# Patient Record
Sex: MALE | Race: WHITE | NOT HISPANIC OR LATINO | Employment: FULL TIME | URBAN - METROPOLITAN AREA
[De-identification: names, ages, dates, MRNs, and addresses within clinical notes are randomized per-mention and may not be internally consistent; named-entity substitution may affect disease eponyms.]

---

## 2017-02-15 ENCOUNTER — CONSULT (OUTPATIENT)
Dept: CARDIOLOGY | Facility: CLINIC | Age: 42
End: 2017-02-15

## 2017-02-15 VITALS
WEIGHT: 243 LBS | HEART RATE: 76 BPM | BODY MASS INDEX: 32.91 KG/M2 | HEIGHT: 72 IN | DIASTOLIC BLOOD PRESSURE: 108 MMHG | SYSTOLIC BLOOD PRESSURE: 150 MMHG

## 2017-02-15 DIAGNOSIS — F10.10 ETOH ABUSE: ICD-10-CM

## 2017-02-15 DIAGNOSIS — I48.3 TYPICAL ATRIAL FLUTTER (HCC): Primary | ICD-10-CM

## 2017-02-15 DIAGNOSIS — G47.33 OSA (OBSTRUCTIVE SLEEP APNEA): ICD-10-CM

## 2017-02-15 PROCEDURE — 99244 OFF/OP CNSLTJ NEW/EST MOD 40: CPT | Performed by: INTERNAL MEDICINE

## 2017-02-15 PROCEDURE — 93000 ELECTROCARDIOGRAM COMPLETE: CPT | Performed by: INTERNAL MEDICINE

## 2017-02-15 RX ORDER — METOPROLOL SUCCINATE 25 MG/1
25 TABLET, EXTENDED RELEASE ORAL 2 TIMES DAILY
COMMUNITY
End: 2017-03-27 | Stop reason: ALTCHOICE

## 2017-02-15 RX ORDER — BUSPIRONE HYDROCHLORIDE 15 MG/1
15 TABLET ORAL 2 TIMES DAILY
COMMUNITY
End: 2017-07-11

## 2017-02-15 RX ORDER — DILTIAZEM HYDROCHLORIDE 120 MG/1
120 CAPSULE, COATED, EXTENDED RELEASE ORAL 2 TIMES DAILY
Qty: 60 CAPSULE | Refills: 5 | Status: SHIPPED | OUTPATIENT
Start: 2017-02-15 | End: 2017-07-31 | Stop reason: SDUPTHER

## 2017-02-15 RX ORDER — CLONAZEPAM 1 MG/1
1 TABLET ORAL AS NEEDED
COMMUNITY
End: 2017-10-26

## 2017-02-15 RX ORDER — AMIODARONE HYDROCHLORIDE 200 MG/1
200 TABLET ORAL DAILY
COMMUNITY
End: 2017-02-15 | Stop reason: ALTCHOICE

## 2017-02-15 RX ORDER — DULOXETIN HYDROCHLORIDE 30 MG/1
60 CAPSULE, DELAYED RELEASE ORAL DAILY
COMMUNITY
End: 2017-03-27

## 2017-02-15 NOTE — PROGRESS NOTES
Cardiology Consults     Stevenson J Ranjana  1975  [phone]    02/15/17      [facility] CARDIOLOGY    VIKASH Nguyen  [address] / [place] [address]    Chief Complaint   Patient presents with   • Palpitations     1. Typical Atrial Flutter      A) Hosptialization 7/2016 for Aflutter in setting of phentermine - cardioverted with Corvert discharged on betablocker      B) Cardiolite stress test 8/2016 Negative for ischemia      C)Echocardiogram 7/2016: mild LVH, biatrial enlargement, EF 55%-60%     D) EPS with RFA atrial flutter 11/21/16 with Dr. Alaniz- [facility], [place], [place], recurrence with ECV and initiation of Amiodarone     E) Recurrence with repeat EPS 12/16/17: with bidirectional block documented, Dr. Alaniz,     F) CHADSvasc = 0 on Eliquis  2. Anxiety/ Depression   3. Ulcerative Colitis - diagnosed at age 23, but not being treated currently  4. Obesity   5. ETOH Abuse   6. DONNIE- non compliant  7. Surgical History   A. Right shoulder   B. Tonsillectomy      History of Present Illness:   41 year old WM referred today for atrial flutter by [facility]. He was diagnosed with atrial flutter in July of 2016 in the setting of phentermine. He underwent EPS and ablation in [place] in 11/2016 and redo in 12/2016 due to recurrence. After the second ablation, it was felt that bidirectional block was obtained.  Apparently, the night after the ablation, he had some occurrence of atrial flutter and possible atrial fibrillation. This is by report, we don't have documentation of this. Since, the second ablation, he still has some palpitations with HRs up to 140s. This happens a few times per week, each episode lasting a few hours. His symptoms include tachy palpitations, chest discomfort, and SOB. He drinks 1/5 of bourbon every three-five days, and sometimes drinks beer as well. He has not cut back on his ETOH consumption. He also has history of  binge drinking on weekends. He also has history of phentermine abuse. He has stopped taking phentermine and has not taken this since 2016. He does not drink high amounts of caffeine. He occasionally drinks an energy drink. He occasionally smokes. His BP is good. He has sleep apnea but could not tolerate the CPAP.  He has not followed up with Dr. Alaniz or worn a monitor since the procedures. He is still taking Amiodarone since the second procedure.       Allergies   Allergen Reactions   • Contrast Dye         Cannot display prior to admission medications because the patient has not been admitted in this contact.            Current Outpatient Prescriptions:   •  amiodarone (PACERONE) 200 MG tablet, Take 200 mg by mouth Daily., Disp: , Rfl:   •  apixaban (ELIQUIS) 5 MG tablet tablet, Take 5 mg by mouth 2 (Two) Times a Day., Disp: , Rfl:   •  busPIRone (BUSPAR) 15 MG tablet, Take 15 mg by mouth 2 (Two) Times a Day., Disp: , Rfl:   •  clonazePAM (KlonoPIN) 1 MG tablet, Take 1 mg by mouth 2 (Two) Times a Day As Needed for seizures., Disp: , Rfl:   •  DULoxetine (CYMBALTA) 30 MG capsule, Take 30 mg by mouth Daily., Disp: , Rfl:   •  magnesium oxide (MAGOX) 400 (241.3 MG) MG tablet tablet, Take 400 mg by mouth Daily., Disp: , Rfl:   •  metoprolol succinate XL (TOPROL-XL) 25 MG 24 hr tablet, Take 25 mg by mouth 2 (Two) Times a Day., Disp: , Rfl:     Social History     Social History   • Marital status:      Spouse name: N/A   • Number of children: N/A   • Years of education: N/A     Social History Main Topics   • Smoking status: Never Smoker   • Smokeless tobacco: None   • Alcohol use No   • Drug use: No   • Sexual activity: Defer     Other Topics Concern   • None     Social History Narrative   • None       Family History: Father  CHF 62. Grandfather on father's   Mother: still living 63 no known cardiac conditions  Cousin with atrial fibrillation, with ablation.     REVIEW OF SYSTEMS:   CONST:  No weight  "loss, fever, chills, weakness or fatigue.   HEENT:  No visual loss, blurred vision, double vision, yellow sclerae.                   No hearing loss, congestion, sore throat.   SKIN:      No rashes, urticaria, ulcers, sores.     RESP:     No shortness of breath, hemoptysis, cough, sputum.   GI:           No anorexia, nausea, vomiting, diarrhea. No abdominal pain, melena.   :         No burning on urination, hematuria or increased frequency.  ENDO:    No diaphoresis, cold or heat intolerance. No polyuria or polydipsia.   NEURO:  No headache, dizziness, syncope, paralysis, ataxia, or parasthesias.                  No change in bowel or bladder control. No history of CVA/TIA  MUSC:    No muscle, back pain, joint pain or stiffness.   HEME:    No anemia, bleeding, bruising. No history of DVT/PE.  PSYCH:  No history of depression, anxiety    Vitals:    02/15/17 1258   BP: (!) 150/108   BP Location: Right arm   Patient Position: Sitting   Pulse: 76   Weight: 243 lb (110 kg)   Height: 72\" (182.9 cm)       Physical Exam:  GEN: Well nourished, Well- developed  No acute distress  HEENT: Normocephalic, Atraumatic, PERRLA, moist mucous membranes  NECK: supple, NO JVD, no thyromegaly, no lymphadenopathy  CARD: S1S2  Tachycardic, regular no murmur, gallop, rub  LUNGS: Clear to auscultataion, normal respiratory effort  ABDOMEN: Soft, nontender, normal bowel sounds  EXTREMITIES:No gross deformities,  No clubbing, cyanosis, or edema  SKIN: Warm, dry  NEURO: No focal deficits  PSYCHIATRIC: Normal affect and mood      Data:                                        ECG 12 Lead  Date/Time: 2/15/2017 1:36 PM  Performed by: KAT REZA  Authorized by: KAT REZA   Rhythm: sinus rhythm  BPM: 76            2nd EKG: shows atrial flutter 136 bpm with 2:1 conduction (this EKG was done after Dr. Reza examined the patient)    Assessment and Plan:   1. Typical atrial flutter    2. DONNIE (obstructive sleep apnea)    3. ETOH abuse  "     1. Typical atrial flutter s/p RFA in 12/2016. By report, this was felt to be successful at an outside hospital. He is still having episodes of atrial flutter documented by EKG in our office today. CHADsvasc = 0 continue Eliquis for now. He will stop Amiodarone today. Stop Metoprolol. Start Cardizem  mg BID. We will try to obtain the tele strips from [facility] the night of his ablation when he apparently went into atrial fibrillation by report. He did go into AFL with RVR while in clinic today. We will schedule him for EPS and redo ablation of atrial flutter. The risks, benefits, and alternatives of the procedure have been reviewed and the patient wishes to proceed.     2. DONNIE- needs to wear his CPAP  3. ETOH abuse- counseled cessation.     Scribed for Ramirez Reza MD by Makenan Flaherty PA-C. 2/15/2017  2:04 PM    I, Ramirez Reza MD, personally performed the services described in this documentation as scribed by the above named individual in my presence, and it is both accurate and complete.  2/15/2017  2:27 PM

## 2017-03-27 ENCOUNTER — APPOINTMENT (OUTPATIENT)
Dept: GENERAL RADIOLOGY | Facility: HOSPITAL | Age: 42
End: 2017-03-27

## 2017-03-27 ENCOUNTER — HOSPITAL ENCOUNTER (INPATIENT)
Facility: HOSPITAL | Age: 42
LOS: 2 days | Discharge: HOME | End: 2017-03-29
Attending: HOSPITALIST | Admitting: HOSPITALIST

## 2017-03-27 DIAGNOSIS — R07.2 PRECORDIAL PAIN: ICD-10-CM

## 2017-03-27 DIAGNOSIS — I48.92 ATRIAL FLUTTER WITH RAPID VENTRICULAR RESPONSE (HCC): Primary | ICD-10-CM

## 2017-03-27 LAB
ALBUMIN SERPL-MCNC: 3.8 G/DL (ref 3.4–4.8)
ALBUMIN/GLOB SERPL: 1.6 G/DL (ref 1.1–1.8)
ALP SERPL-CCNC: 46 U/L (ref 38–126)
ALT SERPL W P-5'-P-CCNC: 75 U/L (ref 21–72)
AMPHET+METHAMPHET UR QL: NEGATIVE
ANION GAP SERPL CALCULATED.3IONS-SCNC: 9 MMOL/L (ref 5–15)
AST SERPL-CCNC: 52 U/L (ref 17–59)
BARBITURATES UR QL SCN: NEGATIVE
BASOPHILS # BLD AUTO: 0.01 10*3/MM3 (ref 0–0.2)
BASOPHILS NFR BLD AUTO: 0.1 % (ref 0–2)
BENZODIAZ UR QL SCN: NEGATIVE
BILIRUB SERPL-MCNC: 1 MG/DL (ref 0.2–1.3)
BUN BLD-MCNC: 14 MG/DL (ref 7–21)
BUN/CREAT SERPL: 12.1 (ref 7–25)
CALCIUM SPEC-SCNC: 8.3 MG/DL (ref 8.4–10.2)
CANNABINOIDS SERPL QL: NEGATIVE
CHLORIDE SERPL-SCNC: 101 MMOL/L (ref 95–110)
CO2 SERPL-SCNC: 27 MMOL/L (ref 22–31)
COCAINE UR QL: NEGATIVE
CREAT BLD-MCNC: 1.16 MG/DL (ref 0.7–1.3)
DEPRECATED RDW RBC AUTO: 50.7 FL (ref 35.1–43.9)
EOSINOPHIL # BLD AUTO: 0.19 10*3/MM3 (ref 0–0.7)
EOSINOPHIL NFR BLD AUTO: 2.1 % (ref 0–7)
ERYTHROCYTE [DISTWIDTH] IN BLOOD BY AUTOMATED COUNT: 14.6 % (ref 11.5–14.5)
GFR SERPL CREATININE-BSD FRML MDRD: 70 ML/MIN/1.73 (ref 70–162)
GLOBULIN UR ELPH-MCNC: 2.4 GM/DL (ref 2.3–3.5)
GLUCOSE BLD-MCNC: 96 MG/DL (ref 60–100)
HCT VFR BLD AUTO: 43.2 % (ref 39–49)
HGB BLD-MCNC: 14.8 G/DL (ref 13.7–17.3)
HOLD SPECIMEN: NORMAL
HOLD SPECIMEN: NORMAL
IMM GRANULOCYTES # BLD: 0.03 10*3/MM3 (ref 0–0.02)
IMM GRANULOCYTES NFR BLD: 0.3 % (ref 0–0.5)
LYMPHOCYTES # BLD AUTO: 1.46 10*3/MM3 (ref 0.6–4.2)
LYMPHOCYTES NFR BLD AUTO: 16.4 % (ref 10–50)
MCH RBC QN AUTO: 32.5 PG (ref 26.5–34)
MCHC RBC AUTO-ENTMCNC: 34.3 G/DL (ref 31.5–36.3)
MCV RBC AUTO: 94.9 FL (ref 80–98)
METHADONE UR QL SCN: NEGATIVE
MONOCYTES # BLD AUTO: 0.62 10*3/MM3 (ref 0–0.9)
MONOCYTES NFR BLD AUTO: 7 % (ref 0–12)
NEUTROPHILS # BLD AUTO: 6.61 10*3/MM3 (ref 2–8.6)
NEUTROPHILS NFR BLD AUTO: 74.1 % (ref 37–80)
OPIATES UR QL: NEGATIVE
OXYCODONE UR QL SCN: NEGATIVE
PLATELET # BLD AUTO: 259 10*3/MM3 (ref 150–450)
PMV BLD AUTO: 10.5 FL (ref 8–12)
POTASSIUM BLD-SCNC: 4 MMOL/L (ref 3.5–5.1)
PROT SERPL-MCNC: 6.2 G/DL (ref 6.3–8.6)
RBC # BLD AUTO: 4.55 10*6/MM3 (ref 4.37–5.74)
SODIUM BLD-SCNC: 137 MMOL/L (ref 137–145)
TROPONIN I SERPL-MCNC: 0.01 NG/ML
TROPONIN I SERPL-MCNC: 0.02 NG/ML
TSH SERPL DL<=0.05 MIU/L-ACNC: 2.02 MIU/ML (ref 0.46–4.68)
WBC NRBC COR # BLD: 8.92 10*3/MM3 (ref 3.2–9.8)
WHOLE BLOOD HOLD SPECIMEN: NORMAL
WHOLE BLOOD HOLD SPECIMEN: NORMAL

## 2017-03-27 PROCEDURE — 80053 COMPREHEN METABOLIC PANEL: CPT | Performed by: EMERGENCY MEDICINE

## 2017-03-27 PROCEDURE — 93005 ELECTROCARDIOGRAM TRACING: CPT | Performed by: EMERGENCY MEDICINE

## 2017-03-27 PROCEDURE — 84443 ASSAY THYROID STIM HORMONE: CPT | Performed by: EMERGENCY MEDICINE

## 2017-03-27 PROCEDURE — 80307 DRUG TEST PRSMV CHEM ANLYZR: CPT | Performed by: EMERGENCY MEDICINE

## 2017-03-27 PROCEDURE — 84484 ASSAY OF TROPONIN QUANT: CPT | Performed by: EMERGENCY MEDICINE

## 2017-03-27 PROCEDURE — 84484 ASSAY OF TROPONIN QUANT: CPT | Performed by: HOSPITALIST

## 2017-03-27 PROCEDURE — 93005 ELECTROCARDIOGRAM TRACING: CPT

## 2017-03-27 PROCEDURE — 93010 ELECTROCARDIOGRAM REPORT: CPT | Performed by: INTERNAL MEDICINE

## 2017-03-27 PROCEDURE — 99285 EMERGENCY DEPT VISIT HI MDM: CPT

## 2017-03-27 PROCEDURE — 85025 COMPLETE CBC W/AUTO DIFF WBC: CPT | Performed by: EMERGENCY MEDICINE

## 2017-03-27 PROCEDURE — 71010 HC CHEST PA OR AP: CPT

## 2017-03-27 RX ORDER — SODIUM CHLORIDE 0.9 % (FLUSH) 0.9 %
10 SYRINGE (ML) INJECTION AS NEEDED
Status: DISCONTINUED | OUTPATIENT
Start: 2017-03-27 | End: 2017-03-29 | Stop reason: HOSPADM

## 2017-03-27 RX ORDER — LORAZEPAM 2 MG/ML
1 INJECTION INTRAMUSCULAR
Status: DISCONTINUED | OUTPATIENT
Start: 2017-03-27 | End: 2017-03-29 | Stop reason: HOSPADM

## 2017-03-27 RX ORDER — DULOXETIN HYDROCHLORIDE 60 MG/1
60 CAPSULE, DELAYED RELEASE ORAL NIGHTLY
Status: DISCONTINUED | OUTPATIENT
Start: 2017-03-27 | End: 2017-03-29 | Stop reason: HOSPADM

## 2017-03-27 RX ORDER — TRAZODONE HYDROCHLORIDE 50 MG/1
50 TABLET ORAL NIGHTLY PRN
Status: DISCONTINUED | OUTPATIENT
Start: 2017-03-27 | End: 2017-03-29 | Stop reason: HOSPADM

## 2017-03-27 RX ORDER — SODIUM CHLORIDE 0.9 % (FLUSH) 0.9 %
1-10 SYRINGE (ML) INJECTION AS NEEDED
Status: DISCONTINUED | OUTPATIENT
Start: 2017-03-27 | End: 2017-03-29 | Stop reason: HOSPADM

## 2017-03-27 RX ORDER — DILTIAZEM HYDROCHLORIDE 300 MG/1
300 CAPSULE, COATED, EXTENDED RELEASE ORAL
Status: DISCONTINUED | OUTPATIENT
Start: 2017-03-28 | End: 2017-03-27

## 2017-03-27 RX ORDER — BUSPIRONE HYDROCHLORIDE 15 MG/1
15 TABLET ORAL 2 TIMES DAILY
Status: DISCONTINUED | OUTPATIENT
Start: 2017-03-28 | End: 2017-03-28 | Stop reason: SDUPTHER

## 2017-03-27 RX ORDER — ASPIRIN 325 MG
325 TABLET ORAL ONCE
Status: DISCONTINUED | OUTPATIENT
Start: 2017-03-27 | End: 2017-03-29 | Stop reason: HOSPADM

## 2017-03-27 RX ORDER — DULOXETIN HYDROCHLORIDE 60 MG/1
60 CAPSULE, DELAYED RELEASE ORAL DAILY
COMMUNITY
End: 2018-06-06 | Stop reason: DRUGHIGH

## 2017-03-27 RX ORDER — BUSPIRONE HYDROCHLORIDE 15 MG/1
15 TABLET ORAL 2 TIMES DAILY
Status: DISCONTINUED | OUTPATIENT
Start: 2017-03-28 | End: 2017-03-29 | Stop reason: HOSPADM

## 2017-03-27 RX ORDER — LORAZEPAM 0.5 MG/1
0.5 TABLET ORAL
Status: DISCONTINUED | OUTPATIENT
Start: 2017-03-27 | End: 2017-03-29 | Stop reason: HOSPADM

## 2017-03-27 RX ORDER — CLONAZEPAM 0.5 MG/1
1 TABLET ORAL 2 TIMES DAILY PRN
Status: DISCONTINUED | OUTPATIENT
Start: 2017-03-27 | End: 2017-03-29 | Stop reason: HOSPADM

## 2017-03-27 RX ORDER — DILTIAZEM HYDROCHLORIDE 5 MG/ML
20 INJECTION INTRAVENOUS ONCE
Status: COMPLETED | OUTPATIENT
Start: 2017-03-27 | End: 2017-03-27

## 2017-03-27 RX ORDER — LORAZEPAM 1 MG/1
1 TABLET ORAL
Status: DISCONTINUED | OUTPATIENT
Start: 2017-03-27 | End: 2017-03-29 | Stop reason: HOSPADM

## 2017-03-27 RX ORDER — LORAZEPAM 2 MG/ML
0.5 INJECTION INTRAMUSCULAR
Status: DISCONTINUED | OUTPATIENT
Start: 2017-03-27 | End: 2017-03-29 | Stop reason: HOSPADM

## 2017-03-27 RX ADMIN — DILTIAZEM HYDROCHLORIDE 20 MG: 5 INJECTION INTRAVENOUS at 17:38

## 2017-03-27 RX ADMIN — DULOXETINE 60 MG: 60 CAPSULE, DELAYED RELEASE ORAL at 23:25

## 2017-03-27 RX ADMIN — DILTIAZEM HYDROCHLORIDE 5 MG/HR: 5 INJECTION INTRAVENOUS at 23:34

## 2017-03-28 LAB
TROPONIN I SERPL-MCNC: 0.01 NG/ML
TROPONIN I SERPL-MCNC: 0.02 NG/ML
TROPONIN I SERPL-MCNC: 0.02 NG/ML

## 2017-03-28 PROCEDURE — 93005 ELECTROCARDIOGRAM TRACING: CPT | Performed by: INTERNAL MEDICINE

## 2017-03-28 PROCEDURE — 93010 ELECTROCARDIOGRAM REPORT: CPT | Performed by: INTERNAL MEDICINE

## 2017-03-28 PROCEDURE — 84484 ASSAY OF TROPONIN QUANT: CPT | Performed by: HOSPITALIST

## 2017-03-28 PROCEDURE — 25010000002 IBUTILIDE FUMARATE PER 1 MG: Performed by: INTERNAL MEDICINE

## 2017-03-28 RX ORDER — DILTIAZEM HYDROCHLORIDE 60 MG/1
120 CAPSULE, EXTENDED RELEASE ORAL EVERY 12 HOURS SCHEDULED
Status: DISCONTINUED | OUTPATIENT
Start: 2017-03-28 | End: 2017-03-29 | Stop reason: HOSPADM

## 2017-03-28 RX ORDER — PANTOPRAZOLE SODIUM 40 MG/1
40 TABLET, DELAYED RELEASE ORAL
Status: DISCONTINUED | OUTPATIENT
Start: 2017-03-29 | End: 2017-03-29 | Stop reason: HOSPADM

## 2017-03-28 RX ADMIN — DILTIAZEM HYDROCHLORIDE 5 MG/HR: 5 INJECTION INTRAVENOUS at 20:12

## 2017-03-28 RX ADMIN — APIXABAN 5 MG: 5 TABLET, FILM COATED ORAL at 17:52

## 2017-03-28 RX ADMIN — MAGNESIUM OXIDE TAB 400 MG (241.3 MG ELEMENTAL MG) 400 MG: 400 (241.3 MG) TAB at 08:08

## 2017-03-28 RX ADMIN — BUSPIRONE HYDROCHLORIDE 15 MG: 15 TABLET ORAL at 08:08

## 2017-03-28 RX ADMIN — DULOXETINE 60 MG: 60 CAPSULE, DELAYED RELEASE ORAL at 20:12

## 2017-03-28 RX ADMIN — APIXABAN 5 MG: 5 TABLET, FILM COATED ORAL at 08:08

## 2017-03-28 RX ADMIN — BUSPIRONE HYDROCHLORIDE 15 MG: 15 TABLET ORAL at 17:52

## 2017-03-28 RX ADMIN — BUSPIRONE HYDROCHLORIDE 15 MG: 15 TABLET ORAL at 00:52

## 2017-03-28 RX ADMIN — IBUTILIDE FUMARATE 1 MG: 0.1 INJECTION, SOLUTION INTRAVENOUS at 03:18

## 2017-03-28 RX ADMIN — DILTIAZEM HYDROCHLORIDE 120 MG: 60 CAPSULE, EXTENDED RELEASE ORAL at 21:42

## 2017-03-28 RX ADMIN — TRAZODONE HYDROCHLORIDE 50 MG: 50 TABLET ORAL at 01:05

## 2017-03-28 RX ADMIN — CLONAZEPAM 1 MG: 0.5 TABLET ORAL at 00:52

## 2017-03-29 VITALS
SYSTOLIC BLOOD PRESSURE: 132 MMHG | TEMPERATURE: 96.6 F | DIASTOLIC BLOOD PRESSURE: 73 MMHG | RESPIRATION RATE: 18 BRPM | BODY MASS INDEX: 32.53 KG/M2 | HEART RATE: 75 BPM | WEIGHT: 240.2 LBS | OXYGEN SATURATION: 94 % | HEIGHT: 72 IN

## 2017-03-29 LAB
ALBUMIN SERPL-MCNC: 3.5 G/DL (ref 3.4–4.8)
ALBUMIN/GLOB SERPL: 1.5 G/DL (ref 1.1–1.8)
ALP SERPL-CCNC: 42 U/L (ref 38–126)
ALT SERPL W P-5'-P-CCNC: 71 U/L (ref 21–72)
ANION GAP SERPL CALCULATED.3IONS-SCNC: 10 MMOL/L (ref 5–15)
AST SERPL-CCNC: 56 U/L (ref 17–59)
BASOPHILS # BLD AUTO: 0.02 10*3/MM3 (ref 0–0.2)
BASOPHILS NFR BLD AUTO: 0.3 % (ref 0–2)
BILIRUB SERPL-MCNC: 0.9 MG/DL (ref 0.2–1.3)
BUN BLD-MCNC: 13 MG/DL (ref 7–21)
BUN/CREAT SERPL: 10.8 (ref 7–25)
CALCIUM SPEC-SCNC: 8.3 MG/DL (ref 8.4–10.2)
CHLORIDE SERPL-SCNC: 104 MMOL/L (ref 95–110)
CO2 SERPL-SCNC: 26 MMOL/L (ref 22–31)
CREAT BLD-MCNC: 1.2 MG/DL (ref 0.7–1.3)
DEPRECATED RDW RBC AUTO: 50.6 FL (ref 35.1–43.9)
EOSINOPHIL # BLD AUTO: 0.17 10*3/MM3 (ref 0–0.7)
EOSINOPHIL NFR BLD AUTO: 2.5 % (ref 0–7)
ERYTHROCYTE [DISTWIDTH] IN BLOOD BY AUTOMATED COUNT: 14.8 % (ref 11.5–14.5)
GFR SERPL CREATININE-BSD FRML MDRD: 67 ML/MIN/1.73 (ref 70–162)
GLOBULIN UR ELPH-MCNC: 2.4 GM/DL (ref 2.3–3.5)
GLUCOSE BLD-MCNC: 87 MG/DL (ref 60–100)
HCT VFR BLD AUTO: 42.1 % (ref 39–49)
HGB BLD-MCNC: 14.1 G/DL (ref 13.7–17.3)
IMM GRANULOCYTES # BLD: 0.01 10*3/MM3 (ref 0–0.02)
IMM GRANULOCYTES NFR BLD: 0.1 % (ref 0–0.5)
LYMPHOCYTES # BLD AUTO: 1.49 10*3/MM3 (ref 0.6–4.2)
LYMPHOCYTES NFR BLD AUTO: 22 % (ref 10–50)
MAGNESIUM SERPL-MCNC: 2.1 MG/DL (ref 1.6–2.3)
MCH RBC QN AUTO: 31.5 PG (ref 26.5–34)
MCHC RBC AUTO-ENTMCNC: 33.5 G/DL (ref 31.5–36.3)
MCV RBC AUTO: 94 FL (ref 80–98)
MONOCYTES # BLD AUTO: 0.73 10*3/MM3 (ref 0–0.9)
MONOCYTES NFR BLD AUTO: 10.8 % (ref 0–12)
NEUTROPHILS # BLD AUTO: 4.34 10*3/MM3 (ref 2–8.6)
NEUTROPHILS NFR BLD AUTO: 64.3 % (ref 37–80)
PLATELET # BLD AUTO: 246 10*3/MM3 (ref 150–450)
PMV BLD AUTO: 10.6 FL (ref 8–12)
POTASSIUM BLD-SCNC: 4.2 MMOL/L (ref 3.5–5.1)
PROT SERPL-MCNC: 5.9 G/DL (ref 6.3–8.6)
RBC # BLD AUTO: 4.48 10*6/MM3 (ref 4.37–5.74)
SODIUM BLD-SCNC: 140 MMOL/L (ref 137–145)
WBC NRBC COR # BLD: 6.76 10*3/MM3 (ref 3.2–9.8)

## 2017-03-29 PROCEDURE — 80053 COMPREHEN METABOLIC PANEL: CPT | Performed by: INTERNAL MEDICINE

## 2017-03-29 PROCEDURE — 85025 COMPLETE CBC W/AUTO DIFF WBC: CPT | Performed by: INTERNAL MEDICINE

## 2017-03-29 PROCEDURE — 83735 ASSAY OF MAGNESIUM: CPT | Performed by: INTERNAL MEDICINE

## 2017-03-29 PROCEDURE — 25010000002 MAGNESIUM SULFATE PER 500 MG OF MAGNESIUM: Performed by: HOSPITALIST

## 2017-03-29 PROCEDURE — 25010000002 THIAMINE PER 100 MG: Performed by: HOSPITALIST

## 2017-03-29 RX ADMIN — CLONAZEPAM 1 MG: 0.5 TABLET ORAL at 00:04

## 2017-03-29 RX ADMIN — MAGNESIUM OXIDE TAB 400 MG (241.3 MG ELEMENTAL MG) 400 MG: 400 (241.3 MG) TAB at 08:10

## 2017-03-29 RX ADMIN — TRAZODONE HYDROCHLORIDE 50 MG: 50 TABLET ORAL at 00:03

## 2017-03-29 RX ADMIN — FOLIC ACID 100 ML/HR: 5 INJECTION, SOLUTION INTRAMUSCULAR; INTRAVENOUS; SUBCUTANEOUS at 08:11

## 2017-03-29 RX ADMIN — APIXABAN 5 MG: 5 TABLET, FILM COATED ORAL at 08:10

## 2017-03-29 RX ADMIN — DILTIAZEM HYDROCHLORIDE 120 MG: 60 CAPSULE, EXTENDED RELEASE ORAL at 08:09

## 2017-03-29 RX ADMIN — BUSPIRONE HYDROCHLORIDE 15 MG: 15 TABLET ORAL at 08:10

## 2017-04-07 ENCOUNTER — PREP FOR SURGERY (OUTPATIENT)
Dept: CARDIOLOGY | Facility: CLINIC | Age: 42
End: 2017-04-07

## 2017-04-07 DIAGNOSIS — I48.3 TYPICAL ATRIAL FLUTTER (HCC): Primary | ICD-10-CM

## 2017-04-07 RX ORDER — ACETAMINOPHEN 325 MG/1
650 TABLET ORAL EVERY 4 HOURS PRN
Status: CANCELLED | OUTPATIENT
Start: 2017-04-07

## 2017-04-07 RX ORDER — NITROGLYCERIN 0.4 MG/1
0.4 TABLET SUBLINGUAL
Status: CANCELLED | OUTPATIENT
Start: 2017-04-07

## 2017-04-07 RX ORDER — PROMETHAZINE HYDROCHLORIDE 25 MG/ML
12.5 INJECTION, SOLUTION INTRAMUSCULAR; INTRAVENOUS EVERY 4 HOURS PRN
Status: CANCELLED | OUTPATIENT
Start: 2017-04-07

## 2017-04-17 ENCOUNTER — TELEPHONE (OUTPATIENT)
Dept: CARDIOLOGY | Facility: CLINIC | Age: 42
End: 2017-04-17

## 2017-05-01 ENCOUNTER — APPOINTMENT (OUTPATIENT)
Dept: PREADMISSION TESTING | Facility: HOSPITAL | Age: 42
End: 2017-05-01

## 2017-05-01 DIAGNOSIS — I48.3 TYPICAL ATRIAL FLUTTER (HCC): ICD-10-CM

## 2017-05-01 LAB
ANION GAP SERPL CALCULATED.3IONS-SCNC: 3 MMOL/L (ref 3–11)
BUN BLD-MCNC: 16 MG/DL (ref 9–23)
BUN/CREAT SERPL: 12.3 (ref 7–25)
CALCIUM SPEC-SCNC: 9.2 MG/DL (ref 8.7–10.4)
CHLORIDE SERPL-SCNC: 105 MMOL/L (ref 99–109)
CO2 SERPL-SCNC: 32 MMOL/L (ref 20–31)
CREAT BLD-MCNC: 1.3 MG/DL (ref 0.6–1.3)
DEPRECATED RDW RBC AUTO: 48.9 FL (ref 37–54)
ERYTHROCYTE [DISTWIDTH] IN BLOOD BY AUTOMATED COUNT: 13.8 % (ref 11.3–14.5)
GFR SERPL CREATININE-BSD FRML MDRD: 61 ML/MIN/1.73
GLUCOSE BLD-MCNC: 91 MG/DL (ref 70–100)
HBA1C MFR BLD: 5.1 % (ref 4.8–5.6)
HCT VFR BLD AUTO: 48.8 % (ref 38.9–50.9)
HGB BLD-MCNC: 15.9 G/DL (ref 13.1–17.5)
INR PPP: 1.1
MCH RBC QN AUTO: 31.5 PG (ref 27–31)
MCHC RBC AUTO-ENTMCNC: 32.6 G/DL (ref 32–36)
MCV RBC AUTO: 96.8 FL (ref 80–99)
PLATELET # BLD AUTO: 328 10*3/MM3 (ref 150–450)
PMV BLD AUTO: 10.5 FL (ref 6–12)
POTASSIUM BLD-SCNC: 4.5 MMOL/L (ref 3.5–5.5)
PROTHROMBIN TIME: 12 SECONDS (ref 9.6–11.5)
RBC # BLD AUTO: 5.04 10*6/MM3 (ref 4.2–5.76)
SODIUM BLD-SCNC: 140 MMOL/L (ref 132–146)
WBC NRBC COR # BLD: 6.81 10*3/MM3 (ref 3.5–10.8)

## 2017-05-01 RX ORDER — DULOXETIN HYDROCHLORIDE 30 MG/1
30 CAPSULE, DELAYED RELEASE ORAL
COMMUNITY
Start: 2016-09-23 | End: 2017-05-01 | Stop reason: DRUGHIGH

## 2017-05-02 ENCOUNTER — HOSPITAL ENCOUNTER (OUTPATIENT)
Facility: HOSPITAL | Age: 42
Setting detail: OBSERVATION
Discharge: HOME | End: 2017-05-03
Attending: INTERNAL MEDICINE | Admitting: INTERNAL MEDICINE

## 2017-05-02 DIAGNOSIS — I48.3 TYPICAL ATRIAL FLUTTER (HCC): ICD-10-CM

## 2017-05-02 PROCEDURE — 25010000002 FENTANYL CITRATE (PF) 100 MCG/2ML SOLUTION: Performed by: INTERNAL MEDICINE

## 2017-05-02 PROCEDURE — C1732 CATH, EP, DIAG/ABL, 3D/VECT: HCPCS | Performed by: INTERNAL MEDICINE

## 2017-05-02 PROCEDURE — C1894 INTRO/SHEATH, NON-LASER: HCPCS | Performed by: INTERNAL MEDICINE

## 2017-05-02 PROCEDURE — 93653 COMPRE EP EVAL TX SVT: CPT | Performed by: INTERNAL MEDICINE

## 2017-05-02 PROCEDURE — 25010000002 HEPARIN (PORCINE) PER 1000 UNITS: Performed by: INTERNAL MEDICINE

## 2017-05-02 PROCEDURE — 25010000002 DIPHENHYDRAMINE PER 50 MG: Performed by: INTERNAL MEDICINE

## 2017-05-02 PROCEDURE — 94660 CPAP INITIATION&MGMT: CPT

## 2017-05-02 PROCEDURE — G0378 HOSPITAL OBSERVATION PER HR: HCPCS

## 2017-05-02 PROCEDURE — C1730 CATH, EP, 19 OR FEW ELECT: HCPCS | Performed by: INTERNAL MEDICINE

## 2017-05-02 PROCEDURE — 93623 PRGRMD STIMJ&PACG IV RX NFS: CPT | Performed by: INTERNAL MEDICINE

## 2017-05-02 PROCEDURE — 99152 MOD SED SAME PHYS/QHP 5/>YRS: CPT | Performed by: INTERNAL MEDICINE

## 2017-05-02 PROCEDURE — 93613 INTRACARDIAC EPHYS 3D MAPG: CPT | Performed by: INTERNAL MEDICINE

## 2017-05-02 PROCEDURE — 25010000002 MIDAZOLAM PER 1 MG: Performed by: INTERNAL MEDICINE

## 2017-05-02 PROCEDURE — 25010000002 ONDANSETRON PER 1 MG: Performed by: INTERNAL MEDICINE

## 2017-05-02 RX ORDER — ACETAMINOPHEN 325 MG/1
650 TABLET ORAL EVERY 4 HOURS PRN
Status: DISCONTINUED | OUTPATIENT
Start: 2017-05-02 | End: 2017-05-02 | Stop reason: HOSPADM

## 2017-05-02 RX ORDER — ONDANSETRON 2 MG/ML
INJECTION INTRAMUSCULAR; INTRAVENOUS AS NEEDED
Status: DISCONTINUED | OUTPATIENT
Start: 2017-05-02 | End: 2017-05-02 | Stop reason: HOSPADM

## 2017-05-02 RX ORDER — DULOXETIN HYDROCHLORIDE 60 MG/1
60 CAPSULE, DELAYED RELEASE ORAL NIGHTLY
Status: DISCONTINUED | OUTPATIENT
Start: 2017-05-02 | End: 2017-05-03 | Stop reason: HOSPADM

## 2017-05-02 RX ORDER — DIPHENHYDRAMINE HYDROCHLORIDE 50 MG/ML
INJECTION INTRAMUSCULAR; INTRAVENOUS AS NEEDED
Status: DISCONTINUED | OUTPATIENT
Start: 2017-05-02 | End: 2017-05-02 | Stop reason: HOSPADM

## 2017-05-02 RX ORDER — HYDROCODONE BITARTRATE AND ACETAMINOPHEN 5; 325 MG/1; MG/1
1 TABLET ORAL EVERY 4 HOURS PRN
Status: DISCONTINUED | OUTPATIENT
Start: 2017-05-02 | End: 2017-05-03 | Stop reason: HOSPADM

## 2017-05-02 RX ORDER — NITROGLYCERIN 0.4 MG/1
0.4 TABLET SUBLINGUAL
Status: DISCONTINUED | OUTPATIENT
Start: 2017-05-02 | End: 2017-05-02 | Stop reason: HOSPADM

## 2017-05-02 RX ORDER — CLONAZEPAM 1 MG/1
1 TABLET ORAL AS NEEDED
Status: DISCONTINUED | OUTPATIENT
Start: 2017-05-02 | End: 2017-05-03 | Stop reason: HOSPADM

## 2017-05-02 RX ORDER — SODIUM CHLORIDE 9 MG/ML
INJECTION, SOLUTION INTRAVENOUS CONTINUOUS PRN
Status: DISCONTINUED | OUTPATIENT
Start: 2017-05-02 | End: 2017-05-02 | Stop reason: HOSPADM

## 2017-05-02 RX ORDER — METOPROLOL SUCCINATE 25 MG/1
25 TABLET, EXTENDED RELEASE ORAL 2 TIMES DAILY
COMMUNITY
End: 2017-05-03 | Stop reason: HOSPADM

## 2017-05-02 RX ORDER — BUSPIRONE HYDROCHLORIDE 15 MG/1
15 TABLET ORAL 2 TIMES DAILY
Status: DISCONTINUED | OUTPATIENT
Start: 2017-05-02 | End: 2017-05-03 | Stop reason: HOSPADM

## 2017-05-02 RX ORDER — LIDOCAINE HYDROCHLORIDE 10 MG/ML
INJECTION, SOLUTION INFILTRATION; PERINEURAL AS NEEDED
Status: DISCONTINUED | OUTPATIENT
Start: 2017-05-02 | End: 2017-05-02 | Stop reason: HOSPADM

## 2017-05-02 RX ORDER — ONDANSETRON 2 MG/ML
4 INJECTION INTRAMUSCULAR; INTRAVENOUS EVERY 6 HOURS PRN
Status: DISCONTINUED | OUTPATIENT
Start: 2017-05-02 | End: 2017-05-03 | Stop reason: HOSPADM

## 2017-05-02 RX ORDER — PROMETHAZINE HYDROCHLORIDE 25 MG/ML
12.5 INJECTION, SOLUTION INTRAMUSCULAR; INTRAVENOUS EVERY 4 HOURS PRN
Status: DISCONTINUED | OUTPATIENT
Start: 2017-05-02 | End: 2017-05-02 | Stop reason: HOSPADM

## 2017-05-02 RX ORDER — MIDAZOLAM HYDROCHLORIDE 1 MG/ML
INJECTION INTRAMUSCULAR; INTRAVENOUS AS NEEDED
Status: DISCONTINUED | OUTPATIENT
Start: 2017-05-02 | End: 2017-05-02 | Stop reason: HOSPADM

## 2017-05-02 RX ORDER — FENTANYL CITRATE 50 UG/ML
INJECTION, SOLUTION INTRAMUSCULAR; INTRAVENOUS AS NEEDED
Status: DISCONTINUED | OUTPATIENT
Start: 2017-05-02 | End: 2017-05-02 | Stop reason: HOSPADM

## 2017-05-02 RX ADMIN — HYDROCODONE BITARTRATE AND ACETAMINOPHEN 1 TABLET: 5; 325 TABLET ORAL at 21:14

## 2017-05-02 RX ADMIN — DULOXETINE 60 MG: 60 CAPSULE, DELAYED RELEASE ORAL at 21:10

## 2017-05-02 RX ADMIN — APIXABAN 5 MG: 5 TABLET, FILM COATED ORAL at 21:11

## 2017-05-02 RX ADMIN — CLONAZEPAM 1 MG: 1 TABLET ORAL at 21:12

## 2017-05-02 RX ADMIN — BUSPIRONE HYDROCHLORIDE 15 MG: 15 TABLET ORAL at 21:10

## 2017-05-03 VITALS
TEMPERATURE: 98.7 F | DIASTOLIC BLOOD PRESSURE: 93 MMHG | HEIGHT: 72 IN | RESPIRATION RATE: 8 BRPM | BODY MASS INDEX: 33.44 KG/M2 | SYSTOLIC BLOOD PRESSURE: 133 MMHG | HEART RATE: 92 BPM | WEIGHT: 246.91 LBS | OXYGEN SATURATION: 96 %

## 2017-05-03 PROCEDURE — 99225 PR SBSQ OBSERVATION CARE/DAY 25 MINUTES: CPT | Performed by: INTERNAL MEDICINE

## 2017-05-03 PROCEDURE — 93010 ELECTROCARDIOGRAM REPORT: CPT | Performed by: INTERNAL MEDICINE

## 2017-05-03 PROCEDURE — 93005 ELECTROCARDIOGRAM TRACING: CPT | Performed by: INTERNAL MEDICINE

## 2017-05-03 PROCEDURE — G0378 HOSPITAL OBSERVATION PER HR: HCPCS

## 2017-05-03 RX ADMIN — BUSPIRONE HYDROCHLORIDE 15 MG: 15 TABLET ORAL at 08:20

## 2017-05-03 RX ADMIN — APIXABAN 5 MG: 5 TABLET, FILM COATED ORAL at 08:20

## 2017-05-05 ENCOUNTER — HOSPITAL ENCOUNTER (EMERGENCY)
Facility: HOSPITAL | Age: 42
Discharge: HOME | End: 2017-05-05
Attending: EMERGENCY MEDICINE | Admitting: EMERGENCY MEDICINE

## 2017-05-05 VITALS
DIASTOLIC BLOOD PRESSURE: 90 MMHG | RESPIRATION RATE: 18 BRPM | HEART RATE: 108 BPM | BODY MASS INDEX: 33.47 KG/M2 | OXYGEN SATURATION: 95 % | TEMPERATURE: 97.8 F | HEIGHT: 72 IN | SYSTOLIC BLOOD PRESSURE: 148 MMHG | WEIGHT: 247.1 LBS

## 2017-05-05 DIAGNOSIS — R07.2 PRECORDIAL PAIN: Primary | ICD-10-CM

## 2017-05-05 DIAGNOSIS — I48.20 CHRONIC ATRIAL FIBRILLATION (HCC): ICD-10-CM

## 2017-05-05 LAB
ALBUMIN SERPL-MCNC: 4.2 G/DL (ref 3.4–4.8)
ALBUMIN/GLOB SERPL: 1.4 G/DL (ref 1.1–1.8)
ALP SERPL-CCNC: 54 U/L (ref 38–126)
ALT SERPL W P-5'-P-CCNC: 146 U/L (ref 21–72)
AMPHET+METHAMPHET UR QL: NEGATIVE
ANION GAP SERPL CALCULATED.3IONS-SCNC: 13 MMOL/L (ref 5–15)
AST SERPL-CCNC: 51 U/L (ref 17–59)
BARBITURATES UR QL SCN: NEGATIVE
BASOPHILS # BLD AUTO: 0.01 10*3/MM3 (ref 0–0.2)
BASOPHILS NFR BLD AUTO: 0.1 % (ref 0–2)
BENZODIAZ UR QL SCN: NEGATIVE
BILIRUB SERPL-MCNC: 1 MG/DL (ref 0.2–1.3)
BUN BLD-MCNC: 15 MG/DL (ref 7–21)
BUN/CREAT SERPL: 12.5 (ref 7–25)
CALCIUM SPEC-SCNC: 9.1 MG/DL (ref 8.4–10.2)
CANNABINOIDS SERPL QL: NEGATIVE
CHLORIDE SERPL-SCNC: 102 MMOL/L (ref 95–110)
CK MB SERPL-CCNC: 5.29 NG/ML (ref 0–5)
CK SERPL-CCNC: 214 U/L (ref 55–170)
CO2 SERPL-SCNC: 24 MMOL/L (ref 22–31)
COCAINE UR QL: NEGATIVE
CREAT BLD-MCNC: 1.2 MG/DL (ref 0.7–1.3)
DEPRECATED RDW RBC AUTO: 45.8 FL (ref 35.1–43.9)
EOSINOPHIL # BLD AUTO: 0.15 10*3/MM3 (ref 0–0.7)
EOSINOPHIL NFR BLD AUTO: 1.9 % (ref 0–7)
ERYTHROCYTE [DISTWIDTH] IN BLOOD BY AUTOMATED COUNT: 13.8 % (ref 11.5–14.5)
GFR SERPL CREATININE-BSD FRML MDRD: 67 ML/MIN/1.73 (ref 70–162)
GLOBULIN UR ELPH-MCNC: 2.9 GM/DL (ref 2.3–3.5)
GLUCOSE BLD-MCNC: 109 MG/DL (ref 60–100)
HCT VFR BLD AUTO: 47.1 % (ref 39–49)
HGB BLD-MCNC: 16.3 G/DL (ref 13.7–17.3)
HOLD SPECIMEN: NORMAL
HOLD SPECIMEN: NORMAL
IMM GRANULOCYTES # BLD: 0.01 10*3/MM3 (ref 0–0.02)
IMM GRANULOCYTES NFR BLD: 0.1 % (ref 0–0.5)
LYMPHOCYTES # BLD AUTO: 1.1 10*3/MM3 (ref 0.6–4.2)
LYMPHOCYTES NFR BLD AUTO: 13.6 % (ref 10–50)
MCH RBC QN AUTO: 31.8 PG (ref 26.5–34)
MCHC RBC AUTO-ENTMCNC: 34.6 G/DL (ref 31.5–36.3)
MCV RBC AUTO: 92 FL (ref 80–98)
METHADONE UR QL SCN: NEGATIVE
MONOCYTES # BLD AUTO: 0.33 10*3/MM3 (ref 0–0.9)
MONOCYTES NFR BLD AUTO: 4.1 % (ref 0–12)
NEUTROPHILS # BLD AUTO: 6.47 10*3/MM3 (ref 2–8.6)
NEUTROPHILS NFR BLD AUTO: 80.2 % (ref 37–80)
NT-PROBNP SERPL-MCNC: 251 PG/ML (ref 0–450)
OPIATES UR QL: NEGATIVE
OXYCODONE UR QL SCN: NEGATIVE
PLATELET # BLD AUTO: 269 10*3/MM3 (ref 150–450)
PMV BLD AUTO: 10.7 FL (ref 8–12)
POTASSIUM BLD-SCNC: 4 MMOL/L (ref 3.5–5.1)
PROT SERPL-MCNC: 7.1 G/DL (ref 6.3–8.6)
RBC # BLD AUTO: 5.12 10*6/MM3 (ref 4.37–5.74)
SODIUM BLD-SCNC: 139 MMOL/L (ref 137–145)
TROPONIN I SERPL-MCNC: 0.03 NG/ML
WBC NRBC COR # BLD: 8.07 10*3/MM3 (ref 3.2–9.8)
WHOLE BLOOD HOLD SPECIMEN: NORMAL
WHOLE BLOOD HOLD SPECIMEN: NORMAL

## 2017-05-05 PROCEDURE — 80307 DRUG TEST PRSMV CHEM ANLYZR: CPT | Performed by: EMERGENCY MEDICINE

## 2017-05-05 PROCEDURE — 84484 ASSAY OF TROPONIN QUANT: CPT | Performed by: EMERGENCY MEDICINE

## 2017-05-05 PROCEDURE — 82550 ASSAY OF CK (CPK): CPT | Performed by: EMERGENCY MEDICINE

## 2017-05-05 PROCEDURE — 85025 COMPLETE CBC W/AUTO DIFF WBC: CPT | Performed by: EMERGENCY MEDICINE

## 2017-05-05 PROCEDURE — 82553 CREATINE MB FRACTION: CPT | Performed by: EMERGENCY MEDICINE

## 2017-05-05 PROCEDURE — 93005 ELECTROCARDIOGRAM TRACING: CPT | Performed by: EMERGENCY MEDICINE

## 2017-05-05 PROCEDURE — 93010 ELECTROCARDIOGRAM REPORT: CPT | Performed by: INTERNAL MEDICINE

## 2017-05-05 PROCEDURE — 83880 ASSAY OF NATRIURETIC PEPTIDE: CPT | Performed by: EMERGENCY MEDICINE

## 2017-05-05 PROCEDURE — 80053 COMPREHEN METABOLIC PANEL: CPT | Performed by: EMERGENCY MEDICINE

## 2017-05-05 PROCEDURE — 99284 EMERGENCY DEPT VISIT MOD MDM: CPT

## 2017-05-05 RX ORDER — ASPIRIN 81 MG/1
324 TABLET, CHEWABLE ORAL ONCE
Status: COMPLETED | OUTPATIENT
Start: 2017-05-05 | End: 2017-05-05

## 2017-05-05 RX ORDER — SODIUM CHLORIDE 0.9 % (FLUSH) 0.9 %
10 SYRINGE (ML) INJECTION AS NEEDED
Status: DISCONTINUED | OUTPATIENT
Start: 2017-05-05 | End: 2017-05-05 | Stop reason: HOSPADM

## 2017-05-05 RX ADMIN — ASPIRIN 324 MG: 81 TABLET, CHEWABLE ORAL at 19:00

## 2017-07-11 ENCOUNTER — OFFICE VISIT (OUTPATIENT)
Dept: CARDIOLOGY | Facility: CLINIC | Age: 42
End: 2017-07-11

## 2017-07-11 VITALS
DIASTOLIC BLOOD PRESSURE: 90 MMHG | HEART RATE: 141 BPM | HEIGHT: 72 IN | WEIGHT: 245 LBS | BODY MASS INDEX: 33.18 KG/M2 | SYSTOLIC BLOOD PRESSURE: 126 MMHG

## 2017-07-11 DIAGNOSIS — I48.19 PERSISTENT ATRIAL FIBRILLATION (HCC): Primary | ICD-10-CM

## 2017-07-11 DIAGNOSIS — I48.3 TYPICAL ATRIAL FLUTTER (HCC): ICD-10-CM

## 2017-07-11 DIAGNOSIS — G47.33 OSA (OBSTRUCTIVE SLEEP APNEA): ICD-10-CM

## 2017-07-11 PROCEDURE — 93000 ELECTROCARDIOGRAM COMPLETE: CPT | Performed by: INTERNAL MEDICINE

## 2017-07-11 PROCEDURE — 99213 OFFICE O/P EST LOW 20 MIN: CPT | Performed by: INTERNAL MEDICINE

## 2017-07-11 RX ORDER — ASPIRIN 81 MG/1
81 TABLET ORAL DAILY
COMMUNITY
End: 2017-08-30 | Stop reason: ALTCHOICE

## 2017-07-11 NOTE — PROGRESS NOTES
Stevenson ELIZABETH Ranjana  1975  [phone]  [phone]    07/11/2017    [facility] CARDIOLOGY     Irma Schuster, APRN  [address] [id number]    Chief Complaint   Patient presents with   • Atrial Fibrillation       Problem List:     1. Typical Atrial Flutter      A) Hosptialization 7/2016 for Aflutter in setting of phentermine - cardioverted with Corvert discharged on betablocker      B) Cardiolite stress test 8/2016 Negative for ischemia      C)Echocardiogram 7/2016: mild LVH, biatrial enlargement, EF 55%-60%  D) EPS with RFA atrial flutter 11/21/16 with Dr. Alaniz- [facility], [place], [place], recurrence with ECV and initiation of Amiodarone  E) Recurrence with repeat EPS 12/16/17: with bidirectional block documented, Dr. Alaniz,  F) RFA of RA flutter 5/2/17  F) CHADSvasc = 0 on Eliquis  2. Anxiety/ Depression   3. Ulcerative Colitis - diagnosed at age 23, but not being treated currently  4. Obesity   5. ETOH Abuse   6. DONNIE- non compliant  7. Surgical History  A. Right shoulder  B. Tonsillectomy    Allergies  Allergies   Allergen Reactions   • Contrast Dye Anaphylaxis       Current Medications    Current Outpatient Prescriptions:   •  apixaban (ELIQUIS) 5 MG tablet tablet, Take 5 mg by mouth 2 (Two) Times a Day., Disp: , Rfl:   •  aspirin 81 MG EC tablet, Take 81 mg by mouth Daily., Disp: , Rfl:   •  clonazePAM (KlonoPIN) 1 MG tablet, Take 1 mg by mouth As Needed for Anxiety. Pt will sometimes just take 1/2 tab (0.5 mg)., Disp: , Rfl:   •  diltiaZEM CD (CARDIZEM CD) 120 MG 24 hr capsule, Take 1 capsule by mouth 2 (Two) Times a Day., Disp: 60 capsule, Rfl: 5  •  DULoxetine (CYMBALTA) 60 MG capsule, Take 60 mg by mouth Every Night., Disp: , Rfl:   •  magnesium oxide (MAGOX) 400 (241.3 MG) MG tablet tablet, Take 400 mg by mouth Daily., Disp: , Rfl:   •  traZODone (DESYREL) 50 MG tablet, Take 50 mg by mouth At Night As Needed. Pt states he takes 1/2 tab (25 mg)  "to 1 tab (50 mg) nightly as needed., Disp: , Rfl:     History of Present Illness   HPI    Pt presents for follow up of AFL/DONNIE . Since we last saw the pt, he underwent RFA of AFL. Pt states that his HR has been irratic since then . Not like AFL but irrregular. No CP, SOB, but with fatigue.  Denies any hospitalizations, ER visits, bleeding, or TIA/CVA symptoms. Overall feels ok. He lost his job recently and does not have insurance    ROS:  General:  + fatigue, No weight gain or loss  Cardiovascular:  Denies CP, PND, syncope, near syncope, edema or palpitations.  Pulmonary:  Denies HOLLINS, cough, or wheezing      Vitals:    07/11/17 1044   BP: 126/90   BP Location: Left arm   Patient Position: Sitting   Pulse: (!) 141   Weight: 245 lb (111 kg)   Height: 72\" (182.9 cm)     PE:  General: NAD  Neck: no JVD, no carotid bruits, no TM  Heart irreg irreg tachycardic, NL S1, S2,  no rubs, murmurs  Lungs: CTA, no wheezes, rhonchi, or rales  Abd: soft, non-tender, NL BS  Ext: No musculoskeletal deformities, no edema, cyanosis, or clubbing  Psych: normal mood and affect    Diagnostic Data:    ECG 12 Lead  Date/Time: 7/11/2017 11:11 AM  Performed by: AKT BEGUM  Authorized by: KAT BEGUM   Comparison: not compared with previous ECG   Rhythm: atrial fibrillation  BPM: 140              1. Persistent atrial fibrillation    2. Typical atrial flutter    3. DONNIE (obstructive sleep apnea)          Plan:  1) AF: new onset of unknown duration prob persistent with RVR: Pt does not know the duration and is mildly symptomatic: increase dilt to 120 mg po bid RTC in 4 weeks after pt gets insurance for further evaluation   2) RA flutter s/p RFA: No recurrence  3) Anticoagulation  Continue eliquis  4) DONNIE: CPAP      F/up in 1       "

## 2017-07-25 ENCOUNTER — TELEPHONE (OUTPATIENT)
Dept: CARDIOLOGY | Facility: CLINIC | Age: 42
End: 2017-07-25

## 2017-07-27 ENCOUNTER — TELEPHONE (OUTPATIENT)
Dept: CARDIOLOGY | Facility: CLINIC | Age: 42
End: 2017-07-27

## 2017-07-27 NOTE — TELEPHONE ENCOUNTER
Spoke with wife. Stevenson is still having issue with a-fib, SOB, and chest tightness even after increasing Dilt. She reports that you discussed with them about having a a-fib ablation in his July 11th appointment. He now has insurance and is able to do the procedure. He is scheduled to see you in [facility] in Sept. Patient is wanting to do procedure sooner than later. Do you want to see him in clinic or can we go ahead and schedule him for the ablation?

## 2017-07-31 DIAGNOSIS — I48.0 PAROXYSMAL ATRIAL FIBRILLATION (HCC): Primary | ICD-10-CM

## 2017-07-31 RX ORDER — DILTIAZEM HYDROCHLORIDE 120 MG/1
120 CAPSULE, COATED, EXTENDED RELEASE ORAL 2 TIMES DAILY
Qty: 60 CAPSULE | Refills: 5 | OUTPATIENT
Start: 2017-07-31 | End: 2017-09-06 | Stop reason: HOSPADM

## 2017-07-31 NOTE — TELEPHONE ENCOUNTER
Patient has a Contrast allergy. Do you want me to order CTA without contrast or just premed for allergy?

## 2017-08-03 PROBLEM — I48.0 PAROXYSMAL ATRIAL FIBRILLATION (HCC): Status: ACTIVE | Noted: 2017-08-03

## 2017-08-04 RX ORDER — CIMETIDINE 400 MG/1
TABLET, FILM COATED ORAL
Qty: 2 TABLET | Refills: 0 | Status: SHIPPED | OUTPATIENT
Start: 2017-08-04 | End: 2017-11-30

## 2017-08-04 RX ORDER — PREDNISONE 20 MG/1
TABLET ORAL
Qty: 4 TABLET | Refills: 0 | Status: SHIPPED | OUTPATIENT
Start: 2017-08-04 | End: 2017-10-12

## 2017-08-17 ENCOUNTER — PREP FOR SURGERY (OUTPATIENT)
Dept: OTHER | Facility: HOSPITAL | Age: 42
End: 2017-08-17

## 2017-08-17 DIAGNOSIS — I48.0 PAROXYSMAL ATRIAL FIBRILLATION (HCC): Primary | ICD-10-CM

## 2017-08-17 RX ORDER — PROMETHAZINE HYDROCHLORIDE 25 MG/ML
12.5 INJECTION, SOLUTION INTRAMUSCULAR; INTRAVENOUS EVERY 4 HOURS PRN
Status: CANCELLED | OUTPATIENT
Start: 2017-08-17

## 2017-08-17 RX ORDER — NITROGLYCERIN 0.4 MG/1
0.4 TABLET SUBLINGUAL
Status: CANCELLED | OUTPATIENT
Start: 2017-08-17

## 2017-08-17 RX ORDER — ACETAMINOPHEN 325 MG/1
650 TABLET ORAL EVERY 4 HOURS PRN
Status: CANCELLED | OUTPATIENT
Start: 2017-08-17

## 2017-08-25 PROBLEM — I48.19 PERSISTENT ATRIAL FIBRILLATION (HCC): Status: ACTIVE | Noted: 2017-08-03

## 2017-08-30 ENCOUNTER — TELEPHONE (OUTPATIENT)
Dept: CARDIOLOGY | Facility: CLINIC | Age: 42
End: 2017-08-30

## 2017-08-31 ENCOUNTER — HOSPITAL ENCOUNTER (INPATIENT)
Facility: HOSPITAL | Age: 42
LOS: 1 days | Discharge: HOME | End: 2017-09-01
Attending: EMERGENCY MEDICINE | Admitting: INTERNAL MEDICINE

## 2017-08-31 ENCOUNTER — APPOINTMENT (OUTPATIENT)
Dept: GENERAL RADIOLOGY | Facility: HOSPITAL | Age: 42
End: 2017-08-31

## 2017-08-31 DIAGNOSIS — I48.91 ATRIAL FIBRILLATION WITH RAPID VENTRICULAR RESPONSE (HCC): Primary | ICD-10-CM

## 2017-08-31 PROBLEM — R79.89 ELEVATED LFTS: Status: ACTIVE | Noted: 2017-08-31

## 2017-08-31 PROBLEM — N17.9 ACUTE KIDNEY INJURY: Status: ACTIVE | Noted: 2017-08-31

## 2017-08-31 LAB
ALBUMIN SERPL-MCNC: 4.4 G/DL (ref 3.4–4.8)
ALBUMIN/GLOB SERPL: 1.7 G/DL (ref 1.1–1.8)
ALP SERPL-CCNC: 49 U/L (ref 38–126)
ALT SERPL W P-5'-P-CCNC: 114 U/L (ref 21–72)
ANION GAP SERPL CALCULATED.3IONS-SCNC: 14 MMOL/L (ref 5–15)
AST SERPL-CCNC: 98 U/L (ref 17–59)
BASOPHILS # BLD AUTO: 0.02 10*3/MM3 (ref 0–0.2)
BASOPHILS NFR BLD AUTO: 0.3 % (ref 0–2)
BILIRUB SERPL-MCNC: 1.5 MG/DL (ref 0.2–1.3)
BUN BLD-MCNC: 17 MG/DL (ref 7–21)
BUN/CREAT SERPL: 12 (ref 7–25)
CALCIUM SPEC-SCNC: 8.4 MG/DL (ref 8.4–10.2)
CHLORIDE SERPL-SCNC: 103 MMOL/L (ref 95–110)
CO2 SERPL-SCNC: 25 MMOL/L (ref 22–31)
CREAT BLD-MCNC: 1.42 MG/DL (ref 0.7–1.3)
DEPRECATED RDW RBC AUTO: 52.8 FL (ref 35.1–43.9)
EOSINOPHIL # BLD AUTO: 0.07 10*3/MM3 (ref 0–0.7)
EOSINOPHIL NFR BLD AUTO: 1.1 % (ref 0–7)
ERYTHROCYTE [DISTWIDTH] IN BLOOD BY AUTOMATED COUNT: 15.7 % (ref 11.5–14.5)
GFR SERPL CREATININE-BSD FRML MDRD: 55 ML/MIN/1.73 (ref 63–147)
GLOBULIN UR ELPH-MCNC: 2.6 GM/DL (ref 2.3–3.5)
GLUCOSE BLD-MCNC: 100 MG/DL (ref 60–100)
HAV IGM SERPL QL IA: NEGATIVE
HBV CORE IGM SERPL QL IA: NEGATIVE
HBV SURFACE AG SERPL QL IA: NEGATIVE
HCT VFR BLD AUTO: 51.2 % (ref 39–49)
HCV AB SER DONR QL: NEGATIVE
HGB BLD-MCNC: 17.3 G/DL (ref 13.7–17.3)
HOLD SPECIMEN: NORMAL
HOLD SPECIMEN: NORMAL
IMM GRANULOCYTES # BLD: 0.02 10*3/MM3 (ref 0–0.02)
IMM GRANULOCYTES NFR BLD: 0.3 % (ref 0–0.5)
INR PPP: 1.1 (ref 0.8–1.2)
LYMPHOCYTES # BLD AUTO: 1.81 10*3/MM3 (ref 0.6–4.2)
LYMPHOCYTES NFR BLD AUTO: 27.3 % (ref 10–50)
MCH RBC QN AUTO: 31.2 PG (ref 26.5–34)
MCHC RBC AUTO-ENTMCNC: 33.8 G/DL (ref 31.5–36.3)
MCV RBC AUTO: 92.4 FL (ref 80–98)
MONOCYTES # BLD AUTO: 0.36 10*3/MM3 (ref 0–0.9)
MONOCYTES NFR BLD AUTO: 5.4 % (ref 0–12)
NEUTROPHILS # BLD AUTO: 4.36 10*3/MM3 (ref 2–8.6)
NEUTROPHILS NFR BLD AUTO: 65.6 % (ref 37–80)
NT-PROBNP SERPL-MCNC: 480 PG/ML (ref 0–450)
PLATELET # BLD AUTO: 244 10*3/MM3 (ref 150–450)
PMV BLD AUTO: 10.6 FL (ref 8–12)
POTASSIUM BLD-SCNC: 3.9 MMOL/L (ref 3.5–5.1)
PROT SERPL-MCNC: 7 G/DL (ref 6.3–8.6)
PROTHROMBIN TIME: 14.1 SECONDS (ref 11.1–15.3)
RBC # BLD AUTO: 5.54 10*6/MM3 (ref 4.37–5.74)
SODIUM BLD-SCNC: 142 MMOL/L (ref 137–145)
TROPONIN I SERPL-MCNC: 0.03 NG/ML
TROPONIN I SERPL-MCNC: 0.03 NG/ML
TROPONIN I SERPL-MCNC: 0.04 NG/ML
TSH SERPL DL<=0.05 MIU/L-ACNC: 1.37 MIU/ML (ref 0.46–4.68)
WBC NRBC COR # BLD: 6.64 10*3/MM3 (ref 3.2–9.8)
WHOLE BLOOD HOLD SPECIMEN: NORMAL
WHOLE BLOOD HOLD SPECIMEN: NORMAL

## 2017-08-31 PROCEDURE — 84484 ASSAY OF TROPONIN QUANT: CPT | Performed by: EMERGENCY MEDICINE

## 2017-08-31 PROCEDURE — 85610 PROTHROMBIN TIME: CPT | Performed by: EMERGENCY MEDICINE

## 2017-08-31 PROCEDURE — 84443 ASSAY THYROID STIM HORMONE: CPT | Performed by: FAMILY MEDICINE

## 2017-08-31 PROCEDURE — 93005 ELECTROCARDIOGRAM TRACING: CPT

## 2017-08-31 PROCEDURE — 94799 UNLISTED PULMONARY SVC/PX: CPT

## 2017-08-31 PROCEDURE — 80074 ACUTE HEPATITIS PANEL: CPT | Performed by: FAMILY MEDICINE

## 2017-08-31 PROCEDURE — 93005 ELECTROCARDIOGRAM TRACING: CPT | Performed by: FAMILY MEDICINE

## 2017-08-31 PROCEDURE — 85025 COMPLETE CBC W/AUTO DIFF WBC: CPT | Performed by: EMERGENCY MEDICINE

## 2017-08-31 PROCEDURE — 83880 ASSAY OF NATRIURETIC PEPTIDE: CPT | Performed by: EMERGENCY MEDICINE

## 2017-08-31 PROCEDURE — 80053 COMPREHEN METABOLIC PANEL: CPT | Performed by: EMERGENCY MEDICINE

## 2017-08-31 PROCEDURE — 93010 ELECTROCARDIOGRAM REPORT: CPT | Performed by: INTERNAL MEDICINE

## 2017-08-31 PROCEDURE — 71010 HC CHEST PA OR AP: CPT

## 2017-08-31 PROCEDURE — 84484 ASSAY OF TROPONIN QUANT: CPT | Performed by: FAMILY MEDICINE

## 2017-08-31 PROCEDURE — 99285 EMERGENCY DEPT VISIT HI MDM: CPT

## 2017-08-31 PROCEDURE — 94760 N-INVAS EAR/PLS OXIMETRY 1: CPT

## 2017-08-31 RX ORDER — THIAMINE MONONITRATE (VIT B1) 100 MG
100 TABLET ORAL DAILY
Status: DISCONTINUED | OUTPATIENT
Start: 2017-09-01 | End: 2017-09-01 | Stop reason: HOSPADM

## 2017-08-31 RX ORDER — SODIUM CHLORIDE 0.9 % (FLUSH) 0.9 %
10 SYRINGE (ML) INJECTION AS NEEDED
Status: DISCONTINUED | OUTPATIENT
Start: 2017-08-31 | End: 2017-09-01 | Stop reason: HOSPADM

## 2017-08-31 RX ORDER — DIPHENHYDRAMINE HCL 25 MG
25 TABLET ORAL NIGHTLY PRN
Status: DISCONTINUED | OUTPATIENT
Start: 2017-08-31 | End: 2017-09-01 | Stop reason: HOSPADM

## 2017-08-31 RX ORDER — CLONAZEPAM 0.5 MG/1
1 TABLET ORAL AS NEEDED
Status: DISCONTINUED | OUTPATIENT
Start: 2017-08-31 | End: 2017-09-01 | Stop reason: HOSPADM

## 2017-08-31 RX ORDER — LORAZEPAM 2 MG/1
2 TABLET ORAL
Status: DISCONTINUED | OUTPATIENT
Start: 2017-08-31 | End: 2017-09-01 | Stop reason: HOSPADM

## 2017-08-31 RX ORDER — LORAZEPAM 2 MG/ML
2 INJECTION INTRAMUSCULAR
Status: DISCONTINUED | OUTPATIENT
Start: 2017-08-31 | End: 2017-09-01 | Stop reason: HOSPADM

## 2017-08-31 RX ORDER — DILTIAZEM HYDROCHLORIDE 120 MG/1
120 CAPSULE, COATED, EXTENDED RELEASE ORAL 2 TIMES DAILY
Status: DISCONTINUED | OUTPATIENT
Start: 2017-08-31 | End: 2017-09-01 | Stop reason: HOSPADM

## 2017-08-31 RX ORDER — MORPHINE SULFATE 2 MG/ML
1 INJECTION, SOLUTION INTRAMUSCULAR; INTRAVENOUS EVERY 4 HOURS PRN
Status: DISCONTINUED | OUTPATIENT
Start: 2017-08-31 | End: 2017-09-01 | Stop reason: HOSPADM

## 2017-08-31 RX ORDER — SODIUM CHLORIDE 0.9 % (FLUSH) 0.9 %
1-10 SYRINGE (ML) INJECTION AS NEEDED
Status: DISCONTINUED | OUTPATIENT
Start: 2017-08-31 | End: 2017-09-01 | Stop reason: HOSPADM

## 2017-08-31 RX ORDER — LORAZEPAM 0.5 MG/1
1 TABLET ORAL
Status: DISCONTINUED | OUTPATIENT
Start: 2017-08-31 | End: 2017-09-01 | Stop reason: HOSPADM

## 2017-08-31 RX ORDER — SODIUM CHLORIDE 9 MG/ML
100 INJECTION, SOLUTION INTRAVENOUS CONTINUOUS
Status: DISCONTINUED | OUTPATIENT
Start: 2017-08-31 | End: 2017-09-01 | Stop reason: HOSPADM

## 2017-08-31 RX ORDER — DULOXETIN HYDROCHLORIDE 60 MG/1
60 CAPSULE, DELAYED RELEASE ORAL EVERY MORNING
Status: DISCONTINUED | OUTPATIENT
Start: 2017-09-01 | End: 2017-09-01 | Stop reason: HOSPADM

## 2017-08-31 RX ORDER — FOLIC ACID 1 MG/1
1 TABLET ORAL DAILY
Status: DISCONTINUED | OUTPATIENT
Start: 2017-09-01 | End: 2017-09-01 | Stop reason: HOSPADM

## 2017-08-31 RX ORDER — DILTIAZEM HYDROCHLORIDE 5 MG/ML
10 INJECTION INTRAVENOUS ONCE
Status: COMPLETED | OUTPATIENT
Start: 2017-08-31 | End: 2017-08-31

## 2017-08-31 RX ORDER — LORAZEPAM 2 MG/ML
1 INJECTION INTRAMUSCULAR
Status: DISCONTINUED | OUTPATIENT
Start: 2017-08-31 | End: 2017-09-01 | Stop reason: HOSPADM

## 2017-08-31 RX ORDER — NALOXONE HCL 0.4 MG/ML
0.4 VIAL (ML) INJECTION
Status: DISCONTINUED | OUTPATIENT
Start: 2017-08-31 | End: 2017-09-01 | Stop reason: HOSPADM

## 2017-08-31 RX ADMIN — CLONAZEPAM 1 MG: 0.5 TABLET ORAL at 20:56

## 2017-08-31 RX ADMIN — DILTIAZEM HYDROCHLORIDE 10 MG/HR: 5 INJECTION INTRAVENOUS at 13:22

## 2017-08-31 RX ADMIN — APIXABAN 5 MG: 5 TABLET, FILM COATED ORAL at 17:12

## 2017-08-31 RX ADMIN — SODIUM CHLORIDE 100 ML/HR: 9 INJECTION, SOLUTION INTRAVENOUS at 17:11

## 2017-08-31 RX ADMIN — METOPROLOL TARTRATE 25 MG: 25 TABLET ORAL at 17:12

## 2017-08-31 RX ADMIN — DILTIAZEM HYDROCHLORIDE 10 MG: 5 INJECTION INTRAVENOUS at 12:56

## 2017-08-31 RX ADMIN — Medication 10 ML: at 14:28

## 2017-08-31 RX ADMIN — DILTIAZEM HYDROCHLORIDE 12.5 MG/HR: 5 INJECTION INTRAVENOUS at 16:45

## 2017-09-01 VITALS
OXYGEN SATURATION: 93 % | RESPIRATION RATE: 20 BRPM | HEIGHT: 72 IN | BODY MASS INDEX: 33.06 KG/M2 | HEART RATE: 103 BPM | DIASTOLIC BLOOD PRESSURE: 88 MMHG | WEIGHT: 244.06 LBS | SYSTOLIC BLOOD PRESSURE: 144 MMHG | TEMPERATURE: 97.9 F

## 2017-09-01 LAB
ALBUMIN SERPL-MCNC: 3.8 G/DL (ref 3.4–4.8)
ALBUMIN/GLOB SERPL: 1.7 G/DL (ref 1.1–1.8)
ALP SERPL-CCNC: 44 U/L (ref 38–126)
ALT SERPL W P-5'-P-CCNC: 104 U/L (ref 21–72)
ANION GAP SERPL CALCULATED.3IONS-SCNC: 11 MMOL/L (ref 5–15)
AST SERPL-CCNC: 73 U/L (ref 17–59)
BASOPHILS # BLD AUTO: 0.02 10*3/MM3 (ref 0–0.2)
BASOPHILS NFR BLD AUTO: 0.3 % (ref 0–2)
BILIRUB SERPL-MCNC: 1.7 MG/DL (ref 0.2–1.3)
BUN BLD-MCNC: 18 MG/DL (ref 7–21)
BUN/CREAT SERPL: 14.9 (ref 7–25)
CALCIUM SPEC-SCNC: 8 MG/DL (ref 8.4–10.2)
CHLORIDE SERPL-SCNC: 101 MMOL/L (ref 95–110)
CO2 SERPL-SCNC: 26 MMOL/L (ref 22–31)
CREAT BLD-MCNC: 1.21 MG/DL (ref 0.7–1.3)
DEPRECATED RDW RBC AUTO: 53.2 FL (ref 35.1–43.9)
EOSINOPHIL # BLD AUTO: 0.22 10*3/MM3 (ref 0–0.7)
EOSINOPHIL NFR BLD AUTO: 3 % (ref 0–7)
ERYTHROCYTE [DISTWIDTH] IN BLOOD BY AUTOMATED COUNT: 15.5 % (ref 11.5–14.5)
GFR SERPL CREATININE-BSD FRML MDRD: 66 ML/MIN/1.73 (ref 63–147)
GLOBULIN UR ELPH-MCNC: 2.3 GM/DL (ref 2.3–3.5)
GLUCOSE BLD-MCNC: 83 MG/DL (ref 60–100)
HCT VFR BLD AUTO: 48.6 % (ref 39–49)
HGB BLD-MCNC: 16.4 G/DL (ref 13.7–17.3)
IMM GRANULOCYTES # BLD: 0.01 10*3/MM3 (ref 0–0.02)
IMM GRANULOCYTES NFR BLD: 0.1 % (ref 0–0.5)
LYMPHOCYTES # BLD AUTO: 1.93 10*3/MM3 (ref 0.6–4.2)
LYMPHOCYTES NFR BLD AUTO: 26.7 % (ref 10–50)
MCH RBC QN AUTO: 31.4 PG (ref 26.5–34)
MCHC RBC AUTO-ENTMCNC: 33.7 G/DL (ref 31.5–36.3)
MCV RBC AUTO: 92.9 FL (ref 80–98)
MONOCYTES # BLD AUTO: 0.6 10*3/MM3 (ref 0–0.9)
MONOCYTES NFR BLD AUTO: 8.3 % (ref 0–12)
NEUTROPHILS # BLD AUTO: 4.44 10*3/MM3 (ref 2–8.6)
NEUTROPHILS NFR BLD AUTO: 61.6 % (ref 37–80)
PLATELET # BLD AUTO: 191 10*3/MM3 (ref 150–450)
PMV BLD AUTO: 10.3 FL (ref 8–12)
POTASSIUM BLD-SCNC: 4 MMOL/L (ref 3.5–5.1)
PROT SERPL-MCNC: 6.1 G/DL (ref 6.3–8.6)
RBC # BLD AUTO: 5.23 10*6/MM3 (ref 4.37–5.74)
SODIUM BLD-SCNC: 138 MMOL/L (ref 137–145)
TROPONIN I SERPL-MCNC: 0.04 NG/ML
WBC NRBC COR # BLD: 7.22 10*3/MM3 (ref 3.2–9.8)

## 2017-09-01 PROCEDURE — 84484 ASSAY OF TROPONIN QUANT: CPT | Performed by: FAMILY MEDICINE

## 2017-09-01 PROCEDURE — 80053 COMPREHEN METABOLIC PANEL: CPT | Performed by: FAMILY MEDICINE

## 2017-09-01 PROCEDURE — 93010 ELECTROCARDIOGRAM REPORT: CPT | Performed by: INTERNAL MEDICINE

## 2017-09-01 PROCEDURE — 85025 COMPLETE CBC W/AUTO DIFF WBC: CPT | Performed by: FAMILY MEDICINE

## 2017-09-01 PROCEDURE — 93005 ELECTROCARDIOGRAM TRACING: CPT | Performed by: INTERNAL MEDICINE

## 2017-09-01 RX ORDER — METOPROLOL SUCCINATE 25 MG/1
25 TABLET, EXTENDED RELEASE ORAL EVERY 12 HOURS SCHEDULED
Status: DISCONTINUED | OUTPATIENT
Start: 2017-09-01 | End: 2017-09-01 | Stop reason: HOSPADM

## 2017-09-01 RX ORDER — METOPROLOL SUCCINATE 25 MG/1
25 TABLET, EXTENDED RELEASE ORAL EVERY 12 HOURS SCHEDULED
Qty: 60 TABLET | Refills: 1 | Status: SHIPPED | OUTPATIENT
Start: 2017-09-01 | End: 2017-09-06 | Stop reason: HOSPADM

## 2017-09-01 RX ORDER — METOPROLOL SUCCINATE 25 MG/1
25 TABLET, EXTENDED RELEASE ORAL EVERY 12 HOURS SCHEDULED
Status: DISCONTINUED | OUTPATIENT
Start: 2017-09-01 | End: 2017-09-01

## 2017-09-01 RX ADMIN — METOPROLOL SUCCINATE 25 MG: 25 TABLET, EXTENDED RELEASE ORAL at 09:22

## 2017-09-01 RX ADMIN — SODIUM CHLORIDE 100 ML/HR: 9 INJECTION, SOLUTION INTRAVENOUS at 03:51

## 2017-09-01 RX ADMIN — THERA TABS 1 TABLET: TAB at 09:22

## 2017-09-01 RX ADMIN — DILTIAZEM HYDROCHLORIDE 120 MG: 120 CAPSULE, COATED, EXTENDED RELEASE ORAL at 09:21

## 2017-09-01 RX ADMIN — FOLIC ACID 1 MG: 1 TABLET ORAL at 09:23

## 2017-09-01 RX ADMIN — Medication 100 MG: at 09:22

## 2017-09-01 RX ADMIN — METOPROLOL SUCCINATE 25 MG: 25 TABLET, EXTENDED RELEASE ORAL at 03:54

## 2017-09-01 RX ADMIN — DULOXETINE 60 MG: 60 CAPSULE, DELAYED RELEASE ORAL at 09:21

## 2017-09-01 RX ADMIN — MAGNESIUM OXIDE TAB 400 MG (241.3 MG ELEMENTAL MG) 400 MG: 400 (241.3 MG) TAB at 09:22

## 2017-09-01 RX ADMIN — APIXABAN 5 MG: 5 TABLET, FILM COATED ORAL at 09:21

## 2017-09-01 NOTE — DISCHARGE SUMMARY
[facility] Medicine Services  DISCHARGE SUMMARY       Date of Admission: 8/31/2017  Date of Discharge:  9/1/2017  Primary Care Physician: VIKASH Linda    Presenting Problem/History of Present Illness:  Atrial fibrillation with rapid ventricular response [I48.91]       Final Discharge Diagnoses:  A fib with RVR  pierre   Alcohol abuse disorder   Elevated liver enzymes     Consults:   Consults     Date and Time Order Name Status Description    8/31/2017 1606 Inpatient Consult to Cardiology Completed     8/31/2017 1441 Hospitalist (on-call MD unless specified)            Procedures Performed: none                 Pertinent Test Results:   Chest xray   CONCLUSION:  No Acute Disease  Chief Complaint on Day of Discharge: none     Hospital Course:   Patient is 42 y.o. male presents with Complaint of having palpitation and dizziness this morning.  Patient was taking shower when he felt palpitation and stated that he felt dizzy and fatigue and felt like he was about to pass out.  Patient has history of atrial fibrillation which was started May 2017.  Patient has had total of 3 ablations done and he is planning to get another ablation done on Tuesday of next week in [place].  Patient is currently on Eliquis, and Cardizem.  Patient admitted to use marijuana on every other day basis, also he admitted to drink alcohol about a pint of liquor a day.  Patient denies any use of cocaine or amphetamine.     Patient has been started on Cardizem drip at 10, his heart rate is controlled at 110 to 115 in the ED.  I have discussed with Dr. Robbins he is recommended to start patient on metoprolol 25 mg.  His Cardizem drip would be stopped if his heart rate is better controlled on metoprolol.     Patient was also found to have creatinine 1.42 with a baseline of 1.2.  She is also have elevated LFT  Patient was started on lopressor . Heart rate was controlled . He will have another  "ablation on Tuesday           Condition on Discharge:  Stable     Physical Exam on Discharge:  /88 (BP Location: Left arm, Patient Position: Lying)  Pulse 103  Temp 97.9 °F (36.6 °C) (Temporal Artery )   Resp 20  Ht 72\" (182.9 cm)  Wt 244 lb 1 oz (111 kg)  SpO2 93%  BMI 33.1 kg/m2  Physical Exam   Constitutional: He is oriented to person, place, and time. He appears well-developed and well-nourished.   HENT:   Head: Normocephalic and atraumatic.   Eyes: EOM are normal. Pupils are equal, round, and reactive to light.   Neck: Normal range of motion. Neck supple.   Cardiovascular: Exam reveals no gallop and no friction rub.    No murmur heard.  Irregular    Abdominal: Soft. Bowel sounds are normal. He exhibits no distension. There is no tenderness. There is no rebound and no guarding.   Musculoskeletal: Normal range of motion. He exhibits no edema.   Neurological: He is alert and oriented to person, place, and time. He has normal reflexes.   Skin: Skin is warm and dry. No erythema. No pallor.   Psychiatric: He has a normal mood and affect. His behavior is normal. Judgment and thought content normal.         Discharge Disposition:  Home or Self Care    Discharge Medications:   Stevenson Chilel   Home Medication Instructions TERESSA:[id number]    Printed on:09/01/17 1810   Medication Information                      apixaban (ELIQUIS) 5 MG tablet tablet  Take 5 mg by mouth 2 (Two) Times a Day.             cimetidine (TAGAMET) 400 MG tablet  Take 1 tablet AM prior to procedure and 1 tablet AM of procedure.             clonazePAM (KlonoPIN) 1 MG tablet  Take 1 mg by mouth As Needed for Anxiety. Pt will sometimes just take 1/2 tab (0.5 mg).             diltiaZEM CD (CARDIZEM CD) 120 MG 24 hr capsule  Take 1 capsule by mouth 2 (Two) Times a Day.             diphenhydrAMINE (BENADRYL) 50 MG tablet  Take 1 tablet AM prior to procedure and 1 tablet AM of procedure.             DULoxetine (CYMBALTA) 60 MG " capsule  Take 60 mg by mouth Every Morning.             magnesium oxide (MAGOX) 400 (241.3 MG) MG tablet tablet  Take 400 mg by mouth Daily.             metoprolol succinate XL (TOPROL-XL) 25 MG 24 hr tablet  Take 1 tablet by mouth Every 12 (Twelve) Hours.             predniSONE (DELTASONE) 20 MG tablet  Take 2 tablet AM prior to procedure and 2 tablet AM of procedure.                 Discharge Diet:   Diet Instructions     Diet: Cardiac; Thin       Discharge Diet:  Cardiac   Fluid Consistency:  Thin                 Activity at Discharge:   Activity Instructions     Activity as Tolerated                     Discharge Care Plan/Instructions: [facility] in [facility] [place]     Follow-up Appointments:   Future Appointments  Date Time Provider Department Center   9/4/2017 4:00 PM PAT 1 [facility] PAT [facility]   9/4/2017 4:30 PM [facility] CT 1 BH [facility] CT [facility]   9/12/2017 2:15 PM Ramirez Reza MD [facility] None       Test Results Pending at Discharge:     Lila Disla MD  09/01/17  6:10 PM    Time: 30 min

## 2017-09-01 NOTE — PAYOR COMM NOTE
"Clinicals for pending reference # [id number]  Hui Burns RN Case Management  Phone: [phone] Fax: [phone].      Viki Chilel (42 y.o. Male)     Date of Birth Social Security Number Address Home Phone MRN    1975  [address] [place] [address] [place] [address] [phone] [id number]    Hindu Marital Status          Orthodox        Admission Date Admission Type Admitting Provider Attending Provider Department, Room/Bed    8/31/17 Emergency Alexandre Borja MD Popescu, Tudor, MD [facility] STEPDOWN UNIT, 318/1    Discharge Date Discharge Disposition Discharge Destination                      Attending Provider: Alexandre Borja MD     Allergies:  Contrast Dye    Isolation:  None   Infection:  None   Code Status:  FULL    Ht:  72\" (182.9 cm)   Wt:  244 lb 1 oz (111 kg)    Admission Cmt:  None   Principal Problem:  None                Active Insurance as of 8/31/2017     Primary Coverage     Payor Plan Insurance Group Employer/Plan Group    ANTHEM MEDICAID ANTHEM MEDICAID [id number]     Payor Plan Address Payor Plan Phone Number Effective From Effective To    [address] [address] [phone] 7/1/2017     [place] [address]       Subscriber Name Subscriber Birth Date Member ID       VIKI CHILEL 1975 [id number]                 Emergency Contacts      (Rel.) Home Phone Work Phone Mobile Phone    Gunjan Chilel (Spouse) [phone] -- [phone]            Insurance Information                ANTHEM MEDICAID/ANTHEM MEDICAID Phone: [phone]    Subscriber: Viki Chilel Subscriber#: [id number]    Group#: [id number] Precert#:              History & Physical      Colby Odell MD at 8/31/2017  3:20 PM           [address], [place]. [address]  T - [phone]     H&P         SUBJECTIVE:   Patient Care Team:  VIKASH Linda as PCP - General (Family Medicine)    Chief " Complaint:     Chief Complaint   Patient presents with   • Chest Pain       Patient is 42 y.o. male presents with Complaint of having palpitation and dizziness this morning.  Patient was taking shower when he felt palpitation and stated that he felt dizzy and fatigue and felt like he was about to pass out.  Patient has history of atrial fibrillation which was started May 2017.  Patient has had total of 3 ablations done and he is planning to get another ablation done on Tuesday of next week in [place].  Patient is currently on Eliquis, and Cardizem.  Patient admitted to use marijuana on every other day basis, also he admitted to drink alcohol about a pint of liquor a day.  Patient denies any use of cocaine or amphetamine.    Patient has been started on Cardizem drip at 10, his heart rate is controlled at 110 to 115 in the ED.  I have discussed with Dr. Robbins he is recommended to start patient on metoprolol 25 mg.  His Cardizem drip would be stopped if his heart rate is better controlled on metoprolol.    Patient was also found to have creatinine 1.42 with a baseline of 1.2.  She is also have elevated LFT.    HPI     ROS/HISTORY/ CURRENT MEDICATIONS/OBJECTIVE/VS/PE:   Review of Systems:   Review of Systems   Constitutional: Negative for chills and fever.   HENT: Negative for rhinorrhea.    Respiratory: Negative for choking.    Cardiovascular: Negative for chest pain, palpitations and leg swelling.   Gastrointestinal: Negative for abdominal pain, constipation, diarrhea, nausea and vomiting.   Endocrine: Negative for cold intolerance and heat intolerance.   Genitourinary: Negative for dysuria and urgency.   Neurological: Positive for dizziness. Negative for numbness.   Hematological: Negative for adenopathy.   Psychiatric/Behavioral: Negative for agitation and behavioral problems.       History:     Past Medical History:   Diagnosis Date   • Anxiety    • Arrhythmia    • Arthritis     rt shoulder    • Atrial flutter  07/2016    EP  ablation x2    • Depression    • ETOH abuse    • Sleep apnea     have used a machine in the past   • Tendonitis     rt shoulder   • Ulcerative colitis      Past Surgical History:   Procedure Laterality Date   • CARDIAC ELECTROPHYSIOLOGY PROCEDURE N/A 5/2/2017    Procedure: Ablation atrial flutter;  Surgeon: Ramirez Reza MD;  Location: [facility] INVASIVE LOCATION;  Service:    • CARDIAC SURGERY  Nov 2016 Dec 2016    ablation   • COLONOSCOPY     • SHOULDER SURGERY Right 2014   • TONSILLECTOMY  12/2015     Family History   Problem Relation Age of Onset   • Heart disease Father    • Cancer Maternal Grandmother    • Cancer Paternal Grandmother    • Heart disease Paternal Grandfather      Social History   Substance Use Topics   • Smoking status: Never Smoker   • Smokeless tobacco: Never Used   • Alcohol use 1.8 - 3.0 oz/week     3 - 5 Shots of liquor per week      Comment: bourbon & moonshine       (Not in a hospital admission)  Allergies:  Contrast dye    Current Medications:     Current Facility-Administered Medications   Medication Dose Route Frequency Provider Last Rate Last Dose   • diltiaZEM (CARDIZEM) 125 mg in 100 mL sodium chloride 0.9% (total volume 125 mL)  5-15 mg/hr Intravenous Titrated Titi Andrade MD 10 mL/hr at 08/31/17 1322 10 mg/hr at 08/31/17 1322   • sodium chloride 0.9 % flush 10 mL  10 mL Intravenous PRN Titi Andrade MD   10 mL at 08/31/17 1428     Current Outpatient Prescriptions   Medication Sig Dispense Refill   • apixaban (ELIQUIS) 5 MG tablet tablet Take 5 mg by mouth 2 (Two) Times a Day.     • clonazePAM (KlonoPIN) 1 MG tablet Take 1 mg by mouth As Needed for Anxiety. Pt will sometimes just take 1/2 tab (0.5 mg).     • diltiaZEM CD (CARDIZEM CD) 120 MG 24 hr capsule Take 1 capsule by mouth 2 (Two) Times a Day. 60 capsule 5   • diphenhydrAMINE (BENADRYL) 50 MG tablet Take 1 tablet AM prior to procedure and 1 tablet AM of procedure. 2 tablet 0   • DULoxetine (CYMBALTA)  60 MG capsule Take 60 mg by mouth Every Morning.     • magnesium oxide (MAGOX) 400 (241.3 MG) MG tablet tablet Take 400 mg by mouth Daily.     • cimetidine (TAGAMET) 400 MG tablet Take 1 tablet AM prior to procedure and 1 tablet AM of procedure. 2 tablet 0   • predniSONE (DELTASONE) 20 MG tablet Take 2 tablet AM prior to procedure and 2 tablet AM of procedure. 4 tablet 0       Physical Exam:     Vital Sign Min/Max for last 24 hours  Temp  Min: 97.7 °F (36.5 °C)  Max: 97.7 °F (36.5 °C)   BP  Min: 98/66  Max: 146/80   Pulse  Min: 83  Max: 130   Resp  Min: 18  Max: 20   SpO2  Min: 95 %  Max: 96 %   No Data Recorded   Weight  Min: 240 lb (109 kg)  Max: 240 lb (109 kg)       Physical Exam:    Physical Exam   Constitutional: He is oriented to person, place, and time. He appears well-developed.   HENT:   Head: Normocephalic.   Eyes: EOM are normal. Pupils are equal, round, and reactive to light.   Neck: Normal range of motion.   Cardiovascular: Normal rate, regular rhythm and normal heart sounds.    Pulmonary/Chest: Effort normal and breath sounds normal.   Abdominal: Soft. Bowel sounds are normal.   Musculoskeletal: Normal range of motion.   Neurological: He is alert and oriented to person, place, and time. No cranial nerve deficit. Coordination normal.   Skin: Skin is warm.   Psychiatric: He has a normal mood and affect. His behavior is normal.   Vitals reviewed.       Results Review:   Lab Results (last 24 hours)     Procedure Component Value Units Date/Time    CBC & Differential [[id number]] Collected:  08/31/17 1248    Specimen:  Blood Updated:  08/31/17 1301    Narrative:       The following orders were created for panel order CBC & Differential.  Procedure                               Abnormality         Status                     ---------                               -----------         ------                     CBC Auto Differential[[id number]]        Abnormal            Final result                 Please view  results for these tests on the individual orders.    CBC Auto Differential [[id number]]  (Abnormal) Collected:  08/31/17 1248    Specimen:  Blood Updated:  08/31/17 1301     WBC 6.64 10*3/mm3      RBC 5.54 10*6/mm3      Hemoglobin 17.3 g/dL      Hematocrit 51.2 (H) %      MCV 92.4 fL      MCH 31.2 pg      MCHC 33.8 g/dL      RDW 15.7 (H) %      RDW-SD 52.8 (H) fl      MPV 10.6 fL      Platelets 244 10*3/mm3      Neutrophil % 65.6 %      Lymphocyte % 27.3 %      Monocyte % 5.4 %      Eosinophil % 1.1 %      Basophil % 0.3 %      Immature Grans % 0.3 %      Neutrophils, Absolute 4.36 10*3/mm3      Lymphocytes, Absolute 1.81 10*3/mm3      Monocytes, Absolute 0.36 10*3/mm3      Eosinophils, Absolute 0.07 10*3/mm3      Basophils, Absolute 0.02 10*3/mm3      Immature Grans, Absolute 0.02 10*3/mm3     Comprehensive Metabolic Panel [[id number]]  (Abnormal) Collected:  08/31/17 1248    Specimen:  Blood Updated:  08/31/17 1316     Glucose 100 mg/dL      BUN 17 mg/dL      Creatinine 1.42 (H) mg/dL      Sodium 142 mmol/L      Potassium 3.9 mmol/L      Chloride 103 mmol/L      CO2 25.0 mmol/L      Calcium 8.4 mg/dL      Total Protein 7.0 g/dL      Albumin 4.40 g/dL      ALT (SGPT) 114 (H) U/L      AST (SGOT) 98 (H) U/L      Alkaline Phosphatase 49 U/L      Total Bilirubin 1.5 (H) mg/dL      eGFR Non African Amer 55 (L) mL/min/1.73      Globulin 2.6 gm/dL      A/G Ratio 1.7 g/dL      BUN/Creatinine Ratio 12.0     Anion Gap 14.0 mmol/L     Protime-INR [[id number]]  (Normal) Collected:  08/31/17 1248    Specimen:  Blood Updated:  08/31/17 1319     Protime 14.1 Seconds      INR 1.10    Narrative:       Therapeutic range for most indications is 2.0-3.0 INR,  or 2.5-3.5 for mechanical heart valves.    Troponin [[id number]]  (Normal) Collected:  08/31/17 1248    Specimen:  Blood Updated:  08/31/17 1327     Troponin I 0.031 ng/mL     BNP [[id number]]  (Abnormal) Collected:  08/31/17 1248    Specimen:  Blood Updated:  08/31/17 1327     proBNP  480.0 (H) pg/mL     [place] Draw [[id number]] Collected:  08/31/17 1248    Specimen:  Blood Updated:  08/31/17 1402    Narrative:       The following orders were created for panel order [place] Draw.  Procedure                               Abnormality         Status                     ---------                               -----------         ------                     Light Blue Top[[id number]]                                   Final result               Green Top (Gel)[[id number]]                                  Final result               Lavender Top[[id number]]                                     Final result               Gold Top - SST[[id number]]                                   Final result                 Please view results for these tests on the individual orders.    Light Blue Top [[id number]] Collected:  08/31/17 1248    Specimen:  Blood Updated:  08/31/17 1402     Extra Tube hold for add-on      Auto resulted       Green Top (Gel) [[id number]] Collected:  08/31/17 1248    Specimen:  Blood Updated:  08/31/17 1402     Extra Tube Hold for add-ons.      Auto resulted.       Lavender Top [[id number]] Collected:  08/31/17 1248    Specimen:  Blood Updated:  08/31/17 1402     Extra Tube hold for add-on      Auto resulted       Gold Top - SST [[id number]] Collected:  08/31/17 1248    Specimen:  Blood Updated:  08/31/17 1402     Extra Tube Hold for add-ons.      Auto resulted.                  Imaging Results (last 24 hours)     Procedure Component Value Units Date/Time    XR Chest 1 View [[id number]] Collected:  08/31/17 1252     Updated:  08/31/17 1325    Narrative:       Patient Name:  VIKI SNOWDEN  Patient ID:  [id number]   Ordering:  ELISABETH NELSON  Attending:  ELISABETH NELSON  Referring:  ELISABETH NELSON  ------------------------------------------------    PORTABLE CHEST    HISTORY: Chest pain. Left-sided facial numbness.    Portable AP upright film of the chest was obtained at 12:33 PM.  COMPARISON: March  27, 2017    EKG leads in place.  The lungs are clear of an acute process.  The heart is not enlarged.  The pulmonary vasculature is not increased.  No pleural effusion.  No pneumothorax.  No acute osseous abnormality.      Impression:       CONCLUSION:  No Acute Disease    [id number]    Electronically signed by:  Pedrito Lyons MD  8/31/2017 1:24 PM CDT  Workstation: [employer]           I reviewed the patient's new clinical results.  I reviewed the patient's new imaging results and agree with the interpretation.     ASSESSMENT/PLAN:   Assessment/Plan   Active Hospital Problems (** Indicates Principal Problem)    Diagnosis Date Noted   • Atrial fibrillation with rapid ventricular response [I48.91] 08/31/2017     Patient is currently on Cardizem drip at 10.  Heart rate control and 110s and 150s.  Discussed with Dr. Robbins recommended to start patient on metoprolol 25 mg daily.  If it makes a heart rate down we'll stop the Cardizem drip.  He will follow up patient in the hospital.     • Acute kidney injury [N17.9] 08/31/2017     Worsening of Cr. Pt has baseline of 1.4.   Will give hydration and will monitor.      • Elevated LFTs [R79.89] 08/31/2017     Will obtain hepatitis panel        Resolved Hospital Problems    Diagnosis Date Noted Date Resolved   No resolved problems to display.       Full code     I discussed the patients findings and my recommendations with patient, family and consulting provider.               This document has been electronically signed by Colby Odell MD on August 31, 2017 3:33 PM                            Electronically signed by Colby Odell MD at 8/31/2017  3:34 PM        Emergency Department Notes     No notes of this type exist for this encounter.        Hospital Medications (active)       Dose Frequency Start End    apixaban (ELIQUIS) tablet 5 mg 5 mg 2 Times Daily 8/31/2017     Sig - Route: Take 1 tablet by mouth 2 (Two) Times a Day. - Oral    clonazePAM (KlonoPIN) tablet 1 mg 1 mg  "As Needed 8/31/2017     Sig - Route: Take 2 tablets by mouth As Needed for Anxiety. - Oral    diltiaZEM (CARDIZEM) 125 mg in 100 mL sodium chloride 0.9% (total volume 125 mL) 5-15 mg/hr Titrated 8/31/2017     Sig - Route: Infuse 5-15 mg/hr into a venous catheter Dose Adjusted By Provider As Needed. - Intravenous    diltiaZEM CD (CARDIZEM CD) 24 hr capsule 120 mg 120 mg 2 Times Daily 8/31/2017     Sig - Route: Take 1 capsule by mouth 2 (Two) Times a Day. - Oral    diphenhydrAMINE (BENADRYL) tablet 25 mg 25 mg Nightly PRN 8/31/2017     Sig - Route: Take 1 tablet by mouth At Night As Needed for Itching. - Oral    DULoxetine (CYMBALTA) DR capsule 60 mg 60 mg Every Morning 9/1/2017     Sig - Route: Take 1 capsule by mouth Every Morning. - Oral    folic acid (FOLVITE) tablet 1 mg 1 mg Daily 9/1/2017 9/4/2017    Sig - Route: Take 1 tablet by mouth Daily. - Oral    Linked Group 1:  \"And\" Linked Group Details        LORazepam (ATIVAN) injection 1 mg 1 mg Every 2 Hours PRN 8/31/2017 9/10/2017    Sig - Route: Infuse 0.5 mL into a venous catheter Every 2 (Two) Hours As Needed for Withdrawal (For CIWA score 8-10). - Intravenous    Linked Group 2:  \"Or\" Linked Group Details        LORazepam (ATIVAN) injection 2 mg 2 mg Every 1 Hour PRN 8/31/2017 9/10/2017    Sig - Route: Infuse 1 mL into a venous catheter Every 1 (One) Hour As Needed for Withdrawal (For CIWA score 11-15). - Intravenous    Linked Group 2:  \"Or\" Linked Group Details        LORazepam (ATIVAN) injection 2 mg 2 mg Every 15 Minutes PRN 8/31/2017 9/10/2017    Sig - Route: Infuse 1 mL into a venous catheter Every 15 (Fifteen) Minutes As Needed for Anxiety (Use IV route first.  If unable to give IV, use IM.  For CIWA-Ar greater than 15.  Repeat dose in 15 minutes if CIWA-Ar is not decreasing.). - Intravenous    Linked Group 2:  \"Or\" Linked Group Details        LORazepam (ATIVAN) injection 2 mg 2 mg Every 15 Minutes PRN 8/31/2017 9/10/2017    Sig - Route: Inject 1 mL into " "the shoulder, thigh, or buttocks Every 15 (Fifteen) Minutes As Needed for Withdrawal (Use IV route first.  If unable to give IV, use IM.  For CIWA-Ar greater than 15.  Repeat dose in 15 minutes if CIWA-Ar is not decreasing.). - Intramuscular    Linked Group 2:  \"Or\" Linked Group Details        LORazepam (ATIVAN) tablet 1 mg 1 mg Every 2 Hours PRN 8/31/2017 9/10/2017    Sig - Route: Take 2 tablets by mouth Every 2 (Two) Hours As Needed for Withdrawal (For CIWA score 8-10). - Oral    Linked Group 2:  \"Or\" Linked Group Details        LORazepam (ATIVAN) tablet 2 mg 2 mg Every 1 Hour PRN 8/31/2017 9/10/2017    Sig - Route: Take 1 tablet by mouth Every 1 (One) Hour As Needed for Withdrawal (For CIWA score 11-15). - Oral    Linked Group 2:  \"Or\" Linked Group Details        magnesium oxide (MAGOX) tablet 400 mg 400 mg Daily 8/31/2017     Sig - Route: Take 1 tablet by mouth Daily. - Oral    metoprolol succinate XL (TOPROL-XL) 24 hr tablet 25 mg 25 mg Every 12 Hours Scheduled 9/1/2017     Sig - Route: Take 1 tablet by mouth Every 12 (Twelve) Hours. - Oral    Morphine sulfate (PF) injection 1 mg 1 mg Every 4 Hours PRN 8/31/2017 9/10/2017    Sig - Route: Infuse 0.5 mL into a venous catheter Every 4 (Four) Hours As Needed for Moderate Pain . - Intravenous    Linked Group 3:  \"And\" Linked Group Details        multivitamin with beta carotene tablet 1 tablet 1 tablet Daily 9/1/2017 9/4/2017    Sig - Route: Take 1 tablet by mouth Daily. - Oral    Linked Group 1:  \"And\" Linked Group Details        naloxone (NARCAN) injection 0.4 mg 0.4 mg Every 5 Minutes PRN 8/31/2017     Sig - Route: Infuse 1 mL into a venous catheter Every 5 (Five) Minutes As Needed for Respiratory Depression. - Intravenous    Linked Group 3:  \"And\" Linked Group Details        sodium chloride 0.9 % bolus 500 mL 500 mL Once 8/31/2017     Sig - Route: Infuse 500 mL into a venous catheter 1 (One) Time. - Intravenous    sodium chloride 0.9 % flush 1-10 mL 1-10 mL As " "Needed 2017     Sig - Route: Infuse 1-10 mL into a venous catheter As Needed for Line Care. - Intravenous    sodium chloride 0.9 % flush 10 mL 10 mL As Needed 2017     Sig - Route: Infuse 10 mL into a venous catheter As Needed for Line Care. - Intravenous    sodium chloride 0.9 % infusion 100 mL/hr Continuous 2017     Sig - Route: Infuse 100 mL/hr into a venous catheter Continuous. - Intravenous    thiamine (VITAMIN B-1) tablet 100 mg 100 mg Daily 2017    Sig - Route: Take 1 tablet by mouth Daily. - Oral    Linked Group 1:  \"And\" Linked Group Details        metoprolol succinate XL (TOPROL-XL) 24 hr tablet 25 mg (Discontinued) 25 mg Every 12 Hours Scheduled 2017    Sig - Route: Take 1 tablet by mouth Every 12 (Twelve) Hours. - Oral    Reason for Discontinue: Error    metoprolol tartrate (LOPRESSOR) tablet 25 mg (Discontinued) 25 mg Every 12 Hours Scheduled 2017    Sig - Route: Take 1 tablet by mouth Every 12 (Twelve) Hours. - Oral    Reason for Discontinue: Duplicate order          Physician Progress Notes (most recent note)     No notes of this type exist for this encounter.           Consult Notes (most recent note)      Joe Robbins MD at 2017  5:22 PM  Version 1 of 1     Consult Orders:    1. Inpatient Consult to Cardiology [[id number]] ordered by Colby Odell MD at 17 1517                  Cardiology Consultation Note.        Patient Name: Stevenson Chilel  Age/Sex: 42 y.o. male  : 1975  MRN: [id number]    Date of consultation: 2017  Consulting Physician: Joe Robbins MD  Primary care Physician: VIKASH Linda  Requesting Physician:   Colby Odell MD  Reason for consultation: Atrial fibrillation with a rapid ventricular response      Subjective:       Chief Complaint: Symptomatic palpitation    History of Present Illness:  Stevenson Chilel is a 42 y.o. male     Body mass index is 33.1 kg/(m^2). with a weight " of 252 pounds and height of 6 feet, with previous history of atrial flutter, status post chemical cardioversion, history of alcohol abuse with previous history of phentermine abuse, biatrial enlargement, concentric left ventricular hypertrophy, with an ejection fraction of 55%, with a negative exercise stress test evaluation with patient exercising for a total duration of 12 minutes without any recurrence of atrial flutter, history of obstructive sleep apnea, noncompliant with the CPAP, anxiety and depression, who presents for further evaluation for symptoms of palpitation. Patient was last evaluated in July. Patient subsequently underwent an exercise Cardiolite stress test. Patient's exercise Cardiolite stress test did not show of any evidence of any stress-induced ischemia and no evidence of any arrhythmia. Patient had been on anticoagulation with Eliquis.     Patient was hospitalized in March 2017 with symptoms off shortness of breath and atrial flutter fibrillation.  Patient after the last evaluation underwent atrial flutter ablation with  Dr. Crandall at [place] .  Patient was scheduled for atrial fibrillation ablation on Tuesday.  Patient denies excessive intake of coffee or caffeinated beverage.  Patient presented to the emergency room with symptoms off palpitation.  Patient was found to be in atrial fibrillation with a rapid ventricular response.  Due to the patient atrial fibrillation with a rapid ventricular response patient was evaluated in the emergency room and was subsequently hospitalized.  Patient denies excessive intake of coffee or caffeinated beverage.  Patient was found to be in atrial fibrillation with a rapid ventricular response.  Patient had mild renal function abnormality.  Patient had not complained of having any bleeding diastasis of hemoptysis hematuria bright blood per rectum.    Patient 10 point review of system except for stated in the history of present illness is  "negative      Past Medical History:  1. Atrial fibrillation with the radiofrequency ablation ×2 done at [facility].   2.  Radiofrequency ablation for atrial flutter ×2 done at [facility] and [facility] in [place] with previous chemical cardioversion with [facility].   3. Negative exercise Cardiolite stress test for any evidence of any stress induced ischemia with patient exercising for a total duration of 12 minutes completing 3 minutes of José Luis stage 4 done in 08/2016.   4. Arterial hypertension.   5. Hypertensive heart disease.   6. Preserved left ventricular systolic function with an ejection fraction of 55%.   7. Mild mitral regurgitation   8. Obesity with a body mass index of 31.   9. Obstructive sleep apnea noncompliant with the C-PAP machine.   10. Anxiety.   11. Chronic anticoagulation with Eliquis.   12. History of alcohol abuse.         Past Surgical History:  1. Colonoscopy.   2. Tonsillectomy.   3. Shoulder surgery.   Family History:  Family History   Problem Relation Age of Onset   • Heart disease Father    • Cancer Maternal Grandmother    • Cancer Paternal Grandmother    • Heart disease Paternal Grandfather        Social History:  Social History     Social History   • Marital status:      Spouse name: N/A   • Number of children: N/A   • Years of education: N/A     Occupational History   • Not on file.     Social History Main Topics   • Smoking status: Never Smoker   • Smokeless tobacco: Never Used   • Alcohol use 1.8 - 3.0 oz/week     3 - 5 Shots of liquor per week      Comment: matthew dahl   • Drug use: No      Comment: states he uses \"a little\" every day   • Sexual activity: Defer     Other Topics Concern   • Not on file     Social History Narrative        Cardiac Risk factor:   1. Male.   2. Arterial hypertension.   3. Obesity.     Allergies:  Allergies   Allergen Reactions   • Contrast Dye Anaphylaxis       Medication::  Prescriptions Prior to Admission "   Medication Sig Dispense Refill Last Dose   • apixaban (ELIQUIS) 5 MG tablet tablet Take 5 mg by mouth 2 (Two) Times a Day.   8/31/2017 at 0900   • clonazePAM (KlonoPIN) 1 MG tablet Take 1 mg by mouth As Needed for Anxiety. Pt will sometimes just take 1/2 tab (0.5 mg).   Past Week at Unknown time   • diltiaZEM CD (CARDIZEM CD) 120 MG 24 hr capsule Take 1 capsule by mouth 2 (Two) Times a Day. 60 capsule 5 8/31/2017 at 0900   • diphenhydrAMINE (BENADRYL) 50 MG tablet Take 1 tablet AM prior to procedure and 1 tablet AM of procedure. 2 tablet 0    • DULoxetine (CYMBALTA) 60 MG capsule Take 60 mg by mouth Every Morning.   8/31/2017 at 0900   • magnesium oxide (MAGOX) 400 (241.3 MG) MG tablet tablet Take 400 mg by mouth Daily.   8/31/2017 at 0900   • cimetidine (TAGAMET) 400 MG tablet Take 1 tablet AM prior to procedure and 1 tablet AM of procedure. 2 tablet 0    • predniSONE (DELTASONE) 20 MG tablet Take 2 tablet AM prior to procedure and 2 tablet AM of procedure. 4 tablet 0            Review of Systems:       Constitutional:  Denies recent weight loss, weight gain, fever or chills, no change in exercise tolerance     HENT:  Denies any hearing loss, epistaxis, hoarseness, or difficulty speaking.     Eyes: Wears eyeglasses or contact lenses     Respiratory:  Denies dyspnea with exertion,no cough, wheezing, or hemoptysis.     Cardiovascular: Positive for palpitation Negative for palpitations,  orthopnea, PND, peripheral edema, syncope, or claudication.     Gastrointestinal:  Denies change in bowel habits, dyspepsia, ulcer disease, hematochezia, or melena.  No nausea, no vomiting, no hematemesis, no diarrhea or constipation, no melena      Endocrine: Negative for cold intolerance, heat intolerance, polydipsia, polyphagia and polyuria. Denies any history of weight change, heat/cold intolerance, polydipsia, polyuria     Genitourinary: Negative for hematuria.      Musculoskeletal: Denies any history of arthritic symptoms or  back problems .  No joint pain, joint stiffness, joint swelling, muscle pain, muscle weakness and neck pain    Skin:  Denies any change in hair or nails, rashes, or skin lesions.     Allergic/Immunologic: Negative.  Negative for environmental allergies, food allergies and immunocompromised state.     Neurological:  Denies any history of recurrent headaches, strokes, TIA, or seizure disorder.     Hematological: Denies excessive bleeding, easy bruising, fatigue, lymphadenopathy and petechiae or any bleeding disorders, or lymphadenopathy.     Psychiatric/Behavioral: Denies any history of depression, substance abuse, or change in cognitive function. Denies any psychomotor reaction or tangential thought.  No depression, homicidal ideations and suicidal ideations    Endocrine: No frequent urination and nocturia, temperature intolerance, weight gain, unintended and weight loss, unintended            Objective:     Objective:  Vitals:    08/31/17 1559   BP: 142/90   Pulse:    Resp:    Temp:    SpO2:      .    Body mass index is 33.1 kg/(m^2).           Physical Exam:   General Appearance:    Alert, oriented, cooperative, in no acute distress   Head:    Normocephalic, atraumatic, without obvious abnormality   Eyes:           ELLIE  Lids and lashes normal, conjunctivae and sclerae normal, no icterus, no pallor   Ears:    Ears appear intact with no abnormalities noted   Throat:   Mucous membranes pink and moist   Neck:   Supple, trachea midline, no carotid bruit, no organomegaly or JVD   Lungs:     Clear to auscultation and percussion, respirations regular, even and Unlabored. No wheezes, rales, rhonchi    Heart:    Regular rhythm and normal rate, normal S1 and S2, no            murmur, no gallop, no rub, no click   Abdomen:     Soft, non-tender, non-distended, no guarding, no rebound tenderness, Normal bowel sounds in all four quadrants, no masses, liver and spleen nonpalpable,    Genitalia:    Deferred   Extremities:   Moves  all extremities well, no edema, no cyanosis, no              Redness, no clubbing   Pulses:   Pulses palpable and equal bilaterally   Skin:   Moist and warm. No bleeding, bruising or rash   Neurologic/Psychiatric:   Alert and oriented to person, place, and time.  Motor, power and tone in upper and lower extremities are grossly intact.  No focal neurological deficits. Normal cognitive function. No psychomotor reaction or tangential thought. No depression, homicidal ideations and suicidal ideations           Lab Review:       Results from last 7 days  Lab Units 08/31/17  1248   SODIUM mmol/L 142   POTASSIUM mmol/L 3.9   CHLORIDE mmol/L 103   CO2 mmol/L 25.0   BUN mg/dL 17   CREATININE mg/dL 1.42*   CALCIUM mg/dL 8.4   BILIRUBIN mg/dL 1.5*   ALK PHOS U/L 49   ALT (SGPT) U/L 114*   AST (SGOT) U/L 98*   GLUCOSE mg/dL 100       Results from last 7 days  Lab Units 08/31/17  1600 08/31/17  1248   TROPONIN I ng/mL 0.030 0.031           Results from last 7 days  Lab Units 08/31/17  1248   WBC 10*3/mm3 6.64   HEMOGLOBIN g/dL 17.3   HEMATOCRIT % 51.2*   PLATELETS 10*3/mm3 244       Results from last 7 days  Lab Units 08/31/17  1248   INR  1.10                   Invalid input(s):  T4,  FREET4    EKG:   ECG/EMG Results (last 24 hours)     Procedure Component Value Units Date/Time    ECG 12 Lead [[id number]] Collected:  08/31/17 1625     Updated:  08/31/17 1635    Narrative:       Test Reason : chest pain  Blood Pressure : **/** mmHG  Vent. Rate : 110 BPM     Atrial Rate : 375 BPM     P-R Int : 000 ms          QRS Dur : 100 ms      QT Int : 344 ms       P-R-T Axes : 000 077 067 degrees     QTc Int : 465 ms    Atrial fibrillation with rapid ventricular response with premature  ventricular or aberrantly conducted complexes  Abnormal ECG      Referred By:             Confirmed By:     ECG 12 Lead [[id number]] Collected:  08/31/17 1229     Updated:  08/31/17 1639          Imaging:  Imaging Results (last 24 hours)     Procedure Component  Value Units Date/Time    XR Chest 1 View [[id number]] Collected:  08/31/17 1252     Updated:  08/31/17 1325    Narrative:       Patient Name:  VIKI SNOWDEN  Patient ID:  [id number]   Ordering:  ELISABETH NELSON  Attending:  ELISABETH NELSON  Referring:  ELISABETH NELSON  ------------------------------------------------    PORTABLE CHEST    HISTORY: Chest pain. Left-sided facial numbness.    Portable AP upright film of the chest was obtained at 12:33 PM.  COMPARISON: March 27, 2017    EKG leads in place.  The lungs are clear of an acute process.  The heart is not enlarged.  The pulmonary vasculature is not increased.  No pleural effusion.  No pneumothorax.  No acute osseous abnormality.      Impression:       CONCLUSION:  No Acute Disease    [id number]    Electronically signed by:  Pedrito Lyons MD  8/31/2017 1:24 PM CDT  Workstation: [email or web address] personally viewed and interpreted the patient's EKG/Telemetry data.    Assessment:   1.  Atrial fibrillation with a rapid ventricular response.  2.  Arterial hypertension.  3.  Hypertensive heart disease.  4.  Obstructive sleep apnea.  5.  Chronic anticoagulation with Eliquis          Plan:   1. Symptomatic palpitation and shortness of breath with resting electrocardiogram which revealed atrial fibrillation with a rapid ventricular response at 120 beats per minute. Patient has had previous history of atrial flutter with previous hospitalization and ablation done for atrial flutter by Dr. Crandall in [place].  Patient is scheduled for atrial fibrillation  Ablation.  Patient is currently on IV Cardizem.  Would start the patient on beta blocker metoprolol 25 mg twice a day for blood pressure and heart rate control.  Patient would be weaned off the IV nitroglycerin and washed restart the patient on oral Cardizem.  Patient would be continued on anticoagulation with Eliquis.  2.  Atypical symptoms of chest pain.  Patient had undergone previous stress test which was  negative.  Patient has negative troponin.  Patient at the present time has been recommended medical management.  Patient would not be subjected to any invasive intervention.  3.  Arterial hypertension.  Some blood pressure is mildly elevated to 155/77.  Patient denies any symptoms of headache.  Patient denies excessive intake of salt.  4. Obstructive sleep apnea with obesity with a body mass index of 31. Patient has been counseled on weight reduction and to be compliant with the CPAP machine. Patient has been explained the risks associated with not using the CPAP machine and causing right atrial and right ventricular enlargement and further increasing the pressure in the pulmonary artery were discussed with the patient.   5. Anxiety and depression. Patient is on Klonopin, BuSpar, and Cymbalta and has been followed by VIKASH Mcmillan.   6.  Chronic anticoagulation with Eliquis.  Patient had not complained of having any episodes or bleeding diastasis of hemoptysis hematuria bright red blood per rectum.      Thank you for the consultation.        Time: time spent in face-to-face evaluation of greater than 55 minutes and interacting and formulating examining and discussing the plan with the patient with 50% of greater time spent in face-to-face interaction.    Joe Robbins MD  08/31/17  5:22 PM    Dictated utilizing Dragon dictation.          Electronically signed by Joe Robbins MD at 9/1/2017  1:35 AM

## 2017-09-04 ENCOUNTER — APPOINTMENT (OUTPATIENT)
Dept: PREADMISSION TESTING | Facility: HOSPITAL | Age: 42
End: 2017-09-04

## 2017-09-04 ENCOUNTER — HOSPITAL ENCOUNTER (OUTPATIENT)
Dept: CT IMAGING | Facility: HOSPITAL | Age: 42
Discharge: HOME | End: 2017-09-04
Attending: INTERNAL MEDICINE | Admitting: INTERNAL MEDICINE

## 2017-09-04 DIAGNOSIS — I48.0 PAROXYSMAL ATRIAL FIBRILLATION (HCC): ICD-10-CM

## 2017-09-04 LAB
ANION GAP SERPL CALCULATED.3IONS-SCNC: 7 MMOL/L (ref 3–11)
BUN BLD-MCNC: 19 MG/DL (ref 9–23)
BUN/CREAT SERPL: 15.8 (ref 7–25)
CALCIUM SPEC-SCNC: 9.4 MG/DL (ref 8.7–10.4)
CHLORIDE SERPL-SCNC: 104 MMOL/L (ref 99–109)
CO2 SERPL-SCNC: 28 MMOL/L (ref 20–31)
CREAT BLD-MCNC: 1.2 MG/DL (ref 0.6–1.3)
DEPRECATED RDW RBC AUTO: 54.2 FL (ref 37–54)
ERYTHROCYTE [DISTWIDTH] IN BLOOD BY AUTOMATED COUNT: 15.6 % (ref 11.3–14.5)
GFR SERPL CREATININE-BSD FRML MDRD: 67 ML/MIN/1.73
GLUCOSE BLD-MCNC: 100 MG/DL (ref 70–100)
HBA1C MFR BLD: 5.3 % (ref 4.8–5.6)
HCT VFR BLD AUTO: 52.8 % (ref 38.9–50.9)
HGB BLD-MCNC: 17.6 G/DL (ref 13.1–17.5)
INR PPP: 1.13
MCH RBC QN AUTO: 31.8 PG (ref 27–31)
MCHC RBC AUTO-ENTMCNC: 33.3 G/DL (ref 32–36)
MCV RBC AUTO: 95.5 FL (ref 80–99)
PLATELET # BLD AUTO: 223 10*3/MM3 (ref 150–450)
PMV BLD AUTO: 10.6 FL (ref 6–12)
POTASSIUM BLD-SCNC: 4.6 MMOL/L (ref 3.5–5.5)
PROTHROMBIN TIME: 12.4 SECONDS (ref 9.6–11.5)
RBC # BLD AUTO: 5.53 10*6/MM3 (ref 4.2–5.76)
SODIUM BLD-SCNC: 139 MMOL/L (ref 132–146)
WBC NRBC COR # BLD: 7.97 10*3/MM3 (ref 3.5–10.8)

## 2017-09-04 PROCEDURE — 83036 HEMOGLOBIN GLYCOSYLATED A1C: CPT | Performed by: PHYSICIAN ASSISTANT

## 2017-09-04 PROCEDURE — 0 IOPAMIDOL PER 1 ML: Performed by: INTERNAL MEDICINE

## 2017-09-04 PROCEDURE — 71275 CT ANGIOGRAPHY CHEST: CPT

## 2017-09-04 PROCEDURE — 85610 PROTHROMBIN TIME: CPT | Performed by: PHYSICIAN ASSISTANT

## 2017-09-04 PROCEDURE — 80048 BASIC METABOLIC PNL TOTAL CA: CPT | Performed by: PHYSICIAN ASSISTANT

## 2017-09-04 PROCEDURE — 36415 COLL VENOUS BLD VENIPUNCTURE: CPT

## 2017-09-04 PROCEDURE — 85027 COMPLETE CBC AUTOMATED: CPT | Performed by: PHYSICIAN ASSISTANT

## 2017-09-04 RX ADMIN — IOPAMIDOL 85 ML: 755 INJECTION, SOLUTION INTRAVENOUS at 17:17

## 2017-09-04 NOTE — DISCHARGE INSTRUCTIONS
The following instructions given during Pre Admission Testing visit:    Do not eat or drink anything after MN except for sips of water with your a.m. Prescription meds unless otherwise instructed by your physician.    Glasses and jewelry may be worn, but dentures must be removed prior to cath/procedure.    Leave any items you consider valuable at home.    Family members may wait in [facility] waiting area during procedure.    Bring all medications in their original containers the day of procedure.    Bring photo ID and insurance cards on the day of procedure.    Need to make arrangements for transportation prior to discharge.    The following handouts were given:     Heart Cath pathway (if applicable)   Cardiac Cath booklet published by [facility]    OR appropriate [facility] procedure booklet    If applicable, pt instructed to bring CPAP mask and tubing the day of procedure.

## 2017-09-05 ENCOUNTER — ANESTHESIA EVENT (OUTPATIENT)
Dept: CARDIOLOGY | Facility: HOSPITAL | Age: 42
End: 2017-09-05

## 2017-09-05 ENCOUNTER — HOSPITAL ENCOUNTER (OUTPATIENT)
Facility: HOSPITAL | Age: 42
Discharge: HOME | End: 2017-09-06
Attending: INTERNAL MEDICINE | Admitting: INTERNAL MEDICINE

## 2017-09-05 ENCOUNTER — ANESTHESIA (OUTPATIENT)
Dept: CARDIOLOGY | Facility: HOSPITAL | Age: 42
End: 2017-09-05

## 2017-09-05 DIAGNOSIS — I48.19 PERSISTENT ATRIAL FIBRILLATION (HCC): Primary | ICD-10-CM

## 2017-09-05 DIAGNOSIS — I48.0 PAROXYSMAL ATRIAL FIBRILLATION (HCC): ICD-10-CM

## 2017-09-05 LAB
ACT BLD: 103 SECONDS (ref 82–152)
ACT BLD: 147 SECONDS (ref 82–152)
ACT BLD: 324 SECONDS (ref 82–152)
ACT BLD: 335 SECONDS (ref 82–152)
ACT BLD: 340 SECONDS (ref 82–152)
ACT BLD: 351 SECONDS (ref 82–152)
BASE EXCESS BLDA CALC-SCNC: -2 MMOL/L (ref -5–5)
CA-I BLDA-SCNC: 1.13 MMOL/L (ref 1.2–1.32)
CO2 BLDA-SCNC: 25 MMOL/L (ref 24–29)
HCO3 BLDA-SCNC: 23.6 MMOL/L (ref 22–26)
HCT VFR BLDA CALC: 52 % (ref 38–51)
HGB BLDA-MCNC: 17.7 G/DL (ref 12–17)
PCO2 BLDA: 44.9 MM HG (ref 35–45)
PH BLDA: 7.33 PH UNITS (ref 7.35–7.6)
PO2 BLDA: 64 MMHG (ref 80–105)
POTASSIUM BLDA-SCNC: 5.5 MMOL/L (ref 3.5–4.9)
SAO2 % BLDA: 90 % (ref 95–98)
SODIUM BLDA-SCNC: 139 MMOL/L (ref 138–146)

## 2017-09-05 PROCEDURE — 93656 COMPRE EP EVAL ABLTJ ATR FIB: CPT | Performed by: INTERNAL MEDICINE

## 2017-09-05 PROCEDURE — C1893 INTRO/SHEATH, FIXED,NON-PEEL: HCPCS | Performed by: INTERNAL MEDICINE

## 2017-09-05 PROCEDURE — C1894 INTRO/SHEATH, NON-LASER: HCPCS | Performed by: INTERNAL MEDICINE

## 2017-09-05 PROCEDURE — C1732 CATH, EP, DIAG/ABL, 3D/VECT: HCPCS | Performed by: INTERNAL MEDICINE

## 2017-09-05 PROCEDURE — 84295 ASSAY OF SERUM SODIUM: CPT

## 2017-09-05 PROCEDURE — 93613 INTRACARDIAC EPHYS 3D MAPG: CPT | Performed by: INTERNAL MEDICINE

## 2017-09-05 PROCEDURE — 25010000002 KETOROLAC TROMETHAMINE PER 15 MG: Performed by: INTERNAL MEDICINE

## 2017-09-05 PROCEDURE — C1730 CATH, EP, 19 OR FEW ELECT: HCPCS | Performed by: INTERNAL MEDICINE

## 2017-09-05 PROCEDURE — C1733 CATH, EP, OTHR THAN COOL-TIP: HCPCS | Performed by: INTERNAL MEDICINE

## 2017-09-05 PROCEDURE — 25010000002 DIPHENHYDRAMINE PER 50 MG: Performed by: NURSE PRACTITIONER

## 2017-09-05 PROCEDURE — C1769 GUIDE WIRE: HCPCS | Performed by: INTERNAL MEDICINE

## 2017-09-05 PROCEDURE — 85347 COAGULATION TIME ACTIVATED: CPT

## 2017-09-05 PROCEDURE — C1731 CATH, EP, 20 OR MORE ELEC: HCPCS | Performed by: INTERNAL MEDICINE

## 2017-09-05 PROCEDURE — 25010000003 DOPAMINE PER 40 MG: Performed by: NURSE ANESTHETIST, CERTIFIED REGISTERED

## 2017-09-05 PROCEDURE — 25010000002 ONDANSETRON PER 1 MG: Performed by: NURSE ANESTHETIST, CERTIFIED REGISTERED

## 2017-09-05 PROCEDURE — 82330 ASSAY OF CALCIUM: CPT

## 2017-09-05 PROCEDURE — 82803 BLOOD GASES ANY COMBINATION: CPT

## 2017-09-05 PROCEDURE — 25010000002 METHYLPREDNISOLONE PER 125 MG: Performed by: NURSE ANESTHETIST, CERTIFIED REGISTERED

## 2017-09-05 PROCEDURE — 25010000002 HYDROCORTISONE SODIUM SUCCINATE 100 MG RECONSTITUTED SOLUTION: Performed by: NURSE PRACTITIONER

## 2017-09-05 PROCEDURE — 25010000002 PHENYLEPHRINE PER 1 ML: Performed by: NURSE ANESTHETIST, CERTIFIED REGISTERED

## 2017-09-05 PROCEDURE — 84132 ASSAY OF SERUM POTASSIUM: CPT

## 2017-09-05 PROCEDURE — 25010000002 PROPOFOL 10 MG/ML EMULSION: Performed by: NURSE ANESTHETIST, CERTIFIED REGISTERED

## 2017-09-05 PROCEDURE — 25010000002 NEOSTIGMINE PER 0.5 MG: Performed by: NURSE ANESTHETIST, CERTIFIED REGISTERED

## 2017-09-05 PROCEDURE — 25010000002 HEPARIN (PORCINE) PER 1000 UNITS: Performed by: INTERNAL MEDICINE

## 2017-09-05 PROCEDURE — 82947 ASSAY GLUCOSE BLOOD QUANT: CPT

## 2017-09-05 PROCEDURE — 93662 INTRACARDIAC ECG (ICE): CPT | Performed by: INTERNAL MEDICINE

## 2017-09-05 PROCEDURE — C1759 CATH, INTRA ECHOCARDIOGRAPHY: HCPCS | Performed by: INTERNAL MEDICINE

## 2017-09-05 PROCEDURE — 25010000002 PROTAMINE SULFATE PER 10 MG: Performed by: INTERNAL MEDICINE

## 2017-09-05 PROCEDURE — G0378 HOSPITAL OBSERVATION PER HR: HCPCS

## 2017-09-05 PROCEDURE — 25010000002 FENTANYL CITRATE (PF) 100 MCG/2ML SOLUTION: Performed by: NURSE ANESTHETIST, CERTIFIED REGISTERED

## 2017-09-05 PROCEDURE — C1766 INTRO/SHEATH,STRBLE,NON-PEEL: HCPCS | Performed by: INTERNAL MEDICINE

## 2017-09-05 PROCEDURE — 85014 HEMATOCRIT: CPT

## 2017-09-05 RX ORDER — PROTAMINE SULFATE 10 MG/ML
INJECTION, SOLUTION INTRAVENOUS AS NEEDED
Status: DISCONTINUED | OUTPATIENT
Start: 2017-09-05 | End: 2017-09-05 | Stop reason: HOSPADM

## 2017-09-05 RX ORDER — PROMETHAZINE HYDROCHLORIDE 25 MG/ML
12.5 INJECTION, SOLUTION INTRAMUSCULAR; INTRAVENOUS EVERY 4 HOURS PRN
Status: DISCONTINUED | OUTPATIENT
Start: 2017-09-05 | End: 2017-09-05 | Stop reason: HOSPADM

## 2017-09-05 RX ORDER — SODIUM CHLORIDE 9 MG/ML
INJECTION, SOLUTION INTRAVENOUS CONTINUOUS PRN
Status: DISCONTINUED | OUTPATIENT
Start: 2017-09-05 | End: 2017-09-05 | Stop reason: HOSPADM

## 2017-09-05 RX ORDER — HYDROMORPHONE HYDROCHLORIDE 1 MG/ML
0.5 INJECTION, SOLUTION INTRAMUSCULAR; INTRAVENOUS; SUBCUTANEOUS
Status: DISCONTINUED | OUTPATIENT
Start: 2017-09-05 | End: 2017-09-05 | Stop reason: HOSPADM

## 2017-09-05 RX ORDER — HEPARIN SODIUM 1000 [USP'U]/ML
INJECTION, SOLUTION INTRAVENOUS; SUBCUTANEOUS AS NEEDED
Status: DISCONTINUED | OUTPATIENT
Start: 2017-09-05 | End: 2017-09-05 | Stop reason: HOSPADM

## 2017-09-05 RX ORDER — DOPAMINE HYDROCHLORIDE 80 MG/100ML
INJECTION, SOLUTION INTRAVENOUS CONTINUOUS PRN
Status: DISCONTINUED | OUTPATIENT
Start: 2017-09-05 | End: 2017-09-05 | Stop reason: SURG

## 2017-09-05 RX ORDER — ATRACURIUM BESYLATE 10 MG/ML
INJECTION, SOLUTION INTRAVENOUS AS NEEDED
Status: DISCONTINUED | OUTPATIENT
Start: 2017-09-05 | End: 2017-09-05 | Stop reason: SURG

## 2017-09-05 RX ORDER — PANTOPRAZOLE SODIUM 40 MG/1
40 TABLET, DELAYED RELEASE ORAL
Status: DISCONTINUED | OUTPATIENT
Start: 2017-09-05 | End: 2017-09-06 | Stop reason: HOSPADM

## 2017-09-05 RX ORDER — SUCRALFATE 1 G/1
1 TABLET ORAL
Status: DISCONTINUED | OUTPATIENT
Start: 2017-09-05 | End: 2017-09-05

## 2017-09-05 RX ORDER — CARVEDILOL 3.12 MG/1
3.12 TABLET ORAL EVERY 12 HOURS SCHEDULED
Status: DISCONTINUED | OUTPATIENT
Start: 2017-09-06 | End: 2017-09-06

## 2017-09-05 RX ORDER — LIDOCAINE HYDROCHLORIDE 10 MG/ML
INJECTION, SOLUTION INFILTRATION; PERINEURAL AS NEEDED
Status: DISCONTINUED | OUTPATIENT
Start: 2017-09-05 | End: 2017-09-05 | Stop reason: SURG

## 2017-09-05 RX ORDER — PROMETHAZINE HYDROCHLORIDE 25 MG/ML
6.25 INJECTION, SOLUTION INTRAMUSCULAR; INTRAVENOUS ONCE AS NEEDED
Status: DISCONTINUED | OUTPATIENT
Start: 2017-09-05 | End: 2017-09-05 | Stop reason: HOSPADM

## 2017-09-05 RX ORDER — LISINOPRIL 10 MG/1
10 TABLET ORAL
Status: DISCONTINUED | OUTPATIENT
Start: 2017-09-05 | End: 2017-09-06 | Stop reason: HOSPADM

## 2017-09-05 RX ORDER — MEPERIDINE HYDROCHLORIDE 25 MG/ML
12.5 INJECTION INTRAMUSCULAR; INTRAVENOUS; SUBCUTANEOUS
Status: DISCONTINUED | OUTPATIENT
Start: 2017-09-05 | End: 2017-09-05 | Stop reason: HOSPADM

## 2017-09-05 RX ORDER — DULOXETIN HYDROCHLORIDE 60 MG/1
60 CAPSULE, DELAYED RELEASE ORAL EVERY MORNING
Status: DISCONTINUED | OUTPATIENT
Start: 2017-09-06 | End: 2017-09-06 | Stop reason: HOSPADM

## 2017-09-05 RX ORDER — SUCRALFATE 1 G/1
1 TABLET ORAL
Status: DISCONTINUED | OUTPATIENT
Start: 2017-09-06 | End: 2017-09-06 | Stop reason: HOSPADM

## 2017-09-05 RX ORDER — KETOROLAC TROMETHAMINE 15 MG/ML
15 INJECTION, SOLUTION INTRAMUSCULAR; INTRAVENOUS EVERY 8 HOURS
Status: DISCONTINUED | OUTPATIENT
Start: 2017-09-05 | End: 2017-09-06 | Stop reason: HOSPADM

## 2017-09-05 RX ORDER — PROPOFOL 10 MG/ML
VIAL (ML) INTRAVENOUS AS NEEDED
Status: DISCONTINUED | OUTPATIENT
Start: 2017-09-05 | End: 2017-09-05 | Stop reason: SURG

## 2017-09-05 RX ORDER — HYDROCODONE BITARTRATE AND ACETAMINOPHEN 5; 325 MG/1; MG/1
1 TABLET ORAL EVERY 4 HOURS PRN
Status: DISCONTINUED | OUTPATIENT
Start: 2017-09-05 | End: 2017-09-06 | Stop reason: HOSPADM

## 2017-09-05 RX ORDER — FAMOTIDINE 10 MG/ML
20 INJECTION, SOLUTION INTRAVENOUS ONCE
Status: DISCONTINUED | OUTPATIENT
Start: 2017-09-05 | End: 2017-09-05 | Stop reason: HOSPADM

## 2017-09-05 RX ORDER — ONDANSETRON 2 MG/ML
INJECTION INTRAMUSCULAR; INTRAVENOUS AS NEEDED
Status: DISCONTINUED | OUTPATIENT
Start: 2017-09-05 | End: 2017-09-05 | Stop reason: SURG

## 2017-09-05 RX ORDER — PROMETHAZINE HYDROCHLORIDE 25 MG/1
25 TABLET ORAL ONCE AS NEEDED
Status: DISCONTINUED | OUTPATIENT
Start: 2017-09-05 | End: 2017-09-05 | Stop reason: HOSPADM

## 2017-09-05 RX ORDER — PROMETHAZINE HYDROCHLORIDE 25 MG/1
25 SUPPOSITORY RECTAL ONCE AS NEEDED
Status: DISCONTINUED | OUTPATIENT
Start: 2017-09-05 | End: 2017-09-05 | Stop reason: HOSPADM

## 2017-09-05 RX ORDER — CLONAZEPAM 1 MG/1
1 TABLET ORAL DAILY PRN
Status: DISCONTINUED | OUTPATIENT
Start: 2017-09-05 | End: 2017-09-06 | Stop reason: HOSPADM

## 2017-09-05 RX ORDER — NITROGLYCERIN 0.4 MG/1
0.4 TABLET SUBLINGUAL
Status: DISCONTINUED | OUTPATIENT
Start: 2017-09-05 | End: 2017-09-05 | Stop reason: HOSPADM

## 2017-09-05 RX ORDER — FENTANYL CITRATE 50 UG/ML
INJECTION, SOLUTION INTRAMUSCULAR; INTRAVENOUS AS NEEDED
Status: DISCONTINUED | OUTPATIENT
Start: 2017-09-05 | End: 2017-09-05 | Stop reason: SURG

## 2017-09-05 RX ORDER — ACETAMINOPHEN 325 MG/1
650 TABLET ORAL EVERY 4 HOURS PRN
Status: DISCONTINUED | OUTPATIENT
Start: 2017-09-05 | End: 2017-09-05 | Stop reason: HOSPADM

## 2017-09-05 RX ORDER — FAMOTIDINE 20 MG/1
20 TABLET, FILM COATED ORAL ONCE
Status: COMPLETED | OUTPATIENT
Start: 2017-09-05 | End: 2017-09-05

## 2017-09-05 RX ORDER — FENTANYL CITRATE 50 UG/ML
50 INJECTION, SOLUTION INTRAMUSCULAR; INTRAVENOUS
Status: DISCONTINUED | OUTPATIENT
Start: 2017-09-05 | End: 2017-09-05 | Stop reason: HOSPADM

## 2017-09-05 RX ORDER — SODIUM CHLORIDE 0.9 % (FLUSH) 0.9 %
1-10 SYRINGE (ML) INJECTION AS NEEDED
Status: DISCONTINUED | OUTPATIENT
Start: 2017-09-05 | End: 2017-09-05 | Stop reason: HOSPADM

## 2017-09-05 RX ORDER — METHYLPREDNISOLONE SODIUM SUCCINATE 125 MG/2ML
INJECTION, POWDER, LYOPHILIZED, FOR SOLUTION INTRAMUSCULAR; INTRAVENOUS AS NEEDED
Status: DISCONTINUED | OUTPATIENT
Start: 2017-09-05 | End: 2017-09-05 | Stop reason: SURG

## 2017-09-05 RX ORDER — DIPHENHYDRAMINE HYDROCHLORIDE 50 MG/ML
50 INJECTION INTRAMUSCULAR; INTRAVENOUS ONCE
Status: COMPLETED | OUTPATIENT
Start: 2017-09-05 | End: 2017-09-05

## 2017-09-05 RX ORDER — LIDOCAINE HYDROCHLORIDE 10 MG/ML
0.5 INJECTION, SOLUTION EPIDURAL; INFILTRATION; INTRACAUDAL; PERINEURAL ONCE AS NEEDED
Status: DISCONTINUED | OUTPATIENT
Start: 2017-09-05 | End: 2017-09-05 | Stop reason: HOSPADM

## 2017-09-05 RX ORDER — GLYCOPYRROLATE 0.2 MG/ML
INJECTION INTRAMUSCULAR; INTRAVENOUS AS NEEDED
Status: DISCONTINUED | OUTPATIENT
Start: 2017-09-05 | End: 2017-09-05 | Stop reason: SURG

## 2017-09-05 RX ORDER — SODIUM CHLORIDE, SODIUM LACTATE, POTASSIUM CHLORIDE, CALCIUM CHLORIDE 600; 310; 30; 20 MG/100ML; MG/100ML; MG/100ML; MG/100ML
9 INJECTION, SOLUTION INTRAVENOUS CONTINUOUS
Status: DISCONTINUED | OUTPATIENT
Start: 2017-09-05 | End: 2017-09-06 | Stop reason: HOSPADM

## 2017-09-05 RX ADMIN — LIDOCAINE HYDROCHLORIDE 50 MG: 10 INJECTION, SOLUTION INFILTRATION; PERINEURAL at 10:54

## 2017-09-05 RX ADMIN — GLYCOPYRROLATE 0.4 MG: 0.2 INJECTION, SOLUTION INTRAMUSCULAR; INTRAVENOUS at 12:58

## 2017-09-05 RX ADMIN — PHENYLEPHRINE HYDROCHLORIDE 200 MCG: 10 INJECTION INTRAVENOUS at 12:31

## 2017-09-05 RX ADMIN — DIPHENHYDRAMINE HYDROCHLORIDE 50 MG: 50 INJECTION INTRAMUSCULAR; INTRAVENOUS at 08:08

## 2017-09-05 RX ADMIN — LISINOPRIL 10 MG: 10 TABLET ORAL at 21:08

## 2017-09-05 RX ADMIN — HYDROCODONE BITARTRATE AND ACETAMINOPHEN 1 TABLET: 5; 325 TABLET ORAL at 15:51

## 2017-09-05 RX ADMIN — Medication: at 16:55

## 2017-09-05 RX ADMIN — HYDROCORTISONE SODIUM SUCCINATE 50 MG: 100 INJECTION, POWDER, FOR SOLUTION INTRAMUSCULAR; INTRAVENOUS at 08:08

## 2017-09-05 RX ADMIN — FENTANYL CITRATE 50 MCG: 50 INJECTION, SOLUTION INTRAMUSCULAR; INTRAVENOUS at 10:54

## 2017-09-05 RX ADMIN — DOPAMINE HYDROCHLORIDE 5 MCG/KG/MIN: 80 INJECTION, SOLUTION INTRAVENOUS at 12:32

## 2017-09-05 RX ADMIN — SUCRALFATE 1 G: 1 TABLET ORAL at 18:01

## 2017-09-05 RX ADMIN — PROPOFOL 200 MG: 10 INJECTION, EMULSION INTRAVENOUS at 10:54

## 2017-09-05 RX ADMIN — PHENYLEPHRINE HYDROCHLORIDE 0.5 MCG/KG/MIN: 10 INJECTION INTRAVENOUS at 11:20

## 2017-09-05 RX ADMIN — METHYLPREDNISOLONE SODIUM SUCCINATE 125 MG: 125 INJECTION, POWDER, FOR SOLUTION INTRAMUSCULAR; INTRAVENOUS at 12:47

## 2017-09-05 RX ADMIN — KETOROLAC TROMETHAMINE 15 MG: 15 INJECTION, SOLUTION INTRAMUSCULAR; INTRAVENOUS at 15:51

## 2017-09-05 RX ADMIN — PHENYLEPHRINE HYDROCHLORIDE 200 MCG: 10 INJECTION INTRAVENOUS at 12:19

## 2017-09-05 RX ADMIN — ONDANSETRON 4 MG: 2 INJECTION INTRAMUSCULAR; INTRAVENOUS at 12:51

## 2017-09-05 RX ADMIN — PHENYLEPHRINE HYDROCHLORIDE 100 MCG: 10 INJECTION INTRAVENOUS at 11:51

## 2017-09-05 RX ADMIN — PHENYLEPHRINE HYDROCHLORIDE 100 MCG: 10 INJECTION INTRAVENOUS at 12:15

## 2017-09-05 RX ADMIN — CLONAZEPAM 1 MG: 1 TABLET ORAL at 21:08

## 2017-09-05 RX ADMIN — FAMOTIDINE 20 MG: 20 TABLET ORAL at 07:27

## 2017-09-05 RX ADMIN — PHENYLEPHRINE HYDROCHLORIDE 100 MCG: 10 INJECTION INTRAVENOUS at 12:29

## 2017-09-05 RX ADMIN — PHENYLEPHRINE HYDROCHLORIDE 100 MCG: 10 INJECTION INTRAVENOUS at 12:17

## 2017-09-05 RX ADMIN — ATRACURIUM BESYLATE 50 MG: 10 INJECTION, SOLUTION INTRAVENOUS at 10:54

## 2017-09-05 RX ADMIN — FENTANYL CITRATE 25 MCG: 50 INJECTION, SOLUTION INTRAMUSCULAR; INTRAVENOUS at 11:45

## 2017-09-05 RX ADMIN — KETOROLAC TROMETHAMINE 15 MG: 15 INJECTION, SOLUTION INTRAMUSCULAR; INTRAVENOUS at 23:49

## 2017-09-05 RX ADMIN — FENTANYL CITRATE 25 MCG: 50 INJECTION, SOLUTION INTRAMUSCULAR; INTRAVENOUS at 12:11

## 2017-09-05 RX ADMIN — HYDROCODONE BITARTRATE AND ACETAMINOPHEN 1 TABLET: 5; 325 TABLET ORAL at 21:08

## 2017-09-05 RX ADMIN — Medication 3 MG: at 12:58

## 2017-09-05 NOTE — PLAN OF CARE
Problem: Patient Care Overview (Adult)  Goal: Plan of Care Review  Outcome: Ongoing (interventions implemented as appropriate)    09/05/17 3658   Outcome Evaluation   Outcome Summary/Follow up Plan Pt arrived to floor from PACU. Sheaths removed with no complications. No s/s of distress. Bedrest up at 2030. Will continue to monitor.        Goal: Adult Individualization and Mutuality  Outcome: Ongoing (interventions implemented as appropriate)  Goal: Discharge Needs Assessment  Outcome: Ongoing (interventions implemented as appropriate)    Problem: Arrhythmia/Dysrhythmia (Symptomatic) (Adult)  Goal: Signs and Symptoms of Listed Potential Problems Will be Absent or Manageable (Arrhythmia/Dysrhythmia)  Outcome: Ongoing (interventions implemented as appropriate)

## 2017-09-05 NOTE — ANESTHESIA POSTPROCEDURE EVALUATION
Patient: Stevenson Chilel    Procedure Summary     Date Anesthesia Start Anesthesia Stop Room / Location    09/05/17 1050  [facility] / [facility]       Procedure Diagnosis Provider Provider    Ablation atrial fibrillation PVA  (N/A ) Paroxysmal atrial fibrillation  (a-fib) MD Matthew Domínguez MD          Anesthesia Type: general  Last vitals  BP   120/80 (09/05/17 1325)    Temp   97 °F (36.1 °C) (09/05/17 1325)    Pulse   74 (09/05/17 1325)   Resp   16 (09/05/17 1325)    SpO2   96 % (09/05/17 1325)      Post Anesthesia Care and Evaluation    Patient location during evaluation: PACU  Patient participation: complete - patient participated  Level of consciousness: awake and alert  Pain score: 0  Pain management: adequate  Airway patency: patent  Anesthetic complications: No anesthetic complications  PONV Status: none  Cardiovascular status: hemodynamically stable and acceptable  Respiratory status: nonlabored ventilation, acceptable and nasal cannula  Hydration status: acceptable

## 2017-09-05 NOTE — ANESTHESIA PREPROCEDURE EVALUATION
Anesthesia Evaluation     Patient summary reviewed and Nursing notes reviewed   NPO Solid Status: > 8 hours  NPO Liquid Status: > 8 hours     Airway   Mallampati: III  TM distance: >3 FB  Neck ROM: full  possible difficult intubation  Dental - normal exam     Pulmonary    (+) sleep apnea,   Cardiovascular     ECG reviewed    (+) dysrhythmias Atrial Fib,       Neuro/Psych  GI/Hepatic/Renal/Endo    (+)  renal disease CRI,     Musculoskeletal     Abdominal    Substance History   (+) alcohol use,      OB/GYN          Other                                        Anesthesia Plan    ASA 3     general   (Glidescope available)  intravenous induction     Plan discussed with CRNA.

## 2017-09-05 NOTE — ANESTHESIA PROCEDURE NOTES
Airway  Urgency: elective    Date/Time: 9/5/2017 10:56 AM  Airway not difficult    General Information and Staff    Patient location during procedure: OR  Anesthesiologist: LISSETTE CALVIN  CRNA: ROEL QUEVEDO    Indications and Patient Condition  Indications for airway management: airway protection    Preoxygenated: yes  MILS not maintained throughout  Mask difficulty assessment: 1 - vent by mask    Final Airway Details  Final airway type: endotracheal airway      Successful airway: ETT  Cuffed: yes   Successful intubation technique: direct laryngoscopy  Endotracheal tube insertion site: oral  Blade: Bridgett  Blade size: #3  ETT size: 7.5 mm  Cormack-Lehane Classification: grade IIb - view of arytenoids or posterior of glottis only  Placement verified by: chest auscultation and capnometry   Measured from: lips  ETT to lips (cm): 20  Number of attempts at approach: 1    Additional Comments  Negative epigastric sounds, Breath sound equal bilaterally with symmetric chest rise and fall

## 2017-09-05 NOTE — PLAN OF CARE
Problem: Patient Care Overview (Adult)  Goal: Plan of Care Review  Outcome: Ongoing (interventions implemented as appropriate)    09/05/17 0714   Coping/Psychosocial Response Interventions   Plan Of Care Reviewed With patient   Patient Care Overview   Progress no change   Outcome Evaluation   Outcome Summary/Follow up Plan awaiting pva

## 2017-09-05 NOTE — H&P
Stevenson Chilel  [id number]  1975   LOS: 0 days   Patient Care Team:  VIKASH Linda as PCP - General (Family Medicine)     Mr. Chilel is a  white male from [place], [place], mows lawns and power lifts.     Chief Complaint:  Atrial fibrillation    Problem List:  1. Typical Atrial Flutter    A) Hosptialization 7/2016 for Aflutter in setting of phentermine - cardioverted with Corvert discharged on betablocker    B) Cardiolite stress test 8/2016 Negative for ischemia    C)Echocardiogram 7/2016: mild LVH, biatrial enlargement, EF 55%-60%   D) EPS with RFA atrial flutter 11/21/16 with Dr. Alaniz- [facility], [place], [place], recurrence with ECV and initiation of Amiodarone   E) Recurrence with repeat EPS 12/16/17: with bidirectional block documented, Dr. Alaniz,   F) RFA of RA flutter 5/2/17   G) CHADSvasc = 0 on Eliquis  2. Anxiety/ Depression   3. Ulcerative Colitis - diagnosed at age 23, but not being treated currently  4. Moderate Obesity; BMI 33  5. ETOH Abuse; half pint daily   6. DONNIE- non compliant  7. Marijuana use  8. Surgical History   A. Right shoulder   B. Tonsillectomy    Allergies   Allergen Reactions   • Contrast Dye Anaphylaxis     ANAPHYLAXIS     Prescriptions Prior to Admission   Medication Sig Dispense Refill Last Dose   • apixaban (ELIQUIS) 5 MG tablet tablet Take 5 mg by mouth 2 (Two) Times a Day.   9/5/2017 at Unknown time   • cimetidine (TAGAMET) 400 MG tablet Take 1 tablet AM prior to procedure and 1 tablet AM of procedure. (Patient taking differently: Take 400 mg by mouth. Take 1 tablet AM prior to procedure and 1 tablet AM of procedure.) 2 tablet 0 9/4/2017 at Unknown time   • clonazePAM (KlonoPIN) 1 MG tablet Take 1 mg by mouth As Needed for Anxiety. Pt will sometimes just take 1/2 tab (0.5 mg).   Past Week at Unknown time   • diltiaZEM CD (CARDIZEM CD) 120 MG 24 hr capsule Take 1 capsule by mouth 2 (Two) Times a Day. 60 capsule 5 9/5/2017 at Unknown time    • diphenhydrAMINE (BENADRYL) 50 MG tablet Take 1 tablet AM prior to procedure and 1 tablet AM of procedure. (Patient taking differently: Take 50 mg by mouth. Take 1 tablet AM prior to procedure and 1 tablet AM of procedure.) 2 tablet 0 9/4/2017 at Unknown time   • DULoxetine (CYMBALTA) 60 MG capsule Take 60 mg by mouth Every Morning.   9/5/2017 at Unknown time   • magnesium oxide (MAGOX) 400 (241.3 MG) MG tablet tablet Take 400 mg by mouth Daily.   9/5/2017 at Unknown time   • metoprolol succinate XL (TOPROL-XL) 25 MG 24 hr tablet Take 1 tablet by mouth Every 12 (Twelve) Hours. 60 tablet 1 9/5/2017 at Unknown time   • predniSONE (DELTASONE) 20 MG tablet Take 2 tablet AM prior to procedure and 2 tablet AM of procedure. (Patient taking differently: Take 20 mg by mouth. Take 2 tablet AM prior to procedure and 2 tablet AM of procedure.) 4 tablet 0 9/4/2017 at Unknown time     Scheduled Meds:  famotidine 20 mg Intravenous Once     Continuous Infusions:  lactated ringers 9 mL/hr          History of Present Illness:    42 year old white male here for a PVA with Cryo and anesthesia. He is anticoagulated with Eliquis, last dose this morning. He has a contrast allergy, DONNIE, and drinks a pint of bourbon/moonshine daily as well as uses marijuana. He denies chest pain but says when he feels the atrial fibrillation that he experiences SOB, palpitations, fatigue, dizziness, and cold sweats. Currently he is asymptomatic. He is requesting to heavy weight lift as soon as we can give him clearance to do so.     Cardiac risk factors: male gender and obesity (BMI >= 30 kg/m2).    Social History     Social History   • Marital status:      Spouse name: N/A   • Number of children: N/A   • Years of education: N/A     Occupational History   • Not on file.     Social History Main Topics   • Smoking status: Never Smoker   • Smokeless tobacco: Never Used   • Alcohol use 1.8 - 3.0 oz/week     3 - 5 Shots of liquor per week      Comment:  "bourbon & moonshine DRINKS HALF PINT DAILY    • Drug use: 7.00 per week     Special: Marijuana      Comment: states he uses \"a little\" every day   • Sexual activity: Defer     Other Topics Concern   • Not on file     Social History Narrative     Family History   Problem Relation Age of Onset   • Heart disease Father    • Cancer Maternal Grandmother    • Cancer Paternal Grandmother    • Heart disease Paternal Grandfather        Review of Systems  Pertinent items are noted in HPI and problem list.     Objective:       Physical Exam  BP (!) 138/120 (BP Location: Right arm, Patient Position: Lying) Comment: 138/113 left arm  Pulse 84  Temp 97.1 °F (36.2 °C) (Temporal Artery )   Ht 72\" (182.9 cm)  Wt 247 lb 9.2 oz (112 kg)  SpO2 95%  BMI 33.58 kg/m2  Last 2 weights    17  0657   Weight: 247 lb 9.2 oz (112 kg)     Body mass index is 33.58 kg/(m^2).  No intake or output data in the 24 hours ending 17 0742    General Appearance:  Alert, cooperative, no distress, appears stated age   Head:  Normocephalic, without obvious abnormality, atraumatic   Eyes:  PERRL, conjunctiva/corneas clear, EOM's intact, fundi benign, both eyes   Throat: Lips, mucosa, and tongue normal; teeth and gums normal   Neck: Supple, symmetrical, trachea midline, no adenopathy, thyroid: not enlarged, symmetric, no tenderness/mass/nodules, no carotid bruit or JVD   Lungs:   Clear to auscultation bilaterally, respirations unlabored   Heart:  Irregular rate and rhythm, S1, S2 normal, no murmur, rub or gallop   Abdomen:   Soft, non-tender, no masses, no organomegaly   Extremities: No edema   Pulses: 2+ and symmetric   Skin: Skin color, texture, turgor normal, no rashes or lesions   Neurologic: Normal       Cardiographics  · EK17; Atrial fibrillation with a competing junctional pacemaker  Nonspecific ST-T changes  Abnormal ECG    Imaging  · Chest x-ray:17; No acute disease  · CTA chest 17 pending    Lab Review     Results from " last 7 days  Lab Units 09/04/17  1607 09/01/17  0345 08/31/17  1248   SODIUM mmol/L 139 138 142   POTASSIUM mmol/L 4.6 4.0 3.9   CHLORIDE mmol/L 104 101 103   CO2 mmol/L 28.0 26.0 25.0   BUN mg/dL 19 18 17   CREATININE mg/dL 1.20 1.21 1.42*   GLUCOSE mg/dL 100 83 100   CALCIUM mg/dL 9.4 8.0* 8.4       Results from last 7 days  Lab Units 09/04/17  1607 09/01/17  0345 08/31/17  1248   WBC 10*3/mm3 7.97 7.22 6.64   HEMOGLOBIN g/dL 17.6* 16.4 17.3   HEMATOCRIT % 52.8* 48.6 51.2*   PLATELETS 10*3/mm3 223 191 244           Results from last 7 days  Lab Units 09/04/17  1608   HEMOGLOBIN A1C % 5.30       Results from last 7 days  Lab Units 09/01/17  0345 08/31/17  2128 08/31/17  1600   TROPONIN I ng/mL 0.038* 0.035* 0.030         Assessment:    Patient here for a PVA for atrial fibrillation, anticoagulated with Eliquis, last dose this morning.  Procedure, risks, complications discussed with patient and he is agreeable to proceed.  He needs reduction in his consumption of alcohol long-term. Labs acceptable.           Plan:   1. PVA with Cryo and anesthesia  2. Premedicate for contrast allergy      Scribed for Ramirez Reza MD by VIKASH Lewis. 9/5/2017  7:52 AM            I, Ramirez Reza MD, personally performed the services face to face as described and documented by the above named individual. I have made any necessary edits and it is both accurate and complete 9/5/2017  10:53 AM

## 2017-09-06 ENCOUNTER — APPOINTMENT (OUTPATIENT)
Dept: CARDIOLOGY | Facility: HOSPITAL | Age: 42
End: 2017-09-06
Attending: INTERNAL MEDICINE

## 2017-09-06 VITALS
WEIGHT: 247.58 LBS | SYSTOLIC BLOOD PRESSURE: 128 MMHG | OXYGEN SATURATION: 95 % | DIASTOLIC BLOOD PRESSURE: 68 MMHG | RESPIRATION RATE: 16 BRPM | HEART RATE: 85 BPM | HEIGHT: 72 IN | BODY MASS INDEX: 33.53 KG/M2 | TEMPERATURE: 97.5 F

## 2017-09-06 LAB
ALBUMIN SERPL-MCNC: 4 G/DL (ref 3.2–4.8)
ALBUMIN/GLOB SERPL: 1.7 G/DL (ref 1.5–2.5)
ALP SERPL-CCNC: 46 U/L (ref 25–100)
ALT SERPL W P-5'-P-CCNC: 66 U/L (ref 7–40)
ANION GAP SERPL CALCULATED.3IONS-SCNC: 2 MMOL/L (ref 3–11)
AST SERPL-CCNC: 58 U/L (ref 0–33)
BH CV ECHO MEAS - AO ROOT AREA (BSA CORRECTED): 1.4
BH CV ECHO MEAS - AO ROOT AREA: 8.6 CM^2
BH CV ECHO MEAS - AO ROOT DIAM: 3.3 CM
BH CV ECHO MEAS - BSA(HAYCOCK): 2.4 M^2
BH CV ECHO MEAS - BSA: 2.3 M^2
BH CV ECHO MEAS - BZI_BMI: 33.5 KILOGRAMS/M^2
BH CV ECHO MEAS - BZI_METRIC_HEIGHT: 182.9 CM
BH CV ECHO MEAS - BZI_METRIC_WEIGHT: 112 KG
BH CV ECHO MEAS - CONTRAST EF (2CH): 56.1 ML/M^2
BH CV ECHO MEAS - CONTRAST EF 4CH: 41.9 ML/M^2
BH CV ECHO MEAS - EDV(CUBED): 168.2 ML
BH CV ECHO MEAS - EDV(MOD-SP2): 228 ML
BH CV ECHO MEAS - EDV(MOD-SP4): 217 ML
BH CV ECHO MEAS - EDV(TEICH): 148.7 ML
BH CV ECHO MEAS - EF(CUBED): 58.7 %
BH CV ECHO MEAS - EF(TEICH): 49.8 %
BH CV ECHO MEAS - ESV(CUBED): 69.4 ML
BH CV ECHO MEAS - ESV(MOD-SP2): 100 ML
BH CV ECHO MEAS - ESV(MOD-SP4): 126 ML
BH CV ECHO MEAS - ESV(TEICH): 74.7 ML
BH CV ECHO MEAS - FS: 25.5 %
BH CV ECHO MEAS - IVS/LVPW: 0.99
BH CV ECHO MEAS - IVSD: 1.3 CM
BH CV ECHO MEAS - LA DIMENSION: 4.4 CM
BH CV ECHO MEAS - LA/AO: 1.3
BH CV ECHO MEAS - LV DIASTOLIC VOL/BSA (35-75): 93.1 ML/M^2
BH CV ECHO MEAS - LV MASS(C)D: 317.7 GRAMS
BH CV ECHO MEAS - LV MASS(C)DI: 136.3 GRAMS/M^2
BH CV ECHO MEAS - LV SYSTOLIC VOL/BSA (12-30): 54.1 ML/M^2
BH CV ECHO MEAS - LVIDD: 5.5 CM
BH CV ECHO MEAS - LVIDS: 4.1 CM
BH CV ECHO MEAS - LVLD AP2: 9.9 CM
BH CV ECHO MEAS - LVLD AP4: 9.6 CM
BH CV ECHO MEAS - LVLS AP2: 8.3 CM
BH CV ECHO MEAS - LVLS AP4: 8.6 CM
BH CV ECHO MEAS - LVPWD: 1.3 CM
BH CV ECHO MEAS - MV A MAX VEL: 42.4 CM/SEC
BH CV ECHO MEAS - MV DEC SLOPE: 783 CM/SEC^2
BH CV ECHO MEAS - MV DEC TIME: 0.16 SEC
BH CV ECHO MEAS - MV E MAX VEL: 83.4 CM/SEC
BH CV ECHO MEAS - MV E/A: 2
BH CV ECHO MEAS - MV P1/2T MAX VEL: 103 CM/SEC
BH CV ECHO MEAS - MV P1/2T: 38.5 MSEC
BH CV ECHO MEAS - MVA P1/2T LCG: 2.1 CM^2
BH CV ECHO MEAS - MVA(P1/2T): 5.7 CM^2
BH CV ECHO MEAS - PA ACC SLOPE: 643 CM/SEC^2
BH CV ECHO MEAS - PA ACC TIME: 0.11 SEC
BH CV ECHO MEAS - PA PR(ACCEL): 27.7 MMHG
BH CV ECHO MEAS - RV MAX PG: 1.5 MMHG
BH CV ECHO MEAS - RV V1 MAX: 60.7 CM/SEC
BH CV ECHO MEAS - SI(CUBED): 42.4 ML/M^2
BH CV ECHO MEAS - SI(MOD-SP2): 54.9 ML/M^2
BH CV ECHO MEAS - SI(MOD-SP4): 39.1 ML/M^2
BH CV ECHO MEAS - SI(TEICH): 31.8 ML/M^2
BH CV ECHO MEAS - SV(CUBED): 98.8 ML
BH CV ECHO MEAS - SV(MOD-SP2): 128 ML
BH CV ECHO MEAS - SV(MOD-SP4): 91 ML
BH CV ECHO MEAS - SV(TEICH): 74 ML
BH CV ECHO MEAS - TAPSE (>1.6): 2.3 CM2
BH CV XLRA - RV BASE: 4.1 CM
BH CV XLRA - RV LENGTH: 8.5 CM
BH CV XLRA - RV MID: 3.4 CM
BH CV XLRA - TDI S': 10.1 CM/SEC
BILIRUB SERPL-MCNC: 1.2 MG/DL (ref 0.3–1.2)
BUN BLD-MCNC: 21 MG/DL (ref 9–23)
BUN/CREAT SERPL: 16.2 (ref 7–25)
CALCIUM SPEC-SCNC: 8.6 MG/DL (ref 8.7–10.4)
CHLORIDE SERPL-SCNC: 100 MMOL/L (ref 99–109)
CO2 SERPL-SCNC: 32 MMOL/L (ref 20–31)
CREAT BLD-MCNC: 1.3 MG/DL (ref 0.6–1.3)
GFR SERPL CREATININE-BSD FRML MDRD: 61 ML/MIN/1.73
GLOBULIN UR ELPH-MCNC: 2.3 GM/DL
GLUCOSE BLD-MCNC: 123 MG/DL (ref 70–100)
LEFT ATRIUM VOLUME INDEX: 24 ML/M2
LEFT ATRIUM VOLUME: 56 CM3
POTASSIUM BLD-SCNC: 4.4 MMOL/L (ref 3.5–5.5)
PROT SERPL-MCNC: 6.3 G/DL (ref 5.7–8.2)
SODIUM BLD-SCNC: 134 MMOL/L (ref 132–146)

## 2017-09-06 PROCEDURE — 80053 COMPREHEN METABOLIC PANEL: CPT | Performed by: INTERNAL MEDICINE

## 2017-09-06 PROCEDURE — 25010000002 KETOROLAC TROMETHAMINE PER 15 MG: Performed by: INTERNAL MEDICINE

## 2017-09-06 PROCEDURE — 93306 TTE W/DOPPLER COMPLETE: CPT

## 2017-09-06 PROCEDURE — 93005 ELECTROCARDIOGRAM TRACING: CPT | Performed by: INTERNAL MEDICINE

## 2017-09-06 PROCEDURE — G0378 HOSPITAL OBSERVATION PER HR: HCPCS

## 2017-09-06 PROCEDURE — 93306 TTE W/DOPPLER COMPLETE: CPT | Performed by: INTERNAL MEDICINE

## 2017-09-06 PROCEDURE — 99217 PR OBSERVATION CARE DISCHARGE MANAGEMENT: CPT | Performed by: INTERNAL MEDICINE

## 2017-09-06 PROCEDURE — 93010 ELECTROCARDIOGRAM REPORT: CPT | Performed by: INTERNAL MEDICINE

## 2017-09-06 RX ORDER — CARVEDILOL 6.25 MG/1
6.25 TABLET ORAL EVERY 12 HOURS SCHEDULED
Qty: 60 TABLET | Refills: 3 | Status: SHIPPED | OUTPATIENT
Start: 2017-09-06 | End: 2017-09-06

## 2017-09-06 RX ORDER — SUCRALFATE 1 G/1
1 TABLET ORAL
Qty: 21 TABLET | Refills: 0 | Status: SHIPPED | OUTPATIENT
Start: 2017-09-06 | End: 2017-09-06

## 2017-09-06 RX ORDER — SUCRALFATE 1 G/1
1 TABLET ORAL
Qty: 21 TABLET | Refills: 0 | Status: SHIPPED | OUTPATIENT
Start: 2017-09-06 | End: 2017-12-12

## 2017-09-06 RX ORDER — CARVEDILOL 3.12 MG/1
3.12 TABLET ORAL EVERY 12 HOURS SCHEDULED
Qty: 60 TABLET | Refills: 6 | Status: CANCELLED | OUTPATIENT
Start: 2017-09-06

## 2017-09-06 RX ORDER — CARVEDILOL 6.25 MG/1
6.25 TABLET ORAL EVERY 12 HOURS SCHEDULED
Qty: 60 TABLET | Refills: 3 | Status: SHIPPED | OUTPATIENT
Start: 2017-09-06 | End: 2017-10-03 | Stop reason: SDUPTHER

## 2017-09-06 RX ORDER — LISINOPRIL 10 MG/1
10 TABLET ORAL
Qty: 30 TABLET | Refills: 6 | Status: SHIPPED | OUTPATIENT
Start: 2017-09-06 | End: 2017-12-12 | Stop reason: DRUGHIGH

## 2017-09-06 RX ORDER — LISINOPRIL 10 MG/1
10 TABLET ORAL
Qty: 30 TABLET | Refills: 6 | Status: SHIPPED | OUTPATIENT
Start: 2017-09-06 | End: 2017-09-06

## 2017-09-06 RX ORDER — CARVEDILOL 6.25 MG/1
6.25 TABLET ORAL EVERY 12 HOURS SCHEDULED
Status: DISCONTINUED | OUTPATIENT
Start: 2017-09-06 | End: 2017-09-06 | Stop reason: HOSPADM

## 2017-09-06 RX ADMIN — APIXABAN 5 MG: 5 TABLET, FILM COATED ORAL at 08:35

## 2017-09-06 RX ADMIN — PANTOPRAZOLE SODIUM 40 MG: 40 TABLET, DELAYED RELEASE ORAL at 08:35

## 2017-09-06 RX ADMIN — CARVEDILOL 3.12 MG: 3.12 TABLET, FILM COATED ORAL at 08:36

## 2017-09-06 RX ADMIN — KETOROLAC TROMETHAMINE 15 MG: 15 INJECTION, SOLUTION INTRAMUSCULAR; INTRAVENOUS at 08:35

## 2017-09-06 RX ADMIN — SUCRALFATE 1 G: 1 TABLET ORAL at 12:18

## 2017-09-06 RX ADMIN — DULOXETINE HYDROCHLORIDE 60 MG: 60 CAPSULE, DELAYED RELEASE ORAL at 08:35

## 2017-09-06 RX ADMIN — SUCRALFATE 1 G: 1 TABLET ORAL at 08:36

## 2017-09-06 RX ADMIN — Medication 400 MG: at 08:36

## 2017-09-06 NOTE — PLAN OF CARE
Problem: Patient Care Overview (Adult)  Goal: Plan of Care Review  Outcome: Ongoing (interventions implemented as appropriate)    09/06/17 0416   Coping/Psychosocial Response Interventions   Plan Of Care Reviewed With patient   Patient Care Overview   Progress progress toward functional goals as expected   Outcome Evaluation   Outcome Summary/Follow up Plan Pt VSS. Groin sites, clean dry & intact. NSR on tele.          Problem: Arrhythmia/Dysrhythmia (Symptomatic) (Adult)  Goal: Signs and Symptoms of Listed Potential Problems Will be Absent or Manageable (Arrhythmia/Dysrhythmia)  Outcome: Ongoing (interventions implemented as appropriate)    09/06/17 0416   Arrhythmia/Dysrhythmia (Symptomatic)   Problems Assessed (Arrhythmia/Dysrhythmia) all   Problems Present (Arrhythmia/Dysrhythmia) none

## 2017-09-06 NOTE — DISCHARGE SUMMARY
"[facility]  IP Progress Note      Chief Complaint: Atrial Fibrillation       Subjective:Denies Chest Pain or SOB. Ambulating last night and today. Eating breakfast ok. No complaints .        Objective:  Blood pressure 138/86, pulse 92, temperature 97.4 °F (36.3 °C), temperature source Oral, resp. rate 16, height 72\" (182.9 cm), weight 247 lb 9.2 oz (112 kg), SpO2 95 %.     Intake/Output Summary (Last 24 hours) at 09/06/17 0845  Last data filed at 09/06/17 0843   Gross per 24 hour   Intake             1720 ml   Output                0 ml   Net             1720 ml       Physical Exam:  General: No acute distress.   Neck: no JVD.  Chest:No respiratory distress, breath sounds are normal. No wheezes,  rhonchi or rales.  Cardiovascular: Normal S1 and S2, no murmer, gallop or rub.    Extremities: No edema.Cath sites stable. Mild ecchymosis on the right    Results Review:     I reviewed the patient's new clinical results.      Results from last 7 days  Lab Units 09/05/17  1251 09/04/17  1607   WBC 10*3/mm3  --  7.97   HEMOGLOBIN g/dL  --  17.6*   HEMOGLOBIN, POC g/dL 17.7*  --    HEMATOCRIT %  --  52.8*   HEMATOCRIT POC % 52*  --    PLATELETS 10*3/mm3  --  223       Results from last 7 days  Lab Units 09/06/17  0555   SODIUM mmol/L 134   POTASSIUM mmol/L 4.4   CHLORIDE mmol/L 100   CO2 mmol/L 32.0*   BUN mg/dL 21   CREATININE mg/dL 1.30   CALCIUM mg/dL 8.6*   BILIRUBIN mg/dL 1.2   ALK PHOS U/L 46   ALT (SGPT) U/L 66*   AST (SGOT) U/L 58*   GLUCOSE mg/dL 123*       Results from last 7 days  Lab Units 09/06/17  0555   SODIUM mmol/L 134   POTASSIUM mmol/L 4.4   CHLORIDE mmol/L 100   CO2 mmol/L 32.0*   BUN mg/dL 21   CREATININE mg/dL 1.30   GLUCOSE mg/dL 123*   CALCIUM mg/dL 8.6*       Results from last 7 days  Lab Units 09/04/17  1607 08/31/17  1248   INR  1.13 1.10     Lab Results   Component Value Date    CKTOTAL 214 (H) 05/05/2017    CKMB 5.29 (H) 05/05/2017    TROPONINI 0.038 (H) " 09/01/2017       Results from last 7 days  Lab Units 08/31/17  1600   TSH mIU/mL 1.370               Tele: Sinus Rhythm       Assessment/Plan:  1. Afib/Flutter: S/p PVA Cryo  2. ETOH/Illicit drug abuse  3. UC  4. DONNIE  5. Cardiomyopathy by ICE with EF 25%: BB,ACE, Repeat Echo if EF <35% Life vest.    PLAN:  · Repeat Echo today if EF <35%, Home with Life vest. Continue BB and ACE  · Follow up in Afib clinic one month and with Dr. Reza in 3 months; remove suture in right leg before DC  · Disposition stable    Will Juan GARCIA I, Andrea Arzate, have reviewed the note in full and agree with all aspects of the above including physical exam, assessment, labs and plan with changes made accordingly. Face to Face Time was spent with the patient.    Andrea Arzate DO  09/06/17  8:59 AM    ADDENDUM:  EF 50-55% and suture removed. DC home on BB and ACE with follow up as noted    Andrea Arzate DO  2:48 PM

## 2017-09-06 NOTE — NURSING NOTE
Ablation Nurse Navigator    Pt s/p PVA w/cryo yesterday.  Hypotensive with low EF while under general anesthesia, resolved.  EF on TTE done earlier today 50-55% per Dr. Hicks.  Doing well at this time. No C/O.  NSR on Tele.  HR 80s, /80.  I removed suture from right groin.  Small ecchymosis present, but no hematoma.  Has eaten without nausea, has voided without difficulty (did not have lama) and has ambulated without dizziness or groin issue.  Going home soon.  Reviewed D/C information with patient and his wife, particularly normal post procedural expectations and what to call for.  Ablation discharge instruction handout left for future reference.  Verbalized understanding.  Will follow up in the AFib Clinic in one month and with Dr. Reza in 3 months in [facility] (may cancel AFib Clinic appt due to 3hr drive).  I gave them my contact information and encouraged to call me with questions or concerns in the interim.      MARCE MorrisonN, RN  09/06/17  3:08 PM

## 2017-09-06 NOTE — PROGRESS NOTES
Discharge Planning Assessment  [facility]     Patient Name: Stevenson Chilel  MRN: [id number]  Today's Date: 9/6/2017    Admit Date: 9/5/2017          Discharge Needs Assessment       09/06/17 1203    Living Environment    Lives With spouse    Living Arrangements house    Home Accessibility no concerns    Stair Railings at Home none    Type of Financial/Environmental Concern none    Transportation Available car;family or friend will provide    Living Environment    Provides Primary Care For no one    Quality Of Family Relationships supportive    Able to Return to Prior Living Arrangements yes    Discharge Needs Assessment    Concerns To Be Addressed denies needs/concerns at this time    Readmission Within The Last 30 Days planned readmission    Anticipated Changes Related to Illness none    Equipment Currently Used at Home bipap/ cpap    Equipment Needed After Discharge none    Discharge Disposition still a patient            Discharge Plan       09/06/17 1206    Case Management/Social Work Plan    Plan Home    Additional Comments Met with Mr. Chilel and his wife Gunjan at the bedside, he uses a cpap at home and denies any other DME.  States he has never had any HH services.  He reports his copays and medications are affordable.  His plan is to return home this afternoon, his wife will transport him.  CM will cont. to follow.         Discharge Placement     No information found        Expected Discharge Date and Time     Expected Discharge Date Expected Discharge Time    Sep 7, 2017               Demographic Summary       09/06/17 1202    Contact Information    Permission Granted to Share Information With     Primary Care Physician Information    Name Irma Schuster      09/06/17 1103    Referral Information    Admission Type observation    Referral Source admission list    Reason For Consult discharge planning    Record Reviewed history and physical;medical record            Functional Status        09/06/17 1202    Functional Status Current    Current Functional Level Comment See Nursing Notes    Functional Status Prior    Ambulation 0-->independent    Transferring 0-->independent    Toileting 0-->independent    Bathing 0-->independent    Dressing 0-->independent    Eating 0-->independent    Communication 0-->understands/communicates without difficulty    Swallowing 0-->swallows foods/liquids without difficulty    IADL    Medications independent    Meal Preparation independent    Housekeeping independent    Laundry independent    Shopping independent    Oral Care independent    Activity Tolerance    Current Activity Limitations none    Usual Activity Tolerance good    Current Activity Tolerance good    Employment/Financial    Financial Concerns none    Employment/Finance Comments [employer]            Psychosocial     None            Abuse/Neglect     None            Legal     None            Substance Abuse     None            Patient Forms     None          Sofia Owens RN

## 2017-09-08 ENCOUNTER — TELEPHONE (OUTPATIENT)
Dept: CARDIOLOGY | Facility: CLINIC | Age: 42
End: 2017-09-08

## 2017-09-08 NOTE — TELEPHONE ENCOUNTER
MD Analia Domínguez, RN                     Please call and stop lisinopril. His baseline crea abnormal. Stay on coreg.                I spoke with pt's wife, she understands and they will monitor BP and continue coreg.

## 2017-10-03 ENCOUNTER — APPOINTMENT (OUTPATIENT)
Dept: ONCOLOGY | Facility: HOSPITAL | Age: 42
End: 2017-10-03

## 2017-10-03 ENCOUNTER — APPOINTMENT (OUTPATIENT)
Dept: ONCOLOGY | Facility: CLINIC | Age: 42
End: 2017-10-03

## 2017-10-03 ENCOUNTER — APPOINTMENT (OUTPATIENT)
Dept: ULTRASOUND IMAGING | Facility: HOSPITAL | Age: 42
End: 2017-10-03

## 2017-10-03 RX ORDER — CARVEDILOL 6.25 MG/1
6.25 TABLET ORAL EVERY 12 HOURS SCHEDULED
Qty: 60 TABLET | Refills: 5 | Status: SHIPPED | OUTPATIENT
Start: 2017-10-03 | End: 2017-10-04 | Stop reason: SDUPTHER

## 2017-10-04 ENCOUNTER — APPOINTMENT (OUTPATIENT)
Dept: CARDIOLOGY | Facility: HOSPITAL | Age: 42
End: 2017-10-04

## 2017-10-04 RX ORDER — CARVEDILOL 6.25 MG/1
6.25 TABLET ORAL EVERY 12 HOURS SCHEDULED
Qty: 60 TABLET | Refills: 0 | Status: ON HOLD | OUTPATIENT
Start: 2017-10-04 | End: 2018-07-02 | Stop reason: SDUPTHER

## 2017-10-11 ENCOUNTER — HOSPITAL ENCOUNTER (OUTPATIENT)
Dept: ULTRASOUND IMAGING | Facility: HOSPITAL | Age: 42
Discharge: HOME | End: 2017-10-11
Admitting: INTERNAL MEDICINE

## 2017-10-11 ENCOUNTER — APPOINTMENT (OUTPATIENT)
Dept: ULTRASOUND IMAGING | Facility: HOSPITAL | Age: 42
End: 2017-10-11

## 2017-10-11 DIAGNOSIS — N18.1 CHRONIC KIDNEY DISEASE, STAGE I: ICD-10-CM

## 2017-10-11 DIAGNOSIS — E66.9 OBESITY: ICD-10-CM

## 2017-10-11 DIAGNOSIS — I48.91 ATRIAL FIBRILLATION (HCC): ICD-10-CM

## 2017-10-11 DIAGNOSIS — G47.33 OBSTRUCTIVE SLEEP APNEA: ICD-10-CM

## 2017-10-11 DIAGNOSIS — F10.20 ACUTE ALCOHOLISM (HCC): ICD-10-CM

## 2017-10-11 PROCEDURE — 76775 US EXAM ABDO BACK WALL LIM: CPT

## 2017-10-12 ENCOUNTER — CONSULT (OUTPATIENT)
Dept: ONCOLOGY | Facility: CLINIC | Age: 42
End: 2017-10-12

## 2017-10-12 ENCOUNTER — LAB (OUTPATIENT)
Dept: ONCOLOGY | Facility: HOSPITAL | Age: 42
End: 2017-10-12

## 2017-10-12 VITALS
WEIGHT: 241 LBS | DIASTOLIC BLOOD PRESSURE: 95 MMHG | TEMPERATURE: 98.2 F | RESPIRATION RATE: 16 BRPM | HEIGHT: 72 IN | BODY MASS INDEX: 32.64 KG/M2 | SYSTOLIC BLOOD PRESSURE: 131 MMHG | HEART RATE: 78 BPM

## 2017-10-12 DIAGNOSIS — D75.1 POLYCYTHEMIA: ICD-10-CM

## 2017-10-12 DIAGNOSIS — D75.1 POLYCYTHEMIA: Primary | ICD-10-CM

## 2017-10-12 LAB
ALBUMIN SERPL-MCNC: 4.6 G/DL (ref 3.4–4.8)
ALBUMIN/GLOB SERPL: 1.6 G/DL (ref 1.1–1.8)
ALP SERPL-CCNC: 59 U/L (ref 38–126)
ALT SERPL W P-5'-P-CCNC: 81 U/L (ref 21–72)
ANION GAP SERPL CALCULATED.3IONS-SCNC: 15 MMOL/L (ref 5–15)
AST SERPL-CCNC: 44 U/L (ref 17–59)
BASOPHILS # BLD AUTO: 0.01 10*3/MM3 (ref 0–0.2)
BASOPHILS NFR BLD AUTO: 0.2 % (ref 0–2)
BILIRUB SERPL-MCNC: 2 MG/DL (ref 0.2–1.3)
BUN BLD-MCNC: 26 MG/DL (ref 7–21)
BUN/CREAT SERPL: 21 (ref 7–25)
CALCIUM SPEC-SCNC: 9.5 MG/DL (ref 8.4–10.2)
CHLORIDE SERPL-SCNC: 100 MMOL/L (ref 95–110)
CO2 SERPL-SCNC: 23 MMOL/L (ref 22–31)
COHGB MFR BLD: 2.8 % (ref 0–5)
CREAT BLD-MCNC: 1.24 MG/DL (ref 0.7–1.3)
DEPRECATED RDW RBC AUTO: 46.5 FL (ref 35.1–43.9)
EOSINOPHIL # BLD AUTO: 0.17 10*3/MM3 (ref 0–0.7)
EOSINOPHIL NFR BLD AUTO: 2.9 % (ref 0–7)
ERYTHROCYTE [DISTWIDTH] IN BLOOD BY AUTOMATED COUNT: 13.9 % (ref 11.5–14.5)
ERYTHROCYTE [SEDIMENTATION RATE] IN BLOOD: 2 MM/HR (ref 0–15)
GFR SERPL CREATININE-BSD FRML MDRD: 65 ML/MIN/1.73 (ref 63–147)
GLOBULIN UR ELPH-MCNC: 2.9 GM/DL (ref 2.3–3.5)
GLUCOSE BLD-MCNC: 68 MG/DL (ref 60–100)
HCT VFR BLD AUTO: 51.5 % (ref 39–49)
HGB BLD-MCNC: 17.8 G/DL (ref 13.7–17.3)
IMM GRANULOCYTES # BLD: 0.01 10*3/MM3 (ref 0–0.02)
IMM GRANULOCYTES NFR BLD: 0.2 % (ref 0–0.5)
LDH SERPL-CCNC: 482 U/L (ref 313–618)
LYMPHOCYTES # BLD AUTO: 1.23 10*3/MM3 (ref 0.6–4.2)
LYMPHOCYTES NFR BLD AUTO: 20.7 % (ref 10–50)
MCH RBC QN AUTO: 32 PG (ref 26.5–34)
MCHC RBC AUTO-ENTMCNC: 34.6 G/DL (ref 31.5–36.3)
MCV RBC AUTO: 92.5 FL (ref 80–98)
MONOCYTES # BLD AUTO: 0.54 10*3/MM3 (ref 0–0.9)
MONOCYTES NFR BLD AUTO: 9.1 % (ref 0–12)
NEUTROPHILS # BLD AUTO: 3.99 10*3/MM3 (ref 2–8.6)
NEUTROPHILS NFR BLD AUTO: 66.9 % (ref 37–80)
NRBC BLD MANUAL-RTO: 0 /100 WBC (ref 0–0)
PLATELET # BLD AUTO: 155 10*3/MM3 (ref 150–450)
PMV BLD AUTO: 10.3 FL (ref 8–12)
POTASSIUM BLD-SCNC: 4.4 MMOL/L (ref 3.5–5.1)
PROT SERPL-MCNC: 7.5 G/DL (ref 6.3–8.6)
RBC # BLD AUTO: 5.57 10*6/MM3 (ref 4.37–5.74)
SODIUM BLD-SCNC: 138 MMOL/L (ref 137–145)
WBC NRBC COR # BLD: 5.95 10*3/MM3 (ref 3.2–9.8)

## 2017-10-12 PROCEDURE — 82375 ASSAY CARBOXYHB QUANT: CPT | Performed by: INTERNAL MEDICINE

## 2017-10-12 PROCEDURE — 83615 LACTATE (LD) (LDH) ENZYME: CPT | Performed by: INTERNAL MEDICINE

## 2017-10-12 PROCEDURE — 85651 RBC SED RATE NONAUTOMATED: CPT | Performed by: INTERNAL MEDICINE

## 2017-10-12 PROCEDURE — 81270 JAK2 GENE: CPT | Performed by: INTERNAL MEDICINE

## 2017-10-12 PROCEDURE — 36415 COLL VENOUS BLD VENIPUNCTURE: CPT | Performed by: INTERNAL MEDICINE

## 2017-10-12 PROCEDURE — 82668 ASSAY OF ERYTHROPOIETIN: CPT | Performed by: INTERNAL MEDICINE

## 2017-10-12 PROCEDURE — 85025 COMPLETE CBC W/AUTO DIFF WBC: CPT | Performed by: INTERNAL MEDICINE

## 2017-10-12 PROCEDURE — 99204 OFFICE O/P NEW MOD 45 MIN: CPT | Performed by: INTERNAL MEDICINE

## 2017-10-12 PROCEDURE — 80053 COMPREHEN METABOLIC PANEL: CPT | Performed by: INTERNAL MEDICINE

## 2017-10-12 PROCEDURE — G0463 HOSPITAL OUTPT CLINIC VISIT: HCPCS | Performed by: INTERNAL MEDICINE

## 2017-10-12 NOTE — PROGRESS NOTES
DATE OF CONSULT: 10/12/2017    REQUESTING SOURCE:   [facility]    REASON FOR CONSULTATION:  Persistent Polycythemia    HISTORY OF PRESENT ILLNESS: Mr. Chilel is a pleasant 42 year old gentleman with history of sleep apnea diagnosed 3 years ago doesn't use a CPAP yet, atrial fibrillation and flutter s/p ablation x 4 being referred to us for further evaluation of his persistent polycythemia. He doesn't smoke cigarettes but smokes marjuana and drinks 1-2 beers daily. He denies any headaches, dizziness, chest pains, worsening dyspnea, erythromelalgia like symptoms but admits to snoring at nights and early satiety symptoms with mild upper abdominal discomfort. Further review of his labs is as follows -    On 9/4/17 H&H 17.6&52.8  RBC 5.53   Plt 223K WBC 7.97K with normal differential         8/31   H&H 17.3&51.2          5/5    H&H 16.3&47.1       PAST MEDICAL HISTORY:    Past Medical History:   Diagnosis Date   • A-fib    • Anxiety    • Arrhythmia    • Arthritis     rt shoulder    • Atrial flutter 07/2016    EP  ablation x2    • Depression    • ETOH abuse    • Sleep apnea     have used a machine in the past   • Tendonitis     rt shoulder   • Ulcerative colitis        PAST SURGICAL HISTORY:  Past Surgical History:   Procedure Laterality Date   • CARDIAC ELECTROPHYSIOLOGY PROCEDURE N/A 5/2/2017    Procedure: Ablation atrial flutter;  Surgeon: Ramirez Reza MD;  Location: [facility] EP INVASIVE LOCATION;  Service:    • CARDIAC ELECTROPHYSIOLOGY PROCEDURE N/A 9/5/2017    Procedure: Ablation atrial fibrillation PVA ;  Surgeon: Ramirez Reza MD;  Location: [facility] EP INVASIVE LOCATION;  Service:    • COLONOSCOPY     • SHOULDER SURGERY Right 2014   • TONSILLECTOMY  12/2015       ALLERGIES:    Allergies   Allergen Reactions   • Contrast Dye Anaphylaxis     ANAPHYLAXIS       SOCIAL HISTORY:   Social History   Substance Use Topics   • Smoking status: Never Smoker   • Smokeless tobacco: Never Used   • Alcohol use 1.8 -  3.0 oz/week     3 - 5 Shots of liquor per week      Comment: bourbon & momelissahine DRINKS HALF PINT DAILY        CURRENT MEDICATIONS:    Current Outpatient Prescriptions   Medication Sig Dispense Refill   • apixaban (ELIQUIS) 5 MG tablet tablet Take 5 mg by mouth 2 (Two) Times a Day.     • carvedilol (COREG) 6.25 MG tablet Take 1 tablet by mouth Every 12 (Twelve) Hours. 60 tablet 0   • cimetidine (TAGAMET) 400 MG tablet Take 1 tablet AM prior to procedure and 1 tablet AM of procedure. (Patient taking differently: Take 400 mg by mouth. Take 1 tablet AM prior to procedure and 1 tablet AM of procedure.) 2 tablet 0   • clonazePAM (KlonoPIN) 1 MG tablet Take 1 mg by mouth As Needed for Anxiety. Pt will sometimes just take 1/2 tab (0.5 mg).     • DULoxetine (CYMBALTA) 60 MG capsule Take 60 mg by mouth Every Morning.     • esomeprazole (NEXIUM 24HR) 20 MG capsule Take 2 capsules by mouth Every Morning Before Breakfast. 30 capsule 0   • lisinopril (PRINIVIL,ZESTRIL) 10 MG tablet Take 1 tablet by mouth Daily. 30 tablet 6   • magnesium oxide (MAGOX) 400 (241.3 MG) MG tablet tablet Take 400 mg by mouth Daily.     • sucralfate (CARAFATE) 1 g tablet Take 1 tablet by mouth 3 (Three) Times a Day Before Meals. Take for one week and discontinue 21 tablet 0     No current facility-administered medications for this visit.         HOME MEDICATIONS:   Current Outpatient Prescriptions on File Prior to Visit   Medication Sig Dispense Refill   • apixaban (ELIQUIS) 5 MG tablet tablet Take 5 mg by mouth 2 (Two) Times a Day.     • carvedilol (COREG) 6.25 MG tablet Take 1 tablet by mouth Every 12 (Twelve) Hours. 60 tablet 0   • cimetidine (TAGAMET) 400 MG tablet Take 1 tablet AM prior to procedure and 1 tablet AM of procedure. (Patient taking differently: Take 400 mg by mouth. Take 1 tablet AM prior to procedure and 1 tablet AM of procedure.) 2 tablet 0   • clonazePAM (KlonoPIN) 1 MG tablet Take 1 mg by mouth As Needed for Anxiety. Pt will  sometimes just take 1/2 tab (0.5 mg).     • DULoxetine (CYMBALTA) 60 MG capsule Take 60 mg by mouth Every Morning.     • esomeprazole (NEXIUM 24HR) 20 MG capsule Take 2 capsules by mouth Every Morning Before Breakfast. 30 capsule 0   • lisinopril (PRINIVIL,ZESTRIL) 10 MG tablet Take 1 tablet by mouth Daily. 30 tablet 6   • magnesium oxide (MAGOX) 400 (241.3 MG) MG tablet tablet Take 400 mg by mouth Daily.     • sucralfate (CARAFATE) 1 g tablet Take 1 tablet by mouth 3 (Three) Times a Day Before Meals. Take for one week and discontinue 21 tablet 0   • [DISCONTINUED] diphenhydrAMINE (BENADRYL) 50 MG tablet Take 1 tablet AM prior to procedure and 1 tablet AM of procedure. (Patient taking differently: Take 50 mg by mouth. Take 1 tablet AM prior to procedure and 1 tablet AM of procedure.) 2 tablet 0   • [DISCONTINUED] predniSONE (DELTASONE) 20 MG tablet Take 2 tablet AM prior to procedure and 2 tablet AM of procedure. (Patient taking differently: Take 20 mg by mouth. Take 2 tablet AM prior to procedure and 2 tablet AM of procedure.) 4 tablet 0     No current facility-administered medications on file prior to visit.        FAMILY HISTORY:    Family History   Problem Relation Age of Onset   • Heart disease Father    • Cancer Maternal Grandmother    • Cancer Paternal Grandmother    • Heart disease Paternal Grandfather        REVIEW OF SYSTEMS:      Review of Systems     CONSTITUTIONAL: Complains of fatigue. No fever, chills, or night sweats. No loss of weight or apetite.      HEENT:  No epistaxis, mouth sores, or difficulty swallowing. Denies any sore throat.      RESPIRATORY:  Denies any worsening dyspnea on exertion, cough at present.      CARDIOVASCULAR:  No chest pain or palpitation or dizziness.      GASTROINTESTINAL:  No abdominal pain, nausea, vomiting, or blood in the stool. Admits to early satiety and discomfort in the left upper abdomen.      GENITOURINARY:  No dysuria or hematuria.       MUSCULOSKELETAL:  No any  "new back pain or arthralgias.       NEUROLOGICAL:  No tingling or numbness. No new headache or dizziness.       LYMPHATICS:  Denies any abnormal swollen and anywhere in the body.      SKIN:  Denies any new skin rash    VITAL SIGNS: /95  Pulse 78  Temp 98.2 °F (36.8 °C) (Temporal Artery )   Resp 16  Ht 72\" (182.9 cm)  Wt 241 lb (109 kg)  BMI 32.69 kg/m2    PHYSICAL EXAMINATION:      GENERAL:  Not in any distress.    HEENT:  Conjunctiva clear. No icterus.    NECK:  No adenopathy.    RESPIRATORY:  Clear to ausculatation bilaterally.    CARDIOVASCULAR:  S1, S2. Regular rate and rhythm.    ABDOMEN:  Soft, obese, nontender. Bowel sounds present.  No organomegaly appreciated.    EXTREMITIES:  No edema.    NEUROLOGIC:  Alert, awake and oriented ×3.  No deficit appreciated.    DIAGNOSTIC DATA:    WBC   Date Value Ref Range Status   09/04/2017 7.97 3.50 - 10.80 10*3/mm3 Final     RBC   Date Value Ref Range Status   09/04/2017 5.53 4.20 - 5.76 10*6/mm3 Final     Hemoglobin   Date Value Ref Range Status   09/05/2017 17.7 (H) 12.0 - 17.0 g/dL Final   09/04/2017 17.6 (H) 13.1 - 17.5 g/dL Final     Hematocrit   Date Value Ref Range Status   09/05/2017 52 (H) 38 - 51 % Final   09/04/2017 52.8 (H) 38.9 - 50.9 % Final     MCV   Date Value Ref Range Status   09/04/2017 95.5 80.0 - 99.0 fL Final     MCH   Date Value Ref Range Status   09/04/2017 31.8 (H) 27.0 - 31.0 pg Final     MCHC   Date Value Ref Range Status   09/04/2017 33.3 32.0 - 36.0 g/dL Final     RDW   Date Value Ref Range Status   09/04/2017 15.6 (H) 11.3 - 14.5 % Final     RDW-SD   Date Value Ref Range Status   09/04/2017 54.2 (H) 37.0 - 54.0 fl Final     MPV   Date Value Ref Range Status   09/04/2017 10.6 6.0 - 12.0 fL Final     Platelets   Date Value Ref Range Status   09/04/2017 223 150 - 450 10*3/mm3 Final     Neutrophil %   Date Value Ref Range Status   09/01/2017 61.6 37.0 - 80.0 % Final     Lymphocyte %   Date Value Ref Range Status   09/01/2017 26.7 " 10.0 - 50.0 % Final     Monocyte %   Date Value Ref Range Status   09/01/2017 8.3 0.0 - 12.0 % Final     Eosinophil %   Date Value Ref Range Status   09/01/2017 3.0 0.0 - 7.0 % Final     Basophil %   Date Value Ref Range Status   09/01/2017 0.3 0.0 - 2.0 % Final     Immature Grans %   Date Value Ref Range Status   09/01/2017 0.1 0.0 - 0.5 % Final     Neutrophils, Absolute   Date Value Ref Range Status   09/01/2017 4.44 2.00 - 8.60 10*3/mm3 Final     Lymphocytes, Absolute   Date Value Ref Range Status   09/01/2017 1.93 0.60 - 4.20 10*3/mm3 Final     Monocytes, Absolute   Date Value Ref Range Status   09/01/2017 0.60 0.00 - 0.90 10*3/mm3 Final     Eosinophils, Absolute   Date Value Ref Range Status   09/01/2017 0.22 0.00 - 0.70 10*3/mm3 Final     Basophils, Absolute   Date Value Ref Range Status   09/01/2017 0.02 0.00 - 0.20 10*3/mm3 Final     Immature Grans, Absolute   Date Value Ref Range Status   09/01/2017 0.01 0.00 - 0.02 10*3/mm3 Final     Glucose   Date Value Ref Range Status   09/06/2017 123 (H) 70 - 100 mg/dL Final     Sodium   Date Value Ref Range Status   09/06/2017 134 132 - 146 mmol/L Final     Potassium   Date Value Ref Range Status   09/06/2017 4.4 3.5 - 5.5 mmol/L Final     CO2   Date Value Ref Range Status   09/06/2017 32.0 (H) 20.0 - 31.0 mmol/L Final     Chloride   Date Value Ref Range Status   09/06/2017 100 99 - 109 mmol/L Final     Anion Gap   Date Value Ref Range Status   09/06/2017 2.0 (L) 3.0 - 11.0 mmol/L Final     Creatinine   Date Value Ref Range Status   09/06/2017 1.30 0.60 - 1.30 mg/dL Final     BUN   Date Value Ref Range Status   09/06/2017 21 9 - 23 mg/dL Final     BUN/Creatinine Ratio   Date Value Ref Range Status   09/06/2017 16.2 7.0 - 25.0 Final     Calcium   Date Value Ref Range Status   09/06/2017 8.6 (L) 8.7 - 10.4 mg/dL Final     eGFR Non  Amer   Date Value Ref Range Status   09/06/2017 61 >60 mL/min/1.73 Final     Alkaline Phosphatase   Date Value Ref Range Status    09/06/2017 46 25 - 100 U/L Final     Total Protein   Date Value Ref Range Status   09/06/2017 6.3 5.7 - 8.2 g/dL Final     ALT (SGPT)   Date Value Ref Range Status   09/06/2017 66 (H) 7 - 40 U/L Final     AST (SGOT)   Date Value Ref Range Status   09/06/2017 58 (H) 0 - 33 U/L Final     Total Bilirubin   Date Value Ref Range Status   09/06/2017 1.2 0.3 - 1.2 mg/dL Final     Albumin   Date Value Ref Range Status   09/06/2017 4.00 3.20 - 4.80 g/dL Final     Globulin   Date Value Ref Range Status   09/06/2017 2.3 gm/dL Final     A/G Ratio   Date Value Ref Range Status   09/06/2017 1.7 1.5 - 2.5 g/dL Final       No results found for: , LABCA2, AFPTM, HCGQUANT, , CHROMGRNA, [id number], CEA, REFLABREPO]    ASSESSMENT AND PLAN:      Persistent polycythemia likely secondary due to underlying sleep apnea cannot r/o PPV:    - His repeat CBC today shows hemoglobin & hematocrit of 17.5 & 51.5, RBC 5.57; WBC & platelets normal at 5.9K and 155K  - I requested for erythropoetin level, BAKARI 2 mutation, LDH, peripheral blood smear review and an abdominal ultrasound to r/o hepatosplenomegaly.  - He had ultrasound of the kidneys done on 10/11 with no evidence of renal tumors.  - We will refer him to a sleep specialist (as per patient's request). He had a sleep study done 3 years ago s/o sleep apnea but doesn't use CPAP.  - We will phlebotomize 500ml today. Advised him to drink plenty of fluids. He is already on apixiban for his heart condition.  - He will return to the clinic in 2 weeks with repeat CBC prior to the visit. He understands to call us back sooner if needed.    ACP: Advanced Care Planning was discussed. N    Thank you for this consultation.    Sonia Monet MD  10/12/2017  2:07 PM

## 2017-10-13 ENCOUNTER — LAB (OUTPATIENT)
Dept: LAB | Facility: HOSPITAL | Age: 42
End: 2017-10-13

## 2017-10-13 DIAGNOSIS — D75.1 POLYCYTHEMIA: ICD-10-CM

## 2017-10-13 LAB
BASOPHILS # BLD AUTO: 0.02 10*3/MM3 (ref 0–0.2)
BASOPHILS NFR BLD AUTO: 0.3 % (ref 0–2)
DEPRECATED RDW RBC AUTO: 46 FL (ref 35.1–43.9)
EOSINOPHIL # BLD AUTO: 0.17 10*3/MM3 (ref 0–0.7)
EOSINOPHIL NFR BLD AUTO: 2.4 % (ref 0–7)
ERYTHROCYTE [DISTWIDTH] IN BLOOD BY AUTOMATED COUNT: 13.8 % (ref 11.5–14.5)
ETHNIC BACKGROUND STATED: 6.9 MIU/ML (ref 2.6–18.5)
HCT VFR BLD AUTO: 50.5 % (ref 39–49)
HGB BLD-MCNC: 17.4 G/DL (ref 13.7–17.3)
IMM GRANULOCYTES # BLD: 0.01 10*3/MM3 (ref 0–0.02)
IMM GRANULOCYTES NFR BLD: 0.1 % (ref 0–0.5)
LYMPHOCYTES # BLD AUTO: 1.51 10*3/MM3 (ref 0.6–4.2)
LYMPHOCYTES NFR BLD AUTO: 20.9 % (ref 10–50)
MCH RBC QN AUTO: 31.8 PG (ref 26.5–34)
MCHC RBC AUTO-ENTMCNC: 34.5 G/DL (ref 31.5–36.3)
MCV RBC AUTO: 92.3 FL (ref 80–98)
MONOCYTES # BLD AUTO: 0.64 10*3/MM3 (ref 0–0.9)
MONOCYTES NFR BLD AUTO: 8.9 % (ref 0–12)
NEUTROPHILS # BLD AUTO: 4.86 10*3/MM3 (ref 2–8.6)
NEUTROPHILS NFR BLD AUTO: 67.4 % (ref 37–80)
PLATELET # BLD AUTO: 145 10*3/MM3 (ref 150–450)
PMV BLD AUTO: 10.8 FL (ref 8–12)
POST-BLOOD PRESSURE: NORMAL
PRE-BLOOD PRESSURE: NORMAL
PRE-HCT: 51.5
PRE-HGB: 17.8
PULSE: 72
RBC # BLD AUTO: 5.47 10*6/MM3 (ref 4.37–5.74)
VOLUME COLLECTED: 500
WBC NRBC COR # BLD: 7.21 10*3/MM3 (ref 3.2–9.8)

## 2017-10-13 PROCEDURE — 85025 COMPLETE CBC W/AUTO DIFF WBC: CPT

## 2017-10-13 PROCEDURE — 99195 PHLEBOTOMY: CPT | Performed by: INTERNAL MEDICINE

## 2017-10-13 PROCEDURE — 85060 BLOOD SMEAR INTERPRETATION: CPT

## 2017-10-16 LAB
CYTOLOGIST CVX/VAG CYTO: NORMAL
PATH INTERP BLD-IMP: NORMAL

## 2017-10-17 LAB
JAK2 P.V617F BLD/T QL: NORMAL
LAB DIRECTOR NAME PROVIDER: NORMAL
LABORATORY COMMENT REPORT: NORMAL

## 2017-10-20 ENCOUNTER — HOSPITAL ENCOUNTER (OUTPATIENT)
Dept: ULTRASOUND IMAGING | Facility: HOSPITAL | Age: 42
Discharge: HOME | End: 2017-10-20
Attending: INTERNAL MEDICINE | Admitting: INTERNAL MEDICINE

## 2017-10-20 DIAGNOSIS — D75.1 POLYCYTHEMIA: ICD-10-CM

## 2017-10-20 PROCEDURE — 76700 US EXAM ABDOM COMPLETE: CPT

## 2017-10-26 ENCOUNTER — OFFICE VISIT (OUTPATIENT)
Dept: ONCOLOGY | Facility: CLINIC | Age: 42
End: 2017-10-26

## 2017-10-26 VITALS
DIASTOLIC BLOOD PRESSURE: 70 MMHG | RESPIRATION RATE: 16 BRPM | BODY MASS INDEX: 33.52 KG/M2 | TEMPERATURE: 98.4 F | SYSTOLIC BLOOD PRESSURE: 155 MMHG | WEIGHT: 247.14 LBS | HEART RATE: 76 BPM

## 2017-10-26 DIAGNOSIS — D69.6 THROMBOCYTOPENIA (HCC): ICD-10-CM

## 2017-10-26 DIAGNOSIS — D75.1 SECONDARY POLYCYTHEMIA: Primary | ICD-10-CM

## 2017-10-26 PROCEDURE — G0463 HOSPITAL OUTPT CLINIC VISIT: HCPCS | Performed by: INTERNAL MEDICINE

## 2017-10-26 PROCEDURE — 99214 OFFICE O/P EST MOD 30 MIN: CPT | Performed by: INTERNAL MEDICINE

## 2017-10-26 NOTE — PROGRESS NOTES
DATE OF VISIT: 10/26/2017    REASON FOR VISIT:  Secondary polycythemia    HISTORY OF PRESENT ILLNESS:    42 year old male with history of sleep apnea diagnosed 3 years ago doesn't use a CPAP yet, history of hypo-testosterone level for which she was taking testosterone supplement until August 2017, atrial fibrillation and flutter s/p ablation x 4 .which was in September of 2017.  Patient was initially seen in consultation by Dr. Monet on October 20, 2017 for evaluation of polycythemia.  Patient had extensive blood work done as well as ultrasound of abdomen.  He is here to discuss the result of blood work and ultrasound.  Denies any headaches or dizziness or any symptoms suggestive of erythromelalgia.      PAST MEDICAL HISTORY:    Past Medical History:   Diagnosis Date   • A-fib    • Anxiety    • Arrhythmia    • Arthritis     rt shoulder    • Atrial flutter 07/2016    EP  ablation x2    • Depression    • ETOH abuse    • Sleep apnea     have used a machine in the past   • Tendonitis     rt shoulder   • Ulcerative colitis        SOCIAL HISTORY:    Social History   Substance Use Topics   • Smoking status: Never Smoker   • Smokeless tobacco: Never Used   • Alcohol use 1.8 - 3.0 oz/week     3 - 5 Shots of liquor per week      Comment: bourbon & moonshine DRINKS HALF PINT DAILY        Surgical History :  Past Surgical History:   Procedure Laterality Date   • CARDIAC ELECTROPHYSIOLOGY PROCEDURE N/A 5/2/2017    Procedure: Ablation atrial flutter;  Surgeon: Ramirez Reza MD;  Location:  [facility];  Service:    • CARDIAC ELECTROPHYSIOLOGY PROCEDURE N/A 9/5/2017    Procedure: Ablation atrial fibrillation PVA ;  Surgeon: Ramirez Reza MD;  Location:  [facility];  Service:    • COLONOSCOPY     • SHOULDER SURGERY Right 2014   • TONSILLECTOMY  12/2015       ALLERGIES:    Allergies   Allergen Reactions   • Contrast Dye Anaphylaxis     ANAPHYLAXIS         FAMILY HISTORY:  Family History    Problem Relation Age of Onset   • Heart disease Father    • Cancer Maternal Grandmother    • Cancer Paternal Grandmother    • Heart disease Paternal Grandfather            REVIEW OF SYSTEMS:      CONSTITUTIONAL:  No fever, chills, or night sweats.     HEENT:  No epistaxis, mouth sores, or difficulty swallowing.    RESPIRATORY:  No new shortness of breath or cough at present.    CARDIOVASCULAR:  No chest pain or palpitations.    GASTROINTESTINAL:  No abdominal pain, nausea, vomiting, or blood in the stool.    GENITOURINARY:  No dysuria or hematuria.    MUSCULOSKELETAL:  Lens of chronic back pain secondary to disc issue as well as pain in the knee, secondary to weightlifting exercises.    NEUROLOGICAL:  No tingling or numbness. No new headache or dizziness.     LYMPHATICS:  Denies any abnormal swollen and anywhere in the body.    SKIN:  Denies any new skin rash.        PHYSICAL EXAMINATION:      VITAL SIGNS:  /70  Pulse 76  Temp 98.4 °F (36.9 °C)  Resp 16  Wt 247 lb 2.2 oz (112 kg)  BMI 33.52 kg/m2  Last 3 weights    10/26/17  1359   Weight: 247 lb 2.2 oz (112 kg)       ECOG  performance status: 0      GENERAL:  Not in any distress.    HEENT:  Normocephalic, Atraumatic.Mild Conjunctival pallor. No icterus. Extraocular Movements Intact. No Facial Asymmetry noted.    NECK:  No adenopathy. No JVD.    RESPIRATORY:  Fair air entry bilateral. No rhonchi or wheezing.    CARDIOVASCULAR:  S1, S2. Regular rate and rhythm. No murmur or gallop appreciated.    ABDOMEN:  Soft, obese, nontender. Bowel sounds present in all four quadrants.  No organomegaly appreciated.    EXTREMITIES:  No edema.No Calf Tenderness.    NEUROLOGIC:  Alert, awake and oriented ×3.  No  Motor or sensory deficit appreciated. Cranial Nerves 2-12 grossly intact.    SKIN : Tattoos present.        DIAGNOSTIC DATA:    Glucose   Date Value Ref Range Status   10/12/2017 68 60 - 100 mg/dL Final     Sodium   Date Value Ref Range Status   10/12/2017 138  137 - 145 mmol/L Final     Potassium   Date Value Ref Range Status   10/12/2017 4.4 3.5 - 5.1 mmol/L Final     CO2   Date Value Ref Range Status   10/12/2017 23.0 22.0 - 31.0 mmol/L Final     Chloride   Date Value Ref Range Status   10/12/2017 100 95 - 110 mmol/L Final     Anion Gap   Date Value Ref Range Status   10/12/2017 15.0 5.0 - 15.0 mmol/L Final     Creatinine   Date Value Ref Range Status   10/12/2017 1.24 0.70 - 1.30 mg/dL Final     BUN   Date Value Ref Range Status   10/12/2017 26 (H) 7 - 21 mg/dL Final     BUN/Creatinine Ratio   Date Value Ref Range Status   10/12/2017 21.0 7.0 - 25.0 Final     Calcium   Date Value Ref Range Status   10/12/2017 9.5 8.4 - 10.2 mg/dL Final     eGFR Non  Amer   Date Value Ref Range Status   10/12/2017 65 63 - 147 mL/min/1.73 Final     Alkaline Phosphatase   Date Value Ref Range Status   10/12/2017 59 38 - 126 U/L Final     Total Protein   Date Value Ref Range Status   10/12/2017 7.5 6.3 - 8.6 g/dL Final     ALT (SGPT)   Date Value Ref Range Status   10/12/2017 81 (H) 21 - 72 U/L Final     AST (SGOT)   Date Value Ref Range Status   10/12/2017 44 17 - 59 U/L Final     Total Bilirubin   Date Value Ref Range Status   10/12/2017 2.0 (H) 0.2 - 1.3 mg/dL Final     Albumin   Date Value Ref Range Status   10/12/2017 4.60 3.40 - 4.80 g/dL Final     Globulin   Date Value Ref Range Status   10/12/2017 2.9 2.3 - 3.5 gm/dL Final     A/G Ratio   Date Value Ref Range Status   10/12/2017 1.6 1.1 - 1.8 g/dL Final     Lab Results   Component Value Date    WBC 7.21 10/13/2017    HGB 17.4 (H) 10/13/2017    HCT 50.5 (H) 10/13/2017    MCV 92.3 10/13/2017     (L) 10/13/2017     Lab Results   Component Value Date    NEUTROABS 4.86 10/13/2017     JAK2 mutation done on peripheral blood on October 12, 2017 showed:  JAK2 V617F Mutation Comment   Comments: Result: NEGATIVE for the JAK2 V617F mutation.        Erythropoietin level done on peripheral blood on October 12, 2017  showed:  Erythropoietin 2.6 - 18.5 mIU/mL 6.9   Comments: Siemens Immulite 2000 Immunochemiluminometric assay (ICMA)               RADIOLOGY DATA :  Ultrasound of abdomen done on October 20, 2017 showed:  Liver:      size: normal      echotexture: wnl      no focal mass or intrahepatic biliary ductal dilatation.       Biliary:    Gall bladder:  no cholelithiasis                            no wall thickening or pericholecystic  fluid      Common bile duct:  normal caliber       Pancreas (visualized portions): Cannot see well enough to  evaluate.     Spleen:  normal  size and echotexture, no focal mass.     Kidney, right:      size:  normal    echotexture:  normal    no nephrolithiasis, solid mass, or collecting system dilation     Kidney, left:      size:  normal    echotexture:  normal    no nephrolithiasis, solid mass, or collecting system dilation        IMPRESSION:  CONCLUSION:   1.  Cannot see the pancreas well enough to evaluate.  2.  Otherwise negative ultrasound abdomen.          ASSESSMENT AND PLAN:       1.  Erythrocytosis: Most likely secondary to obstructive sleep apnea plus use of testosterone injection in the past.  Workup done by Dr. Castro shows negativity for JAK2 mutation as well as normal level of erythropoietin, both of which are against primary myeloproliferative neoplasm.  Result of blood work were discussed with patient.  He had a phlebotomy done on October 13, 2017.  Patient does not complain of any symptoms of hyperviscosity-like erythromelalgia or any headaches.  We will see him back in about 5 weeks with a repeat CBC to be done on that day.  Patient remains on Apixaban as well as baby aspirin as per cardiology team.    2.  Mild thrombocytopenia: CBC done on October 13, 2017 shows mild thrombocytopenia with platelet count of 145,000.  At that point patient was consuming alcohol on a daily basis.  Patient states for last 3 days.  He has not consumed any alcohol.  We will monitored with CBC at  this point.  Ultrasound of abdomen does not show any evidence of splenomegaly or any liver lesions.    3.  History of obstructive sleep apnea: Patient is not using CPAP machine.  He has been diagnosed 3 years ago.  He has an appointment to get seen by sleep medicine clinic in January 2018.  He was encouraged to keep that appointment.    4.  History of low testosterone, patient was taking weekly testosterone supplement.  He states he has not taken any testosterone injections since August 2017.    5.  Health maintenance: Patient is not a smoker.  He is only 42 years of age and not due for colonoscopy at this point.    Jose Banks MD  10/26/2017  2:34 PM        EMR Dragon/Transcription disclaimer:   Much of this encounter note is an electronic transcription/translation of spoken language to printed text. The electronic translation of spoken language may permit erroneous, or at times, nonsensical words or phrases to be inadvertently transcribed; Although I have reviewed the note for such errors, some may still exist.

## 2017-11-30 ENCOUNTER — LAB (OUTPATIENT)
Dept: ONCOLOGY | Facility: HOSPITAL | Age: 42
End: 2017-11-30

## 2017-11-30 ENCOUNTER — OFFICE VISIT (OUTPATIENT)
Dept: ONCOLOGY | Facility: CLINIC | Age: 42
End: 2017-11-30

## 2017-11-30 VITALS
TEMPERATURE: 99.7 F | HEART RATE: 91 BPM | SYSTOLIC BLOOD PRESSURE: 153 MMHG | RESPIRATION RATE: 18 BRPM | DIASTOLIC BLOOD PRESSURE: 70 MMHG | BODY MASS INDEX: 32.62 KG/M2 | WEIGHT: 240.52 LBS

## 2017-11-30 DIAGNOSIS — D75.1 SECONDARY POLYCYTHEMIA: ICD-10-CM

## 2017-11-30 DIAGNOSIS — D75.1 ERYTHROCYTOSIS: Primary | ICD-10-CM

## 2017-11-30 LAB
ALBUMIN SERPL-MCNC: 4.7 G/DL (ref 3.4–4.8)
ALBUMIN/GLOB SERPL: 1.6 G/DL (ref 1.1–1.8)
ALP SERPL-CCNC: 62 U/L (ref 38–126)
ALT SERPL W P-5'-P-CCNC: 58 U/L (ref 21–72)
ANION GAP SERPL CALCULATED.3IONS-SCNC: 13 MMOL/L (ref 5–15)
AST SERPL-CCNC: 39 U/L (ref 17–59)
BASOPHILS # BLD AUTO: 0 10*3/MM3 (ref 0–0.2)
BASOPHILS NFR BLD AUTO: 0 % (ref 0–2)
BILIRUB SERPL-MCNC: 1.7 MG/DL (ref 0.2–1.3)
BUN BLD-MCNC: 33 MG/DL (ref 7–21)
BUN/CREAT SERPL: 26.4 (ref 7–25)
CALCIUM SPEC-SCNC: 9.4 MG/DL (ref 8.4–10.2)
CHLORIDE SERPL-SCNC: 98 MMOL/L (ref 95–110)
CO2 SERPL-SCNC: 22 MMOL/L (ref 22–31)
CREAT BLD-MCNC: 1.25 MG/DL (ref 0.7–1.3)
DEPRECATED RDW RBC AUTO: 43.5 FL (ref 35.1–43.9)
EOSINOPHIL # BLD AUTO: 0 10*3/MM3 (ref 0–0.7)
EOSINOPHIL NFR BLD AUTO: 0 % (ref 0–7)
ERYTHROCYTE [DISTWIDTH] IN BLOOD BY AUTOMATED COUNT: 12.9 % (ref 11.5–14.5)
GFR SERPL CREATININE-BSD FRML MDRD: 64 ML/MIN/1.73 (ref 60–147)
GLOBULIN UR ELPH-MCNC: 2.9 GM/DL (ref 2.3–3.5)
GLUCOSE BLD-MCNC: 105 MG/DL (ref 60–100)
HCT VFR BLD AUTO: 49 % (ref 39–49)
HGB BLD-MCNC: 16.8 G/DL (ref 13.7–17.3)
IMM GRANULOCYTES # BLD: 0.04 10*3/MM3 (ref 0–0.02)
IMM GRANULOCYTES NFR BLD: 0.2 % (ref 0–0.5)
LYMPHOCYTES # BLD AUTO: 0.82 10*3/MM3 (ref 0.6–4.2)
LYMPHOCYTES NFR BLD AUTO: 4.6 % (ref 10–50)
MCH RBC QN AUTO: 31.7 PG (ref 26.5–34)
MCHC RBC AUTO-ENTMCNC: 34.3 G/DL (ref 31.5–36.3)
MCV RBC AUTO: 92.5 FL (ref 80–98)
MONOCYTES # BLD AUTO: 0.49 10*3/MM3 (ref 0–0.9)
MONOCYTES NFR BLD AUTO: 2.8 % (ref 0–12)
NEUTROPHILS # BLD AUTO: 16.46 10*3/MM3 (ref 2–8.6)
NEUTROPHILS NFR BLD AUTO: 92.4 % (ref 37–80)
PLATELET # BLD AUTO: 215 10*3/MM3 (ref 150–450)
PMV BLD AUTO: 10.4 FL (ref 8–12)
POTASSIUM BLD-SCNC: 4.5 MMOL/L (ref 3.5–5.1)
PROT SERPL-MCNC: 7.6 G/DL (ref 6.3–8.6)
RBC # BLD AUTO: 5.3 10*6/MM3 (ref 4.37–5.74)
SODIUM BLD-SCNC: 133 MMOL/L (ref 137–145)
WBC NRBC COR # BLD: 17.81 10*3/MM3 (ref 3.2–9.8)

## 2017-11-30 PROCEDURE — 99214 OFFICE O/P EST MOD 30 MIN: CPT | Performed by: NURSE PRACTITIONER

## 2017-11-30 PROCEDURE — 85025 COMPLETE CBC W/AUTO DIFF WBC: CPT

## 2017-11-30 PROCEDURE — G0463 HOSPITAL OUTPT CLINIC VISIT: HCPCS | Performed by: NURSE PRACTITIONER

## 2017-11-30 PROCEDURE — 80053 COMPREHEN METABOLIC PANEL: CPT

## 2017-11-30 NOTE — PROGRESS NOTES
DATE OF VISIT: 11/30/2017    REASON FOR VISIT:  Follow-up for secondary polycythemia      HISTORY OF PRESENT ILLNESS:      42 yr old male with history of sleep apnea diagnosed 3 yrs ago but doesn't use his CPAP machine. He is scheduled for a follow-up sleep study in January. He has a past history of testosterone use and a history of atrial flutter that has required ablation. He was initially seen by Dr. Monet in October for elevated H&H. He had an extensive work up that included a negative BAKARI 2 mutation, Normal EPO level and Ultrasound of abdomen that was unremarkable. He did have a phlebotomy in October.    PAST MEDICAL HISTORY:    Past Medical History:   Diagnosis Date   • A-fib    • Anxiety    • Arrhythmia    • Arthritis     rt shoulder    • Atrial flutter 07/2016    EP  ablation x2    • Depression    • ETOH abuse    • Sleep apnea     have used a machine in the past   • Tendonitis     rt shoulder   • Ulcerative colitis        SOCIAL HISTORY:    Social History   Substance Use Topics   • Smoking status: Never Smoker   • Smokeless tobacco: Never Used   • Alcohol use 1.8 - 3.0 oz/week     3 - 5 Shots of liquor per week      Comment: bourbon & moonshine DRINKS HALF PINT DAILY        Surgical History :  Past Surgical History:   Procedure Laterality Date   • CARDIAC ELECTROPHYSIOLOGY PROCEDURE N/A 5/2/2017    Procedure: Ablation atrial flutter;  Surgeon: Ramirez Reza MD;  Location:  [facility];  Service:    • CARDIAC ELECTROPHYSIOLOGY PROCEDURE N/A 9/5/2017    Procedure: Ablation atrial fibrillation PVA ;  Surgeon: Ramirez Reza MD;  Location:  [facility];  Service:    • COLONOSCOPY     • SHOULDER SURGERY Right 2014   • TONSILLECTOMY  12/2015       ALLERGIES:    Allergies   Allergen Reactions   • Contrast Dye Anaphylaxis     ANAPHYLAXIS       REVIEW OF SYSTEMS:      CONSTITUTIONAL:  No fever, chills, or night sweats.     HEENT:  No epistaxis, mouth sores, or difficulty  swallowing.    RESPIRATORY:  No new shortness of breath or cough at present.    CARDIOVASCULAR:  No chest pain or palpitations.    GASTROINTESTINAL:  No abdominal pain, nausea, vomiting, or blood in the stool.    GENITOURINARY:  No dysuria or hematuria.    MUSCULOSKELETAL:  No any new back pain or arthralgias.     NEUROLOGICAL:  No tingling or numbness. No new headache or dizziness.     LYMPHATICS:  Denies any abnormal swollen and anywhere in the body.    SKIN:  Denies any new skin rash.    PHYSICAL EXAMINATION:      VITAL SIGNS:  /70  Pulse 91  Temp 99.7 °F (37.6 °C)  Resp 18  Wt 240 lb 8.4 oz (109 kg)  BMI 32.62 kg/m2    GENERAL:  Not in any distress.    HEENT:  Normocephalic, Atraumatic.Mild Conjunctival pallor. No icterus.  No Facial Asymmetry noted.    NECK:  No adenopathy. No JVD.    RESPIRATORY:  Fair air entry bilateral. No rhonchi or wheezing.    CARDIOVASCULAR:  S1, S2. Regular rate and rhythm. No murmur or gallop appreciated.    ABDOMEN:  Soft, obese, nontender. Bowel sounds present in all four quadrants.  No organomegaly appreciated.    EXTREMITIES:  No edema.No Calf Tenderness.    NEUROLOGIC:  Alert, awake and oriented ×3.  No  Motor or sensory deficit appreciated. Cranial Nerves 2-12 grossly intact.    SKIN : No new skin lesion identified  DIAGNOSTIC DATA:    Glucose   Date Value Ref Range Status   11/30/2017 105 (H) 60 - 100 mg/dL Final     Sodium   Date Value Ref Range Status   11/30/2017 133 (L) 137 - 145 mmol/L Final     Potassium   Date Value Ref Range Status   11/30/2017 4.5 3.5 - 5.1 mmol/L Final     CO2   Date Value Ref Range Status   11/30/2017 22.0 22.0 - 31.0 mmol/L Final     Chloride   Date Value Ref Range Status   11/30/2017 98 95 - 110 mmol/L Final     Anion Gap   Date Value Ref Range Status   11/30/2017 13.0 5.0 - 15.0 mmol/L Final     Creatinine   Date Value Ref Range Status   11/30/2017 1.25 0.70 - 1.30 mg/dL Final     BUN   Date Value Ref Range Status   11/30/2017 33 (H) 7  - 21 mg/dL Final     BUN/Creatinine Ratio   Date Value Ref Range Status   11/30/2017 26.4 (H) 7.0 - 25.0 Final     Calcium   Date Value Ref Range Status   11/30/2017 9.4 8.4 - 10.2 mg/dL Final     eGFR Non  Amer   Date Value Ref Range Status   11/30/2017 64 >60 mL/min/1.73 Final     Alkaline Phosphatase   Date Value Ref Range Status   11/30/2017 62 38 - 126 U/L Final     Total Protein   Date Value Ref Range Status   11/30/2017 7.6 6.3 - 8.6 g/dL Final     ALT (SGPT)   Date Value Ref Range Status   11/30/2017 58 21 - 72 U/L Final     AST (SGOT)   Date Value Ref Range Status   11/30/2017 39 17 - 59 U/L Final     Total Bilirubin   Date Value Ref Range Status   11/30/2017 1.7 (H) 0.2 - 1.3 mg/dL Final     Albumin   Date Value Ref Range Status   11/30/2017 4.70 3.40 - 4.80 g/dL Final     Globulin   Date Value Ref Range Status   11/30/2017 2.9 2.3 - 3.5 gm/dL Final     A/G Ratio   Date Value Ref Range Status   11/30/2017 1.6 1.1 - 1.8 g/dL Final     Lab Results   Component Value Date    WBC 17.81 (H) 11/30/2017    HGB 16.8 11/30/2017    HCT 49.0 11/30/2017    MCV 92.5 11/30/2017     11/30/2017     Lab Results   Component Value Date    NEUTROABS 16.46 (H) 11/30/2017     No results found for: , LABCA2, AFPTM, HCGQUANT, , CHROMGRNA, [id number], CEA, REFLABREPO]        ASSESSMENT AND PLAN:      1. Erythrocytosis: most likely secondary to obstructive sleep apnea along with his past use of testosterone, pt states he is no longer using testosterone supplement. He does not have testing that would suggest that this a primary myeloproliferative neoplasm. He is not having hyperviscosity or erythromelalgia symptoms and his Hct today is 49%. Will recheck his labs again in 6 weeks.    2. Leukocytosis, most likely reactive, he has had some upper respiratory/allergy things going on, no fever, no palpable adenopathy, will recheck again in 6 weeks.    3. History of obstructive sleep apnea, he is not using his CPAP  machine, he is seeing sleep clinic in January.    4. Health maintenance, he has cut back considerable on his ETOH intake, he does not smoke and he is 42 so not due for screening colnoscopy.       This document has been signed by VIKASH Clinton on November 30, 2017 1:00 PM

## 2017-12-12 ENCOUNTER — OFFICE VISIT (OUTPATIENT)
Dept: CARDIOLOGY | Facility: CLINIC | Age: 42
End: 2017-12-12

## 2017-12-12 VITALS
DIASTOLIC BLOOD PRESSURE: 86 MMHG | HEIGHT: 72 IN | BODY MASS INDEX: 31.56 KG/M2 | SYSTOLIC BLOOD PRESSURE: 130 MMHG | WEIGHT: 233 LBS | HEART RATE: 65 BPM

## 2017-12-12 DIAGNOSIS — D75.1 SECONDARY POLYCYTHEMIA: ICD-10-CM

## 2017-12-12 DIAGNOSIS — F10.10 ETOH ABUSE: ICD-10-CM

## 2017-12-12 DIAGNOSIS — I48.19 PERSISTENT ATRIAL FIBRILLATION (HCC): Primary | ICD-10-CM

## 2017-12-12 DIAGNOSIS — I48.92 ATRIAL FLUTTER WITH RAPID VENTRICULAR RESPONSE (HCC): ICD-10-CM

## 2017-12-12 PROCEDURE — 93000 ELECTROCARDIOGRAM COMPLETE: CPT | Performed by: INTERNAL MEDICINE

## 2017-12-12 PROCEDURE — 99213 OFFICE O/P EST LOW 20 MIN: CPT | Performed by: INTERNAL MEDICINE

## 2017-12-12 RX ORDER — LISINOPRIL 5 MG/1
5 TABLET ORAL DAILY
COMMUNITY
End: 2019-01-31 | Stop reason: ALTCHOICE

## 2017-12-12 NOTE — PROGRESS NOTES
Stevenson CLARA Chilel  1975  [phone]      12/12/2017    [facility] CARDIOLOGY     Irma Schuster, APRN  [address] [address]    Chief Complaint   Patient presents with   • Atrial Fibrillation     PROBLEM LIST:  1. Typical atrial flutter  a. Hospitilization 7/2016 for Aflutter in setting of phentermine - cardioverted with Corvert discharged on betablocker  b. Cardiolite stress test 8/2016 Negative for ischemia  c. Echo 7/2016: mild LVH, biatrial enlargement, EF 55%-60%  d. EPS with RFA atrial flutter 11/21/16 with Dr. Alaniz- [facility], [place], [place], recurrence with ECV and initiation of Amiodarone  e. Recurrence with repeat EPS 12/16/17: with bidirectional block documented, Dr. Alaniz  f. RFA of RA flutter 5/2/2017  2. AF   A.  CHADS-Vasc 0 on Eliquis.  B.  Cryoablation of pulmonary veins, 8/3/2017, successful cryoablation, transseptal cath tto LA, intracardiac US, esophageal temperature monitoring. Severe LV dysfunction with LV EF of < 25% by ICE under general anesthesia noted. Transient periods of hypotension associated with drop in o2 sat from 100-92% requiring IV Mundo-Synephrine as well as dopamine with resolution following discontinuation of general anesthesia  C. Echo 9/6/2017: EF 50-55%. Trace MR and TR.  3. CKD  g. BL Renal US 10/11/2017, Increased echogenicity of kidneys, compatible with medical renal disease. No hydronephrosis.  h. Negative abdominal US, 10/20/2017  4 Anxiety/Depression  5 UC - diagnosed at age 23, but not being treated currently  6  Obesity   7 ETOH abuse  8 DONNIE- non compliant  9 Polycytemia: plhebotomy as needed  10  Surgical Hx:  i. Right shoulder  j. Tonsillectomy      Allergies  Allergies   Allergen Reactions   • Contrast Dye Anaphylaxis     ANAPHYLAXIS       Current Medications    Current Outpatient Prescriptions:   •  apixaban (ELIQUIS) 5 MG tablet tablet, Take 5 mg by mouth 2 (Two) Times a Day., Disp: , Rfl:   •   "carvedilol (COREG) 6.25 MG tablet, Take 1 tablet by mouth Every 12 (Twelve) Hours., Disp: 60 tablet, Rfl: 0  •  DULoxetine (CYMBALTA) 60 MG capsule, Take 60 mg by mouth Every Morning., Disp: , Rfl:   •  lisinopril (PRINIVIL,ZESTRIL) 5 MG tablet, Take 5 mg by mouth Daily., Disp: , Rfl:   •  magnesium oxide (MAGOX) 400 (241.3 MG) MG tablet tablet, Take 400 mg by mouth Daily., Disp: , Rfl:     History of Present Illness   HPI    Pt presents for follow up of AFL/DONNIE . Since we last saw the pt, pt denies any AF episodes, SOB, CP, LH, and dizziness. Denies any hospitalizations, ER visits, bleeding, or TIA/CVA symptoms. Overall feels well.  Kidney fxn has normalized. He has stopped drinking ETOH. He was phebotomized for increased HCT.    ROS:  General:  Denies fatigue, weight gain or loss  Cardiovascular:  Denies CP, PND, syncope, near syncope, edema or palpitations.  Pulmonary:  Denies HOLLINS, cough, or wheezing      Vitals:    12/12/17 1136   BP: 130/86   BP Location: Left arm   Patient Position: Sitting   Pulse: 65   Weight: 106 kg (233 lb)   Height: 182.9 cm (72\")         Physical Exam   Constitutional: oriented to person, place, and time.  well-developed and well-nourished. No distress.   HENT: Normocephalic.   Eyes: Conjunctivae are normal. No scleral icterus.   Neck: Normal carotid pulses, no hepatojugular reflux and no JVD present. Carotid bruit is not present. No tracheal deviation, no edema and no erythema present. No thyromegaly present.   Cardiovascular: Regular rate and rhythm normal S1 and S2 there is an S4 no murmurs are appreciated.  Pulses: equal and symmetrical.   Pulmonary/Chest: Clear to Auscultation bilaterally with no rales or wheezes.  Abdominal: Soft. Bowel sounds are normal. no distension and no mass. There is no hepatosplenomegaly. There is no tenderness. There is no rebound and no guarding.   Musculoskeletal:  exhibits no edema, tenderness or deformity.   Neurological: is alert and oriented to " person, place, and time.  Rest is nonfocal.    Skin: Skin is warm and dry. No rash noted. No diaphoretic. No cyanosis or erythema. No pallor. Nails show no clubbing.   Psychiatric: Normal mood and affect.Speech is normal and behavior is normal.      Diagnostic Data:        ECG 12 Lead  Date/Time: 12/12/2017 11:48 AM  Performed by: KAT BEGUM  Authorized by: KAT BEGUM   Comparison: compared with previous ECG from 9/5/2017  Similar to previous ECG  Rhythm: sinus rhythm  BPM: 65              1. Persistent atrial fibrillation    2. Atrial flutter with rapid ventricular response    3. Secondary polycythemia    4. ETOH abuse          Plan:  1) AF/AFL s/p RFA; No recurrence doing very well   Continue present medications.   2) Anticoagulation  Continue ASA; stop NOAC as pt with CHADVASC 1; use prn as needed; told pt to notify Hematologist of stopping eliquis  3) HTN  Wt loss, exercise, salt reduction  4) ETOH abuse: cessation      F/up in 6 months

## 2018-01-03 ENCOUNTER — OFFICE VISIT (OUTPATIENT)
Dept: ONCOLOGY | Facility: CLINIC | Age: 43
End: 2018-01-03

## 2018-01-03 ENCOUNTER — CONSULT (OUTPATIENT)
Dept: SLEEP MEDICINE | Facility: HOSPITAL | Age: 43
End: 2018-01-03

## 2018-01-03 ENCOUNTER — LAB (OUTPATIENT)
Dept: ONCOLOGY | Facility: HOSPITAL | Age: 43
End: 2018-01-03

## 2018-01-03 ENCOUNTER — LAB (OUTPATIENT)
Dept: LAB | Facility: HOSPITAL | Age: 43
End: 2018-01-03

## 2018-01-03 VITALS
BODY MASS INDEX: 32.04 KG/M2 | RESPIRATION RATE: 18 BRPM | HEIGHT: 72 IN | DIASTOLIC BLOOD PRESSURE: 106 MMHG | HEART RATE: 91 BPM | WEIGHT: 236.55 LBS | TEMPERATURE: 98.4 F | SYSTOLIC BLOOD PRESSURE: 163 MMHG

## 2018-01-03 VITALS
HEART RATE: 68 BPM | SYSTOLIC BLOOD PRESSURE: 120 MMHG | OXYGEN SATURATION: 98 % | BODY MASS INDEX: 32.51 KG/M2 | WEIGHT: 240 LBS | DIASTOLIC BLOOD PRESSURE: 70 MMHG | HEIGHT: 72 IN

## 2018-01-03 DIAGNOSIS — G47.33 OBSTRUCTIVE SLEEP APNEA, ADULT: Primary | ICD-10-CM

## 2018-01-03 DIAGNOSIS — D75.1 ERYTHROCYTOSIS: ICD-10-CM

## 2018-01-03 DIAGNOSIS — D75.1 SECONDARY ERYTHROCYTOSIS: Primary | ICD-10-CM

## 2018-01-03 DIAGNOSIS — D75.1 SECONDARY ERYTHROCYTOSIS: ICD-10-CM

## 2018-01-03 DIAGNOSIS — I48.91 ATRIAL FIBRILLATION, UNSPECIFIED TYPE (HCC): ICD-10-CM

## 2018-01-03 LAB
ALBUMIN SERPL-MCNC: 4.8 G/DL (ref 3.4–4.8)
ALBUMIN/GLOB SERPL: 1.5 G/DL (ref 1.1–1.8)
ALP SERPL-CCNC: 58 U/L (ref 38–126)
ALT SERPL W P-5'-P-CCNC: 71 U/L (ref 21–72)
ANION GAP SERPL CALCULATED.3IONS-SCNC: 13 MMOL/L (ref 5–15)
AST SERPL-CCNC: 81 U/L (ref 17–59)
BASOPHILS # BLD AUTO: 0.02 10*3/MM3 (ref 0–0.2)
BASOPHILS NFR BLD AUTO: 0.2 % (ref 0–2)
BILIRUB SERPL-MCNC: 0.9 MG/DL (ref 0.2–1.3)
BUN BLD-MCNC: 26 MG/DL (ref 7–21)
BUN/CREAT SERPL: 25.2 (ref 7–25)
CALCIUM SPEC-SCNC: 9.7 MG/DL (ref 8.4–10.2)
CHLORIDE SERPL-SCNC: 98 MMOL/L (ref 95–110)
CO2 SERPL-SCNC: 30 MMOL/L (ref 22–31)
CREAT BLD-MCNC: 1.03 MG/DL (ref 0.7–1.3)
DEPRECATED RDW RBC AUTO: 47.5 FL (ref 35.1–43.9)
EOSINOPHIL # BLD AUTO: 0.22 10*3/MM3 (ref 0–0.7)
EOSINOPHIL NFR BLD AUTO: 2.6 % (ref 0–7)
ERYTHROCYTE [DISTWIDTH] IN BLOOD BY AUTOMATED COUNT: 13.8 % (ref 11.5–14.5)
GFR SERPL CREATININE-BSD FRML MDRD: 80 ML/MIN/1.73 (ref 60–147)
GLOBULIN UR ELPH-MCNC: 3.1 GM/DL (ref 2.3–3.5)
GLUCOSE BLD-MCNC: 97 MG/DL (ref 60–100)
HCT VFR BLD AUTO: 52.2 % (ref 39–49)
HGB BLD-MCNC: 17.7 G/DL (ref 13.7–17.3)
IMM GRANULOCYTES # BLD: 0.02 10*3/MM3 (ref 0–0.02)
IMM GRANULOCYTES NFR BLD: 0.2 % (ref 0–0.5)
LYMPHOCYTES # BLD AUTO: 1.56 10*3/MM3 (ref 0.6–4.2)
LYMPHOCYTES NFR BLD AUTO: 18.5 % (ref 10–50)
MCH RBC QN AUTO: 31.9 PG (ref 26.5–34)
MCHC RBC AUTO-ENTMCNC: 33.9 G/DL (ref 31.5–36.3)
MCV RBC AUTO: 94.2 FL (ref 80–98)
MONOCYTES # BLD AUTO: 0.8 10*3/MM3 (ref 0–0.9)
MONOCYTES NFR BLD AUTO: 9.5 % (ref 0–12)
NEUTROPHILS # BLD AUTO: 5.81 10*3/MM3 (ref 2–8.6)
NEUTROPHILS NFR BLD AUTO: 69 % (ref 37–80)
PLATELET # BLD AUTO: 184 10*3/MM3 (ref 150–450)
PMV BLD AUTO: 10 FL (ref 8–12)
POST-BLOOD PRESSURE: NORMAL
POTASSIUM BLD-SCNC: 4.5 MMOL/L (ref 3.5–5.1)
PRE-BLOOD PRESSURE: NORMAL
PRE-HCT: 52.2
PRE-HGB: 17.7
PROT SERPL-MCNC: 7.9 G/DL (ref 6.3–8.6)
PULSE: 72
RBC # BLD AUTO: 5.54 10*6/MM3 (ref 4.37–5.74)
SODIUM BLD-SCNC: 141 MMOL/L (ref 137–145)
VOLUME COLLECTED: NORMAL
WBC NRBC COR # BLD: 8.43 10*3/MM3 (ref 3.2–9.8)

## 2018-01-03 PROCEDURE — 99195 PHLEBOTOMY: CPT

## 2018-01-03 PROCEDURE — G0463 HOSPITAL OUTPT CLINIC VISIT: HCPCS | Performed by: NURSE PRACTITIONER

## 2018-01-03 PROCEDURE — 85025 COMPLETE CBC W/AUTO DIFF WBC: CPT

## 2018-01-03 PROCEDURE — 99214 OFFICE O/P EST MOD 30 MIN: CPT | Performed by: NURSE PRACTITIONER

## 2018-01-03 PROCEDURE — 36415 COLL VENOUS BLD VENIPUNCTURE: CPT | Performed by: NURSE PRACTITIONER

## 2018-01-03 PROCEDURE — 80053 COMPREHEN METABOLIC PANEL: CPT

## 2018-01-03 PROCEDURE — 99213 OFFICE O/P EST LOW 20 MIN: CPT | Performed by: INTERNAL MEDICINE

## 2018-01-03 NOTE — PROGRESS NOTES
Sleep Clinic Follow Up    Date: 1/3/2018  Primary Care Physician: VIKASH Linda      Interim History (1/3):  Since the last visit on 12/16/2014, patient has:      1)  DONNIE - Has not been compliant with CPAP. He has mask issues. He did not notice any benefit with prior use. He has less alcohol use than prior and symptoms are worse with alcohol. He had tonsillectomy in ~ 12/2015. His current symptoms include snoring, unrestful sleep, gasping during sleep, decreased libido, excessive daytime sleepiness, irritability, memory loss, nocturia, night sweats, sleep talking.  He denies abnormal dreams, sleep paralysis, hypnagogic hallucinations, or cataplexy symptoms. Data card was not available.     Mask type: FFM  DME: [facility]    Bed time: [id number]  Sleep latency: 5-10 minutes  Number of times awakens during the night: 1-2  Wake time: 0700  Estimated total sleep time at night: 7-8 hours  Caffeine intake: 1 coffee, 1 tea  Alcohol intake: 3-5 per week  Nap time: rare  Sleepiness with Driving: none    [employer] - 8    2) Patient denies RLS symptoms.     PMHx, FH, SH reviewed and pertinent changes are: as above    REVIEW OF SYSTEMS:   Negative for chest pain, fever, chills, SOA, abdominal pain. Smoking: none      Exam (6-11/12):    Vitals:    01/03/18 0816   BP: 120/70   Pulse: 68   SpO2: 98%     HR - 68  RR - 15    Body mass index is 32.55 kg/(m^2).  Gen:  No distress, conversant, pleasant, appears stated age, alert, oriented  Eyes:   Anicteric sclera, moist conjunctiva, no lid lag     PERRLA, EOMI   Heent:   NC/AT    Oropharynx clear, Mallampati 4, s/p UPPP, large tongue, crowded dentition    normal hearing  Lungs:  Normal effort, non-labored breathing    Clear to auscultation    CV:  Normal S1/S2, no murmur    no lower extremity edema  ABD:  Soft, normal bowel sounds    Psych:  Appropriate affect  Neuro:  CN 2-12 intact    Past Medical History:   Diagnosis Date   • A-fib    • Anxiety    • Arrhythmia    •  Arthritis     rt shoulder    • Atrial flutter 07/2016    EP  ablation x2    • Depression    • ETOH abuse    • Sleep apnea     have used a machine in the past   • Tendonitis     rt shoulder   • Ulcerative colitis        Current Outpatient Prescriptions:   •  apixaban (ELIQUIS) 5 MG tablet tablet, Take 5 mg by mouth 2 (Two) Times a Day., Disp: , Rfl:   •  carvedilol (COREG) 6.25 MG tablet, Take 1 tablet by mouth Every 12 (Twelve) Hours., Disp: 60 tablet, Rfl: 0  •  DULoxetine (CYMBALTA) 60 MG capsule, Take 60 mg by mouth Every Morning., Disp: , Rfl:   •  lisinopril (PRINIVIL,ZESTRIL) 5 MG tablet, Take 5 mg by mouth Daily., Disp: , Rfl:   •  magnesium oxide (MAGOX) 400 (241.3 MG) MG tablet tablet, Take 400 mg by mouth Daily., Disp: , Rfl:   - off eliquis    ASSESSMENT / PLAN:     1. Obstructive sleep apnea  1. PSG on 02/27/2013, AHI of 15 , REM AHI of 24.6  2. Currently on no therapy.  3. Repeat PSG given his tonsillectomy and ablations (4)  4. Return to clinic after study   5. May be candidate for oral appliance evaluation.  2. Afib - on ASA  3. Depression    Total time 15 min, more than half spent in face to face counseling and coordination of care.     This document has been electronically signed by Doug Lezama MD on January 3, 2018         CC: Irma Schuster, Sonia Hickman MD

## 2018-01-03 NOTE — PROGRESS NOTES
DATE OF VISIT: 1/3/2018    REASON FOR VISIT:  Follow-up for secondary erythrocytosis    HISTORY OF PRESENT ILLNESS:       42 yr old male with history of sleep apnea diagnosed 3 yrs ago but doesn't use his CPAP machine. He just saw Dr. Lezama in sleep clinic and is scheduled for sleep study the end of this month. He has a past history of testosterone use and a history of atrial flutter that has required ablation. He was initially seen by Dr. Monet in October for elevated H&H. He had an extensive work up that included a negative BAKARI 2 mutation, Normal EPO level and Ultrasound of abdomen that was unremarkable. He did have a phlebotomy in October.       PAST MEDICAL HISTORY:    Past Medical History:   Diagnosis Date   • A-fib    • Anxiety    • Arrhythmia    • Arthritis     rt shoulder    • Atrial flutter 07/2016    EP  ablation x2    • Depression    • ETOH abuse    • Sleep apnea     have used a machine in the past   • Tendonitis     rt shoulder   • Ulcerative colitis        SOCIAL HISTORY:    Social History   Substance Use Topics   • Smoking status: Never Smoker   • Smokeless tobacco: Never Used   • Alcohol use Yes      Comment: occa       Surgical History :  Past Surgical History:   Procedure Laterality Date   • CARDIAC ELECTROPHYSIOLOGY PROCEDURE N/A 5/2/2017    Procedure: Ablation atrial flutter;  Surgeon: Ramirez Reza MD;  Location: [facility] EP INVASIVE LOCATION;  Service:    • CARDIAC ELECTROPHYSIOLOGY PROCEDURE N/A 9/5/2017    Procedure: Ablation atrial fibrillation PVA ;  Surgeon: Ramirez Reza MD;  Location: [facility] EP INVASIVE LOCATION;  Service:    • COLONOSCOPY     • SHOULDER SURGERY Right 2014   • TONSILLECTOMY  12/2015       ALLERGIES:    Allergies   Allergen Reactions   • Contrast Dye Anaphylaxis     ANAPHYLAXIS       REVIEW OF SYSTEMS:      CONSTITUTIONAL:  No fever, chills, or night sweats.     HEENT:  No epistaxis, mouth sores, or difficulty swallowing.    RESPIRATORY:  No new shortness of breath  "or cough at present.    CARDIOVASCULAR:  No chest pain or palpitations.    GASTROINTESTINAL:  No abdominal pain, nausea, vomiting, or blood in the stool.    GENITOURINARY:  No dysuria or hematuria.    MUSCULOSKELETAL:  No any new back pain or arthralgias.     NEUROLOGICAL:  No tingling or numbness. No new headache or dizziness.     LYMPHATICS:  Denies any abnormal swollen and anywhere in the body.    SKIN:  Denies any new skin rash.    PHYSICAL EXAMINATION:      VITAL SIGNS:  BP (!) 163/106  Pulse 91  Temp 98.4 °F (36.9 °C) (Temporal Artery )   Resp 18  Ht 182.9 cm (72.01\")  Wt 107 kg (236 lb 8.9 oz)  BMI 32.08 kg/m2    GENERAL:  Not in any distress.    HEENT:  Normocephalic, Atraumatic.Mild Conjunctival pallor. No icterus.  No Facial Asymmetry noted.    NECK:  No adenopathy. No JVD.    RESPIRATORY:  Fair air entry bilateral. No rhonchi or wheezing.    CARDIOVASCULAR:  S1, S2. Regular rate and rhythm. No murmur or gallop appreciated.    ABDOMEN:  Soft, obese, nontender. Bowel sounds present in all four quadrants.  No organomegaly appreciated.    EXTREMITIES:  No edema.No Calf Tenderness.    NEUROLOGIC:  Alert, awake and oriented ×3.      SKIN : No new skin lesion identified  DIAGNOSTIC DATA:    Glucose   Date Value Ref Range Status   01/03/2018 97 60 - 100 mg/dL Final     Sodium   Date Value Ref Range Status   01/03/2018 141 137 - 145 mmol/L Final     Potassium   Date Value Ref Range Status   01/03/2018 4.5 3.5 - 5.1 mmol/L Final     CO2   Date Value Ref Range Status   01/03/2018 30.0 22.0 - 31.0 mmol/L Final     Chloride   Date Value Ref Range Status   01/03/2018 98 95 - 110 mmol/L Final     Anion Gap   Date Value Ref Range Status   01/03/2018 13.0 5.0 - 15.0 mmol/L Final     Creatinine   Date Value Ref Range Status   01/03/2018 1.03 0.70 - 1.30 mg/dL Final     BUN   Date Value Ref Range Status   01/03/2018 26 (H) 7 - 21 mg/dL Final     BUN/Creatinine Ratio   Date Value Ref Range Status   01/03/2018 25.2 (H) " 7.0 - 25.0 Final     Calcium   Date Value Ref Range Status   01/03/2018 9.7 8.4 - 10.2 mg/dL Final     eGFR Non  Amer   Date Value Ref Range Status   01/03/2018 80 >60 mL/min/1.73 Final     Alkaline Phosphatase   Date Value Ref Range Status   01/03/2018 58 38 - 126 U/L Final     Total Protein   Date Value Ref Range Status   01/03/2018 7.9 6.3 - 8.6 g/dL Final     ALT (SGPT)   Date Value Ref Range Status   01/03/2018 71 21 - 72 U/L Final     AST (SGOT)   Date Value Ref Range Status   01/03/2018 81 (H) 17 - 59 U/L Final     Total Bilirubin   Date Value Ref Range Status   01/03/2018 0.9 0.2 - 1.3 mg/dL Final     Albumin   Date Value Ref Range Status   01/03/2018 4.80 3.40 - 4.80 g/dL Final     Globulin   Date Value Ref Range Status   01/03/2018 3.1 2.3 - 3.5 gm/dL Final     A/G Ratio   Date Value Ref Range Status   01/03/2018 1.5 1.1 - 1.8 g/dL Final     Lab Results   Component Value Date    WBC 8.43 01/03/2018    HGB 17.7 (H) 01/03/2018    HCT 52.2 (H) 01/03/2018    MCV 94.2 01/03/2018     01/03/2018     Lab Results   Component Value Date    NEUTROABS 5.81 01/03/2018     No results found for: , LABCA2, AFPTM, HCGQUANT, , CHROMGRNA, [id number], CEA, REFLABREPO]        ASSESSMENT AND PLAN:      1. Secondary erythrocytosis; pt has resumed taking his testosterone; he Hct has risen since he was here last. He understands the increased risk of taking the supplement. His Hct today is 52.2%. Will send him for a therapeutic phlebotomy 500 ml today and recheck his lab again in about 4 wks.    2. Atrial fibrillation, pt was just taken off the Eliquis by his cardiologist and is currently taking baby ASA daily.    3. Health maintenance, he does not smoke and does not need screening colonoscopy at this time.      This document has been signed by VIKASH Clinton on January 3, 2018 2:04 PM

## 2018-01-13 ENCOUNTER — APPOINTMENT (OUTPATIENT)
Dept: GENERAL RADIOLOGY | Facility: HOSPITAL | Age: 43
End: 2018-01-13

## 2018-01-13 ENCOUNTER — HOSPITAL ENCOUNTER (EMERGENCY)
Facility: HOSPITAL | Age: 43
Discharge: HOME | End: 2018-01-13
Attending: FAMILY MEDICINE | Admitting: FAMILY MEDICINE

## 2018-01-13 VITALS
DIASTOLIC BLOOD PRESSURE: 87 MMHG | SYSTOLIC BLOOD PRESSURE: 156 MMHG | HEART RATE: 85 BPM | TEMPERATURE: 98.8 F | RESPIRATION RATE: 18 BRPM | WEIGHT: 235 LBS | OXYGEN SATURATION: 93 % | HEIGHT: 72 IN | BODY MASS INDEX: 31.83 KG/M2

## 2018-01-13 DIAGNOSIS — M25.562 ACUTE PAIN OF LEFT KNEE: Primary | ICD-10-CM

## 2018-01-13 PROCEDURE — 73564 X-RAY EXAM KNEE 4 OR MORE: CPT

## 2018-01-13 PROCEDURE — 99282 EMERGENCY DEPT VISIT SF MDM: CPT

## 2018-01-13 RX ORDER — NAPROXEN 500 MG/1
500 TABLET ORAL 2 TIMES DAILY WITH MEALS
Qty: 14 TABLET | Refills: 0 | Status: SHIPPED | OUTPATIENT
Start: 2018-01-13 | End: 2018-01-13 | Stop reason: HOSPADM

## 2018-01-13 NOTE — ED PROVIDER NOTES
Subjective   HPI Comments: Patient presents to emergency department for fall and left knee pain x 12 hours.  States he fell while walking due to slipping on ice.  He landed on his left knee.  Denies weakness, numbness, tingling.  He is able to bear weight and ambulate with pain.  Notes inability to fully extend or flex knee.             Patient is a 42 y.o. male presenting with knee pain.   History provided by:  Patient   used: No    Knee Pain   Location:  Knee  Time since incident:  12 hours  Injury: yes    Mechanism of injury: fall    Knee location:  L knee  Pain details:     Quality:  Aching    Radiates to:  Does not radiate  Associated symptoms: no back pain, no fever and no neck pain        Review of Systems   Constitutional: Negative for chills and fever.   Respiratory: Negative for chest tightness and shortness of breath.    Cardiovascular: Negative for chest pain.   Gastrointestinal: Negative for abdominal pain, constipation, diarrhea, nausea and vomiting.   Endocrine: Negative for polyuria.   Genitourinary: Negative for decreased urine volume, difficulty urinating, dysuria, flank pain, frequency and hematuria.   Musculoskeletal: Positive for arthralgias (left knee). Negative for back pain and neck pain.   Skin: Negative for color change.   Allergic/Immunologic: Negative for immunocompromised state.   Neurological: Negative for dizziness, syncope, weakness and headaches.   Hematological: Does not bruise/bleed easily.   Psychiatric/Behavioral: Negative for confusion, self-injury and suicidal ideas. The patient is not nervous/anxious.        Past Medical History:   Diagnosis Date   • A-fib    • Anxiety    • Arrhythmia    • Arthritis     rt shoulder    • Atrial flutter 07/2016    EP  ablation x2    • Depression    • ETOH abuse    • Sleep apnea     have used a machine in the past   • Tendonitis     rt shoulder   • Ulcerative colitis        Allergies   Allergen Reactions   • Contrast Dye  "Anaphylaxis     ANAPHYLAXIS       Past Surgical History:   Procedure Laterality Date   • CARDIAC ELECTROPHYSIOLOGY PROCEDURE N/A 5/2/2017    Procedure: Ablation atrial flutter;  Surgeon: Ramirez Reza MD;  Location:  [facility] EP INVASIVE LOCATION;  Service:    • CARDIAC ELECTROPHYSIOLOGY PROCEDURE N/A 9/5/2017    Procedure: Ablation atrial fibrillation PVA ;  Surgeon: Ramirez Reza MD;  Location:  [facility] EP INVASIVE LOCATION;  Service:    • COLONOSCOPY     • SHOULDER SURGERY Right 2014   • TONSILLECTOMY  12/2015       Family History   Problem Relation Age of Onset   • Heart disease Father    • Cancer Maternal Grandmother    • Cancer Paternal Grandmother    • Heart disease Paternal Grandfather        Social History     Social History   • Marital status:      Spouse name: N/A   • Number of children: N/A   • Years of education: N/A     Social History Main Topics   • Smoking status: Never Smoker   • Smokeless tobacco: Never Used   • Alcohol use Yes      Comment: occa   • Drug use: 7.00 per week     Special: Marijuana      Comment: states he uses \"a little\" every day   • Sexual activity: Defer     Other Topics Concern   • None     Social History Narrative           Objective      /87 (BP Location: Right arm, Patient Position: Sitting)  Pulse 85  Temp 98.8 °F (37.1 °C) (Oral)   Resp 18  Ht 182.9 cm (72\")  Wt 107 kg (235 lb)  SpO2 93%  BMI 31.87 kg/m2    Physical Exam   Constitutional: He is oriented to person, place, and time. He appears well-developed and well-nourished. No distress.   Cardiovascular: Normal rate, regular rhythm and normal heart sounds.    Pulmonary/Chest: Effort normal and breath sounds normal.   Abdominal: Soft. Bowel sounds are normal.   Musculoskeletal:        Left knee: He exhibits decreased range of motion. He exhibits no swelling, no effusion, no ecchymosis, no deformity and no erythema. Tenderness found.   Neurological: He is alert and oriented to person, place, and time. "   Skin: Skin is warm and dry.   Psychiatric: He has a normal mood and affect. His behavior is normal. Thought content normal.   Nursing note and vitals reviewed.      Procedures         ED Course  ED Course      Xr Knee 4+ View Left    Result Date: 1/13/2018  Narrative: EXAM:  Radiograph(s), Osseous       REGION:   Knee  SIDE:     Left   VIEWS:   4         INDICATION:    fall, left knee pain   COMPARISON:    none          FINDINGS:       No evidence of a fracture or bony malalignment.   No evidence of osteolytic/osteoblastic disease.                           .        Impression: CONCLUSION:       1. No gross evidence of acute osseous pathology.          Note:  if pain or symptoms persist beyond reasonable expectations and follow-up imaging is anticipated,  cross sectional imaging (CT and/or MRI) is suggested, as is deemed clinically appropriate.            Electronically signed by:  KOBY Cotton MD  1/13/2018 1:30 PM [facility] Workstation: [id number]                [facility]    Final diagnoses:   Acute pain of left knee            Benedicto Beauchamp PA-C  01/13/18 [id number]

## 2018-01-13 NOTE — ED NOTES
Patient presented to ED with c/O left knee pain due to fall last night.     Grace Clarke LPN  01/13/18 [id number]

## 2018-01-31 ENCOUNTER — HOSPITAL ENCOUNTER (OUTPATIENT)
Dept: SLEEP MEDICINE | Facility: HOSPITAL | Age: 43
Discharge: HOME | End: 2018-01-31
Admitting: INTERNAL MEDICINE

## 2018-01-31 VITALS — BODY MASS INDEX: 31.83 KG/M2 | WEIGHT: 235 LBS | HEIGHT: 72 IN

## 2018-01-31 DIAGNOSIS — G47.33 OBSTRUCTIVE SLEEP APNEA, ADULT: ICD-10-CM

## 2018-01-31 PROCEDURE — 95810 POLYSOM 6/> YRS 4/> PARAM: CPT

## 2018-01-31 PROCEDURE — 95810 POLYSOM 6/> YRS 4/> PARAM: CPT | Performed by: INTERNAL MEDICINE

## 2018-02-06 DIAGNOSIS — G47.33 OSA (OBSTRUCTIVE SLEEP APNEA): Primary | ICD-10-CM

## 2018-02-09 DIAGNOSIS — D75.1 SECONDARY POLYCYTHEMIA: Primary | ICD-10-CM

## 2018-02-14 ENCOUNTER — OFFICE VISIT (OUTPATIENT)
Dept: ONCOLOGY | Facility: CLINIC | Age: 43
End: 2018-02-14

## 2018-02-14 ENCOUNTER — LAB (OUTPATIENT)
Dept: ONCOLOGY | Facility: HOSPITAL | Age: 43
End: 2018-02-14

## 2018-02-14 VITALS
HEART RATE: 80 BPM | TEMPERATURE: 99.8 F | WEIGHT: 245.81 LBS | DIASTOLIC BLOOD PRESSURE: 97 MMHG | BODY MASS INDEX: 33.29 KG/M2 | SYSTOLIC BLOOD PRESSURE: 160 MMHG | RESPIRATION RATE: 16 BRPM | HEIGHT: 72 IN

## 2018-02-14 DIAGNOSIS — D75.1 POLYCYTHEMIA, SECONDARY: Primary | ICD-10-CM

## 2018-02-14 DIAGNOSIS — D75.1 SECONDARY POLYCYTHEMIA: ICD-10-CM

## 2018-02-14 LAB
ALBUMIN SERPL-MCNC: 4.3 G/DL (ref 3.4–4.8)
ALBUMIN/GLOB SERPL: 1.7 G/DL (ref 1.1–1.8)
ALP SERPL-CCNC: 41 U/L (ref 38–126)
ALT SERPL W P-5'-P-CCNC: 49 U/L (ref 21–72)
ANION GAP SERPL CALCULATED.3IONS-SCNC: 14 MMOL/L (ref 5–15)
AST SERPL-CCNC: 45 U/L (ref 17–59)
BASOPHILS # BLD AUTO: 0.02 10*3/MM3 (ref 0–0.2)
BASOPHILS NFR BLD AUTO: 0.2 % (ref 0–2)
BILIRUB SERPL-MCNC: 0.6 MG/DL (ref 0.2–1.3)
BUN BLD-MCNC: 25 MG/DL (ref 7–21)
BUN/CREAT SERPL: 16.9 (ref 7–25)
CALCIUM SPEC-SCNC: 9.2 MG/DL (ref 8.4–10.2)
CHLORIDE SERPL-SCNC: 100 MMOL/L (ref 95–110)
CO2 SERPL-SCNC: 25 MMOL/L (ref 22–31)
CREAT BLD-MCNC: 1.48 MG/DL (ref 0.7–1.3)
DEPRECATED RDW RBC AUTO: 49.5 FL (ref 35.1–43.9)
EOSINOPHIL # BLD AUTO: 0.11 10*3/MM3 (ref 0–0.7)
EOSINOPHIL NFR BLD AUTO: 1 % (ref 0–7)
ERYTHROCYTE [DISTWIDTH] IN BLOOD BY AUTOMATED COUNT: 14.7 % (ref 11.5–14.5)
GFR SERPL CREATININE-BSD FRML MDRD: 53 ML/MIN/1.73 (ref 63–147)
GLOBULIN UR ELPH-MCNC: 2.6 GM/DL (ref 2.3–3.5)
GLUCOSE BLD-MCNC: 60 MG/DL (ref 60–100)
HCT VFR BLD AUTO: 47.9 % (ref 39–49)
HGB BLD-MCNC: 16.1 G/DL (ref 13.7–17.3)
IMM GRANULOCYTES # BLD: 0.04 10*3/MM3 (ref 0–0.02)
IMM GRANULOCYTES NFR BLD: 0.3 % (ref 0–0.5)
LYMPHOCYTES # BLD AUTO: 1.24 10*3/MM3 (ref 0.6–4.2)
LYMPHOCYTES NFR BLD AUTO: 10.8 % (ref 10–50)
MCH RBC QN AUTO: 31.2 PG (ref 26.5–34)
MCHC RBC AUTO-ENTMCNC: 33.6 G/DL (ref 31.5–36.3)
MCV RBC AUTO: 92.8 FL (ref 80–98)
MONOCYTES # BLD AUTO: 0.95 10*3/MM3 (ref 0–0.9)
MONOCYTES NFR BLD AUTO: 8.3 % (ref 0–12)
NEUTROPHILS # BLD AUTO: 9.11 10*3/MM3 (ref 2–8.6)
NEUTROPHILS NFR BLD AUTO: 79.4 % (ref 37–80)
PLATELET # BLD AUTO: 208 10*3/MM3 (ref 150–450)
PMV BLD AUTO: 10.2 FL (ref 8–12)
POTASSIUM BLD-SCNC: 4.1 MMOL/L (ref 3.5–5.1)
PROT SERPL-MCNC: 6.9 G/DL (ref 6.3–8.6)
RBC # BLD AUTO: 5.16 10*6/MM3 (ref 4.37–5.74)
SODIUM BLD-SCNC: 139 MMOL/L (ref 137–145)
WBC NRBC COR # BLD: 11.47 10*3/MM3 (ref 3.2–9.8)

## 2018-02-14 PROCEDURE — 80053 COMPREHEN METABOLIC PANEL: CPT

## 2018-02-14 PROCEDURE — G0463 HOSPITAL OUTPT CLINIC VISIT: HCPCS | Performed by: NURSE PRACTITIONER

## 2018-02-14 PROCEDURE — 85025 COMPLETE CBC W/AUTO DIFF WBC: CPT

## 2018-02-14 PROCEDURE — 99214 OFFICE O/P EST MOD 30 MIN: CPT | Performed by: NURSE PRACTITIONER

## 2018-02-14 NOTE — PROGRESS NOTES
DATE OF VISIT: 2/14/2018    REASON FOR VISIT:  Secondary erythrocytosis    HISTORY OF PRESENT ILLNESS:  42 yr old male with history of sleep apnea diagnosed 3 yrs ago but doesn't use his CPAP machine. He just saw Dr. Lezama in sleep clinic and is scheduled for sleep study the end of this month. He has a past history of testosterone use and a history of atrial flutter that has required ablation. He was initially seen by Dr. Moent in October for elevated H&H. He had an extensive work up that included a negative BAKARI 2 mutation, Normal EPO level and Ultrasound of abdomen that was unremarkable. He has had couple therapeutic phlebotomy; last one was in January. He was seen by sleep medicine and found to have mild DONNIE; he is in process of getting new machine with new face piece.       PAST MEDICAL HISTORY:    Past Medical History:   Diagnosis Date   • A-fib    • Anxiety    • Arrhythmia    • Arthritis     rt shoulder    • Atrial flutter 07/2016    EP  ablation x2    • Depression    • ETOH abuse    • Sleep apnea     have used a machine in the past   • Tendonitis     rt shoulder   • Ulcerative colitis        SOCIAL HISTORY:    Social History   Substance Use Topics   • Smoking status: Never Smoker   • Smokeless tobacco: Never Used   • Alcohol use Yes      Comment: occa       Surgical History :  Past Surgical History:   Procedure Laterality Date   • CARDIAC ELECTROPHYSIOLOGY PROCEDURE N/A 5/2/2017    Procedure: Ablation atrial flutter;  Surgeon: Ramirez Reza MD;  Location:  [facility] EP INVASIVE LOCATION;  Service:    • CARDIAC ELECTROPHYSIOLOGY PROCEDURE N/A 9/5/2017    Procedure: Ablation atrial fibrillation PVA ;  Surgeon: Ramirez Reza MD;  Location:  [facility] EP INVASIVE LOCATION;  Service:    • COLONOSCOPY     • SHOULDER SURGERY Right 2014   • TONSILLECTOMY  12/2015       ALLERGIES:    Allergies   Allergen Reactions   • Contrast Dye Anaphylaxis     ANAPHYLAXIS       REVIEW OF SYSTEMS:      CONSTITUTIONAL:  No fever,  "chills, or night sweats.     HEENT:  No epistaxis, mouth sores, or difficulty swallowing.    RESPIRATORY:  No new shortness of breath or cough at present.    CARDIOVASCULAR:  No chest pain or palpitations.    GASTROINTESTINAL:  No abdominal pain, nausea, vomiting, or blood in the stool.    GENITOURINARY:  No dysuria or hematuria.    MUSCULOSKELETAL:  No any new back pain or arthralgias.     NEUROLOGICAL:  No tingling or numbness. No new headache or dizziness.     LYMPHATICS:  Denies any abnormal swollen and anywhere in the body.    SKIN:  Denies any new skin rash.    PHYSICAL EXAMINATION:      VITAL SIGNS:  /97  Pulse 80  Temp 99.8 °F (37.7 °C) (Temporal Artery )   Resp 16  Ht 182.9 cm (72.01\")  Wt 112 kg (245 lb 13 oz)  BMI 33.33 kg/m2    GENERAL:  Not in any distress.    HEENT:  Normocephalic, Atraumatic.Mild Conjunctival pallor. No icterus. Extraocular Movements Intact. No Facial Asymmetry noted.    NECK:  No adenopathy. No JVD.    RESPIRATORY:  Fair air entry bilateral. No rhonchi or wheezing.    CARDIOVASCULAR:  S1, S2. Regular rate and rhythm. No murmur or gallop appreciated.    ABDOMEN:  Soft, obese, nontender. Bowel sounds present in all four quadrants.  No organomegaly appreciated.    EXTREMITIES:  No edema.No Calf Tenderness.    NEUROLOGIC:  Alert, awake and oriented ×3.  No  Motor or sensory deficit appreciated. Cranial Nerves 2-12 grossly intact.    SKIN : No new skin lesion identified  DIAGNOSTIC DATA:    Glucose   Date Value Ref Range Status   01/03/2018 97 60 - 100 mg/dL Final     Sodium   Date Value Ref Range Status   01/03/2018 141 137 - 145 mmol/L Final     Potassium   Date Value Ref Range Status   01/03/2018 4.5 3.5 - 5.1 mmol/L Final     CO2   Date Value Ref Range Status   01/03/2018 30.0 22.0 - 31.0 mmol/L Final     Chloride   Date Value Ref Range Status   01/03/2018 98 95 - 110 mmol/L Final     Anion Gap   Date Value Ref Range Status   01/03/2018 13.0 5.0 - 15.0 mmol/L Final "     Creatinine   Date Value Ref Range Status   01/03/2018 1.03 0.70 - 1.30 mg/dL Final     BUN   Date Value Ref Range Status   01/03/2018 26 (H) 7 - 21 mg/dL Final     BUN/Creatinine Ratio   Date Value Ref Range Status   01/03/2018 25.2 (H) 7.0 - 25.0 Final     Calcium   Date Value Ref Range Status   01/03/2018 9.7 8.4 - 10.2 mg/dL Final     eGFR Non  Amer   Date Value Ref Range Status   01/03/2018 80 >60 mL/min/1.73 Final     Alkaline Phosphatase   Date Value Ref Range Status   01/03/2018 58 38 - 126 U/L Final     Total Protein   Date Value Ref Range Status   01/03/2018 7.9 6.3 - 8.6 g/dL Final     ALT (SGPT)   Date Value Ref Range Status   01/03/2018 71 21 - 72 U/L Final     AST (SGOT)   Date Value Ref Range Status   01/03/2018 81 (H) 17 - 59 U/L Final     Total Bilirubin   Date Value Ref Range Status   01/03/2018 0.9 0.2 - 1.3 mg/dL Final     Albumin   Date Value Ref Range Status   01/03/2018 4.80 3.40 - 4.80 g/dL Final     Globulin   Date Value Ref Range Status   01/03/2018 3.1 2.3 - 3.5 gm/dL Final     A/G Ratio   Date Value Ref Range Status   01/03/2018 1.5 1.1 - 1.8 g/dL Final     Lab Results   Component Value Date    WBC 11.47 (H) 02/14/2018    HGB 16.1 02/14/2018    HCT 47.9 02/14/2018    MCV 92.8 02/14/2018     02/14/2018     Lab Results   Component Value Date    NEUTROABS 9.11 (H) 02/14/2018     No results found for: , LABCA2, AFPTM, HCGQUANT, , CHROMGRNA, [id number], CEA, REFLABREPO]      ASSESSMENT AND PLAN:          1.Erythrocytosis,secondary to past testosterone use and an element of DONNIE;  Hct today is 47.9%, last therapeutic phlebotomy was in January; no need for any intervention; will see him back again in 3 mos with repeat CBC.     2. Atrial fibrillation, pt was just taken off the Eliquis by his cardiologist and is currently taking baby ASA daily. He has an appointment to see him again in June.     3. Health maintenance, he does not smoke and does not need screening  colonoscopy at this time.       This document has been signed by VIKASH Clinton on February 14, 2018 2:37 PM

## 2018-03-19 ENCOUNTER — APPOINTMENT (OUTPATIENT)
Dept: GENERAL RADIOLOGY | Facility: HOSPITAL | Age: 43
End: 2018-03-19

## 2018-03-19 ENCOUNTER — HOSPITAL ENCOUNTER (EMERGENCY)
Facility: HOSPITAL | Age: 43
Discharge: HOME | End: 2018-03-19
Attending: FAMILY MEDICINE | Admitting: FAMILY MEDICINE

## 2018-03-19 VITALS
HEIGHT: 72 IN | SYSTOLIC BLOOD PRESSURE: 167 MMHG | HEART RATE: 98 BPM | TEMPERATURE: 98.2 F | WEIGHT: 235 LBS | RESPIRATION RATE: 18 BRPM | OXYGEN SATURATION: 93 % | BODY MASS INDEX: 31.83 KG/M2 | DIASTOLIC BLOOD PRESSURE: 92 MMHG

## 2018-03-19 DIAGNOSIS — R10.13 EPIGASTRIC PAIN: Primary | ICD-10-CM

## 2018-03-19 DIAGNOSIS — K85.20 ALCOHOL-INDUCED ACUTE PANCREATITIS, UNSPECIFIED COMPLICATION STATUS: ICD-10-CM

## 2018-03-19 LAB
ALBUMIN SERPL-MCNC: 3.8 G/DL (ref 3.4–4.8)
ALBUMIN/GLOB SERPL: 1.5 G/DL (ref 1.1–1.8)
ALP SERPL-CCNC: 33 U/L (ref 38–126)
ALT SERPL W P-5'-P-CCNC: 45 U/L (ref 21–72)
AMYLASE SERPL-CCNC: 221 U/L (ref 50–130)
ANION GAP SERPL CALCULATED.3IONS-SCNC: 12 MMOL/L (ref 5–15)
AST SERPL-CCNC: 36 U/L (ref 17–59)
BACTERIA UR QL AUTO: ABNORMAL /HPF
BASOPHILS # BLD AUTO: 0.02 10*3/MM3 (ref 0–0.2)
BASOPHILS NFR BLD AUTO: 0.2 % (ref 0–2)
BILIRUB SERPL-MCNC: 1.4 MG/DL (ref 0.2–1.3)
BILIRUB UR QL STRIP: ABNORMAL
BUN BLD-MCNC: 12 MG/DL (ref 7–21)
BUN/CREAT SERPL: 12.9 (ref 7–25)
CALCIUM SPEC-SCNC: 9.3 MG/DL (ref 8.4–10.2)
CHLORIDE SERPL-SCNC: 99 MMOL/L (ref 95–110)
CK MB SERPL-CCNC: 3.21 NG/ML (ref 0–5)
CK SERPL-CCNC: 193 U/L (ref 55–170)
CLARITY UR: CLEAR
CO2 SERPL-SCNC: 24 MMOL/L (ref 22–31)
COLOR UR: ABNORMAL
CREAT BLD-MCNC: 0.93 MG/DL (ref 0.7–1.3)
DEPRECATED RDW RBC AUTO: 48.8 FL (ref 35.1–43.9)
EOSINOPHIL # BLD AUTO: 0.07 10*3/MM3 (ref 0–0.7)
EOSINOPHIL NFR BLD AUTO: 0.8 % (ref 0–7)
ERYTHROCYTE [DISTWIDTH] IN BLOOD BY AUTOMATED COUNT: 14.7 % (ref 11.5–14.5)
GFR SERPL CREATININE-BSD FRML MDRD: 90 ML/MIN/1.73 (ref 63–147)
GLOBULIN UR ELPH-MCNC: 2.6 GM/DL (ref 2.3–3.5)
GLUCOSE BLD-MCNC: 84 MG/DL (ref 60–100)
GLUCOSE UR STRIP-MCNC: NEGATIVE MG/DL
HCT VFR BLD AUTO: 49.8 % (ref 39–49)
HGB BLD-MCNC: 17.1 G/DL (ref 13.7–17.3)
HGB UR QL STRIP.AUTO: NEGATIVE
HOLD SPECIMEN: NORMAL
HOLD SPECIMEN: NORMAL
HYALINE CASTS UR QL AUTO: ABNORMAL /LPF
IMM GRANULOCYTES # BLD: 0.03 10*3/MM3 (ref 0–0.02)
IMM GRANULOCYTES NFR BLD: 0.3 % (ref 0–0.5)
INR PPP: 1.06 (ref 0.8–1.2)
KETONES UR QL STRIP: ABNORMAL
LEUKOCYTE ESTERASE UR QL STRIP.AUTO: NEGATIVE
LIPASE SERPL-CCNC: 662 U/L (ref 23–300)
LYMPHOCYTES # BLD AUTO: 1.06 10*3/MM3 (ref 0.6–4.2)
LYMPHOCYTES NFR BLD AUTO: 11.4 % (ref 10–50)
MCH RBC QN AUTO: 31.1 PG (ref 26.5–34)
MCHC RBC AUTO-ENTMCNC: 34.3 G/DL (ref 31.5–36.3)
MCV RBC AUTO: 90.7 FL (ref 80–98)
MONOCYTES # BLD AUTO: 0.7 10*3/MM3 (ref 0–0.9)
MONOCYTES NFR BLD AUTO: 7.5 % (ref 0–12)
NEUTROPHILS # BLD AUTO: 7.43 10*3/MM3 (ref 2–8.6)
NEUTROPHILS NFR BLD AUTO: 79.8 % (ref 37–80)
NITRITE UR QL STRIP: NEGATIVE
NT-PROBNP SERPL-MCNC: 160 PG/ML (ref 0–450)
PH UR STRIP.AUTO: 7.5 [PH] (ref 5–9)
PLATELET # BLD AUTO: 296 10*3/MM3 (ref 150–450)
PMV BLD AUTO: 10.7 FL (ref 8–12)
POTASSIUM BLD-SCNC: 4.7 MMOL/L (ref 3.5–5.1)
PROT SERPL-MCNC: 6.4 G/DL (ref 6.3–8.6)
PROT UR QL STRIP: ABNORMAL
PROTHROMBIN TIME: 13.6 SECONDS (ref 11.1–15.3)
RBC # BLD AUTO: 5.49 10*6/MM3 (ref 4.37–5.74)
RBC # UR: ABNORMAL /HPF
REF LAB TEST METHOD: ABNORMAL
SODIUM BLD-SCNC: 135 MMOL/L (ref 137–145)
SP GR UR STRIP: 1.02 (ref 1–1.03)
SQUAMOUS #/AREA URNS HPF: ABNORMAL /HPF
TROPONIN I SERPL-MCNC: <0.012 NG/ML
UROBILINOGEN UR QL STRIP: ABNORMAL
WBC NRBC COR # BLD: 9.31 10*3/MM3 (ref 3.2–9.8)
WBC UR QL AUTO: ABNORMAL /HPF
WHOLE BLOOD HOLD SPECIMEN: NORMAL
WHOLE BLOOD HOLD SPECIMEN: NORMAL

## 2018-03-19 PROCEDURE — 93005 ELECTROCARDIOGRAM TRACING: CPT

## 2018-03-19 PROCEDURE — 83880 ASSAY OF NATRIURETIC PEPTIDE: CPT | Performed by: FAMILY MEDICINE

## 2018-03-19 PROCEDURE — 80053 COMPREHEN METABOLIC PANEL: CPT | Performed by: FAMILY MEDICINE

## 2018-03-19 PROCEDURE — 85025 COMPLETE CBC W/AUTO DIFF WBC: CPT | Performed by: FAMILY MEDICINE

## 2018-03-19 PROCEDURE — 85610 PROTHROMBIN TIME: CPT | Performed by: FAMILY MEDICINE

## 2018-03-19 PROCEDURE — 81001 URINALYSIS AUTO W/SCOPE: CPT | Performed by: FAMILY MEDICINE

## 2018-03-19 PROCEDURE — 93010 ELECTROCARDIOGRAM REPORT: CPT | Performed by: INTERNAL MEDICINE

## 2018-03-19 PROCEDURE — 82550 ASSAY OF CK (CPK): CPT | Performed by: FAMILY MEDICINE

## 2018-03-19 PROCEDURE — 25010000002 ONDANSETRON PER 1 MG

## 2018-03-19 PROCEDURE — 99285 EMERGENCY DEPT VISIT HI MDM: CPT

## 2018-03-19 PROCEDURE — 82553 CREATINE MB FRACTION: CPT | Performed by: FAMILY MEDICINE

## 2018-03-19 PROCEDURE — 83690 ASSAY OF LIPASE: CPT | Performed by: FAMILY MEDICINE

## 2018-03-19 PROCEDURE — 93005 ELECTROCARDIOGRAM TRACING: CPT | Performed by: FAMILY MEDICINE

## 2018-03-19 PROCEDURE — 82150 ASSAY OF AMYLASE: CPT | Performed by: FAMILY MEDICINE

## 2018-03-19 PROCEDURE — 84484 ASSAY OF TROPONIN QUANT: CPT | Performed by: FAMILY MEDICINE

## 2018-03-19 PROCEDURE — 96361 HYDRATE IV INFUSION ADD-ON: CPT

## 2018-03-19 PROCEDURE — 96374 THER/PROPH/DIAG INJ IV PUSH: CPT

## 2018-03-19 PROCEDURE — 71046 X-RAY EXAM CHEST 2 VIEWS: CPT

## 2018-03-19 RX ORDER — ONDANSETRON 4 MG/1
4 TABLET, ORALLY DISINTEGRATING ORAL EVERY 6 HOURS PRN
Qty: 10 TABLET | Refills: 0 | Status: ON HOLD | OUTPATIENT
Start: 2018-03-19 | End: 2018-04-01

## 2018-03-19 RX ORDER — SUCRALFATE 1 G/1
1 TABLET ORAL
Qty: 30 TABLET | Refills: 0 | Status: SHIPPED | OUTPATIENT
Start: 2018-03-19 | End: 2018-06-06

## 2018-03-19 RX ORDER — PROMETHAZINE HYDROCHLORIDE 25 MG/1
25 SUPPOSITORY RECTAL EVERY 6 HOURS PRN
Qty: 15 SUPPOSITORY | Refills: 0 | Status: SHIPPED | OUTPATIENT
Start: 2018-03-19 | End: 2018-08-20

## 2018-03-19 RX ORDER — ONDANSETRON 2 MG/ML
4 INJECTION INTRAMUSCULAR; INTRAVENOUS ONCE
Status: COMPLETED | OUTPATIENT
Start: 2018-03-19 | End: 2018-03-19

## 2018-03-19 RX ORDER — ASPIRIN 325 MG
325 TABLET ORAL ONCE
Status: COMPLETED | OUTPATIENT
Start: 2018-03-19 | End: 2018-03-19

## 2018-03-19 RX ORDER — ESOMEPRAZOLE MAGNESIUM 20 MG/1
20 TABLET, DELAYED RELEASE ORAL DAILY
Qty: 30 TABLET | Refills: 0 | Status: ON HOLD | OUTPATIENT
Start: 2018-03-19 | End: 2018-03-28

## 2018-03-19 RX ORDER — FAMOTIDINE 10 MG/ML
20 INJECTION, SOLUTION INTRAVENOUS EVERY 12 HOURS SCHEDULED
Status: DISCONTINUED | OUTPATIENT
Start: 2018-03-19 | End: 2018-03-19 | Stop reason: HOSPADM

## 2018-03-19 RX ORDER — ONDANSETRON 2 MG/ML
INJECTION INTRAMUSCULAR; INTRAVENOUS
Status: COMPLETED
Start: 2018-03-19 | End: 2018-03-19

## 2018-03-19 RX ORDER — SODIUM CHLORIDE 0.9 % (FLUSH) 0.9 %
10 SYRINGE (ML) INJECTION AS NEEDED
Status: DISCONTINUED | OUTPATIENT
Start: 2018-03-19 | End: 2018-03-19 | Stop reason: HOSPADM

## 2018-03-19 RX ORDER — ALUMINA, MAGNESIA, AND SIMETHICONE 2400; 2400; 240 MG/30ML; MG/30ML; MG/30ML
15 SUSPENSION ORAL ONCE
Status: COMPLETED | OUTPATIENT
Start: 2018-03-19 | End: 2018-03-19

## 2018-03-19 RX ADMIN — ASPIRIN 325 MG: 325 TABLET, COATED ORAL at 16:36

## 2018-03-19 RX ADMIN — SODIUM CHLORIDE 1000 ML: 900 INJECTION, SOLUTION INTRAVENOUS at 16:37

## 2018-03-19 RX ADMIN — ALUMINUM HYDROXIDE, MAGNESIUM HYDROXIDE, AND DIMETHICONE 15 ML: 400; 400; 40 SUSPENSION ORAL at 16:22

## 2018-03-19 RX ADMIN — LIDOCAINE HYDROCHLORIDE 15 ML: 20 SOLUTION ORAL; TOPICAL at 16:23

## 2018-03-19 RX ADMIN — ONDANSETRON 4 MG: 2 INJECTION INTRAMUSCULAR; INTRAVENOUS at 17:00

## 2018-03-19 NOTE — ED PROVIDER NOTES
Subjective   PT with h/o a-fib with cardiac ablasion x 4.         Chest Pain   Pain location:  Substernal area and epigastric  Pain quality: burning    Pain radiates to:  Does not radiate  Pain severity:  Moderate  Onset quality:  Sudden  Duration:  3 weeks  Timing:  Intermittent  Progression:  Waxing and waning  Chronicity:  Recurrent  Context: eating    Relieved by:  Nothing  Exacerbated by: eating.  Ineffective treatments:  None tried  Associated symptoms: diaphoresis, nausea, palpitations, shortness of breath and vomiting    Associated symptoms: no abdominal pain, no cough, no dizziness, no dysphagia, no fatigue, no fever, no headache and no weakness    Risk factors: male sex    Risk factors: no coronary artery disease, no diabetes mellitus, no high cholesterol, not obese and no smoking        Review of Systems   Constitutional: Positive for chills and diaphoresis. Negative for appetite change, fatigue and fever.   HENT: Negative for congestion, ear discharge, ear pain, nosebleeds, rhinorrhea, sinus pressure, sore throat and trouble swallowing.    Eyes: Negative for discharge and redness.   Respiratory: Positive for shortness of breath. Negative for apnea, cough, chest tightness and wheezing.    Cardiovascular: Positive for chest pain and palpitations.   Gastrointestinal: Positive for nausea and vomiting. Negative for abdominal pain and diarrhea.   Endocrine: Negative for polyuria.   Genitourinary: Negative for dysuria, frequency and urgency.   Musculoskeletal: Negative for myalgias and neck pain.   Skin: Negative for color change and rash.   Allergic/Immunologic: Negative for immunocompromised state.   Neurological: Negative for dizziness, seizures, syncope, weakness, light-headedness and headaches.   Hematological: Negative for adenopathy. Does not bruise/bleed easily.   Psychiatric/Behavioral: Negative for behavioral problems and confusion.   All other systems reviewed and are negative.      Past Medical  "History:   Diagnosis Date   • A-fib    • Anxiety    • Arrhythmia    • Arthritis     rt shoulder    • Atrial flutter 07/2016    EP  ablation x2    • Depression    • ETOH abuse    • Sleep apnea     have used a machine in the past   • Tendonitis     rt shoulder   • Ulcerative colitis        Allergies   Allergen Reactions   • Contrast Dye Anaphylaxis     ANAPHYLAXIS       Past Surgical History:   Procedure Laterality Date   • CARDIAC ELECTROPHYSIOLOGY PROCEDURE N/A 5/2/2017    Procedure: Ablation atrial flutter;  Surgeon: Ramirez Reza MD;  Location:  [facility];  Service:    • CARDIAC ELECTROPHYSIOLOGY PROCEDURE N/A 9/5/2017    Procedure: Ablation atrial fibrillation PVA ;  Surgeon: Ramirez Reza MD;  Location:  [facility];  Service:    • COLONOSCOPY     • SHOULDER SURGERY Right 2014   • TONSILLECTOMY  12/2015       Family History   Problem Relation Age of Onset   • Heart disease Father    • Cancer Maternal Grandmother    • Cancer Paternal Grandmother    • Heart disease Paternal Grandfather        Social History     Social History   • Marital status:      Social History Main Topics   • Smoking status: Never Smoker   • Smokeless tobacco: Never Used   • Alcohol use Yes      Comment: occa   • Drug use:      Frequency: 7.0 times per week     Types: Marijuana      Comment: states he uses \"a little\" every day   • Sexual activity: Defer     Other Topics Concern   • Not on file           Objective   Physical Exam   Constitutional: He is oriented to person, place, and time. He appears well-developed and well-nourished.   HENT:   Head: Normocephalic and atraumatic.   Nose: Nose normal.   Mouth/Throat: Oropharynx is clear and moist.   Eyes: Conjunctivae and EOM are normal. Pupils are equal, round, and reactive to light. Right eye exhibits no discharge. Left eye exhibits no discharge. No scleral icterus.   Neck: Normal range of motion. Neck supple. No tracheal deviation present. "   Cardiovascular: Normal rate, regular rhythm and normal heart sounds.    No murmur heard.  Pulmonary/Chest: Effort normal and breath sounds normal. No stridor. No respiratory distress. He has no wheezes. He has no rales.   Abdominal: Soft. Bowel sounds are normal. He exhibits no distension and no mass. There is tenderness in the epigastric area. There is no rebound and no guarding.   Musculoskeletal: He exhibits no edema.   Neurological: He is alert and oriented to person, place, and time. Coordination normal.   Skin: Skin is warm and dry. No rash noted. No erythema.   Psychiatric: He has a normal mood and affect. His behavior is normal. Thought content normal.   Nursing note and vitals reviewed.      ECG 12 Lead    Date/Time: 3/19/2018 5:47 PM  Performed by: ELICIA DICKINSON  Authorized by: ELICIA DICKINSON   Interpreted by physician  Rhythm: sinus rhythm  Rate: normal  BPM: 81  ST Segments: ST segments normal                 ED Course  ED Course            Labs Reviewed   COMPREHENSIVE METABOLIC PANEL - Abnormal; Notable for the following:        Result Value    Sodium 135 (*)     Alkaline Phosphatase 33 (*)     Total Bilirubin 1.4 (*)     All other components within normal limits   CK - Abnormal; Notable for the following:     Creatine Kinase 193 (*)     All other components within normal limits   LIPASE - Abnormal; Notable for the following:     Lipase 662 (*)     All other components within normal limits   AMYLASE - Abnormal; Notable for the following:     Amylase 221 (*)     All other components within normal limits   CBC WITH AUTO DIFFERENTIAL - Abnormal; Notable for the following:     Hematocrit 49.8 (*)     RDW 14.7 (*)     RDW-SD 48.8 (*)     Immature Grans, Absolute 0.03 (*)     All other components within normal limits   URINALYSIS W/ CULTURE IF INDICATED - Abnormal; Notable for the following:     Color, UA Orange (*)     Ketones, UA 15 mg/dL (1+) (*)     Bilirubin, UA Small (1+) (*)      Protein, UA 30 mg/dL (1+) (*)     All other components within normal limits   URINALYSIS, MICROSCOPIC ONLY - Abnormal; Notable for the following:     RBC, UA 0-2 (*)     All other components within normal limits   TROPONIN (IN-HOUSE) - Normal   BNP (IN-HOUSE) - Normal   PROTIME-INR - Normal    Narrative:     Therapeutic range for most indications is 2.0-3.0 INR,  or 2.5-3.5 for mechanical heart valves.   CK MB - Normal   RAINBOW DRAW    Narrative:     The following orders were created for panel order [place] Draw.  Procedure                               Abnormality         Status                     ---------                               -----------         ------                     Light Blue Top[[id number]]                                   Final result               Green Top (Gel)[[id number]]                                  Final result               Lavender Top[[id number]]                                     Final result               Gold Top - SST[[id number]]                                   Final result                 Please view results for these tests on the individual orders.   CBC AND DIFFERENTIAL    Narrative:     The following orders were created for panel order CBC & Differential.  Procedure                               Abnormality         Status                     ---------                               -----------         ------                     CBC Auto Differential[[id number]]        Abnormal            Final result                 Please view results for these tests on the individual orders.   LIGHT BLUE TOP   GREEN TOP   LAVENDER TOP   GOLD TOP - SST       XR Chest 2 View   Final Result   CONCLUSION: No evidence of active disease.          Electronically signed by:  Tahir Tuttle MD  3/19/2018 3:28 PM CDT   Workstation: [id number]                  [facility]    Final diagnoses:   Epigastric pain   Alcohol-induced acute pancreatitis, unspecified complication status            Rios Mart,  MD  03/19/18 3199

## 2018-03-27 ENCOUNTER — APPOINTMENT (OUTPATIENT)
Dept: CT IMAGING | Facility: HOSPITAL | Age: 43
End: 2018-03-27

## 2018-03-27 ENCOUNTER — HOSPITAL ENCOUNTER (INPATIENT)
Facility: HOSPITAL | Age: 43
LOS: 5 days | Discharge: HOME | End: 2018-04-01
Attending: FAMILY MEDICINE | Admitting: FAMILY MEDICINE

## 2018-03-27 DIAGNOSIS — K57.92 ACUTE DIVERTICULITIS: Primary | ICD-10-CM

## 2018-03-27 DIAGNOSIS — R10.13 EPIGASTRIC PAIN: ICD-10-CM

## 2018-03-27 LAB
ALBUMIN SERPL-MCNC: 4 G/DL (ref 3.4–4.8)
ALBUMIN/GLOB SERPL: 1.4 G/DL (ref 1.1–1.8)
ALP SERPL-CCNC: 42 U/L (ref 38–126)
ALT SERPL W P-5'-P-CCNC: 74 U/L (ref 21–72)
ANION GAP SERPL CALCULATED.3IONS-SCNC: 14 MMOL/L (ref 5–15)
AST SERPL-CCNC: 42 U/L (ref 17–59)
BASOPHILS # BLD AUTO: 0.01 10*3/MM3 (ref 0–0.2)
BASOPHILS NFR BLD AUTO: 0.1 % (ref 0–2)
BILIRUB SERPL-MCNC: 1.2 MG/DL (ref 0.2–1.3)
BUN BLD-MCNC: 12 MG/DL (ref 7–21)
BUN/CREAT SERPL: 11.7 (ref 7–25)
CALCIUM SPEC-SCNC: 8.5 MG/DL (ref 8.4–10.2)
CHLORIDE SERPL-SCNC: 94 MMOL/L (ref 95–110)
CO2 SERPL-SCNC: 27 MMOL/L (ref 22–31)
CREAT BLD-MCNC: 1.03 MG/DL (ref 0.7–1.3)
CRP SERPL-MCNC: 4.6 MG/DL (ref 0–1)
D-LACTATE SERPL-SCNC: 0.9 MMOL/L (ref 0.5–2)
DEPRECATED RDW RBC AUTO: 46.7 FL (ref 35.1–43.9)
EOSINOPHIL # BLD AUTO: 0.02 10*3/MM3 (ref 0–0.7)
EOSINOPHIL NFR BLD AUTO: 0.1 % (ref 0–7)
ERYTHROCYTE [DISTWIDTH] IN BLOOD BY AUTOMATED COUNT: 14.2 % (ref 11.5–14.5)
ETHANOL BLD-MCNC: <10 MG/DL (ref 0–10)
ETHANOL UR QL: <0.01 %
GFR SERPL CREATININE-BSD FRML MDRD: 80 ML/MIN/1.73 (ref 63–147)
GLOBULIN UR ELPH-MCNC: 2.9 GM/DL (ref 2.3–3.5)
GLUCOSE BLD-MCNC: 110 MG/DL (ref 60–100)
HCT VFR BLD AUTO: 48.4 % (ref 39–49)
HGB BLD-MCNC: 16.7 G/DL (ref 13.7–17.3)
HOLD SPECIMEN: NORMAL
HOLD SPECIMEN: NORMAL
IMM GRANULOCYTES # BLD: 0.11 10*3/MM3 (ref 0–0.02)
IMM GRANULOCYTES NFR BLD: 0.6 % (ref 0–0.5)
LIPASE SERPL-CCNC: 138 U/L (ref 23–300)
LYMPHOCYTES # BLD AUTO: 0.61 10*3/MM3 (ref 0.6–4.2)
LYMPHOCYTES NFR BLD AUTO: 3.1 % (ref 10–50)
MCH RBC QN AUTO: 31.2 PG (ref 26.5–34)
MCHC RBC AUTO-ENTMCNC: 34.5 G/DL (ref 31.5–36.3)
MCV RBC AUTO: 90.5 FL (ref 80–98)
MONOCYTES # BLD AUTO: 0.98 10*3/MM3 (ref 0–0.9)
MONOCYTES NFR BLD AUTO: 4.9 % (ref 0–12)
NEUTROPHILS # BLD AUTO: 18.13 10*3/MM3 (ref 2–8.6)
NEUTROPHILS NFR BLD AUTO: 91.2 % (ref 37–80)
PLATELET # BLD AUTO: 272 10*3/MM3 (ref 150–450)
PMV BLD AUTO: 11 FL (ref 8–12)
POTASSIUM BLD-SCNC: 3.5 MMOL/L (ref 3.5–5.1)
PROT SERPL-MCNC: 6.9 G/DL (ref 6.3–8.6)
RBC # BLD AUTO: 5.35 10*6/MM3 (ref 4.37–5.74)
SODIUM BLD-SCNC: 135 MMOL/L (ref 137–145)
WBC NRBC COR # BLD: 19.86 10*3/MM3 (ref 3.2–9.8)
WHOLE BLOOD HOLD SPECIMEN: NORMAL
WHOLE BLOOD HOLD SPECIMEN: NORMAL

## 2018-03-27 PROCEDURE — 80307 DRUG TEST PRSMV CHEM ANLYZR: CPT | Performed by: FAMILY MEDICINE

## 2018-03-27 PROCEDURE — 25010000002 HYDROMORPHONE PER 4 MG: Performed by: FAMILY MEDICINE

## 2018-03-27 PROCEDURE — 36415 COLL VENOUS BLD VENIPUNCTURE: CPT

## 2018-03-27 PROCEDURE — 25010000002 LEVOFLOXACIN PER 250 MG: Performed by: FAMILY MEDICINE

## 2018-03-27 PROCEDURE — 74176 CT ABD & PELVIS W/O CONTRAST: CPT

## 2018-03-27 PROCEDURE — 86140 C-REACTIVE PROTEIN: CPT | Performed by: FAMILY MEDICINE

## 2018-03-27 PROCEDURE — 80053 COMPREHEN METABOLIC PANEL: CPT | Performed by: FAMILY MEDICINE

## 2018-03-27 PROCEDURE — 83690 ASSAY OF LIPASE: CPT | Performed by: FAMILY MEDICINE

## 2018-03-27 PROCEDURE — 87040 BLOOD CULTURE FOR BACTERIA: CPT | Performed by: FAMILY MEDICINE

## 2018-03-27 PROCEDURE — 25010000002 ONDANSETRON PER 1 MG: Performed by: FAMILY MEDICINE

## 2018-03-27 PROCEDURE — 85025 COMPLETE CBC W/AUTO DIFF WBC: CPT | Performed by: FAMILY MEDICINE

## 2018-03-27 PROCEDURE — 99285 EMERGENCY DEPT VISIT HI MDM: CPT

## 2018-03-27 PROCEDURE — 83605 ASSAY OF LACTIC ACID: CPT | Performed by: FAMILY MEDICINE

## 2018-03-27 PROCEDURE — 81001 URINALYSIS AUTO W/SCOPE: CPT | Performed by: FAMILY MEDICINE

## 2018-03-27 RX ORDER — PROMETHAZINE HYDROCHLORIDE 25 MG/1
25 TABLET ORAL EVERY 6 HOURS PRN
COMMUNITY
End: 2018-04-01 | Stop reason: HOSPADM

## 2018-03-27 RX ORDER — LEVOFLOXACIN 5 MG/ML
500 INJECTION, SOLUTION INTRAVENOUS ONCE
Status: COMPLETED | OUTPATIENT
Start: 2018-03-27 | End: 2018-03-28

## 2018-03-27 RX ORDER — ONDANSETRON 2 MG/ML
4 INJECTION INTRAMUSCULAR; INTRAVENOUS ONCE
Status: COMPLETED | OUTPATIENT
Start: 2018-03-27 | End: 2018-03-27

## 2018-03-27 RX ORDER — SODIUM CHLORIDE 0.9 % (FLUSH) 0.9 %
10 SYRINGE (ML) INJECTION AS NEEDED
Status: DISCONTINUED | OUTPATIENT
Start: 2018-03-27 | End: 2018-04-01 | Stop reason: HOSPADM

## 2018-03-27 RX ORDER — CLONAZEPAM 1 MG/1
1 TABLET ORAL 2 TIMES DAILY PRN
COMMUNITY
End: 2020-10-27

## 2018-03-27 RX ORDER — PANTOPRAZOLE SODIUM 40 MG/1
40 TABLET, DELAYED RELEASE ORAL DAILY
Status: ON HOLD | COMMUNITY
End: 2018-04-01 | Stop reason: SDUPTHER

## 2018-03-27 RX ADMIN — ONDANSETRON HYDROCHLORIDE 4 MG: 2 INJECTION, SOLUTION INTRAMUSCULAR; INTRAVENOUS at 20:59

## 2018-03-27 RX ADMIN — HYDROMORPHONE HYDROCHLORIDE 0.5 MG: 1 INJECTION, SOLUTION INTRAMUSCULAR; INTRAVENOUS; SUBCUTANEOUS at 23:29

## 2018-03-27 RX ADMIN — SODIUM CHLORIDE 1000 ML: 9 INJECTION, SOLUTION INTRAVENOUS at 20:59

## 2018-03-27 RX ADMIN — LEVOFLOXACIN 500 MG: 5 INJECTION, SOLUTION INTRAVENOUS at 23:29

## 2018-03-27 RX ADMIN — METRONIDAZOLE 500 MG: 500 INJECTION, SOLUTION INTRAVENOUS at 22:39

## 2018-03-27 RX ADMIN — HYDROMORPHONE HYDROCHLORIDE 0.5 MG: 1 INJECTION, SOLUTION INTRAMUSCULAR; INTRAVENOUS; SUBCUTANEOUS at 21:00

## 2018-03-27 RX ADMIN — SODIUM CHLORIDE 1000 ML: 900 INJECTION, SOLUTION INTRAVENOUS at 23:29

## 2018-03-28 PROBLEM — Z86.79 S/P ABLATION OF ATRIAL FLUTTER: Status: ACTIVE | Noted: 2018-03-28

## 2018-03-28 PROBLEM — F41.9 ANXIETY: Status: ACTIVE | Noted: 2018-03-28

## 2018-03-28 PROBLEM — I10 ESSENTIAL HYPERTENSION: Status: ACTIVE | Noted: 2018-03-28

## 2018-03-28 PROBLEM — A41.9 SEPSIS: Status: ACTIVE | Noted: 2018-03-28

## 2018-03-28 PROBLEM — N10 ACUTE PYELONEPHRITIS: Status: ACTIVE | Noted: 2018-03-28

## 2018-03-28 PROBLEM — Z98.890 S/P ABLATION OF ATRIAL FLUTTER: Status: ACTIVE | Noted: 2018-03-28

## 2018-03-28 PROBLEM — F60.3 BORDERLINE PERSONALITY DISORDER (HCC): Status: ACTIVE | Noted: 2018-03-28

## 2018-03-28 LAB
ALBUMIN SERPL-MCNC: 3.8 G/DL (ref 3.4–4.8)
ALBUMIN/GLOB SERPL: 1.4 G/DL (ref 1.1–1.8)
ALP SERPL-CCNC: 37 U/L (ref 38–126)
ALT SERPL W P-5'-P-CCNC: 61 U/L (ref 21–72)
AMPHET+METHAMPHET UR QL: NEGATIVE
ANION GAP SERPL CALCULATED.3IONS-SCNC: 9 MMOL/L (ref 5–15)
AST SERPL-CCNC: 28 U/L (ref 17–59)
BACTERIA UR QL AUTO: ABNORMAL /HPF
BARBITURATES UR QL SCN: NEGATIVE
BASOPHILS # BLD AUTO: 0.01 10*3/MM3 (ref 0–0.2)
BASOPHILS NFR BLD AUTO: 0 % (ref 0–2)
BENZODIAZ UR QL SCN: NEGATIVE
BILIRUB SERPL-MCNC: 1.5 MG/DL (ref 0.2–1.3)
BILIRUB UR QL STRIP: ABNORMAL
BUN BLD-MCNC: 9 MG/DL (ref 7–21)
BUN/CREAT SERPL: 9 (ref 7–25)
CALCIUM SPEC-SCNC: 8.2 MG/DL (ref 8.4–10.2)
CANNABINOIDS SERPL QL: POSITIVE
CHLORIDE SERPL-SCNC: 99 MMOL/L (ref 95–110)
CLARITY UR: CLEAR
CO2 SERPL-SCNC: 28 MMOL/L (ref 22–31)
COCAINE UR QL: NEGATIVE
COLOR UR: ABNORMAL
CREAT BLD-MCNC: 1 MG/DL (ref 0.7–1.3)
DEPRECATED RDW RBC AUTO: 47.3 FL (ref 35.1–43.9)
EOSINOPHIL # BLD AUTO: 0.01 10*3/MM3 (ref 0–0.7)
EOSINOPHIL NFR BLD AUTO: 0 % (ref 0–7)
ERYTHROCYTE [DISTWIDTH] IN BLOOD BY AUTOMATED COUNT: 14.2 % (ref 11.5–14.5)
GFR SERPL CREATININE-BSD FRML MDRD: 83 ML/MIN/1.73 (ref 60–147)
GLOBULIN UR ELPH-MCNC: 2.8 GM/DL (ref 2.3–3.5)
GLUCOSE BLD-MCNC: 110 MG/DL (ref 60–100)
GLUCOSE UR STRIP-MCNC: NEGATIVE MG/DL
HCT VFR BLD AUTO: 49 % (ref 39–49)
HGB BLD-MCNC: 16.8 G/DL (ref 13.7–17.3)
HGB UR QL STRIP.AUTO: NEGATIVE
HYALINE CASTS UR QL AUTO: ABNORMAL /LPF
IMM GRANULOCYTES # BLD: 0.1 10*3/MM3 (ref 0–0.02)
IMM GRANULOCYTES NFR BLD: 0.5 % (ref 0–0.5)
KETONES UR QL STRIP: ABNORMAL
LEUKOCYTE ESTERASE UR QL STRIP.AUTO: ABNORMAL
LYMPHOCYTES # BLD AUTO: 0.94 10*3/MM3 (ref 0.6–4.2)
LYMPHOCYTES NFR BLD AUTO: 4.3 % (ref 10–50)
MCH RBC QN AUTO: 31.2 PG (ref 26.5–34)
MCHC RBC AUTO-ENTMCNC: 34.3 G/DL (ref 31.5–36.3)
MCV RBC AUTO: 90.9 FL (ref 80–98)
METHADONE UR QL SCN: NEGATIVE
MONOCYTES # BLD AUTO: 1.02 10*3/MM3 (ref 0–0.9)
MONOCYTES NFR BLD AUTO: 4.7 % (ref 0–12)
MUCOUS THREADS URNS QL MICRO: ABNORMAL /HPF
NEUTROPHILS # BLD AUTO: 19.79 10*3/MM3 (ref 2–8.6)
NEUTROPHILS NFR BLD AUTO: 90.5 % (ref 37–80)
NITRITE UR QL STRIP: NEGATIVE
OPIATES UR QL: POSITIVE
OXYCODONE UR QL SCN: NEGATIVE
PH UR STRIP.AUTO: 7 [PH] (ref 5–9)
PLATELET # BLD AUTO: 265 10*3/MM3 (ref 150–450)
PMV BLD AUTO: 10.2 FL (ref 8–12)
POTASSIUM BLD-SCNC: 4 MMOL/L (ref 3.5–5.1)
PROT SERPL-MCNC: 6.6 G/DL (ref 6.3–8.6)
PROT UR QL STRIP: ABNORMAL
RBC # BLD AUTO: 5.39 10*6/MM3 (ref 4.37–5.74)
RBC # UR: ABNORMAL /HPF
REF LAB TEST METHOD: ABNORMAL
SODIUM BLD-SCNC: 136 MMOL/L (ref 137–145)
SP GR UR STRIP: 1.03 (ref 1–1.03)
SQUAMOUS #/AREA URNS HPF: ABNORMAL /HPF
UROBILINOGEN UR QL STRIP: ABNORMAL
WBC NRBC COR # BLD: 21.87 10*3/MM3 (ref 3.2–9.8)
WBC UR QL AUTO: ABNORMAL /HPF

## 2018-03-28 PROCEDURE — 25010000002 LORAZEPAM PER 2 MG: Performed by: FAMILY MEDICINE

## 2018-03-28 PROCEDURE — 85025 COMPLETE CBC W/AUTO DIFF WBC: CPT | Performed by: FAMILY MEDICINE

## 2018-03-28 PROCEDURE — 25010000002 THIAMINE PER 100 MG: Performed by: FAMILY MEDICINE

## 2018-03-28 PROCEDURE — 25010000002 MORPHINE PER 10 MG: Performed by: FAMILY MEDICINE

## 2018-03-28 PROCEDURE — 25010000002 ONDANSETRON PER 1 MG: Performed by: FAMILY MEDICINE

## 2018-03-28 PROCEDURE — 94762 N-INVAS EAR/PLS OXIMTRY CONT: CPT

## 2018-03-28 PROCEDURE — 25010000002 MAGNESIUM SULFATE PER 500 MG OF MAGNESIUM: Performed by: FAMILY MEDICINE

## 2018-03-28 PROCEDURE — 99223 1ST HOSP IP/OBS HIGH 75: CPT | Performed by: FAMILY MEDICINE

## 2018-03-28 PROCEDURE — 25010000002 PROMETHAZINE PER 50 MG: Performed by: FAMILY MEDICINE

## 2018-03-28 PROCEDURE — 80053 COMPREHEN METABOLIC PANEL: CPT | Performed by: FAMILY MEDICINE

## 2018-03-28 RX ORDER — CLONAZEPAM 0.5 MG/1
1 TABLET ORAL 2 TIMES DAILY PRN
Status: DISCONTINUED | OUTPATIENT
Start: 2018-03-28 | End: 2018-03-28

## 2018-03-28 RX ORDER — PROMETHAZINE HYDROCHLORIDE 25 MG/1
25 SUPPOSITORY RECTAL EVERY 6 HOURS PRN
Status: DISCONTINUED | OUTPATIENT
Start: 2018-03-28 | End: 2018-03-28

## 2018-03-28 RX ORDER — NALOXONE HCL 0.4 MG/ML
0.4 VIAL (ML) INJECTION
Status: DISCONTINUED | OUTPATIENT
Start: 2018-03-28 | End: 2018-04-01 | Stop reason: HOSPADM

## 2018-03-28 RX ORDER — LORAZEPAM 2 MG/ML
1 INJECTION INTRAMUSCULAR
Status: DISCONTINUED | OUTPATIENT
Start: 2018-03-28 | End: 2018-04-01 | Stop reason: HOSPADM

## 2018-03-28 RX ORDER — LABETALOL HYDROCHLORIDE 5 MG/ML
10 INJECTION, SOLUTION INTRAVENOUS EVERY 6 HOURS PRN
Status: DISCONTINUED | OUTPATIENT
Start: 2018-03-28 | End: 2018-04-01 | Stop reason: HOSPADM

## 2018-03-28 RX ORDER — CLONAZEPAM 0.5 MG/1
1 TABLET ORAL 2 TIMES DAILY PRN
Status: DISCONTINUED | OUTPATIENT
Start: 2018-03-28 | End: 2018-04-01 | Stop reason: HOSPADM

## 2018-03-28 RX ORDER — DULOXETIN HYDROCHLORIDE 60 MG/1
60 CAPSULE, DELAYED RELEASE ORAL EVERY MORNING
Status: DISCONTINUED | OUTPATIENT
Start: 2018-03-28 | End: 2018-03-31

## 2018-03-28 RX ORDER — CALCIUM CARBONATE 200(500)MG
2 TABLET,CHEWABLE ORAL 2 TIMES DAILY PRN
Status: DISCONTINUED | OUTPATIENT
Start: 2018-03-28 | End: 2018-04-01 | Stop reason: HOSPADM

## 2018-03-28 RX ORDER — CLONAZEPAM 1 MG/1
1 TABLET ORAL
Status: ON HOLD | COMMUNITY
End: 2018-03-28

## 2018-03-28 RX ORDER — ONDANSETRON 2 MG/ML
4 INJECTION INTRAMUSCULAR; INTRAVENOUS EVERY 6 HOURS PRN
Status: DISCONTINUED | OUTPATIENT
Start: 2018-03-28 | End: 2018-04-01 | Stop reason: HOSPADM

## 2018-03-28 RX ORDER — LORAZEPAM 2 MG/ML
2 INJECTION INTRAMUSCULAR
Status: DISCONTINUED | OUTPATIENT
Start: 2018-03-28 | End: 2018-04-01 | Stop reason: HOSPADM

## 2018-03-28 RX ORDER — MORPHINE SULFATE 2 MG/ML
2 INJECTION, SOLUTION INTRAMUSCULAR; INTRAVENOUS ONCE
Status: COMPLETED | OUTPATIENT
Start: 2018-03-28 | End: 2018-03-28

## 2018-03-28 RX ORDER — PROMETHAZINE HYDROCHLORIDE 25 MG/1
25 TABLET ORAL EVERY 6 HOURS PRN
Status: DISCONTINUED | OUTPATIENT
Start: 2018-03-28 | End: 2018-03-31

## 2018-03-28 RX ORDER — MORPHINE SULFATE 2 MG/ML
1 INJECTION, SOLUTION INTRAMUSCULAR; INTRAVENOUS EVERY 4 HOURS PRN
Status: DISCONTINUED | OUTPATIENT
Start: 2018-03-28 | End: 2018-04-01 | Stop reason: HOSPADM

## 2018-03-28 RX ORDER — ONDANSETRON 4 MG/1
4 TABLET, ORALLY DISINTEGRATING ORAL EVERY 6 HOURS PRN
Status: DISCONTINUED | OUTPATIENT
Start: 2018-03-28 | End: 2018-04-01 | Stop reason: HOSPADM

## 2018-03-28 RX ORDER — ONDANSETRON 4 MG/1
4 TABLET, FILM COATED ORAL EVERY 8 HOURS PRN
COMMUNITY
Start: 2018-03-23 | End: 2018-04-01 | Stop reason: HOSPADM

## 2018-03-28 RX ORDER — DULOXETIN HYDROCHLORIDE 60 MG/1
60 CAPSULE, DELAYED RELEASE ORAL
Status: ON HOLD | COMMUNITY
End: 2018-03-28

## 2018-03-28 RX ORDER — LISINOPRIL 5 MG/1
5 TABLET ORAL
Status: DISCONTINUED | OUTPATIENT
Start: 2018-03-28 | End: 2018-04-01 | Stop reason: HOSPADM

## 2018-03-28 RX ORDER — LEVOFLOXACIN 5 MG/ML
750 INJECTION, SOLUTION INTRAVENOUS EVERY 24 HOURS
Status: DISCONTINUED | OUTPATIENT
Start: 2018-03-28 | End: 2018-04-01 | Stop reason: HOSPADM

## 2018-03-28 RX ORDER — SUCRALFATE 1 G/1
1 TABLET ORAL
COMMUNITY
End: 2018-04-01 | Stop reason: HOSPADM

## 2018-03-28 RX ORDER — LISINOPRIL 5 MG/1
5 TABLET ORAL
Status: ON HOLD | COMMUNITY
End: 2018-03-28

## 2018-03-28 RX ORDER — CARVEDILOL 6.25 MG/1
6.25 TABLET ORAL
COMMUNITY
End: 2018-04-01 | Stop reason: HOSPADM

## 2018-03-28 RX ORDER — CHLORDIAZEPOXIDE HYDROCHLORIDE 25 MG/1
25 CAPSULE, GELATIN COATED ORAL
COMMUNITY
Start: 2018-03-23 | End: 2018-04-01 | Stop reason: HOSPADM

## 2018-03-28 RX ORDER — LORAZEPAM 2 MG/ML
1 INJECTION INTRAMUSCULAR EVERY 8 HOURS
Status: DISCONTINUED | OUTPATIENT
Start: 2018-03-29 | End: 2018-03-28

## 2018-03-28 RX ORDER — CARVEDILOL 6.25 MG/1
6.25 TABLET ORAL EVERY 12 HOURS SCHEDULED
Status: DISCONTINUED | OUTPATIENT
Start: 2018-03-28 | End: 2018-04-01 | Stop reason: HOSPADM

## 2018-03-28 RX ORDER — OMEPRAZOLE 40 MG/1
40 CAPSULE, DELAYED RELEASE ORAL
COMMUNITY
Start: 2018-03-23 | End: 2018-04-01 | Stop reason: HOSPADM

## 2018-03-28 RX ORDER — SODIUM CHLORIDE 0.9 % (FLUSH) 0.9 %
1-10 SYRINGE (ML) INJECTION AS NEEDED
Status: DISCONTINUED | OUTPATIENT
Start: 2018-03-28 | End: 2018-04-01 | Stop reason: HOSPADM

## 2018-03-28 RX ORDER — SODIUM CHLORIDE 9 MG/ML
100 INJECTION, SOLUTION INTRAVENOUS CONTINUOUS
Status: DISCONTINUED | OUTPATIENT
Start: 2018-03-28 | End: 2018-04-01 | Stop reason: HOSPADM

## 2018-03-28 RX ORDER — PROMETHAZINE HYDROCHLORIDE 25 MG/1
25 SUPPOSITORY RECTAL EVERY 6 HOURS PRN
COMMUNITY
End: 2018-04-01 | Stop reason: HOSPADM

## 2018-03-28 RX ORDER — ALUMINA, MAGNESIA, AND SIMETHICONE 2400; 2400; 240 MG/30ML; MG/30ML; MG/30ML
15 SUSPENSION ORAL ONCE
Status: COMPLETED | OUTPATIENT
Start: 2018-03-28 | End: 2018-03-28

## 2018-03-28 RX ORDER — PANTOPRAZOLE SODIUM 40 MG/1
40 TABLET, DELAYED RELEASE ORAL DAILY
Status: DISCONTINUED | OUTPATIENT
Start: 2018-03-28 | End: 2018-04-01 | Stop reason: HOSPADM

## 2018-03-28 RX ORDER — LORAZEPAM 2 MG/ML
1 INJECTION INTRAMUSCULAR EVERY 6 HOURS
Status: DISCONTINUED | OUTPATIENT
Start: 2018-03-28 | End: 2018-03-28

## 2018-03-28 RX ORDER — CALCIUM CARBONATE/VITAMIN D3 500-10/5ML
400 LIQUID (ML) ORAL
Status: ON HOLD | COMMUNITY
End: 2018-03-28

## 2018-03-28 RX ORDER — LORAZEPAM 2 MG/1
2 TABLET ORAL
Status: DISCONTINUED | OUTPATIENT
Start: 2018-03-28 | End: 2018-04-01 | Stop reason: HOSPADM

## 2018-03-28 RX ORDER — PROMETHAZINE HYDROCHLORIDE 25 MG/ML
25 INJECTION, SOLUTION INTRAMUSCULAR; INTRAVENOUS EVERY 6 HOURS PRN
Status: DISCONTINUED | OUTPATIENT
Start: 2018-03-28 | End: 2018-03-31

## 2018-03-28 RX ORDER — LORAZEPAM 1 MG/1
1 TABLET ORAL
Status: DISCONTINUED | OUTPATIENT
Start: 2018-03-28 | End: 2018-04-01 | Stop reason: HOSPADM

## 2018-03-28 RX ADMIN — LISINOPRIL 5 MG: 5 TABLET ORAL at 16:59

## 2018-03-28 RX ADMIN — PROMETHAZINE HYDROCHLORIDE 25 MG: 25 INJECTION INTRAMUSCULAR; INTRAVENOUS at 05:41

## 2018-03-28 RX ADMIN — PROMETHAZINE HYDROCHLORIDE 25 MG: 25 INJECTION INTRAMUSCULAR; INTRAVENOUS at 18:30

## 2018-03-28 RX ADMIN — MORPHINE SULFATE 1 MG: 2 INJECTION, SOLUTION INTRAMUSCULAR; INTRAVENOUS at 10:32

## 2018-03-28 RX ADMIN — MAGNESIUM SULFATE HEPTAHYDRATE 100 ML/HR: 500 INJECTION, SOLUTION INTRAMUSCULAR; INTRAVENOUS at 08:11

## 2018-03-28 RX ADMIN — MORPHINE SULFATE 1 MG: 2 INJECTION, SOLUTION INTRAMUSCULAR; INTRAVENOUS at 22:52

## 2018-03-28 RX ADMIN — MORPHINE SULFATE 2 MG: 2 INJECTION, SOLUTION INTRAMUSCULAR; INTRAVENOUS at 01:40

## 2018-03-28 RX ADMIN — ONDANSETRON HYDROCHLORIDE 4 MG: 2 INJECTION, SOLUTION INTRAMUSCULAR; INTRAVENOUS at 20:33

## 2018-03-28 RX ADMIN — SODIUM CHLORIDE 100 ML/HR: 900 INJECTION, SOLUTION INTRAVENOUS at 07:00

## 2018-03-28 RX ADMIN — PROMETHAZINE HYDROCHLORIDE 25 MG: 25 INJECTION INTRAMUSCULAR; INTRAVENOUS at 12:25

## 2018-03-28 RX ADMIN — LORAZEPAM 1 MG: 2 INJECTION, SOLUTION INTRAMUSCULAR; INTRAVENOUS at 12:08

## 2018-03-28 RX ADMIN — LIDOCAINE HYDROCHLORIDE 15 ML: 20 SOLUTION ORAL; TOPICAL at 21:20

## 2018-03-28 RX ADMIN — Medication 10 ML: at 18:30

## 2018-03-28 RX ADMIN — ALUMINUM HYDROXIDE, MAGNESIUM HYDROXIDE, AND DIMETHICONE 15 ML: 400; 400; 40 SUSPENSION ORAL at 21:20

## 2018-03-28 RX ADMIN — LORAZEPAM 1 MG: 2 INJECTION, SOLUTION INTRAMUSCULAR; INTRAVENOUS at 02:38

## 2018-03-28 RX ADMIN — METRONIDAZOLE 500 MG: 500 INJECTION, SOLUTION INTRAVENOUS at 06:56

## 2018-03-28 RX ADMIN — LORAZEPAM 2 MG: 2 INJECTION, SOLUTION INTRAMUSCULAR; INTRAVENOUS at 21:34

## 2018-03-28 RX ADMIN — MAGNESIUM OXIDE TAB 400 MG (241.3 MG ELEMENTAL MG) 400 MG: 400 (241.3 MG) TAB at 21:21

## 2018-03-28 RX ADMIN — MORPHINE SULFATE 1 MG: 2 INJECTION, SOLUTION INTRAMUSCULAR; INTRAVENOUS at 05:41

## 2018-03-28 RX ADMIN — ONDANSETRON HYDROCHLORIDE 4 MG: 2 INJECTION, SOLUTION INTRAMUSCULAR; INTRAVENOUS at 01:53

## 2018-03-28 RX ADMIN — LABETALOL HYDROCHLORIDE 10 MG: 5 INJECTION, SOLUTION INTRAVENOUS at 22:53

## 2018-03-28 RX ADMIN — CARVEDILOL 6.25 MG: 6.25 TABLET, FILM COATED ORAL at 21:21

## 2018-03-28 RX ADMIN — MORPHINE SULFATE 1 MG: 2 INJECTION, SOLUTION INTRAMUSCULAR; INTRAVENOUS at 18:30

## 2018-03-28 RX ADMIN — MORPHINE SULFATE 1 MG: 2 INJECTION, SOLUTION INTRAMUSCULAR; INTRAVENOUS at 14:07

## 2018-03-28 RX ADMIN — Medication 10 ML: at 02:32

## 2018-03-28 RX ADMIN — METRONIDAZOLE 500 MG: 500 INJECTION, SOLUTION INTRAVENOUS at 16:59

## 2018-03-28 RX ADMIN — ONDANSETRON HYDROCHLORIDE 4 MG: 2 INJECTION, SOLUTION INTRAMUSCULAR; INTRAVENOUS at 08:35

## 2018-03-28 RX ADMIN — METRONIDAZOLE 500 MG: 500 INJECTION, SOLUTION INTRAVENOUS at 22:52

## 2018-03-29 LAB
ALBUMIN SERPL-MCNC: 3.3 G/DL (ref 3.4–4.8)
ALBUMIN/GLOB SERPL: 1.1 G/DL (ref 1.1–1.8)
ALP SERPL-CCNC: 38 U/L (ref 38–126)
ALT SERPL W P-5'-P-CCNC: 48 U/L (ref 21–72)
ANION GAP SERPL CALCULATED.3IONS-SCNC: 13 MMOL/L (ref 5–15)
AST SERPL-CCNC: 20 U/L (ref 17–59)
BASOPHILS # BLD AUTO: 0 10*3/MM3 (ref 0–0.2)
BASOPHILS NFR BLD AUTO: 0 % (ref 0–2)
BILIRUB SERPL-MCNC: 1.3 MG/DL (ref 0.2–1.3)
BUN BLD-MCNC: 11 MG/DL (ref 7–21)
BUN/CREAT SERPL: 10.3 (ref 7–25)
CALCIUM SPEC-SCNC: 7.8 MG/DL (ref 8.4–10.2)
CHLORIDE SERPL-SCNC: 102 MMOL/L (ref 95–110)
CO2 SERPL-SCNC: 22 MMOL/L (ref 22–31)
CREAT BLD-MCNC: 1.07 MG/DL (ref 0.7–1.3)
CRP SERPL-MCNC: 26.3 MG/DL (ref 0–1)
DEPRECATED RDW RBC AUTO: 47.6 FL (ref 35.1–43.9)
EOSINOPHIL # BLD AUTO: 0.01 10*3/MM3 (ref 0–0.7)
EOSINOPHIL NFR BLD AUTO: 0.1 % (ref 0–7)
ERYTHROCYTE [DISTWIDTH] IN BLOOD BY AUTOMATED COUNT: 14.3 % (ref 11.5–14.5)
GFR SERPL CREATININE-BSD FRML MDRD: 77 ML/MIN/1.73 (ref 63–147)
GLOBULIN UR ELPH-MCNC: 2.9 GM/DL (ref 2.3–3.5)
GLUCOSE BLD-MCNC: 108 MG/DL (ref 60–100)
HCT VFR BLD AUTO: 45.2 % (ref 39–49)
HGB BLD-MCNC: 15.3 G/DL (ref 13.7–17.3)
IMM GRANULOCYTES # BLD: 0.09 10*3/MM3 (ref 0–0.02)
IMM GRANULOCYTES NFR BLD: 0.5 % (ref 0–0.5)
LYMPHOCYTES # BLD AUTO: 0.62 10*3/MM3 (ref 0.6–4.2)
LYMPHOCYTES NFR BLD AUTO: 3.4 % (ref 10–50)
MCH RBC QN AUTO: 30.7 PG (ref 26.5–34)
MCHC RBC AUTO-ENTMCNC: 33.8 G/DL (ref 31.5–36.3)
MCV RBC AUTO: 90.8 FL (ref 80–98)
MONOCYTES # BLD AUTO: 1.35 10*3/MM3 (ref 0–0.9)
MONOCYTES NFR BLD AUTO: 7.3 % (ref 0–12)
NEUTROPHILS # BLD AUTO: 16.36 10*3/MM3 (ref 2–8.6)
NEUTROPHILS NFR BLD AUTO: 88.7 % (ref 37–80)
PLATELET # BLD AUTO: 241 10*3/MM3 (ref 150–450)
PMV BLD AUTO: 10.4 FL (ref 8–12)
POTASSIUM BLD-SCNC: 3.9 MMOL/L (ref 3.5–5.1)
PROT SERPL-MCNC: 6.2 G/DL (ref 6.3–8.6)
RBC # BLD AUTO: 4.98 10*6/MM3 (ref 4.37–5.74)
SODIUM BLD-SCNC: 137 MMOL/L (ref 137–145)
WBC NRBC COR # BLD: 18.43 10*3/MM3 (ref 3.2–9.8)

## 2018-03-29 PROCEDURE — 99253 IP/OBS CNSLTJ NEW/EST LOW 45: CPT | Performed by: INTERNAL MEDICINE

## 2018-03-29 PROCEDURE — 99232 SBSQ HOSP IP/OBS MODERATE 35: CPT | Performed by: STUDENT IN AN ORGANIZED HEALTH CARE EDUCATION/TRAINING PROGRAM

## 2018-03-29 PROCEDURE — 25010000002 PROMETHAZINE PER 50 MG: Performed by: FAMILY MEDICINE

## 2018-03-29 PROCEDURE — 25010000002 THIAMINE PER 100 MG: Performed by: FAMILY MEDICINE

## 2018-03-29 PROCEDURE — 85025 COMPLETE CBC W/AUTO DIFF WBC: CPT | Performed by: FAMILY MEDICINE

## 2018-03-29 PROCEDURE — 25010000002 LEVOFLOXACIN PER 250 MG: Performed by: FAMILY MEDICINE

## 2018-03-29 PROCEDURE — 80053 COMPREHEN METABOLIC PANEL: CPT | Performed by: FAMILY MEDICINE

## 2018-03-29 PROCEDURE — 25010000002 LORAZEPAM PER 2 MG: Performed by: FAMILY MEDICINE

## 2018-03-29 PROCEDURE — 25010000002 HYDRALAZINE PER 20 MG: Performed by: FAMILY MEDICINE

## 2018-03-29 PROCEDURE — 86140 C-REACTIVE PROTEIN: CPT | Performed by: FAMILY MEDICINE

## 2018-03-29 PROCEDURE — 25010000002 MAGNESIUM SULFATE PER 500 MG OF MAGNESIUM: Performed by: FAMILY MEDICINE

## 2018-03-29 PROCEDURE — 25010000002 MORPHINE PER 10 MG: Performed by: FAMILY MEDICINE

## 2018-03-29 PROCEDURE — 25010000002 ONDANSETRON PER 1 MG: Performed by: FAMILY MEDICINE

## 2018-03-29 RX ORDER — HYDRALAZINE HYDROCHLORIDE 20 MG/ML
20 INJECTION INTRAMUSCULAR; INTRAVENOUS EVERY 6 HOURS PRN
Status: DISCONTINUED | OUTPATIENT
Start: 2018-03-29 | End: 2018-04-01 | Stop reason: HOSPADM

## 2018-03-29 RX ORDER — DEXTROSE AND SODIUM CHLORIDE 5; .45 G/100ML; G/100ML
30 INJECTION, SOLUTION INTRAVENOUS CONTINUOUS PRN
Status: CANCELLED | OUTPATIENT
Start: 2018-03-29

## 2018-03-29 RX ORDER — SODIUM CHLORIDE 0.9 % (FLUSH) 0.9 %
1-10 SYRINGE (ML) INJECTION AS NEEDED
Status: CANCELLED | OUTPATIENT
Start: 2018-03-29

## 2018-03-29 RX ORDER — ALUMINA, MAGNESIA, AND SIMETHICONE 2400; 2400; 240 MG/30ML; MG/30ML; MG/30ML
15 SUSPENSION ORAL ONCE
Status: COMPLETED | OUTPATIENT
Start: 2018-03-29 | End: 2018-03-29

## 2018-03-29 RX ORDER — CHLORPROMAZINE HYDROCHLORIDE 25 MG/ML
25 INJECTION INTRAMUSCULAR ONCE
Status: COMPLETED | OUTPATIENT
Start: 2018-03-30 | End: 2018-03-30

## 2018-03-29 RX ORDER — ACETAMINOPHEN 325 MG/1
650 TABLET ORAL EVERY 6 HOURS PRN
Status: DISCONTINUED | OUTPATIENT
Start: 2018-03-29 | End: 2018-04-01 | Stop reason: HOSPADM

## 2018-03-29 RX ADMIN — MORPHINE SULFATE 1 MG: 2 INJECTION, SOLUTION INTRAMUSCULAR; INTRAVENOUS at 02:57

## 2018-03-29 RX ADMIN — MORPHINE SULFATE 1 MG: 2 INJECTION, SOLUTION INTRAMUSCULAR; INTRAVENOUS at 08:02

## 2018-03-29 RX ADMIN — LEVOFLOXACIN 750 MG: 5 INJECTION, SOLUTION INTRAVENOUS at 00:00

## 2018-03-29 RX ADMIN — LISINOPRIL 5 MG: 5 TABLET ORAL at 17:37

## 2018-03-29 RX ADMIN — CLONAZEPAM 1 MG: 0.5 TABLET ORAL at 21:52

## 2018-03-29 RX ADMIN — MORPHINE SULFATE 1 MG: 2 INJECTION, SOLUTION INTRAMUSCULAR; INTRAVENOUS at 20:52

## 2018-03-29 RX ADMIN — METRONIDAZOLE 500 MG: 500 INJECTION, SOLUTION INTRAVENOUS at 22:39

## 2018-03-29 RX ADMIN — Medication 20 MG: at 08:03

## 2018-03-29 RX ADMIN — METRONIDAZOLE 500 MG: 500 INJECTION, SOLUTION INTRAVENOUS at 06:23

## 2018-03-29 RX ADMIN — LORAZEPAM 2 MG: 2 INJECTION, SOLUTION INTRAMUSCULAR; INTRAVENOUS at 20:52

## 2018-03-29 RX ADMIN — Medication 20 MG: at 20:51

## 2018-03-29 RX ADMIN — ACETAMINOPHEN 650 MG: 325 TABLET ORAL at 04:54

## 2018-03-29 RX ADMIN — Medication 10 ML: at 17:30

## 2018-03-29 RX ADMIN — LIDOCAINE HYDROCHLORIDE 15 ML: 20 SOLUTION ORAL; TOPICAL at 10:44

## 2018-03-29 RX ADMIN — CLONAZEPAM 1 MG: 0.5 TABLET ORAL at 08:00

## 2018-03-29 RX ADMIN — ONDANSETRON HYDROCHLORIDE 4 MG: 2 INJECTION, SOLUTION INTRAMUSCULAR; INTRAVENOUS at 20:52

## 2018-03-29 RX ADMIN — ALUMINUM HYDROXIDE, MAGNESIUM HYDROXIDE, AND DIMETHICONE 15 ML: 400; 400; 40 SUSPENSION ORAL at 10:44

## 2018-03-29 RX ADMIN — METRONIDAZOLE 500 MG: 500 INJECTION, SOLUTION INTRAVENOUS at 16:13

## 2018-03-29 RX ADMIN — MAGNESIUM OXIDE TAB 400 MG (241.3 MG ELEMENTAL MG) 400 MG: 400 (241.3 MG) TAB at 08:11

## 2018-03-29 RX ADMIN — MORPHINE SULFATE 1 MG: 2 INJECTION, SOLUTION INTRAMUSCULAR; INTRAVENOUS at 16:10

## 2018-03-29 RX ADMIN — SODIUM CHLORIDE 100 ML/HR: 900 INJECTION, SOLUTION INTRAVENOUS at 05:53

## 2018-03-29 RX ADMIN — PROMETHAZINE HYDROCHLORIDE 25 MG: 25 INJECTION INTRAMUSCULAR; INTRAVENOUS at 17:31

## 2018-03-29 RX ADMIN — LISINOPRIL 5 MG: 5 TABLET ORAL at 06:54

## 2018-03-29 RX ADMIN — MORPHINE SULFATE 1 MG: 2 INJECTION, SOLUTION INTRAMUSCULAR; INTRAVENOUS at 08:04

## 2018-03-29 RX ADMIN — ONDANSETRON HYDROCHLORIDE 4 MG: 2 INJECTION, SOLUTION INTRAMUSCULAR; INTRAVENOUS at 10:19

## 2018-03-29 RX ADMIN — PROMETHAZINE HYDROCHLORIDE 25 MG: 25 INJECTION INTRAMUSCULAR; INTRAVENOUS at 02:57

## 2018-03-29 RX ADMIN — CARVEDILOL 6.25 MG: 6.25 TABLET, FILM COATED ORAL at 21:53

## 2018-03-29 RX ADMIN — Medication 20 MG: at 00:35

## 2018-03-29 RX ADMIN — PROMETHAZINE HYDROCHLORIDE 25 MG: 25 INJECTION INTRAMUSCULAR; INTRAVENOUS at 08:13

## 2018-03-29 RX ADMIN — MAGNESIUM SULFATE HEPTAHYDRATE 100 ML/HR: 500 INJECTION, SOLUTION INTRAMUSCULAR; INTRAVENOUS at 08:06

## 2018-03-29 RX ADMIN — DULOXETINE 60 MG: 60 CAPSULE, DELAYED RELEASE ORAL at 06:23

## 2018-03-29 RX ADMIN — MAGNESIUM OXIDE TAB 400 MG (241.3 MG ELEMENTAL MG) 400 MG: 400 (241.3 MG) TAB at 21:52

## 2018-03-29 RX ADMIN — PANTOPRAZOLE SODIUM 40 MG: 40 TABLET, DELAYED RELEASE ORAL at 08:09

## 2018-03-29 RX ADMIN — Medication 10 ML: at 08:10

## 2018-03-29 RX ADMIN — CARVEDILOL 6.25 MG: 6.25 TABLET, FILM COATED ORAL at 08:11

## 2018-03-30 ENCOUNTER — ANESTHESIA (OUTPATIENT)
Dept: GASTROENTEROLOGY | Facility: HOSPITAL | Age: 43
End: 2018-03-30

## 2018-03-30 ENCOUNTER — ANESTHESIA EVENT (OUTPATIENT)
Dept: GASTROENTEROLOGY | Facility: HOSPITAL | Age: 43
End: 2018-03-30

## 2018-03-30 PROBLEM — R10.13 EPIGASTRIC PAIN: Status: ACTIVE | Noted: 2018-03-27

## 2018-03-30 LAB
ALBUMIN SERPL-MCNC: 2.8 G/DL (ref 3.4–4.8)
ALBUMIN/GLOB SERPL: 1.1 G/DL (ref 1.1–1.8)
ALP SERPL-CCNC: 25 U/L (ref 38–126)
ALT SERPL W P-5'-P-CCNC: 35 U/L (ref 21–72)
ANION GAP SERPL CALCULATED.3IONS-SCNC: 10 MMOL/L (ref 5–15)
AST SERPL-CCNC: 19 U/L (ref 17–59)
BASOPHILS # BLD AUTO: 0.01 10*3/MM3 (ref 0–0.2)
BASOPHILS NFR BLD AUTO: 0.1 % (ref 0–2)
BILIRUB SERPL-MCNC: 0.7 MG/DL (ref 0.2–1.3)
BUN BLD-MCNC: 14 MG/DL (ref 7–21)
BUN/CREAT SERPL: 13.9 (ref 7–25)
CALCIUM SPEC-SCNC: 7.6 MG/DL (ref 8.4–10.2)
CHLORIDE SERPL-SCNC: 104 MMOL/L (ref 95–110)
CO2 SERPL-SCNC: 23 MMOL/L (ref 22–31)
CREAT BLD-MCNC: 1.01 MG/DL (ref 0.7–1.3)
DEPRECATED RDW RBC AUTO: 46.2 FL (ref 35.1–43.9)
EOSINOPHIL # BLD AUTO: 0.04 10*3/MM3 (ref 0–0.7)
EOSINOPHIL NFR BLD AUTO: 0.4 % (ref 0–7)
ERYTHROCYTE [DISTWIDTH] IN BLOOD BY AUTOMATED COUNT: 14.1 % (ref 11.5–14.5)
GFR SERPL CREATININE-BSD FRML MDRD: 82 ML/MIN/1.73 (ref 60–147)
GLOBULIN UR ELPH-MCNC: 2.5 GM/DL (ref 2.3–3.5)
GLUCOSE BLD-MCNC: 80 MG/DL (ref 60–100)
HCT VFR BLD AUTO: 39.4 % (ref 39–49)
HGB BLD-MCNC: 13.7 G/DL (ref 13.7–17.3)
IMM GRANULOCYTES # BLD: 0.04 10*3/MM3 (ref 0–0.02)
IMM GRANULOCYTES NFR BLD: 0.4 % (ref 0–0.5)
LYMPHOCYTES # BLD AUTO: 0.73 10*3/MM3 (ref 0.6–4.2)
LYMPHOCYTES NFR BLD AUTO: 6.9 % (ref 10–50)
MCH RBC QN AUTO: 31.1 PG (ref 26.5–34)
MCHC RBC AUTO-ENTMCNC: 34.8 G/DL (ref 31.5–36.3)
MCV RBC AUTO: 89.3 FL (ref 80–98)
MONOCYTES # BLD AUTO: 0.79 10*3/MM3 (ref 0–0.9)
MONOCYTES NFR BLD AUTO: 7.5 % (ref 0–12)
NEUTROPHILS # BLD AUTO: 8.99 10*3/MM3 (ref 2–8.6)
NEUTROPHILS NFR BLD AUTO: 84.7 % (ref 37–80)
PLATELET # BLD AUTO: 225 10*3/MM3 (ref 150–450)
PMV BLD AUTO: 9.9 FL (ref 8–12)
POTASSIUM BLD-SCNC: 4 MMOL/L (ref 3.5–5.1)
PROT SERPL-MCNC: 5.3 G/DL (ref 6.3–8.6)
RBC # BLD AUTO: 4.41 10*6/MM3 (ref 4.37–5.74)
SODIUM BLD-SCNC: 137 MMOL/L (ref 137–145)
WBC NRBC COR # BLD: 10.6 10*3/MM3 (ref 3.2–9.8)

## 2018-03-30 PROCEDURE — 25010000002 LEVOFLOXACIN PER 250 MG: Performed by: FAMILY MEDICINE

## 2018-03-30 PROCEDURE — 25010000002 CHLORPROMAZINE HCL 50 MG/2ML SOLUTION: Performed by: FAMILY MEDICINE

## 2018-03-30 PROCEDURE — 88305 TISSUE EXAM BY PATHOLOGIST: CPT | Performed by: INTERNAL MEDICINE

## 2018-03-30 PROCEDURE — 85025 COMPLETE CBC W/AUTO DIFF WBC: CPT | Performed by: FAMILY MEDICINE

## 2018-03-30 PROCEDURE — 43239 EGD BIOPSY SINGLE/MULTIPLE: CPT | Performed by: INTERNAL MEDICINE

## 2018-03-30 PROCEDURE — 25010000002 MAGNESIUM SULFATE PER 500 MG OF MAGNESIUM: Performed by: FAMILY MEDICINE

## 2018-03-30 PROCEDURE — 25010000002 THIAMINE PER 100 MG: Performed by: FAMILY MEDICINE

## 2018-03-30 PROCEDURE — 88312 SPECIAL STAINS GROUP 1: CPT | Performed by: PATHOLOGY

## 2018-03-30 PROCEDURE — 88312 SPECIAL STAINS GROUP 1: CPT | Performed by: INTERNAL MEDICINE

## 2018-03-30 PROCEDURE — 99232 SBSQ HOSP IP/OBS MODERATE 35: CPT | Performed by: INTERNAL MEDICINE

## 2018-03-30 PROCEDURE — 0DB38ZX EXCISION OF LOWER ESOPHAGUS, VIA NATURAL OR ARTIFICIAL OPENING ENDOSCOPIC, DIAGNOSTIC: ICD-10-PCS | Performed by: INTERNAL MEDICINE

## 2018-03-30 PROCEDURE — 88305 TISSUE EXAM BY PATHOLOGIST: CPT | Performed by: PATHOLOGY

## 2018-03-30 PROCEDURE — 99232 SBSQ HOSP IP/OBS MODERATE 35: CPT | Performed by: STUDENT IN AN ORGANIZED HEALTH CARE EDUCATION/TRAINING PROGRAM

## 2018-03-30 PROCEDURE — 25010000002 PROPOFOL 10 MG/ML EMULSION: Performed by: NURSE ANESTHETIST, CERTIFIED REGISTERED

## 2018-03-30 PROCEDURE — 25010000002 MORPHINE PER 10 MG: Performed by: FAMILY MEDICINE

## 2018-03-30 PROCEDURE — 0DB68ZX EXCISION OF STOMACH, VIA NATURAL OR ARTIFICIAL OPENING ENDOSCOPIC, DIAGNOSTIC: ICD-10-PCS | Performed by: INTERNAL MEDICINE

## 2018-03-30 PROCEDURE — 80053 COMPREHEN METABOLIC PANEL: CPT | Performed by: FAMILY MEDICINE

## 2018-03-30 PROCEDURE — 25010000002 PROMETHAZINE PER 50 MG: Performed by: FAMILY MEDICINE

## 2018-03-30 PROCEDURE — 25010000002 ONDANSETRON PER 1 MG: Performed by: FAMILY MEDICINE

## 2018-03-30 RX ORDER — LIDOCAINE HYDROCHLORIDE 20 MG/ML
INJECTION, SOLUTION INFILTRATION; PERINEURAL AS NEEDED
Status: DISCONTINUED | OUTPATIENT
Start: 2018-03-30 | End: 2018-03-30 | Stop reason: SURG

## 2018-03-30 RX ORDER — SODIUM CHLORIDE 9 MG/ML
INJECTION, SOLUTION INTRAVENOUS CONTINUOUS PRN
Status: DISCONTINUED | OUTPATIENT
Start: 2018-03-30 | End: 2018-03-30 | Stop reason: SURG

## 2018-03-30 RX ORDER — SUCRALFATE ORAL 1 G/10ML
1 SUSPENSION ORAL EVERY 6 HOURS SCHEDULED
Status: DISCONTINUED | OUTPATIENT
Start: 2018-03-30 | End: 2018-03-30 | Stop reason: HOSPADM

## 2018-03-30 RX ORDER — PROPOFOL 10 MG/ML
VIAL (ML) INTRAVENOUS AS NEEDED
Status: DISCONTINUED | OUTPATIENT
Start: 2018-03-30 | End: 2018-03-30 | Stop reason: SURG

## 2018-03-30 RX ADMIN — MAGNESIUM OXIDE TAB 400 MG (241.3 MG ELEMENTAL MG) 400 MG: 400 (241.3 MG) TAB at 20:35

## 2018-03-30 RX ADMIN — PROMETHAZINE HYDROCHLORIDE 25 MG: 25 INJECTION INTRAMUSCULAR; INTRAVENOUS at 14:58

## 2018-03-30 RX ADMIN — ONDANSETRON HYDROCHLORIDE 4 MG: 2 INJECTION, SOLUTION INTRAMUSCULAR; INTRAVENOUS at 08:56

## 2018-03-30 RX ADMIN — METRONIDAZOLE 500 MG: 500 INJECTION, SOLUTION INTRAVENOUS at 16:37

## 2018-03-30 RX ADMIN — SODIUM CHLORIDE: 9 INJECTION, SOLUTION INTRAVENOUS at 12:59

## 2018-03-30 RX ADMIN — PROPOFOL 100 MG: 10 INJECTION, EMULSION INTRAVENOUS at 13:03

## 2018-03-30 RX ADMIN — PROPOFOL 20 MG: 10 INJECTION, EMULSION INTRAVENOUS at 13:05

## 2018-03-30 RX ADMIN — MORPHINE SULFATE 1 MG: 2 INJECTION, SOLUTION INTRAMUSCULAR; INTRAVENOUS at 05:50

## 2018-03-30 RX ADMIN — LISINOPRIL 5 MG: 5 TABLET ORAL at 16:37

## 2018-03-30 RX ADMIN — LIDOCAINE HYDROCHLORIDE 60 MG: 20 INJECTION, SOLUTION INFILTRATION; PERINEURAL at 13:03

## 2018-03-30 RX ADMIN — PROPOFOL 20 MG: 10 INJECTION, EMULSION INTRAVENOUS at 13:07

## 2018-03-30 RX ADMIN — PROMETHAZINE HYDROCHLORIDE 25 MG: 25 INJECTION INTRAMUSCULAR; INTRAVENOUS at 05:50

## 2018-03-30 RX ADMIN — CARVEDILOL 6.25 MG: 6.25 TABLET, FILM COATED ORAL at 10:18

## 2018-03-30 RX ADMIN — CARVEDILOL 6.25 MG: 6.25 TABLET, FILM COATED ORAL at 20:35

## 2018-03-30 RX ADMIN — DULOXETINE 60 MG: 60 CAPSULE, DELAYED RELEASE ORAL at 06:00

## 2018-03-30 RX ADMIN — MORPHINE SULFATE 1 MG: 2 INJECTION, SOLUTION INTRAMUSCULAR; INTRAVENOUS at 15:13

## 2018-03-30 RX ADMIN — PANTOPRAZOLE SODIUM 40 MG: 40 TABLET, DELAYED RELEASE ORAL at 08:55

## 2018-03-30 RX ADMIN — MAGNESIUM OXIDE TAB 400 MG (241.3 MG ELEMENTAL MG) 400 MG: 400 (241.3 MG) TAB at 08:55

## 2018-03-30 RX ADMIN — MAGNESIUM SULFATE HEPTAHYDRATE 100 ML/HR: 500 INJECTION, SOLUTION INTRAMUSCULAR; INTRAVENOUS at 08:56

## 2018-03-30 RX ADMIN — LISINOPRIL 5 MG: 5 TABLET ORAL at 08:55

## 2018-03-30 RX ADMIN — LEVOFLOXACIN 750 MG: 5 INJECTION, SOLUTION INTRAVENOUS at 00:22

## 2018-03-30 RX ADMIN — MORPHINE SULFATE 1 MG: 2 INJECTION, SOLUTION INTRAMUSCULAR; INTRAVENOUS at 10:33

## 2018-03-30 RX ADMIN — METRONIDAZOLE 500 MG: 500 INJECTION, SOLUTION INTRAVENOUS at 06:00

## 2018-03-30 RX ADMIN — CHLORPROMAZINE HYDROCHLORIDE 25 MG: 25 INJECTION INTRAMUSCULAR at 00:22

## 2018-03-30 RX ADMIN — METRONIDAZOLE 500 MG: 500 INJECTION, SOLUTION INTRAVENOUS at 22:04

## 2018-03-30 NOTE — ANESTHESIA PREPROCEDURE EVALUATION
Anesthesia Evaluation     Patient summary reviewed   NPO Solid Status: > 8 hours  NPO Liquid Status: > 8 hours           Airway   Mallampati: II  TM distance: >3 FB  Dental      Pulmonary - normal exam   (+) sleep apnea,   Cardiovascular - normal exam    Patient on routine beta blocker    (+) hypertension, dysrhythmias (post ablation),       Neuro/Psych  GI/Hepatic/Renal/Endo    (+) obesity,  GERD,      ROS Comment: pancreatitis    Musculoskeletal     Abdominal  - normal exam  (+) obese,    Substance History   (+) alcohol use,      OB/GYN          Other   (+) arthritis                   Anesthesia Plan    ASA 3     MAC     intravenous induction   Anesthetic plan and risks discussed with patient.

## 2018-03-30 NOTE — ANESTHESIA POSTPROCEDURE EVALUATION
Patient: Stevenson Chilel    Procedure Summary     Date:  03/30/18 Room / Location:  [facility] ENDOSCOPY 1 / [facility] ENDOSCOPY    Anesthesia Start:  1259 Anesthesia Stop:  1310    Procedure:  ESOPHAGOGASTRODUODENOSCOPY (N/A Esophagus) Diagnosis:       Epigastric pain      (Epigastric pain [R10.13])    Surgeon:  Edwin Medina MD Provider:  Kuldeep Mao CRNA    Anesthesia Type:  MAC ASA Status:  3          Anesthesia Type: MAC  Last vitals  BP   (!) 186/100 (03/30/18 1249)   Temp   98 °F (36.7 °C) (03/30/18 1251)   Pulse   101 (03/30/18 1249)   Resp   16 (03/30/18 1249)     SpO2   95 % (03/30/18 1249)     Post Anesthesia Care and Evaluation    Patient location during evaluation: PACU  Level of consciousness: responsive to light touch  Pain score: 0  Pain management: adequate  Airway patency: patent  Anesthetic complications: No anesthetic complications  PONV Status: none  Cardiovascular status: acceptable and hypertensive  Respiratory status: acceptable and spontaneous ventilation  Hydration status: acceptable

## 2018-03-31 PROBLEM — A41.9 SEPSIS (HCC): Status: RESOLVED | Noted: 2018-03-28 | Resolved: 2018-03-31

## 2018-03-31 PROBLEM — F12.10 MARIJUANA ABUSE, CONTINUOUS: Status: ACTIVE | Noted: 2018-03-31

## 2018-03-31 PROBLEM — K29.20 CHRONIC ALCOHOLIC GASTRITIS WITHOUT HEMORRHAGE: Status: ACTIVE | Noted: 2018-03-27

## 2018-03-31 LAB
ALBUMIN SERPL-MCNC: 3.1 G/DL (ref 3.4–4.8)
ALBUMIN/GLOB SERPL: 1.1 G/DL (ref 1.1–1.8)
ALP SERPL-CCNC: 32 U/L (ref 38–126)
ALT SERPL W P-5'-P-CCNC: 44 U/L (ref 21–72)
ANION GAP SERPL CALCULATED.3IONS-SCNC: 13 MMOL/L (ref 5–15)
AST SERPL-CCNC: 27 U/L (ref 17–59)
BASOPHILS # BLD AUTO: 0.02 10*3/MM3 (ref 0–0.2)
BASOPHILS NFR BLD AUTO: 0.2 % (ref 0–2)
BILIRUB SERPL-MCNC: 0.7 MG/DL (ref 0.2–1.3)
BUN BLD-MCNC: 11 MG/DL (ref 7–21)
BUN/CREAT SERPL: 10.6 (ref 7–25)
CALCIUM SPEC-SCNC: 7.8 MG/DL (ref 8.4–10.2)
CHLORIDE SERPL-SCNC: 102 MMOL/L (ref 95–110)
CO2 SERPL-SCNC: 25 MMOL/L (ref 22–31)
CREAT BLD-MCNC: 1.04 MG/DL (ref 0.7–1.3)
CRP SERPL-MCNC: 5 MG/DL (ref 0–1)
DEPRECATED RDW RBC AUTO: 46.6 FL (ref 35.1–43.9)
EOSINOPHIL # BLD AUTO: 0.08 10*3/MM3 (ref 0–0.7)
EOSINOPHIL NFR BLD AUTO: 1 % (ref 0–7)
ERYTHROCYTE [DISTWIDTH] IN BLOOD BY AUTOMATED COUNT: 14 % (ref 11.5–14.5)
GFR SERPL CREATININE-BSD FRML MDRD: 79 ML/MIN/1.73 (ref 63–147)
GLOBULIN UR ELPH-MCNC: 2.7 GM/DL (ref 2.3–3.5)
GLUCOSE BLD-MCNC: 94 MG/DL (ref 60–100)
HCT VFR BLD AUTO: 42.9 % (ref 39–49)
HGB BLD-MCNC: 14.5 G/DL (ref 13.7–17.3)
IMM GRANULOCYTES # BLD: 0.04 10*3/MM3 (ref 0–0.02)
IMM GRANULOCYTES NFR BLD: 0.5 % (ref 0–0.5)
LYMPHOCYTES # BLD AUTO: 0.68 10*3/MM3 (ref 0.6–4.2)
LYMPHOCYTES NFR BLD AUTO: 8.1 % (ref 10–50)
MCH RBC QN AUTO: 30.6 PG (ref 26.5–34)
MCHC RBC AUTO-ENTMCNC: 33.8 G/DL (ref 31.5–36.3)
MCV RBC AUTO: 90.5 FL (ref 80–98)
MONOCYTES # BLD AUTO: 0.54 10*3/MM3 (ref 0–0.9)
MONOCYTES NFR BLD AUTO: 6.4 % (ref 0–12)
NEUTROPHILS # BLD AUTO: 7.06 10*3/MM3 (ref 2–8.6)
NEUTROPHILS NFR BLD AUTO: 83.8 % (ref 37–80)
PLATELET # BLD AUTO: 263 10*3/MM3 (ref 150–450)
PMV BLD AUTO: 10.5 FL (ref 8–12)
POTASSIUM BLD-SCNC: 4.2 MMOL/L (ref 3.5–5.1)
PROT SERPL-MCNC: 5.8 G/DL (ref 6.3–8.6)
RBC # BLD AUTO: 4.74 10*6/MM3 (ref 4.37–5.74)
SODIUM BLD-SCNC: 140 MMOL/L (ref 137–145)
WBC NRBC COR # BLD: 8.42 10*3/MM3 (ref 3.2–9.8)

## 2018-03-31 PROCEDURE — 85025 COMPLETE CBC W/AUTO DIFF WBC: CPT | Performed by: FAMILY MEDICINE

## 2018-03-31 PROCEDURE — 63710000001 PROMETHAZINE PER 25 MG: Performed by: FAMILY MEDICINE

## 2018-03-31 PROCEDURE — 25010000002 MORPHINE PER 10 MG: Performed by: FAMILY MEDICINE

## 2018-03-31 PROCEDURE — 86140 C-REACTIVE PROTEIN: CPT | Performed by: FAMILY MEDICINE

## 2018-03-31 PROCEDURE — 25010000002 ONDANSETRON PER 1 MG: Performed by: FAMILY MEDICINE

## 2018-03-31 PROCEDURE — 25010000002 LEVOFLOXACIN PER 250 MG: Performed by: FAMILY MEDICINE

## 2018-03-31 PROCEDURE — 80053 COMPREHEN METABOLIC PANEL: CPT | Performed by: FAMILY MEDICINE

## 2018-03-31 PROCEDURE — 99232 SBSQ HOSP IP/OBS MODERATE 35: CPT | Performed by: FAMILY MEDICINE

## 2018-03-31 RX ORDER — TRAZODONE HYDROCHLORIDE 50 MG/1
25 TABLET ORAL ONCE
Status: COMPLETED | OUTPATIENT
Start: 2018-03-31 | End: 2018-03-31

## 2018-03-31 RX ORDER — DULOXETIN HYDROCHLORIDE 60 MG/1
60 CAPSULE, DELAYED RELEASE ORAL NIGHTLY
Status: DISCONTINUED | OUTPATIENT
Start: 2018-03-31 | End: 2018-04-01 | Stop reason: HOSPADM

## 2018-03-31 RX ORDER — SUCRALFATE ORAL 1 G/10ML
1 SUSPENSION ORAL
Status: DISCONTINUED | OUTPATIENT
Start: 2018-03-31 | End: 2018-04-01 | Stop reason: HOSPADM

## 2018-03-31 RX ORDER — METOPROLOL TARTRATE 5 MG/5ML
5 INJECTION INTRAVENOUS EVERY 6 HOURS PRN
Status: DISCONTINUED | OUTPATIENT
Start: 2018-03-31 | End: 2018-04-01 | Stop reason: HOSPADM

## 2018-03-31 RX ADMIN — METRONIDAZOLE 500 MG: 500 INJECTION, SOLUTION INTRAVENOUS at 14:49

## 2018-03-31 RX ADMIN — DULOXETINE 60 MG: 60 CAPSULE, DELAYED RELEASE ORAL at 22:50

## 2018-03-31 RX ADMIN — PANTOPRAZOLE SODIUM 40 MG: 40 TABLET, DELAYED RELEASE ORAL at 08:46

## 2018-03-31 RX ADMIN — SUCRALFATE 1 G: 1 SUSPENSION ORAL at 20:25

## 2018-03-31 RX ADMIN — SODIUM CHLORIDE 100 ML/HR: 900 INJECTION, SOLUTION INTRAVENOUS at 22:52

## 2018-03-31 RX ADMIN — ONDANSETRON HYDROCHLORIDE 4 MG: 2 INJECTION, SOLUTION INTRAMUSCULAR; INTRAVENOUS at 00:23

## 2018-03-31 RX ADMIN — MORPHINE SULFATE 1 MG: 2 INJECTION, SOLUTION INTRAMUSCULAR; INTRAVENOUS at 22:51

## 2018-03-31 RX ADMIN — Medication 25 MG: at 10:37

## 2018-03-31 RX ADMIN — CLONAZEPAM 1 MG: 0.5 TABLET ORAL at 01:56

## 2018-03-31 RX ADMIN — PROMETHAZINE HYDROCHLORIDE 25 MG: 25 TABLET ORAL at 04:12

## 2018-03-31 RX ADMIN — SUCRALFATE 1 G: 1 SUSPENSION ORAL at 14:49

## 2018-03-31 RX ADMIN — MORPHINE SULFATE 1 MG: 2 INJECTION, SOLUTION INTRAMUSCULAR; INTRAVENOUS at 04:12

## 2018-03-31 RX ADMIN — METRONIDAZOLE 500 MG: 500 INJECTION, SOLUTION INTRAVENOUS at 06:02

## 2018-03-31 RX ADMIN — LISINOPRIL 5 MG: 5 TABLET ORAL at 18:03

## 2018-03-31 RX ADMIN — CLONAZEPAM 1 MG: 0.5 TABLET ORAL at 22:50

## 2018-03-31 RX ADMIN — SUCRALFATE 1 G: 1 SUSPENSION ORAL at 18:03

## 2018-03-31 RX ADMIN — LEVOFLOXACIN 750 MG: 5 INJECTION, SOLUTION INTRAVENOUS at 00:20

## 2018-03-31 RX ADMIN — MAGNESIUM OXIDE TAB 400 MG (241.3 MG ELEMENTAL MG) 400 MG: 400 (241.3 MG) TAB at 20:25

## 2018-03-31 RX ADMIN — MAGNESIUM OXIDE TAB 400 MG (241.3 MG ELEMENTAL MG) 400 MG: 400 (241.3 MG) TAB at 08:47

## 2018-03-31 RX ADMIN — SODIUM CHLORIDE 300 ML/HR: 900 INJECTION, SOLUTION INTRAVENOUS at 00:24

## 2018-03-31 RX ADMIN — DULOXETINE 60 MG: 60 CAPSULE, DELAYED RELEASE ORAL at 06:02

## 2018-03-31 RX ADMIN — CALCIUM CARBONATE (ANTACID) CHEW TAB 500 MG 2 TABLET: 500 CHEW TAB at 22:52

## 2018-03-31 RX ADMIN — MORPHINE SULFATE 1 MG: 2 INJECTION, SOLUTION INTRAMUSCULAR; INTRAVENOUS at 00:23

## 2018-03-31 RX ADMIN — CARVEDILOL 6.25 MG: 6.25 TABLET, FILM COATED ORAL at 08:48

## 2018-03-31 RX ADMIN — SUCRALFATE 1 G: 1 SUSPENSION ORAL at 10:37

## 2018-03-31 RX ADMIN — ONDANSETRON 4 MG: 4 TABLET, ORALLY DISINTEGRATING ORAL at 20:25

## 2018-03-31 RX ADMIN — SODIUM CHLORIDE 300 ML/HR: 900 INJECTION, SOLUTION INTRAVENOUS at 05:13

## 2018-03-31 RX ADMIN — LISINOPRIL 5 MG: 5 TABLET ORAL at 08:46

## 2018-03-31 RX ADMIN — METRONIDAZOLE 500 MG: 500 INJECTION, SOLUTION INTRAVENOUS at 22:51

## 2018-03-31 RX ADMIN — CARVEDILOL 6.25 MG: 6.25 TABLET, FILM COATED ORAL at 20:25

## 2018-04-01 VITALS
OXYGEN SATURATION: 96 % | HEART RATE: 72 BPM | BODY MASS INDEX: 33.21 KG/M2 | DIASTOLIC BLOOD PRESSURE: 87 MMHG | RESPIRATION RATE: 16 BRPM | TEMPERATURE: 97.1 F | WEIGHT: 245.19 LBS | HEIGHT: 72 IN | SYSTOLIC BLOOD PRESSURE: 126 MMHG

## 2018-04-01 LAB
ALBUMIN SERPL-MCNC: 2.8 G/DL (ref 3.4–4.8)
ALBUMIN/GLOB SERPL: 1.1 G/DL (ref 1.1–1.8)
ALP SERPL-CCNC: 23 U/L (ref 38–126)
ALT SERPL W P-5'-P-CCNC: 40 U/L (ref 21–72)
ANION GAP SERPL CALCULATED.3IONS-SCNC: 9 MMOL/L (ref 5–15)
AST SERPL-CCNC: 26 U/L (ref 17–59)
BACTERIA SPEC AEROBE CULT: NORMAL
BACTERIA SPEC AEROBE CULT: NORMAL
BASOPHILS # BLD AUTO: 0.02 10*3/MM3 (ref 0–0.2)
BASOPHILS NFR BLD AUTO: 0.3 % (ref 0–2)
BILIRUB SERPL-MCNC: 0.7 MG/DL (ref 0.2–1.3)
BUN BLD-MCNC: 8 MG/DL (ref 7–21)
BUN/CREAT SERPL: 8.2 (ref 7–25)
CALCIUM SPEC-SCNC: 8.3 MG/DL (ref 8.4–10.2)
CHLORIDE SERPL-SCNC: 102 MMOL/L (ref 95–110)
CO2 SERPL-SCNC: 29 MMOL/L (ref 22–31)
CREAT BLD-MCNC: 0.97 MG/DL (ref 0.7–1.3)
DEPRECATED RDW RBC AUTO: 46 FL (ref 35.1–43.9)
EOSINOPHIL # BLD AUTO: 0.1 10*3/MM3 (ref 0–0.7)
EOSINOPHIL NFR BLD AUTO: 1.6 % (ref 0–7)
ERYTHROCYTE [DISTWIDTH] IN BLOOD BY AUTOMATED COUNT: 14 % (ref 11.5–14.5)
GFR SERPL CREATININE-BSD FRML MDRD: 86 ML/MIN/1.73 (ref 60–147)
GLOBULIN UR ELPH-MCNC: 2.5 GM/DL (ref 2.3–3.5)
GLUCOSE BLD-MCNC: 82 MG/DL (ref 60–100)
HCT VFR BLD AUTO: 42.1 % (ref 39–49)
HGB BLD-MCNC: 14.2 G/DL (ref 13.7–17.3)
IMM GRANULOCYTES # BLD: 0.02 10*3/MM3 (ref 0–0.02)
IMM GRANULOCYTES NFR BLD: 0.3 % (ref 0–0.5)
LYMPHOCYTES # BLD AUTO: 1.06 10*3/MM3 (ref 0.6–4.2)
LYMPHOCYTES NFR BLD AUTO: 17.1 % (ref 10–50)
MCH RBC QN AUTO: 30.4 PG (ref 26.5–34)
MCHC RBC AUTO-ENTMCNC: 33.7 G/DL (ref 31.5–36.3)
MCV RBC AUTO: 90.1 FL (ref 80–98)
MONOCYTES # BLD AUTO: 0.69 10*3/MM3 (ref 0–0.9)
MONOCYTES NFR BLD AUTO: 11.1 % (ref 0–12)
NEUTROPHILS # BLD AUTO: 4.3 10*3/MM3 (ref 2–8.6)
NEUTROPHILS NFR BLD AUTO: 69.6 % (ref 37–80)
PLATELET # BLD AUTO: 220 10*3/MM3 (ref 150–450)
PMV BLD AUTO: 9.9 FL (ref 8–12)
POTASSIUM BLD-SCNC: 4.2 MMOL/L (ref 3.5–5.1)
PROT SERPL-MCNC: 5.3 G/DL (ref 6.3–8.6)
RBC # BLD AUTO: 4.67 10*6/MM3 (ref 4.37–5.74)
SODIUM BLD-SCNC: 140 MMOL/L (ref 137–145)
WBC NRBC COR # BLD: 6.19 10*3/MM3 (ref 3.2–9.8)

## 2018-04-01 PROCEDURE — 80053 COMPREHEN METABOLIC PANEL: CPT | Performed by: FAMILY MEDICINE

## 2018-04-01 PROCEDURE — 25010000002 MORPHINE PER 10 MG: Performed by: FAMILY MEDICINE

## 2018-04-01 PROCEDURE — 25010000002 LEVOFLOXACIN PER 250 MG: Performed by: FAMILY MEDICINE

## 2018-04-01 PROCEDURE — 85025 COMPLETE CBC W/AUTO DIFF WBC: CPT | Performed by: FAMILY MEDICINE

## 2018-04-01 PROCEDURE — 99232 SBSQ HOSP IP/OBS MODERATE 35: CPT | Performed by: FAMILY MEDICINE

## 2018-04-01 RX ORDER — ESOMEPRAZOLE MAGNESIUM 20 MG/1
40 TABLET, DELAYED RELEASE ORAL
Qty: 60 TABLET | Refills: 0 | Status: SHIPPED | OUTPATIENT
Start: 2018-04-01 | End: 2018-11-29

## 2018-04-01 RX ORDER — METRONIDAZOLE 500 MG/1
500 TABLET ORAL 3 TIMES DAILY
Qty: 27 TABLET | Refills: 0 | Status: SHIPPED | OUTPATIENT
Start: 2018-04-01 | End: 2018-04-10

## 2018-04-01 RX ORDER — LEVOFLOXACIN 750 MG/1
750 TABLET ORAL DAILY
Qty: 9 TABLET | Refills: 0 | Status: SHIPPED | OUTPATIENT
Start: 2018-04-01 | End: 2018-04-10

## 2018-04-01 RX ORDER — ONDANSETRON 4 MG/1
4 TABLET, ORALLY DISINTEGRATING ORAL EVERY 6 HOURS PRN
Qty: 30 TABLET | Refills: 0 | Status: SHIPPED | OUTPATIENT
Start: 2018-04-01 | End: 2018-08-20

## 2018-04-01 RX ADMIN — MAGNESIUM OXIDE TAB 400 MG (241.3 MG ELEMENTAL MG) 400 MG: 400 (241.3 MG) TAB at 09:25

## 2018-04-01 RX ADMIN — CLONAZEPAM 1 MG: 0.5 TABLET ORAL at 09:25

## 2018-04-01 RX ADMIN — MORPHINE SULFATE 1 MG: 2 INJECTION, SOLUTION INTRAMUSCULAR; INTRAVENOUS at 06:20

## 2018-04-01 RX ADMIN — LISINOPRIL 5 MG: 5 TABLET ORAL at 09:25

## 2018-04-01 RX ADMIN — PANTOPRAZOLE SODIUM 40 MG: 40 TABLET, DELAYED RELEASE ORAL at 09:24

## 2018-04-01 RX ADMIN — METRONIDAZOLE 500 MG: 500 INJECTION, SOLUTION INTRAVENOUS at 06:13

## 2018-04-01 RX ADMIN — LEVOFLOXACIN 750 MG: 5 INJECTION, SOLUTION INTRAVENOUS at 00:00

## 2018-04-01 RX ADMIN — CARVEDILOL 6.25 MG: 6.25 TABLET, FILM COATED ORAL at 09:25

## 2018-04-01 RX ADMIN — SUCRALFATE 1 G: 1 SUSPENSION ORAL at 09:25

## 2018-04-01 NOTE — PROGRESS NOTES
FAMILY MEDICINE DAILY PROGRESS NOTE  NAME: Stevenson Chilel  : 1975  MRN: [id number]     LOS: 5 days      PROVIDER OF SERVICE: Edna Miranda MD    Chief Complaint: Acute diverticulitis    Subjective:     Interval History:  History taken from: patient chart RN    Stevenson Chilel was admitted for management of abdominal pain secondary to alcoholic gastritis, acute diverticulitis, and acute pyelonephritis. Patient was started on PPI therapy for his gastritis and dual antibiotic therapy for the presenting infectious processes.     Patient GI symptoms have improved and patient was able to tolerate a clear, liquid diet yesterday. He is willing to try a soft, bland diet today. If tolerating PO, patient is agreeable to discharge home.     Review of Systems:   Review of Systems   Constitutional: Negative for activity change, appetite change, fatigue and fever.   HENT: Negative for congestion, rhinorrhea and sore throat.    Eyes: Negative for pain, discharge and visual disturbance.   Respiratory: Negative for cough and shortness of breath.    Cardiovascular: Negative for chest pain and palpitations.   Gastrointestinal: Positive for abdominal pain (LLQ). Negative for nausea and vomiting.   Endocrine: Negative for cold intolerance and heat intolerance.   Genitourinary: Negative for difficulty urinating and dysuria.   Musculoskeletal: Negative for arthralgias and gait problem.   Skin: Negative for color change and rash.   Neurological: Negative for dizziness, weakness and headaches.   Hematological: Negative for adenopathy. Does not bruise/bleed easily.   Psychiatric/Behavioral: Negative for agitation, confusion and sleep disturbance.       Objective:     Vital Signs  Temp:  [97.6 °F (36.4 °C)-98.5 °F (36.9 °C)] 98.5 °F (36.9 °C)  Heart Rate:  [71-95] 71  Resp:  [16] 16  BP: (132-198)/() 132/82      Intake/Output Summary (Last 24 hours) at 18 0730  Last data filed at 18 4526   Gross per 24  hour   Intake              350 ml   Output                0 ml   Net              350 ml       Physical Exam  Physical Exam   Constitutional: He is oriented to person, place, and time. He appears well-developed and well-nourished. No distress.   HENT:   Head: Normocephalic and atraumatic.   Mouth/Throat: Oropharynx is clear and moist.   Eyes: Conjunctivae and EOM are normal. Pupils are equal, round, and reactive to light. No scleral icterus.   Neck: Normal range of motion. Neck supple. No tracheal deviation present. No thyromegaly present.   Cardiovascular: Normal rate, regular rhythm, normal heart sounds and intact distal pulses.    Pulmonary/Chest: Effort normal and breath sounds normal. He has no wheezes. He has no rales.   Abdominal: Soft. Bowel sounds are normal. There is tenderness (LLQ).   Musculoskeletal: Normal range of motion. He exhibits no edema or tenderness.   Lymphadenopathy:     He has no cervical adenopathy.   Neurological: He is alert and oriented to person, place, and time. No cranial nerve deficit.   Skin: Skin is warm and dry. No rash noted. He is not diaphoretic.   Psychiatric: He has a normal mood and affect. His behavior is normal. Judgment and thought content normal.   Nursing note and vitals reviewed.      Medication Review    Current Facility-Administered Medications:   •  acetaminophen (TYLENOL) tablet 650 mg, 650 mg, Oral, Q6H PRN, Chloé Velazquez MD, 650 mg at 03/29/18 0454  •  calcium carbonate (TUMS) chewable tablet 500 mg (200 mg elemental), 2 tablet, Oral, BID PRN, Chloé Velazquez MD, 2 tablet at 03/31/18 2252  •  carvedilol (COREG) tablet 6.25 mg, 6.25 mg, Oral, Q12H, Chloé Velazquez MD, 6.25 mg at 03/31/18 2025  •  clonazePAM (KlonoPIN) tablet 1 mg, 1 mg, Oral, BID PRN, Chloé Velazquez MD, 1 mg at 03/31/18 2250  •  DULoxetine (CYMBALTA) DR capsule 60 mg, 60 mg, Oral, Nightly, Edna Miranda MD, 60 mg at 03/31/18 2250  •  hydrALAZINE  (APRESOLINE) injection 20 mg, 20 mg, Intravenous, Q6H PRN, Chloé Velazquez MD, 20 mg at 03/29/18 2051  •  labetalol (NORMODYNE,TRANDATE) injection 10 mg, 10 mg, Intravenous, Q6H PRN, Chloé Velazquez MD, 10 mg at 03/28/18 2253  •  levoFLOXacin (LEVAQUIN) 750 mg/150 mL D5W (premix) (LEVAQUIN) 750 mg, 750 mg, Intravenous, Q24H, Chloé Velazquez MD, 750 mg at 04/01/18 0000  •  lisinopril (PRINIVIL,ZESTRIL) tablet 5 mg, 5 mg, Oral, BID AC, Chloé Velazquez MD, 5 mg at 03/31/18 1803  •  LORazepam (ATIVAN) tablet 1 mg, 1 mg, Oral, Q2H PRN **OR** LORazepam (ATIVAN) injection 1 mg, 1 mg, Intravenous, Q2H PRN, 1 mg at 03/28/18 1208 **OR** LORazepam (ATIVAN) tablet 2 mg, 2 mg, Oral, Q1H PRN **OR** LORazepam (ATIVAN) injection 2 mg, 2 mg, Intravenous, Q1H PRN, 2 mg at 03/29/18 2052 **OR** LORazepam (ATIVAN) injection 2 mg, 2 mg, Intravenous, Q15 Min PRN **OR** LORazepam (ATIVAN) injection 2 mg, 2 mg, Intramuscular, Q15 Min PRN, Chloé Velazquez MD  •  magnesium oxide (MAGOX) tablet 400 mg, 400 mg, Oral, BID, Chloé Velazquez MD, 400 mg at 03/31/18 2025  •  metoprolol tartrate (LOPRESSOR) injection 5 mg, 5 mg, Intravenous, Q6H PRN, Edna Miranda MD  •  metroNIDAZOLE (FLAGYL) IVPB 500 mg, 500 mg, Intravenous, Q8H, Chloé Velazquez MD, 500 mg at 04/01/18 0613  •  morphine injection 1 mg, 1 mg, Intravenous, Q4H PRN, 1 mg at 04/01/18 0620 **AND** naloxone (NARCAN) injection 0.4 mg, 0.4 mg, Intravenous, Q5 Min PRN, Chloé Velazquez MD  •  morphine injection 1 mg, 1 mg, Intravenous, Q4H PRN, 1 mg at 03/31/18 0023 **AND** naloxone (NARCAN) injection 0.4 mg, 0.4 mg, Intravenous, Q5 Min PRN, Chloé Velazquez MD  •  ondansetron (ZOFRAN) injection 4 mg, 4 mg, Intravenous, Q6H PRN, Chloé Velazquez MD, 4 mg at 03/31/18 0023  •  ondansetron ODT (ZOFRAN-ODT) disintegrating tablet 4 mg, 4 mg, Oral, Q6H PRN, Chloé Velazquez MD, 4 mg at 03/31/18 2025  •   pantoprazole (PROTONIX) EC tablet 40 mg, 40 mg, Oral, Daily, Chloé Velazquez MD, 40 mg at 03/31/18 0846  •  prochlorperazine (COMPAZINE) injection 5 mg, 5 mg, Intravenous, Q6H PRN, Edna Miranda MD  •  sodium chloride 0.9 % flush 1-10 mL, 1-10 mL, Intravenous, PRN, Chloé Velazquez MD, 10 mL at 03/28/18 1830  •  Insert peripheral IV, , , Once **AND** sodium chloride 0.9 % flush 10 mL, 10 mL, Intravenous, PRN, Rios Mart MD, 10 mL at 03/29/18 1730  •  sodium chloride 0.9 % infusion, 100 mL/hr, Intravenous, Continuous, Edna Miranda MD, Last Rate: 100 mL/hr at 03/31/18 2252, 100 mL/hr at 03/31/18 2252  •  sucralfate (CARAFATE) 1 GM/10ML suspension 1 g, 1 g, Oral, 4x Daily With Meals & Nightly, Edna Miranda MD, 1 g at 03/31/18 2025     Diagnostic Data    Lab Results (last 24 hours)     Procedure Component Value Units Date/Time    Comprehensive Metabolic Panel [[id number]]  (Abnormal) Collected:  04/01/18 0541    Specimen:  Blood Updated:  04/01/18 0616     Glucose 82 mg/dL      BUN 8 mg/dL      Creatinine 0.97 mg/dL      Sodium 140 mmol/L      Potassium 4.2 mmol/L      Chloride 102 mmol/L      CO2 29.0 mmol/L      Calcium 8.3 (L) mg/dL      Total Protein 5.3 (L) g/dL      Albumin 2.80 (L) g/dL      ALT (SGPT) 40 U/L      AST (SGOT) 26 U/L      Alkaline Phosphatase 23 (L) U/L      Total Bilirubin 0.7 mg/dL      eGFR Non African Amer 86 mL/min/1.73      Globulin 2.5 gm/dL      A/G Ratio 1.1 g/dL      BUN/Creatinine Ratio 8.2     Anion Gap 9.0 mmol/L     CBC & Differential [[id number]] Collected:  04/01/18 0541    Specimen:  Blood Updated:  04/01/18 0609    Narrative:       The following orders were created for panel order CBC & Differential.  Procedure                               Abnormality         Status                     ---------                               -----------         ------                     CBC Auto Differential[[id number]]        Abnormal             Final result                 Please view results for these tests on the individual orders.    CBC Auto Differential [[id number]]  (Abnormal) Collected:  04/01/18 0541    Specimen:  Blood Updated:  04/01/18 0609     WBC 6.19 10*3/mm3      RBC 4.67 10*6/mm3      Hemoglobin 14.2 g/dL      Hematocrit 42.1 %      MCV 90.1 fL      MCH 30.4 pg      MCHC 33.7 g/dL      RDW 14.0 %      RDW-SD 46.0 (H) fl      MPV 9.9 fL      Platelets 220 10*3/mm3      Neutrophil % 69.6 %      Lymphocyte % 17.1 %      Monocyte % 11.1 %      Eosinophil % 1.6 %      Basophil % 0.3 %      Immature Grans % 0.3 %      Neutrophils, Absolute 4.30 10*3/mm3      Lymphocytes, Absolute 1.06 10*3/mm3      Monocytes, Absolute 0.69 10*3/mm3      Eosinophils, Absolute 0.10 10*3/mm3      Basophils, Absolute 0.02 10*3/mm3      Immature Grans, Absolute 0.02 10*3/mm3     Blood Culture - Blood, [[id number]]  (Normal) Collected:  03/27/18 2201    Specimen:  Blood from Arm, Left Updated:  03/31/18 2216     Blood Culture No growth at 4 days    Blood Culture - Blood, [[id number]]  (Normal) Collected:  03/27/18 2038    Specimen:  Blood from Wrist, Left Updated:  03/31/18 2046     Blood Culture No growth at 4 days        I reviewed the patient's new clinical results.    Assessment/Plan:     Active Hospital Problems (** Indicates Principal Problem)    Diagnosis Date Noted   • **Acute diverticulitis [K57.92] 03/27/2018     Priority: High     -Descending colon, as seen on CT  -Levaquin 750 mg IV (day 5 of 14)  -Metronidazole 500 mg IV (day 5 of 14)  -Tolerating PO; advancing diet  -Trending CRP every other day and WBC daily      • Chronic alcoholic gastritis without hemorrhage [K29.20] 03/27/2018     Priority: High     -Status post EGD with Dr. Medina  -Found to have severe esophagitis and gastritis  -Discussed a GERD diet with patient; will provide additional written material  -Continue PPI therapy and Zofran PRN for nausea and vomiting     • ETOH abuse [F10.10]  02/15/2017     Priority: High     -Patient states he use to drink 1/5 of 100 proof bourbon every 2 days but has cut back since September and has not drank in 1 week.   -[facility] protocol in place; last score 1  -Encouraging sobriety      • Marijuana abuse, continuous [F12.10] 03/31/2018     Priority: Medium     -Seen on UDS  -Likely contributing to nausea and vomiting     • Essential hypertension [I10] 03/28/2018     Priority: Medium     -Continue home lisinopril 5mg daily  -Monitoring BP per hospital protocol     • Borderline personality disorder [F60.3] 03/28/2018     Priority: Medium     -Continue home Cymbalta     • Acute pyelonephritis [N10] 03/28/2018     Priority: Medium     -Perinephric stranding on CT  -Urinalysis indicated no infectious process  -Empiric antibiotic therapy with Levaquin; also used to treat diverticulitis      • Anxiety [F41.9] 03/28/2018     Priority: Low     -Monitoring patient per [facility] protocol  -Continuing home Klonopin therapy     • S/P ablation of atrial flutter [Z98.890, Z86.79] 03/28/2018     -Continue aspirin 81 mg home medication         Resolved Hospital Problems    Diagnosis Date Noted Date Resolved   • Sepsis [A41.9] 03/28/2018 03/31/2018     Patient screens sepsis positive based on heart rate greater than 90 (108), WBC greater than 12 (19.86) and 2 possible sources of infection of the abdomen and kidneys.  Clinically patient appears stable.   -f/u urine culture, GI panel, and blood cultures         We will continue to monitor the patient's course and adjust our care accordingly.     DVT prophylaxis: SCD/KASHIF  Code status is Full Code    Plan for disposition:Where: home and When:  today    Signature  Edna Miranda MD  [facility] Medicine Resident, PGYII        This document has been electronically signed by Edna Miranda MD on April 1, 2018 7:48 AM

## 2018-04-01 NOTE — PROGRESS NOTES
FAMILY MEDICINE DAILY PROGRESS NOTE    NAME: Stevenson Chilel  : 1975  MRN: [id number]      LOS: 5 days     18  7:33 AM      PROVIDER OF SERVICE: Mitchell Rob DO    Chief Complaint: Acute diverticulitis    Subjective:     Interval History:  History taken from: patient. EGD yesterday showed gastritis  Patient Complaints: vomiting, left sided abdominal pain.  Patient Denies:  Chest pains, palpitations, shortness of breath    Review of Systems:   Review of Systems   Constitutional: Negative for activity change and fatigue.   HENT: Negative for congestion and sore throat.    Eyes: Negative for pain and visual disturbance.   Respiratory: Negative for chest tightness, shortness of breath and wheezing.    Cardiovascular: Negative for chest pain and palpitations.   Gastrointestinal: Negative for nausea and vomiting.   Genitourinary: Negative for difficulty urinating and hematuria.   Musculoskeletal: Negative for arthralgias and myalgias.   Skin: Negative for color change and pallor.   Neurological: Negative for seizures and syncope.   Psychiatric/Behavioral: Negative for agitation and confusion.       Objective:     Vital Signs  Temp:  [96.2 °F (35.7 °C)-98.5 °F (36.9 °C)] 96.2 °F (35.7 °C)  Heart Rate:  [71-85] 73  Resp:  [16-18] 18  BP: (132-158)/(79-92) 132/79  Body mass index is 33.25 kg/m².    Physical Exam  Physical Exam   Constitutional: He is oriented to person, place, and time. Vital signs are normal. He appears well-developed and well-nourished. He is cooperative. He does not appear ill. No distress.   HENT:   Head: Normocephalic and atraumatic.   Right Ear: Tympanic membrane and external ear normal.   Left Ear: Tympanic membrane and external ear normal.   Nose: Nose normal. Right sinus exhibits no maxillary sinus tenderness and no frontal sinus tenderness. Left sinus exhibits no maxillary sinus tenderness and no frontal sinus tenderness.   Mouth/Throat: Oropharynx is clear and moist and  mucous membranes are normal. No oral lesions. Normal dentition. No oropharyngeal exudate.   Eyes: Conjunctivae and EOM are normal. Pupils are equal, round, and reactive to light.   Neck: Trachea normal and normal range of motion. Neck supple. No spinous process tenderness present. No tracheal deviation, no edema and no erythema present. No thyroid mass and no thyromegaly present.   Cardiovascular: Normal rate, regular rhythm, S1 normal, S2 normal, normal heart sounds, intact distal pulses and normal pulses.  PMI is not displaced.  Exam reveals no gallop and no friction rub.    No murmur heard.  Pulses:       Dorsalis pedis pulses are 2+ on the right side.        Posterior tibial pulses are 2+ on the right side.   Pulmonary/Chest: Effort normal and breath sounds normal. No accessory muscle usage. No tachypnea. No respiratory distress. He has no wheezes. He has no rhonchi. He has no rales.   Abdominal: Soft. Normal appearance and bowel sounds are normal. He exhibits no distension. There is no hepatosplenomegaly. There is no tenderness. There is no guarding and no CVA tenderness.   Musculoskeletal: Normal range of motion. He exhibits no edema, tenderness or deformity.   Lymphadenopathy:        Head (right side): No submental, no submandibular, no preauricular and no posterior auricular adenopathy present.        Head (left side): No submental, no submandibular, no preauricular and no posterior auricular adenopathy present.     He has no cervical adenopathy.   Neurological: He is alert and oriented to person, place, and time. He has normal reflexes. He is not disoriented. No cranial nerve deficit or sensory deficit. Coordination and gait normal. GCS eye subscore is 4. GCS verbal subscore is 5. GCS motor subscore is 6.   Skin: Skin is warm, dry and intact. No abrasion, no lesion and no rash noted. He is not diaphoretic. No cyanosis or erythema. No pallor. Nails show no clubbing.   Psychiatric: He has a normal mood and  affect. His speech is normal and behavior is normal. His mood appears not anxious. His affect is not inappropriate. He is not agitated and not hyperactive. He expresses no homicidal and no suicidal ideation.   Vitals reviewed.      Medication Review    Current Facility-Administered Medications:   •  acetaminophen (TYLENOL) tablet 650 mg, 650 mg, Oral, Q6H PRN, Chloé Velazquez MD, 650 mg at 03/29/18 0454  •  calcium carbonate (TUMS) chewable tablet 500 mg (200 mg elemental), 2 tablet, Oral, BID PRN, Chloé Velazquez MD, 2 tablet at 03/31/18 2252  •  carvedilol (COREG) tablet 6.25 mg, 6.25 mg, Oral, Q12H, Chloé Velazquez MD, 6.25 mg at 04/01/18 0925  •  clonazePAM (KlonoPIN) tablet 1 mg, 1 mg, Oral, BID PRN, Chloé Velazquez MD, 1 mg at 04/01/18 0925  •  DULoxetine (CYMBALTA) DR capsule 60 mg, 60 mg, Oral, Nightly, Edna Miranad MD, 60 mg at 03/31/18 2250  •  hydrALAZINE (APRESOLINE) injection 20 mg, 20 mg, Intravenous, Q6H PRN, Chloé Velazquez MD, 20 mg at 03/29/18 2051  •  labetalol (NORMODYNE,TRANDATE) injection 10 mg, 10 mg, Intravenous, Q6H PRN, Chloé Velazquez MD, 10 mg at 03/28/18 2253  •  levoFLOXacin (LEVAQUIN) 750 mg/150 mL D5W (premix) (LEVAQUIN) 750 mg, 750 mg, Intravenous, Q24H, Chloé Velazquez MD, 750 mg at 04/01/18 0000  •  lisinopril (PRINIVIL,ZESTRIL) tablet 5 mg, 5 mg, Oral, BID AC, Chloé Velazquez MD, 5 mg at 04/01/18 0925  •  LORazepam (ATIVAN) tablet 1 mg, 1 mg, Oral, Q2H PRN **OR** LORazepam (ATIVAN) injection 1 mg, 1 mg, Intravenous, Q2H PRN, 1 mg at 03/28/18 1208 **OR** LORazepam (ATIVAN) tablet 2 mg, 2 mg, Oral, Q1H PRN **OR** LORazepam (ATIVAN) injection 2 mg, 2 mg, Intravenous, Q1H PRN, 2 mg at 03/29/18 2052 **OR** LORazepam (ATIVAN) injection 2 mg, 2 mg, Intravenous, Q15 Min PRN **OR** LORazepam (ATIVAN) injection 2 mg, 2 mg, Intramuscular, Q15 Min PRN, Chloé Velazquez MD  •  magnesium oxide (MAGOX) tablet 400 mg,  400 mg, Oral, BID, Chloé Velazquez MD, 400 mg at 04/01/18 0925  •  metoprolol tartrate (LOPRESSOR) injection 5 mg, 5 mg, Intravenous, Q6H PRN, Edna Miranda MD  •  metroNIDAZOLE (FLAGYL) IVPB 500 mg, 500 mg, Intravenous, Q8H, Chloé Velazquez MD, 500 mg at 04/01/18 0613  •  morphine injection 1 mg, 1 mg, Intravenous, Q4H PRN, 1 mg at 04/01/18 0620 **AND** naloxone (NARCAN) injection 0.4 mg, 0.4 mg, Intravenous, Q5 Min PRN, Chloé Velazquez MD  •  morphine injection 1 mg, 1 mg, Intravenous, Q4H PRN, 1 mg at 03/31/18 0023 **AND** naloxone (NARCAN) injection 0.4 mg, 0.4 mg, Intravenous, Q5 Min PRN, Chloé Velazquez MD  •  ondansetron (ZOFRAN) injection 4 mg, 4 mg, Intravenous, Q6H PRN, Chloé Velazquez MD, 4 mg at 03/31/18 0023  •  ondansetron ODT (ZOFRAN-ODT) disintegrating tablet 4 mg, 4 mg, Oral, Q6H PRN, Chloé Velazquez MD, 4 mg at 03/31/18 2025  •  pantoprazole (PROTONIX) EC tablet 40 mg, 40 mg, Oral, Daily, Chloé Velazquez MD, 40 mg at 04/01/18 0924  •  prochlorperazine (COMPAZINE) injection 5 mg, 5 mg, Intravenous, Q6H PRN, Edna Miranda MD  •  sodium chloride 0.9 % flush 1-10 mL, 1-10 mL, Intravenous, PRN, Chloé Velazquez MD, 10 mL at 03/28/18 1830  •  Insert peripheral IV, , , Once **AND** sodium chloride 0.9 % flush 10 mL, 10 mL, Intravenous, PRN, Rios Mart MD, 10 mL at 03/29/18 1730  •  sodium chloride 0.9 % infusion, 100 mL/hr, Intravenous, Continuous, Edna Miranda MD, Last Rate: 100 mL/hr at 03/31/18 2252, 100 mL/hr at 03/31/18 2252  •  sucralfate (CARAFATE) 1 GM/10ML suspension 1 g, 1 g, Oral, 4x Daily With Meals & Nightly, Edna Miranda MD, 1 g at 04/01/18 0905     Diagnostic Data    Lab Results (last 24 hours)     Procedure Component Value Units Date/Time    Comprehensive Metabolic Panel [[id number]]  (Abnormal) Collected:  04/01/18 0541    Specimen:  Blood Updated:  04/01/18 0616     Glucose 82 mg/dL       BUN 8 mg/dL      Creatinine 0.97 mg/dL      Sodium 140 mmol/L      Potassium 4.2 mmol/L      Chloride 102 mmol/L      CO2 29.0 mmol/L      Calcium 8.3 (L) mg/dL      Total Protein 5.3 (L) g/dL      Albumin 2.80 (L) g/dL      ALT (SGPT) 40 U/L      AST (SGOT) 26 U/L      Alkaline Phosphatase 23 (L) U/L      Total Bilirubin 0.7 mg/dL      eGFR Non African Amer 86 mL/min/1.73      Globulin 2.5 gm/dL      A/G Ratio 1.1 g/dL      BUN/Creatinine Ratio 8.2     Anion Gap 9.0 mmol/L     CBC & Differential [[id number]] Collected:  04/01/18 0541    Specimen:  Blood Updated:  04/01/18 0609    Narrative:       The following orders were created for panel order CBC & Differential.  Procedure                               Abnormality         Status                     ---------                               -----------         ------                     CBC Auto Differential[[id number]]        Abnormal            Final result                 Please view results for these tests on the individual orders.    CBC Auto Differential [[id number]]  (Abnormal) Collected:  04/01/18 0541    Specimen:  Blood Updated:  04/01/18 0609     WBC 6.19 10*3/mm3      RBC 4.67 10*6/mm3      Hemoglobin 14.2 g/dL      Hematocrit 42.1 %      MCV 90.1 fL      MCH 30.4 pg      MCHC 33.7 g/dL      RDW 14.0 %      RDW-SD 46.0 (H) fl      MPV 9.9 fL      Platelets 220 10*3/mm3      Neutrophil % 69.6 %      Lymphocyte % 17.1 %      Monocyte % 11.1 %      Eosinophil % 1.6 %      Basophil % 0.3 %      Immature Grans % 0.3 %      Neutrophils, Absolute 4.30 10*3/mm3      Lymphocytes, Absolute 1.06 10*3/mm3      Monocytes, Absolute 0.69 10*3/mm3      Eosinophils, Absolute 0.10 10*3/mm3      Basophils, Absolute 0.02 10*3/mm3      Immature Grans, Absolute 0.02 10*3/mm3     Blood Culture - Blood, [[id number]]  (Normal) Collected:  03/27/18 2201    Specimen:  Blood from Arm, Left Updated:  03/31/18 2216     Blood Culture No growth at 4 days    Blood Culture - Blood,  [[id number]]  (Normal) Collected:  03/27/18 2038    Specimen:  Blood from Wrist, Left Updated:  03/31/18 2046     Blood Culture No growth at 4 days            I reviewed the patient's new clinical results.    Assessment/Plan:     Active Hospital Problems (** Indicates Principal Problem)    Diagnosis Date Noted   • **Acute diverticulitis [K57.92] 03/27/2018     -Descending colon, as seen on CT  -Levaquin 750 mg IV (day 5 of 14)  -Metronidazole 500 mg IV (day 5 of 14)  -Tolerating PO; advancing diet  -Trending CRP every other day and WBC daily      • Marijuana abuse, continuous [F12.10] 03/31/2018     -Seen on UDS  -Likely contributing to nausea and vomiting     • Essential hypertension [I10] 03/28/2018     -Continue home lisinopril 5mg daily  -Monitoring BP per hospital protocol     • S/P ablation of atrial flutter [Z98.890, Z86.79] 03/28/2018     -Continue aspirin 81 mg home medication      • Borderline personality disorder [F60.3] 03/28/2018     -Continue home Cymbalta     • Anxiety [F41.9] 03/28/2018     -Monitoring patient per [facility] protocol  -Continuing home Klonopin therapy     • Acute pyelonephritis [N10] 03/28/2018     -Perinephric stranding on CT  -Urinalysis indicated no infectious process  -Empiric antibiotic therapy with Levaquin; also used to treat diverticulitis      • Chronic alcoholic gastritis without hemorrhage [K29.20] 03/27/2018     -Status post EGD with Dr. Medina  -Found to have severe esophagitis and gastritis  -Discussed a GERD diet with patient; will provide additional written material  -Continue PPI therapy and Zofran PRN for nausea and vomiting     • ETOH abuse [F10.10] 02/15/2017     -Patient states he use to drink 1/5 of 100 proof bourbon every 2 days but has cut back since September and has not drank in 1 week.   -[facility] protocol in place; last score 1  -Encouraging sobriety         Resolved Hospital Problems    Diagnosis Date Noted Date Resolved   • Sepsis [A41.9] 03/28/2018 03/31/2018      Patient screens sepsis positive based on heart rate greater than 90 (108), WBC greater than 12 (19.86) and 2 possible sources of infection of the abdomen and kidneys.  Clinically patient appears stable.   -f/u urine culture, GI panel, and blood cultures         DVT prophylaxis: SCDs/TEDs  Code status is Full Code     Home today    I have examined the patient and reviewed the pertinent diagnostic data. I have discussed the case with the Family Medicine Resident and agree with the assessment and plan of care.    Mitchell Rob DO           This document has been electronically signed by Mitchell Rob DO on April 1, 2018 10:29 AM

## 2018-04-01 NOTE — PLAN OF CARE
Problem: Patient Care Overview  Goal: Plan of Care Review  Outcome: Ongoing (interventions implemented as appropriate)   04/01/18 0128   Coping/Psychosocial   Plan of Care Reviewed With patient   Plan of Care Review   Progress no change       Problem: Infection, Risk/Actual (Adult)  Goal: Identify Related Risk Factors and Signs and Symptoms  Outcome: Ongoing (interventions implemented as appropriate)    Goal: Infection Prevention/Resolution  Outcome: Ongoing (interventions implemented as appropriate)      Problem: Pain, Acute (Adult)  Goal: Identify Related Risk Factors and Signs and Symptoms  Outcome: Ongoing (interventions implemented as appropriate)    Goal: Acceptable Pain Control/Comfort Level  Outcome: Ongoing (interventions implemented as appropriate)

## 2018-04-02 LAB
LAB AP CASE REPORT: NORMAL
LAB AP SPECIAL STAINS: NORMAL
Lab: NORMAL
PATH REPORT.FINAL DX SPEC: NORMAL
PATH REPORT.GROSS SPEC: NORMAL

## 2018-04-02 NOTE — PAYOR COMM NOTE
"Viki Chilel (42 y.o. Male)     Date of Birth Social Security Number Address Home Phone MRN    1975  [address] [place] [address] [address] [place] [address] [phone] [id number]    Islam Marital Status          Amish        Admission Date Admission Type Admitting Provider Attending Provider Department, Room/Bed    3/27/18 Emergency Chloé Velazquez MD  [address], 432/1    Discharge Date Discharge Disposition Discharge Destination        4/1/2018 Home or Self Care              Attending Provider:  (none)   Allergies:  Contrast Dye    Isolation:  None   Infection:  None   Code Status:  Prior    Ht:  182.9 cm (72\")   Wt:  111 kg (245 lb 3 oz)    Admission Cmt:  None   Principal Problem:  Acute diverticulitis [K57.92] More...                 Active Insurance as of 3/27/2018     Primary Coverage     Payor Plan Insurance Group Employer/Plan Group    ANTHEM MEDICAID ANTHEM MEDICAID [id number]     Payor Plan Address Payor Plan Phone Number Effective From Effective To    [address] [address] [phone] 7/1/2017     [place] [address]       Subscriber Name Subscriber Birth Date Member ID       VIKI CHILEL 1975 [id number]                 Emergency Contacts      (Rel.) Home Phone Work Phone Mobile Phone    Gunjan Chilel (Spouse) [phone] -- [phone]               Discharge Summary      Edna Miranda MD at 3/31/2018  6:08 AM     Attestation signed by Mitchell Rob DO at 4/2/2018  8:40 AM    I have examined the patient and reviewed the pertinent diagnostic data. I have discussed the case with the Family Medicine Resident and agree with the assessment and plan of care.    Mitchell Rob DO           This document has been electronically signed by Mitchell Rob DO on April 2, 2018 8:40 AM                     [address], [place]. [address]  T - [phone] "     DISCHARGE SUMMARY         PATIENT  DEMOGRAPHICS   PATIENT NAME: Stevenson Chilel                      PHYSICIAN: Edna Miranda MD  : 1975  MRN: [id number]    ADMISSION/DISCHARGE INFO   ADMISSION DATE: 3/27/2018   DISCHARGE DATE:  18    ADMISSION DIAGNOSES: Acute diverticulitis [K57.92]  DISCHARGE DIAGNOSES:     Principal Problem:    Acute diverticulitis  Active Problems:    ETOH abuse    Chronic alcoholic gastritis without hemorrhage    Essential hypertension    Borderline personality disorder    Acute pyelonephritis    Marijuana abuse, continuous    Anxiety    S/P ablation of atrial flutter      SERVICE: Family Medicine  ATTENDING PROVIDER: Dr. Rob  RESIDENT: Edna Miranda MD     CONSULTS   Consult Orders (all)     Start     Ordered    18  Inpatient Gastroenterology Consult  Once     Specialty:  Gastroenterology  Provider:  Edwin Medina MD    18 2357  Family Practice - Resident (on-call MD unless specified)  Once     Specialty:  Family Medicine  Provider:  Chloé Velazquez MD    18 2358          PROCEDURES     Imaging Results (last 24 hours)     ** No results found for the last 24 hours. **          Ct Abdomen Pelvis Without Contrast    Result Date: 3/27/2018  Narrative: CT abdomen and pelvis without contrast on 3/27/2018 CLINICAL INDICATION: Left-sided abdominal pain, reported history of ulcerative colitis TECHNIQUE: Multiple axial images are obtained throughout the abdomen and pelvis without the administration of contrast. This exam was performed according to our departmental dose-optimization program, which includes automated exposure control, adjustment of the mA and/or kV according to patient size and/or use of iterative reconstruction technique. Total DLP is 551.5 mGy*cm. COMPARISON: None FINDINGS: Abdomen: Mild bilateral gynecomastia is noted. There is mild bibasilar atelectasis. There are no renal or ureteral stones and no  hydronephrosis. There is bilateral perinephric stranding which is nonspecific. Recommend correlation with urinalysis to exclude infection as a cause. The unenhanced solid abdominal organs are otherwise unremarkable. There is no abdominal adenopathy. There is no free air within the abdomen. There is fat stranding and fluid centered around a diverticulum in the mid descending colon consistent with acute diverticulitis. There is no evidence of abscess or perforation. The abdominal portion of the GI tract is otherwise unremarkable. Pelvis: There is other diverticulosis. No bowel wall thickening to suggest patient's reported history of ulcerative colitis is noted. There is no pelvic adenopathy. The pelvic portion of the GI tract including the appendix is otherwise unremarkable. There is no pelvic adenopathy. Trace free fluid is noted in the pelvis. Degenerative changes are noted in the spine.     Impression: 1. Acute descending colon diverticulitis without evidence of abscess or perforation. 2. Nonspecific bilateral perinephric stranding, recommend correlation with urinalysis to exclude infection. Electronically signed by:  Carlos Gilman  3/27/2018 10:23 PM CDT Workstation: [id number]    Xr Chest 2 View    Result Date: 3/19/2018  Narrative: Chest PA and lateral CLINICAL INDICATION: Chest pain, epigastric pain. COMPARISON: August 31, 2017. FINDINGS: Cardiac silhouette is normal in size. Pulmonary vascularity is unremarkable. No focal infiltrate or consolidation.  No pleural effusion.  No pneumothorax.     Impression: CONCLUSION: No evidence of active disease.  Electronically signed by:  Tahir Tuttle MD  3/19/2018 3:28 PM CDT Workstation: [id number]      HISTORY OF PRESENT ILLNESS   Stevenson Chilel is a 42 y.o. male with concurrent medical history of Hypertension, borderline personality disorder, GERD, and recent discharge from [facility] on Friday where he was treated for cyclical vomiting disorder and acute  "pancreatitis who presents with severe abdominal pain.  Patient states he has been having nausea, vomiting, and abdominal pain for the last 6 weeks.  He was recently discharged from [facility] on Friday.  He for the past week he has been having fevers off and on and chills for the past week.  He states he woke up this morning and had a 3/10 cramping abdominal pain which felt like he needed to have a bowel movement but he was unable to pass anything including flatus.  The pain then started to increased to a left sided 10/10 pain which would radiate under the umbilicus.  He states nothing makes the pain better and nothing makes it worse.  He has eaten today but he has also been nauseated and vomited 10x bile substance.  He denies any history of abdominal surgeries, hemoptysis, hematemesis, melena, hematochezia or sick contacts.  Patient states now he is starting to have heart burn symptoms and he has pain in his abdomen when he tries to take deep breaths.  In the ED patient was found to have WBC of 19.86, normal lipase at 138, CRP of 4.6, normal lactate of 0.9, U/A positive for 2+ ketones, 2+ protein and 1+ bilirubin with trace leuk esterase although patient denies any dysuria.  Urine drug test positive for opiates and THC.  CT of the abdomen/pelvis showed acute descending colon diverticulitis with out evidence of abscess or perforation and nonspecific bilateral perinephric stranding.\"  In the ED patient was given maalox, aspirin 325, diluadid, levaquin, flagyl, zofran and 2L bolus of normal saline.       HOSPITAL COURSE   During his hospital stay the patient was admitted and brought to the floor. Patient was started on IV fluid hydration and IV antibiotics, Levaquin and Flagyl for his acute diverticulitis. He was kept NPO to avoid further exacerbation of his abdominal pain. Additionally, he was placed on [facility] protocol for concern of drinking history obtained on admission although he did not show signs of withdrawal " during his admission. Patient began to improve in the subsequent days in terms of his abdominal pain and nausea/vomiting. However, patient's family requested that a GI doctor see the patient for additional evaluation. Dr. Ureña was consulted and decided to do a EGD due to patient's symptoms. EGD did show gastritis and severe esophagitis; biopsies were obtained and pending. Patient was started on PPI therapy and advised to follow a GERD diet. As patient's GI symptoms improved and he was able to tolerate PO intake, patient was prepared for discharge with prescriptions to complete his antibiotic therapy and a prescription for a PPI. Patient was advised to avoid alcohol with Flagyl. Patient is determined to maintain his sobriety.      DISCHARGE CONDITION   Stable    DISPOSITION   To Home      DISCHARGE MEDICATIONS        Your medication list      START taking these medications      Instructions Last Dose Given Next Dose Due   Esomeprazole Magnesium 20 MG tablet delayed-release  Commonly known as:  NEXIUM 24HR  Replaces:  pantoprazole 40 MG EC tablet      Take 40 mg by mouth Every Morning Before Breakfast.       levoFLOXacin 750 MG tablet  Commonly known as:  LEVAQUIN      Take 1 tablet by mouth Daily for 9 days.       metroNIDAZOLE 500 MG tablet  Commonly known as:  FLAGYL      Take 1 tablet by mouth 3 (Three) Times a Day for 9 days.          CHANGE how you take these medications      Instructions Last Dose Given Next Dose Due   carvedilol 6.25 MG tablet  Commonly known as:  COREG  What changed:  Another medication with the same name was removed. Continue taking this medication, and follow the directions you see here.      Take 1 tablet by mouth Every 12 (Twelve) Hours.       promethazine 25 MG suppository  Commonly known as:  PHENERGAN  What changed:  Another medication with the same name was removed. Continue taking this medication, and follow the directions you see here.      Insert 1 suppository into the rectum  Every 6 (Six) Hours As Needed for Nausea or Vomiting.       sucralfate 1 g tablet  Commonly known as:  CARAFATE  What changed:  Another medication with the same name was removed. Continue taking this medication, and follow the directions you see here.      Take 1 tablet by mouth 5 (Five) Times a Day As Needed (abdominal pain).          CONTINUE taking these medications      Instructions Last Dose Given Next Dose Due   clonazePAM 1 MG tablet  Commonly known as:  KlonoPIN      Take 1 mg by mouth 2 (Two) Times a Day As Needed for Seizures.       DULoxetine 60 MG capsule  Commonly known as:  CYMBALTA      Take 60 mg by mouth Every Morning.       lisinopril 5 MG tablet  Commonly known as:  PRINIVIL,ZESTRIL      Take 5 mg by mouth 2 (Two) Times a Day Before Meals.       magnesium oxide 400 (241.3 Mg) MG tablet tablet  Commonly known as:  MAGOX      Take 400 mg by mouth 2 (Two) Times a Day.       ondansetron ODT 4 MG disintegrating tablet  Commonly known as:  ZOFRAN-ODT      Take 1 tablet by mouth Every 6 (Six) Hours As Needed for Nausea or Vomiting.          STOP taking these medications    chlordiazePOXIDE 25 MG capsule  Commonly known as:  LIBRIUM        omeprazole 40 MG capsule  Commonly known as:  priLOSEC        ondansetron 4 MG tablet  Commonly known as:  ZOFRAN        pantoprazole 40 MG EC tablet  Commonly known as:  PROTONIX  Replaced by:  Esomeprazole Magnesium 20 MG tablet delayed-release              Where to Get Your Medications      These medications were sent to [facility] - [place] - [address] - [phone]  - [phone]   [address], [facility] [address]    Phone:  [phone]    Esomeprazole Magnesium 20 MG tablet delayed-release     You can get these medications from any pharmacy    Bring a paper prescription for each of these medications   levoFLOXacin 750 MG tablet   metroNIDAZOLE 500 MG tablet   ondansetron ODT 4 MG disintegrating tablet         INSTRUCTIONS    Activity:   Activity Instructions     Activity as Tolerated             Diet:   Diet Instructions     Advance Diet As Tolerated             Special Instructions: Patient instructed to call M.D. or return to ED with worsening shortness of breath, chest pain, fever greater than 100.4°F or any other medical concerns.    FOLLOW UP   Additional Instructions for the Follow-ups that You Need to Schedule     Discharge Follow-up with PCP    As directed      Follow Up Details:  Follow up for acute diverticulitis and chronic alcoholic gastritis and esophagitis           Follow-up Information     [facility] Follow up on 4/10/2018.    Specialty:  Family Medicine  Why:  Hospital follow up appointment Tuesday  4/10/18 at 2:00pm.  Contact information:  [address] [id number]  [phone]             [facility] Linda .    Specialty:  Family Medicine  Why:  Follow up for acute diverticulitis and chronic alcoholic gastritis and esophagitis  Contact information:  [address] [facility] [address]  [phone]                  PENDING TEST RESULTS AT DISCHARGE    Order Current Status    Tissue Pathology Exam In process    Blood Culture - Blood, Preliminary result    Blood Culture - Blood, Preliminary result         TIME   Time: 30 minutes were spent planning this discharge.          Dr. Rob  is the attending at time of discharge, He is aware of the patient's status and agrees with the above discharge summary.     Signature  Edna Miranda MD  [facility] Family Medicine Resident, PGY II        This document has been electronically signed by Edna Miranda MD on April 1, 2018 10:24 AM      Electronically signed by Mitchell Rob DO at 4/2/2018  8:40 AM

## 2018-04-05 ENCOUNTER — APPOINTMENT (OUTPATIENT)
Dept: GENERAL RADIOLOGY | Facility: HOSPITAL | Age: 43
End: 2018-04-05

## 2018-04-05 ENCOUNTER — HOSPITAL ENCOUNTER (EMERGENCY)
Facility: HOSPITAL | Age: 43
Discharge: HOME | End: 2018-04-05
Attending: EMERGENCY MEDICINE | Admitting: EMERGENCY MEDICINE

## 2018-04-05 ENCOUNTER — APPOINTMENT (OUTPATIENT)
Dept: NUCLEAR MEDICINE | Facility: HOSPITAL | Age: 43
End: 2018-04-05

## 2018-04-05 ENCOUNTER — APPOINTMENT (OUTPATIENT)
Dept: ULTRASOUND IMAGING | Facility: HOSPITAL | Age: 43
End: 2018-04-05

## 2018-04-05 ENCOUNTER — APPOINTMENT (OUTPATIENT)
Dept: CT IMAGING | Facility: HOSPITAL | Age: 43
End: 2018-04-05

## 2018-04-05 VITALS
DIASTOLIC BLOOD PRESSURE: 68 MMHG | WEIGHT: 219 LBS | TEMPERATURE: 98.4 F | OXYGEN SATURATION: 91 % | RESPIRATION RATE: 20 BRPM | HEIGHT: 72 IN | SYSTOLIC BLOOD PRESSURE: 125 MMHG | BODY MASS INDEX: 29.66 KG/M2 | HEART RATE: 81 BPM

## 2018-04-05 DIAGNOSIS — J40 BRONCHITIS: Primary | ICD-10-CM

## 2018-04-05 DIAGNOSIS — R10.84 GENERALIZED ABDOMINAL PAIN: ICD-10-CM

## 2018-04-05 LAB
ALBUMIN SERPL-MCNC: 3.6 G/DL (ref 3.4–4.8)
ALBUMIN/GLOB SERPL: 1.3 G/DL (ref 1.1–1.8)
ALP SERPL-CCNC: 31 U/L (ref 38–126)
ALT SERPL W P-5'-P-CCNC: 87 U/L (ref 21–72)
ANION GAP SERPL CALCULATED.3IONS-SCNC: 14 MMOL/L (ref 5–15)
AST SERPL-CCNC: 79 U/L (ref 17–59)
BASOPHILS # BLD AUTO: 0.03 10*3/MM3 (ref 0–0.2)
BASOPHILS NFR BLD AUTO: 0.4 % (ref 0–2)
BILIRUB SERPL-MCNC: 0.4 MG/DL (ref 0.2–1.3)
BILIRUB UR QL STRIP: NEGATIVE
BUN BLD-MCNC: 11 MG/DL (ref 7–21)
BUN/CREAT SERPL: 10.6 (ref 7–25)
CALCIUM SPEC-SCNC: 8.5 MG/DL (ref 8.4–10.2)
CHLORIDE SERPL-SCNC: 101 MMOL/L (ref 95–110)
CLARITY UR: CLEAR
CO2 SERPL-SCNC: 23 MMOL/L (ref 22–31)
COLOR UR: ABNORMAL
CREAT BLD-MCNC: 1.04 MG/DL (ref 0.7–1.3)
CRP SERPL-MCNC: 0.9 MG/DL (ref 0–1)
D-DIMER, QUANTITATIVE (MAD,POW, STR): 2376 NG/ML (FEU) (ref 0–470)
DEPRECATED RDW RBC AUTO: 45.9 FL (ref 35.1–43.9)
EOSINOPHIL # BLD AUTO: 0.13 10*3/MM3 (ref 0–0.7)
EOSINOPHIL NFR BLD AUTO: 1.8 % (ref 0–7)
ERYTHROCYTE [DISTWIDTH] IN BLOOD BY AUTOMATED COUNT: 14.2 % (ref 11.5–14.5)
FLUAV AG NPH QL: NEGATIVE
FLUBV AG NPH QL IA: NEGATIVE
GFR SERPL CREATININE-BSD FRML MDRD: 79 ML/MIN/1.73 (ref 63–147)
GLOBULIN UR ELPH-MCNC: 2.8 GM/DL (ref 2.3–3.5)
GLUCOSE BLD-MCNC: 113 MG/DL (ref 60–100)
GLUCOSE UR STRIP-MCNC: NEGATIVE MG/DL
HCT VFR BLD AUTO: 46.8 % (ref 39–49)
HGB BLD-MCNC: 16.1 G/DL (ref 13.7–17.3)
HGB UR QL STRIP.AUTO: NEGATIVE
HOLD SPECIMEN: NORMAL
HOLD SPECIMEN: NORMAL
IMM GRANULOCYTES # BLD: 0.04 10*3/MM3 (ref 0–0.02)
IMM GRANULOCYTES NFR BLD: 0.5 % (ref 0–0.5)
KETONES UR QL STRIP: NEGATIVE
LEUKOCYTE ESTERASE UR QL STRIP.AUTO: NEGATIVE
LIPASE SERPL-CCNC: 144 U/L (ref 23–300)
LYMPHOCYTES # BLD AUTO: 0.74 10*3/MM3 (ref 0.6–4.2)
LYMPHOCYTES NFR BLD AUTO: 10.1 % (ref 10–50)
MCH RBC QN AUTO: 30.4 PG (ref 26.5–34)
MCHC RBC AUTO-ENTMCNC: 34.4 G/DL (ref 31.5–36.3)
MCV RBC AUTO: 88.3 FL (ref 80–98)
MONOCYTES # BLD AUTO: 0.91 10*3/MM3 (ref 0–0.9)
MONOCYTES NFR BLD AUTO: 12.4 % (ref 0–12)
NEUTROPHILS # BLD AUTO: 5.48 10*3/MM3 (ref 2–8.6)
NEUTROPHILS NFR BLD AUTO: 74.8 % (ref 37–80)
NITRITE UR QL STRIP: NEGATIVE
NT-PROBNP SERPL-MCNC: 123 PG/ML (ref 0–450)
PH UR STRIP.AUTO: 7 [PH] (ref 5–9)
PLATELET # BLD AUTO: 247 10*3/MM3 (ref 150–450)
PMV BLD AUTO: 10.1 FL (ref 8–12)
POTASSIUM BLD-SCNC: 3.8 MMOL/L (ref 3.5–5.1)
PROT SERPL-MCNC: 6.4 G/DL (ref 6.3–8.6)
PROT UR QL STRIP: ABNORMAL
RBC # BLD AUTO: 5.3 10*6/MM3 (ref 4.37–5.74)
SODIUM BLD-SCNC: 138 MMOL/L (ref 137–145)
SP GR UR STRIP: 1.02 (ref 1–1.03)
TROPONIN I SERPL-MCNC: <0.012 NG/ML
UROBILINOGEN UR QL STRIP: ABNORMAL
WBC NRBC COR # BLD: 7.33 10*3/MM3 (ref 3.2–9.8)
WHOLE BLOOD HOLD SPECIMEN: NORMAL
WHOLE BLOOD HOLD SPECIMEN: NORMAL

## 2018-04-05 PROCEDURE — 96374 THER/PROPH/DIAG INJ IV PUSH: CPT

## 2018-04-05 PROCEDURE — 71046 X-RAY EXAM CHEST 2 VIEWS: CPT

## 2018-04-05 PROCEDURE — 84484 ASSAY OF TROPONIN QUANT: CPT | Performed by: EMERGENCY MEDICINE

## 2018-04-05 PROCEDURE — 25010000002 MORPHINE PER 10 MG: Performed by: EMERGENCY MEDICINE

## 2018-04-05 PROCEDURE — 93010 ELECTROCARDIOGRAM REPORT: CPT | Performed by: INTERNAL MEDICINE

## 2018-04-05 PROCEDURE — 85379 FIBRIN DEGRADATION QUANT: CPT | Performed by: EMERGENCY MEDICINE

## 2018-04-05 PROCEDURE — 83690 ASSAY OF LIPASE: CPT | Performed by: EMERGENCY MEDICINE

## 2018-04-05 PROCEDURE — 99284 EMERGENCY DEPT VISIT MOD MDM: CPT

## 2018-04-05 PROCEDURE — A9540 TC99M MAA: HCPCS | Performed by: EMERGENCY MEDICINE

## 2018-04-05 PROCEDURE — 93005 ELECTROCARDIOGRAM TRACING: CPT | Performed by: EMERGENCY MEDICINE

## 2018-04-05 PROCEDURE — 80053 COMPREHEN METABOLIC PANEL: CPT | Performed by: EMERGENCY MEDICINE

## 2018-04-05 PROCEDURE — 86140 C-REACTIVE PROTEIN: CPT | Performed by: EMERGENCY MEDICINE

## 2018-04-05 PROCEDURE — 25010000002 ONDANSETRON PER 1 MG: Performed by: EMERGENCY MEDICINE

## 2018-04-05 PROCEDURE — 0 TECHNETIUM ALBUMIN AGGREGATED: Performed by: EMERGENCY MEDICINE

## 2018-04-05 PROCEDURE — 83880 ASSAY OF NATRIURETIC PEPTIDE: CPT | Performed by: EMERGENCY MEDICINE

## 2018-04-05 PROCEDURE — 81003 URINALYSIS AUTO W/O SCOPE: CPT | Performed by: EMERGENCY MEDICINE

## 2018-04-05 PROCEDURE — 93970 EXTREMITY STUDY: CPT

## 2018-04-05 PROCEDURE — A9539 TC99M PENTETATE: HCPCS | Performed by: EMERGENCY MEDICINE

## 2018-04-05 PROCEDURE — 74176 CT ABD & PELVIS W/O CONTRAST: CPT

## 2018-04-05 PROCEDURE — 78582 LUNG VENTILAT&PERFUS IMAGING: CPT

## 2018-04-05 PROCEDURE — 94760 N-INVAS EAR/PLS OXIMETRY 1: CPT

## 2018-04-05 PROCEDURE — 85025 COMPLETE CBC W/AUTO DIFF WBC: CPT | Performed by: EMERGENCY MEDICINE

## 2018-04-05 PROCEDURE — 87804 INFLUENZA ASSAY W/OPTIC: CPT | Performed by: EMERGENCY MEDICINE

## 2018-04-05 PROCEDURE — 94640 AIRWAY INHALATION TREATMENT: CPT

## 2018-04-05 PROCEDURE — 0 TECHNETIUM TC 99M PENTETATE KIT: Performed by: EMERGENCY MEDICINE

## 2018-04-05 PROCEDURE — 96375 TX/PRO/DX INJ NEW DRUG ADDON: CPT

## 2018-04-05 RX ORDER — MORPHINE SULFATE 2 MG/ML
2 INJECTION, SOLUTION INTRAMUSCULAR; INTRAVENOUS ONCE
Status: COMPLETED | OUTPATIENT
Start: 2018-04-05 | End: 2018-04-05

## 2018-04-05 RX ORDER — SODIUM CHLORIDE 0.9 % (FLUSH) 0.9 %
10 SYRINGE (ML) INJECTION AS NEEDED
Status: DISCONTINUED | OUTPATIENT
Start: 2018-04-05 | End: 2018-04-05 | Stop reason: HOSPADM

## 2018-04-05 RX ORDER — IPRATROPIUM BROMIDE AND ALBUTEROL SULFATE 2.5; .5 MG/3ML; MG/3ML
3 SOLUTION RESPIRATORY (INHALATION)
Status: DISCONTINUED | OUTPATIENT
Start: 2018-04-05 | End: 2018-04-05 | Stop reason: HOSPADM

## 2018-04-05 RX ORDER — ALBUTEROL SULFATE 90 UG/1
2 AEROSOL, METERED RESPIRATORY (INHALATION) EVERY 4 HOURS PRN
Qty: 18 G | Refills: 2 | Status: SHIPPED | OUTPATIENT
Start: 2018-04-05 | End: 2018-08-20 | Stop reason: ALTCHOICE

## 2018-04-05 RX ORDER — ONDANSETRON 2 MG/ML
4 INJECTION INTRAMUSCULAR; INTRAVENOUS ONCE
Status: COMPLETED | OUTPATIENT
Start: 2018-04-05 | End: 2018-04-05

## 2018-04-05 RX ADMIN — KIT FOR THE PREPARATION OF TECHNETIUM TC 99M PENTETATE 1 DOSE: 20 INJECTION, POWDER, LYOPHILIZED, FOR SOLUTION INTRAVENOUS; RESPIRATORY (INHALATION) at 11:20

## 2018-04-05 RX ADMIN — Medication 10 ML: at 10:38

## 2018-04-05 RX ADMIN — ONDANSETRON HYDROCHLORIDE 4 MG: 2 INJECTION INTRAMUSCULAR; INTRAVENOUS at 07:51

## 2018-04-05 RX ADMIN — MORPHINE SULFATE 2 MG: 2 INJECTION, SOLUTION INTRAMUSCULAR; INTRAVENOUS at 10:38

## 2018-04-05 RX ADMIN — Medication 10 ML: at 07:51

## 2018-04-05 RX ADMIN — Medication 1 DOSE: at 11:43

## 2018-04-05 RX ADMIN — SODIUM CHLORIDE 1000 ML: 9 INJECTION, SOLUTION INTRAVENOUS at 07:51

## 2018-04-05 RX ADMIN — IPRATROPIUM BROMIDE AND ALBUTEROL SULFATE 3 ML: .5; 3 SOLUTION RESPIRATORY (INHALATION) at 12:29

## 2018-04-05 NOTE — ED PROVIDER NOTES
Subjective   Patient is a 42-year-old male who presents today with cough and flank pain.  Recent admission to the hospital and discharge about 3 days ago.  He was admitted for esophagitis, gastritis, pancreatitis and diverticulitis.  He was admitted by the [facility] practice service.  He was also followed by Dr. Medina of the GI service.  While here he had an EGD that showed gastritis and esophagitis.  Patient was discharged on Flagyl and Levaquin.  Since that time he has had nausea and over the past 24 hours multiple stools that have been described as loose with some water.  He is felt like he's had a fever but has not actually been able to record one.  The cough has been nonproductive.  He denies any alcohol use since discharge.            Review of Systems   Constitutional: Positive for appetite change, chills and fatigue. Negative for activity change, diaphoresis, fever and unexpected weight change.   Respiratory: Positive for cough. Negative for apnea, choking, chest tightness, shortness of breath, wheezing and stridor.    Gastrointestinal: Positive for abdominal pain, diarrhea, nausea and vomiting. Negative for abdominal distention, anal bleeding, blood in stool, constipation and rectal pain.   All other systems reviewed and are negative.      Past Medical History:   Diagnosis Date   • A-fib    • Anxiety    • Arrhythmia    • Arthritis     rt shoulder    • Atrial flutter 07/2016    EP  ablation x2    • Depression    • Diverticulitis    • ETOH abuse    • GERD (gastroesophageal reflux disease)    • Kidney stone    • Pancreatitis    • Sleep apnea     have used a machine in the past   • Tendonitis     rt shoulder       Allergies   Allergen Reactions   • Contrast Dye Anaphylaxis     ANAPHYLAXIS       Past Surgical History:   Procedure Laterality Date   • CARDIAC ELECTROPHYSIOLOGY PROCEDURE N/A 5/2/2017    Procedure: Ablation atrial flutter;  Surgeon: Ramirez Reza MD;  Location: [facility] EP INVASIVE LOCATION;   "Service:    • CARDIAC ELECTROPHYSIOLOGY PROCEDURE N/A 9/5/2017    Procedure: Ablation atrial fibrillation PVA ;  Surgeon: Ramirez Reza MD;  Location: [facility] INVASIVE LOCATION;  Service:    • COLONOSCOPY     • ENDOSCOPY N/A 3/30/2018    Procedure: ESOPHAGOGASTRODUODENOSCOPY;  Surgeon: Edwin Medina MD;  Location: [facility] ENDOSCOPY;  Service: Gastroenterology   • SHOULDER SURGERY Right 2014   • TONSILLECTOMY  12/2015       Family History   Problem Relation Age of Onset   • Heart disease Father    • Depression Father    • Cancer Maternal Grandmother      lung   • Cancer Paternal Grandmother      pancreatic   • Heart disease Paternal Grandfather        Social History     Social History   • Marital status:      Social History Main Topics   • Smoking status: Never Smoker   • Smokeless tobacco: Never Used   • Alcohol use Yes      Comment: 1/5 bourbon in 2 days, has cut back since september   • Drug use:      Frequency: 7.0 times per week     Types: Marijuana      Comment: states he uses \"a little\" every day   • Sexual activity: Defer     Other Topics Concern   • Not on file     Social History Narrative    Pt states has not had drink of any alcohol in 3 weeks as of 4/5/2018           Objective   Physical Exam   Constitutional: He is oriented to person, place, and time. He appears well-developed and well-nourished. No distress.   HENT:   Head: Normocephalic and atraumatic.   Mouth/Throat: Oropharynx is clear and moist.   Eyes: Conjunctivae and EOM are normal. Pupils are equal, round, and reactive to light.   Neck: Normal range of motion. Neck supple. No JVD present. No tracheal deviation present. No thyromegaly present.   Cardiovascular: Normal heart sounds.    Atrial fibrillation/atrial flutter with a heart rate around 100 bpm.   Abdominal: Soft. He exhibits no distension and no mass. There is tenderness. There is no rebound and no guarding.   Decreased bowel sounds throughout   Musculoskeletal: Normal range " of motion. He exhibits no edema, tenderness or deformity.   Neurological: He is alert and oriented to person, place, and time. No cranial nerve deficit. Coordination normal.   Skin: Skin is warm and dry. No rash noted. He is not diaphoretic. No erythema. No pallor.   Psychiatric: He has a normal mood and affect. His behavior is normal. Judgment and thought content normal.   Nursing note and vitals reviewed.      ECG 12 Lead    Date/Time: 4/5/2018 7:37 AM  Performed by: KWABENA BRIGGS  Authorized by: KWABENA BRIGGS   Comparison: compared with previous ECG from 3/19/2018  Rhythm: sinus rhythm  Rate: normal  BPM: 95  QRS axis: right  Clinical impression: abnormal ECG  Comments: Pulmonary disease pattern.  Downsloping ST segments in inferior leads have resolved from previous EKG.               ED Course  ED Course      Patient's diverticulitis by CT is much improved.  I think patient is picked up a bronchitis.  He is on Levaquin and Flagyl and think of if there is any infectious/bacterial etiology and in his lungs that should take care of it.  Chest x-ray was unremarkable.  D-dimer was known to be elevated.  Doppler bilateral lower extremities for DVT was negative.  VQ scan was low probability.  V/Q was obtained because patient is intolerant contrast dye.  Patient had a DuoNeb in the emergency department and didn't have the good improvement.  I've written him for a Ventolin inhaler.  With his current infection I'm going to avoid steroids at this time.  He has an appointment with Taty Schuster his PCP on Monday.  They're to call today and let Taty Schuster know he was in the emergency department and see if she wants to see him sooner.            MDM  Number of Diagnoses or Management Options     Amount and/or Complexity of Data Reviewed  Clinical lab tests: ordered and reviewed  Tests in the radiology section of CPT®: ordered and reviewed  Tests in the medicine section of CPT®: reviewed and ordered  Decide to obtain  previous medical records or to obtain history from someone other than the patient: yes  Review and summarize past medical records: yes  Independent visualization of images, tracings, or specimens: yes    Risk of Complications, Morbidity, and/or Mortality  Presenting problems: high  Diagnostic procedures: high  Management options: high        Final diagnoses:   Bronchitis   Generalized abdominal pain            Manuel Ventura MD  04/05/18 1244       Manuel Ventura MD  04/05/18 1249

## 2018-04-09 ENCOUNTER — OFFICE VISIT (OUTPATIENT)
Dept: GASTROENTEROLOGY | Facility: CLINIC | Age: 43
End: 2018-04-09

## 2018-04-09 VITALS
BODY MASS INDEX: 29.96 KG/M2 | DIASTOLIC BLOOD PRESSURE: 68 MMHG | HEIGHT: 72 IN | WEIGHT: 221.2 LBS | HEART RATE: 72 BPM | SYSTOLIC BLOOD PRESSURE: 124 MMHG

## 2018-04-09 DIAGNOSIS — K21.00 GASTROESOPHAGEAL REFLUX DISEASE WITH ESOPHAGITIS: Primary | ICD-10-CM

## 2018-04-09 DIAGNOSIS — R74.8 ELEVATED LIVER ENZYMES: ICD-10-CM

## 2018-04-09 DIAGNOSIS — R63.4 WEIGHT LOSS, UNINTENTIONAL: ICD-10-CM

## 2018-04-09 DIAGNOSIS — R10.84 GENERALIZED ABDOMINAL PAIN: ICD-10-CM

## 2018-04-09 DIAGNOSIS — K85.21 ALCOHOL-INDUCED ACUTE PANCREATITIS WITH UNINFECTED NECROSIS: ICD-10-CM

## 2018-04-09 PROCEDURE — 99214 OFFICE O/P EST MOD 30 MIN: CPT | Performed by: PHYSICIAN ASSISTANT

## 2018-04-09 RX ORDER — DEXTROSE AND SODIUM CHLORIDE 5; .45 G/100ML; G/100ML
30 INJECTION, SOLUTION INTRAVENOUS CONTINUOUS PRN
Status: CANCELLED | OUTPATIENT
Start: 2018-05-30

## 2018-04-09 NOTE — PROGRESS NOTES
Chief Complaint   Patient presents with   • Hospital Follow Up       ENDO PROCEDURE ORDERED: EGD/COLON, severe esophagitis/hx colitis, pancreatitis, abd pain, diverticulitis    Subjective    Stevenson Chilel is a 42 y.o. male. he is here today for follow-up.    History of Present Illness    This 42-year-old male was seen as a hospital followup for nausea, vomiting, diverticulitis. He was hospitalized at [facility] for nausea, vomiting, pancreatitis. I did not have all of those records available. He did have some laboratories drawn there on 03/19/2018 that showed an amylase 221, lipase 662. He was subsequently admitted here from 03/27/2018 to 04/01/2018 with acute diverticulitis, alcohol abuse. A urine drug screen was positive for opiates and THC. CT scan showed fat stranding in the mid descending colon as well as bilateral perinephric stranding. Chest x-ray was negative. He had an EGD by Dr. Ureña on 03/30/2018 that showed severe esophagitis and gastritis. Biopsy showed reactive change, they were negative for acute viral changes, fungi, etc.    Subsequently he had studies on 04/05/2018. CT scan of the abdomen and pelvis without contrast showed normal appearing liver, gallbladder, pancreas, improvement in the left perinephric stranding, improvement in the inflammatory changes around the descending colon. He had a negative bilateral DVT ultrasound. VQ scan was negative. Influenza screen was negative. Urinalysis showed some trace of abnormalities with protein. Normal BNP. D-dimer was increased to 2376. Cardiac enzymes were normal. Lipase was normal. CBC was normal. He is followed by the [facility] for polycythemia. A CMP showed glucose 113, AST 79, ALT 87, otherwise normal.    The patient states that he has had 4 cardiac ablations for arrhythmias. He states it had caused liver and kidney abnormalities in his laboratories around that time, it has been several years ago. He has apparently saw a nephrologist in the  past. He denies a previous history of pancreatitis, diverticulitis, or previous renal issues. He states he had a history of severe colitis at age 23 when he started working at [employer], he states everyone gets sick when they start there and he has been well since that time. He thinks he had a colonoscopy around that time as well or perhaps prior.    The patient presents with 2 out of 10 diffuse abdominal pain. He is on Nexium and Carafate for chronic heartburn. He denied nausea, vomiting or dysphagia. He states his bowels were normal before he was hospitalized. He has now had very loose stools, but he thinks it may be related to his antibiotics. His weight is down 25 pounds since his symptoms began. He is currently on Flagyl. He was treated with Levaquin in the hospital.    ASSESSMENT/PLAN: Patient with moderate elevation in transaminases with history of heavy alcohol use, pancreatitis presumed secondary to alcohol use, suspect infectious etiology for his perinephric and colonic stranding. Certainly inflammatory bowel disease is of concern, would consider ischemia. Again consider infectious etiology. I recommended repeating an EGD as well as scheduling him for a colonoscopy in 6-8 weeks once he finishes his antibiotics and presuming he continues to improve. He states he is scheduled for a HIDA scan on Thursday, but I do not think he has a gallbladder issue. I think his GI problems need to be more fully evaluated. Given his elevated liver enzymes, recommended hepatitis diagnostic panel, HENSON FibroSure, RADHA, AMA, SMA, alpha-1 antitrypsin, AFP, iron studies, ceruloplasmin. Will see him in followup after the above, further pending clinical course and the results of the above.       The following portions of the patient's history were reviewed and updated as appropriate:   Past Medical History:   Diagnosis Date   • A-fib    • Anxiety    • Arrhythmia    • Arthritis     rt shoulder    • Atrial flutter 07/2016    EP  ablation  "x2    • Depression    • Diverticulitis    • ETOH abuse    • GERD (gastroesophageal reflux disease)    • Kidney stone    • Pancreatitis    • Sleep apnea     have used a machine in the past   • Tendonitis     rt shoulder     Past Surgical History:   Procedure Laterality Date   • CARDIAC ELECTROPHYSIOLOGY PROCEDURE N/A 5/2/2017    Procedure: Ablation atrial flutter;  Surgeon: Ramirez Reza MD;  Location:  [facility];  Service:    • CARDIAC ELECTROPHYSIOLOGY PROCEDURE N/A 9/5/2017    Procedure: Ablation atrial fibrillation PVA ;  Surgeon: Ramirez Reza MD;  Location:  [facility];  Service:    • COLONOSCOPY     • ENDOSCOPY N/A 3/30/2018    Procedure: ESOPHAGOGASTRODUODENOSCOPY;  Surgeon: Edwin Medina MD;  Location: [facility] ENDOSCOPY;  Service: Gastroenterology   • SHOULDER SURGERY Right 2014   • TONSILLECTOMY  12/2015   • UPPER GASTROINTESTINAL ENDOSCOPY  03/30/2018   • UPPER GASTROINTESTINAL ENDOSCOPY       Family History   Problem Relation Age of Onset   • Heart disease Father    • Depression Father    • Cancer Maternal Grandmother      lung   • Cancer Paternal Grandmother      pancreatic   • Heart disease Paternal Grandfather        Allergies   Allergen Reactions   • Contrast Dye Anaphylaxis     ANAPHYLAXIS   • Iodinated Diagnostic Agents Swelling     Social History     Social History   • Marital status:      Social History Main Topics   • Smoking status: Never Smoker   • Smokeless tobacco: Never Used   • Alcohol use No      Comment: quit 3 weeks ago   • Drug use:      Frequency: 7.0 times per week     Types: Marijuana      Comment: states he uses \"a little\" every day   • Sexual activity: Defer     Other Topics Concern   • Not on file     Social History Narrative    Pt states has not had drink of any alcohol in 3 weeks as of 4/5/2018       Current Outpatient Prescriptions:   •  albuterol (PROVENTIL HFA;VENTOLIN HFA) 108 (90 Base) MCG/ACT inhaler, Inhale 2 puffs Every 4 " "(Four) Hours As Needed for Wheezing or Shortness of Air. 2 puffs q 2 hours for 2 days then 2 puffs q 4 hours  PRN., Disp: 18 g, Rfl: 2  •  carvedilol (COREG) 6.25 MG tablet, Take 1 tablet by mouth Every 12 (Twelve) Hours., Disp: 60 tablet, Rfl: 0  •  clonazePAM (KlonoPIN) 1 MG tablet, Take 1 mg by mouth 2 (Two) Times a Day As Needed for Anxiety., Disp: , Rfl:   •  DULoxetine (CYMBALTA) 60 MG capsule, Take 60 mg by mouth Daily., Disp: , Rfl:   •  Esomeprazole Magnesium (NEXIUM 24HR) 20 MG tablet delayed-release, Take 40 mg by mouth Every Morning Before Breakfast., Disp: 60 tablet, Rfl: 0  •  levoFLOXacin (LEVAQUIN) 750 MG tablet, Take 1 tablet by mouth Daily for 9 days., Disp: 9 tablet, Rfl: 0  •  lisinopril (PRINIVIL,ZESTRIL) 5 MG tablet, Take 5 mg by mouth Daily., Disp: , Rfl:   •  magnesium oxide (MAGOX) 400 (241.3 MG) MG tablet tablet, Take 400 mg by mouth 2 (Two) Times a Day., Disp: , Rfl:   •  metroNIDAZOLE (FLAGYL) 500 MG tablet, Take 1 tablet by mouth 3 (Three) Times a Day for 9 days., Disp: 27 tablet, Rfl: 0  •  ondansetron ODT (ZOFRAN-ODT) 4 MG disintegrating tablet, Take 1 tablet by mouth Every 6 (Six) Hours As Needed for Nausea or Vomiting., Disp: 30 tablet, Rfl: 0  •  promethazine (PHENERGAN) 25 MG suppository, Insert 1 suppository into the rectum Every 6 (Six) Hours As Needed for Nausea or Vomiting., Disp: 15 suppository, Rfl: 0  •  sucralfate (CARAFATE) 1 g tablet, Take 1 tablet by mouth 5 (Five) Times a Day As Needed (abdominal pain)., Disp: 30 tablet, Rfl: 0  Review of Systems  Review of Systems       Objective    /68 (BP Location: Left arm)   Pulse 72   Ht 182.9 cm (72.01\")   Wt 100 kg (221 lb 3.2 oz)   BMI 29.99 kg/m²   Physical Exam   Constitutional: He is oriented to person, place, and time. He appears well-developed and well-nourished. No distress.   HENT:   Head: Normocephalic and atraumatic.   Eyes: EOM are normal. Pupils are equal, round, and reactive to light.   Neck: Normal range " of motion.   Cardiovascular: Normal rate, regular rhythm and normal heart sounds.    Pulmonary/Chest: Effort normal and breath sounds normal.   Abdominal: Soft. Bowel sounds are normal. He exhibits no shifting dullness, no distension, no abdominal bruit, no ascites and no mass. There is no hepatosplenomegaly. There is tenderness. There is no rigidity, no rebound, no guarding and no CVA tenderness. No hernia. Hernia confirmed negative in the ventral area.   Mild diffuse   Musculoskeletal: Normal range of motion.   Neurological: He is alert and oriented to person, place, and time.   Skin: Skin is warm and dry.   Psychiatric: He has a normal mood and affect. His behavior is normal. Judgment and thought content normal.   Nursing note and vitals reviewed.    Assessment/Plan      1. Gastroesophageal reflux disease with esophagitis    2. Alcohol-induced acute pancreatitis with uninfected necrosis    3. Elevated liver enzymes    4. Weight loss, unintentional    5. Generalized abdominal pain    .   Stevenson was seen today for hospital follow up.    Diagnoses and all orders for this visit:    Gastroesophageal reflux disease with esophagitis  -     Case Request; Standing  -     dextrose 5 % and sodium chloride 0.45 % infusion; Infuse 30 mL/hr into a venous catheter Continuous As Needed (Start Prior to Procedure).  -     Case Request    Alcohol-induced acute pancreatitis with uninfected necrosis  -     Iron and TIBC  -     Ferritin  -     AFP Tumor Marker  -     Alpha - 1 - Antitrypsin  -     Anti-Smooth Muscle Antibody Titer  -     Ceruloplasmin  -     Protime-INR  -     Nuclear Antigen Antibody, IFA  -     HENSON Fibrosure  -     Mitochondrial Antibodies, M2  -     Hepatitis Panel, Acute  -     Case Request; Standing  -     dextrose 5 % and sodium chloride 0.45 % infusion; Infuse 30 mL/hr into a venous catheter Continuous As Needed (Start Prior to Procedure).  -     Case Request    Elevated liver enzymes  -     Iron and TIBC  -      Ferritin  -     AFP Tumor Marker  -     Alpha - 1 - Antitrypsin  -     Anti-Smooth Muscle Antibody Titer  -     Ceruloplasmin  -     Protime-INR  -     Nuclear Antigen Antibody, IFA  -     HENSON Fibrosure  -     Mitochondrial Antibodies, M2  -     Hepatitis Panel, Acute  -     Case Request; Standing  -     dextrose 5 % and sodium chloride 0.45 % infusion; Infuse 30 mL/hr into a venous catheter Continuous As Needed (Start Prior to Procedure).  -     Case Request    Weight loss, unintentional  -     Case Request; Standing  -     dextrose 5 % and sodium chloride 0.45 % infusion; Infuse 30 mL/hr into a venous catheter Continuous As Needed (Start Prior to Procedure).  -     Case Request    Generalized abdominal pain  -     Case Request; Standing  -     dextrose 5 % and sodium chloride 0.45 % infusion; Infuse 30 mL/hr into a venous catheter Continuous As Needed (Start Prior to Procedure).  -     Case Request    Other orders  -     Obtain Informed Consent; Standing  -     POC Glucose Once; Standing        Orders placed during this encounter include:  Orders Placed This Encounter   Procedures   • Iron and TIBC   • Ferritin   • AFP Tumor Marker   • Alpha - 1 - Antitrypsin   • Anti-Smooth Muscle Antibody Titer   • Ceruloplasmin   • Protime-INR   • Nuclear Antigen Antibody, IFA   • HENSON Fibrosure   • Mitochondrial Antibodies, M2   • Hepatitis Panel, Acute       Medications prescribed:  No orders of the defined types were placed in this encounter.      Requested Prescriptions      No prescriptions requested or ordered in this encounter       Review and/or summary of lab tests, radiology, procedures, medications. Review and summary of old records and obtaining of history. The risks and benefits of my recommendations, as well as other treatment options were discussed with the patient today. Questions were answered.    Follow-up: Return if symptoms worsen or fail to improve, for After the above.      ESOPHAGOGASTRODUODENOSCOPY--schedule in 6-8 weeks (N/A), COLONOSCOPY (N/A)      This document has been electronically signed by Rene Saenz PA-C on April 10, 2018 7:22 PM      Results for orders placed or performed in visit on 04/09/18   Iron and TIBC   Result Value Ref Range    Iron 126 49 - 181 mcg/dL    TIBC 345 261 - 462 mcg/dL    Iron Saturation 37 20 - 55 %   Protime-INR   Result Value Ref Range    Protime 14.0 11.1 - 15.3 Seconds    INR 1.10 0.80 - 1.20   Ferritin   Result Value Ref Range    Ferritin 179.00 17.90 - 464.00 ng/mL   Results for orders placed or performed during the hospital encounter of 04/05/18   Gold Top - SST   Result Value Ref Range    Extra Tube Hold for add-ons.    Green Top (Gel)   Result Value Ref Range    Extra Tube Hold for add-ons.    Urinalysis With / Culture If Indicated - Urine, Clean Catch   Result Value Ref Range    Color, UA Dark Yellow Yellow, Straw, Dark Yellow, Amelia    Appearance, UA Clear Clear    pH, UA 7.0 5.0 - 9.0    Specific Gravity, UA 1.024 1.003 - 1.030    Glucose, UA Negative Negative    Ketones, UA Negative Negative    Bilirubin, UA Negative Negative    Blood, UA Negative Negative    Protein, UA Trace (A) Negative    Leuk Esterase, UA Negative Negative    Nitrite, UA Negative Negative    Urobilinogen, UA 1.0 E.U./dL 0.2 - 1.0 E.U./dL   CBC Auto Differential   Result Value Ref Range    WBC 7.33 3.20 - 9.80 10*3/mm3    RBC 5.30 4.37 - 5.74 10*6/mm3    Hemoglobin 16.1 13.7 - 17.3 g/dL    Hematocrit 46.8 39.0 - 49.0 %    MCV 88.3 80.0 - 98.0 fL    MCH 30.4 26.5 - 34.0 pg    MCHC 34.4 31.5 - 36.3 g/dL    RDW 14.2 11.5 - 14.5 %    RDW-SD 45.9 (H) 35.1 - 43.9 fl    MPV 10.1 8.0 - 12.0 fL    Platelets 247 150 - 450 10*3/mm3    Neutrophil % 74.8 37.0 - 80.0 %    Lymphocyte % 10.1 10.0 - 50.0 %    Monocyte % 12.4 (H) 0.0 - 12.0 %    Eosinophil % 1.8 0.0 - 7.0 %    Basophil % 0.4 0.0 - 2.0 %    Immature Grans % 0.5 0.0 - 0.5 %    Neutrophils, Absolute 5.48 2.00 -  8.60 10*3/mm3    Lymphocytes, Absolute 0.74 0.60 - 4.20 10*3/mm3    Monocytes, Absolute 0.91 (H) 0.00 - 0.90 10*3/mm3    Eosinophils, Absolute 0.13 0.00 - 0.70 10*3/mm3    Basophils, Absolute 0.03 0.00 - 0.20 10*3/mm3    Immature Grans, Absolute 0.04 (H) 0.00 - 0.02 10*3/mm3   Lavender Top   Result Value Ref Range    Extra Tube hold for add-on    Light Blue Top   Result Value Ref Range    Extra Tube hold for add-on    Troponin   Result Value Ref Range    Troponin I <0.012 <=0.034 ng/mL   D-dimer, Quantitative   Result Value Ref Range    D-Dimer, Quantitative 2,376 (H) 0 - 470 ng/mL (FEU)   Influenza Antigen, Rapid - Swab, Nasopharynx   Result Value Ref Range    Influenza A Ag, EIA Negative Negative    Influenza B Ag, EIA Negative Negative   C-reactive Protein   Result Value Ref Range    C-Reactive Protein 0.90 0.00 - 1.00 mg/dL   BNP   Result Value Ref Range    proBNP 123.0 0.0 - 450.0 pg/mL   Lipase   Result Value Ref Range    Lipase 144 23 - 300 U/L   Comprehensive Metabolic Panel   Result Value Ref Range    Glucose 113 (H) 60 - 100 mg/dL    BUN 11 7 - 21 mg/dL    Creatinine 1.04 0.70 - 1.30 mg/dL    Sodium 138 137 - 145 mmol/L    Potassium 3.8 3.5 - 5.1 mmol/L    Chloride 101 95 - 110 mmol/L    CO2 23.0 22.0 - 31.0 mmol/L    Calcium 8.5 8.4 - 10.2 mg/dL    Total Protein 6.4 6.3 - 8.6 g/dL    Albumin 3.60 3.40 - 4.80 g/dL    ALT (SGPT) 87 (H) 21 - 72 U/L    AST (SGOT) 79 (H) 17 - 59 U/L    Alkaline Phosphatase 31 (L) 38 - 126 U/L    Total Bilirubin 0.4 0.2 - 1.3 mg/dL    eGFR Non  Amer 79 63 - 147 mL/min/1.73    Globulin 2.8 2.3 - 3.5 gm/dL    A/G Ratio 1.3 1.1 - 1.8 g/dL    BUN/Creatinine Ratio 10.6 7.0 - 25.0    Anion Gap 14.0 5.0 - 15.0 mmol/L   Results for orders placed or performed during the hospital encounter of 03/27/18   Gold Top - SST   Result Value Ref Range    Extra Tube Hold for add-ons.    Green Top (Gel)   Result Value Ref Range    Extra Tube Hold for add-ons.    Urinalysis, Microscopic Only  - Urine, Clean Catch   Result Value Ref Range    RBC, UA None Seen None Seen /HPF    WBC, UA 3-5 None Seen, 0-2, 3-5 /HPF    Bacteria, UA None Seen None Seen /HPF    Squamous Epithelial Cells, UA 0-2 None Seen, 0-2 /HPF    Hyaline Casts, UA None Seen None Seen /LPF    Mucus, UA Moderate/2+ (A) None Seen, Trace /HPF    Methodology Manual Light Microscopy    Urinalysis With / Culture If Indicated - Urine, Clean Catch   Result Value Ref Range    Color, UA Dark Yellow Yellow, Straw, Dark Yellow, Amelia    Appearance, UA Clear Clear    pH, UA 7.0 5.0 - 9.0    Specific Gravity, UA 1.029 1.003 - 1.030    Glucose, UA Negative Negative    Ketones, UA 40 mg/dL (2+) (A) Negative    Bilirubin, UA Small (1+) (A) Negative    Blood, UA Negative Negative    Protein,  mg/dL (2+) (A) Negative    Leuk Esterase, UA Trace (A) Negative    Nitrite, UA Negative Negative    Urobilinogen, UA 1.0 E.U./dL 0.2 - 1.0 E.U./dL   CBC Auto Differential   Result Value Ref Range    WBC 6.19 3.20 - 9.80 10*3/mm3    RBC 4.67 4.37 - 5.74 10*6/mm3    Hemoglobin 14.2 13.7 - 17.3 g/dL    Hematocrit 42.1 39.0 - 49.0 %    MCV 90.1 80.0 - 98.0 fL    MCH 30.4 26.5 - 34.0 pg    MCHC 33.7 31.5 - 36.3 g/dL    RDW 14.0 11.5 - 14.5 %    RDW-SD 46.0 (H) 35.1 - 43.9 fl    MPV 9.9 8.0 - 12.0 fL    Platelets 220 150 - 450 10*3/mm3    Neutrophil % 69.6 37.0 - 80.0 %    Lymphocyte % 17.1 10.0 - 50.0 %    Monocyte % 11.1 0.0 - 12.0 %    Eosinophil % 1.6 0.0 - 7.0 %    Basophil % 0.3 0.0 - 2.0 %    Immature Grans % 0.3 0.0 - 0.5 %    Neutrophils, Absolute 4.30 2.00 - 8.60 10*3/mm3    Lymphocytes, Absolute 1.06 0.60 - 4.20 10*3/mm3    Monocytes, Absolute 0.69 0.00 - 0.90 10*3/mm3    Eosinophils, Absolute 0.10 0.00 - 0.70 10*3/mm3    Basophils, Absolute 0.02 0.00 - 0.20 10*3/mm3    Immature Grans, Absolute 0.02 0.00 - 0.02 10*3/mm3     *Note: Due to a large number of results and/or encounters for the requested time period, some results have not been displayed. A complete  set of results can be found in Results Review.       Some portions of this note have been dictated using voice recognition software and may contain errors and/or omissions.

## 2018-04-10 ENCOUNTER — APPOINTMENT (OUTPATIENT)
Dept: LAB | Facility: HOSPITAL | Age: 43
End: 2018-04-10

## 2018-04-10 ENCOUNTER — OFFICE VISIT (OUTPATIENT)
Dept: SLEEP MEDICINE | Facility: HOSPITAL | Age: 43
End: 2018-04-10

## 2018-04-10 VITALS
SYSTOLIC BLOOD PRESSURE: 122 MMHG | HEIGHT: 72 IN | DIASTOLIC BLOOD PRESSURE: 69 MMHG | BODY MASS INDEX: 30.2 KG/M2 | WEIGHT: 223 LBS

## 2018-04-10 DIAGNOSIS — G47.33 OBSTRUCTIVE SLEEP APNEA, ADULT: Primary | ICD-10-CM

## 2018-04-10 LAB
FERRITIN SERPL-MCNC: 179 NG/ML (ref 17.9–464)
INR PPP: 1.1 (ref 0.8–1.2)
IRON 24H UR-MRATE: 126 MCG/DL (ref 49–181)
IRON SATN MFR SERPL: 37 % (ref 20–55)
PROTHROMBIN TIME: 14 SECONDS (ref 11.1–15.3)
TIBC SERPL-MCNC: 345 MCG/DL (ref 261–462)

## 2018-04-10 PROCEDURE — 82947 ASSAY GLUCOSE BLOOD QUANT: CPT | Performed by: PHYSICIAN ASSISTANT

## 2018-04-10 PROCEDURE — 82105 ALPHA-FETOPROTEIN SERUM: CPT | Performed by: PHYSICIAN ASSISTANT

## 2018-04-10 PROCEDURE — 86038 ANTINUCLEAR ANTIBODIES: CPT | Performed by: PHYSICIAN ASSISTANT

## 2018-04-10 PROCEDURE — 82390 ASSAY OF CERULOPLASMIN: CPT | Performed by: PHYSICIAN ASSISTANT

## 2018-04-10 PROCEDURE — 82728 ASSAY OF FERRITIN: CPT | Performed by: PHYSICIAN ASSISTANT

## 2018-04-10 PROCEDURE — 82172 ASSAY OF APOLIPOPROTEIN: CPT | Performed by: PHYSICIAN ASSISTANT

## 2018-04-10 PROCEDURE — 83550 IRON BINDING TEST: CPT | Performed by: PHYSICIAN ASSISTANT

## 2018-04-10 PROCEDURE — 99213 OFFICE O/P EST LOW 20 MIN: CPT | Performed by: INTERNAL MEDICINE

## 2018-04-10 PROCEDURE — 83540 ASSAY OF IRON: CPT | Performed by: PHYSICIAN ASSISTANT

## 2018-04-10 PROCEDURE — 85610 PROTHROMBIN TIME: CPT | Performed by: PHYSICIAN ASSISTANT

## 2018-04-10 PROCEDURE — 84460 ALANINE AMINO (ALT) (SGPT): CPT | Performed by: PHYSICIAN ASSISTANT

## 2018-04-10 PROCEDURE — 82465 ASSAY BLD/SERUM CHOLESTEROL: CPT | Performed by: PHYSICIAN ASSISTANT

## 2018-04-10 PROCEDURE — 82977 ASSAY OF GGT: CPT | Performed by: PHYSICIAN ASSISTANT

## 2018-04-10 PROCEDURE — 83883 ASSAY NEPHELOMETRY NOT SPEC: CPT | Performed by: PHYSICIAN ASSISTANT

## 2018-04-10 PROCEDURE — 84450 TRANSFERASE (AST) (SGOT): CPT | Performed by: PHYSICIAN ASSISTANT

## 2018-04-10 PROCEDURE — 80074 ACUTE HEPATITIS PANEL: CPT | Performed by: PHYSICIAN ASSISTANT

## 2018-04-10 PROCEDURE — 83010 ASSAY OF HAPTOGLOBIN QUANT: CPT | Performed by: PHYSICIAN ASSISTANT

## 2018-04-10 PROCEDURE — 36415 COLL VENOUS BLD VENIPUNCTURE: CPT | Performed by: PHYSICIAN ASSISTANT

## 2018-04-10 PROCEDURE — 82103 ALPHA-1-ANTITRYPSIN TOTAL: CPT | Performed by: PHYSICIAN ASSISTANT

## 2018-04-10 PROCEDURE — 83516 IMMUNOASSAY NONANTIBODY: CPT | Performed by: PHYSICIAN ASSISTANT

## 2018-04-10 PROCEDURE — 82247 BILIRUBIN TOTAL: CPT | Performed by: PHYSICIAN ASSISTANT

## 2018-04-10 PROCEDURE — 84478 ASSAY OF TRIGLYCERIDES: CPT | Performed by: PHYSICIAN ASSISTANT

## 2018-04-10 NOTE — PROGRESS NOTES
Sleep Clinic Follow Up    Date: 4/10/2018  Primary Care Physician: VIKASH Linda      Interim History (1/3):  Since the last visit on 01/03/2018, patient has:      1)  DONNIE - he had overnight diagnostic polysomnography on January 31, 2018.  It showed an apnea hypopnea index of 9.7, elevated arousal index, elevated periodic limb movement index, and oxygen gayathri of 81%. He was started on autocpap, but has reduced compliance secondary to hospitalization. His initial compliance was great, but of course dropped off. He denies mask and machine issues, dry mouth, headaches, pressures intolerance. He uses a FFM and had mild claustrophobia.     PAP Data:  Time frame: 02/21/2018 - 04/08/2018   Compliance 46.8%  PAP range : 5-20 cm H2O  Average 90% pressure: 12.2 cmH2O  Leak: 16.2 min   Average AHI 1.9 events/hr  Mask type: FFM  DME: [facility]    Bed time: 8953-9899  Sleep latency: 5 minutes  Number of times awakens during the night: 1  Wake time: 0630  Estimated total sleep time at night: 8 hours  Caffeine intake: 1 coffee, ctea  Alcohol intake: none  Nap time: none  Sleepiness with Driving: none    [place] - 6    2) Patient RLS symptoms are better with CPAP    PMHx, FH, SH reviewed and pertinent changes are: He was hospitalized for 7 days with pancreatitis and diverticulitis. He lost 25 lbs. He returned to ED and was diagnosed with bronchitis.       REVIEW OF SYSTEMS:   Negative for chest pain, fever, chills, SOA, abdominal pain. Smoking: none      Exam (6-11/12):    Vitals:    04/10/18 1310   BP: 122/69     Body mass index is 30.24 kg/m². Discussed the patient's BMI with him. BMI is above normal parameters. Follow-up plan includes:  referral to primary care.      Gen:  No distress, conversant, pleasant, appears stated age, alert, oriented  Eyes:   Anicteric sclera, moist conjunctiva, no lid lag     PERRLA, EOMI   Heent:   NC/AT    Oropharynx clear, Mallampati 4, s/p UPPP    normal hearing  Lungs:  Normal effort,  non-labored breathing    Clear to auscultation    CV:  Normal S1/S2, no murmur    no lower extremity edema  ABD:  Soft, normal bowel sounds    Psych:  Appropriate affect  Neuro:  CN 2-12 intact    Past Medical History:   Diagnosis Date   • A-fib    • Anxiety    • Arrhythmia    • Arthritis     rt shoulder    • Atrial flutter 07/2016    EP  ablation x2    • Depression    • Diverticulitis    • ETOH abuse    • GERD (gastroesophageal reflux disease)    • Kidney stone    • Pancreatitis    • Sleep apnea     have used a machine in the past   • Tendonitis     rt shoulder       Current Outpatient Prescriptions:   •  albuterol (PROVENTIL HFA;VENTOLIN HFA) 108 (90 Base) MCG/ACT inhaler, Inhale 2 puffs Every 4 (Four) Hours As Needed for Wheezing or Shortness of Air. 2 puffs q 2 hours for 2 days then 2 puffs q 4 hours  PRN., Disp: 18 g, Rfl: 2  •  carvedilol (COREG) 6.25 MG tablet, Take 1 tablet by mouth Every 12 (Twelve) Hours., Disp: 60 tablet, Rfl: 0  •  clonazePAM (KlonoPIN) 1 MG tablet, Take 1 mg by mouth 2 (Two) Times a Day As Needed for Anxiety., Disp: , Rfl:   •  DULoxetine (CYMBALTA) 60 MG capsule, Take 60 mg by mouth Daily., Disp: , Rfl:   •  Esomeprazole Magnesium (NEXIUM 24HR) 20 MG tablet delayed-release, Take 40 mg by mouth Every Morning Before Breakfast., Disp: 60 tablet, Rfl: 0  •  levoFLOXacin (LEVAQUIN) 750 MG tablet, Take 1 tablet by mouth Daily for 9 days., Disp: 9 tablet, Rfl: 0  •  lisinopril (PRINIVIL,ZESTRIL) 5 MG tablet, Take 5 mg by mouth Daily., Disp: , Rfl:   •  magnesium oxide (MAGOX) 400 (241.3 MG) MG tablet tablet, Take 400 mg by mouth 2 (Two) Times a Day., Disp: , Rfl:   •  metroNIDAZOLE (FLAGYL) 500 MG tablet, Take 1 tablet by mouth 3 (Three) Times a Day for 9 days., Disp: 27 tablet, Rfl: 0  •  ondansetron ODT (ZOFRAN-ODT) 4 MG disintegrating tablet, Take 1 tablet by mouth Every 6 (Six) Hours As Needed for Nausea or Vomiting., Disp: 30 tablet, Rfl: 0  •  promethazine (PHENERGAN) 25 MG suppository,  Insert 1 suppository into the rectum Every 6 (Six) Hours As Needed for Nausea or Vomiting., Disp: 15 suppository, Rfl: 0  •  sucralfate (CARAFATE) 1 g tablet, Take 1 tablet by mouth 5 (Five) Times a Day As Needed (abdominal pain)., Disp: 30 tablet, Rfl: 0      ASSESSMENT / PLAN:     1. Obstructive sleep apnea  1. PSG on 2/27/2013, AHI of 15  2. Currently on 5-20 cm H2O  3. Continue PAP as prescribed.   4. Increase compliance   5. Return to clinic in 3 months with compliance check unless sx change in the interim period.  2. Afib  3. Depression -   1. Better now back on Cymbalta        Total time 15 min, more than half spent in face to face counseling and coordination of care.     This document has been electronically signed by Doug Lezama MD on April 10, 2018         CC: VIKASH Linda          No ref. provider found

## 2018-04-11 LAB
A1AT SERPL-MCNC: 182 MG/DL (ref 90–200)
ACTIN IGG SERPL-ACNC: 10 UNITS (ref 0–19)
AFP-TM SERPL-MCNC: 2.2 NG/ML (ref 0–8.3)
ANA SER QL IA: NEGATIVE
CERULOPLASMIN SERPL-MCNC: 24 MG/DL (ref 16–31)
DEPRECATED MITOCHONDRIA M2 IGG SER-ACNC: <20 UNITS (ref 0–20)
HAV IGM SERPL QL IA: NEGATIVE
HBV CORE IGM SERPL QL IA: NEGATIVE
HBV SURFACE AG SERPL QL IA: NEGATIVE
HCV AB SER DONR QL: NEGATIVE

## 2018-04-12 ENCOUNTER — HOSPITAL ENCOUNTER (OUTPATIENT)
Dept: NUCLEAR MEDICINE | Facility: HOSPITAL | Age: 43
Discharge: HOME | End: 2018-04-12

## 2018-04-12 DIAGNOSIS — R10.13 EPIGASTRIC PAIN: ICD-10-CM

## 2018-04-12 LAB
A2 MACROGLOB SERPL-MCNC: 106 MG/DL (ref 110–276)
ALT SERPL W P-5'-P-CCNC: 72 IU/L (ref 0–55)
APO A-I SERPL-MCNC: 86 MG/DL (ref 101–178)
AST SERPL W P-5'-P-CCNC: 48 IU/L (ref 0–40)
BILIRUB SERPL-MCNC: 0.6 MG/DL (ref 0–1.2)
CHOLEST SERPL-MCNC: 141 MG/DL (ref 100–199)
FIBROSIS SCORING:: ABNORMAL
FIBROSIS STAGE SERPL QL: ABNORMAL
GGT SERPL-CCNC: 24 IU/L (ref 0–65)
GLUCOSE SERPL-MCNC: 75 MG/DL (ref 65–99)
HAPTOGLOB SERPL-MCNC: 211 MG/DL (ref 34–200)
INTERPRETATIONS: (REFERENCE): ABNORMAL
LABORATORY COMMENT REPORT: ABNORMAL
LIMITATIONS: (REFERENCE): ABNORMAL
LIVER FIBR SCORE SERPL CALC.FIBROSURE: 0.09 (ref 0–0.21)
NASH GRADE (REFERENCE): ABNORMAL
NASH SCORE (REFERENCE): 0.5
NASH SCORING (REFERENCE): ABNORMAL
STEATOSIS GRADE (REFERENCE): ABNORMAL
STEATOSIS GRADING (REFERENCE): ABNORMAL
STEATOSIS SCORE (REFERENCE): 0.7 (ref 0–0.3)
TRIGL SERPL-MCNC: 91 MG/DL (ref 0–149)
WEIGHT: (REFERENCE): 223 LBS

## 2018-04-12 PROCEDURE — 25010000002 SINCALIDE PER 5 MCG: Performed by: NURSE PRACTITIONER

## 2018-04-12 PROCEDURE — A9537 TC99M MEBROFENIN: HCPCS | Performed by: NURSE PRACTITIONER

## 2018-04-12 PROCEDURE — 78227 HEPATOBIL SYST IMAGE W/DRUG: CPT

## 2018-04-12 PROCEDURE — 0 TECHNETIUM TC 99M MEBROFENIN KIT: Performed by: NURSE PRACTITIONER

## 2018-04-12 RX ORDER — SODIUM CHLORIDE 9 MG/ML
50 INJECTION, SOLUTION INTRAVENOUS ONCE
Status: COMPLETED | OUTPATIENT
Start: 2018-04-12 | End: 2018-04-12

## 2018-04-12 RX ORDER — KIT FOR THE PREPARATION OF TECHNETIUM TC 99M MEBROFENIN 45 MG/10ML
1 INJECTION, POWDER, LYOPHILIZED, FOR SOLUTION INTRAVENOUS
Status: COMPLETED | OUTPATIENT
Start: 2018-04-12 | End: 2018-04-12

## 2018-04-12 RX ADMIN — SODIUM CHLORIDE 50 ML/HR: 900 INJECTION, SOLUTION INTRAVENOUS at 14:34

## 2018-04-12 RX ADMIN — SINCALIDE 2 MCG: 5 INJECTION, POWDER, LYOPHILIZED, FOR SOLUTION INTRAVENOUS at 14:34

## 2018-04-12 RX ADMIN — MEBROFENIN 1 DOSE: 45 INJECTION, POWDER, LYOPHILIZED, FOR SOLUTION INTRAVENOUS at 13:30

## 2018-05-16 ENCOUNTER — LAB (OUTPATIENT)
Dept: ONCOLOGY | Facility: HOSPITAL | Age: 43
End: 2018-05-16

## 2018-05-16 ENCOUNTER — OFFICE VISIT (OUTPATIENT)
Dept: ONCOLOGY | Facility: CLINIC | Age: 43
End: 2018-05-16

## 2018-05-16 VITALS
DIASTOLIC BLOOD PRESSURE: 70 MMHG | HEIGHT: 72 IN | WEIGHT: 222.6 LBS | SYSTOLIC BLOOD PRESSURE: 111 MMHG | HEART RATE: 69 BPM | BODY MASS INDEX: 30.15 KG/M2 | RESPIRATION RATE: 16 BRPM | TEMPERATURE: 98.8 F

## 2018-05-16 DIAGNOSIS — D75.1 POLYCYTHEMIA, SECONDARY: ICD-10-CM

## 2018-05-16 DIAGNOSIS — D75.1 SECONDARY ERYTHROCYTOSIS: Primary | ICD-10-CM

## 2018-05-16 LAB
BASOPHILS # BLD AUTO: 0.03 10*3/MM3 (ref 0–0.2)
BASOPHILS NFR BLD AUTO: 0.4 % (ref 0–2)
DEPRECATED RDW RBC AUTO: 53 FL (ref 35.1–43.9)
EOSINOPHIL # BLD AUTO: 0.16 10*3/MM3 (ref 0–0.7)
EOSINOPHIL NFR BLD AUTO: 2.2 % (ref 0–7)
ERYTHROCYTE [DISTWIDTH] IN BLOOD BY AUTOMATED COUNT: 16.3 % (ref 11.5–14.5)
HCT VFR BLD AUTO: 46.5 % (ref 39–49)
HGB BLD-MCNC: 15.7 G/DL (ref 13.7–17.3)
IMM GRANULOCYTES # BLD: 0.03 10*3/MM3 (ref 0–0.02)
IMM GRANULOCYTES NFR BLD: 0.4 % (ref 0–0.5)
LYMPHOCYTES # BLD AUTO: 1.33 10*3/MM3 (ref 0.6–4.2)
LYMPHOCYTES NFR BLD AUTO: 18.7 % (ref 10–50)
MCH RBC QN AUTO: 29.7 PG (ref 26.5–34)
MCHC RBC AUTO-ENTMCNC: 33.8 G/DL (ref 31.5–36.3)
MCV RBC AUTO: 88.1 FL (ref 80–98)
MONOCYTES # BLD AUTO: 0.87 10*3/MM3 (ref 0–0.9)
MONOCYTES NFR BLD AUTO: 12.2 % (ref 0–12)
NEUTROPHILS # BLD AUTO: 4.71 10*3/MM3 (ref 2–8.6)
NEUTROPHILS NFR BLD AUTO: 66.1 % (ref 37–80)
PLATELET # BLD AUTO: 274 10*3/MM3 (ref 150–450)
PMV BLD AUTO: 9.9 FL (ref 8–12)
RBC # BLD AUTO: 5.28 10*6/MM3 (ref 4.37–5.74)
WBC NRBC COR # BLD: 7.13 10*3/MM3 (ref 3.2–9.8)

## 2018-05-16 PROCEDURE — 99214 OFFICE O/P EST MOD 30 MIN: CPT | Performed by: NURSE PRACTITIONER

## 2018-05-16 PROCEDURE — G0463 HOSPITAL OUTPT CLINIC VISIT: HCPCS | Performed by: NURSE PRACTITIONER

## 2018-05-16 PROCEDURE — 85025 COMPLETE CBC W/AUTO DIFF WBC: CPT | Performed by: NURSE PRACTITIONER

## 2018-05-16 NOTE — PROGRESS NOTES
DATE OF VISIT: 5/16/2018    REASON FOR VISIT:  Follow-up for erythrocytosis, secondary    HISTORY OF PRESENT ILLNESS:  42 yr old male with history of sleep apnea diagnosed 3 yrs ago but doesn't use his CPAP machine. He just saw Dr. Lezama in sleep clinic and is scheduled for sleep study the end of this month. He has a past history of testosterone use and a history of atrial flutter that has required ablation. He was initially seen by Dr. Monet in October for elevated H&H. He had an extensive work up that included a negative BAKARI 2 mutation, Normal EPO level and Ultrasound of abdomen that was unremarkable. He has had couple therapeutic phlebotomy; last one was in January. He was seen by sleep medicine and found to have mild DONNIE. His labs have been adequate last several visits and no intervention needed.    Since he was here last he was hospitalized for pancreatitis and diverticulitis. He is scheduled to have EGD and colonoscopy later this month with Dr. Ureña. He states he is back to wearing his sleep apnea machine. He denies any symptoms of erythromyalgia.    PAST MEDICAL HISTORY:    Past Medical History:   Diagnosis Date   • A-fib    • Anxiety    • Arrhythmia    • Arthritis     rt shoulder    • Atrial flutter 07/2016    EP  ablation x2    • Depression    • Diverticulitis    • ETOH abuse    • GERD (gastroesophageal reflux disease)    • Kidney stone    • Pancreatitis    • Sleep apnea     have used a machine in the past   • Tendonitis     rt shoulder       SOCIAL HISTORY:    Social History   Substance Use Topics   • Smoking status: Never Smoker   • Smokeless tobacco: Never Used   • Alcohol use No      Comment: quit 3 weeks ago       Surgical History :  Past Surgical History:   Procedure Laterality Date   • CARDIAC ELECTROPHYSIOLOGY PROCEDURE N/A 5/2/2017    Procedure: Ablation atrial flutter;  Surgeon: Ramirez Reza MD;  Location: [facility] INVASIVE LOCATION;  Service:    • CARDIAC ELECTROPHYSIOLOGY PROCEDURE N/A  "9/5/2017    Procedure: Ablation atrial fibrillation PVA ;  Surgeon: Ramirez Reza MD;  Location: [facility] INVASIVE LOCATION;  Service:    • COLONOSCOPY     • ENDOSCOPY N/A 3/30/2018    Procedure: ESOPHAGOGASTRODUODENOSCOPY;  Surgeon: Edwin Medina MD;  Location: [facility] ENDOSCOPY;  Service: Gastroenterology   • SHOULDER SURGERY Right 2014   • TONSILLECTOMY  12/2015   • UPPER GASTROINTESTINAL ENDOSCOPY  03/30/2018   • UPPER GASTROINTESTINAL ENDOSCOPY         ALLERGIES:    Allergies   Allergen Reactions   • Contrast Dye Anaphylaxis     ANAPHYLAXIS   • Iodinated Diagnostic Agents Anaphylaxis       REVIEW OF SYSTEMS:      CONSTITUTIONAL:  No fever, chills, or night sweats.     HEENT:  No epistaxis, mouth sores, or difficulty swallowing.    RESPIRATORY:  No new shortness of breath or cough at present.    CARDIOVASCULAR:  No chest pain or palpitations.    GASTROINTESTINAL:  No abdominal pain, nausea, vomiting, or blood in the stool.    GENITOURINARY:  No dysuria or hematuria.    MUSCULOSKELETAL:  No any new back pain or arthralgias.     NEUROLOGICAL:  No tingling or numbness. No new headache or dizziness.     LYMPHATICS:  Denies any abnormal swollen and anywhere in the body.    SKIN:  Denies any new skin rash.    PHYSICAL EXAMINATION:      VITAL SIGNS:  /70   Pulse 69   Temp 98.8 °F (37.1 °C) (Temporal Artery )   Resp 16   Ht 182.9 cm (72.01\")   Wt 101 kg (222 lb 9.6 oz)   BMI 30.18 kg/m²     GENERAL:  Not in any distress.    HEENT:  Normocephalic, Atraumatic.Mild Conjunctival pallor. No icterus. No Facial Asymmetry noted.    NECK:  No adenopathy. No JVD.    RESPIRATORY:  Fair air entry bilateral. No rhonchi or wheezing.    CARDIOVASCULAR:  S1, S2. Regular rate and rhythm. No murmur or gallop appreciated.    ABDOMEN:  Soft, obese, nontender. Bowel sounds present in all four quadrants.  No organomegaly appreciated.    EXTREMITIES:  No edema.No Calf Tenderness.    NEUROLOGIC:  Alert, awake and oriented ×3. "      SKIN : No new skin lesion identified  DIAGNOSTIC DATA:    Glucose   Date Value Ref Range Status   04/05/2018 113 (H) 60 - 100 mg/dL Final     Sodium   Date Value Ref Range Status   04/05/2018 138 137 - 145 mmol/L Final     Potassium   Date Value Ref Range Status   04/05/2018 3.8 3.5 - 5.1 mmol/L Final     CO2   Date Value Ref Range Status   04/05/2018 23.0 22.0 - 31.0 mmol/L Final     Chloride   Date Value Ref Range Status   04/05/2018 101 95 - 110 mmol/L Final     Anion Gap   Date Value Ref Range Status   04/05/2018 14.0 5.0 - 15.0 mmol/L Final     Creatinine   Date Value Ref Range Status   04/05/2018 1.04 0.70 - 1.30 mg/dL Final     BUN   Date Value Ref Range Status   04/05/2018 11 7 - 21 mg/dL Final     BUN/Creatinine Ratio   Date Value Ref Range Status   04/05/2018 10.6 7.0 - 25.0 Final     Calcium   Date Value Ref Range Status   04/05/2018 8.5 8.4 - 10.2 mg/dL Final     eGFR Non  Amer   Date Value Ref Range Status   04/05/2018 79 63 - 147 mL/min/1.73 Final     Alkaline Phosphatase   Date Value Ref Range Status   04/05/2018 31 (L) 38 - 126 U/L Final     Total Protein   Date Value Ref Range Status   04/05/2018 6.4 6.3 - 8.6 g/dL Final     ALT (SGPT)   Date Value Ref Range Status   04/05/2018 87 (H) 21 - 72 U/L Final     AST (SGOT)   Date Value Ref Range Status   04/05/2018 79 (H) 17 - 59 U/L Final     Total Bilirubin   Date Value Ref Range Status   04/05/2018 0.4 0.2 - 1.3 mg/dL Final     Albumin   Date Value Ref Range Status   04/05/2018 3.60 3.40 - 4.80 g/dL Final     Globulin   Date Value Ref Range Status   04/05/2018 2.8 2.3 - 3.5 gm/dL Final     A/G Ratio   Date Value Ref Range Status   04/05/2018 1.3 1.1 - 1.8 g/dL Final     Lab Results   Component Value Date    WBC 7.13 05/16/2018    HGB 15.7 05/16/2018    HCT 46.5 05/16/2018    MCV 88.1 05/16/2018     05/16/2018     Lab Results   Component Value Date    NEUTROABS 4.71 05/16/2018    IRON 126 04/10/2018    TIBC 345 04/10/2018    LABIRON  37 04/10/2018    FERRITIN 179.00 04/10/2018     Lab Results   Component Value Date    AFPTM 2.2 04/10/2018    AFPTM 182 04/10/2018   ]        ASSESSMENT AND PLAN:      1. Erythrocytosis,secondary to past testosterone use and an element of DONNIE;  Hct today is 46.5%,  No intervention needed at this time. Will see him back in 4 mos. He is already taking baby ASA for his atrial fibrillation.      2. Atrial fibrillation, pt was just taken off the Eliquis by his cardiologist and is currently taking baby ASA daily. He has an appointment to see him again in June.     3. Health maintenance, he does not smoke; he is having EGD and colonoscopy later this month for follow-up from recent hospitalization for pancreatitis, esophagitis and diverticulitis.             Patient's Body mass index is 30.18 kg/m². BMI is within normal parameters. No follow-up required. Patient did lose about 25 lbs due to his recent hospitalization.          This document has been signed by VIKASH Clinton on May 16, 2018 8:35 AM

## 2018-05-30 ENCOUNTER — ANESTHESIA (OUTPATIENT)
Dept: GASTROENTEROLOGY | Facility: HOSPITAL | Age: 43
End: 2018-05-30

## 2018-05-30 ENCOUNTER — ANESTHESIA EVENT (OUTPATIENT)
Dept: GASTROENTEROLOGY | Facility: HOSPITAL | Age: 43
End: 2018-05-30

## 2018-05-30 ENCOUNTER — HOSPITAL ENCOUNTER (OUTPATIENT)
Facility: HOSPITAL | Age: 43
Setting detail: HOSPITAL OUTPATIENT SURGERY
Discharge: HOME | End: 2018-05-30
Attending: INTERNAL MEDICINE | Admitting: INTERNAL MEDICINE

## 2018-05-30 VITALS
OXYGEN SATURATION: 95 % | HEART RATE: 83 BPM | SYSTOLIC BLOOD PRESSURE: 118 MMHG | HEIGHT: 72 IN | DIASTOLIC BLOOD PRESSURE: 74 MMHG | WEIGHT: 217 LBS | RESPIRATION RATE: 20 BRPM | BODY MASS INDEX: 29.39 KG/M2 | TEMPERATURE: 98.7 F

## 2018-05-30 DIAGNOSIS — K85.21 ALCOHOL-INDUCED ACUTE PANCREATITIS WITH UNINFECTED NECROSIS: ICD-10-CM

## 2018-05-30 DIAGNOSIS — R74.8 ELEVATED LIVER ENZYMES: ICD-10-CM

## 2018-05-30 DIAGNOSIS — R10.84 GENERALIZED ABDOMINAL PAIN: ICD-10-CM

## 2018-05-30 DIAGNOSIS — R63.4 WEIGHT LOSS, UNINTENTIONAL: ICD-10-CM

## 2018-05-30 DIAGNOSIS — K21.00 GASTROESOPHAGEAL REFLUX DISEASE WITH ESOPHAGITIS: ICD-10-CM

## 2018-05-30 PROCEDURE — 94640 AIRWAY INHALATION TREATMENT: CPT

## 2018-05-30 PROCEDURE — 88305 TISSUE EXAM BY PATHOLOGIST: CPT | Performed by: INTERNAL MEDICINE

## 2018-05-30 PROCEDURE — 88305 TISSUE EXAM BY PATHOLOGIST: CPT | Performed by: PATHOLOGY

## 2018-05-30 PROCEDURE — 25010000002 PROPOFOL 10 MG/ML EMULSION: Performed by: NURSE ANESTHETIST, CERTIFIED REGISTERED

## 2018-05-30 PROCEDURE — 43239 EGD BIOPSY SINGLE/MULTIPLE: CPT | Performed by: INTERNAL MEDICINE

## 2018-05-30 PROCEDURE — 45380 COLONOSCOPY AND BIOPSY: CPT | Performed by: INTERNAL MEDICINE

## 2018-05-30 RX ORDER — MEPERIDINE HYDROCHLORIDE 50 MG/ML
12.5 INJECTION INTRAMUSCULAR; INTRAVENOUS; SUBCUTANEOUS
Status: DISCONTINUED | OUTPATIENT
Start: 2018-05-30 | End: 2018-05-30 | Stop reason: HOSPADM

## 2018-05-30 RX ORDER — DEXTROSE AND SODIUM CHLORIDE 5; .45 G/100ML; G/100ML
30 INJECTION, SOLUTION INTRAVENOUS CONTINUOUS PRN
Status: DISCONTINUED | OUTPATIENT
Start: 2018-05-30 | End: 2018-05-30 | Stop reason: HOSPADM

## 2018-05-30 RX ORDER — PROMETHAZINE HYDROCHLORIDE 25 MG/1
25 SUPPOSITORY RECTAL ONCE AS NEEDED
Status: DISCONTINUED | OUTPATIENT
Start: 2018-05-30 | End: 2018-05-30 | Stop reason: HOSPADM

## 2018-05-30 RX ORDER — LIDOCAINE HYDROCHLORIDE 10 MG/ML
INJECTION, SOLUTION INFILTRATION; PERINEURAL AS NEEDED
Status: DISCONTINUED | OUTPATIENT
Start: 2018-05-30 | End: 2018-05-30 | Stop reason: SURG

## 2018-05-30 RX ORDER — PROMETHAZINE HYDROCHLORIDE 25 MG/ML
12.5 INJECTION, SOLUTION INTRAMUSCULAR; INTRAVENOUS ONCE AS NEEDED
Status: DISCONTINUED | OUTPATIENT
Start: 2018-05-30 | End: 2018-05-30 | Stop reason: HOSPADM

## 2018-05-30 RX ORDER — ONDANSETRON 2 MG/ML
4 INJECTION INTRAMUSCULAR; INTRAVENOUS ONCE AS NEEDED
Status: DISCONTINUED | OUTPATIENT
Start: 2018-05-30 | End: 2018-05-30 | Stop reason: HOSPADM

## 2018-05-30 RX ORDER — PROPOFOL 10 MG/ML
VIAL (ML) INTRAVENOUS AS NEEDED
Status: DISCONTINUED | OUTPATIENT
Start: 2018-05-30 | End: 2018-05-30 | Stop reason: SURG

## 2018-05-30 RX ORDER — DEXAMETHASONE SODIUM PHOSPHATE 4 MG/ML
8 INJECTION, SOLUTION INTRA-ARTICULAR; INTRALESIONAL; INTRAMUSCULAR; INTRAVENOUS; SOFT TISSUE ONCE AS NEEDED
Status: DISCONTINUED | OUTPATIENT
Start: 2018-05-30 | End: 2018-05-30 | Stop reason: HOSPADM

## 2018-05-30 RX ORDER — PROMETHAZINE HYDROCHLORIDE 25 MG/1
25 TABLET ORAL ONCE AS NEEDED
Status: DISCONTINUED | OUTPATIENT
Start: 2018-05-30 | End: 2018-05-30 | Stop reason: HOSPADM

## 2018-05-30 RX ADMIN — PROPOFOL 100 MG: 10 INJECTION, EMULSION INTRAVENOUS at 15:45

## 2018-05-30 RX ADMIN — ALBUTEROL SULFATE 2.5 MG: 2.5 SOLUTION RESPIRATORY (INHALATION) at 16:53

## 2018-05-30 RX ADMIN — PROPOFOL 100 MG: 10 INJECTION, EMULSION INTRAVENOUS at 15:40

## 2018-05-30 RX ADMIN — PROPOFOL 100 MG: 10 INJECTION, EMULSION INTRAVENOUS at 15:47

## 2018-05-30 RX ADMIN — DEXTROSE AND SODIUM CHLORIDE 30 ML/HR: 5; 450 INJECTION, SOLUTION INTRAVENOUS at 14:17

## 2018-05-30 RX ADMIN — LIDOCAINE HYDROCHLORIDE 100 MG: 10 INJECTION, SOLUTION INFILTRATION; PERINEURAL at 15:35

## 2018-05-30 RX ADMIN — PROPOFOL 200 MG: 10 INJECTION, EMULSION INTRAVENOUS at 15:35

## 2018-05-30 RX ADMIN — PROPOFOL 100 MG: 10 INJECTION, EMULSION INTRAVENOUS at 15:42

## 2018-05-30 NOTE — ANESTHESIA PREPROCEDURE EVALUATION
Anesthesia Evaluation     Patient summary reviewed and Nursing notes reviewed   NPO Solid Status: > 8 hours  NPO Liquid Status: > 8 hours           Airway   No difficulty expected  Dental      Pulmonary    (+) sleep apnea,   Cardiovascular     (+) hypertension, dysrhythmias Atrial Flutter, Atrial Fib,       Neuro/Psych  GI/Hepatic/Renal/Endo    (+)  GERD,      Musculoskeletal     Abdominal    Substance History   (+) alcohol use,      OB/GYN          Other   (+) arthritis                     Anesthesia Plan    ASA 3     MAC     intravenous induction   Anesthetic plan and risks discussed with patient.    Plan discussed with CRNA.

## 2018-05-30 NOTE — ANESTHESIA POSTPROCEDURE EVALUATION
Patient: Stevenson Chilel    Procedure Summary     Date:  05/30/18 Room / Location:  [facility] ENDOSCOPY 1 / [facility] ENDOSCOPY    Anesthesia Start:  1533 Anesthesia Stop:  1602    Procedures:       ESOPHAGOGASTRODUODENOSCOPY (N/A Esophagus)      COLONOSCOPY (N/A ) Diagnosis:       Generalized abdominal pain      Elevated liver enzymes      Weight loss, unintentional      Gastroesophageal reflux disease with esophagitis      Alcohol-induced acute pancreatitis with uninfected necrosis      (Generalized abdominal pain [R10.84])      (Elevated liver enzymes [R74.8])      (Weight loss, unintentional [R63.4])      (Gastroesophageal reflux disease with esophagitis [K21.0])      (Alcohol-induced acute pancreatitis with uninfected necrosis [K85.21])    Surgeon:  Edwin Medina MD Provider:      Anesthesia Type:  MAC ASA Status:  3          Anesthesia Type: MAC  Last vitals  BP   149/89 (05/30/18 1402)   Temp   98.7 °F (37.1 °C) (05/30/18 1402)   Pulse   76 (05/30/18 1402)   Resp   16 (05/30/18 1402)     SpO2   97 % (05/30/18 1402)     Post Anesthesia Care and Evaluation    Patient location during evaluation: bedside  Patient participation: complete - patient participated  Level of consciousness: awake and alert  Pain score: 1  Pain management: adequate  Airway patency: patent  Anesthetic complications: No anesthetic complications  PONV Status: none  Cardiovascular status: acceptable  Respiratory status: acceptable  Hydration status: acceptable

## 2018-06-01 LAB
LAB AP CASE REPORT: NORMAL
Lab: NORMAL
PATH REPORT.FINAL DX SPEC: NORMAL
PATH REPORT.GROSS SPEC: NORMAL

## 2018-06-06 ENCOUNTER — OFFICE VISIT (OUTPATIENT)
Dept: GASTROENTEROLOGY | Facility: CLINIC | Age: 43
End: 2018-06-06

## 2018-06-06 VITALS
WEIGHT: 224.2 LBS | SYSTOLIC BLOOD PRESSURE: 138 MMHG | BODY MASS INDEX: 30.37 KG/M2 | DIASTOLIC BLOOD PRESSURE: 94 MMHG | HEART RATE: 89 BPM | HEIGHT: 72 IN

## 2018-06-06 DIAGNOSIS — R74.8 ELEVATED LIVER ENZYMES: ICD-10-CM

## 2018-06-06 DIAGNOSIS — K85.21 ALCOHOL-INDUCED ACUTE PANCREATITIS WITH UNINFECTED NECROSIS: ICD-10-CM

## 2018-06-06 DIAGNOSIS — R10.84 GENERALIZED ABDOMINAL PAIN: ICD-10-CM

## 2018-06-06 DIAGNOSIS — K21.00 GASTROESOPHAGEAL REFLUX DISEASE WITH ESOPHAGITIS: Primary | ICD-10-CM

## 2018-06-06 DIAGNOSIS — K57.30 DIVERTICULOSIS OF LARGE INTESTINE WITHOUT HEMORRHAGE: ICD-10-CM

## 2018-06-06 PROCEDURE — 99214 OFFICE O/P EST MOD 30 MIN: CPT | Performed by: PHYSICIAN ASSISTANT

## 2018-06-06 RX ORDER — DULOXETIN HYDROCHLORIDE 30 MG/1
60 CAPSULE, DELAYED RELEASE ORAL NIGHTLY
COMMUNITY
End: 2018-08-20 | Stop reason: ALTCHOICE

## 2018-06-06 NOTE — PATIENT INSTRUCTIONS
MyPlate from [employer]  The general, healthful diet is based on the 2010 Dietary Guidelines for Americans. The amount of food you need to eat from each food group depends on your age, sex, and level of physical activity and can be individualized by a dietitian. Go to ChooseMyPlate.gov for more information.  What do I need to know about the MyPlate plan?  · Enjoy your food, but eat less.  · Avoid oversized portions.  ¨ ½ of your plate should include fruits and vegetables.  ¨ ¼ of your plate should be grains.  ¨ ¼ of your plate should be protein.  Grains   · Make at least half of your grains whole grains.  · For a 2,000 calorie daily food plan, eat 6 oz every day.  · 1 oz is about 1 slice bread, 1 cup cereal, or ½ cup cooked rice, cereal, or pasta.  Vegetables   · Make half your plate fruits and vegetables.  · For a 2,000 calorie daily food plan, eat 2½ cups every day.  · 1 cup is about 1 cup raw or cooked vegetables or vegetable juice or 2 cups raw leafy greens.  Fruits   · Make half your plate fruits and vegetables.  · For a 2,000 calorie daily food plan, eat 2 cups every day.  · 1 cup is about 1 cup fruit or 100% fruit juice or ½ cup dried fruit.  Protein   · For a 2,000 calorie daily food plan, eat 5½ oz every day.  · 1 oz is about 1 oz meat, poultry, or fish, ¼ cup cooked beans, 1 egg, 1 Tbsp peanut butter, or ½ oz nuts or seeds.  Dairy   · Switch to fat-free or low-fat (1%) milk.  · For a 2,000 calorie daily food plan, eat 3 cups every day.  · 1 cup is about 1 cup milk or yogurt or soy milk (soy beverage), 1½ oz natural cheese, or 2 oz processed cheese.  Fats, Oils, and Empty Calories   · Only small amounts of oils are recommended.  · Empty calories are calories from solid fats or added sugars.  · Compare sodium in foods like soup, bread, and frozen meals. Choose the foods with lower numbers.  · Drink water instead of sugary drinks.  What foods can I eat?  Grains   Whole grains such as whole wheat, quinoa, millet,  and bulgur. Bread, rolls, and pasta made from whole grains. Brown or wild rice. Hot or cold cereals made from whole grains and without added sugar.  Vegetables   All fresh vegetables, especially fresh red, dark green, or orange vegetables. Peas and beans. Low-sodium frozen or canned vegetables prepared without added salt. Low-sodium vegetable juices.  Fruits   All fresh, frozen, and dried fruits. Canned fruit packed in water or fruit juice without added sugar. Fruit juices without added sugar.  Meats and Other Protein Sources   Boiled, baked, or grilled lean meat trimmed of fat. Skinless poultry. Fresh seafood and shellfish. Canned seafood packed in water. Unsalted nuts and unsalted nut butters. Tofu. Dried beans and pea. Eggs.  Dairy   Low-fat or fat-free milk, yogurt, and cheeses.  Sweets and Desserts   Frozen desserts made from low-fat milk.  Fats and Oils   Olive, peanut, and canola oils and margarine. Salad dressing and mayonnaise made from these oils.  Other   Soups and casseroles made from allowed ingredients and without added fat or salt.  The items listed above may not be a complete list of recommended foods or beverages. Contact your dietitian for more options.   What foods are not recommended?  Grains   Sweetened, low-fiber cereals. Packaged baked goods. Snack crackers and chips. Cheese crackers, butter crackers, and biscuits. Frozen waffles, sweet breads, doughnuts, pastries, packaged baking mixes, pancakes, cakes, and cookies.  Vegetables   Regular canned or frozen vegetables or vegetables prepared with salt. Canned tomatoes. Canned tomato sauce. Fried vegetables. Vegetables in cream sauce or cheese sauce.  Fruits   Fruits packed in syrup or made with added sugar.  Meats and Other Protein Sources   Marbled or fatty meats such as ribs. Poultry with skin. Fried meats, poultry, eggs, or fish. Sausages, hot dogs, and deli meats such as pastrami, bologna, or salami.  Dairy   Whole milk, cream, cheeses made  from whole milk, sour cream. Ice cream or yogurt made from whole milk or with added sugar.  Beverages   For adults, no more than one alcoholic drink per day. Regular soft drinks or other sugary beverages. Juice drinks.  Sweets and Desserts   Sugary or fatty desserts, candy, and other sweets.  Fats and Oils   Solid shortening or partially hydrogenated oils. Solid margarine. Margarine that contains trans fats. Butter.  The items listed above may not be a complete list of foods and beverages to avoid. Contact your dietitian for more information.   This information is not intended to replace advice given to you by your health care provider. Make sure you discuss any questions you have with your health care provider.  Document Released: 01/06/2009 Document Revised: 05/25/2017 Document Reviewed: 11/26/2014  [employer] Interactive Patient Education © 2017 [employer]  BMI for Adults  Body mass index (BMI) is a number that is calculated from a person's weight and height. In most adults, the number is used to find how much of an adult's weight is made up of fat. BMI is not as accurate as a direct measure of body fat.  How is BMI calculated?  BMI is calculated by dividing weight in kilograms by height in meters squared. It can also be calculated by dividing weight in pounds by height in inches squared, then multiplying the resulting number by 703. Charts are available to help you find your BMI quickly and easily without doing this calculation.  How is BMI interpreted?  Health care professionals use BMI charts to identify whether an adult is underweight, at a normal weight, or overweight based on the following guidelines:  · Underweight: BMI less than 18.5.  · Normal weight: BMI between 18.5 and 24.9.  · Overweight: BMI between 25 and 29.9.  · Obese: BMI of 30 and above.  BMI is usually interpreted the same for males and females.  Weight includes both fat and muscle, so someone with a muscular build, such as an athlete, may  have a BMI that is higher than 24.9. In cases like these, BMI may not accurately depict body fat. To determine if excess body fat is the cause of a BMI of 25 or higher, further assessments may need to be done by a health care provider.  Why is BMI a useful tool?  BMI is used to identify a possible weight problem that may be related to a medical problem or may increase the risk for medical problems. BMI can also be used to promote changes to reach a healthy weight.  This information is not intended to replace advice given to you by your health care provider. Make sure you discuss any questions you have with your health care provider.  Document Released: 08/29/2005 Document Revised: 04/27/2017 Document Reviewed: 05/15/2015  [employer] Interactive Patient Education © 2017 [employer]

## 2018-06-06 NOTE — PROGRESS NOTES
Chief Complaint   Patient presents with   • Heartburn   • Elevated Hepatic Enzymes   • Weight Loss   • Abdominal Pain       ENDO PROCEDURE ORDERED:    Karolina Chilel is a 43 y.o. male. he is here today for follow-up.    History of Present Illness    HISTORY: Patient is seen on a recheck of his GERD, abdominal pain, recent diverticulitis, elevated pancreatic enzymes, elevated liver enzymes. Last seen 04/09/2018. The patient currently denies abdominal pain. GERD is well-controlled on the Nexium. He denied nausea, vomiting, or dysphagia. Bowel movements are regular without blood or mucus. Weight is up 3 pounds since last visit. The patient had a previous EGD on 03/30/2018 showing a severe esophagitis and gastritis.     Laboratory on 04/10/2018: Hepatitis diagnostic panel, AMA, RADHA, SMA, ceruloplasmin, alpha-1 antitrypsin, AFP, and iron studies were normal or negative. HENSON FibroSure 0.09/F0, steatosis 0.70/S3, 0.50/N1. Within the panel AST was 48, ALT 72, otherwise normal. INR 1.1. He had a HIDA scan with an EF of 92% on 04/12/2018. CBC was okay on 05/16/2018.     The patient underwent EGD/colonoscopy on 05/30/2018 that showed esophagitis, diffuse gastritis. Antral biopsy showed mild chronic gastritis, negative H pylori. Small bowel biopsy showed mild chronic inflammation. Colonoscopy showed multiple diverticula in the sigmoid, descending, transverse colon, hemorrhoids, adequate prep. Random colon biopsy was negative. Recommend repeat colonoscopy at age 47.     ASSESSMENT AND PLAN: Patient with significant esophagitis and gastritis, improved on the Nexium. He was encouraged to continue dietary modification and weight loss. Extensive diverticulosis. I discussed long-term implications, potential risk for recurrent diverticulitis, the importance of a high-fiber/diverticular diet. He is encouraged to continue to abstain from alcohol. We will plan followup in 3 months with CMP prior, further pending  clinical course and the results of the above. I did tell him if he has recurrent symptoms of diverticulitis that he should be seen immediately. We also discussed potential risk for surgical resection if he has recurrent bouts.        The following portions of the patient's history were reviewed and updated as appropriate:   Past Medical History:   Diagnosis Date   • A-fib    • Anxiety    • Arrhythmia    • Arthritis     rt shoulder    • Atrial flutter 07/2016    EP  ablation x2    • Depression    • Diverticulitis    • ETOH abuse    • GERD (gastroesophageal reflux disease)    • Kidney stone    • Pancreatitis    • Sleep apnea     have used a machine in the past   • Tendonitis     rt shoulder     Past Surgical History:   Procedure Laterality Date   • CARDIAC ELECTROPHYSIOLOGY PROCEDURE N/A 5/2/2017    Procedure: Ablation atrial flutter;  Surgeon: Ramirez Reza MD;  Location:  [facility];  Service:    • CARDIAC ELECTROPHYSIOLOGY PROCEDURE N/A 9/5/2017    Procedure: Ablation atrial fibrillation PVA ;  Surgeon: Ramirez Reza MD;  Location:  [facility];  Service:    • COLONOSCOPY     • COLONOSCOPY N/A 5/30/2018    Procedure: COLONOSCOPY;  Surgeon: Edwin Medina MD;  Location: [facility] ENDOSCOPY;  Service: Gastroenterology   • ENDOSCOPY N/A 3/30/2018    Procedure: ESOPHAGOGASTRODUODENOSCOPY;  Surgeon: Edwin Medina MD;  Location: [facility] ENDOSCOPY;  Service: Gastroenterology   • ENDOSCOPY N/A 5/30/2018    Procedure: ESOPHAGOGASTRODUODENOSCOPY;  Surgeon: Edwin Medina MD;  Location: [facility] ENDOSCOPY;  Service: Gastroenterology   • SHOULDER SURGERY Right 2014   • TONSILLECTOMY  12/2015   • UPPER GASTROINTESTINAL ENDOSCOPY  03/30/2018   • UPPER GASTROINTESTINAL ENDOSCOPY     • UPPER GASTROINTESTINAL ENDOSCOPY  05/30/2018     Family History   Problem Relation Age of Onset   • Heart disease Father    • Depression Father    • Cancer Maternal Grandmother         lung   • Cancer Paternal  "Grandmother         pancreatic   • Heart disease Paternal Grandfather        Allergies   Allergen Reactions   • Contrast Dye Anaphylaxis     ANAPHYLAXIS   • Iodinated Diagnostic Agents Anaphylaxis     Social History     Social History   • Marital status:      Social History Main Topics   • Smoking status: Never Smoker   • Smokeless tobacco: Never Used   • Alcohol use No      Comment: quit 3/17/18   • Drug use: Yes     Frequency: 7.0 times per week     Types: Marijuana      Comment: states he uses \"a little\" every day   • Sexual activity: Defer     Other Topics Concern   • Not on file     Social History Narrative    Pt states has not had drink of any alcohol in 3 weeks as of 4/5/2018       Current Outpatient Prescriptions:   •  albuterol (PROVENTIL HFA;VENTOLIN HFA) 108 (90 Base) MCG/ACT inhaler, Inhale 2 puffs Every 4 (Four) Hours As Needed for Wheezing or Shortness of Air. 2 puffs q 2 hours for 2 days then 2 puffs q 4 hours  PRN., Disp: 18 g, Rfl: 2  •  carvedilol (COREG) 6.25 MG tablet, Take 1 tablet by mouth Every 12 (Twelve) Hours., Disp: 60 tablet, Rfl: 0  •  clonazePAM (KlonoPIN) 1 MG tablet, Take 1 mg by mouth 2 (Two) Times a Day As Needed for Anxiety., Disp: , Rfl:   •  DULoxetine (CYMBALTA) 30 MG capsule, Take 30 mg by mouth Daily., Disp: , Rfl:   •  Esomeprazole Magnesium (NEXIUM 24HR) 20 MG tablet delayed-release, Take 40 mg by mouth Every Morning Before Breakfast., Disp: 60 tablet, Rfl: 0  •  lisinopril (PRINIVIL,ZESTRIL) 5 MG tablet, Take 5 mg by mouth Daily., Disp: , Rfl:   •  magnesium oxide (MAGOX) 400 (241.3 MG) MG tablet tablet, Take 400 mg by mouth 2 (Two) Times a Day., Disp: , Rfl:   •  ondansetron ODT (ZOFRAN-ODT) 4 MG disintegrating tablet, Take 1 tablet by mouth Every 6 (Six) Hours As Needed for Nausea or Vomiting., Disp: 30 tablet, Rfl: 0  •  promethazine (PHENERGAN) 25 MG suppository, Insert 1 suppository into the rectum Every 6 (Six) Hours As Needed for Nausea or Vomiting., Disp: 15 " "suppository, Rfl: 0  Review of Systems  Review of Systems       Objective    /94 (BP Location: Left arm)   Pulse 89   Ht 182.9 cm (72.01\")   Wt 102 kg (224 lb 3.2 oz)   BMI 30.40 kg/m²   Physical Exam   Constitutional: He is oriented to person, place, and time. He appears well-developed and well-nourished. No distress.   HENT:   Head: Normocephalic and atraumatic.   Eyes: EOM are normal. Pupils are equal, round, and reactive to light.   Neck: Normal range of motion.   Cardiovascular: Normal rate, regular rhythm and normal heart sounds.    Pulmonary/Chest: Effort normal and breath sounds normal.   Abdominal: Soft. Bowel sounds are normal. He exhibits no shifting dullness, no distension, no abdominal bruit, no ascites and no mass. There is no hepatosplenomegaly. There is tenderness. There is no rigidity, no rebound, no guarding and no CVA tenderness. No hernia. Hernia confirmed negative in the ventral area.   Mild diffuse   Musculoskeletal: Normal range of motion.   Neurological: He is alert and oriented to person, place, and time.   Skin: Skin is warm and dry.   Psychiatric: He has a normal mood and affect. His behavior is normal. Judgment and thought content normal.   Nursing note and vitals reviewed.    Assessment/Plan      1. Gastroesophageal reflux disease with esophagitis    2. Elevated liver enzymes    3. Generalized abdominal pain    4. Alcohol-induced acute pancreatitis with uninfected necrosis    5. Diverticulosis of large intestine without hemorrhage    .   Stevenson was seen today for heartburn, elevated hepatic enzymes, weight loss and abdominal pain.    Diagnoses and all orders for this visit:    Gastroesophageal reflux disease with esophagitis  -     Comprehensive Metabolic Panel; Future    Elevated liver enzymes  -     Comprehensive Metabolic Panel; Future    Generalized abdominal pain  -     Comprehensive Metabolic Panel; Future    Alcohol-induced acute pancreatitis with uninfected necrosis  -    "  Comprehensive Metabolic Panel; Future    Diverticulosis of large intestine without hemorrhage        Orders placed during this encounter include:  Orders Placed This Encounter   Procedures   • Comprehensive Metabolic Panel     Standing Status:   Future     Standing Expiration Date:   12/3/2018       Medications prescribed:  No orders of the defined types were placed in this encounter.    Discontinued Medications       Reason for Discontinue    DULoxetine (CYMBALTA) 60 MG capsule Dose adjustment    sucralfate (CARAFATE) 1 g tablet *Therapy completed        Requested Prescriptions      No prescriptions requested or ordered in this encounter       Review and/or summary of lab tests, radiology, procedures, medications. Review and summary of old records and obtaining of history. The risks and benefits of my recommendations, as well as other treatment options were discussed with the patient today. Questions were answered.    Follow-up: Return in about 3 months (around 9/6/2018), or if symptoms worsen or fail to improve, for lab prior.     * Surgery not found *      This document has been electronically signed by Rene Saenz PA-C on June 7, 2018 6:38 PM      Results for orders placed or performed during the hospital encounter of 05/30/18   Tissue Pathology Exam   Result Value Ref Range    Case Report       Surgical Pathology Report                         Case: [id number]                                  Authorizing Provider:  Edwin Medina MD         Collected:           05/30/2018 03:48 PM          [facility]ing [facility]             Received:            05/31/2018 08:00 AM                                 [place] [facility]                                                     Pathologist:           Mahad Morillo MD                                                         Specimens:   1) - Small Intestine, small bowel                                                                   2) -  Gastric, Antrum                                                                                3) - Large Intestine, colonic mucosa                                                       Final Diagnosis       1.  SMALL INTESTINE, MUCOSAL BIOPSY:   MILD CHRONIC INFLAMMATION.     2.  GASTRIC ANTRUM, MUCOSAL BIOPSY:   MILD CHRONIC GASTRITIS.   NO EVIDENCE OF HELICOBACTER PYLORI.     3.  COLONIC MUCOSAL BIOPSY:   NO SIGNIFICANT PATHOLOGIC DIAGNOSIS.       Gross Description       In 3 containers, each of these show mucosal biopsies measuring up to 0.3 cm in greatest dimension.  Embedded accordingly:  1A small bowel, 2A antrum, 3A colonic mucosa.         Embedded Images     Results for orders placed or performed in visit on 05/16/18   CBC Auto Differential   Result Value Ref Range    WBC 7.13 3.20 - 9.80 10*3/mm3    RBC 5.28 4.37 - 5.74 10*6/mm3    Hemoglobin 15.7 13.7 - 17.3 g/dL    Hematocrit 46.5 39.0 - 49.0 %    MCV 88.1 80.0 - 98.0 fL    MCH 29.7 26.5 - 34.0 pg    MCHC 33.8 31.5 - 36.3 g/dL    RDW 16.3 (H) 11.5 - 14.5 %    RDW-SD 53.0 (H) 35.1 - 43.9 fl    MPV 9.9 8.0 - 12.0 fL    Platelets 274 150 - 450 10*3/mm3    Neutrophil % 66.1 37.0 - 80.0 %    Lymphocyte % 18.7 10.0 - 50.0 %    Monocyte % 12.2 (H) 0.0 - 12.0 %    Eosinophil % 2.2 0.0 - 7.0 %    Basophil % 0.4 0.0 - 2.0 %    Immature Grans % 0.4 0.0 - 0.5 %    Neutrophils, Absolute 4.71 2.00 - 8.60 10*3/mm3    Lymphocytes, Absolute 1.33 0.60 - 4.20 10*3/mm3    Monocytes, Absolute 0.87 0.00 - 0.90 10*3/mm3    Eosinophils, Absolute 0.16 0.00 - 0.70 10*3/mm3    Basophils, Absolute 0.03 0.00 - 0.20 10*3/mm3    Immature Grans, Absolute 0.03 (H) 0.00 - 0.02 10*3/mm3   Results for orders placed or performed in visit on 04/09/18   HENSON Fibrosure   Result Value Ref Range    Fibrosis Score (References) 0.09 0.00 - 0.21    Fibrosis Stage (Reference) Comment     Steatosis Score (Reference) 0.70 (H) 0.00 - 0.30    Steatosis Grade (Reference) Comment     HENSON Score  (Reference) 0.50 0.25    Lewis Grade (Reference) Comment     Height: (Reference) 72 in    Weight: (Reference) 223 LBS    Alpha 2-Macroglobulins, Qn 106 (L) 110 - 276 mg/dL    Haptoglobin 211 (H) 34 - 200 mg/dL    Apolipoprotein A-1 86 (L) 101 - 178 mg/dL    Total Bilirubin 0.6 0.0 - 1.2 mg/dL    GGT 24 0 - 65 IU/L    ALT (SGPT) 72 (H) 0 - 55 IU/L    AST (SGOT) P5P (Reference) 48 (H) 0 - 40 IU/L    Cholesterol, Total (Reference) 141 100 - 199 mg/dL    Glucose, Serum (Reference) 75 65 - 99 mg/dL    Triglycerides 91 0 - 149 mg/dL    Interpretations: (Reference) Comment     Fibrosis Scoring: Comment     Steatosis Grading (Reference) Comment     Lewis Scoring (Reference) Comment     Limitations: (Reference) Comment     Comment (Reference) Comment    Nuclear Antigen Antibody, IFA   Result Value Ref Range    RADHA Negative    Iron and TIBC   Result Value Ref Range    Iron 126 49 - 181 mcg/dL    TIBC 345 261 - 462 mcg/dL    Iron Saturation 37 20 - 55 %   Alpha - 1 - Antitrypsin   Result Value Ref Range    A-1 Antitrypsin 182 90 - 200 mg/dL   Mitochondrial Antibodies, M2   Result Value Ref Range    Mitochondrial Ab <20.0 0.0 - 20.0 Units   Ceruloplasmin   Result Value Ref Range    Ceruloplasmin 24.0 16.0 - 31.0 mg/dL   AFP Tumor Marker   Result Value Ref Range    AFP Tumor Marker 2.2 0.0 - 8.3 ng/mL   Hepatitis Panel, Acute   Result Value Ref Range    Hepatitis C Ab Negative Negative    Hep A IgM Negative Negative    Hep B C IgM Negative Negative    Hepatitis B Surface Ag Negative Negative   Anti-Smooth Muscle Antibody Titer   Result Value Ref Range    Smooth Muscle Ab 10 0 - 19 Units   Protime-INR   Result Value Ref Range    Protime 14.0 11.1 - 15.3 Seconds    INR 1.10 0.80 - 1.20   Ferritin   Result Value Ref Range    Ferritin 179.00 17.90 - 464.00 ng/mL   Results for orders placed or performed during the hospital encounter of 04/05/18   Gold Top - SST   Result Value Ref Range    Extra Tube Hold for add-ons.    Green Top (Gel)    Result Value Ref Range    Extra Tube Hold for add-ons.    Urinalysis With / Culture If Indicated - Urine, Clean Catch   Result Value Ref Range    Color, UA Dark Yellow Yellow, Straw, Dark Yellow, Amelia    Appearance, UA Clear Clear    pH, UA 7.0 5.0 - 9.0    Specific Gravity, UA 1.024 1.003 - 1.030    Glucose, UA Negative Negative    Ketones, UA Negative Negative    Bilirubin, UA Negative Negative    Blood, UA Negative Negative    Protein, UA Trace (A) Negative    Leuk Esterase, UA Negative Negative    Nitrite, UA Negative Negative    Urobilinogen, UA 1.0 E.U./dL 0.2 - 1.0 E.U./dL   CBC Auto Differential   Result Value Ref Range    WBC 7.33 3.20 - 9.80 10*3/mm3    RBC 5.30 4.37 - 5.74 10*6/mm3    Hemoglobin 16.1 13.7 - 17.3 g/dL    Hematocrit 46.8 39.0 - 49.0 %    MCV 88.3 80.0 - 98.0 fL    MCH 30.4 26.5 - 34.0 pg    MCHC 34.4 31.5 - 36.3 g/dL    RDW 14.2 11.5 - 14.5 %    RDW-SD 45.9 (H) 35.1 - 43.9 fl    MPV 10.1 8.0 - 12.0 fL    Platelets 247 150 - 450 10*3/mm3    Neutrophil % 74.8 37.0 - 80.0 %    Lymphocyte % 10.1 10.0 - 50.0 %    Monocyte % 12.4 (H) 0.0 - 12.0 %    Eosinophil % 1.8 0.0 - 7.0 %    Basophil % 0.4 0.0 - 2.0 %    Immature Grans % 0.5 0.0 - 0.5 %    Neutrophils, Absolute 5.48 2.00 - 8.60 10*3/mm3    Lymphocytes, Absolute 0.74 0.60 - 4.20 10*3/mm3    Monocytes, Absolute 0.91 (H) 0.00 - 0.90 10*3/mm3    Eosinophils, Absolute 0.13 0.00 - 0.70 10*3/mm3    Basophils, Absolute 0.03 0.00 - 0.20 10*3/mm3    Immature Grans, Absolute 0.04 (H) 0.00 - 0.02 10*3/mm3     *Note: Due to a large number of results and/or encounters for the requested time period, some results have not been displayed. A complete set of results can be found in Results Review.       Some portions of this note have been dictated using voice recognition software and may contain errors and/or omissions.

## 2018-06-27 ENCOUNTER — APPOINTMENT (OUTPATIENT)
Dept: CT IMAGING | Facility: HOSPITAL | Age: 43
End: 2018-06-27

## 2018-06-27 ENCOUNTER — HOSPITAL ENCOUNTER (EMERGENCY)
Facility: HOSPITAL | Age: 43
Discharge: HOME | End: 2018-06-27
Attending: FAMILY MEDICINE | Admitting: EMERGENCY MEDICINE

## 2018-06-27 ENCOUNTER — APPOINTMENT (OUTPATIENT)
Dept: GENERAL RADIOLOGY | Facility: HOSPITAL | Age: 43
End: 2018-06-27

## 2018-06-27 VITALS
RESPIRATION RATE: 20 BRPM | SYSTOLIC BLOOD PRESSURE: 178 MMHG | DIASTOLIC BLOOD PRESSURE: 96 MMHG | HEIGHT: 72 IN | WEIGHT: 218 LBS | TEMPERATURE: 97.8 F | OXYGEN SATURATION: 98 % | HEART RATE: 98 BPM | BODY MASS INDEX: 29.53 KG/M2

## 2018-06-27 DIAGNOSIS — R07.89 NON-CARDIAC CHEST PAIN: Primary | ICD-10-CM

## 2018-06-27 LAB
ALBUMIN SERPL-MCNC: 4.6 G/DL (ref 3.4–4.8)
ALBUMIN/GLOB SERPL: 1.5 G/DL (ref 1.1–1.8)
ALP SERPL-CCNC: 36 U/L (ref 38–126)
ALT SERPL W P-5'-P-CCNC: 55 U/L (ref 21–72)
AMPHET+METHAMPHET UR QL: NEGATIVE
AMYLASE SERPL-CCNC: 112 U/L (ref 50–130)
ANION GAP SERPL CALCULATED.3IONS-SCNC: 11 MMOL/L (ref 5–15)
AST SERPL-CCNC: 43 U/L (ref 17–59)
BACTERIA UR QL AUTO: ABNORMAL /HPF
BARBITURATES UR QL SCN: NEGATIVE
BASOPHILS # BLD AUTO: 0.03 10*3/MM3 (ref 0–0.2)
BASOPHILS NFR BLD AUTO: 0.3 % (ref 0–2)
BENZODIAZ UR QL SCN: NEGATIVE
BILIRUB SERPL-MCNC: 1.2 MG/DL (ref 0.2–1.3)
BILIRUB UR QL STRIP: NEGATIVE
BUN BLD-MCNC: 18 MG/DL (ref 7–21)
BUN/CREAT SERPL: 14.9 (ref 7–25)
CALCIUM SPEC-SCNC: 9.5 MG/DL (ref 8.4–10.2)
CANNABINOIDS SERPL QL: POSITIVE
CHLORIDE SERPL-SCNC: 102 MMOL/L (ref 95–110)
CK SERPL-CCNC: 295 U/L (ref 55–170)
CLARITY UR: CLEAR
CO2 SERPL-SCNC: 27 MMOL/L (ref 22–31)
COCAINE UR QL: NEGATIVE
COLOR UR: ABNORMAL
CREAT BLD-MCNC: 1.21 MG/DL (ref 0.7–1.3)
CRP SERPL-MCNC: 0.6 MG/DL (ref 0–1)
D-DIMER, QUANTITATIVE (MAD,POW, STR): 466 NG/ML (FEU) (ref 0–470)
DEPRECATED RDW RBC AUTO: 50.4 FL (ref 35.1–43.9)
EOSINOPHIL # BLD AUTO: 0.17 10*3/MM3 (ref 0–0.7)
EOSINOPHIL NFR BLD AUTO: 1.5 % (ref 0–7)
ERYTHROCYTE [DISTWIDTH] IN BLOOD BY AUTOMATED COUNT: 15.5 % (ref 11.5–14.5)
ETHANOL BLD-MCNC: <10 MG/DL (ref 0–10)
ETHANOL UR QL: <0.01 %
GFR SERPL CREATININE-BSD FRML MDRD: 66 ML/MIN/1.73 (ref 63–147)
GLOBULIN UR ELPH-MCNC: 3.1 GM/DL (ref 2.3–3.5)
GLUCOSE BLD-MCNC: 92 MG/DL (ref 60–100)
GLUCOSE UR STRIP-MCNC: NEGATIVE MG/DL
HCT VFR BLD AUTO: 52.1 % (ref 39–49)
HGB BLD-MCNC: 17.8 G/DL (ref 13.7–17.3)
HGB UR QL STRIP.AUTO: NEGATIVE
HOLD SPECIMEN: NORMAL
HOLD SPECIMEN: NORMAL
HYALINE CASTS UR QL AUTO: ABNORMAL /LPF
IMM GRANULOCYTES # BLD: 0.03 10*3/MM3 (ref 0–0.02)
IMM GRANULOCYTES NFR BLD: 0.3 % (ref 0–0.5)
KETONES UR QL STRIP: ABNORMAL
LEUKOCYTE ESTERASE UR QL STRIP.AUTO: NEGATIVE
LIPASE SERPL-CCNC: 98 U/L (ref 23–300)
LYMPHOCYTES # BLD AUTO: 1.18 10*3/MM3 (ref 0.6–4.2)
LYMPHOCYTES NFR BLD AUTO: 10.3 % (ref 10–50)
MCH RBC QN AUTO: 30.1 PG (ref 26.5–34)
MCHC RBC AUTO-ENTMCNC: 34.2 G/DL (ref 31.5–36.3)
MCV RBC AUTO: 88.2 FL (ref 80–98)
METHADONE UR QL SCN: NEGATIVE
MONOCYTES # BLD AUTO: 0.61 10*3/MM3 (ref 0–0.9)
MONOCYTES NFR BLD AUTO: 5.3 % (ref 0–12)
NEUTROPHILS # BLD AUTO: 9.46 10*3/MM3 (ref 2–8.6)
NEUTROPHILS NFR BLD AUTO: 82.3 % (ref 37–80)
NITRITE UR QL STRIP: NEGATIVE
OPIATES UR QL: POSITIVE
OXYCODONE UR QL SCN: NEGATIVE
PH UR STRIP.AUTO: 7 [PH] (ref 5–9)
PLATELET # BLD AUTO: 292 10*3/MM3 (ref 150–450)
PMV BLD AUTO: 9.2 FL (ref 8–12)
POTASSIUM BLD-SCNC: 4.2 MMOL/L (ref 3.5–5.1)
PROT SERPL-MCNC: 7.7 G/DL (ref 6.3–8.6)
PROT UR QL STRIP: ABNORMAL
RBC # BLD AUTO: 5.91 10*6/MM3 (ref 4.37–5.74)
RBC # UR: ABNORMAL /HPF
REF LAB TEST METHOD: ABNORMAL
SODIUM BLD-SCNC: 140 MMOL/L (ref 137–145)
SP GR UR STRIP: 1.03 (ref 1–1.03)
SQUAMOUS #/AREA URNS HPF: ABNORMAL /HPF
TROPONIN I SERPL-MCNC: <0.012 NG/ML
UROBILINOGEN UR QL STRIP: ABNORMAL
WBC NRBC COR # BLD: 11.48 10*3/MM3 (ref 3.2–9.8)
WBC UR QL AUTO: ABNORMAL /HPF
WHOLE BLOOD HOLD SPECIMEN: NORMAL
WHOLE BLOOD HOLD SPECIMEN: NORMAL

## 2018-06-27 PROCEDURE — 80307 DRUG TEST PRSMV CHEM ANLYZR: CPT | Performed by: FAMILY MEDICINE

## 2018-06-27 PROCEDURE — 71045 X-RAY EXAM CHEST 1 VIEW: CPT

## 2018-06-27 PROCEDURE — 82550 ASSAY OF CK (CPK): CPT | Performed by: FAMILY MEDICINE

## 2018-06-27 PROCEDURE — 25010000002 HYDROMORPHONE PER 4 MG: Performed by: EMERGENCY MEDICINE

## 2018-06-27 PROCEDURE — 96361 HYDRATE IV INFUSION ADD-ON: CPT

## 2018-06-27 PROCEDURE — 93005 ELECTROCARDIOGRAM TRACING: CPT

## 2018-06-27 PROCEDURE — 96374 THER/PROPH/DIAG INJ IV PUSH: CPT

## 2018-06-27 PROCEDURE — 25010000002 ONDANSETRON PER 1 MG: Performed by: FAMILY MEDICINE

## 2018-06-27 PROCEDURE — 25010000002 ONDANSETRON PER 1 MG: Performed by: EMERGENCY MEDICINE

## 2018-06-27 PROCEDURE — 80053 COMPREHEN METABOLIC PANEL: CPT | Performed by: FAMILY MEDICINE

## 2018-06-27 PROCEDURE — 81001 URINALYSIS AUTO W/SCOPE: CPT | Performed by: FAMILY MEDICINE

## 2018-06-27 PROCEDURE — 99284 EMERGENCY DEPT VISIT MOD MDM: CPT

## 2018-06-27 PROCEDURE — 82150 ASSAY OF AMYLASE: CPT | Performed by: FAMILY MEDICINE

## 2018-06-27 PROCEDURE — 96376 TX/PRO/DX INJ SAME DRUG ADON: CPT

## 2018-06-27 PROCEDURE — 85379 FIBRIN DEGRADATION QUANT: CPT | Performed by: PHYSICIAN ASSISTANT

## 2018-06-27 PROCEDURE — 93010 ELECTROCARDIOGRAM REPORT: CPT | Performed by: INTERNAL MEDICINE

## 2018-06-27 PROCEDURE — 25010000002 HYDROMORPHONE PER 4 MG: Performed by: PHYSICIAN ASSISTANT

## 2018-06-27 PROCEDURE — 86140 C-REACTIVE PROTEIN: CPT | Performed by: FAMILY MEDICINE

## 2018-06-27 PROCEDURE — 85025 COMPLETE CBC W/AUTO DIFF WBC: CPT | Performed by: FAMILY MEDICINE

## 2018-06-27 PROCEDURE — 96375 TX/PRO/DX INJ NEW DRUG ADDON: CPT

## 2018-06-27 PROCEDURE — 74176 CT ABD & PELVIS W/O CONTRAST: CPT

## 2018-06-27 PROCEDURE — 93005 ELECTROCARDIOGRAM TRACING: CPT | Performed by: FAMILY MEDICINE

## 2018-06-27 PROCEDURE — 83690 ASSAY OF LIPASE: CPT | Performed by: FAMILY MEDICINE

## 2018-06-27 PROCEDURE — 84484 ASSAY OF TROPONIN QUANT: CPT | Performed by: FAMILY MEDICINE

## 2018-06-27 RX ORDER — ONDANSETRON 2 MG/ML
4 INJECTION INTRAMUSCULAR; INTRAVENOUS ONCE
Status: COMPLETED | OUTPATIENT
Start: 2018-06-27 | End: 2018-06-27

## 2018-06-27 RX ORDER — SODIUM CHLORIDE 0.9 % (FLUSH) 0.9 %
10 SYRINGE (ML) INJECTION AS NEEDED
Status: DISCONTINUED | OUTPATIENT
Start: 2018-06-27 | End: 2018-06-28 | Stop reason: HOSPADM

## 2018-06-27 RX ORDER — ASPIRIN 325 MG
325 TABLET ORAL ONCE
Status: COMPLETED | OUTPATIENT
Start: 2018-06-27 | End: 2018-06-27

## 2018-06-27 RX ADMIN — HYDROMORPHONE HYDROCHLORIDE 0.5 MG: 1 INJECTION, SOLUTION INTRAMUSCULAR; INTRAVENOUS; SUBCUTANEOUS at 22:08

## 2018-06-27 RX ADMIN — SODIUM CHLORIDE 1000 ML: 9 INJECTION, SOLUTION INTRAVENOUS at 18:30

## 2018-06-27 RX ADMIN — ONDANSETRON 4 MG: 2 INJECTION INTRAMUSCULAR; INTRAVENOUS at 18:33

## 2018-06-27 RX ADMIN — ASPIRIN 325 MG: 325 TABLET, COATED ORAL at 20:41

## 2018-06-27 RX ADMIN — HYDROMORPHONE HYDROCHLORIDE 1 MG: 1 INJECTION, SOLUTION INTRAMUSCULAR; INTRAVENOUS; SUBCUTANEOUS at 18:39

## 2018-06-27 RX ADMIN — ONDANSETRON 4 MG: 2 INJECTION INTRAMUSCULAR; INTRAVENOUS at 22:08

## 2018-06-28 ENCOUNTER — HOSPITAL ENCOUNTER (OUTPATIENT)
Facility: HOSPITAL | Age: 43
Setting detail: OBSERVATION
Discharge: HOME | End: 2018-07-02
Attending: FAMILY MEDICINE | Admitting: FAMILY MEDICINE

## 2018-06-28 ENCOUNTER — OFFICE VISIT (OUTPATIENT)
Dept: GASTROENTEROLOGY | Facility: CLINIC | Age: 43
End: 2018-06-28

## 2018-06-28 VITALS
SYSTOLIC BLOOD PRESSURE: 168 MMHG | BODY MASS INDEX: 27.72 KG/M2 | DIASTOLIC BLOOD PRESSURE: 92 MMHG | HEIGHT: 74 IN | WEIGHT: 216 LBS

## 2018-06-28 DIAGNOSIS — R10.84 GENERALIZED ABDOMINAL PAIN: ICD-10-CM

## 2018-06-28 DIAGNOSIS — R63.4 WEIGHT LOSS, ABNORMAL: ICD-10-CM

## 2018-06-28 DIAGNOSIS — K57.92 ACUTE DIVERTICULITIS OF INTESTINE: Primary | ICD-10-CM

## 2018-06-28 DIAGNOSIS — R11.2 INTRACTABLE VOMITING WITH NAUSEA, UNSPECIFIED VOMITING TYPE: ICD-10-CM

## 2018-06-28 DIAGNOSIS — K57.92 DIVERTICULITIS OF INTESTINE WITHOUT PERFORATION OR ABSCESS WITHOUT BLEEDING, UNSPECIFIED PART OF INTESTINAL TRACT: Primary | ICD-10-CM

## 2018-06-28 DIAGNOSIS — R11.2 NON-INTRACTABLE VOMITING WITH NAUSEA, UNSPECIFIED VOMITING TYPE: ICD-10-CM

## 2018-06-28 PROCEDURE — G0378 HOSPITAL OBSERVATION PER HR: HCPCS

## 2018-06-28 PROCEDURE — 96365 THER/PROPH/DIAG IV INF INIT: CPT

## 2018-06-28 PROCEDURE — 25010000002 LEVOFLOXACIN PER 250 MG: Performed by: PHYSICIAN ASSISTANT

## 2018-06-28 PROCEDURE — 25010000002 MORPHINE PER 10 MG: Performed by: FAMILY MEDICINE

## 2018-06-28 PROCEDURE — 25010000002 ONDANSETRON PER 1 MG: Performed by: PHYSICIAN ASSISTANT

## 2018-06-28 PROCEDURE — 99214 OFFICE O/P EST MOD 30 MIN: CPT | Performed by: PHYSICIAN ASSISTANT

## 2018-06-28 PROCEDURE — 96375 TX/PRO/DX INJ NEW DRUG ADDON: CPT

## 2018-06-28 PROCEDURE — 99283 EMERGENCY DEPT VISIT LOW MDM: CPT

## 2018-06-28 PROCEDURE — 25010000002 PROMETHAZINE PER 50 MG: Performed by: PHYSICIAN ASSISTANT

## 2018-06-28 PROCEDURE — 96361 HYDRATE IV INFUSION ADD-ON: CPT

## 2018-06-28 RX ORDER — SODIUM CHLORIDE 0.9 % (FLUSH) 0.9 %
1-10 SYRINGE (ML) INJECTION AS NEEDED
Status: DISCONTINUED | OUTPATIENT
Start: 2018-06-28 | End: 2018-07-02 | Stop reason: HOSPADM

## 2018-06-28 RX ORDER — LEVOFLOXACIN 5 MG/ML
750 INJECTION, SOLUTION INTRAVENOUS ONCE
Status: COMPLETED | OUTPATIENT
Start: 2018-06-28 | End: 2018-06-29

## 2018-06-28 RX ORDER — MORPHINE SULFATE 2 MG/ML
1 INJECTION, SOLUTION INTRAMUSCULAR; INTRAVENOUS EVERY 6 HOURS PRN
Status: DISCONTINUED | OUTPATIENT
Start: 2018-06-28 | End: 2018-06-30

## 2018-06-28 RX ORDER — PROMETHAZINE HYDROCHLORIDE 25 MG/1
25 TABLET ORAL EVERY 6 HOURS PRN
COMMUNITY
End: 2018-08-20

## 2018-06-28 RX ORDER — NALOXONE HCL 0.4 MG/ML
0.4 VIAL (ML) INJECTION
Status: DISCONTINUED | OUTPATIENT
Start: 2018-06-28 | End: 2018-06-30

## 2018-06-28 RX ORDER — METRONIDAZOLE 500 MG/1
500 TABLET ORAL 2 TIMES DAILY
Qty: 20 TABLET | Refills: 0 | Status: SHIPPED | OUTPATIENT
Start: 2018-06-28 | End: 2018-07-02 | Stop reason: HOSPADM

## 2018-06-28 RX ORDER — SODIUM CHLORIDE 9 MG/ML
125 INJECTION, SOLUTION INTRAVENOUS CONTINUOUS
Status: DISCONTINUED | OUTPATIENT
Start: 2018-06-28 | End: 2018-07-02 | Stop reason: HOSPADM

## 2018-06-28 RX ORDER — ONDANSETRON 2 MG/ML
4 INJECTION INTRAMUSCULAR; INTRAVENOUS EVERY 6 HOURS PRN
Status: DISCONTINUED | OUTPATIENT
Start: 2018-06-28 | End: 2018-06-29

## 2018-06-28 RX ORDER — ONDANSETRON 2 MG/ML
4 INJECTION INTRAMUSCULAR; INTRAVENOUS ONCE
Status: COMPLETED | OUTPATIENT
Start: 2018-06-28 | End: 2018-06-28

## 2018-06-28 RX ORDER — PROMETHAZINE HYDROCHLORIDE 25 MG/ML
12.5 INJECTION, SOLUTION INTRAMUSCULAR; INTRAVENOUS ONCE
Status: COMPLETED | OUTPATIENT
Start: 2018-06-28 | End: 2018-06-28

## 2018-06-28 RX ORDER — CYCLOBENZAPRINE HCL 10 MG
10 TABLET ORAL AS NEEDED
Refills: 2 | COMMUNITY
Start: 2018-06-20 | End: 2018-10-11

## 2018-06-28 RX ORDER — CIPROFLOXACIN 500 MG/1
500 TABLET, FILM COATED ORAL 2 TIMES DAILY
Qty: 20 TABLET | Refills: 0 | Status: SHIPPED | OUTPATIENT
Start: 2018-06-28 | End: 2018-07-02 | Stop reason: HOSPADM

## 2018-06-28 RX ORDER — SODIUM CHLORIDE 0.9 % (FLUSH) 0.9 %
10 SYRINGE (ML) INJECTION AS NEEDED
Status: DISCONTINUED | OUTPATIENT
Start: 2018-06-28 | End: 2018-07-02 | Stop reason: HOSPADM

## 2018-06-28 RX ADMIN — SODIUM CHLORIDE 1000 ML: 900 INJECTION, SOLUTION INTRAVENOUS at 18:00

## 2018-06-28 RX ADMIN — LEVOFLOXACIN 750 MG: 5 INJECTION, SOLUTION INTRAVENOUS at 22:04

## 2018-06-28 RX ADMIN — ONDANSETRON HYDROCHLORIDE 4 MG: 2 INJECTION, SOLUTION INTRAMUSCULAR; INTRAVENOUS at 18:00

## 2018-06-28 RX ADMIN — PROMETHAZINE HYDROCHLORIDE 12.5 MG: 25 INJECTION INTRAMUSCULAR; INTRAVENOUS at 18:23

## 2018-06-28 RX ADMIN — METRONIDAZOLE 500 MG: 500 INJECTION, SOLUTION INTRAVENOUS at 18:00

## 2018-06-28 RX ADMIN — SODIUM CHLORIDE 100 ML/HR: 900 INJECTION, SOLUTION INTRAVENOUS at 21:30

## 2018-06-28 RX ADMIN — MORPHINE SULFATE 1 MG: 2 INJECTION, SOLUTION INTRAMUSCULAR; INTRAVENOUS at 22:08

## 2018-06-29 LAB
ANION GAP SERPL CALCULATED.3IONS-SCNC: 13 MMOL/L (ref 5–15)
BASOPHILS # BLD AUTO: 0.01 10*3/MM3 (ref 0–0.2)
BASOPHILS # BLD AUTO: 0.02 10*3/MM3 (ref 0–0.2)
BASOPHILS NFR BLD AUTO: 0.1 % (ref 0–2)
BASOPHILS NFR BLD AUTO: 0.1 % (ref 0–2)
BUN BLD-MCNC: 14 MG/DL (ref 7–21)
BUN/CREAT SERPL: 12.3 (ref 7–25)
CALCIUM SPEC-SCNC: 8.8 MG/DL (ref 8.4–10.2)
CHLORIDE SERPL-SCNC: 100 MMOL/L (ref 95–110)
CO2 SERPL-SCNC: 29 MMOL/L (ref 22–31)
CREAT BLD-MCNC: 1.14 MG/DL (ref 0.7–1.3)
DEPRECATED RDW RBC AUTO: 49.3 FL (ref 35.1–43.9)
DEPRECATED RDW RBC AUTO: 50.7 FL (ref 35.1–43.9)
EOSINOPHIL # BLD AUTO: 0 10*3/MM3 (ref 0–0.7)
EOSINOPHIL # BLD AUTO: 0.01 10*3/MM3 (ref 0–0.7)
EOSINOPHIL NFR BLD AUTO: 0 % (ref 0–7)
EOSINOPHIL NFR BLD AUTO: 0.1 % (ref 0–7)
ERYTHROCYTE [DISTWIDTH] IN BLOOD BY AUTOMATED COUNT: 15.4 % (ref 11.5–14.5)
ERYTHROCYTE [DISTWIDTH] IN BLOOD BY AUTOMATED COUNT: 15.5 % (ref 11.5–14.5)
GFR SERPL CREATININE-BSD FRML MDRD: 71 ML/MIN/1.73 (ref 63–147)
GLUCOSE BLD-MCNC: 109 MG/DL (ref 60–100)
HCT VFR BLD AUTO: 51.5 % (ref 39–49)
HCT VFR BLD AUTO: 51.9 % (ref 39–49)
HGB BLD-MCNC: 17.4 G/DL (ref 13.7–17.3)
HGB BLD-MCNC: 17.5 G/DL (ref 13.7–17.3)
IMM GRANULOCYTES # BLD: 0.05 10*3/MM3 (ref 0–0.02)
IMM GRANULOCYTES # BLD: 0.06 10*3/MM3 (ref 0–0.02)
IMM GRANULOCYTES NFR BLD: 0.3 % (ref 0–0.5)
IMM GRANULOCYTES NFR BLD: 0.4 % (ref 0–0.5)
LYMPHOCYTES # BLD AUTO: 1.18 10*3/MM3 (ref 0.6–4.2)
LYMPHOCYTES # BLD AUTO: 1.21 10*3/MM3 (ref 0.6–4.2)
LYMPHOCYTES NFR BLD AUTO: 7 % (ref 10–50)
LYMPHOCYTES NFR BLD AUTO: 8.8 % (ref 10–50)
MCH RBC QN AUTO: 29.5 PG (ref 26.5–34)
MCH RBC QN AUTO: 29.8 PG (ref 26.5–34)
MCHC RBC AUTO-ENTMCNC: 33.7 G/DL (ref 31.5–36.3)
MCHC RBC AUTO-ENTMCNC: 33.8 G/DL (ref 31.5–36.3)
MCV RBC AUTO: 87.3 FL (ref 80–98)
MCV RBC AUTO: 88.4 FL (ref 80–98)
MONOCYTES # BLD AUTO: 0.74 10*3/MM3 (ref 0–0.9)
MONOCYTES # BLD AUTO: 1.06 10*3/MM3 (ref 0–0.9)
MONOCYTES NFR BLD AUTO: 5.5 % (ref 0–12)
MONOCYTES NFR BLD AUTO: 6.1 % (ref 0–12)
NEUTROPHILS # BLD AUTO: 11.37 10*3/MM3 (ref 2–8.6)
NEUTROPHILS # BLD AUTO: 15.02 10*3/MM3 (ref 2–8.6)
NEUTROPHILS NFR BLD AUTO: 85.2 % (ref 37–80)
NEUTROPHILS NFR BLD AUTO: 86.4 % (ref 37–80)
PLATELET # BLD AUTO: 266 10*3/MM3 (ref 150–450)
PLATELET # BLD AUTO: 283 10*3/MM3 (ref 150–450)
PMV BLD AUTO: 9.1 FL (ref 8–12)
PMV BLD AUTO: 9.8 FL (ref 8–12)
POTASSIUM BLD-SCNC: 4 MMOL/L (ref 3.5–5.1)
RBC # BLD AUTO: 5.87 10*6/MM3 (ref 4.37–5.74)
RBC # BLD AUTO: 5.9 10*6/MM3 (ref 4.37–5.74)
SODIUM BLD-SCNC: 142 MMOL/L (ref 137–145)
WBC NRBC COR # BLD: 13.36 10*3/MM3 (ref 3.2–9.8)
WBC NRBC COR # BLD: 17.37 10*3/MM3 (ref 3.2–9.8)

## 2018-06-29 PROCEDURE — 25010000002 LEVOFLOXACIN PER 250 MG: Performed by: FAMILY MEDICINE

## 2018-06-29 PROCEDURE — 80048 BASIC METABOLIC PNL TOTAL CA: CPT | Performed by: FAMILY MEDICINE

## 2018-06-29 PROCEDURE — 85025 COMPLETE CBC W/AUTO DIFF WBC: CPT | Performed by: FAMILY MEDICINE

## 2018-06-29 PROCEDURE — 25010000002 PROMETHAZINE PER 50 MG: Performed by: FAMILY MEDICINE

## 2018-06-29 PROCEDURE — 25010000002 MORPHINE PER 10 MG: Performed by: FAMILY MEDICINE

## 2018-06-29 PROCEDURE — 96361 HYDRATE IV INFUSION ADD-ON: CPT

## 2018-06-29 PROCEDURE — 96376 TX/PRO/DX INJ SAME DRUG ADON: CPT

## 2018-06-29 PROCEDURE — 96375 TX/PRO/DX INJ NEW DRUG ADDON: CPT

## 2018-06-29 PROCEDURE — G0378 HOSPITAL OBSERVATION PER HR: HCPCS

## 2018-06-29 PROCEDURE — 25010000002 HYDRALAZINE PER 20 MG: Performed by: INTERNAL MEDICINE

## 2018-06-29 RX ORDER — LEVOFLOXACIN 5 MG/ML
750 INJECTION, SOLUTION INTRAVENOUS EVERY 24 HOURS
Status: DISCONTINUED | OUTPATIENT
Start: 2018-06-29 | End: 2018-07-02 | Stop reason: HOSPADM

## 2018-06-29 RX ORDER — PROMETHAZINE HYDROCHLORIDE 25 MG/ML
12.5 INJECTION, SOLUTION INTRAMUSCULAR; INTRAVENOUS EVERY 8 HOURS PRN
Status: DISCONTINUED | OUTPATIENT
Start: 2018-06-29 | End: 2018-06-30

## 2018-06-29 RX ORDER — PROMETHAZINE HYDROCHLORIDE 12.5 MG/1
12.5 TABLET ORAL EVERY 6 HOURS PRN
Status: DISCONTINUED | OUTPATIENT
Start: 2018-06-29 | End: 2018-06-30

## 2018-06-29 RX ORDER — FAMOTIDINE 10 MG/ML
20 INJECTION, SOLUTION INTRAVENOUS DAILY
Status: DISCONTINUED | OUTPATIENT
Start: 2018-06-29 | End: 2018-07-02 | Stop reason: HOSPADM

## 2018-06-29 RX ORDER — HYDRALAZINE HYDROCHLORIDE 20 MG/ML
10 INJECTION INTRAMUSCULAR; INTRAVENOUS EVERY 6 HOURS PRN
Status: DISCONTINUED | OUTPATIENT
Start: 2018-06-29 | End: 2018-07-02 | Stop reason: HOSPADM

## 2018-06-29 RX ADMIN — MORPHINE SULFATE 1 MG: 2 INJECTION, SOLUTION INTRAMUSCULAR; INTRAVENOUS at 16:45

## 2018-06-29 RX ADMIN — PROMETHAZINE HYDROCHLORIDE 12.5 MG: 25 INJECTION INTRAMUSCULAR; INTRAVENOUS at 12:52

## 2018-06-29 RX ADMIN — METRONIDAZOLE 500 MG: 500 INJECTION, SOLUTION INTRAVENOUS at 09:14

## 2018-06-29 RX ADMIN — Medication 10 MG: at 22:30

## 2018-06-29 RX ADMIN — MORPHINE SULFATE 1 MG: 2 INJECTION, SOLUTION INTRAMUSCULAR; INTRAVENOUS at 09:59

## 2018-06-29 RX ADMIN — PROMETHAZINE HYDROCHLORIDE 12.5 MG: 25 INJECTION INTRAMUSCULAR; INTRAVENOUS at 20:33

## 2018-06-29 RX ADMIN — METRONIDAZOLE 500 MG: 500 INJECTION, SOLUTION INTRAVENOUS at 16:57

## 2018-06-29 RX ADMIN — MORPHINE SULFATE 1 MG: 2 INJECTION, SOLUTION INTRAMUSCULAR; INTRAVENOUS at 23:03

## 2018-06-29 RX ADMIN — LEVOFLOXACIN 750 MG: 5 INJECTION, SOLUTION INTRAVENOUS at 10:34

## 2018-06-29 RX ADMIN — FAMOTIDINE 20 MG: 10 INJECTION INTRAVENOUS at 22:38

## 2018-06-29 RX ADMIN — MORPHINE SULFATE 1 MG: 2 INJECTION, SOLUTION INTRAMUSCULAR; INTRAVENOUS at 04:08

## 2018-06-29 RX ADMIN — SODIUM CHLORIDE 100 ML/HR: 900 INJECTION, SOLUTION INTRAVENOUS at 12:22

## 2018-06-29 RX ADMIN — Medication 10 ML: at 22:38

## 2018-06-30 LAB
ANION GAP SERPL CALCULATED.3IONS-SCNC: 9 MMOL/L (ref 5–15)
BASOPHILS # BLD AUTO: 0.02 10*3/MM3 (ref 0–0.2)
BASOPHILS NFR BLD AUTO: 0.2 % (ref 0–2)
BUN BLD-MCNC: 12 MG/DL (ref 7–21)
BUN/CREAT SERPL: 11.8 (ref 7–25)
CALCIUM SPEC-SCNC: 8.4 MG/DL (ref 8.4–10.2)
CHLORIDE SERPL-SCNC: 103 MMOL/L (ref 95–110)
CO2 SERPL-SCNC: 25 MMOL/L (ref 22–31)
CREAT BLD-MCNC: 1.02 MG/DL (ref 0.7–1.3)
DEPRECATED RDW RBC AUTO: 48.9 FL (ref 35.1–43.9)
EOSINOPHIL # BLD AUTO: 0.01 10*3/MM3 (ref 0–0.7)
EOSINOPHIL NFR BLD AUTO: 0.1 % (ref 0–7)
ERYTHROCYTE [DISTWIDTH] IN BLOOD BY AUTOMATED COUNT: 15.4 % (ref 11.5–14.5)
GFR SERPL CREATININE-BSD FRML MDRD: 80 ML/MIN/1.73 (ref 63–147)
GLUCOSE BLD-MCNC: 89 MG/DL (ref 60–100)
HCT VFR BLD AUTO: 49.9 % (ref 39–49)
HGB BLD-MCNC: 16.9 G/DL (ref 13.7–17.3)
IMM GRANULOCYTES # BLD: 0.04 10*3/MM3 (ref 0–0.02)
IMM GRANULOCYTES NFR BLD: 0.3 % (ref 0–0.5)
LYMPHOCYTES # BLD AUTO: 1.3 10*3/MM3 (ref 0.6–4.2)
LYMPHOCYTES NFR BLD AUTO: 10.5 % (ref 10–50)
MCH RBC QN AUTO: 29.2 PG (ref 26.5–34)
MCHC RBC AUTO-ENTMCNC: 33.9 G/DL (ref 31.5–36.3)
MCV RBC AUTO: 86.2 FL (ref 80–98)
MONOCYTES # BLD AUTO: 0.99 10*3/MM3 (ref 0–0.9)
MONOCYTES NFR BLD AUTO: 8 % (ref 0–12)
NEUTROPHILS # BLD AUTO: 10.02 10*3/MM3 (ref 2–8.6)
NEUTROPHILS NFR BLD AUTO: 80.9 % (ref 37–80)
PLATELET # BLD AUTO: 252 10*3/MM3 (ref 150–450)
PMV BLD AUTO: 10.1 FL (ref 8–12)
POTASSIUM BLD-SCNC: 3.5 MMOL/L (ref 3.5–5.1)
RBC # BLD AUTO: 5.79 10*6/MM3 (ref 4.37–5.74)
SODIUM BLD-SCNC: 137 MMOL/L (ref 137–145)
WBC NRBC COR # BLD: 12.38 10*3/MM3 (ref 3.2–9.8)

## 2018-06-30 PROCEDURE — 93010 ELECTROCARDIOGRAM REPORT: CPT | Performed by: INTERNAL MEDICINE

## 2018-06-30 PROCEDURE — 96376 TX/PRO/DX INJ SAME DRUG ADON: CPT

## 2018-06-30 PROCEDURE — 96361 HYDRATE IV INFUSION ADD-ON: CPT

## 2018-06-30 PROCEDURE — 25010000002 PROMETHAZINE PER 50 MG: Performed by: INTERNAL MEDICINE

## 2018-06-30 PROCEDURE — 93005 ELECTROCARDIOGRAM TRACING: CPT | Performed by: FAMILY MEDICINE

## 2018-06-30 PROCEDURE — 25010000002 MORPHINE PER 10 MG: Performed by: FAMILY MEDICINE

## 2018-06-30 PROCEDURE — 96366 THER/PROPH/DIAG IV INF ADDON: CPT

## 2018-06-30 PROCEDURE — 85025 COMPLETE CBC W/AUTO DIFF WBC: CPT | Performed by: FAMILY MEDICINE

## 2018-06-30 PROCEDURE — 25010000002 HYDRALAZINE PER 20 MG: Performed by: INTERNAL MEDICINE

## 2018-06-30 PROCEDURE — G0378 HOSPITAL OBSERVATION PER HR: HCPCS

## 2018-06-30 PROCEDURE — 80048 BASIC METABOLIC PNL TOTAL CA: CPT | Performed by: FAMILY MEDICINE

## 2018-06-30 PROCEDURE — 96375 TX/PRO/DX INJ NEW DRUG ADDON: CPT

## 2018-06-30 PROCEDURE — 25010000002 LORAZEPAM PER 2 MG: Performed by: FAMILY MEDICINE

## 2018-06-30 PROCEDURE — 25010000002 LEVOFLOXACIN PER 250 MG: Performed by: FAMILY MEDICINE

## 2018-06-30 RX ORDER — MORPHINE SULFATE 2 MG/ML
2 INJECTION, SOLUTION INTRAMUSCULAR; INTRAVENOUS EVERY 4 HOURS PRN
Status: DISCONTINUED | OUTPATIENT
Start: 2018-06-30 | End: 2018-07-02 | Stop reason: HOSPADM

## 2018-06-30 RX ORDER — PROMETHAZINE HYDROCHLORIDE 25 MG/ML
12.5 INJECTION, SOLUTION INTRAMUSCULAR; INTRAVENOUS EVERY 6 HOURS PRN
Status: DISCONTINUED | OUTPATIENT
Start: 2018-06-30 | End: 2018-07-02 | Stop reason: HOSPADM

## 2018-06-30 RX ORDER — DULOXETIN HYDROCHLORIDE 30 MG/1
30 CAPSULE, DELAYED RELEASE ORAL NIGHTLY
Status: DISCONTINUED | OUTPATIENT
Start: 2018-06-30 | End: 2018-07-02 | Stop reason: HOSPADM

## 2018-06-30 RX ORDER — PANTOPRAZOLE SODIUM 40 MG/10ML
40 INJECTION, POWDER, LYOPHILIZED, FOR SOLUTION INTRAVENOUS EVERY 12 HOURS SCHEDULED
Status: DISCONTINUED | OUTPATIENT
Start: 2018-06-30 | End: 2018-07-02 | Stop reason: HOSPADM

## 2018-06-30 RX ORDER — PROMETHAZINE HYDROCHLORIDE 12.5 MG/1
12.5 TABLET ORAL EVERY 6 HOURS PRN
Status: DISCONTINUED | OUTPATIENT
Start: 2018-06-30 | End: 2018-07-02 | Stop reason: HOSPADM

## 2018-06-30 RX ORDER — NALOXONE HCL 0.4 MG/ML
0.4 VIAL (ML) INJECTION
Status: DISCONTINUED | OUTPATIENT
Start: 2018-06-30 | End: 2018-07-02 | Stop reason: HOSPADM

## 2018-06-30 RX ORDER — WATER 1000 ML/1000ML
INJECTION, SOLUTION INTRAVENOUS
Status: COMPLETED
Start: 2018-06-30 | End: 2018-06-30

## 2018-06-30 RX ORDER — LORAZEPAM 2 MG/ML
1 INJECTION INTRAMUSCULAR EVERY 8 HOURS PRN
Status: DISCONTINUED | OUTPATIENT
Start: 2018-06-30 | End: 2018-07-02 | Stop reason: HOSPADM

## 2018-06-30 RX ADMIN — WATER 1 ML: 1 INJECTION INTRAMUSCULAR; INTRAVENOUS; SUBCUTANEOUS at 21:16

## 2018-06-30 RX ADMIN — Medication 10 MG: at 16:20

## 2018-06-30 RX ADMIN — SODIUM CHLORIDE 100 ML/HR: 900 INJECTION, SOLUTION INTRAVENOUS at 05:54

## 2018-06-30 RX ADMIN — PANTOPRAZOLE SODIUM 40 MG: 40 INJECTION, POWDER, FOR SOLUTION INTRAVENOUS at 13:11

## 2018-06-30 RX ADMIN — METRONIDAZOLE 500 MG: 500 INJECTION, SOLUTION INTRAVENOUS at 01:24

## 2018-06-30 RX ADMIN — Medication 10 MG: at 04:17

## 2018-06-30 RX ADMIN — MORPHINE SULFATE 2 MG: 2 INJECTION, SOLUTION INTRAMUSCULAR; INTRAVENOUS at 21:16

## 2018-06-30 RX ADMIN — SODIUM CHLORIDE 125 ML/HR: 900 INJECTION, SOLUTION INTRAVENOUS at 23:51

## 2018-06-30 RX ADMIN — PROMETHAZINE HYDROCHLORIDE 12.5 MG: 25 INJECTION INTRAMUSCULAR; INTRAVENOUS at 09:02

## 2018-06-30 RX ADMIN — METRONIDAZOLE 500 MG: 500 INJECTION, SOLUTION INTRAVENOUS at 09:11

## 2018-06-30 RX ADMIN — Medication 10 ML: at 21:17

## 2018-06-30 RX ADMIN — LEVOFLOXACIN 750 MG: 5 INJECTION, SOLUTION INTRAVENOUS at 10:32

## 2018-06-30 RX ADMIN — PROMETHAZINE HYDROCHLORIDE 12.5 MG: 25 INJECTION INTRAMUSCULAR; INTRAVENOUS at 17:40

## 2018-06-30 RX ADMIN — DULOXETINE HYDROCHLORIDE 30 MG: 30 CAPSULE, DELAYED RELEASE ORAL at 20:15

## 2018-06-30 RX ADMIN — LORAZEPAM 1 MG: 2 INJECTION, SOLUTION INTRAMUSCULAR; INTRAVENOUS at 12:45

## 2018-06-30 RX ADMIN — PROMETHAZINE HYDROCHLORIDE 12.5 MG: 25 INJECTION INTRAMUSCULAR; INTRAVENOUS at 02:49

## 2018-06-30 RX ADMIN — PANTOPRAZOLE SODIUM 40 MG: 40 INJECTION, POWDER, FOR SOLUTION INTRAVENOUS at 20:15

## 2018-06-30 RX ADMIN — Medication 10 ML: at 20:15

## 2018-06-30 RX ADMIN — METRONIDAZOLE 500 MG: 500 INJECTION, SOLUTION INTRAVENOUS at 18:07

## 2018-06-30 RX ADMIN — LORAZEPAM 1 MG: 2 INJECTION, SOLUTION INTRAMUSCULAR; INTRAVENOUS at 21:16

## 2018-06-30 RX ADMIN — MORPHINE SULFATE 2 MG: 2 INJECTION, SOLUTION INTRAMUSCULAR; INTRAVENOUS at 16:20

## 2018-07-01 LAB
ANION GAP SERPL CALCULATED.3IONS-SCNC: 10 MMOL/L (ref 5–15)
BASOPHILS # BLD AUTO: 0.02 10*3/MM3 (ref 0–0.2)
BASOPHILS NFR BLD AUTO: 0.2 % (ref 0–2)
BUN BLD-MCNC: 11 MG/DL (ref 7–21)
BUN/CREAT SERPL: 10.1 (ref 7–25)
CALCIUM SPEC-SCNC: 8.2 MG/DL (ref 8.4–10.2)
CHLORIDE SERPL-SCNC: 103 MMOL/L (ref 95–110)
CO2 SERPL-SCNC: 24 MMOL/L (ref 22–31)
CREAT BLD-MCNC: 1.09 MG/DL (ref 0.7–1.3)
DEPRECATED RDW RBC AUTO: 48.3 FL (ref 35.1–43.9)
EOSINOPHIL # BLD AUTO: 0.05 10*3/MM3 (ref 0–0.7)
EOSINOPHIL NFR BLD AUTO: 0.4 % (ref 0–7)
ERYTHROCYTE [DISTWIDTH] IN BLOOD BY AUTOMATED COUNT: 15.3 % (ref 11.5–14.5)
GFR SERPL CREATININE-BSD FRML MDRD: 75 ML/MIN/1.73 (ref 63–147)
GLUCOSE BLD-MCNC: 91 MG/DL (ref 60–100)
HCT VFR BLD AUTO: 49.3 % (ref 39–49)
HGB BLD-MCNC: 16.8 G/DL (ref 13.7–17.3)
IMM GRANULOCYTES # BLD: 0.03 10*3/MM3 (ref 0–0.02)
IMM GRANULOCYTES NFR BLD: 0.3 % (ref 0–0.5)
LYMPHOCYTES # BLD AUTO: 1.45 10*3/MM3 (ref 0.6–4.2)
LYMPHOCYTES NFR BLD AUTO: 12.2 % (ref 10–50)
MCH RBC QN AUTO: 29.3 PG (ref 26.5–34)
MCHC RBC AUTO-ENTMCNC: 34.1 G/DL (ref 31.5–36.3)
MCV RBC AUTO: 86 FL (ref 80–98)
MONOCYTES # BLD AUTO: 1.1 10*3/MM3 (ref 0–0.9)
MONOCYTES NFR BLD AUTO: 9.3 % (ref 0–12)
NEUTROPHILS # BLD AUTO: 9.21 10*3/MM3 (ref 2–8.6)
NEUTROPHILS NFR BLD AUTO: 77.6 % (ref 37–80)
PLATELET # BLD AUTO: 242 10*3/MM3 (ref 150–450)
PMV BLD AUTO: 9.4 FL (ref 8–12)
POTASSIUM BLD-SCNC: 3.5 MMOL/L (ref 3.5–5.1)
RBC # BLD AUTO: 5.73 10*6/MM3 (ref 4.37–5.74)
SODIUM BLD-SCNC: 137 MMOL/L (ref 137–145)
WBC NRBC COR # BLD: 11.86 10*3/MM3 (ref 3.2–9.8)

## 2018-07-01 PROCEDURE — 25010000002 LEVOFLOXACIN PER 250 MG: Performed by: FAMILY MEDICINE

## 2018-07-01 PROCEDURE — 25010000002 MORPHINE PER 10 MG: Performed by: FAMILY MEDICINE

## 2018-07-01 PROCEDURE — 96376 TX/PRO/DX INJ SAME DRUG ADON: CPT

## 2018-07-01 PROCEDURE — 96361 HYDRATE IV INFUSION ADD-ON: CPT

## 2018-07-01 PROCEDURE — 80048 BASIC METABOLIC PNL TOTAL CA: CPT | Performed by: FAMILY MEDICINE

## 2018-07-01 PROCEDURE — 85025 COMPLETE CBC W/AUTO DIFF WBC: CPT | Performed by: FAMILY MEDICINE

## 2018-07-01 PROCEDURE — 25010000002 LORAZEPAM PER 2 MG: Performed by: FAMILY MEDICINE

## 2018-07-01 PROCEDURE — 96367 TX/PROPH/DG ADDL SEQ IV INF: CPT

## 2018-07-01 PROCEDURE — 96366 THER/PROPH/DIAG IV INF ADDON: CPT

## 2018-07-01 PROCEDURE — G0378 HOSPITAL OBSERVATION PER HR: HCPCS

## 2018-07-01 RX ADMIN — METRONIDAZOLE 500 MG: 500 INJECTION, SOLUTION INTRAVENOUS at 17:51

## 2018-07-01 RX ADMIN — DULOXETINE HYDROCHLORIDE 30 MG: 30 CAPSULE, DELAYED RELEASE ORAL at 21:26

## 2018-07-01 RX ADMIN — PANTOPRAZOLE SODIUM 40 MG: 40 INJECTION, POWDER, FOR SOLUTION INTRAVENOUS at 09:11

## 2018-07-01 RX ADMIN — METRONIDAZOLE 500 MG: 500 INJECTION, SOLUTION INTRAVENOUS at 00:59

## 2018-07-01 RX ADMIN — MORPHINE SULFATE 2 MG: 2 INJECTION, SOLUTION INTRAMUSCULAR; INTRAVENOUS at 10:51

## 2018-07-01 RX ADMIN — MORPHINE SULFATE 2 MG: 2 INJECTION, SOLUTION INTRAMUSCULAR; INTRAVENOUS at 21:36

## 2018-07-01 RX ADMIN — LORAZEPAM 1 MG: 2 INJECTION, SOLUTION INTRAMUSCULAR; INTRAVENOUS at 09:24

## 2018-07-01 RX ADMIN — FAMOTIDINE 20 MG: 10 INJECTION INTRAVENOUS at 09:15

## 2018-07-01 RX ADMIN — LEVOFLOXACIN 750 MG: 5 INJECTION, SOLUTION INTRAVENOUS at 10:45

## 2018-07-01 RX ADMIN — METRONIDAZOLE 500 MG: 500 INJECTION, SOLUTION INTRAVENOUS at 09:12

## 2018-07-01 RX ADMIN — PANTOPRAZOLE SODIUM 40 MG: 40 INJECTION, POWDER, FOR SOLUTION INTRAVENOUS at 21:25

## 2018-07-01 RX ADMIN — MORPHINE SULFATE 2 MG: 2 INJECTION, SOLUTION INTRAMUSCULAR; INTRAVENOUS at 15:35

## 2018-07-01 RX ADMIN — LORAZEPAM 1 MG: 2 INJECTION, SOLUTION INTRAMUSCULAR; INTRAVENOUS at 17:51

## 2018-07-02 VITALS
HEART RATE: 108 BPM | WEIGHT: 219 LBS | RESPIRATION RATE: 18 BRPM | DIASTOLIC BLOOD PRESSURE: 90 MMHG | OXYGEN SATURATION: 97 % | SYSTOLIC BLOOD PRESSURE: 140 MMHG | BODY MASS INDEX: 28.11 KG/M2 | TEMPERATURE: 98.2 F | HEIGHT: 74 IN

## 2018-07-02 LAB
ANION GAP SERPL CALCULATED.3IONS-SCNC: 6 MMOL/L (ref 5–15)
BASOPHILS # BLD AUTO: 0.03 10*3/MM3 (ref 0–0.2)
BASOPHILS NFR BLD AUTO: 0.4 % (ref 0–2)
BUN BLD-MCNC: 9 MG/DL (ref 7–21)
BUN/CREAT SERPL: 8.4 (ref 7–25)
CALCIUM SPEC-SCNC: 7.9 MG/DL (ref 8.4–10.2)
CHLORIDE SERPL-SCNC: 102 MMOL/L (ref 95–110)
CO2 SERPL-SCNC: 29 MMOL/L (ref 22–31)
CREAT BLD-MCNC: 1.07 MG/DL (ref 0.7–1.3)
DEPRECATED RDW RBC AUTO: 48.1 FL (ref 35.1–43.9)
EOSINOPHIL # BLD AUTO: 0.2 10*3/MM3 (ref 0–0.7)
EOSINOPHIL NFR BLD AUTO: 2.8 % (ref 0–7)
ERYTHROCYTE [DISTWIDTH] IN BLOOD BY AUTOMATED COUNT: 15.2 % (ref 11.5–14.5)
GFR SERPL CREATININE-BSD FRML MDRD: 76 ML/MIN/1.73 (ref 63–147)
GLUCOSE BLD-MCNC: 74 MG/DL (ref 60–100)
HCT VFR BLD AUTO: 45.5 % (ref 39–49)
HGB BLD-MCNC: 15.2 G/DL (ref 13.7–17.3)
IMM GRANULOCYTES # BLD: 0.04 10*3/MM3 (ref 0–0.02)
IMM GRANULOCYTES NFR BLD: 0.6 % (ref 0–0.5)
LYMPHOCYTES # BLD AUTO: 1.79 10*3/MM3 (ref 0.6–4.2)
LYMPHOCYTES NFR BLD AUTO: 24.9 % (ref 10–50)
MCH RBC QN AUTO: 29 PG (ref 26.5–34)
MCHC RBC AUTO-ENTMCNC: 33.4 G/DL (ref 31.5–36.3)
MCV RBC AUTO: 86.7 FL (ref 80–98)
MONOCYTES # BLD AUTO: 0.7 10*3/MM3 (ref 0–0.9)
MONOCYTES NFR BLD AUTO: 9.7 % (ref 0–12)
NEUTROPHILS # BLD AUTO: 4.43 10*3/MM3 (ref 2–8.6)
NEUTROPHILS NFR BLD AUTO: 61.6 % (ref 37–80)
PLATELET # BLD AUTO: 200 10*3/MM3 (ref 150–450)
PMV BLD AUTO: 10 FL (ref 8–12)
POTASSIUM BLD-SCNC: 3.5 MMOL/L (ref 3.5–5.1)
RBC # BLD AUTO: 5.25 10*6/MM3 (ref 4.37–5.74)
SODIUM BLD-SCNC: 137 MMOL/L (ref 137–145)
WBC NRBC COR # BLD: 7.19 10*3/MM3 (ref 3.2–9.8)

## 2018-07-02 PROCEDURE — 25010000002 LORAZEPAM PER 2 MG: Performed by: FAMILY MEDICINE

## 2018-07-02 PROCEDURE — 25010000002 MORPHINE PER 10 MG: Performed by: FAMILY MEDICINE

## 2018-07-02 PROCEDURE — G0378 HOSPITAL OBSERVATION PER HR: HCPCS

## 2018-07-02 PROCEDURE — 85025 COMPLETE CBC W/AUTO DIFF WBC: CPT | Performed by: FAMILY MEDICINE

## 2018-07-02 PROCEDURE — 96376 TX/PRO/DX INJ SAME DRUG ADON: CPT

## 2018-07-02 PROCEDURE — 25010000002 LEVOFLOXACIN PER 250 MG: Performed by: FAMILY MEDICINE

## 2018-07-02 PROCEDURE — 63710000001 PROMETHAZINE PER 12.5 MG: Performed by: INTERNAL MEDICINE

## 2018-07-02 PROCEDURE — 96361 HYDRATE IV INFUSION ADD-ON: CPT

## 2018-07-02 PROCEDURE — 80048 BASIC METABOLIC PNL TOTAL CA: CPT | Performed by: FAMILY MEDICINE

## 2018-07-02 PROCEDURE — 96366 THER/PROPH/DIAG IV INF ADDON: CPT

## 2018-07-02 RX ORDER — METRONIDAZOLE 500 MG/1
500 TABLET ORAL 3 TIMES DAILY
Qty: 30 TABLET | Refills: 0 | Status: SHIPPED | OUTPATIENT
Start: 2018-07-02 | End: 2018-08-20

## 2018-07-02 RX ORDER — CIPROFLOXACIN 500 MG/1
500 TABLET, FILM COATED ORAL EVERY 12 HOURS SCHEDULED
Qty: 20 TABLET | Refills: 0 | Status: SHIPPED | OUTPATIENT
Start: 2018-07-02 | End: 2018-08-20

## 2018-07-02 RX ORDER — WATER 1000 ML/1000ML
INJECTION, SOLUTION INTRAVENOUS
Status: COMPLETED
Start: 2018-07-02 | End: 2018-07-02

## 2018-07-02 RX ORDER — CARVEDILOL 6.25 MG/1
TABLET ORAL
Qty: 60 TABLET | Refills: 6 | Status: SHIPPED | OUTPATIENT
Start: 2018-07-02 | End: 2018-12-18

## 2018-07-02 RX ADMIN — MORPHINE SULFATE 2 MG: 2 INJECTION, SOLUTION INTRAMUSCULAR; INTRAVENOUS at 01:40

## 2018-07-02 RX ADMIN — SODIUM CHLORIDE 125 ML/HR: 900 INJECTION, SOLUTION INTRAVENOUS at 08:53

## 2018-07-02 RX ADMIN — PANTOPRAZOLE SODIUM 40 MG: 40 INJECTION, POWDER, FOR SOLUTION INTRAVENOUS at 08:53

## 2018-07-02 RX ADMIN — Medication 10 ML: at 07:46

## 2018-07-02 RX ADMIN — METRONIDAZOLE 500 MG: 500 INJECTION, SOLUTION INTRAVENOUS at 09:03

## 2018-07-02 RX ADMIN — MORPHINE SULFATE 2 MG: 2 INJECTION, SOLUTION INTRAMUSCULAR; INTRAVENOUS at 12:05

## 2018-07-02 RX ADMIN — PROMETHAZINE HYDROCHLORIDE 12.5 MG: 12.5 TABLET ORAL at 07:47

## 2018-07-02 RX ADMIN — LEVOFLOXACIN 750 MG: 5 INJECTION, SOLUTION INTRAVENOUS at 10:51

## 2018-07-02 RX ADMIN — WATER 1 ML: 1 INJECTION INTRAMUSCULAR; INTRAVENOUS; SUBCUTANEOUS at 01:41

## 2018-07-02 RX ADMIN — FAMOTIDINE 20 MG: 10 INJECTION INTRAVENOUS at 09:02

## 2018-07-02 RX ADMIN — METRONIDAZOLE 500 MG: 500 INJECTION, SOLUTION INTRAVENOUS at 01:40

## 2018-07-02 RX ADMIN — MORPHINE SULFATE 2 MG: 2 INJECTION, SOLUTION INTRAMUSCULAR; INTRAVENOUS at 07:46

## 2018-07-02 RX ADMIN — LORAZEPAM 1 MG: 2 INJECTION, SOLUTION INTRAMUSCULAR; INTRAVENOUS at 01:40

## 2018-07-02 RX ADMIN — SODIUM CHLORIDE 125 ML/HR: 900 INJECTION, SOLUTION INTRAVENOUS at 00:24

## 2018-07-03 ENCOUNTER — LAB (OUTPATIENT)
Dept: LAB | Facility: HOSPITAL | Age: 43
End: 2018-07-03

## 2018-07-03 DIAGNOSIS — R74.8 ELEVATED LIVER ENZYMES: ICD-10-CM

## 2018-07-03 DIAGNOSIS — K85.21 ALCOHOL-INDUCED ACUTE PANCREATITIS WITH UNINFECTED NECROSIS: ICD-10-CM

## 2018-07-03 DIAGNOSIS — K21.00 GASTROESOPHAGEAL REFLUX DISEASE WITH ESOPHAGITIS: ICD-10-CM

## 2018-07-03 DIAGNOSIS — R10.84 GENERALIZED ABDOMINAL PAIN: ICD-10-CM

## 2018-07-03 LAB
ANION GAP SERPL CALCULATED.3IONS-SCNC: 6 MMOL/L (ref 5–15)
BUN BLD-MCNC: 12 MG/DL (ref 7–21)
BUN/CREAT SERPL: 10.7 (ref 7–25)
CALCIUM SPEC-SCNC: 8.6 MG/DL (ref 8.4–10.2)
CHLORIDE SERPL-SCNC: 101 MMOL/L (ref 95–110)
CO2 SERPL-SCNC: 32 MMOL/L (ref 22–31)
CREAT BLD-MCNC: 1.12 MG/DL (ref 0.7–1.3)
GFR SERPL CREATININE-BSD FRML MDRD: 72 ML/MIN/1.73 (ref 63–147)
GLUCOSE BLD-MCNC: 82 MG/DL (ref 60–100)
POTASSIUM BLD-SCNC: 4.1 MMOL/L (ref 3.5–5.1)
SODIUM BLD-SCNC: 139 MMOL/L (ref 137–145)

## 2018-07-03 PROCEDURE — 83516 IMMUNOASSAY NONANTIBODY: CPT | Performed by: PHYSICIAN ASSISTANT

## 2018-07-03 PROCEDURE — 36415 COLL VENOUS BLD VENIPUNCTURE: CPT | Performed by: PHYSICIAN ASSISTANT

## 2018-07-03 PROCEDURE — 86671 FUNGUS NES ANTIBODY: CPT | Performed by: PHYSICIAN ASSISTANT

## 2018-07-03 PROCEDURE — 86255 FLUORESCENT ANTIBODY SCREEN: CPT | Performed by: PHYSICIAN ASSISTANT

## 2018-07-03 PROCEDURE — 80048 BASIC METABOLIC PNL TOTAL CA: CPT | Performed by: PHYSICIAN ASSISTANT

## 2018-07-04 ENCOUNTER — LAB (OUTPATIENT)
Dept: LAB | Facility: HOSPITAL | Age: 43
End: 2018-07-04

## 2018-07-04 ENCOUNTER — TRANSCRIBE ORDERS (OUTPATIENT)
Dept: GASTROENTEROLOGY | Facility: CLINIC | Age: 43
End: 2018-07-04

## 2018-07-04 DIAGNOSIS — K57.92 DIVERTICULITIS OF INTESTINE WITHOUT PERFORATION OR ABSCESS WITHOUT BLEEDING, UNSPECIFIED PART OF INTESTINAL TRACT: ICD-10-CM

## 2018-07-04 DIAGNOSIS — R11.2 NON-INTRACTABLE VOMITING WITH NAUSEA, UNSPECIFIED VOMITING TYPE: ICD-10-CM

## 2018-07-04 DIAGNOSIS — R63.4 ABNORMAL WEIGHT LOSS: ICD-10-CM

## 2018-07-04 DIAGNOSIS — R10.84 ABDOMINAL PAIN, GENERALIZED: ICD-10-CM

## 2018-07-04 DIAGNOSIS — R63.4 ABNORMAL WEIGHT LOSS: Primary | ICD-10-CM

## 2018-07-06 ENCOUNTER — OFFICE VISIT (OUTPATIENT)
Dept: GASTROENTEROLOGY | Facility: CLINIC | Age: 43
End: 2018-07-06

## 2018-07-06 ENCOUNTER — OFFICE VISIT (OUTPATIENT)
Dept: SURGERY | Facility: CLINIC | Age: 43
End: 2018-07-06

## 2018-07-06 VITALS
BODY MASS INDEX: 27.41 KG/M2 | WEIGHT: 213.6 LBS | DIASTOLIC BLOOD PRESSURE: 80 MMHG | SYSTOLIC BLOOD PRESSURE: 130 MMHG | HEIGHT: 74 IN

## 2018-07-06 VITALS
WEIGHT: 213.6 LBS | HEIGHT: 74 IN | SYSTOLIC BLOOD PRESSURE: 138 MMHG | DIASTOLIC BLOOD PRESSURE: 92 MMHG | HEART RATE: 109 BPM | BODY MASS INDEX: 27.41 KG/M2

## 2018-07-06 DIAGNOSIS — R63.4 WEIGHT LOSS, ABNORMAL: ICD-10-CM

## 2018-07-06 DIAGNOSIS — R10.84 GENERALIZED ABDOMINAL PAIN: ICD-10-CM

## 2018-07-06 DIAGNOSIS — K57.92 ACUTE DIVERTICULITIS OF INTESTINE: Primary | ICD-10-CM

## 2018-07-06 DIAGNOSIS — K21.00 GASTROESOPHAGEAL REFLUX DISEASE WITH ESOPHAGITIS: ICD-10-CM

## 2018-07-06 DIAGNOSIS — K57.30 DIVERTICULOSIS OF LARGE INTESTINE WITHOUT HEMORRHAGE: ICD-10-CM

## 2018-07-06 DIAGNOSIS — K57.32 DIVERTICULITIS OF LARGE INTESTINE WITHOUT PERFORATION OR ABSCESS WITHOUT BLEEDING: Primary | ICD-10-CM

## 2018-07-06 PROCEDURE — 99214 OFFICE O/P EST MOD 30 MIN: CPT | Performed by: PHYSICIAN ASSISTANT

## 2018-07-06 PROCEDURE — 99203 OFFICE O/P NEW LOW 30 MIN: CPT | Performed by: SURGERY

## 2018-07-06 RX ORDER — MESALAMINE 800 MG/1
800 TABLET, DELAYED RELEASE ORAL 2 TIMES DAILY
Qty: 60 TABLET | Refills: 5 | Status: SHIPPED | OUTPATIENT
Start: 2018-07-06 | End: 2018-07-23

## 2018-07-06 NOTE — PATIENT INSTRUCTIONS

## 2018-07-06 NOTE — PATIENT INSTRUCTIONS
MyPlate from [employer]  The general, healthful diet is based on the 2010 Dietary Guidelines for Americans. The amount of food you need to eat from each food group depends on your age, sex, and level of physical activity and can be individualized by a dietitian. Go to ChooseMyPlate.gov for more information.  What do I need to know about the MyPlate plan?  · Enjoy your food, but eat less.  · Avoid oversized portions.  ? ½ of your plate should include fruits and vegetables.  ? ¼ of your plate should be grains.  ? ¼ of your plate should be protein.  Grains  · Make at least half of your grains whole grains.  · For a 2,000 calorie daily food plan, eat 6 oz every day.  · 1 oz is about 1 slice bread, 1 cup cereal, or ½ cup cooked rice, cereal, or pasta.  Vegetables  · Make half your plate fruits and vegetables.  · For a 2,000 calorie daily food plan, eat 2½ cups every day.  · 1 cup is about 1 cup raw or cooked vegetables or vegetable juice or 2 cups raw leafy greens.  Fruits  · Make half your plate fruits and vegetables.  · For a 2,000 calorie daily food plan, eat 2 cups every day.  · 1 cup is about 1 cup fruit or 100% fruit juice or ½ cup dried fruit.  Protein  · For a 2,000 calorie daily food plan, eat 5½ oz every day.  · 1 oz is about 1 oz meat, poultry, or fish, ¼ cup cooked beans, 1 egg, 1 Tbsp peanut butter, or ½ oz nuts or seeds.  Dairy  · Switch to fat-free or low-fat (1%) milk.  · For a 2,000 calorie daily food plan, eat 3 cups every day.  · 1 cup is about 1 cup milk or yogurt or soy milk (soy beverage), 1½ oz natural cheese, or 2 oz processed cheese.  Fats, Oils, and Empty Calories  · Only small amounts of oils are recommended.  · Empty calories are calories from solid fats or added sugars.  · Compare sodium in foods like soup, bread, and frozen meals. Choose the foods with lower numbers.  · Drink water instead of sugary drinks.  What foods can I eat?  Grains  Whole grains such as whole wheat, quinoa, millet, and  bulgur. Bread, rolls, and pasta made from whole grains. Brown or wild rice. Hot or cold cereals made from whole grains and without added sugar.  Vegetables  All fresh vegetables, especially fresh red, dark green, or orange vegetables. Peas and beans. Low-sodium frozen or canned vegetables prepared without added salt. Low-sodium vegetable juices.  Fruits  All fresh, frozen, and dried fruits. Canned fruit packed in water or fruit juice without added sugar. Fruit juices without added sugar.  Meats and Other Protein Sources  Boiled, baked, or grilled lean meat trimmed of fat. Skinless poultry. Fresh seafood and shellfish. Canned seafood packed in water. Unsalted nuts and unsalted nut butters. Tofu. Dried beans and pea. Eggs.  Dairy  Low-fat or fat-free milk, yogurt, and cheeses.  Sweets and Desserts  Frozen desserts made from low-fat milk.  Fats and Oils  Olive, peanut, and canola oils and margarine. Salad dressing and mayonnaise made from these oils.  Other  Soups and casseroles made from allowed ingredients and without added fat or salt.  The items listed above may not be a complete list of recommended foods or beverages. Contact your dietitian for more options.  What foods are not recommended?  Grains  Sweetened, low-fiber cereals. Packaged baked goods. Snack crackers and chips. Cheese crackers, butter crackers, and biscuits. Frozen waffles, sweet breads, doughnuts, pastries, packaged baking mixes, pancakes, cakes, and cookies.  Vegetables  Regular canned or frozen vegetables or vegetables prepared with salt. Canned tomatoes. Canned tomato sauce. Fried vegetables. Vegetables in cream sauce or cheese sauce.  Fruits  Fruits packed in syrup or made with added sugar.  Meats and Other Protein Sources  Marbled or fatty meats such as ribs. Poultry with skin. Fried meats, poultry, eggs, or fish. Sausages, hot dogs, and deli meats such as pastrami, bologna, or salami.  Dairy  Whole milk, cream, cheeses made from whole milk,  sour cream. Ice cream or yogurt made from whole milk or with added sugar.  Beverages  For adults, no more than one alcoholic drink per day. Regular soft drinks or other sugary beverages. Juice drinks.  Sweets and Desserts  Sugary or fatty desserts, candy, and other sweets.  Fats and Oils  Solid shortening or partially hydrogenated oils. Solid margarine. Margarine that contains trans fats. Butter.  The items listed above may not be a complete list of foods and beverages to avoid. Contact your dietitian for more information.  This information is not intended to replace advice given to you by your health care provider. Make sure you discuss any questions you have with your health care provider.  Document Released: 01/06/2009 Document Revised: 05/25/2017 Document Reviewed: 11/26/2014  [employer] Interactive Patient Education © 2018 [employer]  BMI for Adults  Body mass index (BMI) is a number that is calculated from a person's weight and height. In most adults, the number is used to find how much of an adult's weight is made up of fat. BMI is not as accurate as a direct measure of body fat.  How is BMI calculated?  BMI is calculated by dividing weight in kilograms by height in meters squared. It can also be calculated by dividing weight in pounds by height in inches squared, then multiplying the resulting number by 703. Charts are available to help you find your BMI quickly and easily without doing this calculation.  How is BMI interpreted?  Health care professionals use BMI charts to identify whether an adult is underweight, at a normal weight, or overweight based on the following guidelines:  · Underweight: BMI less than 18.5.  · Normal weight: BMI between 18.5 and 24.9.  · Overweight: BMI between 25 and 29.9.  · Obese: BMI of 30 and above.    BMI is usually interpreted the same for males and females.  Weight includes both fat and muscle, so someone with a muscular build, such as an athlete, may have a BMI that is  higher than 24.9. In cases like these, BMI may not accurately depict body fat. To determine if excess body fat is the cause of a BMI of 25 or higher, further assessments may need to be done by a health care provider.  Why is BMI a useful tool?  BMI is used to identify a possible weight problem that may be related to a medical problem or may increase the risk for medical problems. BMI can also be used to promote changes to reach a healthy weight.  This information is not intended to replace advice given to you by your health care provider. Make sure you discuss any questions you have with your health care provider.  Document Released: 08/29/2005 Document Revised: 04/27/2017 Document Reviewed: 05/15/2015  [employer] Interactive Patient Education © 2018 [employer]

## 2018-07-06 NOTE — PROGRESS NOTES
Chief Complaint   Patient presents with   • Diverticulitis   • Weight Loss   • Abdominal Pain       ENDO PROCEDURE ORDERED:    Subjective    Stevenson Chilel is a 43 y.o. male. he is here today for follow-up.    History of Present Illness    HISTORY: Patient is seen on a recheck of his acute diverticulitis, abdominal pain, weight loss, fatty liver, F0/S3/N1. Last seen 06/28/2018. I had given him Cipro and Flagyl with recommendation for further studies to include stool studies as well as an IBD panel. He subsequently went to the emergency room and was admitted from 06/28/2018 to 07/02/2018 for recurrent diverticulitis. He states he is doing much better. Laboratories after discharge: BMP showed a calcium 7.9, CBC was fairly normal. He had a recent EGD/colonoscopy on 05/30/2018 showing esophagitis, gastritis, severe diverticulosis; this was after his last bout with diverticulitis.    The patient thinks his back injury may have precipitated his colitis. He has been following at the chiropractor. He has some numbness on the right side of his body. He has nearly completed the Cipro and Flagyl. He feels much better. No nausea or vomiting. No fevers. Bowels are moving without blood or mucus. Weight is down 2.4 pounds since last visit. GERD is well-controlled on Prilosec 40 mg daily.    ASSESSMENT AND PLAN: Patient with recurrent diverticulitis. At this point he has recovered quickly. I am concerned he will continue to have bouts of diverticulitis. In the hopes of avoiding surgery and recurrent infections I suggested we start him on Asacol 800 mg b.i.d. He will complete his antibiotics. Continue on the Prilosec for chronic GERD. He will start back on a high-fiber diet in the next few weeks. As long as he is doing well we will plan followup in 6 weeks with CBC, CMP prior, sooner if needed, further pending clinical course and the results of the above.       The following portions of the patient's history were reviewed and  updated as appropriate:   Past Medical History:   Diagnosis Date   • A-fib (CMS/HCC)    • Anxiety    • Arrhythmia    • Arthritis     rt shoulder    • Atrial flutter (CMS/HCC) 07/2016    EP  ablation x2    • Depression    • Diverticulitis    • ETOH abuse    • GERD (gastroesophageal reflux disease)    • Kidney stone    • Pancreatitis    • Sleep apnea     have used a machine in the past   • Tendonitis     rt shoulder     Past Surgical History:   Procedure Laterality Date   • CARDIAC ELECTROPHYSIOLOGY PROCEDURE N/A 5/2/2017    Procedure: Ablation atrial flutter;  Surgeon: Ramirez Reza MD;  [facility];  Service:    • CARDIAC ELECTROPHYSIOLOGY PROCEDURE N/A 9/5/2017    Procedure: Ablation atrial fibrillation PVA ;  Surgeon: Ramirez Reza MD;  [facility];  Service:    • COLONOSCOPY     • COLONOSCOPY N/A 5/30/2018    Procedure: COLONOSCOPY;  Surgeon: Edwin Medina MD;  Location: [facility] ENDOSCOPY;  Service: Gastroenterology   • ENDOSCOPY N/A 3/30/2018    Procedure: ESOPHAGOGASTRODUODENOSCOPY;  Surgeon: Edwin Medina MD;  Location: [facility] ENDOSCOPY;  Service: Gastroenterology   • ENDOSCOPY N/A 5/30/2018    Procedure: ESOPHAGOGASTRODUODENOSCOPY;  Surgeon: Edwin Medina MD;  Location: [facility] ENDOSCOPY;  Service: Gastroenterology   • SHOULDER SURGERY Right 2014   • TONSILLECTOMY  12/2015   • UPPER GASTROINTESTINAL ENDOSCOPY  03/30/2018   • UPPER GASTROINTESTINAL ENDOSCOPY     • UPPER GASTROINTESTINAL ENDOSCOPY  05/30/2018     Family History   Problem Relation Age of Onset   • Heart disease Father    • Depression Father    • Cancer Maternal Grandmother         lung   • Cancer Paternal Grandmother         pancreatic   • Heart disease Paternal Grandfather        Allergies   Allergen Reactions   • Contrast Dye Anaphylaxis     ANAPHYLAXIS   • Iodinated Diagnostic Agents Anaphylaxis     Social History     Social History   • Marital status:      Social History  "Main Topics   • Smoking status: Never Smoker   • Smokeless tobacco: Never Used   • Alcohol use No      Comment: quit 3/17/18   • Drug use: Yes     Frequency: 7.0 times per week     Types: Marijuana      Comment: states he uses \"a little\" every day   • Sexual activity: Defer     Other Topics Concern   • Not on file     Social History Narrative    Pt states has not had drink of any alcohol in 3 weeks as of 4/5/2018       Current Outpatient Prescriptions:   •  albuterol (PROVENTIL HFA;VENTOLIN HFA) 108 (90 Base) MCG/ACT inhaler, Inhale 2 puffs Every 4 (Four) Hours As Needed for Wheezing or Shortness of Air. 2 puffs q 2 hours for 2 days then 2 puffs q 4 hours  PRN., Disp: 18 g, Rfl: 2  •  carvedilol (COREG) 6.25 MG tablet, TAKE ONE TABLET BY MOUTH EVERY 12 HOURS, Disp: 60 tablet, Rfl: 6  •  ciprofloxacin (CIPRO) 500 MG tablet, Take 1 tablet by mouth Every 12 (Twelve) Hours., Disp: 20 tablet, Rfl: 0  •  clonazePAM (KlonoPIN) 1 MG tablet, Take 1 mg by mouth 2 (Two) Times a Day As Needed for Anxiety., Disp: , Rfl:   •  cyclobenzaprine (FLEXERIL) 10 MG tablet, Take 10 mg by mouth As Needed., Disp: , Rfl: 2  •  DULoxetine (CYMBALTA) 30 MG capsule, Take 60 mg by mouth Every Night., Disp: , Rfl:   •  Esomeprazole Magnesium (NEXIUM 24HR) 20 MG tablet delayed-release, Take 40 mg by mouth Every Morning Before Breakfast. (Patient taking differently: Take 40 mg by mouth Daily As Needed.), Disp: 60 tablet, Rfl: 0  •  lisinopril (PRINIVIL,ZESTRIL) 5 MG tablet, Take 5 mg by mouth Daily., Disp: , Rfl:   •  magnesium oxide (MAGOX) 400 (241.3 MG) MG tablet tablet, Take 400 mg by mouth 2 (Two) Times a Day., Disp: , Rfl:   •  metroNIDAZOLE (FLAGYL) 500 MG tablet, Take 1 tablet by mouth 3 (Three) Times a Day., Disp: 30 tablet, Rfl: 0  •  ondansetron ODT (ZOFRAN-ODT) 4 MG disintegrating tablet, Take 1 tablet by mouth Every 6 (Six) Hours As Needed for Nausea or Vomiting., Disp: 30 tablet, Rfl: 0  •  Probiotic Product (PROBIOTIC-10 PO), Take  " "by mouth., Disp: , Rfl:   •  promethazine (PHENERGAN) 25 MG suppository, Insert 1 suppository into the rectum Every 6 (Six) Hours As Needed for Nausea or Vomiting., Disp: 15 suppository, Rfl: 0  •  promethazine (PHENERGAN) 25 MG tablet, Take 25 mg by mouth Every 6 (Six) Hours As Needed for Nausea or Vomiting., Disp: , Rfl:   •  mesalamine (ASACOL HD) 800 MG EC tablet, Take 1 tablet by mouth 2 (Two) Times a Day., Disp: 60 tablet, Rfl: 5  Review of Systems  Review of Systems       Objective    /92 (BP Location: Left arm)   Pulse 109   Ht 188 cm (74\")   Wt 96.9 kg (213 lb 9.6 oz)   BMI 27.42 kg/m²   Physical Exam   Constitutional: He is oriented to person, place, and time. He appears well-developed and well-nourished. No distress.   HENT:   Head: Normocephalic and atraumatic.   Eyes: EOM are normal. Pupils are equal, round, and reactive to light.   Neck: Normal range of motion.   Cardiovascular: Normal rate, regular rhythm and normal heart sounds.    Pulmonary/Chest: Effort normal and breath sounds normal.   Abdominal: Soft. Bowel sounds are normal. He exhibits no shifting dullness, no distension, no abdominal bruit, no ascites and no mass. There is no hepatosplenomegaly. There is tenderness. There is no rigidity, no rebound, no guarding and no CVA tenderness. No hernia. Hernia confirmed negative in the ventral area.   Mild diffuse   Musculoskeletal: Normal range of motion.   Neurological: He is alert and oriented to person, place, and time.   Skin: Skin is warm and dry.   Psychiatric: He has a normal mood and affect. His behavior is normal. Judgment and thought content normal.   Nursing note and vitals reviewed.    Assessment/Plan      1. Acute diverticulitis of intestine    2. Generalized abdominal pain    3. Gastroesophageal reflux disease with esophagitis    4. Weight loss, abnormal    5. Diverticulosis of large intestine without hemorrhage    .   Stevenson was seen today for diverticulitis, weight loss and " abdominal pain.    Diagnoses and all orders for this visit:    Acute diverticulitis of intestine  -     CBC & Differential; Future  -     Comprehensive Metabolic Panel; Future    Generalized abdominal pain  -     CBC & Differential; Future  -     Comprehensive Metabolic Panel; Future    Gastroesophageal reflux disease with esophagitis  -     CBC & Differential; Future  -     Comprehensive Metabolic Panel; Future    Weight loss, abnormal  -     CBC & Differential; Future  -     Comprehensive Metabolic Panel; Future    Diverticulosis of large intestine without hemorrhage    Other orders  -     mesalamine (ASACOL HD) 800 MG EC tablet; Take 1 tablet by mouth 2 (Two) Times a Day.        Orders placed during this encounter include:  Orders Placed This Encounter   Procedures   • Comprehensive Metabolic Panel     Standing Status:   Future     Standing Expiration Date:   9/24/2018   • CBC & Differential     Standing Status:   Future     Standing Expiration Date:   9/24/2018     Order Specific Question:   Manual Differential     Answer:   No       Medications prescribed:  New Medications Ordered This Visit   Medications   • mesalamine (ASACOL HD) 800 MG EC tablet     Sig: Take 1 tablet by mouth 2 (Two) Times a Day.     Dispense:  60 tablet     Refill:  5       Requested Prescriptions     Signed Prescriptions Disp Refills   • mesalamine (ASACOL HD) 800 MG EC tablet 60 tablet 5     Sig: Take 1 tablet by mouth 2 (Two) Times a Day.       Review and/or summary of lab tests, radiology, procedures, medications. Review and summary of old records and obtaining of history. The risks and benefits of my recommendations, as well as other treatment options were discussed with the patient today. Questions were answered.    Follow-up: Return in about 6 weeks (around 8/17/2018), or if symptoms worsen or fail to improve, for lab prior.     * Surgery not found *      This document has been electronically signed by Rene Saenz PA-C on July  6, 2018 2:27 PM      Results for orders placed or performed during the hospital encounter of 06/28/18   CBC Auto Differential   Result Value Ref Range    WBC 7.19 3.20 - 9.80 10*3/mm3    RBC 5.25 4.37 - 5.74 10*6/mm3    Hemoglobin 15.2 13.7 - 17.3 g/dL    Hematocrit 45.5 39.0 - 49.0 %    MCV 86.7 80.0 - 98.0 fL    MCH 29.0 26.5 - 34.0 pg    MCHC 33.4 31.5 - 36.3 g/dL    RDW 15.2 (H) 11.5 - 14.5 %    RDW-SD 48.1 (H) 35.1 - 43.9 fl    MPV 10.0 8.0 - 12.0 fL    Platelets 200 150 - 450 10*3/mm3    Neutrophil % 61.6 37.0 - 80.0 %    Lymphocyte % 24.9 10.0 - 50.0 %    Monocyte % 9.7 0.0 - 12.0 %    Eosinophil % 2.8 0.0 - 7.0 %    Basophil % 0.4 0.0 - 2.0 %    Immature Grans % 0.6 (H) 0.0 - 0.5 %    Neutrophils, Absolute 4.43 2.00 - 8.60 10*3/mm3    Lymphocytes, Absolute 1.79 0.60 - 4.20 10*3/mm3    Monocytes, Absolute 0.70 0.00 - 0.90 10*3/mm3    Eosinophils, Absolute 0.20 0.00 - 0.70 10*3/mm3    Basophils, Absolute 0.03 0.00 - 0.20 10*3/mm3    Immature Grans, Absolute 0.04 (H) 0.00 - 0.02 10*3/mm3   CBC Auto Differential   Result Value Ref Range    WBC 11.86 (H) 3.20 - 9.80 10*3/mm3    RBC 5.73 4.37 - 5.74 10*6/mm3    Hemoglobin 16.8 13.7 - 17.3 g/dL    Hematocrit 49.3 (H) 39.0 - 49.0 %    MCV 86.0 80.0 - 98.0 fL    MCH 29.3 26.5 - 34.0 pg    MCHC 34.1 31.5 - 36.3 g/dL    RDW 15.3 (H) 11.5 - 14.5 %    RDW-SD 48.3 (H) 35.1 - 43.9 fl    MPV 9.4 8.0 - 12.0 fL    Platelets 242 150 - 450 10*3/mm3    Neutrophil % 77.6 37.0 - 80.0 %    Lymphocyte % 12.2 10.0 - 50.0 %    Monocyte % 9.3 0.0 - 12.0 %    Eosinophil % 0.4 0.0 - 7.0 %    Basophil % 0.2 0.0 - 2.0 %    Immature Grans % 0.3 0.0 - 0.5 %    Neutrophils, Absolute 9.21 (H) 2.00 - 8.60 10*3/mm3    Lymphocytes, Absolute 1.45 0.60 - 4.20 10*3/mm3    Monocytes, Absolute 1.10 (H) 0.00 - 0.90 10*3/mm3    Eosinophils, Absolute 0.05 0.00 - 0.70 10*3/mm3    Basophils, Absolute 0.02 0.00 - 0.20 10*3/mm3    Immature Grans, Absolute 0.03 (H) 0.00 - 0.02 10*3/mm3   CBC Auto Differential    Result Value Ref Range    WBC 12.38 (H) 3.20 - 9.80 10*3/mm3    RBC 5.79 (H) 4.37 - 5.74 10*6/mm3    Hemoglobin 16.9 13.7 - 17.3 g/dL    Hematocrit 49.9 (H) 39.0 - 49.0 %    MCV 86.2 80.0 - 98.0 fL    MCH 29.2 26.5 - 34.0 pg    MCHC 33.9 31.5 - 36.3 g/dL    RDW 15.4 (H) 11.5 - 14.5 %    RDW-SD 48.9 (H) 35.1 - 43.9 fl    MPV 10.1 8.0 - 12.0 fL    Platelets 252 150 - 450 10*3/mm3    Neutrophil % 80.9 (H) 37.0 - 80.0 %    Lymphocyte % 10.5 10.0 - 50.0 %    Monocyte % 8.0 0.0 - 12.0 %    Eosinophil % 0.1 0.0 - 7.0 %    Basophil % 0.2 0.0 - 2.0 %    Immature Grans % 0.3 0.0 - 0.5 %    Neutrophils, Absolute 10.02 (H) 2.00 - 8.60 10*3/mm3    Lymphocytes, Absolute 1.30 0.60 - 4.20 10*3/mm3    Monocytes, Absolute 0.99 (H) 0.00 - 0.90 10*3/mm3    Eosinophils, Absolute 0.01 0.00 - 0.70 10*3/mm3    Basophils, Absolute 0.02 0.00 - 0.20 10*3/mm3    Immature Grans, Absolute 0.04 (H) 0.00 - 0.02 10*3/mm3   CBC Auto Differential   Result Value Ref Range    WBC 13.36 (H) 3.20 - 9.80 10*3/mm3    RBC 5.87 (H) 4.37 - 5.74 10*6/mm3    Hemoglobin 17.5 (H) 13.7 - 17.3 g/dL    Hematocrit 51.9 (H) 39.0 - 49.0 %    MCV 88.4 80.0 - 98.0 fL    MCH 29.8 26.5 - 34.0 pg    MCHC 33.7 31.5 - 36.3 g/dL    RDW 15.5 (H) 11.5 - 14.5 %    RDW-SD 50.7 (H) 35.1 - 43.9 fl    MPV 9.1 8.0 - 12.0 fL    Platelets 283 150 - 450 10*3/mm3    Neutrophil % 85.2 (H) 37.0 - 80.0 %    Lymphocyte % 8.8 (L) 10.0 - 50.0 %    Monocyte % 5.5 0.0 - 12.0 %    Eosinophil % 0.0 0.0 - 7.0 %    Basophil % 0.1 0.0 - 2.0 %    Immature Grans % 0.4 0.0 - 0.5 %    Neutrophils, Absolute 11.37 (H) 2.00 - 8.60 10*3/mm3    Lymphocytes, Absolute 1.18 0.60 - 4.20 10*3/mm3    Monocytes, Absolute 0.74 0.00 - 0.90 10*3/mm3    Eosinophils, Absolute 0.00 0.00 - 0.70 10*3/mm3    Basophils, Absolute 0.02 0.00 - 0.20 10*3/mm3    Immature Grans, Absolute 0.05 (H) 0.00 - 0.02 10*3/mm3   CBC Auto Differential   Result Value Ref Range    WBC 17.37 (H) 3.20 - 9.80 10*3/mm3    RBC 5.90 (H) 4.37 - 5.74  10*6/mm3    Hemoglobin 17.4 (H) 13.7 - 17.3 g/dL    Hematocrit 51.5 (H) 39.0 - 49.0 %    MCV 87.3 80.0 - 98.0 fL    MCH 29.5 26.5 - 34.0 pg    MCHC 33.8 31.5 - 36.3 g/dL    RDW 15.4 (H) 11.5 - 14.5 %    RDW-SD 49.3 (H) 35.1 - 43.9 fl    MPV 9.8 8.0 - 12.0 fL    Platelets 266 150 - 450 10*3/mm3    Neutrophil % 86.4 (H) 37.0 - 80.0 %    Lymphocyte % 7.0 (L) 10.0 - 50.0 %    Monocyte % 6.1 0.0 - 12.0 %    Eosinophil % 0.1 0.0 - 7.0 %    Basophil % 0.1 0.0 - 2.0 %    Immature Grans % 0.3 0.0 - 0.5 %    Neutrophils, Absolute 15.02 (H) 2.00 - 8.60 10*3/mm3    Lymphocytes, Absolute 1.21 0.60 - 4.20 10*3/mm3    Monocytes, Absolute 1.06 (H) 0.00 - 0.90 10*3/mm3    Eosinophils, Absolute 0.01 0.00 - 0.70 10*3/mm3    Basophils, Absolute 0.01 0.00 - 0.20 10*3/mm3    Immature Grans, Absolute 0.06 (H) 0.00 - 0.02 10*3/mm3     *Note: Due to a large number of results and/or encounters for the requested time period, some results have not been displayed. A complete set of results can be found in Results Review.       Some portions of this note have been dictated using voice recognition software and may contain errors and/or omissions.

## 2018-07-09 NOTE — PROGRESS NOTES
Chief Complaint   Patient presents with   • Abdominal Pain     diverticulitis        Abdominal Pain   This is a recurrent problem. The current episode started more than 1 month ago. The onset quality is gradual. The problem has been resolved. The pain is located in the LLQ. The pain is moderate. The quality of the pain is sharp. The abdominal pain does not radiate. Associated symptoms include dysuria. Pertinent negatives include no anorexia, arthralgias, belching, constipation, diarrhea, fever, flatus, frequency, headaches, hematochezia, hematuria, melena, myalgias, nausea, vomiting or weight loss. Nothing aggravates the pain. The pain is relieved by nothing. He has tried antibiotics for the symptoms. The treatment provided significant relief. Prior diagnostic workup includes CT scan and lower endoscopy. There is no history of abdominal surgery, colon cancer, Crohn's disease, gallstones, GERD, irritable bowel syndrome, pancreatitis, PUD or ulcerative colitis.     Second episode of diverticulitis in the last several months. Currently pain free on oral antibiotics. No fever or chills.  Past Medical History:   Diagnosis Date   • A-fib (CMS/HCC)    • Anxiety    • Arrhythmia    • Arthritis     rt shoulder    • Atrial flutter (CMS/HCC) 07/2016    EP  ablation x2    • Depression    • Diverticulitis    • ETOH abuse    • GERD (gastroesophageal reflux disease)    • Kidney stone    • Pancreatitis    • Sleep apnea     have used a machine in the past   • Tendonitis     rt shoulder       Past Surgical History:   Procedure Laterality Date   • CARDIAC ELECTROPHYSIOLOGY PROCEDURE N/A 5/2/2017    Procedure: Ablation atrial flutter;  Surgeon: Ramirez Reza MD;  Location:  [facility];  Service:    • CARDIAC ELECTROPHYSIOLOGY PROCEDURE N/A 9/5/2017    Procedure: Ablation atrial fibrillation PVA ;  Surgeon: Ramirez Reza MD;  [facility];  Service:    • COLONOSCOPY     • COLONOSCOPY N/A  5/30/2018    Procedure: COLONOSCOPY;  Surgeon: Edwin Medina MD;  Location: [facility] ENDOSCOPY;  Service: Gastroenterology   • ENDOSCOPY N/A 3/30/2018    Procedure: ESOPHAGOGASTRODUODENOSCOPY;  Surgeon: Edwin Medina MD;  Location: [facility] ENDOSCOPY;  Service: Gastroenterology   • ENDOSCOPY N/A 5/30/2018    Procedure: ESOPHAGOGASTRODUODENOSCOPY;  Surgeon: Edwin Medina MD;  Location: [facility] ENDOSCOPY;  Service: Gastroenterology   • SHOULDER SURGERY Right 2014   • TONSILLECTOMY  12/2015   • UPPER GASTROINTESTINAL ENDOSCOPY  03/30/2018   • UPPER GASTROINTESTINAL ENDOSCOPY     • UPPER GASTROINTESTINAL ENDOSCOPY  05/30/2018         Current Outpatient Prescriptions:   •  albuterol (PROVENTIL HFA;VENTOLIN HFA) 108 (90 Base) MCG/ACT inhaler, Inhale 2 puffs Every 4 (Four) Hours As Needed for Wheezing or Shortness of Air. 2 puffs q 2 hours for 2 days then 2 puffs q 4 hours  PRN., Disp: 18 g, Rfl: 2  •  carvedilol (COREG) 6.25 MG tablet, TAKE ONE TABLET BY MOUTH EVERY 12 HOURS, Disp: 60 tablet, Rfl: 6  •  ciprofloxacin (CIPRO) 500 MG tablet, Take 1 tablet by mouth Every 12 (Twelve) Hours., Disp: 20 tablet, Rfl: 0  •  clonazePAM (KlonoPIN) 1 MG tablet, Take 1 mg by mouth 2 (Two) Times a Day As Needed for Anxiety., Disp: , Rfl:   •  cyclobenzaprine (FLEXERIL) 10 MG tablet, Take 10 mg by mouth As Needed., Disp: , Rfl: 2  •  DULoxetine (CYMBALTA) 30 MG capsule, Take 60 mg by mouth Every Night., Disp: , Rfl:   •  Esomeprazole Magnesium (NEXIUM 24HR) 20 MG tablet delayed-release, Take 40 mg by mouth Every Morning Before Breakfast. (Patient taking differently: Take 40 mg by mouth Daily As Needed.), Disp: 60 tablet, Rfl: 0  •  lisinopril (PRINIVIL,ZESTRIL) 5 MG tablet, Take 5 mg by mouth Daily., Disp: , Rfl:   •  magnesium oxide (MAGOX) 400 (241.3 MG) MG tablet tablet, Take 400 mg by mouth 2 (Two) Times a Day., Disp: , Rfl:   •  mesalamine (ASACOL HD) 800 MG EC tablet, Take 1 tablet by mouth 2 (Two) Times a Day., Disp: 60 tablet,  "Rfl: 5  •  metroNIDAZOLE (FLAGYL) 500 MG tablet, Take 1 tablet by mouth 3 (Three) Times a Day., Disp: 30 tablet, Rfl: 0  •  ondansetron ODT (ZOFRAN-ODT) 4 MG disintegrating tablet, Take 1 tablet by mouth Every 6 (Six) Hours As Needed for Nausea or Vomiting., Disp: 30 tablet, Rfl: 0  •  Probiotic Product (PROBIOTIC-10 PO), Take  by mouth., Disp: , Rfl:   •  promethazine (PHENERGAN) 25 MG suppository, Insert 1 suppository into the rectum Every 6 (Six) Hours As Needed for Nausea or Vomiting., Disp: 15 suppository, Rfl: 0  •  promethazine (PHENERGAN) 25 MG tablet, Take 25 mg by mouth Every 6 (Six) Hours As Needed for Nausea or Vomiting., Disp: , Rfl:     Allergies   Allergen Reactions   • Contrast Dye Anaphylaxis     ANAPHYLAXIS   • Iodinated Diagnostic Agents Anaphylaxis       Family History   Problem Relation Age of Onset   • Heart disease Father    • Depression Father    • Cancer Maternal Grandmother         lung   • Cancer Paternal Grandmother         pancreatic   • Heart disease Paternal Grandfather        Social History     Social History   • Marital status:      Spouse name: N/A   • Number of children: N/A   • Years of education: N/A     Occupational History   • Not on file.     Social History Main Topics   • Smoking status: Never Smoker   • Smokeless tobacco: Never Used   • Alcohol use No      Comment: quit 3/17/18   • Drug use: Yes     Frequency: 7.0 times per week     Types: Marijuana      Comment: states he uses \"a little\" every day   • Sexual activity: Defer     Other Topics Concern   • Not on file     Social History Narrative    Pt states has not had drink of any alcohol in 3 weeks as of 4/5/2018       Review of Systems   Constitutional: Negative for activity change, appetite change, chills, fever and weight loss.   HENT: Negative for hearing loss, nosebleeds and trouble swallowing.    Cardiovascular: Negative for chest pain, palpitations and leg swelling.   Gastrointestinal: Positive for abdominal " pain. Negative for abdominal distention, anal bleeding, anorexia, blood in stool, constipation, diarrhea, flatus, hematochezia, melena, nausea, rectal pain and vomiting.   Endocrine: Negative for cold intolerance, heat intolerance, polydipsia and polyuria.   Genitourinary: Positive for dysuria. Negative for decreased urine volume, difficulty urinating, enuresis, frequency, hematuria and urgency.   Musculoskeletal: Negative for arthralgias, back pain, gait problem, myalgias and neck pain.   Skin: Negative for pallor, rash and wound.   Allergic/Immunologic: Negative for immunocompromised state.   Neurological: Negative for dizziness, seizures, weakness, light-headedness, numbness and headaches.   Psychiatric/Behavioral: Negative for agitation and behavioral problems. The patient is not nervous/anxious.        Physical Exam   Constitutional: He is oriented to person, place, and time. He appears well-developed and well-nourished. No distress.   HENT:   Head: Normocephalic and atraumatic.   Eyes: EOM are normal. Pupils are equal, round, and reactive to light. No scleral icterus.   Neck: Normal range of motion. Neck supple. No JVD present. No tracheal deviation present. No thyromegaly present.   Cardiovascular: Normal rate and regular rhythm.    Pulmonary/Chest: Effort normal and breath sounds normal. No stridor.   Abdominal: Soft. Bowel sounds are normal. He exhibits no distension and no mass. There is no tenderness. There is no rebound and no guarding. No hernia.   Musculoskeletal: Normal range of motion. He exhibits no tenderness.   Lymphadenopathy:     He has no cervical adenopathy.     He has no axillary adenopathy.   Neurological: He is alert and oriented to person, place, and time.   Skin: Skin is warm and dry. No rash noted. He is not diaphoretic. No erythema. No pallor.   Psychiatric: He has a normal mood and affect. His behavior is normal. Judgment and thought content normal.   Vitals  reviewed.        ASSESSMENT    Stevenson was seen today for abdominal pain.    Diagnoses and all orders for this visit:    Diverticulitis of large intestine without perforation or abscess without bleeding        PLAN    1. Discussed surgical versus medical treatment.  2. Recheck in 1 month          This document has been electronically signed by Michel Lala MD on July 8, 2018 8:45 PM

## 2018-07-10 ENCOUNTER — OFFICE VISIT (OUTPATIENT)
Dept: CARDIOLOGY | Facility: CLINIC | Age: 43
End: 2018-07-10

## 2018-07-10 VITALS
SYSTOLIC BLOOD PRESSURE: 140 MMHG | WEIGHT: 209 LBS | BODY MASS INDEX: 26.82 KG/M2 | HEART RATE: 68 BPM | DIASTOLIC BLOOD PRESSURE: 86 MMHG | HEIGHT: 74 IN

## 2018-07-10 DIAGNOSIS — F10.10 ETOH ABUSE: ICD-10-CM

## 2018-07-10 DIAGNOSIS — I48.3 TYPICAL ATRIAL FLUTTER (HCC): ICD-10-CM

## 2018-07-10 DIAGNOSIS — I10 ESSENTIAL HYPERTENSION: ICD-10-CM

## 2018-07-10 DIAGNOSIS — I48.19 PERSISTENT ATRIAL FIBRILLATION (HCC): Primary | ICD-10-CM

## 2018-07-10 LAB
BAKER'S YEAST IGG QN IA: 29 UNITS (ref 0–50)
CHITOBIOSIDE IGA SERPL IA-ACNC: 5 UNITS (ref 0–90)
LABORATORY COMMENT REPORT: NORMAL
LAMINARIBIOSIDE IGG SERPL IA-ACNC: 9 UNITS (ref 0–60)
MANNOBIOSIDE IGG SERPL IA-ACNC: 32 UNITS (ref 0–100)
P-ANCA ATYPICAL TITR SER IF: NEGATIVE {TITER}

## 2018-07-10 PROCEDURE — 99213 OFFICE O/P EST LOW 20 MIN: CPT | Performed by: INTERNAL MEDICINE

## 2018-07-10 PROCEDURE — 93000 ELECTROCARDIOGRAM COMPLETE: CPT | Performed by: INTERNAL MEDICINE

## 2018-07-10 NOTE — PROGRESS NOTES
Stevenson Chilel  1975  [phone]      07/10/2018    [facility] CARDIOLOGY     Irma Schuster, APRN  [address] [address]    Chief Complaint   Patient presents with   • Atrial Fibrillation       Problem List:   1. Typical atrial flutter  a. Hospitilization 7/2016 for Aflutter in setting of phentermine - cardioverted with Corvert discharged on betablocker  b. Cardiolite stress test 8/2016 Negative for ischemia  c. Echo 7/2016: mild LVH, biatrial enlargement, EF 55%-60%  d. EPS with RFA atrial flutter 11/21/16 with Dr. Alaniz- [facility], [place], [place], recurrence with ECV and initiation of Amiodarone  e. Recurrence with repeat EPS 12/16/17: with bidirectional block documented, Dr. Alaniz  f. RFA of RA flutter 5/2/2017    2. AF   A.  CHADS-Vasc 0 on Eliquis.  B.  Cryoablation of pulmonary veins, 8/3/2017, successful cryoablation, transseptal cath tto LA, intracardiac US, esophageal temperature monitoring. Severe LV dysfunction with LV EF of < 25% by ICE under general anesthesia noted. Transient periods of hypotension associated with drop in o2 sat from 100-92% requiring IV Mundo-Synephrine as well as dopamine with resolution following discontinuation of general anesthesia  C. Echo 9/6/2017: EF 50-55%. Trace MR and TR.  3. CKD  g. BL Renal US 10/11/2017, Increased echogenicity of kidneys, compatible with medical renal disease. No hydronephrosis.  h. Negative abdominal US, 10/20/2017  4    Anxiety/Depression  5    UC - diagnosed at age 23, but not being treated currently  6    Obesity              7    ETOH abuse  8    DONNIE- non compliant  9    Polycytemia: plhebotomy as needed  10  Surgical Hx:  i. Right shoulder  j. Tonsillectomy        Allergies  Allergies   Allergen Reactions   • Contrast Dye Anaphylaxis     ANAPHYLAXIS   • Iodinated Diagnostic Agents Anaphylaxis       Current Medications    Current Outpatient Prescriptions:   •  albuterol  (PROVENTIL HFA;VENTOLIN HFA) 108 (90 Base) MCG/ACT inhaler, Inhale 2 puffs Every 4 (Four) Hours As Needed for Wheezing or Shortness of Air. 2 puffs q 2 hours for 2 days then 2 puffs q 4 hours  PRN., Disp: 18 g, Rfl: 2  •  carvedilol (COREG) 6.25 MG tablet, TAKE ONE TABLET BY MOUTH EVERY 12 HOURS, Disp: 60 tablet, Rfl: 6  •  ciprofloxacin (CIPRO) 500 MG tablet, Take 1 tablet by mouth Every 12 (Twelve) Hours., Disp: 20 tablet, Rfl: 0  •  clonazePAM (KlonoPIN) 1 MG tablet, Take 1 mg by mouth 2 (Two) Times a Day As Needed for Anxiety., Disp: , Rfl:   •  cyclobenzaprine (FLEXERIL) 10 MG tablet, Take 10 mg by mouth As Needed., Disp: , Rfl: 2  •  DULoxetine (CYMBALTA) 30 MG capsule, Take 60 mg by mouth Every Night., Disp: , Rfl:   •  Esomeprazole Magnesium (NEXIUM 24HR) 20 MG tablet delayed-release, Take 40 mg by mouth Every Morning Before Breakfast. (Patient taking differently: Take 40 mg by mouth Daily As Needed.), Disp: 60 tablet, Rfl: 0  •  lisinopril (PRINIVIL,ZESTRIL) 5 MG tablet, Take 5 mg by mouth Daily., Disp: , Rfl:   •  magnesium oxide (MAGOX) 400 (241.3 MG) MG tablet tablet, Take 400 mg by mouth 2 (Two) Times a Day., Disp: , Rfl:   •  mesalamine (ASACOL HD) 800 MG EC tablet, Take 1 tablet by mouth 2 (Two) Times a Day., Disp: 60 tablet, Rfl: 5  •  metroNIDAZOLE (FLAGYL) 500 MG tablet, Take 1 tablet by mouth 3 (Three) Times a Day., Disp: 30 tablet, Rfl: 0  •  ondansetron ODT (ZOFRAN-ODT) 4 MG disintegrating tablet, Take 1 tablet by mouth Every 6 (Six) Hours As Needed for Nausea or Vomiting., Disp: 30 tablet, Rfl: 0  •  Probiotic Product (PROBIOTIC-10 PO), Take  by mouth., Disp: , Rfl:   •  promethazine (PHENERGAN) 25 MG suppository, Insert 1 suppository into the rectum Every 6 (Six) Hours As Needed for Nausea or Vomiting., Disp: 15 suppository, Rfl: 0  •  promethazine (PHENERGAN) 25 MG tablet, Take 25 mg by mouth Every 6 (Six) Hours As Needed for Nausea or Vomiting., Disp: , Rfl:     History of Present Illness  "  HPI    Pt presents for follow up of AF . Since we last saw the pt, pt denies any AF episodes, SOB, CP, LH, and dizziness. Denies any hospitalizations, ER visits, bleeding, or TIA/CVA symptoms. Overall feels well. Has had both pancreatitis and diverticulitis. No surgeries planned. Still works and trains daily.  He remains sober since last time we saw him.    ROS:  General:  Denies fatigue, + 30 lb weight vouluntary loss  Cardiovascular:  Denies CP, PND, syncope, near syncope, edema or palpitations.  Pulmonary:  Denies HOLLINS, cough, or wheezing      Vitals:    07/10/18 1139   BP: 140/86   BP Location: Left arm   Patient Position: Sitting   Pulse: 68   Weight: 94.8 kg (209 lb)   Height: 188 cm (74\")     Body mass index is 26.83 kg/m².  PE:  General: NAD  Neck: no JVD, no carotid bruits, no TM  Heart RRR, NL S1, S2, , no rubs, murmurs  Lungs: CTA, no wheezes, rhonchi, or rales  Abd: soft, non-tender, NL BS  Ext: No musculoskeletal deformities, no edema, cyanosis, or clubbing  Psych: normal mood and affect    Diagnostic Data:        ECG 12 Lead  Date/Time: 7/10/2018 12:24 PM  Performed by: KAT BEGUM  Authorized by: KAT BEGUM   Comparison: compared with previous ECG from 12/22/2017  Similar to previous ECG  Rhythm: sinus rhythm  BPM: 86              1. Persistent atrial fibrillation (CMS/HCC)    2. Typical atrial flutter (CMS/HCC)    3. Essential hypertension    4. ETOH abuse          Plan:  1) Persistent atrial fibrillation status post pulmonary vein ablation in September 2017.  No recurrence off antiarrhythmic medications.  Doing well.  Continue present medications.   2) Anticoagulation  Continue ASA  3) HTN; well-controlled following substantial weight loss.  Wt loss, exercise, salt reduction  4) alcohol abuse, presently sober.  Continue to monitor.    F/up in 12 months      "

## 2018-07-23 RX ORDER — MESALAMINE 0.38 G/1
375 CAPSULE, EXTENDED RELEASE ORAL 2 TIMES DAILY
Qty: 60 CAPSULE | Refills: 0 | Status: SHIPPED | OUTPATIENT
Start: 2018-07-23 | End: 2018-08-20 | Stop reason: SDUPTHER

## 2018-08-06 ENCOUNTER — OFFICE VISIT (OUTPATIENT)
Dept: SLEEP MEDICINE | Facility: HOSPITAL | Age: 43
End: 2018-08-06

## 2018-08-06 VITALS
DIASTOLIC BLOOD PRESSURE: 80 MMHG | BODY MASS INDEX: 27.98 KG/M2 | WEIGHT: 218 LBS | HEIGHT: 74 IN | SYSTOLIC BLOOD PRESSURE: 140 MMHG

## 2018-08-06 DIAGNOSIS — G47.33 OBSTRUCTIVE SLEEP APNEA, ADULT: Primary | ICD-10-CM

## 2018-08-06 PROCEDURE — 99213 OFFICE O/P EST LOW 20 MIN: CPT | Performed by: INTERNAL MEDICINE

## 2018-08-06 NOTE — PROGRESS NOTES
Sleep Clinic Follow Up    Date: 8/6/2018  Primary Care Physician: Irma Schuster APRN      Interim History (1/3):  Since the last visit on 04/10/2018, patient has:      1)  DONNIE - Has remained compliant with CPAP until his last flare of diverticulitis in the middle of June. He was hospitalized and lost ~ 15 lbs. He lost ~ 24 lbs total. His sx has dissipated since that time  He denies mask and machine issues, dry mouth, headaches, pressures intolerance, or non-compliance. He denies abnormal dreams, sleep paralysis, nasal congestion, URI sx.    PAP Data:    Time frame: 04/07/2018 - 07/05/2018   Compliance 57.8%  Average use on days used: 5hrs 11 min  Percent of days with usage greater than or equal to 4 hours: 40%  PAP range : 5-20 cm H2O  Average 90% pressure: 6.3 cmH2O  Leak: 35 minutes  Average AHI 1.9 events/hr  Mask type: FFM  DME: [facility]    Bed time: 4490-3817  Sleep latency: 5-10 minutes  Number of times awakens during the night: 1  Wake time: 0800  Estimated total sleep time at night: 8-9 hours  Caffeine intake: 8oz of coffee, 24oz of tea, and 0oz of soda  Alcohol intake: 0 drinks per week  Nap time: none   Sleepiness with Driving: none    [place] - 7    2) Patient denies RLS symptoms.     PMHx, FH, SH reviewed and pertinent changes are: flare of diverticulitis      REVIEW OF SYSTEMS:   Negative for chest pain, fever, chills, SOA, abdominal pain. Smoking: none      Exam (6-11/12):    Vitals:    08/06/18 1013   BP: 140/80     Body mass index is 27.99 kg/m². Patient's Body mass index is 27.99 kg/m². BMI is above normal parameters. Recommendations include: referral to primary care.      Gen:  No distress, conversant, pleasant, appears stated age, alert, oriented  Eyes:   Anicteric sclera, moist conjunctiva, no lid lag     PERRLA, EOMI   Heent:   NC/AT    Oropharynx clear, Mallampati 4, high arched palate    normal hearing  Lungs:  Normal effort, non-labored breathing    Clear to auscultation    CV:  Normal  S1/S2, no murmur    no lower extremity edema  ABD:  Soft, normal bowel sounds    Psych:  Appropriate affect  Neuro:  CN 2-12 intact    Past Medical History:   Diagnosis Date   • A-fib (CMS/HCC)    • Anxiety    • Arrhythmia    • Arthritis     rt shoulder    • Atrial flutter (CMS/HCC) 07/2016    EP  ablation x2    • Depression    • Diverticulitis    • ETOH abuse    • GERD (gastroesophageal reflux disease)    • Kidney stone    • Pancreatitis    • Sleep apnea     have used a machine in the past   • Tendonitis     rt shoulder       Current Outpatient Prescriptions:   •  albuterol (PROVENTIL HFA;VENTOLIN HFA) 108 (90 Base) MCG/ACT inhaler, Inhale 2 puffs Every 4 (Four) Hours As Needed for Wheezing or Shortness of Air. 2 puffs q 2 hours for 2 days then 2 puffs q 4 hours  PRN., Disp: 18 g, Rfl: 2  •  carvedilol (COREG) 6.25 MG tablet, TAKE ONE TABLET BY MOUTH EVERY 12 HOURS, Disp: 60 tablet, Rfl: 6  •  ciprofloxacin (CIPRO) 500 MG tablet, Take 1 tablet by mouth Every 12 (Twelve) Hours., Disp: 20 tablet, Rfl: 0  •  clonazePAM (KlonoPIN) 1 MG tablet, Take 1 mg by mouth 2 (Two) Times a Day As Needed for Anxiety., Disp: , Rfl:   •  cyclobenzaprine (FLEXERIL) 10 MG tablet, Take 10 mg by mouth As Needed., Disp: , Rfl: 2  •  DULoxetine (CYMBALTA) 30 MG capsule, Take 60 mg by mouth Every Night., Disp: , Rfl:   •  Esomeprazole Magnesium (NEXIUM 24HR) 20 MG tablet delayed-release, Take 40 mg by mouth Every Morning Before Breakfast. (Patient taking differently: Take 40 mg by mouth Daily As Needed.), Disp: 60 tablet, Rfl: 0  •  lisinopril (PRINIVIL,ZESTRIL) 5 MG tablet, Take 5 mg by mouth Daily., Disp: , Rfl:   •  magnesium oxide (MAGOX) 400 (241.3 MG) MG tablet tablet, Take 400 mg by mouth 2 (Two) Times a Day., Disp: , Rfl:   •  mesalamine (APRISO) 0.375 g 24 hr capsule, Take 1 capsule by mouth 2 (Two) Times a Day., Disp: 60 capsule, Rfl: 0  •  metroNIDAZOLE (FLAGYL) 500 MG tablet, Take 1 tablet by mouth 3 (Three) Times a Day., Disp:  30 tablet, Rfl: 0  •  ondansetron ODT (ZOFRAN-ODT) 4 MG disintegrating tablet, Take 1 tablet by mouth Every 6 (Six) Hours As Needed for Nausea or Vomiting., Disp: 30 tablet, Rfl: 0  •  Probiotic Product (PROBIOTIC-10 PO), Take  by mouth., Disp: , Rfl:   •  promethazine (PHENERGAN) 25 MG suppository, Insert 1 suppository into the rectum Every 6 (Six) Hours As Needed for Nausea or Vomiting., Disp: 15 suppository, Rfl: 0  •  promethazine (PHENERGAN) 25 MG tablet, Take 25 mg by mouth Every 6 (Six) Hours As Needed for Nausea or Vomiting., Disp: , Rfl:       ASSESSMENT / PLAN:     1. PSG on 2/27/2013, AHI of 15  2. Currently on 5-20 cm H2O, continue  3. May not need CPAP at all, consider repeat study if sx return  4. RTC in 12 months  2. Afib  3. Depression -   1. Now on Effexor instead of Cymbalta    Total time 15 min, more than half spent in face to face counseling and coordination of care.    RTC in 12 months     This document has been electronically signed by Doug Lezama MD on August 6, 2018         CC: Irma Schuster APRN          No ref. provider found

## 2018-08-17 ENCOUNTER — LAB (OUTPATIENT)
Dept: LAB | Facility: HOSPITAL | Age: 43
End: 2018-08-17

## 2018-08-17 DIAGNOSIS — R10.84 GENERALIZED ABDOMINAL PAIN: ICD-10-CM

## 2018-08-17 DIAGNOSIS — K57.92 ACUTE DIVERTICULITIS OF INTESTINE: ICD-10-CM

## 2018-08-17 DIAGNOSIS — K21.00 GASTROESOPHAGEAL REFLUX DISEASE WITH ESOPHAGITIS: ICD-10-CM

## 2018-08-17 DIAGNOSIS — R63.4 WEIGHT LOSS, ABNORMAL: ICD-10-CM

## 2018-08-17 LAB
ALBUMIN SERPL-MCNC: 4.2 G/DL (ref 3.4–4.8)
ALBUMIN/GLOB SERPL: 1.6 G/DL (ref 1.1–1.8)
ALP SERPL-CCNC: 27 U/L (ref 38–126)
ALT SERPL W P-5'-P-CCNC: 57 U/L (ref 21–72)
ANION GAP SERPL CALCULATED.3IONS-SCNC: 11 MMOL/L (ref 5–15)
AST SERPL-CCNC: 56 U/L (ref 17–59)
BASOPHILS # BLD AUTO: 0.03 10*3/MM3 (ref 0–0.2)
BASOPHILS NFR BLD AUTO: 0.4 % (ref 0–2)
BILIRUB SERPL-MCNC: 0.9 MG/DL (ref 0.2–1.3)
BUN BLD-MCNC: 20 MG/DL (ref 7–21)
BUN/CREAT SERPL: 17.4 (ref 7–25)
CALCIUM SPEC-SCNC: 9 MG/DL (ref 8.4–10.2)
CHLORIDE SERPL-SCNC: 103 MMOL/L (ref 95–110)
CO2 SERPL-SCNC: 26 MMOL/L (ref 22–31)
CREAT BLD-MCNC: 1.15 MG/DL (ref 0.7–1.3)
DEPRECATED RDW RBC AUTO: 50.6 FL (ref 35.1–43.9)
EOSINOPHIL # BLD AUTO: 0.32 10*3/MM3 (ref 0–0.7)
EOSINOPHIL NFR BLD AUTO: 4.7 % (ref 0–7)
ERYTHROCYTE [DISTWIDTH] IN BLOOD BY AUTOMATED COUNT: 15.7 % (ref 11.5–14.5)
GFR SERPL CREATININE-BSD FRML MDRD: 70 ML/MIN/1.73 (ref 63–147)
GLOBULIN UR ELPH-MCNC: 2.7 GM/DL (ref 2.3–3.5)
GLUCOSE BLD-MCNC: 85 MG/DL (ref 60–100)
HCT VFR BLD AUTO: 50.5 % (ref 39–49)
HGB BLD-MCNC: 16.9 G/DL (ref 13.7–17.3)
IMM GRANULOCYTES # BLD: 0.01 10*3/MM3 (ref 0–0.02)
IMM GRANULOCYTES NFR BLD: 0.1 % (ref 0–0.5)
LYMPHOCYTES # BLD AUTO: 1.11 10*3/MM3 (ref 0.6–4.2)
LYMPHOCYTES NFR BLD AUTO: 16.4 % (ref 10–50)
MCH RBC QN AUTO: 29.6 PG (ref 26.5–34)
MCHC RBC AUTO-ENTMCNC: 33.5 G/DL (ref 31.5–36.3)
MCV RBC AUTO: 88.6 FL (ref 80–98)
MONOCYTES # BLD AUTO: 0.6 10*3/MM3 (ref 0–0.9)
MONOCYTES NFR BLD AUTO: 8.9 % (ref 0–12)
NEUTROPHILS # BLD AUTO: 4.68 10*3/MM3 (ref 2–8.6)
NEUTROPHILS NFR BLD AUTO: 69.5 % (ref 37–80)
PLATELET # BLD AUTO: 268 10*3/MM3 (ref 150–450)
PMV BLD AUTO: 10 FL (ref 8–12)
POTASSIUM BLD-SCNC: 4.8 MMOL/L (ref 3.5–5.1)
PROT SERPL-MCNC: 6.9 G/DL (ref 6.3–8.6)
RBC # BLD AUTO: 5.7 10*6/MM3 (ref 4.37–5.74)
SODIUM BLD-SCNC: 140 MMOL/L (ref 137–145)
WBC NRBC COR # BLD: 6.75 10*3/MM3 (ref 3.2–9.8)

## 2018-08-17 PROCEDURE — 85025 COMPLETE CBC W/AUTO DIFF WBC: CPT

## 2018-08-17 PROCEDURE — 80053 COMPREHEN METABOLIC PANEL: CPT

## 2018-08-17 PROCEDURE — 36415 COLL VENOUS BLD VENIPUNCTURE: CPT

## 2018-08-20 ENCOUNTER — OFFICE VISIT (OUTPATIENT)
Dept: GASTROENTEROLOGY | Facility: CLINIC | Age: 43
End: 2018-08-20

## 2018-08-20 VITALS
HEART RATE: 78 BPM | SYSTOLIC BLOOD PRESSURE: 130 MMHG | HEIGHT: 72 IN | WEIGHT: 218.4 LBS | BODY MASS INDEX: 29.58 KG/M2 | DIASTOLIC BLOOD PRESSURE: 78 MMHG

## 2018-08-20 DIAGNOSIS — K57.30 DIVERTICULOSIS OF LARGE INTESTINE WITHOUT HEMORRHAGE: Primary | ICD-10-CM

## 2018-08-20 DIAGNOSIS — R74.8 ELEVATED LIVER ENZYMES: ICD-10-CM

## 2018-08-20 DIAGNOSIS — R10.84 GENERALIZED ABDOMINAL PAIN: ICD-10-CM

## 2018-08-20 DIAGNOSIS — K21.00 GASTROESOPHAGEAL REFLUX DISEASE WITH ESOPHAGITIS: ICD-10-CM

## 2018-08-20 PROCEDURE — 99213 OFFICE O/P EST LOW 20 MIN: CPT | Performed by: PHYSICIAN ASSISTANT

## 2018-08-20 RX ORDER — VENLAFAXINE 75 MG/1
75 TABLET ORAL 2 TIMES DAILY
Refills: 1 | COMMUNITY
Start: 2018-07-24 | End: 2018-09-20

## 2018-08-20 RX ORDER — MESALAMINE 0.38 G/1
1500 CAPSULE, EXTENDED RELEASE ORAL DAILY
Qty: 120 CAPSULE | Refills: 2 | Status: SHIPPED | OUTPATIENT
Start: 2018-08-20 | End: 2019-04-25

## 2018-08-20 NOTE — PATIENT INSTRUCTIONS
Diverticulitis  Diverticulitis is infection or inflammation of small pouches (diverticula) in the colon that form due to a condition called diverticulosis. Diverticula can trap stool (feces) and bacteria, causing infection and inflammation.  Diverticulitis may cause severe stomach pain and diarrhea. It may lead to tissue damage in the colon that causes bleeding. The diverticula may also burst (rupture) and cause infected stool to enter other areas of the abdomen.  Complications of diverticulitis can include:  · Bleeding.  · Severe infection.  · Severe pain.  · Rupture (perforation) of the colon.  · Blockage (obstruction) of the colon.    What are the causes?  This condition is caused by stool becoming trapped in the diverticula, which allows bacteria to grow in the diverticula. This leads to inflammation and infection.  What increases the risk?  You are more likely to develop this condition if:  · You have diverticulosis. The risk for diverticulosis increases if:  ? You are overweight or obese.  ? You use tobacco products.  ? You do not get enough exercise.  · You eat a diet that does not include enough fiber. High-fiber foods include fruits, vegetables, beans, nuts, and whole grains.    What are the signs or symptoms?  Symptoms of this condition may include:  · Pain and tenderness in the abdomen. The pain is normally located on the left side of the abdomen, but it may occur in other areas.  · Fever and chills.  · Bloating.  · Cramping.  · Nausea.  · Vomiting.  · Changes in bowel routines.  · Blood in your stool.    How is this diagnosed?  This condition is diagnosed based on:  · Your medical history.  · A physical exam.  · Tests to make sure there is nothing else causing your condition. These tests may include:  ? Blood tests.  ? Urine tests.  ? Imaging tests of the abdomen, including X-rays, ultrasounds, MRIs, or CT scans.    How is this treated?  Most cases of this condition are mild and can be treated at home.  Treatment may include:  · Taking over-the-counter pain medicines.  · Following a clear liquid diet.  · Taking antibiotic medicines by mouth.  · Rest.    More severe cases may need to be treated at a hospital. Treatment may include:  · Not eating or drinking.  · Taking prescription pain medicine.  · Receiving antibiotic medicines through an IV tube.  · Receiving fluids and nutrition through an IV tube.  · Surgery.    When your condition is under control, your health care provider may recommend that you have a colonoscopy. This is an exam to look at the entire large intestine. During the exam, a lubricated, bendable tube is inserted into the anus and then passed into the rectum, colon, and other parts of the large intestine. A colonoscopy can show how severe your diverticula are and whether something else may be causing your symptoms.  Follow these instructions at home:  Medicines  · Take over-the-counter and prescription medicines only as told by your health care provider. These include fiber supplements, probiotics, and stool softeners.  · If you were prescribed an antibiotic medicine, take it as told by your health care provider. Do not stop taking the antibiotic even if you start to feel better.  · Do not drive or use heavy machinery while taking prescription pain medicine.  General instructions  · Follow a full liquid diet or another diet as directed by your health care provider. After your symptoms improve, your health care provider may tell you to change your diet. He or she may recommend that you eat a diet that contains at least 25 g (25 grams) of fiber daily. Fiber makes it easier to pass stool. Healthy sources of fiber include:  ? Berries. One cup contains 4-8 grams of fiber.  ? Beans or lentils. One half cup contains 5-8 grams of fiber.  ? Green vegetables. One cup contains 4 grams of fiber.  · Exercise for at least 30 minutes, 3 times each week. You should exercise hard enough to raise your heart rate and  break a sweat.  · Keep all follow-up visits as told by your health care provider. This is important. You may need a colonoscopy.  Contact a health care provider if:  · Your pain does not improve.  · You have a hard time drinking or eating food.  · Your bowel movements do not return to normal.  Get help right away if:  · Your pain gets worse.  · Your symptoms do not get better with treatment.  · Your symptoms suddenly get worse.  · You have a fever.  · You vomit more than one time.  · You have stools that are bloody, black, or tarry.  Summary  · Diverticulitis is infection or inflammation of small pouches (diverticula) in the colon that form due to a condition called diverticulosis. Diverticula can trap stool (feces) and bacteria, causing infection and inflammation.  · You are at higher risk for this condition if you have diverticulosis and you eat a diet that does not include enough fiber.  · Most cases of this condition are mild and can be treated at home. More severe cases may need to be treated at a hospital.  · When your condition is under control, your health care provider may recommend that you have an exam called a colonoscopy. This exam can show how severe your diverticula are and whether something else may be causing your symptoms.  This information is not intended to replace advice given to you by your health care provider. Make sure you discuss any questions you have with your health care provider.  Document Released: 09/27/2006 Document Revised: 01/20/2018 Document Reviewed: 01/20/2018  [employer] Patient Education © 2018 [employer]

## 2018-08-20 NOTE — PROGRESS NOTES
Chief Complaint   Patient presents with   • Diverticulitis   • Abdominal Pain   • wt loss       ENDO PROCEDURE ORDERED:    Subjective    Stevenson Chilel is a 43 y.o. male. he is here today for follow-up.    History of Present Illness    Patient is seen on a recheck of his GERD, abdominal pain, diverticulosis, weight loss, F0/S3/N1.  Last seen on 07/06/2018.  I tried to give the patient Asacol, but insurance would not cover and he has been on a Apriso 2 daily.  He states he has not had any major episodes since last visit.  He still gets occasional abdominal discomfort.  He is still taking a probiotic as well.  He is on Effexor.  He is on Prilosec 40 mg daily for chronic heartburn.  Denies nausea, vomiting, or dysphagia.  No blood in his stool.  Weight is up 4.5 pounds since last visit.  Last EGD/colonoscopy on 05/30/2018 showed esophagitis, gastritis, and diverticulosis.    Laboratory on 08/17/2018 showed fairly normal CBC and CMP.          ASSESSMENT AND PLAN:  Patient with recurrent diverticulitis; he does appear to be improved on the Apriso.  He states he may be taking a job out of town and may be gone for several months.  As long as he is doing well, I suggested follow up in 4 months with CBC and CMP prior.  I increased his dosage to 4 daily, but he may continue on 2 daily as long as he is doing well, and I asked him to increase his dosage if he has increased symptoms.  Certainly, he is asked to return if he has another bout of diverticulitis or to go to the ER if he is out of town.  He was agreeable.               The following portions of the patient's history were reviewed and updated as appropriate:   Past Medical History:   Diagnosis Date   • A-fib (CMS/[facility])    • Anxiety    • Arrhythmia    • Arthritis     rt shoulder    • Atrial flutter (CMS/[facility]) 07/2016    EP  ablation x2    • Depression    • Diverticulitis    • ETOH abuse    • GERD (gastroesophageal reflux disease)    • Kidney stone    • Pancreatitis   "  • Sleep apnea     have used a machine in the past   • Tendonitis     rt shoulder     Past Surgical History:   Procedure Laterality Date   • CARDIAC ELECTROPHYSIOLOGY PROCEDURE N/A 5/2/2017    Procedure: Ablation atrial flutter;  Surgeon: Ramirez Reza MD;  Location:  [facility];  Service:    • CARDIAC ELECTROPHYSIOLOGY PROCEDURE N/A 9/5/2017    Procedure: Ablation atrial fibrillation PVA ;  Surgeon: Ramirez Reza MD;  Location:  [facility];  Service:    • COLONOSCOPY     • COLONOSCOPY N/A 5/30/2018    Procedure: COLONOSCOPY;  Surgeon: Edwin Medina MD;  Location: [facility] ENDOSCOPY;  Service: Gastroenterology   • ENDOSCOPY N/A 3/30/2018    Procedure: ESOPHAGOGASTRODUODENOSCOPY;  Surgeon: Edwin Medina MD;  Location: [facility] ENDOSCOPY;  Service: Gastroenterology   • ENDOSCOPY N/A 5/30/2018    Procedure: ESOPHAGOGASTRODUODENOSCOPY;  Surgeon: Edwin Medina MD;  Location: [facility] ENDOSCOPY;  Service: Gastroenterology   • SHOULDER SURGERY Right 2014   • TONSILLECTOMY  12/2015   • UPPER GASTROINTESTINAL ENDOSCOPY  03/30/2018   • UPPER GASTROINTESTINAL ENDOSCOPY     • UPPER GASTROINTESTINAL ENDOSCOPY  05/30/2018     Family History   Problem Relation Age of Onset   • Heart disease Father    • Depression Father    • Cancer Maternal Grandmother         lung   • Cancer Paternal Grandmother         pancreatic   • Heart disease Paternal Grandfather        Allergies   Allergen Reactions   • Contrast Dye Anaphylaxis     ANAPHYLAXIS   • Iodinated Diagnostic Agents Anaphylaxis     Social History     Social History   • Marital status:      Social History Main Topics   • Smoking status: Never Smoker   • Smokeless tobacco: Never Used   • Alcohol use No      Comment: quit 3/17/18   • Drug use: Yes     Frequency: 7.0 times per week     Types: Marijuana      Comment: states he uses \"a little\" every day   • Sexual activity: Defer     Other Topics Concern   • Not on file     Social History " "Narrative    Pt states has not had drink of any alcohol in 3 weeks as of 4/5/2018       Current Outpatient Prescriptions:   •  carvedilol (COREG) 6.25 MG tablet, TAKE ONE TABLET BY MOUTH EVERY 12 HOURS, Disp: 60 tablet, Rfl: 6  •  clonazePAM (KlonoPIN) 1 MG tablet, Take 1 mg by mouth 2 (Two) Times a Day As Needed for Anxiety., Disp: , Rfl:   •  cyclobenzaprine (FLEXERIL) 10 MG tablet, Take 10 mg by mouth As Needed., Disp: , Rfl: 2  •  Esomeprazole Magnesium (NEXIUM 24HR) 20 MG tablet delayed-release, Take 40 mg by mouth Every Morning Before Breakfast. (Patient taking differently: Take 40 mg by mouth Daily As Needed.), Disp: 60 tablet, Rfl: 0  •  lisinopril (PRINIVIL,ZESTRIL) 5 MG tablet, Take 5 mg by mouth Daily., Disp: , Rfl:   •  magnesium oxide (MAGOX) 400 (241.3 MG) MG tablet tablet, Take 400 mg by mouth 2 (Two) Times a Day., Disp: , Rfl:   •  mesalamine (APRISO) 0.375 g 24 hr capsule, Take 4 capsules by mouth Daily., Disp: 120 capsule, Rfl: 2  •  Probiotic Product (PROBIOTIC-10 PO), Take  by mouth., Disp: , Rfl:   •  venlafaxine (EFFEXOR) 75 MG tablet, Take 75 mg by mouth 2 (Two) Times a Day., Disp: , Rfl: 1  Review of Systems  Review of Systems       Objective    /78 (BP Location: Left arm, Patient Position: Sitting)   Pulse 78   Ht 182.9 cm (72\")   Wt 99.1 kg (218 lb 6.4 oz)   BMI 29.62 kg/m²   Physical Exam   Constitutional: He is oriented to person, place, and time. He appears well-developed and well-nourished. No distress.   HENT:   Head: Normocephalic and atraumatic.   Eyes: Pupils are equal, round, and reactive to light. EOM are normal.   Neck: Normal range of motion.   Cardiovascular: Normal rate, regular rhythm and normal heart sounds.    Pulmonary/Chest: Effort normal and breath sounds normal.   Abdominal: Soft. Bowel sounds are normal. He exhibits no shifting dullness, no distension, no abdominal bruit, no ascites and no mass. There is no hepatosplenomegaly. There is tenderness. There is no " rigidity, no rebound, no guarding and no CVA tenderness. No hernia. Hernia confirmed negative in the ventral area.   Mild diffuse   Musculoskeletal: Normal range of motion.   Neurological: He is alert and oriented to person, place, and time.   Skin: Skin is warm and dry.   Psychiatric: He has a normal mood and affect. His behavior is normal. Judgment and thought content normal.   Nursing note and vitals reviewed.    Assessment/Plan      1. Diverticulosis of large intestine without hemorrhage    2. Generalized abdominal pain    3. Gastroesophageal reflux disease with esophagitis    4. Elevated liver enzymes    .   Stevenson was seen today for diverticulitis, abdominal pain and wt loss.    Diagnoses and all orders for this visit:    Diverticulosis of large intestine without hemorrhage  -     CBC & Differential; Future  -     Comprehensive Metabolic Panel; Future    Generalized abdominal pain  -     CBC & Differential; Future  -     Comprehensive Metabolic Panel; Future    Gastroesophageal reflux disease with esophagitis  -     CBC & Differential; Future  -     Comprehensive Metabolic Panel; Future    Elevated liver enzymes  -     CBC & Differential; Future  -     Comprehensive Metabolic Panel; Future    Other orders  -     mesalamine (APRISO) 0.375 g 24 hr capsule; Take 4 capsules by mouth Daily.        Orders placed during this encounter include:  Orders Placed This Encounter   Procedures   • Comprehensive Metabolic Panel     Standing Status:   Future     Standing Expiration Date:   2/16/2019   • CBC & Differential     Standing Status:   Future     Standing Expiration Date:   2/16/2019     Order Specific Question:   Manual Differential     Answer:   No       Medications prescribed:  New Medications Ordered This Visit   Medications   • mesalamine (APRISO) 0.375 g 24 hr capsule     Sig: Take 4 capsules by mouth Daily.     Dispense:  120 capsule     Refill:  2     Discontinued Medications       Reason for Discontinue     albuterol (PROVENTIL HFA;VENTOLIN HFA) 108 (90 Base) MCG/ACT inhaler Discontinued by another clinician    DULoxetine (CYMBALTA) 30 MG capsule Discontinued by another clinician    promethazine (PHENERGAN) 25 MG tablet *Therapy completed    promethazine (PHENERGAN) 25 MG suppository *Therapy completed    ondansetron ODT (ZOFRAN-ODT) 4 MG disintegrating tablet *Therapy completed    ciprofloxacin (CIPRO) 500 MG tablet *Therapy completed    metroNIDAZOLE (FLAGYL) 500 MG tablet *Therapy completed        Requested Prescriptions     Signed Prescriptions Disp Refills   • mesalamine (APRISO) 0.375 g 24 hr capsule 120 capsule 2     Sig: Take 4 capsules by mouth Daily.       Review and/or summary of lab tests, radiology, procedures, medications. Review and summary of old records and obtaining of history. The risks and benefits of my recommendations, as well as other treatment options were discussed with the patient today. Questions were answered.    Follow-up: Return in about 4 months (around 12/20/2018), or if symptoms worsen or fail to improve, for lab prior.     * Surgery not found *      This document has been electronically signed by Rene Saenz PA-C on August 21, 2018 6:55 PM      Results for orders placed or performed in visit on 08/17/18   CBC Auto Differential   Result Value Ref Range    WBC 6.75 3.20 - 9.80 10*3/mm3    RBC 5.70 4.37 - 5.74 10*6/mm3    Hemoglobin 16.9 13.7 - 17.3 g/dL    Hematocrit 50.5 (H) 39.0 - 49.0 %    MCV 88.6 80.0 - 98.0 fL    MCH 29.6 26.5 - 34.0 pg    MCHC 33.5 31.5 - 36.3 g/dL    RDW 15.7 (H) 11.5 - 14.5 %    RDW-SD 50.6 (H) 35.1 - 43.9 fl    MPV 10.0 8.0 - 12.0 fL    Platelets 268 150 - 450 10*3/mm3    Neutrophil % 69.5 37.0 - 80.0 %    Lymphocyte % 16.4 10.0 - 50.0 %    Monocyte % 8.9 0.0 - 12.0 %    Eosinophil % 4.7 0.0 - 7.0 %    Basophil % 0.4 0.0 - 2.0 %    Immature Grans % 0.1 0.0 - 0.5 %    Neutrophils, Absolute 4.68 2.00 - 8.60 10*3/mm3    Lymphocytes, Absolute 1.11 0.60 - 4.20  10*3/mm3    Monocytes, Absolute 0.60 0.00 - 0.90 10*3/mm3    Eosinophils, Absolute 0.32 0.00 - 0.70 10*3/mm3    Basophils, Absolute 0.03 0.00 - 0.20 10*3/mm3    Immature Grans, Absolute 0.01 0.00 - 0.02 10*3/mm3   Results for orders placed or performed in visit on 07/03/18   Comprehensive Metabolic Panel   Result Value Ref Range    Glucose 85 60 - 100 mg/dL    BUN 20 7 - 21 mg/dL    Creatinine 1.15 0.70 - 1.30 mg/dL    Sodium 140 137 - 145 mmol/L    Potassium 4.8 3.5 - 5.1 mmol/L    Chloride 103 95 - 110 mmol/L    CO2 26.0 22.0 - 31.0 mmol/L    Calcium 9.0 8.4 - 10.2 mg/dL    Total Protein 6.9 6.3 - 8.6 g/dL    Albumin 4.20 3.40 - 4.80 g/dL    ALT (SGPT) 57 21 - 72 U/L    AST (SGOT) 56 17 - 59 U/L    Alkaline Phosphatase 27 (L) 38 - 126 U/L    Total Bilirubin 0.9 0.2 - 1.3 mg/dL    eGFR Non  Amer 70 63 - 147 mL/min/1.73    Globulin 2.7 2.3 - 3.5 gm/dL    A/G Ratio 1.6 1.1 - 1.8 g/dL    BUN/Creatinine Ratio 17.4 7.0 - 25.0    Anion Gap 11.0 5.0 - 15.0 mmol/L   Results for orders placed or performed during the hospital encounter of 06/28/18   CBC Auto Differential   Result Value Ref Range    WBC 7.19 3.20 - 9.80 10*3/mm3    RBC 5.25 4.37 - 5.74 10*6/mm3    Hemoglobin 15.2 13.7 - 17.3 g/dL    Hematocrit 45.5 39.0 - 49.0 %    MCV 86.7 80.0 - 98.0 fL    MCH 29.0 26.5 - 34.0 pg    MCHC 33.4 31.5 - 36.3 g/dL    RDW 15.2 (H) 11.5 - 14.5 %    RDW-SD 48.1 (H) 35.1 - 43.9 fl    MPV 10.0 8.0 - 12.0 fL    Platelets 200 150 - 450 10*3/mm3    Neutrophil % 61.6 37.0 - 80.0 %    Lymphocyte % 24.9 10.0 - 50.0 %    Monocyte % 9.7 0.0 - 12.0 %    Eosinophil % 2.8 0.0 - 7.0 %    Basophil % 0.4 0.0 - 2.0 %    Immature Grans % 0.6 (H) 0.0 - 0.5 %    Neutrophils, Absolute 4.43 2.00 - 8.60 10*3/mm3    Lymphocytes, Absolute 1.79 0.60 - 4.20 10*3/mm3    Monocytes, Absolute 0.70 0.00 - 0.90 10*3/mm3    Eosinophils, Absolute 0.20 0.00 - 0.70 10*3/mm3    Basophils, Absolute 0.03 0.00 - 0.20 10*3/mm3    Immature Grans, Absolute 0.04 (H)  0.00 - 0.02 10*3/mm3   CBC Auto Differential   Result Value Ref Range    WBC 11.86 (H) 3.20 - 9.80 10*3/mm3    RBC 5.73 4.37 - 5.74 10*6/mm3    Hemoglobin 16.8 13.7 - 17.3 g/dL    Hematocrit 49.3 (H) 39.0 - 49.0 %    MCV 86.0 80.0 - 98.0 fL    MCH 29.3 26.5 - 34.0 pg    MCHC 34.1 31.5 - 36.3 g/dL    RDW 15.3 (H) 11.5 - 14.5 %    RDW-SD 48.3 (H) 35.1 - 43.9 fl    MPV 9.4 8.0 - 12.0 fL    Platelets 242 150 - 450 10*3/mm3    Neutrophil % 77.6 37.0 - 80.0 %    Lymphocyte % 12.2 10.0 - 50.0 %    Monocyte % 9.3 0.0 - 12.0 %    Eosinophil % 0.4 0.0 - 7.0 %    Basophil % 0.2 0.0 - 2.0 %    Immature Grans % 0.3 0.0 - 0.5 %    Neutrophils, Absolute 9.21 (H) 2.00 - 8.60 10*3/mm3    Lymphocytes, Absolute 1.45 0.60 - 4.20 10*3/mm3    Monocytes, Absolute 1.10 (H) 0.00 - 0.90 10*3/mm3    Eosinophils, Absolute 0.05 0.00 - 0.70 10*3/mm3    Basophils, Absolute 0.02 0.00 - 0.20 10*3/mm3    Immature Grans, Absolute 0.03 (H) 0.00 - 0.02 10*3/mm3   CBC Auto Differential   Result Value Ref Range    WBC 12.38 (H) 3.20 - 9.80 10*3/mm3    RBC 5.79 (H) 4.37 - 5.74 10*6/mm3    Hemoglobin 16.9 13.7 - 17.3 g/dL    Hematocrit 49.9 (H) 39.0 - 49.0 %    MCV 86.2 80.0 - 98.0 fL    MCH 29.2 26.5 - 34.0 pg    MCHC 33.9 31.5 - 36.3 g/dL    RDW 15.4 (H) 11.5 - 14.5 %    RDW-SD 48.9 (H) 35.1 - 43.9 fl    MPV 10.1 8.0 - 12.0 fL    Platelets 252 150 - 450 10*3/mm3    Neutrophil % 80.9 (H) 37.0 - 80.0 %    Lymphocyte % 10.5 10.0 - 50.0 %    Monocyte % 8.0 0.0 - 12.0 %    Eosinophil % 0.1 0.0 - 7.0 %    Basophil % 0.2 0.0 - 2.0 %    Immature Grans % 0.3 0.0 - 0.5 %    Neutrophils, Absolute 10.02 (H) 2.00 - 8.60 10*3/mm3    Lymphocytes, Absolute 1.30 0.60 - 4.20 10*3/mm3    Monocytes, Absolute 0.99 (H) 0.00 - 0.90 10*3/mm3    Eosinophils, Absolute 0.01 0.00 - 0.70 10*3/mm3    Basophils, Absolute 0.02 0.00 - 0.20 10*3/mm3    Immature Grans, Absolute 0.04 (H) 0.00 - 0.02 10*3/mm3     *Note: Due to a large number of results and/or encounters for the requested  time period, some results have not been displayed. A complete set of results can be found in Results Review.       Some portions of this note have been dictated using voice recognition software and may contain errors and/or omissions.

## 2018-09-20 ENCOUNTER — OFFICE VISIT (OUTPATIENT)
Dept: ONCOLOGY | Facility: CLINIC | Age: 43
End: 2018-09-20

## 2018-09-20 ENCOUNTER — LAB (OUTPATIENT)
Dept: ONCOLOGY | Facility: HOSPITAL | Age: 43
End: 2018-09-20

## 2018-09-20 VITALS
SYSTOLIC BLOOD PRESSURE: 144 MMHG | BODY MASS INDEX: 30.28 KG/M2 | HEART RATE: 74 BPM | OXYGEN SATURATION: 98 % | DIASTOLIC BLOOD PRESSURE: 88 MMHG | RESPIRATION RATE: 18 BRPM | TEMPERATURE: 98.2 F | WEIGHT: 223.6 LBS | HEIGHT: 72 IN

## 2018-09-20 DIAGNOSIS — D75.1 POLYCYTHEMIA: Primary | ICD-10-CM

## 2018-09-20 DIAGNOSIS — D75.1 SECONDARY ERYTHROCYTOSIS: ICD-10-CM

## 2018-09-20 LAB
ALBUMIN SERPL-MCNC: 3.9 G/DL (ref 3.4–4.8)
ALBUMIN/GLOB SERPL: 1.3 G/DL (ref 1.1–1.8)
ALP SERPL-CCNC: 34 U/L (ref 38–126)
ALT SERPL W P-5'-P-CCNC: 61 U/L (ref 21–72)
ANION GAP SERPL CALCULATED.3IONS-SCNC: 7 MMOL/L (ref 5–15)
AST SERPL-CCNC: 50 U/L (ref 17–59)
BASOPHILS # BLD AUTO: 0.02 10*3/MM3 (ref 0–0.2)
BASOPHILS NFR BLD AUTO: 0.3 % (ref 0–2)
BILIRUB SERPL-MCNC: 0.8 MG/DL (ref 0.2–1.3)
BUN BLD-MCNC: 21 MG/DL (ref 7–21)
BUN/CREAT SERPL: 20.8 (ref 7–25)
CALCIUM SPEC-SCNC: 9.1 MG/DL (ref 8.4–10.2)
CHLORIDE SERPL-SCNC: 104 MMOL/L (ref 95–110)
CO2 SERPL-SCNC: 28 MMOL/L (ref 22–31)
CREAT BLD-MCNC: 1.01 MG/DL (ref 0.7–1.3)
DEPRECATED RDW RBC AUTO: 48.6 FL (ref 35.1–43.9)
EOSINOPHIL # BLD AUTO: 0.47 10*3/MM3 (ref 0–0.7)
EOSINOPHIL NFR BLD AUTO: 6.7 % (ref 0–7)
ERYTHROCYTE [DISTWIDTH] IN BLOOD BY AUTOMATED COUNT: 15.3 % (ref 11.5–14.5)
GFR SERPL CREATININE-BSD FRML MDRD: 81 ML/MIN/1.73 (ref 63–147)
GLOBULIN UR ELPH-MCNC: 2.9 GM/DL (ref 2.3–3.5)
GLUCOSE BLD-MCNC: 78 MG/DL (ref 60–100)
HCT VFR BLD AUTO: 50.2 % (ref 39–49)
HGB BLD-MCNC: 17.4 G/DL (ref 13.7–17.3)
IMM GRANULOCYTES # BLD: 0.01 10*3/MM3 (ref 0–0.02)
IMM GRANULOCYTES NFR BLD: 0.1 % (ref 0–0.5)
LYMPHOCYTES # BLD AUTO: 1.21 10*3/MM3 (ref 0.6–4.2)
LYMPHOCYTES NFR BLD AUTO: 17.3 % (ref 10–50)
MCH RBC QN AUTO: 30.6 PG (ref 26.5–34)
MCHC RBC AUTO-ENTMCNC: 34.7 G/DL (ref 31.5–36.3)
MCV RBC AUTO: 88.2 FL (ref 80–98)
MONOCYTES # BLD AUTO: 0.57 10*3/MM3 (ref 0–0.9)
MONOCYTES NFR BLD AUTO: 8.2 % (ref 0–12)
NEUTROPHILS # BLD AUTO: 4.71 10*3/MM3 (ref 2–8.6)
NEUTROPHILS NFR BLD AUTO: 67.4 % (ref 37–80)
PLATELET # BLD AUTO: 215 10*3/MM3 (ref 150–450)
PMV BLD AUTO: 9.8 FL (ref 8–12)
POTASSIUM BLD-SCNC: 4.3 MMOL/L (ref 3.5–5.1)
PROT SERPL-MCNC: 6.8 G/DL (ref 6.3–8.6)
RBC # BLD AUTO: 5.69 10*6/MM3 (ref 4.37–5.74)
SODIUM BLD-SCNC: 139 MMOL/L (ref 137–145)
WBC NRBC COR # BLD: 6.99 10*3/MM3 (ref 3.2–9.8)

## 2018-09-20 PROCEDURE — 80053 COMPREHEN METABOLIC PANEL: CPT | Performed by: NURSE PRACTITIONER

## 2018-09-20 PROCEDURE — 85025 COMPLETE CBC W/AUTO DIFF WBC: CPT | Performed by: NURSE PRACTITIONER

## 2018-09-20 PROCEDURE — 99214 OFFICE O/P EST MOD 30 MIN: CPT | Performed by: NURSE PRACTITIONER

## 2018-09-20 PROCEDURE — G0463 HOSPITAL OUTPT CLINIC VISIT: HCPCS | Performed by: NURSE PRACTITIONER

## 2018-09-20 RX ORDER — DULOXETIN HYDROCHLORIDE 60 MG/1
90 CAPSULE, DELAYED RELEASE ORAL NIGHTLY
Refills: 2 | COMMUNITY
Start: 2018-08-21

## 2018-09-20 NOTE — PATIENT INSTRUCTIONS
Therapeutic Phlebotomy  Therapeutic phlebotomy is the controlled removal of blood from a person's body for the purpose of treating a medical condition. The procedure is similar to donating blood. Usually, about a pint (470 mL, or 0.47L) of blood is removed. The average adult has 9-12 pints (4.3-5.7 L) of blood.  Therapeutic phlebotomy may be used to treat the following medical conditions:  · Hemochromatosis. This is a condition in which the blood contains too much iron.  · Polycythemia vera. This is a condition in which the blood contains too many red blood cells.  · Porphyria cutanea tarda. This is a disease in which an important part of hemoglobin is not made properly. It results in the buildup of abnormal amounts of porphyrins in the body.  · Sickle cell disease. This is a condition in which the red blood cells form an abnormal crescent shape rather than a round shape.    Tell a health care provider about:  · Any allergies you have.  · All medicines you are taking, including vitamins, herbs, eye drops, creams, and over-the-counter medicines.  · Any problems you or family members have had with anesthetic medicines.  · Any blood disorders you have.  · Any surgeries you have had.  · Any medical conditions you have.  What are the risks?  Generally, this is a safe procedure. However, problems may occur, including:  · Nausea or light-headedness.  · Low blood pressure.  · Soreness, bleeding, swelling, or bruising at the needle insertion site.  · Infection.    What happens before the procedure?  · Follow instructions from your health care provider about eating or drinking restrictions.  · Ask your health care provider about changing or stopping your regular medicines. This is especially important if you are taking diabetes medicines or blood thinners.  · Wear clothing with sleeves that can be raised above the elbow.  · Plan to have someone take you home after the procedure.  · You may have a blood sample taken.  What  happens during the procedure?  · A needle will be inserted into one of your veins.  · Tubing and a collection bag will be attached to that needle.  · Blood will flow through the needle and tubing into the collection bag.  · You may be asked to open and close your hand slowly and continually during the entire collection.  · After the specified amount of blood has been removed from your body, the collection bag and tubing will be clamped.  · The needle will be removed from your vein.  · Pressure will be held on the site of the needle insertion to stop the bleeding.  · A bandage (dressing) will be placed over the needle insertion site.  The procedure may vary among health care providers and hospitals.  What happens after the procedure?  · Your recovery will be assessed and monitored.  · You can return to your normal activities as directed by your health care provider.  This information is not intended to replace advice given to you by your health care provider. Make sure you discuss any questions you have with your health care provider.  Document Released: 05/21/2012 Document Revised: 08/19/2017 Document Reviewed: 12/14/2015  [employer] Interactive Patient Education © 2018 [employer]  Therapeutic Phlebotomy, Care After  Refer to this sheet in the next few weeks. These instructions provide you with information about caring for yourself after your procedure. Your health care provider may also give you more specific instructions. Your treatment has been planned according to current medical practices, but problems sometimes occur. Call your health care provider if you have any problems or questions after your procedure.  What can I expect after the procedure?  After the procedure, it is common to have:  · Light-headedness or dizziness. You may feel faint.  · Nausea.  · Tiredness.    Follow these instructions at home:  Activity  · Return to your normal activities as directed by your health care provider. Most people can go  back to their normal activities right away.  · Avoid strenuous physical activity and heavy lifting or pulling for about 5 hours after the procedure. Do not lift anything that is heavier than 10 lb (4.5 kg).  · Athletes should avoid strenuous exercise for at least 12 hours.  · Change positions slowly for the remainder of the day. This will help to prevent light-headedness or fainting.  · If you feel light-headed, lie down until the feeling goes away.  Eating and drinking  · Be sure to eat well-balanced meals for the next 24 hours.  · Drink enough fluid to keep your urine clear or pale yellow.  · Avoid drinking alcohol on the day that you had the procedure.  Care of the Needle Insertion Site  · Keep your bandage dry. You can remove the bandage after about 5 hours or as directed by your health care provider.  · If you have bleeding from the needle insertion site, elevate your arm and press firmly on the site until the bleeding stops.  · If you have bruising at the site, apply ice to the area:  ? Put ice in a plastic bag.  ? Place a towel between your skin and the bag.  ? Leave the ice on for 20 minutes, 2-3 times a day for the first 24 hours.  · If the swelling does not go away after 24 hours, apply a warm, moist washcloth to the area for 20 minutes, 2-3 times a day.  General instructions  · Avoid smoking for at least 30 minutes after the procedure.  · Keep all follow-up visits as directed by your health care provider. It is important to continue with further therapeutic phlebotomy treatments as directed.  Contact a health care provider if:  · You have redness, swelling, or pain at the needle insertion site.  · You have fluid, blood, or pus coming from the needle insertion site.  · You feel light-headed, dizzy, or nauseated, and the feeling does not go away.  · You notice new bruising at the needle insertion site.  · You feel weaker than normal.  · You have a fever or chills.  Get help right away if:  · You have severe  nausea or vomiting.  · You have chest pain.  · You have trouble breathing.  This information is not intended to replace advice given to you by your health care provider. Make sure you discuss any questions you have with your health care provider.  Document Released: 05/21/2012 Document Revised: 08/19/2017 Document Reviewed: 12/14/2015  [employer] Interactive Patient Education © 2018 [employer]

## 2018-09-20 NOTE — PROGRESS NOTES
DATE OF VISIT: 9/20/2018    REASON FOR VISIT:  Follow-up for erythrocytosis, secondary     HISTORY OF PRESENT ILLNESS:  42 yr old male with history of sleep apnea diagnosed 3 yrs ago but doesn't use his CPAP machine. He just saw Dr. Lezama in sleep clinic and is scheduled for sleep study the end of this month. He has a past history of testosterone use and a history of atrial flutter that has required ablation. He was initially seen by Dr. Monet in October for elevated H&H. He had an extensive work up that included a negative BAAKRI 2 mutation, Normal EPO level and Ultrasound of abdomen that was unremarkable. He has had couple therapeutic phlebotomy; last one was in January. He was seen by sleep medicine and found to have mild DONNIE. His labs have been adequate last several visits and no intervention needed. He is still taking testosterone supplement. He is also taking baby ASA daily.        PAST MEDICAL HISTORY:    Past Medical History:   Diagnosis Date   • A-fib (CMS/[facility])    • Anxiety    • Arrhythmia    • Arthritis     rt shoulder    • Atrial flutter (CMS/H[facility]) 07/2016    EP  ablation x2    • Depression    • Diverticulitis    • ETOH abuse    • GERD (gastroesophageal reflux disease)    • Kidney stone    • Pancreatitis    • Sleep apnea     have used a machine in the past   • Tendonitis     rt shoulder       SOCIAL HISTORY:    Social History   Substance Use Topics   • Smoking status: Never Smoker   • Smokeless tobacco: Never Used   • Alcohol use No      Comment: quit 3/17/18       Surgical History :  Past Surgical History:   Procedure Laterality Date   • CARDIAC ELECTROPHYSIOLOGY PROCEDURE N/A 5/2/2017    Procedure: Ablation atrial flutter;  Surgeon: Ramirez Reza MD;  Location:  [facility];  Service:    • CARDIAC ELECTROPHYSIOLOGY PROCEDURE N/A 9/5/2017    Procedure: Ablation atrial fibrillation PVA ;  Surgeon: Ramirez Reza MD;  Location:  [facility];  Service:    • COLONOSCOPY    "  • COLONOSCOPY N/A 5/30/2018    Procedure: COLONOSCOPY;  Surgeon: Edwin Medina MD;  Location: [facility] ENDOSCOPY;  Service: Gastroenterology   • ENDOSCOPY N/A 3/30/2018    Procedure: ESOPHAGOGASTRODUODENOSCOPY;  Surgeon: Edwin Medina MD;  Location: [facility] ENDOSCOPY;  Service: Gastroenterology   • ENDOSCOPY N/A 5/30/2018    Procedure: ESOPHAGOGASTRODUODENOSCOPY;  Surgeon: Edwin Medina MD;  Location: [facility] ENDOSCOPY;  Service: Gastroenterology   • SHOULDER SURGERY Right 2014   • TONSILLECTOMY  12/2015   • UPPER GASTROINTESTINAL ENDOSCOPY  03/30/2018   • UPPER GASTROINTESTINAL ENDOSCOPY     • UPPER GASTROINTESTINAL ENDOSCOPY  05/30/2018       ALLERGIES:    Allergies   Allergen Reactions   • Contrast Dye Anaphylaxis     ANAPHYLAXIS   • Iodinated Diagnostic Agents Anaphylaxis       REVIEW OF SYSTEMS:      CONSTITUTIONAL:  No fever, chills, or night sweats.     HEENT:  No epistaxis, mouth sores, or difficulty swallowing.    RESPIRATORY:  No new shortness of breath or cough at present.    CARDIOVASCULAR:  No chest pain or palpitations.    GASTROINTESTINAL:  No abdominal pain, nausea, vomiting, or blood in the stool.    GENITOURINARY:  No dysuria or hematuria.    MUSCULOSKELETAL:  No any new back pain or arthralgias; states he has noticed some new joint pain, primarily in his wrists.     NEUROLOGICAL:  No tingling or numbness. No new headache or dizziness.     LYMPHATICS:  Denies any abnormal swollen and anywhere in the body.    SKIN:  Denies any new skin rash.    PHYSICAL EXAMINATION:      VITAL SIGNS:  /88   Pulse 74   Temp 98.2 °F (36.8 °C) (Temporal Artery )   Resp 18   Ht 182.9 cm (72\")   Wt 101 kg (223 lb 9.6 oz)   SpO2 98%   BMI 30.33 kg/m²     GENERAL:  Not in any distress.    HEENT:  Normocephalic, Atraumatic.Mild Conjunctival pallor. No icterus.  No Facial Asymmetry noted.    NECK:  No adenopathy. No JVD.    RESPIRATORY:  Fair air entry bilateral. No rhonchi or wheezing.    CARDIOVASCULAR:  " S1, S2. Regular rate and rhythm. No murmur or gallop appreciated.    ABDOMEN:  Soft, obese, nontender. Bowel sounds present in all four quadrants.  No organomegaly appreciated.    EXTREMITIES:  No edema.No Calf Tenderness.    NEUROLOGIC:  Alert, awake and oriented ×3.      SKIN : No new skin lesion identified    DIAGNOSTIC DATA:    Glucose   Date Value Ref Range Status   09/20/2018 78 60 - 100 mg/dL Final     Sodium   Date Value Ref Range Status   09/20/2018 139 137 - 145 mmol/L Final     Potassium   Date Value Ref Range Status   09/20/2018 4.3 3.5 - 5.1 mmol/L Final     CO2   Date Value Ref Range Status   09/20/2018 28.0 22.0 - 31.0 mmol/L Final     Chloride   Date Value Ref Range Status   09/20/2018 104 95 - 110 mmol/L Final     Anion Gap   Date Value Ref Range Status   09/20/2018 7.0 5.0 - 15.0 mmol/L Final     Creatinine   Date Value Ref Range Status   09/20/2018 1.01 0.70 - 1.30 mg/dL Final     BUN   Date Value Ref Range Status   09/20/2018 21 7 - 21 mg/dL Final     BUN/Creatinine Ratio   Date Value Ref Range Status   09/20/2018 20.8 7.0 - 25.0 Final     Calcium   Date Value Ref Range Status   09/20/2018 9.1 8.4 - 10.2 mg/dL Final     eGFR Non  Amer   Date Value Ref Range Status   09/20/2018 81 63 - 147 mL/min/1.73 Final     Alkaline Phosphatase   Date Value Ref Range Status   09/20/2018 34 (L) 38 - 126 U/L Final     Total Protein   Date Value Ref Range Status   09/20/2018 6.8 6.3 - 8.6 g/dL Final     ALT (SGPT)   Date Value Ref Range Status   09/20/2018 61 21 - 72 U/L Final     AST (SGOT)   Date Value Ref Range Status   09/20/2018 50 17 - 59 U/L Final     Total Bilirubin   Date Value Ref Range Status   09/20/2018 0.8 0.2 - 1.3 mg/dL Final     Albumin   Date Value Ref Range Status   09/20/2018 3.90 3.40 - 4.80 g/dL Final     Globulin   Date Value Ref Range Status   09/20/2018 2.9 2.3 - 3.5 gm/dL Final     Lab Results   Component Value Date    WBC 6.99 09/20/2018    HGB 17.4 (H) 09/20/2018    HCT 50.2 (H)  09/20/2018    MCV 88.2 09/20/2018     09/20/2018     Lab Results   Component Value Date    NEUTROABS 4.71 09/20/2018    IRON 126 04/10/2018    TIBC 345 04/10/2018    LABIRON 37 04/10/2018    FERRITIN 179.00 04/10/2018     Lab Results   Component Value Date    AFPTM 2.2 04/10/2018    AFPTM 182 04/10/2018   ]        ASSESSMENT AND PLAN:      1.  Erythrocytosis,secondary to testosterone use and an element of DONNIE;  Hct today is 50.2% and he is having some erythromyalgia symptoms; will plan therapeutic phlebotomy for tomorrow and recheck him again in 2 mos. He is to continue with hydration and daily ASA.      2. Atrial fibrillation, pt was  taken off the Eliquis by his cardiologist and is currently taking baby ASA daily.      3. Health maintenance, he does not smoke; he has had a EGD and colonoscopy follow-up  pancreatitis, esophagitis and diverticulitis.             Patient's Body mass index is 30.33 kg/m². BMI is within normal parameters. No follow-up required.    This document has been signed by VIKASH Clinton on September 20, 2018 12:38 PM

## 2018-09-21 ENCOUNTER — APPOINTMENT (OUTPATIENT)
Dept: LAB | Facility: HOSPITAL | Age: 43
End: 2018-09-21

## 2018-09-21 ENCOUNTER — LAB (OUTPATIENT)
Dept: LAB | Facility: HOSPITAL | Age: 43
End: 2018-09-21

## 2018-09-21 DIAGNOSIS — D75.1 POLYCYTHEMIA: ICD-10-CM

## 2018-09-21 LAB
Lab: NORMAL
PRE-BLOOD PRESSURE: NORMAL
PRE-HCT: 50.2
PRE-HGB: 17.4
PULSE: 88
VOLUME COLLECTED: 500

## 2018-09-21 PROCEDURE — 36415 COLL VENOUS BLD VENIPUNCTURE: CPT

## 2018-09-21 PROCEDURE — 99195 PHLEBOTOMY: CPT

## 2018-10-11 ENCOUNTER — HOSPITAL ENCOUNTER (EMERGENCY)
Facility: HOSPITAL | Age: 43
Discharge: HOME | End: 2018-10-11
Attending: EMERGENCY MEDICINE | Admitting: EMERGENCY MEDICINE

## 2018-10-11 ENCOUNTER — APPOINTMENT (OUTPATIENT)
Dept: CT IMAGING | Facility: HOSPITAL | Age: 43
End: 2018-10-11

## 2018-10-11 VITALS
RESPIRATION RATE: 18 BRPM | BODY MASS INDEX: 29.12 KG/M2 | TEMPERATURE: 99.1 F | OXYGEN SATURATION: 94 % | WEIGHT: 215 LBS | HEIGHT: 72 IN | DIASTOLIC BLOOD PRESSURE: 88 MMHG | SYSTOLIC BLOOD PRESSURE: 179 MMHG | HEART RATE: 94 BPM

## 2018-10-11 DIAGNOSIS — I10 HYPERTENSION, UNSPECIFIED TYPE: ICD-10-CM

## 2018-10-11 DIAGNOSIS — R10.13 EPIGASTRIC PAIN: Primary | ICD-10-CM

## 2018-10-11 DIAGNOSIS — R11.2 NAUSEA AND VOMITING, INTRACTABILITY OF VOMITING NOT SPECIFIED, UNSPECIFIED VOMITING TYPE: ICD-10-CM

## 2018-10-11 LAB
ALBUMIN SERPL-MCNC: 4.3 G/DL (ref 3.4–4.8)
ALBUMIN/GLOB SERPL: 1.4 G/DL (ref 1.1–1.8)
ALP SERPL-CCNC: 40 U/L (ref 38–126)
ALT SERPL W P-5'-P-CCNC: 59 U/L (ref 21–72)
AMPHET+METHAMPHET UR QL: NEGATIVE
ANION GAP SERPL CALCULATED.3IONS-SCNC: 14 MMOL/L (ref 5–15)
AST SERPL-CCNC: 43 U/L (ref 17–59)
BACTERIA UR QL AUTO: ABNORMAL /HPF
BARBITURATES UR QL SCN: NEGATIVE
BASOPHILS # BLD AUTO: 0 10*3/MM3 (ref 0–0.2)
BASOPHILS NFR BLD AUTO: 0 % (ref 0–2)
BENZODIAZ UR QL SCN: NEGATIVE
BILIRUB SERPL-MCNC: 1.4 MG/DL (ref 0.2–1.3)
BILIRUB UR QL STRIP: ABNORMAL
BUN BLD-MCNC: 18 MG/DL (ref 7–21)
BUN/CREAT SERPL: 17.1 (ref 7–25)
CALCIUM SPEC-SCNC: 9.5 MG/DL (ref 8.4–10.2)
CANNABINOIDS SERPL QL: POSITIVE
CHLORIDE SERPL-SCNC: 103 MMOL/L (ref 95–110)
CLARITY UR: CLEAR
CO2 SERPL-SCNC: 22 MMOL/L (ref 22–31)
COCAINE UR QL: NEGATIVE
COLOR UR: YELLOW
CREAT BLD-MCNC: 1.05 MG/DL (ref 0.7–1.3)
DEPRECATED RDW RBC AUTO: 47.4 FL (ref 35.1–43.9)
EOSINOPHIL # BLD AUTO: 0 10*3/MM3 (ref 0–0.7)
EOSINOPHIL NFR BLD AUTO: 0 % (ref 0–7)
ERYTHROCYTE [DISTWIDTH] IN BLOOD BY AUTOMATED COUNT: 14.8 % (ref 11.5–14.5)
GFR SERPL CREATININE-BSD FRML MDRD: 78 ML/MIN/1.73 (ref 63–147)
GLOBULIN UR ELPH-MCNC: 3.1 GM/DL (ref 2.3–3.5)
GLUCOSE BLD-MCNC: 124 MG/DL (ref 60–100)
GLUCOSE UR STRIP-MCNC: NEGATIVE MG/DL
HCT VFR BLD AUTO: 49.7 % (ref 39–49)
HGB BLD-MCNC: 17.2 G/DL (ref 13.7–17.3)
HGB UR QL STRIP.AUTO: ABNORMAL
HOLD SPECIMEN: NORMAL
HYALINE CASTS UR QL AUTO: ABNORMAL /LPF
IMM GRANULOCYTES # BLD: 0.03 10*3/MM3 (ref 0–0.02)
IMM GRANULOCYTES NFR BLD: 0.3 % (ref 0–0.5)
KETONES UR QL STRIP: ABNORMAL
LEUKOCYTE ESTERASE UR QL STRIP.AUTO: NEGATIVE
LIPASE SERPL-CCNC: 45 U/L (ref 23–300)
LYMPHOCYTES # BLD AUTO: 0.72 10*3/MM3 (ref 0.6–4.2)
LYMPHOCYTES NFR BLD AUTO: 6.1 % (ref 10–50)
MCH RBC QN AUTO: 30.4 PG (ref 26.5–34)
MCHC RBC AUTO-ENTMCNC: 34.6 G/DL (ref 31.5–36.3)
MCV RBC AUTO: 87.8 FL (ref 80–98)
METHADONE UR QL SCN: NEGATIVE
MONOCYTES # BLD AUTO: 0.36 10*3/MM3 (ref 0–0.9)
MONOCYTES NFR BLD AUTO: 3.1 % (ref 0–12)
MUCOUS THREADS URNS QL MICRO: ABNORMAL /HPF
NEUTROPHILS # BLD AUTO: 10.64 10*3/MM3 (ref 2–8.6)
NEUTROPHILS NFR BLD AUTO: 90.5 % (ref 37–80)
NITRITE UR QL STRIP: NEGATIVE
OPIATES UR QL: POSITIVE
OXYCODONE UR QL SCN: NEGATIVE
PH UR STRIP.AUTO: 6.5 [PH] (ref 5–9)
PLATELET # BLD AUTO: 225 10*3/MM3 (ref 150–450)
PMV BLD AUTO: 10.1 FL (ref 8–12)
POTASSIUM BLD-SCNC: 4.6 MMOL/L (ref 3.5–5.1)
PROT SERPL-MCNC: 7.4 G/DL (ref 6.3–8.6)
PROT UR QL STRIP: ABNORMAL
RBC # BLD AUTO: 5.66 10*6/MM3 (ref 4.37–5.74)
RBC # UR: ABNORMAL /HPF
REF LAB TEST METHOD: ABNORMAL
SODIUM BLD-SCNC: 139 MMOL/L (ref 137–145)
SP GR UR STRIP: 1.02 (ref 1–1.03)
SQUAMOUS #/AREA URNS HPF: ABNORMAL /HPF
UROBILINOGEN UR QL STRIP: ABNORMAL
WBC NRBC COR # BLD: 11.75 10*3/MM3 (ref 3.2–9.8)
WBC UR QL AUTO: ABNORMAL /HPF

## 2018-10-11 PROCEDURE — 80307 DRUG TEST PRSMV CHEM ANLYZR: CPT | Performed by: EMERGENCY MEDICINE

## 2018-10-11 PROCEDURE — 25010000002 ONDANSETRON PER 1 MG: Performed by: EMERGENCY MEDICINE

## 2018-10-11 PROCEDURE — 25010000002 MORPHINE PER 10 MG: Performed by: EMERGENCY MEDICINE

## 2018-10-11 PROCEDURE — 96375 TX/PRO/DX INJ NEW DRUG ADDON: CPT

## 2018-10-11 PROCEDURE — 96361 HYDRATE IV INFUSION ADD-ON: CPT

## 2018-10-11 PROCEDURE — 80053 COMPREHEN METABOLIC PANEL: CPT | Performed by: EMERGENCY MEDICINE

## 2018-10-11 PROCEDURE — 81001 URINALYSIS AUTO W/SCOPE: CPT | Performed by: EMERGENCY MEDICINE

## 2018-10-11 PROCEDURE — 74176 CT ABD & PELVIS W/O CONTRAST: CPT

## 2018-10-11 PROCEDURE — 83690 ASSAY OF LIPASE: CPT | Performed by: EMERGENCY MEDICINE

## 2018-10-11 PROCEDURE — 96374 THER/PROPH/DIAG INJ IV PUSH: CPT

## 2018-10-11 PROCEDURE — 99284 EMERGENCY DEPT VISIT MOD MDM: CPT

## 2018-10-11 PROCEDURE — 85025 COMPLETE CBC W/AUTO DIFF WBC: CPT | Performed by: EMERGENCY MEDICINE

## 2018-10-11 RX ORDER — ONDANSETRON 2 MG/ML
4 INJECTION INTRAMUSCULAR; INTRAVENOUS ONCE
Status: COMPLETED | OUTPATIENT
Start: 2018-10-11 | End: 2018-10-11

## 2018-10-11 RX ORDER — LEVOFLOXACIN 750 MG/1
750 TABLET ORAL DAILY
Qty: 7 TABLET | Refills: 0 | Status: ON HOLD | OUTPATIENT
Start: 2018-10-11 | End: 2018-10-15

## 2018-10-11 RX ORDER — CLONIDINE HYDROCHLORIDE 0.2 MG/1
0.2 TABLET ORAL ONCE
Status: COMPLETED | OUTPATIENT
Start: 2018-10-11 | End: 2018-10-11

## 2018-10-11 RX ORDER — ALUMINA, MAGNESIA, AND SIMETHICONE 2400; 2400; 240 MG/30ML; MG/30ML; MG/30ML
15 SUSPENSION ORAL ONCE
Status: COMPLETED | OUTPATIENT
Start: 2018-10-11 | End: 2018-10-11

## 2018-10-11 RX ORDER — TIZANIDINE 4 MG/1
4 TABLET ORAL EVERY 8 HOURS PRN
COMMUNITY
End: 2019-04-25

## 2018-10-11 RX ORDER — SODIUM CHLORIDE 0.9 % (FLUSH) 0.9 %
10 SYRINGE (ML) INJECTION AS NEEDED
Status: DISCONTINUED | OUTPATIENT
Start: 2018-10-11 | End: 2018-10-11 | Stop reason: HOSPADM

## 2018-10-11 RX ORDER — METRONIDAZOLE 500 MG/1
500 TABLET ORAL 3 TIMES DAILY
Qty: 21 TABLET | Refills: 0 | Status: ON HOLD | OUTPATIENT
Start: 2018-10-11 | End: 2018-10-15

## 2018-10-11 RX ADMIN — MORPHINE SULFATE 4 MG: 4 INJECTION INTRAVENOUS at 03:40

## 2018-10-11 RX ADMIN — CLONIDINE HYDROCHLORIDE 0.2 MG: 0.2 TABLET ORAL at 05:07

## 2018-10-11 RX ADMIN — SODIUM CHLORIDE 1000 ML: 900 INJECTION, SOLUTION INTRAVENOUS at 04:25

## 2018-10-11 RX ADMIN — LIDOCAINE HYDROCHLORIDE 15 ML: 20 SOLUTION ORAL; TOPICAL at 05:48

## 2018-10-11 RX ADMIN — ALUMINUM HYDROXIDE, MAGNESIUM HYDROXIDE, AND DIMETHICONE 15 ML: 400; 400; 40 SUSPENSION ORAL at 05:47

## 2018-10-11 RX ADMIN — ONDANSETRON 4 MG: 2 INJECTION INTRAMUSCULAR; INTRAVENOUS at 03:39

## 2018-10-11 NOTE — ED PROVIDER NOTES
Subjective   43 year old male with history of a fib on controller medication but no blood thinner as well as history of GERD, ETOH abuse and diverticulitis presents via EMS from home with onset of persistent nausea and vomiting. Denies bloody emesis. Denies diarrhea. States epigastric sharp pain. State this is how his diverticulitis always starts. Reports seeing surgery after previous episodes with colonoscopies and discussed resections. No abdominal surgeries. Denies fever. States he cannot tolerate anything PO and denies urinary symptoms. He states he ate some trail mix and drank some wine and he knows that is what triggered this flair.     Family history, surgical history, social history, current medications and allergies are reviewed with the patient and triage documentation and vitals are reviewed.          History provided by:  Patient   used: No        Review of Systems   Constitutional: Negative for activity change, appetite change, chills, fever and unexpected weight change.   HENT: Negative.    Eyes: Negative.    Respiratory: Negative for cough, shortness of breath and wheezing.    Cardiovascular: Negative for chest pain and palpitations.   Gastrointestinal: Positive for abdominal pain, nausea and vomiting. Negative for abdominal distention, blood in stool, constipation and diarrhea.   Endocrine: Negative.    Genitourinary: Negative for dysuria, frequency, hematuria and urgency.   Musculoskeletal: Negative for back pain and neck pain.   Skin: Negative for color change, rash and wound.   Allergic/Immunologic: Negative.    Neurological: Negative.    Hematological: Negative.    Psychiatric/Behavioral: Negative.        Past Medical History:   Diagnosis Date   • A-fib (CMS/[facility])    • Anxiety    • Arrhythmia    • Arthritis     rt shoulder    • Atrial flutter (CMS/[facility]) 07/2016    EP  ablation x2    • Depression    • Diverticulitis    • ETOH abuse    • GERD (gastroesophageal reflux disease)    •  "Kidney stone    • Pancreatitis    • Sleep apnea     have used a machine in the past   • Tendonitis     rt shoulder       Allergies   Allergen Reactions   • Contrast Dye Anaphylaxis     ANAPHYLAXIS   • Iodinated Diagnostic Agents Anaphylaxis       Past Surgical History:   Procedure Laterality Date   • CARDIAC ELECTROPHYSIOLOGY PROCEDURE N/A 5/2/2017    Procedure: Ablation atrial flutter;  Surgeon: Ramirez Reza MD;  Location:  [facility];  Service:    • CARDIAC ELECTROPHYSIOLOGY PROCEDURE N/A 9/5/2017    Procedure: Ablation atrial fibrillation PVA ;  Surgeon: Ramirez Reza MD;  Location:  [facility];  Service:    • COLONOSCOPY     • COLONOSCOPY N/A 5/30/2018    Procedure: COLONOSCOPY;  Surgeon: Edwin Medina MD;  Location: [facility] ENDOSCOPY;  Service: Gastroenterology   • ENDOSCOPY N/A 3/30/2018    Procedure: ESOPHAGOGASTRODUODENOSCOPY;  Surgeon: Edwin Medina MD;  Location: [facility] ENDOSCOPY;  Service: Gastroenterology   • ENDOSCOPY N/A 5/30/2018    Procedure: ESOPHAGOGASTRODUODENOSCOPY;  Surgeon: Edwin Medina MD;  Location: [facility] ENDOSCOPY;  Service: Gastroenterology   • SHOULDER SURGERY Right 2014   • TONSILLECTOMY  12/2015   • UPPER GASTROINTESTINAL ENDOSCOPY  03/30/2018   • UPPER GASTROINTESTINAL ENDOSCOPY     • UPPER GASTROINTESTINAL ENDOSCOPY  05/30/2018       Family History   Problem Relation Age of Onset   • Heart disease Father    • Depression Father    • Cancer Maternal Grandmother         lung   • Cancer Paternal Grandmother         pancreatic   • Heart disease Paternal Grandfather        Social History     Social History   • Marital status:      Social History Main Topics   • Smoking status: Never Smoker   • Smokeless tobacco: Never Used   • Alcohol use No      Comment: quit 3/17/18   • Drug use: Yes     Frequency: 7.0 times per week     Types: Marijuana      Comment: states he uses \"a little\" every day   • Sexual activity: Defer     Other Topics " Concern   • Not on file     Social History Narrative    Pt states has not had drink of any alcohol in 3 weeks as of 4/5/2018           Objective   Physical Exam   Constitutional: He is oriented to person, place, and time. He appears well-developed and well-nourished.   HENT:   Head: Normocephalic.   Mouth/Throat: Oropharynx is clear and moist.   Eyes: Pupils are equal, round, and reactive to light. Conjunctivae are normal.   Neck: Normal range of motion. Neck supple.   Cardiovascular: Normal rate and regular rhythm.    Pulmonary/Chest: Effort normal.   Abdominal: Soft. He exhibits no distension. Bowel sounds are decreased. There is no hepatosplenomegaly. There is tenderness (epigastrium). There is no rigidity, no rebound, no guarding and no CVA tenderness.   Musculoskeletal: Normal range of motion. He exhibits no edema.   Neurological: He is alert and oriented to person, place, and time.   Skin: Skin is warm and dry. Capillary refill takes less than 2 seconds.   Psychiatric: He has a normal mood and affect.   Nursing note and vitals reviewed.      Procedures  none         ED Course      Labs Reviewed   COMPREHENSIVE METABOLIC PANEL - Abnormal; Notable for the following:        Result Value    Glucose 124 (*)     Total Bilirubin 1.4 (*)     All other components within normal limits   URINALYSIS W/ MICROSCOPIC IF INDICATED (NO CULTURE) - Abnormal; Notable for the following:     Ketones, UA 80 mg/dL (3+) (*)     Bilirubin, UA Small (1+) (*)     Blood, UA Trace (*)     Protein,  mg/dL (2+) (*)     Urobilinogen, UA 2.0 E.U./dL (*)     All other components within normal limits   CBC WITH AUTO DIFFERENTIAL - Abnormal; Notable for the following:     WBC 11.75 (*)     Hematocrit 49.7 (*)     RDW 14.8 (*)     RDW-SD 47.4 (*)     Neutrophil % 90.5 (*)     Lymphocyte % 6.1 (*)     Neutrophils, Absolute 10.64 (*)     Immature Grans, Absolute 0.03 (*)     All other components within normal limits   URINE DRUG SCREEN -  Abnormal; Notable for the following:     Opiate Screen Positive (*)     THC, Screen, Urine Positive (*)     All other components within normal limits    Narrative:     Negative Thresholds For Drugs Screened in Urine:     Amphetamines          500 ng/ml  Barbiturates          200 ng/ml  Benzodiazepines       200 ng/ml  Cocaine               150 ng/ml  Methadone             300 ng/mL  Opiates               300 ng/mL  Oxycodone             100 ng/mL  THC                   20 ng/mL    The normal value for all drugs tested is negative. This report includes final unconfirmed screening results.  A positive result by this assay can be, at your request, sent to the Reference Lab for confirmation by gas chromatography. Unconfirmed results must not be used for non-medical purposes, such as employment or legal testing. Clinical consideration should be applied to any drug of abuse test result, particularly when unconfirmed results are used.   URINALYSIS, MICROSCOPIC ONLY - Abnormal; Notable for the following:     RBC, UA 0-2 (*)     Mucus, UA Small/1+ (*)     All other components within normal limits   LIPASE - Normal   CBC AND DIFFERENTIAL    Narrative:     The following orders were created for panel order CBC & Differential.  Procedure                               Abnormality         Status                     ---------                               -----------         ------                     CBC Auto Differential[[id number]]        Abnormal            Final result                 Please view results for these tests on the individual orders.   EXTRA TUBES    Narrative:     The following orders were created for panel order Extra Tubes.  Procedure                               Abnormality         Status                     ---------                               -----------         ------                     Gold Top - SST[[id number]]                                   Final result                 Please view results for these  tests on the individual orders.   [facility]     Ct Abdomen Pelvis Without Contrast    Result Date: 10/11/2018  Narrative: CT abdomen and pelvis without contrast on 10/11/2018 CLINICAL INDICATION: Periumbilical abdominal pain, vomiting, history of recurrent diverticulitis TECHNIQUE: Multiple axial images are obtained throughout the abdomen and pelvis without the administration of contrast. This exam was performed according to our departmental dose-optimization program, which includes automated exposure control, adjustment of the mA and/or kV according to patient size and/or use of iterative reconstruction technique. Total DLP is 660.3 mGy*cm. COMPARISON: 6/27/2018 FINDINGS: Abdomen: Mild bilateral gynecomastia is noted. There is mild elevation of the right hemidiaphragm. There is minimal right basilar atelectasis. The lung bases are otherwise clear. There is again noted bilateral perinephric stranding that is nonspecific but would recommend correlation with urinalysis to exclude infection. There are no renal or ureteral stones and no hydronephrosis. The unenhanced solid abdominal organs are otherwise unremarkable. There is no abdominal adenopathy. There is no free fluid or free air within the abdomen. There is diverticulosis. The abdominal portion of the GI tract is unremarkable. Pelvis: There is diverticulosis. The pelvic portion of the GI tract including the appendix is otherwise unremarkable. There is no pelvic adenopathy. There is no free fluid in the pelvis. Degenerative changes are noted in the lower lumbar spine.     Impression: 1. Diverticulosis. 2. Mild bilateral perinephric stranding that is nonspecific but would recommend correlation with urinalysis to exclude infection. Electronically signed by:  Carlos Gilman  10/11/2018 4:27 AM CDT Workstation: [id number]          [facility]  Number of Diagnoses or Management Options  Epigastric pain:   Nausea and vomiting, intractability of vomiting not  specified, unspecified vomiting type:      Amount and/or Complexity of Data Reviewed  Clinical lab tests: reviewed  Tests in the radiology section of CPT®: reviewed    Patient Progress  Patient progress: stable    Labs unremarkable. UA tox negative and no signs of UTI. Patient feels better with fluids and meds. Anaphylaxis to IV contrast. Non contrast CT performed. Bilateral perinephric standing without other abnormality. Perinephric stranding not felt to be infectious given normal UA. Patient HTN treated with Clonidine. He was unable to tolerate his home meds today. GI cocktail given which did not improve epigastric pain much. After discussion at length with the patient regarding no signs of Diverticulitis on CT and his presentation decision is made to treat for diverticulitis as this is how he has presented in the past. Advised to follow-up soon with PCP and surgery.     Final diagnoses:   Epigastric pain   Nausea and vomiting, intractability of vomiting not specified, unspecified vomiting type   Hypertension, unspecified type            Jass Bhatt, DO  10/11/18 [id number]

## 2018-10-11 NOTE — ED NOTES
"Provided patient with urinal for urine sample, he stated \"it will be awhile\"     Adrienne Gaviria RN  10/11/18 3219    "

## 2018-10-11 NOTE — ED NOTES
Patient presents to ED by EMS. He has complaints of nausea and vomiting for 1 day. He states a history of diverticulitis. On arrival patient was diaphoretic.      Adrienne Gaviria RN  10/11/18 0328

## 2018-10-13 ENCOUNTER — HOSPITAL ENCOUNTER (OUTPATIENT)
Facility: HOSPITAL | Age: 43
Setting detail: OBSERVATION
Discharge: HOME | End: 2018-10-17
Attending: EMERGENCY MEDICINE | Admitting: EMERGENCY MEDICINE

## 2018-10-13 ENCOUNTER — APPOINTMENT (OUTPATIENT)
Dept: CT IMAGING | Facility: HOSPITAL | Age: 43
End: 2018-10-13

## 2018-10-13 DIAGNOSIS — R11.2 INTRACTABLE VOMITING WITH NAUSEA, UNSPECIFIED VOMITING TYPE: ICD-10-CM

## 2018-10-13 DIAGNOSIS — K92.0 HEMATEMESIS WITH NAUSEA: Primary | ICD-10-CM

## 2018-10-13 LAB
ALBUMIN SERPL-MCNC: 4.1 G/DL (ref 3.4–4.8)
ALBUMIN/GLOB SERPL: 1.6 G/DL (ref 1.1–1.8)
ALP SERPL-CCNC: 39 U/L (ref 38–126)
ALT SERPL W P-5'-P-CCNC: 54 U/L (ref 21–72)
AMPHET+METHAMPHET UR QL: NEGATIVE
ANION GAP SERPL CALCULATED.3IONS-SCNC: 14 MMOL/L (ref 5–15)
AST SERPL-CCNC: 37 U/L (ref 17–59)
BACTERIA UR QL AUTO: ABNORMAL /HPF
BARBITURATES UR QL SCN: NEGATIVE
BASOPHILS # BLD AUTO: 0 10*3/MM3 (ref 0–0.2)
BASOPHILS NFR BLD AUTO: 0 % (ref 0–2)
BENZODIAZ UR QL SCN: NEGATIVE
BILIRUB SERPL-MCNC: 1.4 MG/DL (ref 0.2–1.3)
BILIRUB UR QL STRIP: NEGATIVE
BUN BLD-MCNC: 22 MG/DL (ref 7–21)
BUN/CREAT SERPL: 18.3 (ref 7–25)
CALCIUM SPEC-SCNC: 9.8 MG/DL (ref 8.4–10.2)
CANNABINOIDS SERPL QL: POSITIVE
CHLORIDE SERPL-SCNC: 92 MMOL/L (ref 95–110)
CLARITY UR: CLEAR
CO2 SERPL-SCNC: 26 MMOL/L (ref 22–31)
COCAINE UR QL: NEGATIVE
COLOR UR: ABNORMAL
CREAT BLD-MCNC: 1.2 MG/DL (ref 0.7–1.3)
CRP SERPL-MCNC: <0.5 MG/DL (ref 0–1)
DEPRECATED RDW RBC AUTO: 46.3 FL (ref 35.1–43.9)
EOSINOPHIL # BLD AUTO: 0 10*3/MM3 (ref 0–0.7)
EOSINOPHIL NFR BLD AUTO: 0 % (ref 0–7)
ERYTHROCYTE [DISTWIDTH] IN BLOOD BY AUTOMATED COUNT: 14.6 % (ref 11.5–14.5)
ETHANOL BLD-MCNC: <10 MG/DL (ref 0–10)
ETHANOL UR QL: <0.01 %
GFR SERPL CREATININE-BSD FRML MDRD: 67 ML/MIN/1.73 (ref 63–147)
GLOBULIN UR ELPH-MCNC: 2.6 GM/DL (ref 2.3–3.5)
GLUCOSE BLD-MCNC: 83 MG/DL (ref 60–100)
GLUCOSE UR STRIP-MCNC: NEGATIVE MG/DL
HCT VFR BLD AUTO: 49.6 % (ref 39–49)
HGB BLD-MCNC: 17.2 G/DL (ref 13.7–17.3)
HGB UR QL STRIP.AUTO: NEGATIVE
HYALINE CASTS UR QL AUTO: ABNORMAL /LPF
IMM GRANULOCYTES # BLD: 0.05 10*3/MM3 (ref 0–0.02)
IMM GRANULOCYTES NFR BLD: 0.4 % (ref 0–0.5)
KETONES UR QL STRIP: ABNORMAL
LEUKOCYTE ESTERASE UR QL STRIP.AUTO: NEGATIVE
LIPASE SERPL-CCNC: 54 U/L (ref 23–300)
LYMPHOCYTES # BLD AUTO: 0.98 10*3/MM3 (ref 0.6–4.2)
LYMPHOCYTES NFR BLD AUTO: 6.9 % (ref 10–50)
MCH RBC QN AUTO: 30.4 PG (ref 26.5–34)
MCHC RBC AUTO-ENTMCNC: 34.7 G/DL (ref 31.5–36.3)
MCV RBC AUTO: 87.6 FL (ref 80–98)
METHADONE UR QL SCN: NEGATIVE
MONOCYTES # BLD AUTO: 0.83 10*3/MM3 (ref 0–0.9)
MONOCYTES NFR BLD AUTO: 5.9 % (ref 0–12)
NEUTROPHILS # BLD AUTO: 12.26 10*3/MM3 (ref 2–8.6)
NEUTROPHILS NFR BLD AUTO: 86.8 % (ref 37–80)
NITRITE UR QL STRIP: NEGATIVE
OPIATES UR QL: POSITIVE
OXYCODONE UR QL SCN: NEGATIVE
PH UR STRIP.AUTO: 7 [PH] (ref 5–9)
PLATELET # BLD AUTO: 251 10*3/MM3 (ref 150–450)
PMV BLD AUTO: 10.2 FL (ref 8–12)
POTASSIUM BLD-SCNC: 4.6 MMOL/L (ref 3.5–5.1)
PROT SERPL-MCNC: 6.7 G/DL (ref 6.3–8.6)
PROT UR QL STRIP: ABNORMAL
RBC # BLD AUTO: 5.66 10*6/MM3 (ref 4.37–5.74)
RBC # UR: ABNORMAL /HPF
REF LAB TEST METHOD: ABNORMAL
SODIUM BLD-SCNC: 132 MMOL/L (ref 137–145)
SP GR UR STRIP: 1.02 (ref 1–1.03)
SQUAMOUS #/AREA URNS HPF: ABNORMAL /HPF
UROBILINOGEN UR QL STRIP: ABNORMAL
WBC NRBC COR # BLD: 14.12 10*3/MM3 (ref 3.2–9.8)
WBC UR QL AUTO: ABNORMAL /HPF

## 2018-10-13 PROCEDURE — 80307 DRUG TEST PRSMV CHEM ANLYZR: CPT | Performed by: PHYSICIAN ASSISTANT

## 2018-10-13 PROCEDURE — 74176 CT ABD & PELVIS W/O CONTRAST: CPT

## 2018-10-13 PROCEDURE — 25010000002 MORPHINE PER 10 MG: Performed by: EMERGENCY MEDICINE

## 2018-10-13 PROCEDURE — 25010000002 METOCLOPRAMIDE PER 10 MG: Performed by: PHYSICIAN ASSISTANT

## 2018-10-13 PROCEDURE — 83690 ASSAY OF LIPASE: CPT | Performed by: PHYSICIAN ASSISTANT

## 2018-10-13 PROCEDURE — 80053 COMPREHEN METABOLIC PANEL: CPT | Performed by: PHYSICIAN ASSISTANT

## 2018-10-13 PROCEDURE — 99284 EMERGENCY DEPT VISIT MOD MDM: CPT

## 2018-10-13 PROCEDURE — 96376 TX/PRO/DX INJ SAME DRUG ADON: CPT

## 2018-10-13 PROCEDURE — 96365 THER/PROPH/DIAG IV INF INIT: CPT

## 2018-10-13 PROCEDURE — 85025 COMPLETE CBC W/AUTO DIFF WBC: CPT | Performed by: PHYSICIAN ASSISTANT

## 2018-10-13 PROCEDURE — 25010000002 PROMETHAZINE PER 50 MG: Performed by: EMERGENCY MEDICINE

## 2018-10-13 PROCEDURE — 96367 TX/PROPH/DG ADDL SEQ IV INF: CPT

## 2018-10-13 PROCEDURE — 96375 TX/PRO/DX INJ NEW DRUG ADDON: CPT

## 2018-10-13 PROCEDURE — 81001 URINALYSIS AUTO W/SCOPE: CPT | Performed by: PHYSICIAN ASSISTANT

## 2018-10-13 PROCEDURE — G0378 HOSPITAL OBSERVATION PER HR: HCPCS

## 2018-10-13 PROCEDURE — 86140 C-REACTIVE PROTEIN: CPT | Performed by: FAMILY MEDICINE

## 2018-10-13 PROCEDURE — 96361 HYDRATE IV INFUSION ADD-ON: CPT

## 2018-10-13 RX ORDER — SODIUM CHLORIDE 9 MG/ML
INJECTION, SOLUTION INTRAVENOUS
Status: DISCONTINUED
Start: 2018-10-13 | End: 2018-10-17 | Stop reason: HOSPADM

## 2018-10-13 RX ORDER — ENALAPRILAT 2.5 MG/2ML
1.25 INJECTION INTRAVENOUS EVERY 6 HOURS PRN
Status: DISCONTINUED | OUTPATIENT
Start: 2018-10-13 | End: 2018-10-17 | Stop reason: HOSPADM

## 2018-10-13 RX ORDER — MORPHINE SULFATE 2 MG/ML
2 INJECTION, SOLUTION INTRAMUSCULAR; INTRAVENOUS ONCE
Status: COMPLETED | OUTPATIENT
Start: 2018-10-13 | End: 2018-10-13

## 2018-10-13 RX ORDER — PANTOPRAZOLE SODIUM 40 MG/10ML
80 INJECTION, POWDER, LYOPHILIZED, FOR SOLUTION INTRAVENOUS ONCE
Status: COMPLETED | OUTPATIENT
Start: 2018-10-13 | End: 2018-10-13

## 2018-10-13 RX ORDER — PROMETHAZINE HYDROCHLORIDE 25 MG/ML
12.5 INJECTION, SOLUTION INTRAMUSCULAR; INTRAVENOUS ONCE
Status: COMPLETED | OUTPATIENT
Start: 2018-10-13 | End: 2018-10-13

## 2018-10-13 RX ORDER — METOCLOPRAMIDE HYDROCHLORIDE 5 MG/ML
10 INJECTION INTRAMUSCULAR; INTRAVENOUS ONCE
Status: COMPLETED | OUTPATIENT
Start: 2018-10-13 | End: 2018-10-13

## 2018-10-13 RX ORDER — HYDROCODONE BITARTRATE AND ACETAMINOPHEN 5; 325 MG/1; MG/1
1 TABLET ORAL ONCE
Status: COMPLETED | OUTPATIENT
Start: 2018-10-13 | End: 2018-10-13

## 2018-10-13 RX ORDER — CLONIDINE HYDROCHLORIDE 0.2 MG/1
0.2 TABLET ORAL ONCE
Status: COMPLETED | OUTPATIENT
Start: 2018-10-13 | End: 2018-10-13

## 2018-10-13 RX ORDER — ALUMINA, MAGNESIA, AND SIMETHICONE 2400; 2400; 240 MG/30ML; MG/30ML; MG/30ML
15 SUSPENSION ORAL ONCE
Status: COMPLETED | OUTPATIENT
Start: 2018-10-13 | End: 2018-10-13

## 2018-10-13 RX ORDER — PANTOPRAZOLE SODIUM 40 MG/10ML
40 INJECTION, POWDER, LYOPHILIZED, FOR SOLUTION INTRAVENOUS
Status: DISCONTINUED | OUTPATIENT
Start: 2018-10-14 | End: 2018-10-17 | Stop reason: HOSPADM

## 2018-10-13 RX ORDER — SODIUM CHLORIDE 0.9 % (FLUSH) 0.9 %
10 SYRINGE (ML) INJECTION AS NEEDED
Status: DISCONTINUED | OUTPATIENT
Start: 2018-10-13 | End: 2018-10-17 | Stop reason: HOSPADM

## 2018-10-13 RX ADMIN — SODIUM CHLORIDE 1000 ML: 9 INJECTION, SOLUTION INTRAVENOUS at 20:34

## 2018-10-13 RX ADMIN — MORPHINE SULFATE 2 MG: 2 INJECTION, SOLUTION INTRAMUSCULAR; INTRAVENOUS at 21:28

## 2018-10-13 RX ADMIN — LIDOCAINE HYDROCHLORIDE 15 ML: 20 SOLUTION ORAL; TOPICAL at 20:34

## 2018-10-13 RX ADMIN — HYDROCODONE BITARTRATE AND ACETAMINOPHEN 1 TABLET: 5; 325 TABLET ORAL at 20:36

## 2018-10-13 RX ADMIN — SODIUM CHLORIDE 1000 ML: 900 INJECTION, SOLUTION INTRAVENOUS at 17:56

## 2018-10-13 RX ADMIN — MORPHINE SULFATE 4 MG: 4 INJECTION INTRAVENOUS at 19:33

## 2018-10-13 RX ADMIN — METOCLOPRAMIDE 10 MG: 5 INJECTION, SOLUTION INTRAMUSCULAR; INTRAVENOUS at 21:28

## 2018-10-13 RX ADMIN — ALUMINUM HYDROXIDE, MAGNESIUM HYDROXIDE, AND DIMETHICONE 15 ML: 400; 400; 40 SUSPENSION ORAL at 20:34

## 2018-10-13 RX ADMIN — METRONIDAZOLE 500 MG: 500 INJECTION, SOLUTION INTRAVENOUS at 21:28

## 2018-10-13 RX ADMIN — PROMETHAZINE HYDROCHLORIDE 12.5 MG: 25 INJECTION INTRAMUSCULAR; INTRAVENOUS at 17:55

## 2018-10-13 RX ADMIN — PANTOPRAZOLE SODIUM 80 MG: 40 INJECTION, POWDER, FOR SOLUTION INTRAVENOUS at 22:52

## 2018-10-13 RX ADMIN — PANTOPRAZOLE SODIUM 8 MG/HR: 40 INJECTION, POWDER, FOR SOLUTION INTRAVENOUS at 22:53

## 2018-10-13 RX ADMIN — CLONIDINE HYDROCHLORIDE 0.2 MG: 0.2 TABLET ORAL at 22:17

## 2018-10-14 LAB
ALBUMIN SERPL-MCNC: 3.3 G/DL (ref 3.4–4.8)
ALBUMIN/GLOB SERPL: 1.3 G/DL (ref 1.1–1.8)
ALP SERPL-CCNC: 26 U/L (ref 38–126)
ALT SERPL W P-5'-P-CCNC: 43 U/L (ref 21–72)
ANION GAP SERPL CALCULATED.3IONS-SCNC: 9 MMOL/L (ref 5–15)
AST SERPL-CCNC: 28 U/L (ref 17–59)
BASOPHILS # BLD AUTO: 0 10*3/MM3 (ref 0–0.2)
BASOPHILS NFR BLD AUTO: 0 % (ref 0–2)
BILIRUB SERPL-MCNC: 1.2 MG/DL (ref 0.2–1.3)
BUN BLD-MCNC: 16 MG/DL (ref 7–21)
BUN/CREAT SERPL: 13.2 (ref 7–25)
CALCIUM SPEC-SCNC: 8.5 MG/DL (ref 8.4–10.2)
CHLORIDE SERPL-SCNC: 98 MMOL/L (ref 95–110)
CO2 SERPL-SCNC: 29 MMOL/L (ref 22–31)
CREAT BLD-MCNC: 1.21 MG/DL (ref 0.7–1.3)
DEPRECATED RDW RBC AUTO: 46.8 FL (ref 35.1–43.9)
EOSINOPHIL # BLD AUTO: 0 10*3/MM3 (ref 0–0.7)
EOSINOPHIL NFR BLD AUTO: 0 % (ref 0–7)
ERYTHROCYTE [DISTWIDTH] IN BLOOD BY AUTOMATED COUNT: 14.6 % (ref 11.5–14.5)
GFR SERPL CREATININE-BSD FRML MDRD: 66 ML/MIN/1.73 (ref 63–147)
GLOBULIN UR ELPH-MCNC: 2.6 GM/DL (ref 2.3–3.5)
GLUCOSE BLD-MCNC: 101 MG/DL (ref 60–100)
HCT VFR BLD AUTO: 47.3 % (ref 39–49)
HCT VFR BLD AUTO: 47.3 % (ref 39–49)
HCT VFR BLD AUTO: 48.5 % (ref 39–49)
HCT VFR BLD AUTO: 48.8 % (ref 39–49)
HCT VFR BLD AUTO: 49.6 % (ref 39–49)
HGB BLD-MCNC: 16.2 G/DL (ref 13.7–17.3)
HGB BLD-MCNC: 16.2 G/DL (ref 13.7–17.3)
HGB BLD-MCNC: 16.7 G/DL (ref 13.7–17.3)
HGB BLD-MCNC: 16.8 G/DL (ref 13.7–17.3)
HGB BLD-MCNC: 17.1 G/DL (ref 13.7–17.3)
IMM GRANULOCYTES # BLD: 0.03 10*3/MM3 (ref 0–0.02)
IMM GRANULOCYTES NFR BLD: 0.2 % (ref 0–0.5)
LYMPHOCYTES # BLD AUTO: 0.7 10*3/MM3 (ref 0.6–4.2)
LYMPHOCYTES NFR BLD AUTO: 5.8 % (ref 10–50)
MCH RBC QN AUTO: 30.2 PG (ref 26.5–34)
MCHC RBC AUTO-ENTMCNC: 34.2 G/DL (ref 31.5–36.3)
MCV RBC AUTO: 88.2 FL (ref 80–98)
MONOCYTES # BLD AUTO: 0.9 10*3/MM3 (ref 0–0.9)
MONOCYTES NFR BLD AUTO: 7.5 % (ref 0–12)
NEUTROPHILS # BLD AUTO: 10.43 10*3/MM3 (ref 2–8.6)
NEUTROPHILS NFR BLD AUTO: 86.5 % (ref 37–80)
PLATELET # BLD AUTO: 231 10*3/MM3 (ref 150–450)
PMV BLD AUTO: 10.2 FL (ref 8–12)
POTASSIUM BLD-SCNC: 4.8 MMOL/L (ref 3.5–5.1)
PROT SERPL-MCNC: 5.9 G/DL (ref 6.3–8.6)
RBC # BLD AUTO: 5.36 10*6/MM3 (ref 4.37–5.74)
SODIUM BLD-SCNC: 136 MMOL/L (ref 137–145)
WBC NRBC COR # BLD: 12.06 10*3/MM3 (ref 3.2–9.8)

## 2018-10-14 PROCEDURE — 80053 COMPREHEN METABOLIC PANEL: CPT | Performed by: FAMILY MEDICINE

## 2018-10-14 PROCEDURE — 36415 COLL VENOUS BLD VENIPUNCTURE: CPT | Performed by: FAMILY MEDICINE

## 2018-10-14 PROCEDURE — 25010000002 LEVOFLOXACIN PER 250 MG: Performed by: FAMILY MEDICINE

## 2018-10-14 PROCEDURE — G0378 HOSPITAL OBSERVATION PER HR: HCPCS

## 2018-10-14 PROCEDURE — 85014 HEMATOCRIT: CPT | Performed by: FAMILY MEDICINE

## 2018-10-14 PROCEDURE — 85018 HEMOGLOBIN: CPT | Performed by: FAMILY MEDICINE

## 2018-10-14 PROCEDURE — 85025 COMPLETE CBC W/AUTO DIFF WBC: CPT | Performed by: FAMILY MEDICINE

## 2018-10-14 PROCEDURE — 25010000002 MORPHINE PER 10 MG: Performed by: FAMILY MEDICINE

## 2018-10-14 PROCEDURE — 25010000002 ONDANSETRON PER 1 MG: Performed by: FAMILY MEDICINE

## 2018-10-14 PROCEDURE — 96376 TX/PRO/DX INJ SAME DRUG ADON: CPT

## 2018-10-14 PROCEDURE — 96361 HYDRATE IV INFUSION ADD-ON: CPT

## 2018-10-14 PROCEDURE — 96367 TX/PROPH/DG ADDL SEQ IV INF: CPT

## 2018-10-14 PROCEDURE — 96375 TX/PRO/DX INJ NEW DRUG ADDON: CPT

## 2018-10-14 PROCEDURE — 96366 THER/PROPH/DIAG IV INF ADDON: CPT

## 2018-10-14 RX ORDER — MORPHINE SULFATE 2 MG/ML
1 INJECTION, SOLUTION INTRAMUSCULAR; INTRAVENOUS EVERY 4 HOURS PRN
Status: DISCONTINUED | OUTPATIENT
Start: 2018-10-14 | End: 2018-10-17 | Stop reason: HOSPADM

## 2018-10-14 RX ORDER — TIZANIDINE 4 MG/1
4 TABLET ORAL NIGHTLY PRN
Status: DISCONTINUED | OUTPATIENT
Start: 2018-10-14 | End: 2018-10-17 | Stop reason: HOSPADM

## 2018-10-14 RX ORDER — ONDANSETRON 4 MG/1
4 TABLET, FILM COATED ORAL EVERY 6 HOURS PRN
Status: DISCONTINUED | OUTPATIENT
Start: 2018-10-14 | End: 2018-10-17 | Stop reason: HOSPADM

## 2018-10-14 RX ORDER — CLONAZEPAM 0.5 MG/1
1 TABLET ORAL 2 TIMES DAILY PRN
Status: DISCONTINUED | OUTPATIENT
Start: 2018-10-14 | End: 2018-10-17 | Stop reason: HOSPADM

## 2018-10-14 RX ORDER — LEVOFLOXACIN 5 MG/ML
750 INJECTION, SOLUTION INTRAVENOUS EVERY 24 HOURS
Status: DISCONTINUED | OUTPATIENT
Start: 2018-10-14 | End: 2018-10-17 | Stop reason: HOSPADM

## 2018-10-14 RX ORDER — CARVEDILOL 6.25 MG/1
6.25 TABLET ORAL EVERY 12 HOURS SCHEDULED
Status: DISCONTINUED | OUTPATIENT
Start: 2018-10-14 | End: 2018-10-17 | Stop reason: HOSPADM

## 2018-10-14 RX ORDER — NALOXONE HCL 0.4 MG/ML
0.4 VIAL (ML) INJECTION
Status: DISCONTINUED | OUTPATIENT
Start: 2018-10-14 | End: 2018-10-17 | Stop reason: HOSPADM

## 2018-10-14 RX ORDER — SODIUM CHLORIDE 0.9 % (FLUSH) 0.9 %
3-10 SYRINGE (ML) INJECTION AS NEEDED
Status: DISCONTINUED | OUTPATIENT
Start: 2018-10-14 | End: 2018-10-17 | Stop reason: HOSPADM

## 2018-10-14 RX ORDER — ONDANSETRON 4 MG/1
4 TABLET, ORALLY DISINTEGRATING ORAL EVERY 6 HOURS PRN
Status: DISCONTINUED | OUTPATIENT
Start: 2018-10-14 | End: 2018-10-17 | Stop reason: HOSPADM

## 2018-10-14 RX ORDER — ONDANSETRON 2 MG/ML
4 INJECTION INTRAMUSCULAR; INTRAVENOUS EVERY 6 HOURS PRN
Status: DISCONTINUED | OUTPATIENT
Start: 2018-10-14 | End: 2018-10-17 | Stop reason: HOSPADM

## 2018-10-14 RX ORDER — DULOXETIN HYDROCHLORIDE 60 MG/1
60 CAPSULE, DELAYED RELEASE ORAL DAILY
Status: DISCONTINUED | OUTPATIENT
Start: 2018-10-14 | End: 2018-10-17 | Stop reason: HOSPADM

## 2018-10-14 RX ORDER — METOCLOPRAMIDE 10 MG/1
10 TABLET ORAL
Status: DISCONTINUED | OUTPATIENT
Start: 2018-10-14 | End: 2018-10-17 | Stop reason: HOSPADM

## 2018-10-14 RX ORDER — SODIUM CHLORIDE 9 MG/ML
100 INJECTION, SOLUTION INTRAVENOUS CONTINUOUS
Status: DISCONTINUED | OUTPATIENT
Start: 2018-10-14 | End: 2018-10-17 | Stop reason: HOSPADM

## 2018-10-14 RX ORDER — SODIUM CHLORIDE 0.9 % (FLUSH) 0.9 %
3 SYRINGE (ML) INJECTION EVERY 12 HOURS SCHEDULED
Status: DISCONTINUED | OUTPATIENT
Start: 2018-10-14 | End: 2018-10-17 | Stop reason: HOSPADM

## 2018-10-14 RX ORDER — LISINOPRIL 5 MG/1
5 TABLET ORAL DAILY
Status: DISCONTINUED | OUTPATIENT
Start: 2018-10-14 | End: 2018-10-17 | Stop reason: HOSPADM

## 2018-10-14 RX ADMIN — SODIUM CHLORIDE 100 ML/HR: 9 INJECTION, SOLUTION INTRAVENOUS at 12:56

## 2018-10-14 RX ADMIN — Medication 3 ML: at 07:46

## 2018-10-14 RX ADMIN — Medication 3 ML: at 00:34

## 2018-10-14 RX ADMIN — MAGNESIUM OXIDE TAB 400 MG (241.3 MG ELEMENTAL MG) 400 MG: 400 (241.3 MG) TAB at 00:33

## 2018-10-14 RX ADMIN — MAGNESIUM OXIDE TAB 400 MG (241.3 MG ELEMENTAL MG) 400 MG: 400 (241.3 MG) TAB at 07:45

## 2018-10-14 RX ADMIN — CLONAZEPAM 1 MG: 0.5 TABLET ORAL at 21:42

## 2018-10-14 RX ADMIN — CARVEDILOL 6.25 MG: 6.25 TABLET, FILM COATED ORAL at 00:33

## 2018-10-14 RX ADMIN — SODIUM CHLORIDE 100 ML/HR: 9 INJECTION, SOLUTION INTRAVENOUS at 00:33

## 2018-10-14 RX ADMIN — MAGNESIUM OXIDE TAB 400 MG (241.3 MG ELEMENTAL MG) 400 MG: 400 (241.3 MG) TAB at 21:43

## 2018-10-14 RX ADMIN — MORPHINE SULFATE 1 MG: 2 INJECTION, SOLUTION INTRAMUSCULAR; INTRAVENOUS at 07:58

## 2018-10-14 RX ADMIN — LEVOFLOXACIN 750 MG: 5 INJECTION, SOLUTION INTRAVENOUS at 00:35

## 2018-10-14 RX ADMIN — METOCLOPRAMIDE 10 MG: 10 TABLET ORAL at 16:56

## 2018-10-14 RX ADMIN — METRONIDAZOLE 500 MG: 500 INJECTION, SOLUTION INTRAVENOUS at 21:43

## 2018-10-14 RX ADMIN — CARVEDILOL 6.25 MG: 6.25 TABLET, FILM COATED ORAL at 07:44

## 2018-10-14 RX ADMIN — ONDANSETRON 4 MG: 2 INJECTION INTRAMUSCULAR; INTRAVENOUS at 00:40

## 2018-10-14 RX ADMIN — PANTOPRAZOLE SODIUM 40 MG: 40 INJECTION, POWDER, FOR SOLUTION INTRAVENOUS at 05:31

## 2018-10-14 RX ADMIN — METRONIDAZOLE 500 MG: 500 INJECTION, SOLUTION INTRAVENOUS at 14:08

## 2018-10-14 RX ADMIN — DULOXETINE HYDROCHLORIDE 60 MG: 60 CAPSULE, DELAYED RELEASE ORAL at 07:45

## 2018-10-14 RX ADMIN — METRONIDAZOLE 500 MG: 500 INJECTION, SOLUTION INTRAVENOUS at 05:31

## 2018-10-14 RX ADMIN — MORPHINE SULFATE 1 MG: 2 INJECTION, SOLUTION INTRAMUSCULAR; INTRAVENOUS at 13:03

## 2018-10-14 RX ADMIN — Medication 3 ML: at 21:42

## 2018-10-14 RX ADMIN — METOCLOPRAMIDE 10 MG: 10 TABLET ORAL at 12:48

## 2018-10-14 RX ADMIN — CARVEDILOL 6.25 MG: 6.25 TABLET, FILM COATED ORAL at 21:42

## 2018-10-14 RX ADMIN — LISINOPRIL 5 MG: 5 TABLET ORAL at 07:45

## 2018-10-15 LAB
ALBUMIN SERPL-MCNC: 3.1 G/DL (ref 3.4–4.8)
ALBUMIN/GLOB SERPL: 1.3 G/DL (ref 1.1–1.8)
ALP SERPL-CCNC: 24 U/L (ref 38–126)
ALT SERPL W P-5'-P-CCNC: 40 U/L (ref 21–72)
ANION GAP SERPL CALCULATED.3IONS-SCNC: 8 MMOL/L (ref 5–15)
AST SERPL-CCNC: 24 U/L (ref 17–59)
BASOPHILS # BLD AUTO: 0 10*3/MM3 (ref 0–0.2)
BASOPHILS NFR BLD AUTO: 0 % (ref 0–2)
BILIRUB SERPL-MCNC: 1.5 MG/DL (ref 0.2–1.3)
BUN BLD-MCNC: 13 MG/DL (ref 7–21)
BUN/CREAT SERPL: 12 (ref 7–25)
CALCIUM SPEC-SCNC: 8.7 MG/DL (ref 8.4–10.2)
CHLORIDE SERPL-SCNC: 99 MMOL/L (ref 95–110)
CO2 SERPL-SCNC: 28 MMOL/L (ref 22–31)
CREAT BLD-MCNC: 1.08 MG/DL (ref 0.7–1.3)
DEPRECATED RDW RBC AUTO: 48.3 FL (ref 35.1–43.9)
EOSINOPHIL # BLD AUTO: 0 10*3/MM3 (ref 0–0.7)
EOSINOPHIL NFR BLD AUTO: 0 % (ref 0–7)
ERYTHROCYTE [DISTWIDTH] IN BLOOD BY AUTOMATED COUNT: 15 % (ref 11.5–14.5)
GFR SERPL CREATININE-BSD FRML MDRD: 75 ML/MIN/1.73 (ref 63–147)
GLOBULIN UR ELPH-MCNC: 2.4 GM/DL (ref 2.3–3.5)
GLUCOSE BLD-MCNC: 91 MG/DL (ref 60–100)
HCT VFR BLD AUTO: 46.3 % (ref 39–49)
HCT VFR BLD AUTO: 48 % (ref 39–49)
HCT VFR BLD AUTO: 48.3 % (ref 39–49)
HCT VFR BLD AUTO: 49.1 % (ref 39–49)
HGB BLD-MCNC: 15.9 G/DL (ref 13.7–17.3)
HGB BLD-MCNC: 16.2 G/DL (ref 13.7–17.3)
HGB BLD-MCNC: 16.3 G/DL (ref 13.7–17.3)
HGB BLD-MCNC: 16.5 G/DL (ref 13.7–17.3)
HOLD SPECIMEN: NORMAL
IMM GRANULOCYTES # BLD: 0.02 10*3/MM3 (ref 0–0.02)
IMM GRANULOCYTES NFR BLD: 0.2 % (ref 0–0.5)
LYMPHOCYTES # BLD AUTO: 0.92 10*3/MM3 (ref 0.6–4.2)
LYMPHOCYTES NFR BLD AUTO: 8.1 % (ref 10–50)
MCH RBC QN AUTO: 30.2 PG (ref 26.5–34)
MCHC RBC AUTO-ENTMCNC: 33.8 G/DL (ref 31.5–36.3)
MCV RBC AUTO: 89.4 FL (ref 80–98)
MONOCYTES # BLD AUTO: 0.76 10*3/MM3 (ref 0–0.9)
MONOCYTES NFR BLD AUTO: 6.7 % (ref 0–12)
NEUTROPHILS # BLD AUTO: 9.63 10*3/MM3 (ref 2–8.6)
NEUTROPHILS NFR BLD AUTO: 85 % (ref 37–80)
PLATELET # BLD AUTO: 215 10*3/MM3 (ref 150–450)
PMV BLD AUTO: 10.6 FL (ref 8–12)
POTASSIUM BLD-SCNC: 4.5 MMOL/L (ref 3.5–5.1)
PROT SERPL-MCNC: 5.5 G/DL (ref 6.3–8.6)
RBC # BLD AUTO: 5.37 10*6/MM3 (ref 4.37–5.74)
SODIUM BLD-SCNC: 135 MMOL/L (ref 137–145)
WBC NRBC COR # BLD: 11.33 10*3/MM3 (ref 3.2–9.8)

## 2018-10-15 PROCEDURE — 96361 HYDRATE IV INFUSION ADD-ON: CPT

## 2018-10-15 PROCEDURE — 25010000002 LEVOFLOXACIN PER 250 MG: Performed by: FAMILY MEDICINE

## 2018-10-15 PROCEDURE — 85025 COMPLETE CBC W/AUTO DIFF WBC: CPT | Performed by: NURSE PRACTITIONER

## 2018-10-15 PROCEDURE — 96376 TX/PRO/DX INJ SAME DRUG ADON: CPT

## 2018-10-15 PROCEDURE — G0378 HOSPITAL OBSERVATION PER HR: HCPCS

## 2018-10-15 PROCEDURE — 85018 HEMOGLOBIN: CPT | Performed by: FAMILY MEDICINE

## 2018-10-15 PROCEDURE — 96366 THER/PROPH/DIAG IV INF ADDON: CPT

## 2018-10-15 PROCEDURE — 80053 COMPREHEN METABOLIC PANEL: CPT | Performed by: NURSE PRACTITIONER

## 2018-10-15 PROCEDURE — 36415 COLL VENOUS BLD VENIPUNCTURE: CPT | Performed by: FAMILY MEDICINE

## 2018-10-15 PROCEDURE — 25010000002 MORPHINE PER 10 MG: Performed by: FAMILY MEDICINE

## 2018-10-15 PROCEDURE — 85014 HEMATOCRIT: CPT | Performed by: FAMILY MEDICINE

## 2018-10-15 RX ORDER — METOCLOPRAMIDE 10 MG/1
10 TABLET ORAL
Qty: 90 TABLET | Refills: 0 | Status: SHIPPED | OUTPATIENT
Start: 2018-10-15 | End: 2018-12-03 | Stop reason: HOSPADM

## 2018-10-15 RX ORDER — LEVOFLOXACIN 750 MG/1
750 TABLET ORAL DAILY
Qty: 7 TABLET | Refills: 0 | Status: SHIPPED | OUTPATIENT
Start: 2018-10-15 | End: 2018-10-17

## 2018-10-15 RX ORDER — METRONIDAZOLE 500 MG/1
500 TABLET ORAL 3 TIMES DAILY
Qty: 21 TABLET | Refills: 0 | Status: SHIPPED | OUTPATIENT
Start: 2018-10-15 | End: 2018-10-17

## 2018-10-15 RX ADMIN — LISINOPRIL 5 MG: 5 TABLET ORAL at 08:04

## 2018-10-15 RX ADMIN — PANTOPRAZOLE SODIUM 40 MG: 40 INJECTION, POWDER, FOR SOLUTION INTRAVENOUS at 05:50

## 2018-10-15 RX ADMIN — CLONAZEPAM 1 MG: 0.5 TABLET ORAL at 10:27

## 2018-10-15 RX ADMIN — METOCLOPRAMIDE 10 MG: 10 TABLET ORAL at 16:43

## 2018-10-15 RX ADMIN — SODIUM CHLORIDE 100 ML/HR: 9 INJECTION, SOLUTION INTRAVENOUS at 12:24

## 2018-10-15 RX ADMIN — SODIUM CHLORIDE 100 ML/HR: 9 INJECTION, SOLUTION INTRAVENOUS at 01:03

## 2018-10-15 RX ADMIN — CARVEDILOL 6.25 MG: 6.25 TABLET, FILM COATED ORAL at 20:29

## 2018-10-15 RX ADMIN — METRONIDAZOLE 500 MG: 500 INJECTION, SOLUTION INTRAVENOUS at 13:47

## 2018-10-15 RX ADMIN — METRONIDAZOLE 500 MG: 500 INJECTION, SOLUTION INTRAVENOUS at 05:50

## 2018-10-15 RX ADMIN — Medication 3 ML: at 20:30

## 2018-10-15 RX ADMIN — MAGNESIUM OXIDE TAB 400 MG (241.3 MG ELEMENTAL MG) 400 MG: 400 (241.3 MG) TAB at 08:04

## 2018-10-15 RX ADMIN — MAGNESIUM OXIDE TAB 400 MG (241.3 MG ELEMENTAL MG) 400 MG: 400 (241.3 MG) TAB at 20:29

## 2018-10-15 RX ADMIN — MORPHINE SULFATE 1 MG: 2 INJECTION, SOLUTION INTRAMUSCULAR; INTRAVENOUS at 16:41

## 2018-10-15 RX ADMIN — Medication 3 ML: at 08:05

## 2018-10-15 RX ADMIN — CARVEDILOL 6.25 MG: 6.25 TABLET, FILM COATED ORAL at 07:55

## 2018-10-15 RX ADMIN — METRONIDAZOLE 500 MG: 500 INJECTION, SOLUTION INTRAVENOUS at 20:29

## 2018-10-15 RX ADMIN — DULOXETINE HYDROCHLORIDE 60 MG: 60 CAPSULE, DELAYED RELEASE ORAL at 08:04

## 2018-10-15 RX ADMIN — METOCLOPRAMIDE 10 MG: 10 TABLET ORAL at 07:43

## 2018-10-15 RX ADMIN — METOCLOPRAMIDE 10 MG: 10 TABLET ORAL at 11:39

## 2018-10-15 RX ADMIN — MORPHINE SULFATE 1 MG: 2 INJECTION, SOLUTION INTRAMUSCULAR; INTRAVENOUS at 20:35

## 2018-10-15 RX ADMIN — MORPHINE SULFATE 1 MG: 2 INJECTION, SOLUTION INTRAMUSCULAR; INTRAVENOUS at 08:00

## 2018-10-15 RX ADMIN — MORPHINE SULFATE 1 MG: 2 INJECTION, SOLUTION INTRAMUSCULAR; INTRAVENOUS at 12:20

## 2018-10-15 RX ADMIN — LEVOFLOXACIN 750 MG: 5 INJECTION, SOLUTION INTRAVENOUS at 01:03

## 2018-10-16 LAB
ALBUMIN SERPL-MCNC: 3 G/DL (ref 3.4–4.8)
ALBUMIN/GLOB SERPL: 1.3 G/DL (ref 1.1–1.8)
ALP SERPL-CCNC: 21 U/L (ref 38–126)
ALT SERPL W P-5'-P-CCNC: 37 U/L (ref 21–72)
ANION GAP SERPL CALCULATED.3IONS-SCNC: 10 MMOL/L (ref 5–15)
AST SERPL-CCNC: 24 U/L (ref 17–59)
BASOPHILS # BLD AUTO: 0 10*3/MM3 (ref 0–0.2)
BASOPHILS NFR BLD AUTO: 0 % (ref 0–2)
BILIRUB SERPL-MCNC: 1.3 MG/DL (ref 0.2–1.3)
BUN BLD-MCNC: 12 MG/DL (ref 7–21)
BUN/CREAT SERPL: 11.2 (ref 7–25)
CALCIUM SPEC-SCNC: 8.2 MG/DL (ref 8.4–10.2)
CHLORIDE SERPL-SCNC: 100 MMOL/L (ref 95–110)
CO2 SERPL-SCNC: 25 MMOL/L (ref 22–31)
CREAT BLD-MCNC: 1.07 MG/DL (ref 0.7–1.3)
DEPRECATED RDW RBC AUTO: 48 FL (ref 35.1–43.9)
EOSINOPHIL # BLD AUTO: 0.01 10*3/MM3 (ref 0–0.7)
EOSINOPHIL NFR BLD AUTO: 0.1 % (ref 0–7)
ERYTHROCYTE [DISTWIDTH] IN BLOOD BY AUTOMATED COUNT: 14.9 % (ref 11.5–14.5)
GFR SERPL CREATININE-BSD FRML MDRD: 76 ML/MIN/1.73 (ref 63–147)
GLOBULIN UR ELPH-MCNC: 2.4 GM/DL (ref 2.3–3.5)
GLUCOSE BLD-MCNC: 82 MG/DL (ref 60–100)
HCT VFR BLD AUTO: 47.4 % (ref 39–49)
HEMOCCULT STL QL: NEGATIVE
HGB BLD-MCNC: 15.9 G/DL (ref 13.7–17.3)
HOLD SPECIMEN: NORMAL
IMM GRANULOCYTES # BLD: 0.04 10*3/MM3 (ref 0–0.02)
IMM GRANULOCYTES NFR BLD: 0.4 % (ref 0–0.5)
LYMPHOCYTES # BLD AUTO: 0.82 10*3/MM3 (ref 0.6–4.2)
LYMPHOCYTES NFR BLD AUTO: 7.8 % (ref 10–50)
MCH RBC QN AUTO: 30 PG (ref 26.5–34)
MCHC RBC AUTO-ENTMCNC: 33.5 G/DL (ref 31.5–36.3)
MCV RBC AUTO: 89.4 FL (ref 80–98)
MONOCYTES # BLD AUTO: 0.74 10*3/MM3 (ref 0–0.9)
MONOCYTES NFR BLD AUTO: 7 % (ref 0–12)
NEUTROPHILS # BLD AUTO: 8.93 10*3/MM3 (ref 2–8.6)
NEUTROPHILS NFR BLD AUTO: 84.7 % (ref 37–80)
PLATELET # BLD AUTO: 198 10*3/MM3 (ref 150–450)
PMV BLD AUTO: 10.1 FL (ref 8–12)
POTASSIUM BLD-SCNC: 4.4 MMOL/L (ref 3.5–5.1)
PROT SERPL-MCNC: 5.4 G/DL (ref 6.3–8.6)
RBC # BLD AUTO: 5.3 10*6/MM3 (ref 4.37–5.74)
SODIUM BLD-SCNC: 135 MMOL/L (ref 137–145)
WBC NRBC COR # BLD: 10.54 10*3/MM3 (ref 3.2–9.8)

## 2018-10-16 PROCEDURE — 99225 PR SBSQ OBSERVATION CARE/DAY 25 MINUTES: CPT | Performed by: INTERNAL MEDICINE

## 2018-10-16 PROCEDURE — 80053 COMPREHEN METABOLIC PANEL: CPT | Performed by: NURSE PRACTITIONER

## 2018-10-16 PROCEDURE — 85025 COMPLETE CBC W/AUTO DIFF WBC: CPT | Performed by: NURSE PRACTITIONER

## 2018-10-16 PROCEDURE — 25010000002 LEVOFLOXACIN PER 250 MG: Performed by: FAMILY MEDICINE

## 2018-10-16 PROCEDURE — G0378 HOSPITAL OBSERVATION PER HR: HCPCS

## 2018-10-16 PROCEDURE — 82272 OCCULT BLD FECES 1-3 TESTS: CPT | Performed by: NURSE PRACTITIONER

## 2018-10-16 PROCEDURE — 25010000002 MORPHINE PER 10 MG: Performed by: FAMILY MEDICINE

## 2018-10-16 PROCEDURE — 96361 HYDRATE IV INFUSION ADD-ON: CPT

## 2018-10-16 PROCEDURE — 96376 TX/PRO/DX INJ SAME DRUG ADON: CPT

## 2018-10-16 PROCEDURE — 96366 THER/PROPH/DIAG IV INF ADDON: CPT

## 2018-10-16 RX ADMIN — LEVOFLOXACIN 750 MG: 5 INJECTION, SOLUTION INTRAVENOUS at 00:04

## 2018-10-16 RX ADMIN — MORPHINE SULFATE 1 MG: 2 INJECTION, SOLUTION INTRAMUSCULAR; INTRAVENOUS at 08:15

## 2018-10-16 RX ADMIN — MAGNESIUM OXIDE TAB 400 MG (241.3 MG ELEMENTAL MG) 400 MG: 400 (241.3 MG) TAB at 20:34

## 2018-10-16 RX ADMIN — MORPHINE SULFATE 1 MG: 2 INJECTION, SOLUTION INTRAMUSCULAR; INTRAVENOUS at 12:50

## 2018-10-16 RX ADMIN — CARVEDILOL 6.25 MG: 6.25 TABLET, FILM COATED ORAL at 20:34

## 2018-10-16 RX ADMIN — METRONIDAZOLE 500 MG: 500 INJECTION, SOLUTION INTRAVENOUS at 20:34

## 2018-10-16 RX ADMIN — MORPHINE SULFATE 1 MG: 2 INJECTION, SOLUTION INTRAMUSCULAR; INTRAVENOUS at 16:30

## 2018-10-16 RX ADMIN — CLONAZEPAM 1 MG: 0.5 TABLET ORAL at 23:37

## 2018-10-16 RX ADMIN — PANTOPRAZOLE SODIUM 40 MG: 40 INJECTION, POWDER, FOR SOLUTION INTRAVENOUS at 05:37

## 2018-10-16 RX ADMIN — METRONIDAZOLE 500 MG: 500 INJECTION, SOLUTION INTRAVENOUS at 12:50

## 2018-10-16 RX ADMIN — METOCLOPRAMIDE 10 MG: 10 TABLET ORAL at 16:30

## 2018-10-16 RX ADMIN — LEVOFLOXACIN 750 MG: 5 INJECTION, SOLUTION INTRAVENOUS at 23:38

## 2018-10-16 RX ADMIN — LISINOPRIL 5 MG: 5 TABLET ORAL at 08:15

## 2018-10-16 RX ADMIN — MAGNESIUM OXIDE TAB 400 MG (241.3 MG ELEMENTAL MG) 400 MG: 400 (241.3 MG) TAB at 08:15

## 2018-10-16 RX ADMIN — METOCLOPRAMIDE 10 MG: 10 TABLET ORAL at 08:15

## 2018-10-16 RX ADMIN — Medication 3 ML: at 20:34

## 2018-10-16 RX ADMIN — CARVEDILOL 6.25 MG: 6.25 TABLET, FILM COATED ORAL at 08:15

## 2018-10-16 RX ADMIN — MORPHINE SULFATE 1 MG: 2 INJECTION, SOLUTION INTRAMUSCULAR; INTRAVENOUS at 20:34

## 2018-10-16 RX ADMIN — MORPHINE SULFATE 1 MG: 2 INJECTION, SOLUTION INTRAMUSCULAR; INTRAVENOUS at 23:58

## 2018-10-16 RX ADMIN — MORPHINE SULFATE 1 MG: 2 INJECTION, SOLUTION INTRAMUSCULAR; INTRAVENOUS at 03:29

## 2018-10-16 RX ADMIN — METRONIDAZOLE 500 MG: 500 INJECTION, SOLUTION INTRAVENOUS at 05:37

## 2018-10-16 RX ADMIN — Medication 3 ML: at 08:16

## 2018-10-16 RX ADMIN — METOCLOPRAMIDE 10 MG: 10 TABLET ORAL at 10:47

## 2018-10-16 RX ADMIN — SODIUM CHLORIDE 100 ML/HR: 9 INJECTION, SOLUTION INTRAVENOUS at 12:50

## 2018-10-17 VITALS
SYSTOLIC BLOOD PRESSURE: 139 MMHG | BODY MASS INDEX: 29.82 KG/M2 | WEIGHT: 220.2 LBS | DIASTOLIC BLOOD PRESSURE: 85 MMHG | OXYGEN SATURATION: 99 % | TEMPERATURE: 98.7 F | RESPIRATION RATE: 16 BRPM | HEART RATE: 82 BPM | HEIGHT: 72 IN

## 2018-10-17 LAB
ALBUMIN SERPL-MCNC: 3.3 G/DL (ref 3.4–4.8)
ALBUMIN/GLOB SERPL: 1.3 G/DL (ref 1.1–1.8)
ALP SERPL-CCNC: 23 U/L (ref 38–126)
ALT SERPL W P-5'-P-CCNC: 41 U/L (ref 21–72)
ANION GAP SERPL CALCULATED.3IONS-SCNC: 7 MMOL/L (ref 5–15)
AST SERPL-CCNC: 25 U/L (ref 17–59)
BASOPHILS # BLD AUTO: 0 10*3/MM3 (ref 0–0.2)
BASOPHILS NFR BLD AUTO: 0 % (ref 0–2)
BILIRUB SERPL-MCNC: 1 MG/DL (ref 0.2–1.3)
BUN BLD-MCNC: 12 MG/DL (ref 7–21)
BUN/CREAT SERPL: 11.7 (ref 7–25)
CALCIUM SPEC-SCNC: 8.6 MG/DL (ref 8.4–10.2)
CHLORIDE SERPL-SCNC: 102 MMOL/L (ref 95–110)
CO2 SERPL-SCNC: 28 MMOL/L (ref 22–31)
CREAT BLD-MCNC: 1.03 MG/DL (ref 0.7–1.3)
DEPRECATED RDW RBC AUTO: 47.9 FL (ref 35.1–43.9)
EOSINOPHIL # BLD AUTO: 0.01 10*3/MM3 (ref 0–0.7)
EOSINOPHIL NFR BLD AUTO: 0.1 % (ref 0–7)
ERYTHROCYTE [DISTWIDTH] IN BLOOD BY AUTOMATED COUNT: 14.7 % (ref 11.5–14.5)
GFR SERPL CREATININE-BSD FRML MDRD: 80 ML/MIN/1.73 (ref 63–147)
GLOBULIN UR ELPH-MCNC: 2.5 GM/DL (ref 2.3–3.5)
GLUCOSE BLD-MCNC: 98 MG/DL (ref 60–100)
HCT VFR BLD AUTO: 49.2 % (ref 39–49)
HGB BLD-MCNC: 16.6 G/DL (ref 13.7–17.3)
IMM GRANULOCYTES # BLD: 0.03 10*3/MM3 (ref 0–0.02)
IMM GRANULOCYTES NFR BLD: 0.3 % (ref 0–0.5)
LYMPHOCYTES # BLD AUTO: 0.87 10*3/MM3 (ref 0.6–4.2)
LYMPHOCYTES NFR BLD AUTO: 8 % (ref 10–50)
MCH RBC QN AUTO: 30 PG (ref 26.5–34)
MCHC RBC AUTO-ENTMCNC: 33.7 G/DL (ref 31.5–36.3)
MCV RBC AUTO: 89 FL (ref 80–98)
MONOCYTES # BLD AUTO: 0.68 10*3/MM3 (ref 0–0.9)
MONOCYTES NFR BLD AUTO: 6.3 % (ref 0–12)
NEUTROPHILS # BLD AUTO: 9.22 10*3/MM3 (ref 2–8.6)
NEUTROPHILS NFR BLD AUTO: 85.3 % (ref 37–80)
PLATELET # BLD AUTO: 200 10*3/MM3 (ref 150–450)
PMV BLD AUTO: 10.2 FL (ref 8–12)
POTASSIUM BLD-SCNC: 4.6 MMOL/L (ref 3.5–5.1)
PROT SERPL-MCNC: 5.8 G/DL (ref 6.3–8.6)
RBC # BLD AUTO: 5.53 10*6/MM3 (ref 4.37–5.74)
SODIUM BLD-SCNC: 137 MMOL/L (ref 137–145)
WBC NRBC COR # BLD: 10.81 10*3/MM3 (ref 3.2–9.8)

## 2018-10-17 PROCEDURE — 96376 TX/PRO/DX INJ SAME DRUG ADON: CPT

## 2018-10-17 PROCEDURE — 96366 THER/PROPH/DIAG IV INF ADDON: CPT

## 2018-10-17 PROCEDURE — 99224 PR SBSQ OBSERVATION CARE/DAY 15 MINUTES: CPT | Performed by: INTERNAL MEDICINE

## 2018-10-17 PROCEDURE — 85025 COMPLETE CBC W/AUTO DIFF WBC: CPT | Performed by: NURSE PRACTITIONER

## 2018-10-17 PROCEDURE — 96361 HYDRATE IV INFUSION ADD-ON: CPT

## 2018-10-17 PROCEDURE — 80053 COMPREHEN METABOLIC PANEL: CPT | Performed by: NURSE PRACTITIONER

## 2018-10-17 PROCEDURE — G0378 HOSPITAL OBSERVATION PER HR: HCPCS

## 2018-10-17 PROCEDURE — 25010000002 MORPHINE PER 10 MG: Performed by: FAMILY MEDICINE

## 2018-10-17 RX ORDER — METRONIDAZOLE 500 MG/1
500 TABLET ORAL 3 TIMES DAILY
Qty: 30 TABLET | Refills: 0 | Status: SHIPPED | OUTPATIENT
Start: 2018-10-17 | End: 2018-10-27

## 2018-10-17 RX ORDER — LEVOFLOXACIN 750 MG/1
750 TABLET ORAL DAILY
Qty: 10 TABLET | Refills: 0 | Status: SHIPPED | OUTPATIENT
Start: 2018-10-17 | End: 2018-10-24

## 2018-10-17 RX ADMIN — METOCLOPRAMIDE 10 MG: 10 TABLET ORAL at 11:21

## 2018-10-17 RX ADMIN — Medication 3 ML: at 09:17

## 2018-10-17 RX ADMIN — METRONIDAZOLE 500 MG: 500 INJECTION, SOLUTION INTRAVENOUS at 04:17

## 2018-10-17 RX ADMIN — LISINOPRIL 5 MG: 5 TABLET ORAL at 08:54

## 2018-10-17 RX ADMIN — MORPHINE SULFATE 1 MG: 2 INJECTION, SOLUTION INTRAMUSCULAR; INTRAVENOUS at 09:15

## 2018-10-17 RX ADMIN — MORPHINE SULFATE 1 MG: 2 INJECTION, SOLUTION INTRAMUSCULAR; INTRAVENOUS at 04:16

## 2018-10-17 RX ADMIN — PANTOPRAZOLE SODIUM 40 MG: 40 INJECTION, POWDER, FOR SOLUTION INTRAVENOUS at 06:05

## 2018-10-17 RX ADMIN — CARVEDILOL 6.25 MG: 6.25 TABLET, FILM COATED ORAL at 08:53

## 2018-10-17 RX ADMIN — MAGNESIUM OXIDE TAB 400 MG (241.3 MG ELEMENTAL MG) 400 MG: 400 (241.3 MG) TAB at 08:53

## 2018-10-17 RX ADMIN — METOCLOPRAMIDE 10 MG: 10 TABLET ORAL at 08:53

## 2018-10-18 ENCOUNTER — READMISSION MANAGEMENT (OUTPATIENT)
Dept: CALL CENTER | Facility: HOSPITAL | Age: 43
End: 2018-10-18

## 2018-10-18 NOTE — OUTREACH NOTE
Prep Survey      Responses   Facility patient discharged from?  [place]   Is patient eligible?  Yes   Discharge diagnosis  Hematemesis with nausea   Does the patient have one of the following disease processes/diagnoses(primary or secondary)?  Other   Does the patient have Home health ordered?  No   Is there a DME ordered?  No   Prep survey completed?  Yes          Genia Castro RN

## 2018-10-20 ENCOUNTER — READMISSION MANAGEMENT (OUTPATIENT)
Dept: CALL CENTER | Facility: HOSPITAL | Age: 43
End: 2018-10-20

## 2018-10-20 NOTE — OUTREACH NOTE
Medical Week 1 Survey      Responses   Facility patient discharged from?  [place]   Does the patient have one of the following disease processes/diagnoses(primary or secondary)?  Other   Is there a successful TCM telephone encounter documented?  No   Week 1 attempt successful?  No   Unsuccessful attempts  Attempt 1          Eulalia Ruelas RN

## 2018-10-21 ENCOUNTER — READMISSION MANAGEMENT (OUTPATIENT)
Dept: CALL CENTER | Facility: HOSPITAL | Age: 43
End: 2018-10-21

## 2018-10-21 NOTE — OUTREACH NOTE
Medical Week 1 Survey      Responses   Facility patient discharged from?  [place]   Does the patient have one of the following disease processes/diagnoses(primary or secondary)?  Other   Is there a successful TCM telephone encounter documented?  No   Week 1 attempt successful?  Yes   Call start time  1543   Call end time  1548   Is patient permission given to speak with other caregiver?  Yes   List who call center can speak with  speak with the patient   Meds reviewed with patient/caregiver?  Yes   Is the patient having any side effects they believe may be caused by any medication additions or changes?  No   Does the patient have all medications ordered at discharge?  Yes   Is the patient taking all medications as directed (includes completed medication regime)?  Yes   Comments regarding appointments   10/26 review his appointment and he plans on keeping them   Does the patient have an appointment with their PCP within 7 days of discharge?  Yes   Has the patient kept scheduled appointments due by today?  N/A   Has home health visited the patient within 72 hours of discharge?  N/A   Did the patient receive a copy of their discharge instructions?  Yes   Nursing interventions  Educated on MyChart   What is the patient's perception of their health status since discharge?  Improving   Is the patient/caregiver able to teach back signs and symptoms related to disease process for when to call PCP?  Yes   Is the patient/caregiver able to teach back signs and symptoms related to disease process for when to call 911?  Yes   Is the patient/caregiver able to teach back the hierarchy of who to call/visit for symptoms/problems? PCP, Specialist, Home health nurse, Urgent Care, ED, 911  Yes   Week 1 call completed?  Yes   Wrap up additional comments  feels like he is improving          Eulalia Ruelas RN

## 2018-10-22 ENCOUNTER — OFFICE VISIT (OUTPATIENT)
Dept: GASTROENTEROLOGY | Facility: CLINIC | Age: 43
End: 2018-10-22

## 2018-10-22 VITALS
BODY MASS INDEX: 29.66 KG/M2 | SYSTOLIC BLOOD PRESSURE: 122 MMHG | WEIGHT: 219 LBS | HEART RATE: 87 BPM | OXYGEN SATURATION: 98 % | HEIGHT: 72 IN | DIASTOLIC BLOOD PRESSURE: 88 MMHG

## 2018-10-22 DIAGNOSIS — K57.30 DIVERTICULOSIS OF LARGE INTESTINE WITHOUT HEMORRHAGE: ICD-10-CM

## 2018-10-22 DIAGNOSIS — K21.00 GASTROESOPHAGEAL REFLUX DISEASE WITH ESOPHAGITIS: ICD-10-CM

## 2018-10-22 DIAGNOSIS — R10.10 PAIN OF UPPER ABDOMEN: Primary | ICD-10-CM

## 2018-10-22 DIAGNOSIS — R93.429 ABNORMAL FINDING ON DIAGNOSTIC IMAGING OF KIDNEY: ICD-10-CM

## 2018-10-22 PROCEDURE — 99213 OFFICE O/P EST LOW 20 MIN: CPT | Performed by: PHYSICIAN ASSISTANT

## 2018-10-22 NOTE — PROGRESS NOTES
Chief Complaint   Patient presents with   • Diverticulitis   • Abdominal Pain   • Weight Loss       ENDO PROCEDURE ORDERED:    Subjective    Stevenson Chilel is a 43 y.o. male. he is here today for follow-up.    History of Present Illness    The patient is seen on hospital followup for abdominal pain, weight loss, questionable recent diverticulitis. Last seen 08/20/2018. The patient was doing better on Aprezo for chronic diverticular disease. He states that his bowel movements have been fairly regular. He currently denies abdominal pain. He is on over-the-counter Nexium, takes Reglan if needed. He denies nausea, vomiting or dysphagia. Weight is up slightly since last visit. Last EGD/colonoscopy 05/30/2018 showed esophagitis, gastritis, diverticulosis. He is apparently still on Levaquin and Flagyl following his hospitalization.    The patient states he had gone on vacation, eating a great deal of trail mix and drinking at least 1-1/2 glasses of wine every night. He states he had a deep pain similar to what he has had with diverticulitis, he states one morning he got up and tried to have a bowel movement, got very diaphoretic and sweaty, he had a lot of pain but otherwise had a normal bowel movement, had to lay down for a few hours, was fine for several days then he started to have again the deep pain with some vomiting. He felt this was similar to when he has had diverticulitis, so he called an ambulance. It is not clear, but the first time he went to the ER with nausea and vomiting 10/11/2018, states he was given Flagyl and Levaquin at that time. CMP showed glucose 124, total bilirubin 1.4, otherwise normal. He had an abnormal urinalysis, CBC showed a white count of 11.75. Urine drug screen is positive for opiates and THC. A CT scan of the abdomen and pelvis without contrast showed bilateral perinephric stranding, diverticulosis and degeneration of the spine.    The patient was then later admitted with nausea,  vomiting and hematemesis on 10/13/2018 through 10/17/2018. He was evaluated by Dr. Segovia, Dr. Jaiyeola, Dr. Ureña. Apparently he also saw Dr. Lala. The CT scan of the abdomen and pelvis without contrast again showed bilateral perinephric stranding, diverticulosis with degeneration of the spine. Urinalysis showed some trace abnormalities and red cells. He had a normal CRP, sedimentation rate, lipase, alcohol level. Fecal occult blood was negative. CBC showed white count of 10.54, hemoglobin 15.9, hematocrit 48.4, platelets 198,000. CMP showed a protein 5.8, albumin 3.3, otherwise normal.     ASSESSMENT/PLAN: Patient with recent hospitalization for abdominal pain of indeterminate significance or etiology. It is not clear to me if this was diverticulitis or related to something renal, does not appear that his perinephric stranding was addressed, urinalysis was limited, but I suggested urological consultation with Dr. Stallings to evaluate if his discomfort could have been related to renal disease. He is encouraged to totally abstain from alcohol given his history of pancreatic disease. I think he can continue on the Aprezo for now for his diverticular disease, he has a followup with Dr. Lala, will plan followup in 4-6 weeks once he complaints his antibiotics. Will pursue further pending clinical course and the results of the above.      The following portions of the patient's history were reviewed and updated as appropriate:   Past Medical History:   Diagnosis Date   • A-fib (CMS/[facility])    • Anxiety    • Arrhythmia    • Arthritis     rt shoulder    • Atrial flutter (CMS/[facility]) 07/2016    EP  ablation x2    • Depression    • Diverticulitis    • ETOH abuse    • GERD (gastroesophageal reflux disease)    • Kidney stone    • Pancreatitis    • Sleep apnea     have used a machine in the past   • Tendonitis     rt shoulder     Past Surgical History:   Procedure Laterality Date   • CARDIAC ELECTROPHYSIOLOGY PROCEDURE N/A 5/2/2017     "Procedure: Ablation atrial flutter;  Surgeon: Ramirez Reza MD;  Location:  [facility];  Service:    • CARDIAC ELECTROPHYSIOLOGY PROCEDURE N/A 9/5/2017    Procedure: Ablation atrial fibrillation PVA ;  Surgeon: Ramirez Reza MD;  Location:  [facility];  Service:    • COLONOSCOPY     • COLONOSCOPY N/A 5/30/2018    Procedure: COLONOSCOPY;  Surgeon: Edwin Medina MD;  Location: [facility] ENDOSCOPY;  Service: Gastroenterology   • ENDOSCOPY N/A 3/30/2018    Procedure: ESOPHAGOGASTRODUODENOSCOPY;  Surgeon: Edwin Medina MD;  Location: [facility] ENDOSCOPY;  Service: Gastroenterology   • ENDOSCOPY N/A 5/30/2018    Procedure: ESOPHAGOGASTRODUODENOSCOPY;  Surgeon: Edwin Medina MD;  Location: [facility] ENDOSCOPY;  Service: Gastroenterology   • SHOULDER SURGERY Right 2014   • TONSILLECTOMY  12/2015   • UPPER GASTROINTESTINAL ENDOSCOPY  03/30/2018   • UPPER GASTROINTESTINAL ENDOSCOPY     • UPPER GASTROINTESTINAL ENDOSCOPY  05/30/2018     Family History   Problem Relation Age of Onset   • Heart disease Father    • Depression Father    • Cancer Maternal Grandmother         lung   • Cancer Paternal Grandmother         pancreatic   • Heart disease Paternal Grandfather        Allergies   Allergen Reactions   • Contrast Dye Anaphylaxis     ANAPHYLAXIS   • Iodinated Diagnostic Agents Anaphylaxis     Social History     Social History   • Marital status:      Social History Main Topics   • Smoking status: Never Smoker   • Smokeless tobacco: Never Used   • Alcohol use No      Comment: quit 3/17/18   • Drug use: Yes     Frequency: 7.0 times per week     Types: Marijuana      Comment: states he uses \"a little\" every day   • Sexual activity: Defer     Other Topics Concern   • Not on file     Social History Narrative    Pt states has not had drink of any alcohol in 3 weeks as of 4/5/2018       Current Outpatient Prescriptions:   •  carvedilol (COREG) 6.25 MG tablet, TAKE ONE TABLET BY MOUTH EVERY " "12 HOURS, Disp: 60 tablet, Rfl: 6  •  clonazePAM (KlonoPIN) 1 MG tablet, Take 1 mg by mouth 2 (Two) Times a Day As Needed for Anxiety., Disp: , Rfl:   •  DULoxetine (CYMBALTA) 60 MG capsule, Take 60 mg by mouth Daily., Disp: , Rfl: 2  •  Esomeprazole Magnesium (NEXIUM 24HR) 20 MG tablet delayed-release, Take 40 mg by mouth Every Morning Before Breakfast. (Patient taking differently: Take 40 mg by mouth Daily As Needed.), Disp: 60 tablet, Rfl: 0  •  levoFLOXacin (LEVAQUIN) 750 MG tablet, Take 1 tablet by mouth Daily for 7 days., Disp: 10 tablet, Rfl: 0  •  lisinopril (PRINIVIL,ZESTRIL) 5 MG tablet, Take 5 mg by mouth Daily., Disp: , Rfl:   •  magnesium oxide (MAGOX) 400 (241.3 MG) MG tablet tablet, Take 400 mg by mouth 2 (Two) Times a Day., Disp: , Rfl:   •  mesalamine (APRISO) 0.375 g 24 hr capsule, Take 4 capsules by mouth Daily., Disp: 120 capsule, Rfl: 2  •  metoclopramide (REGLAN) 10 MG tablet, Take 1 tablet by mouth 3 (Three) Times a Day Before Meals., Disp: 90 tablet, Rfl: 0  •  metroNIDAZOLE (FLAGYL) 500 MG tablet, Take 1 tablet by mouth 3 (Three) Times a Day for 10 days., Disp: 30 tablet, Rfl: 0  •  Probiotic Product (PROBIOTIC-10 PO), Take  by mouth., Disp: , Rfl:   •  RANITIDINE HCL PO, Take  by mouth., Disp: , Rfl:   •  tiZANidine (ZANAFLEX) 4 MG tablet, Take 4 mg by mouth At Night As Needed for Muscle Spasms., Disp: , Rfl:   Review of Systems  Review of Systems       Objective    /88   Pulse 87   Ht 182.9 cm (72\")   Wt 99.3 kg (219 lb)   SpO2 98%   BMI 29.70 kg/m²   Physical Exam   Constitutional: He is oriented to person, place, and time. He appears well-developed and well-nourished. No distress.   HENT:   Head: Normocephalic and atraumatic.   Eyes: Pupils are equal, round, and reactive to light. EOM are normal.   Neck: Normal range of motion.   Cardiovascular: Normal rate, regular rhythm and normal heart sounds.    Pulmonary/Chest: Effort normal and breath sounds normal.   Abdominal: Soft. " Bowel sounds are normal. He exhibits no shifting dullness, no distension, no abdominal bruit, no ascites and no mass. There is no hepatosplenomegaly. There is tenderness. There is no rigidity, no rebound, no guarding and no CVA tenderness. No hernia. Hernia confirmed negative in the ventral area.   Mild diffuse   Musculoskeletal: Normal range of motion.   Neurological: He is alert and oriented to person, place, and time.   Skin: Skin is warm and dry.   Psychiatric: He has a normal mood and affect. His behavior is normal. Judgment and thought content normal.   Nursing note and vitals reviewed.    Assessment/Plan      1. Pain of upper abdomen    2. Diverticulosis of large intestine without hemorrhage    3. Gastroesophageal reflux disease with esophagitis    4. Abnormal finding on diagnostic imaging of kidney    .   Stevenson was seen today for diverticulitis, abdominal pain and weight loss.    Diagnoses and all orders for this visit:    Pain of upper abdomen    Diverticulosis of large intestine without hemorrhage    Gastroesophageal reflux disease with esophagitis    Abnormal finding on diagnostic imaging of kidney  -     Ambulatory Referral to Urology        Orders placed during this encounter include:  Orders Placed This Encounter   Procedures   • Ambulatory Referral to Urology     Referral Priority:   Routine     Referral Type:   Consultation     Referral Reason:   Specialty Services Required     Referred to Provider:   ManchesterNitin MD     Requested Specialty:   Urology     Number of Visits Requested:   1       Medications prescribed:  No orders of the defined types were placed in this encounter.      Requested Prescriptions      No prescriptions requested or ordered in this encounter       Review and/or summary of lab tests, radiology, procedures, medications. Review and summary of old records and obtaining of history. The risks and benefits of my recommendations, as well as other treatment options were discussed  with the patient today. Questions were answered.    Follow-up: Return in about 6 weeks (around 12/3/2018), or if symptoms worsen or fail to improve.     * Surgery not found *      This document has been electronically signed by Rene Saenz PA-C on October 22, 2018 6:14 PM      Results for orders placed or performed during the hospital encounter of 10/13/18   Gold Top - SST   Result Value Ref Range    Extra Tube Hold for add-ons.    Gold Top - SST   Result Value Ref Range    Extra Tube Hold for add-ons.    Urinalysis, Microscopic Only - Urine, Clean Catch   Result Value Ref Range    RBC, UA 3-5 (A) None Seen /HPF    WBC, UA 3-5 None Seen, 0-2, 3-5 /HPF    Bacteria, UA Trace (A) None Seen /HPF    Squamous Epithelial Cells, UA 3-5 (A) None Seen, 0-2 /HPF    Hyaline Casts, UA None Seen None Seen /LPF    Methodology Automated Microscopy    Urinalysis With Microscopic If Indicated (No Culture) - Urine, Clean Catch   Result Value Ref Range    Color, UA Amelia Yellow, Straw, Dark Yellow, Amelia    Appearance, UA Clear Clear    pH, UA 7.0 5.0 - 9.0    Specific Gravity, UA 1.020 1.003 - 1.030    Glucose, UA Negative Negative    Ketones, UA 40 mg/dL (2+) (A) Negative    Bilirubin, UA Negative Negative    Blood, UA Negative Negative    Protein,  mg/dL (2+) (A) Negative    Leuk Esterase, UA Negative Negative    Nitrite, UA Negative Negative    Urobilinogen, UA 1.0 E.U./dL 0.2 - 1.0 E.U./dL   CBC Auto Differential   Result Value Ref Range    WBC 10.81 (H) 3.20 - 9.80 10*3/mm3    RBC 5.53 4.37 - 5.74 10*6/mm3    Hemoglobin 16.6 13.7 - 17.3 g/dL    Hematocrit 49.2 (H) 39.0 - 49.0 %    MCV 89.0 80.0 - 98.0 fL    MCH 30.0 26.5 - 34.0 pg    MCHC 33.7 31.5 - 36.3 g/dL    RDW 14.7 (H) 11.5 - 14.5 %    RDW-SD 47.9 (H) 35.1 - 43.9 fl    MPV 10.2 8.0 - 12.0 fL    Platelets 200 150 - 450 10*3/mm3    Neutrophil % 85.3 (H) 37.0 - 80.0 %    Lymphocyte % 8.0 (L) 10.0 - 50.0 %    Monocyte % 6.3 0.0 - 12.0 %    Eosinophil % 0.1 0.0 -  7.0 %    Basophil % 0.0 0.0 - 2.0 %    Immature Grans % 0.3 0.0 - 0.5 %    Neutrophils, Absolute 9.22 (H) 2.00 - 8.60 10*3/mm3    Lymphocytes, Absolute 0.87 0.60 - 4.20 10*3/mm3    Monocytes, Absolute 0.68 0.00 - 0.90 10*3/mm3    Eosinophils, Absolute 0.01 0.00 - 0.70 10*3/mm3    Basophils, Absolute 0.00 0.00 - 0.20 10*3/mm3    Immature Grans, Absolute 0.03 (H) 0.00 - 0.02 10*3/mm3   CBC Auto Differential   Result Value Ref Range    WBC 10.54 (H) 3.20 - 9.80 10*3/mm3    RBC 5.30 4.37 - 5.74 10*6/mm3    Hemoglobin 15.9 13.7 - 17.3 g/dL    Hematocrit 47.4 39.0 - 49.0 %    MCV 89.4 80.0 - 98.0 fL    MCH 30.0 26.5 - 34.0 pg    MCHC 33.5 31.5 - 36.3 g/dL    RDW 14.9 (H) 11.5 - 14.5 %    RDW-SD 48.0 (H) 35.1 - 43.9 fl    MPV 10.1 8.0 - 12.0 fL    Platelets 198 150 - 450 10*3/mm3    Neutrophil % 84.7 (H) 37.0 - 80.0 %    Lymphocyte % 7.8 (L) 10.0 - 50.0 %    Monocyte % 7.0 0.0 - 12.0 %    Eosinophil % 0.1 0.0 - 7.0 %    Basophil % 0.0 0.0 - 2.0 %    Immature Grans % 0.4 0.0 - 0.5 %    Neutrophils, Absolute 8.93 (H) 2.00 - 8.60 10*3/mm3    Lymphocytes, Absolute 0.82 0.60 - 4.20 10*3/mm3    Monocytes, Absolute 0.74 0.00 - 0.90 10*3/mm3    Eosinophils, Absolute 0.01 0.00 - 0.70 10*3/mm3    Basophils, Absolute 0.00 0.00 - 0.20 10*3/mm3    Immature Grans, Absolute 0.04 (H) 0.00 - 0.02 10*3/mm3   CBC Auto Differential   Result Value Ref Range    WBC 11.33 (H) 3.20 - 9.80 10*3/mm3    RBC 5.37 4.37 - 5.74 10*6/mm3    Hemoglobin 16.2 13.7 - 17.3 g/dL    Hematocrit 48.0 39.0 - 49.0 %    MCV 89.4 80.0 - 98.0 fL    MCH 30.2 26.5 - 34.0 pg    MCHC 33.8 31.5 - 36.3 g/dL    RDW 15.0 (H) 11.5 - 14.5 %    RDW-SD 48.3 (H) 35.1 - 43.9 fl    MPV 10.6 8.0 - 12.0 fL    Platelets 215 150 - 450 10*3/mm3    Neutrophil % 85.0 (H) 37.0 - 80.0 %    Lymphocyte % 8.1 (L) 10.0 - 50.0 %    Monocyte % 6.7 0.0 - 12.0 %    Eosinophil % 0.0 0.0 - 7.0 %    Basophil % 0.0 0.0 - 2.0 %    Immature Grans % 0.2 0.0 - 0.5 %    Neutrophils, Absolute 9.63 (H) 2.00 -  8.60 10*3/mm3    Lymphocytes, Absolute 0.92 0.60 - 4.20 10*3/mm3    Monocytes, Absolute 0.76 0.00 - 0.90 10*3/mm3    Eosinophils, Absolute 0.00 0.00 - 0.70 10*3/mm3    Basophils, Absolute 0.00 0.00 - 0.20 10*3/mm3    Immature Grans, Absolute 0.02 0.00 - 0.02 10*3/mm3   CBC Auto Differential   Result Value Ref Range    WBC 12.06 (H) 3.20 - 9.80 10*3/mm3    RBC 5.36 4.37 - 5.74 10*6/mm3    Hemoglobin 16.2 13.7 - 17.3 g/dL    Hematocrit 47.3 39.0 - 49.0 %    MCV 88.2 80.0 - 98.0 fL    MCH 30.2 26.5 - 34.0 pg    MCHC 34.2 31.5 - 36.3 g/dL    RDW 14.6 (H) 11.5 - 14.5 %    RDW-SD 46.8 (H) 35.1 - 43.9 fl    MPV 10.2 8.0 - 12.0 fL    Platelets 231 150 - 450 10*3/mm3    Neutrophil % 86.5 (H) 37.0 - 80.0 %    Lymphocyte % 5.8 (L) 10.0 - 50.0 %    Monocyte % 7.5 0.0 - 12.0 %    Eosinophil % 0.0 0.0 - 7.0 %    Basophil % 0.0 0.0 - 2.0 %    Immature Grans % 0.2 0.0 - 0.5 %    Neutrophils, Absolute 10.43 (H) 2.00 - 8.60 10*3/mm3    Lymphocytes, Absolute 0.70 0.60 - 4.20 10*3/mm3    Monocytes, Absolute 0.90 0.00 - 0.90 10*3/mm3    Eosinophils, Absolute 0.00 0.00 - 0.70 10*3/mm3    Basophils, Absolute 0.00 0.00 - 0.20 10*3/mm3    Immature Grans, Absolute 0.03 (H) 0.00 - 0.02 10*3/mm3     *Note: Due to a large number of results and/or encounters for the requested time period, some results have not been displayed. A complete set of results can be found in Results Review.       Some portions of this note have been dictated using voice recognition software and may contain errors and/or omissions.

## 2018-10-22 NOTE — PATIENT INSTRUCTIONS

## 2018-10-30 ENCOUNTER — READMISSION MANAGEMENT (OUTPATIENT)
Dept: CALL CENTER | Facility: HOSPITAL | Age: 43
End: 2018-10-30

## 2018-10-30 NOTE — OUTREACH NOTE
Medical Week 2 Survey      Responses   Facility patient discharged from?  [place]   Does the patient have one of the following disease processes/diagnoses(primary or secondary)?  Other   Week 2 attempt successful?  No   Unsuccessful attempts  Attempt 1          Deepa Day RN

## 2018-10-31 ENCOUNTER — READMISSION MANAGEMENT (OUTPATIENT)
Dept: CALL CENTER | Facility: HOSPITAL | Age: 43
End: 2018-10-31

## 2018-10-31 NOTE — OUTREACH NOTE
Medical Week 2 Survey      Responses   Facility patient discharged from?  [place]   Does the patient have one of the following disease processes/diagnoses(primary or secondary)?  Other   Week 2 attempt successful?  Yes   Call start time  1429   Call end time  1431   Meds reviewed with patient/caregiver?  Yes   Is the patient taking all medications as directed (includes completed medication regime)?  Yes   Comments regarding appointments  Has made all appts. so far.    Has the patient kept scheduled appointments due by today?  Yes   What is the patient's perception of their health status since discharge?  Improving   Week 2 Call Completed?  Yes   Graduated  Yes   Did the patient feel the follow up calls were helpful during their recovery period?  Yes   Was the number of calls appropriate?  Yes   Graduated/Revoked comments  He appreciates the after care, no more calls needed.           Deepa Narayan RN

## 2018-11-02 DIAGNOSIS — N20.0 CALCULUS OF KIDNEY: Primary | ICD-10-CM

## 2018-11-13 ENCOUNTER — TRANSCRIBE ORDERS (OUTPATIENT)
Dept: LAB | Facility: HOSPITAL | Age: 43
End: 2018-11-13

## 2018-11-13 DIAGNOSIS — E66.9 OBESITY, UNSPECIFIED CLASSIFICATION, UNSPECIFIED OBESITY TYPE, UNSPECIFIED WHETHER SERIOUS COMORBIDITY PRESENT: ICD-10-CM

## 2018-11-13 DIAGNOSIS — I48.91 ATRIAL FIBRILLATION, UNSPECIFIED TYPE (HCC): ICD-10-CM

## 2018-11-13 DIAGNOSIS — N18.30 CHRONIC RENAL DISEASE, STAGE III (HCC): Primary | ICD-10-CM

## 2018-11-13 DIAGNOSIS — G47.30 SLEEP APNEA, UNSPECIFIED TYPE: ICD-10-CM

## 2018-11-16 ENCOUNTER — LAB (OUTPATIENT)
Dept: LAB | Facility: HOSPITAL | Age: 43
End: 2018-11-16
Attending: INTERNAL MEDICINE

## 2018-11-16 DIAGNOSIS — I48.91 ATRIAL FIBRILLATION, UNSPECIFIED TYPE (HCC): ICD-10-CM

## 2018-11-16 DIAGNOSIS — G47.30 SLEEP APNEA, UNSPECIFIED TYPE: ICD-10-CM

## 2018-11-16 DIAGNOSIS — N18.30 CHRONIC RENAL DISEASE, STAGE III (HCC): ICD-10-CM

## 2018-11-16 DIAGNOSIS — E66.9 OBESITY, UNSPECIFIED CLASSIFICATION, UNSPECIFIED OBESITY TYPE, UNSPECIFIED WHETHER SERIOUS COMORBIDITY PRESENT: ICD-10-CM

## 2018-11-16 LAB
ALBUMIN SERPL-MCNC: 4 G/DL (ref 3.4–4.8)
ANION GAP SERPL CALCULATED.3IONS-SCNC: 9 MMOL/L (ref 5–15)
BUN BLD-MCNC: 16 MG/DL (ref 7–21)
BUN/CREAT SERPL: 13.9 (ref 7–25)
CALCIUM SPEC-SCNC: 8.8 MG/DL (ref 8.4–10.2)
CHLORIDE SERPL-SCNC: 103 MMOL/L (ref 95–110)
CO2 SERPL-SCNC: 28 MMOL/L (ref 22–31)
CREAT BLD-MCNC: 1.15 MG/DL (ref 0.7–1.3)
CREAT UR-MCNC: 265.9 MG/DL
GFR SERPL CREATININE-BSD FRML MDRD: 70 ML/MIN/1.73 (ref 63–147)
GLUCOSE BLD-MCNC: 87 MG/DL (ref 60–100)
HCT VFR BLD AUTO: 48.6 % (ref 39–49)
HGB BLD-MCNC: 16.4 G/DL (ref 13.7–17.3)
MAGNESIUM SERPL-MCNC: 2 MG/DL (ref 1.6–2.3)
PHOSPHATE SERPL-MCNC: 3.3 MG/DL (ref 2.4–4.4)
POTASSIUM BLD-SCNC: 4.3 MMOL/L (ref 3.5–5.1)
PROT UR-MCNC: 14.6 MG/DL
PROT/CREAT UR: 54.9 MG/G CREA (ref 0–200)
SODIUM BLD-SCNC: 140 MMOL/L (ref 137–145)

## 2018-11-16 PROCEDURE — 84156 ASSAY OF PROTEIN URINE: CPT

## 2018-11-16 PROCEDURE — 82570 ASSAY OF URINE CREATININE: CPT

## 2018-11-16 PROCEDURE — 85014 HEMATOCRIT: CPT

## 2018-11-16 PROCEDURE — 83735 ASSAY OF MAGNESIUM: CPT

## 2018-11-16 PROCEDURE — 85018 HEMOGLOBIN: CPT

## 2018-11-16 PROCEDURE — 36415 COLL VENOUS BLD VENIPUNCTURE: CPT

## 2018-11-16 PROCEDURE — 80069 RENAL FUNCTION PANEL: CPT

## 2018-11-29 ENCOUNTER — LAB (OUTPATIENT)
Dept: ONCOLOGY | Facility: HOSPITAL | Age: 43
End: 2018-11-29

## 2018-11-29 ENCOUNTER — APPOINTMENT (OUTPATIENT)
Dept: CT IMAGING | Facility: HOSPITAL | Age: 43
End: 2018-11-29

## 2018-11-29 ENCOUNTER — OFFICE VISIT (OUTPATIENT)
Dept: ONCOLOGY | Facility: CLINIC | Age: 43
End: 2018-11-29

## 2018-11-29 ENCOUNTER — HOSPITAL ENCOUNTER (OUTPATIENT)
Facility: HOSPITAL | Age: 43
Discharge: HOME | End: 2018-12-03
Attending: EMERGENCY MEDICINE | Admitting: INTERNAL MEDICINE

## 2018-11-29 VITALS
BODY MASS INDEX: 28.71 KG/M2 | RESPIRATION RATE: 18 BRPM | WEIGHT: 212 LBS | TEMPERATURE: 98 F | DIASTOLIC BLOOD PRESSURE: 83 MMHG | SYSTOLIC BLOOD PRESSURE: 138 MMHG | HEIGHT: 72 IN | HEART RATE: 75 BPM | OXYGEN SATURATION: 98 %

## 2018-11-29 DIAGNOSIS — D75.1 SECONDARY POLYCYTHEMIA: Primary | ICD-10-CM

## 2018-11-29 DIAGNOSIS — R11.2 INTRACTABLE VOMITING WITH NAUSEA, UNSPECIFIED VOMITING TYPE: ICD-10-CM

## 2018-11-29 DIAGNOSIS — D75.1 POLYCYTHEMIA: ICD-10-CM

## 2018-11-29 DIAGNOSIS — K92.0 HEMATEMESIS WITH NAUSEA: ICD-10-CM

## 2018-11-29 DIAGNOSIS — R11.15 INTRACTABLE CYCLICAL VOMITING WITH NAUSEA: Primary | ICD-10-CM

## 2018-11-29 DIAGNOSIS — K92.2 UPPER GI BLEEDING: ICD-10-CM

## 2018-11-29 LAB
ALBUMIN SERPL-MCNC: 4.2 G/DL (ref 3.4–4.8)
ALBUMIN SERPL-MCNC: 4.3 G/DL (ref 3.4–4.8)
ALBUMIN/GLOB SERPL: 1.6 G/DL (ref 1.1–1.8)
ALBUMIN/GLOB SERPL: 1.8 G/DL (ref 1.1–1.8)
ALP SERPL-CCNC: 32 U/L (ref 38–126)
ALP SERPL-CCNC: 37 U/L (ref 38–126)
ALT SERPL W P-5'-P-CCNC: 55 U/L (ref 21–72)
ALT SERPL W P-5'-P-CCNC: 55 U/L (ref 21–72)
ANION GAP SERPL CALCULATED.3IONS-SCNC: 11 MMOL/L (ref 5–15)
ANION GAP SERPL CALCULATED.3IONS-SCNC: 12 MMOL/L (ref 5–15)
AST SERPL-CCNC: 42 U/L (ref 17–59)
AST SERPL-CCNC: 43 U/L (ref 17–59)
BACTERIA UR QL AUTO: ABNORMAL /HPF
BASOPHILS # BLD AUTO: 0.02 10*3/MM3 (ref 0–0.2)
BASOPHILS # BLD AUTO: 0.03 10*3/MM3 (ref 0–0.2)
BASOPHILS NFR BLD AUTO: 0.1 % (ref 0–2)
BASOPHILS NFR BLD AUTO: 0.3 % (ref 0–2)
BILIRUB SERPL-MCNC: 0.7 MG/DL (ref 0.2–1.3)
BILIRUB SERPL-MCNC: 1 MG/DL (ref 0.2–1.3)
BILIRUB UR QL STRIP: NEGATIVE
BUN BLD-MCNC: 17 MG/DL (ref 7–21)
BUN BLD-MCNC: 20 MG/DL (ref 7–21)
BUN/CREAT SERPL: 13.6 (ref 7–25)
BUN/CREAT SERPL: 18.9 (ref 7–25)
CALCIUM SPEC-SCNC: 9.1 MG/DL (ref 8.4–10.2)
CALCIUM SPEC-SCNC: 9.3 MG/DL (ref 8.4–10.2)
CHLORIDE SERPL-SCNC: 101 MMOL/L (ref 95–110)
CHLORIDE SERPL-SCNC: 101 MMOL/L (ref 95–110)
CLARITY UR: CLEAR
CO2 SERPL-SCNC: 24 MMOL/L (ref 22–31)
CO2 SERPL-SCNC: 27 MMOL/L (ref 22–31)
COLOR UR: ABNORMAL
CREAT BLD-MCNC: 1.06 MG/DL (ref 0.7–1.3)
CREAT BLD-MCNC: 1.25 MG/DL (ref 0.7–1.3)
DEPRECATED RDW RBC AUTO: 47.5 FL (ref 35.1–43.9)
DEPRECATED RDW RBC AUTO: 48.3 FL (ref 35.1–43.9)
EOSINOPHIL # BLD AUTO: 0.17 10*3/MM3 (ref 0–0.7)
EOSINOPHIL # BLD AUTO: 0.29 10*3/MM3 (ref 0–0.7)
EOSINOPHIL NFR BLD AUTO: 1 % (ref 0–7)
EOSINOPHIL NFR BLD AUTO: 3.3 % (ref 0–7)
ERYTHROCYTE [DISTWIDTH] IN BLOOD BY AUTOMATED COUNT: 14.9 % (ref 11.5–14.5)
ERYTHROCYTE [DISTWIDTH] IN BLOOD BY AUTOMATED COUNT: 15 % (ref 11.5–14.5)
GFR SERPL CREATININE-BSD FRML MDRD: 64 ML/MIN/1.73 (ref 63–147)
GFR SERPL CREATININE-BSD FRML MDRD: 77 ML/MIN/1.73 (ref 63–147)
GLOBULIN UR ELPH-MCNC: 2.4 GM/DL (ref 2.3–3.5)
GLOBULIN UR ELPH-MCNC: 2.7 GM/DL (ref 2.3–3.5)
GLUCOSE BLD-MCNC: 103 MG/DL (ref 60–100)
GLUCOSE BLD-MCNC: 87 MG/DL (ref 60–100)
GLUCOSE UR STRIP-MCNC: NEGATIVE MG/DL
HCT VFR BLD AUTO: 50.2 % (ref 39–49)
HCT VFR BLD AUTO: 52.1 % (ref 39–49)
HGB BLD-MCNC: 17.1 G/DL (ref 13.7–17.3)
HGB BLD-MCNC: 17.7 G/DL (ref 13.7–17.3)
HGB UR QL STRIP.AUTO: NEGATIVE
HOLD SPECIMEN: NORMAL
HYALINE CASTS UR QL AUTO: ABNORMAL /LPF
IMM GRANULOCYTES # BLD: 0.02 10*3/MM3 (ref 0–0.02)
IMM GRANULOCYTES # BLD: 0.04 10*3/MM3 (ref 0–0.02)
IMM GRANULOCYTES NFR BLD: 0.2 % (ref 0–0.5)
IMM GRANULOCYTES NFR BLD: 0.2 % (ref 0–0.5)
KETONES UR QL STRIP: ABNORMAL
LEUKOCYTE ESTERASE UR QL STRIP.AUTO: ABNORMAL
LIPASE SERPL-CCNC: 95 U/L (ref 23–300)
LYMPHOCYTES # BLD AUTO: 1.22 10*3/MM3 (ref 0.6–4.2)
LYMPHOCYTES # BLD AUTO: 1.44 10*3/MM3 (ref 0.6–4.2)
LYMPHOCYTES NFR BLD AUTO: 16.5 % (ref 10–50)
LYMPHOCYTES NFR BLD AUTO: 7.1 % (ref 10–50)
MCH RBC QN AUTO: 29.8 PG (ref 26.5–34)
MCH RBC QN AUTO: 30.1 PG (ref 26.5–34)
MCHC RBC AUTO-ENTMCNC: 34 G/DL (ref 31.5–36.3)
MCHC RBC AUTO-ENTMCNC: 34.1 G/DL (ref 31.5–36.3)
MCV RBC AUTO: 87.7 FL (ref 80–98)
MCV RBC AUTO: 88.4 FL (ref 80–98)
MONOCYTES # BLD AUTO: 0.48 10*3/MM3 (ref 0–0.9)
MONOCYTES # BLD AUTO: 0.77 10*3/MM3 (ref 0–0.9)
MONOCYTES NFR BLD AUTO: 2.8 % (ref 0–12)
MONOCYTES NFR BLD AUTO: 8.8 % (ref 0–12)
NEUTROPHILS # BLD AUTO: 15.22 10*3/MM3 (ref 2–8.6)
NEUTROPHILS # BLD AUTO: 6.17 10*3/MM3 (ref 2–8.6)
NEUTROPHILS NFR BLD AUTO: 70.9 % (ref 37–80)
NEUTROPHILS NFR BLD AUTO: 88.8 % (ref 37–80)
NITRITE UR QL STRIP: NEGATIVE
PH UR STRIP.AUTO: 6.5 [PH] (ref 5–9)
PLATELET # BLD AUTO: 224 10*3/MM3 (ref 150–450)
PLATELET # BLD AUTO: 228 10*3/MM3 (ref 150–450)
PMV BLD AUTO: 10 FL (ref 8–12)
PMV BLD AUTO: 10.3 FL (ref 8–12)
POTASSIUM BLD-SCNC: 4.2 MMOL/L (ref 3.5–5.1)
POTASSIUM BLD-SCNC: 4.5 MMOL/L (ref 3.5–5.1)
PROT SERPL-MCNC: 6.6 G/DL (ref 6.3–8.6)
PROT SERPL-MCNC: 7 G/DL (ref 6.3–8.6)
PROT UR QL STRIP: ABNORMAL
RBC # BLD AUTO: 5.68 10*6/MM3 (ref 4.37–5.74)
RBC # BLD AUTO: 5.94 10*6/MM3 (ref 4.37–5.74)
RBC # UR: ABNORMAL /HPF
REF LAB TEST METHOD: ABNORMAL
SODIUM BLD-SCNC: 137 MMOL/L (ref 137–145)
SODIUM BLD-SCNC: 139 MMOL/L (ref 137–145)
SP GR UR STRIP: 1.03 (ref 1–1.03)
SQUAMOUS #/AREA URNS HPF: ABNORMAL /HPF
UROBILINOGEN UR QL STRIP: ABNORMAL
WBC NRBC COR # BLD: 17.15 10*3/MM3 (ref 3.2–9.8)
WBC NRBC COR # BLD: 8.72 10*3/MM3 (ref 3.2–9.8)
WBC UR QL AUTO: ABNORMAL /HPF
WHOLE BLOOD HOLD SPECIMEN: NORMAL

## 2018-11-29 PROCEDURE — 81001 URINALYSIS AUTO W/SCOPE: CPT | Performed by: EMERGENCY MEDICINE

## 2018-11-29 PROCEDURE — 25010000002 MORPHINE PER 10 MG: Performed by: EMERGENCY MEDICINE

## 2018-11-29 PROCEDURE — 36415 COLL VENOUS BLD VENIPUNCTURE: CPT

## 2018-11-29 PROCEDURE — 99214 OFFICE O/P EST MOD 30 MIN: CPT | Performed by: NURSE PRACTITIONER

## 2018-11-29 PROCEDURE — 25010000002 METOCLOPRAMIDE PER 10 MG: Performed by: EMERGENCY MEDICINE

## 2018-11-29 PROCEDURE — 99284 EMERGENCY DEPT VISIT MOD MDM: CPT

## 2018-11-29 PROCEDURE — 85025 COMPLETE CBC W/AUTO DIFF WBC: CPT | Performed by: NURSE PRACTITIONER

## 2018-11-29 PROCEDURE — 83690 ASSAY OF LIPASE: CPT | Performed by: EMERGENCY MEDICINE

## 2018-11-29 PROCEDURE — G0463 HOSPITAL OUTPT CLINIC VISIT: HCPCS | Performed by: NURSE PRACTITIONER

## 2018-11-29 PROCEDURE — 80053 COMPREHEN METABOLIC PANEL: CPT | Performed by: NURSE PRACTITIONER

## 2018-11-29 PROCEDURE — 96374 THER/PROPH/DIAG INJ IV PUSH: CPT

## 2018-11-29 PROCEDURE — 80053 COMPREHEN METABOLIC PANEL: CPT | Performed by: EMERGENCY MEDICINE

## 2018-11-29 PROCEDURE — 74176 CT ABD & PELVIS W/O CONTRAST: CPT

## 2018-11-29 PROCEDURE — 82271 OCCULT BLOOD OTHER SOURCES: CPT | Performed by: EMERGENCY MEDICINE

## 2018-11-29 PROCEDURE — 85025 COMPLETE CBC W/AUTO DIFF WBC: CPT | Performed by: EMERGENCY MEDICINE

## 2018-11-29 PROCEDURE — 96375 TX/PRO/DX INJ NEW DRUG ADDON: CPT

## 2018-11-29 RX ORDER — METOCLOPRAMIDE HYDROCHLORIDE 5 MG/ML
10 INJECTION INTRAMUSCULAR; INTRAVENOUS
Status: DISCONTINUED | OUTPATIENT
Start: 2018-11-29 | End: 2018-12-03 | Stop reason: HOSPADM

## 2018-11-29 RX ORDER — SODIUM CHLORIDE 0.9 % (FLUSH) 0.9 %
10 SYRINGE (ML) INJECTION AS NEEDED
Status: DISCONTINUED | OUTPATIENT
Start: 2018-11-29 | End: 2018-12-03 | Stop reason: HOSPADM

## 2018-11-29 RX ADMIN — MORPHINE SULFATE 4 MG: 4 INJECTION INTRAVENOUS at 23:01

## 2018-11-29 RX ADMIN — METOCLOPRAMIDE 10 MG: 5 INJECTION, SOLUTION INTRAMUSCULAR; INTRAVENOUS at 22:59

## 2018-11-29 NOTE — PROGRESS NOTES
"DATE OF VISIT: 11/29/2018    REASON FOR VISIT:  Follow-up for secondary erythrocytosis    HISTORY OF PRESENT ILLNESS:  43 yr old male with history of sleep apnea diagnosed 3 yrs ago; recently has been more compliant with use of CPAP;  He has a past history of testosterone use and a history of atrial flutter that has required ablation. He was initially seen by Dr. Monet in October for elevated H&H. He had an extensive work up that included a negative BAKARI 2 mutation, Normal EPO level and Ultrasound of abdomen that was unremarkable. He has had couple therapeutic phlebotomy. Most recent was in September when he was symptomatic. He is taking daily baby ASA and does admit to still taking testosterone supplement.   Recent hospitalization for flare of diverticulitis. Denies any erythromelalgia symptoms.        PAST MEDICAL HISTORY:    Past Medical History:   Diagnosis Date   • A-fib (CMS/[facility])    • Anxiety    • Arrhythmia    • Arthritis     rt shoulder    • Atrial flutter (CMS/[facility]) 07/2016    EP  ablation x2    • Depression    • Diverticulitis    • ETOH abuse    • GERD (gastroesophageal reflux disease)    • Kidney stone    • Pancreatitis    • Sleep apnea     have used a machine in the past   • Tendonitis     rt shoulder       SOCIAL HISTORY:    Social History     Tobacco Use   • Smoking status: Never Smoker   • Smokeless tobacco: Never Used   Substance Use Topics   • Alcohol use: No     Comment: quit 3/17/18   • Drug use: Yes     Frequency: 7.0 times per week     Types: Marijuana     Comment: states he uses \"a little\" every day       Surgical History :  Past Surgical History:   Procedure Laterality Date   • COLONOSCOPY     • SHOULDER SURGERY Right 2014   • TONSILLECTOMY  12/2015   • UPPER GASTROINTESTINAL ENDOSCOPY  03/30/2018   • UPPER GASTROINTESTINAL ENDOSCOPY     • UPPER GASTROINTESTINAL ENDOSCOPY  05/30/2018       ALLERGIES:    Allergies   Allergen Reactions   • Contrast Dye Anaphylaxis     ANAPHYLAXIS   • Iodinated " "Diagnostic Agents Anaphylaxis       REVIEW OF SYSTEMS:      CONSTITUTIONAL:  No fever, chills, or night sweats.     HEENT:  No epistaxis, mouth sores, or difficulty swallowing.    RESPIRATORY:  No new shortness of breath or cough at present.    CARDIOVASCULAR:  No chest pain or palpitations.    GASTROINTESTINAL:  No abdominal pain, nausea, vomiting, or blood in the stool.    GENITOURINARY:  No dysuria or hematuria.    MUSCULOSKELETAL:  No any new back pain or arthralgias.     NEUROLOGICAL:  No tingling or numbness. No new headache or dizziness.     LYMPHATICS:  Denies any abnormal swollen and anywhere in the body.    SKIN:  Denies any new skin rash.    PHYSICAL EXAMINATION:      VITAL SIGNS:  /83   Pulse 75   Temp 98 °F (36.7 °C) (Temporal)   Resp 18   Ht 182.9 cm (72.01\")   Wt 96.2 kg (212 lb)   SpO2 98%   BMI 28.75 kg/m²     GENERAL:  Not in any distress.    HEENT:  Normocephalic, Atraumatic.Mild Conjunctival pallor. No icterus.  No Facial Asymmetry noted.    NECK:  No adenopathy. No JVD.    RESPIRATORY:  Fair air entry bilateral. No rhonchi or wheezing.    CARDIOVASCULAR:  S1, S2. Regular rate and rhythm. No murmur or gallop appreciated.    ABDOMEN:  Soft, obese, nontender. Bowel sounds present in all four quadrants.  No organomegaly appreciated.    EXTREMITIES:  No edema.No Calf Tenderness.    NEUROLOGIC:  Alert, awake and oriented ×3.    SKIN : No new skin lesion identified  DIAGNOSTIC DATA:    Glucose   Date Value Ref Range Status   11/29/2018 87 60 - 100 mg/dL Final     Sodium   Date Value Ref Range Status   11/29/2018 139 137 - 145 mmol/L Final     Potassium   Date Value Ref Range Status   11/29/2018 4.2 3.5 - 5.1 mmol/L Final     CO2   Date Value Ref Range Status   11/29/2018 27.0 22.0 - 31.0 mmol/L Final     Chloride   Date Value Ref Range Status   11/29/2018 101 95 - 110 mmol/L Final     Anion Gap   Date Value Ref Range Status   11/29/2018 11.0 5.0 - 15.0 mmol/L Final     Creatinine   Date " Value Ref Range Status   11/29/2018 1.25 0.70 - 1.30 mg/dL Final     BUN   Date Value Ref Range Status   11/29/2018 17 7 - 21 mg/dL Final     BUN/Creatinine Ratio   Date Value Ref Range Status   11/29/2018 13.6 7.0 - 25.0 Final     Calcium   Date Value Ref Range Status   11/29/2018 9.1 8.4 - 10.2 mg/dL Final     eGFR Non  Amer   Date Value Ref Range Status   11/29/2018 64 63 - 147 mL/min/1.73 Final     Alkaline Phosphatase   Date Value Ref Range Status   11/29/2018 32 (L) 38 - 126 U/L Final     Total Protein   Date Value Ref Range Status   11/29/2018 6.6 6.3 - 8.6 g/dL Final     ALT (SGPT)   Date Value Ref Range Status   11/29/2018 55 21 - 72 U/L Final     AST (SGOT)   Date Value Ref Range Status   11/29/2018 43 17 - 59 U/L Final     Total Bilirubin   Date Value Ref Range Status   11/29/2018 0.7 0.2 - 1.3 mg/dL Final     Albumin   Date Value Ref Range Status   11/29/2018 4.20 3.40 - 4.80 g/dL Final     Globulin   Date Value Ref Range Status   11/29/2018 2.4 2.3 - 3.5 gm/dL Final     Lab Results   Component Value Date    WBC 8.72 11/29/2018    HGB 17.1 11/29/2018    HCT 50.2 (H) 11/29/2018    MCV 88.4 11/29/2018     11/29/2018     Lab Results   Component Value Date    NEUTROABS 6.17 11/29/2018    IRON 126 04/10/2018    TIBC 345 04/10/2018    LABIRON 37 04/10/2018    FERRITIN 179.00 04/10/2018     Lab Results   Component Value Date    AFPTM 2.2 04/10/2018    AFPTM 182 04/10/2018   ]        ASSESSMENT AND PLAN:        1. 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 Advised him to continue to stay well hydrated and to continue with daily ASA. Recheck again in 3 mos.     2. Atrial fibrillation, pt was  taken off the Eliquis by his cardiologist and is currently taking baby ASA daily.      3. Health maintenance, he does not smoke; he has had a EGD and  colonoscopy follow-up  pancreatitis, esophagitis and diverticulitis. Continues to follow with GI clinic.              This document has been signed by VIKASH Clinton on November 29, 2018 12:01 PM

## 2018-11-30 PROBLEM — R11.2 INTRACTABLE VOMITING WITH NAUSEA: Status: ACTIVE | Noted: 2018-11-30

## 2018-11-30 LAB
ANION GAP SERPL CALCULATED.3IONS-SCNC: 15 MMOL/L (ref 5–15)
BASOPHILS # BLD AUTO: 0.02 10*3/MM3 (ref 0–0.2)
BASOPHILS NFR BLD AUTO: 0.1 % (ref 0–2)
BUN BLD-MCNC: 17 MG/DL (ref 7–21)
BUN/CREAT SERPL: 17.9 (ref 7–25)
CALCIUM SPEC-SCNC: 9.2 MG/DL (ref 8.4–10.2)
CHLORIDE SERPL-SCNC: 103 MMOL/L (ref 95–110)
CO2 SERPL-SCNC: 23 MMOL/L (ref 22–31)
CREAT BLD-MCNC: 0.95 MG/DL (ref 0.7–1.3)
DEPRECATED RDW RBC AUTO: 47.3 FL (ref 35.1–43.9)
EOSINOPHIL # BLD AUTO: 0.03 10*3/MM3 (ref 0–0.7)
EOSINOPHIL NFR BLD AUTO: 0.2 % (ref 0–7)
ERYTHROCYTE [DISTWIDTH] IN BLOOD BY AUTOMATED COUNT: 15 % (ref 11.5–14.5)
GASTROCULT GAST QL: POSITIVE
GFR SERPL CREATININE-BSD FRML MDRD: 87 ML/MIN/1.73 (ref 63–147)
GLUCOSE BLD-MCNC: 112 MG/DL (ref 60–100)
HCT VFR BLD AUTO: 50.1 % (ref 39–49)
HGB BLD-MCNC: 17.2 G/DL (ref 13.7–17.3)
IMM GRANULOCYTES # BLD: 0.04 10*3/MM3 (ref 0–0.02)
IMM GRANULOCYTES NFR BLD: 0.3 % (ref 0–0.5)
LYMPHOCYTES # BLD AUTO: 0.77 10*3/MM3 (ref 0.6–4.2)
LYMPHOCYTES NFR BLD AUTO: 5.5 % (ref 10–50)
MCH RBC QN AUTO: 29.9 PG (ref 26.5–34)
MCHC RBC AUTO-ENTMCNC: 34.3 G/DL (ref 31.5–36.3)
MCV RBC AUTO: 87 FL (ref 80–98)
MONOCYTES # BLD AUTO: 0.28 10*3/MM3 (ref 0–0.9)
MONOCYTES NFR BLD AUTO: 2 % (ref 0–12)
NEUTROPHILS # BLD AUTO: 12.9 10*3/MM3 (ref 2–8.6)
NEUTROPHILS NFR BLD AUTO: 91.9 % (ref 37–80)
PH GAST: 3 [PH]
PLATELET # BLD AUTO: 221 10*3/MM3 (ref 150–450)
PMV BLD AUTO: 10.5 FL (ref 8–12)
POTASSIUM BLD-SCNC: 4.4 MMOL/L (ref 3.5–5.1)
RBC # BLD AUTO: 5.76 10*6/MM3 (ref 4.37–5.74)
SODIUM BLD-SCNC: 141 MMOL/L (ref 137–145)
WBC NRBC COR # BLD: 14.04 10*3/MM3 (ref 3.2–9.8)

## 2018-11-30 PROCEDURE — 85025 COMPLETE CBC W/AUTO DIFF WBC: CPT | Performed by: FAMILY MEDICINE

## 2018-11-30 PROCEDURE — 96375 TX/PRO/DX INJ NEW DRUG ADDON: CPT

## 2018-11-30 PROCEDURE — 96376 TX/PRO/DX INJ SAME DRUG ADON: CPT

## 2018-11-30 PROCEDURE — 25010000002 HYDROMORPHONE 1 MG/ML SOLUTION: Performed by: FAMILY MEDICINE

## 2018-11-30 PROCEDURE — G0378 HOSPITAL OBSERVATION PER HR: HCPCS

## 2018-11-30 PROCEDURE — 80048 BASIC METABOLIC PNL TOTAL CA: CPT | Performed by: FAMILY MEDICINE

## 2018-11-30 PROCEDURE — 25010000002 METOCLOPRAMIDE PER 10 MG: Performed by: EMERGENCY MEDICINE

## 2018-11-30 PROCEDURE — 96365 THER/PROPH/DIAG IV INF INIT: CPT

## 2018-11-30 PROCEDURE — 96366 THER/PROPH/DIAG IV INF ADDON: CPT

## 2018-11-30 PROCEDURE — 99214 OFFICE O/P EST MOD 30 MIN: CPT | Performed by: INTERNAL MEDICINE

## 2018-11-30 RX ORDER — ONDANSETRON 4 MG/1
4 TABLET, FILM COATED ORAL EVERY 6 HOURS PRN
Status: DISCONTINUED | OUTPATIENT
Start: 2018-11-30 | End: 2018-12-03 | Stop reason: HOSPADM

## 2018-11-30 RX ORDER — SODIUM CHLORIDE 9 MG/ML
125 INJECTION, SOLUTION INTRAVENOUS CONTINUOUS
Status: DISCONTINUED | OUTPATIENT
Start: 2018-11-30 | End: 2018-12-03 | Stop reason: HOSPADM

## 2018-11-30 RX ORDER — SODIUM CHLORIDE 0.9 % (FLUSH) 0.9 %
3 SYRINGE (ML) INJECTION EVERY 12 HOURS SCHEDULED
Status: DISCONTINUED | OUTPATIENT
Start: 2018-11-30 | End: 2018-12-03 | Stop reason: HOSPADM

## 2018-11-30 RX ORDER — NALOXONE HCL 0.4 MG/ML
0.4 VIAL (ML) INJECTION
Status: DISCONTINUED | OUTPATIENT
Start: 2018-11-30 | End: 2018-12-03 | Stop reason: HOSPADM

## 2018-11-30 RX ORDER — CARVEDILOL 6.25 MG/1
6.25 TABLET ORAL EVERY 12 HOURS
Status: DISCONTINUED | OUTPATIENT
Start: 2018-11-30 | End: 2018-12-03 | Stop reason: HOSPADM

## 2018-11-30 RX ORDER — PANTOPRAZOLE SODIUM 40 MG/10ML
80 INJECTION, POWDER, LYOPHILIZED, FOR SOLUTION INTRAVENOUS ONCE
Status: COMPLETED | OUTPATIENT
Start: 2018-11-30 | End: 2018-11-30

## 2018-11-30 RX ORDER — SODIUM CHLORIDE 0.9 % (FLUSH) 0.9 %
3-10 SYRINGE (ML) INJECTION AS NEEDED
Status: DISCONTINUED | OUTPATIENT
Start: 2018-11-30 | End: 2018-12-03 | Stop reason: HOSPADM

## 2018-11-30 RX ADMIN — METOCLOPRAMIDE 10 MG: 5 INJECTION, SOLUTION INTRAMUSCULAR; INTRAVENOUS at 21:07

## 2018-11-30 RX ADMIN — Medication 10 ML: at 02:13

## 2018-11-30 RX ADMIN — CARVEDILOL 6.25 MG: 6.25 TABLET, FILM COATED ORAL at 21:07

## 2018-11-30 RX ADMIN — METOCLOPRAMIDE 10 MG: 5 INJECTION, SOLUTION INTRAMUSCULAR; INTRAVENOUS at 09:54

## 2018-11-30 RX ADMIN — SODIUM CHLORIDE 8 MG/HR: 900 INJECTION INTRAVENOUS at 02:07

## 2018-11-30 RX ADMIN — SODIUM CHLORIDE 8 MG/HR: 900 INJECTION INTRAVENOUS at 09:54

## 2018-11-30 RX ADMIN — HYDROMORPHONE HYDROCHLORIDE 0.5 MG: 1 INJECTION, SOLUTION INTRAMUSCULAR; INTRAVENOUS; SUBCUTANEOUS at 06:22

## 2018-11-30 RX ADMIN — SODIUM CHLORIDE 8 MG/HR: 900 INJECTION INTRAVENOUS at 15:46

## 2018-11-30 RX ADMIN — PANTOPRAZOLE SODIUM 80 MG: 40 INJECTION, POWDER, FOR SOLUTION INTRAVENOUS at 01:49

## 2018-11-30 RX ADMIN — HYDROMORPHONE HYDROCHLORIDE 0.5 MG: 1 INJECTION, SOLUTION INTRAMUSCULAR; INTRAVENOUS; SUBCUTANEOUS at 21:07

## 2018-11-30 RX ADMIN — SODIUM CHLORIDE 125 ML/HR: 9 INJECTION, SOLUTION INTRAVENOUS at 15:46

## 2018-11-30 RX ADMIN — SODIUM CHLORIDE 125 ML/HR: 9 INJECTION, SOLUTION INTRAVENOUS at 06:18

## 2018-11-30 RX ADMIN — METOCLOPRAMIDE 10 MG: 5 INJECTION, SOLUTION INTRAMUSCULAR; INTRAVENOUS at 17:51

## 2018-11-30 RX ADMIN — SODIUM CHLORIDE 8 MG/HR: 900 INJECTION INTRAVENOUS at 21:07

## 2018-11-30 RX ADMIN — METOCLOPRAMIDE 10 MG: 5 INJECTION, SOLUTION INTRAMUSCULAR; INTRAVENOUS at 11:49

## 2018-12-01 LAB

## 2018-12-01 PROCEDURE — 87077 CULTURE AEROBIC IDENTIFY: CPT | Performed by: HOSPITALIST

## 2018-12-01 PROCEDURE — 87581 M.PNEUMON DNA AMP PROBE: CPT | Performed by: HOSPITALIST

## 2018-12-01 PROCEDURE — 87186 SC STD MICRODIL/AGAR DIL: CPT | Performed by: HOSPITALIST

## 2018-12-01 PROCEDURE — 87798 DETECT AGENT NOS DNA AMP: CPT | Performed by: HOSPITALIST

## 2018-12-01 PROCEDURE — 87486 CHLMYD PNEUM DNA AMP PROBE: CPT | Performed by: HOSPITALIST

## 2018-12-01 PROCEDURE — 87147 CULTURE TYPE IMMUNOLOGIC: CPT | Performed by: HOSPITALIST

## 2018-12-01 PROCEDURE — 87040 BLOOD CULTURE FOR BACTERIA: CPT | Performed by: HOSPITALIST

## 2018-12-01 PROCEDURE — 87150 DNA/RNA AMPLIFIED PROBE: CPT | Performed by: HOSPITALIST

## 2018-12-01 PROCEDURE — 25010000002 PIPERACILLIN SOD-TAZOBACTAM PER 1 G: Performed by: HOSPITALIST

## 2018-12-01 PROCEDURE — 96368 THER/DIAG CONCURRENT INF: CPT

## 2018-12-01 PROCEDURE — 99214 OFFICE O/P EST MOD 30 MIN: CPT | Performed by: INTERNAL MEDICINE

## 2018-12-01 PROCEDURE — G0378 HOSPITAL OBSERVATION PER HR: HCPCS

## 2018-12-01 PROCEDURE — 96376 TX/PRO/DX INJ SAME DRUG ADON: CPT

## 2018-12-01 PROCEDURE — 25010000002 METOCLOPRAMIDE PER 10 MG: Performed by: EMERGENCY MEDICINE

## 2018-12-01 PROCEDURE — 87633 RESP VIRUS 12-25 TARGETS: CPT | Performed by: HOSPITALIST

## 2018-12-01 PROCEDURE — 96366 THER/PROPH/DIAG IV INF ADDON: CPT

## 2018-12-01 PROCEDURE — 25010000002 HYDROMORPHONE 1 MG/ML SOLUTION: Performed by: FAMILY MEDICINE

## 2018-12-01 RX ADMIN — SODIUM CHLORIDE 8 MG/HR: 900 INJECTION INTRAVENOUS at 06:52

## 2018-12-01 RX ADMIN — HYDROMORPHONE HYDROCHLORIDE 0.5 MG: 1 INJECTION, SOLUTION INTRAMUSCULAR; INTRAVENOUS; SUBCUTANEOUS at 21:33

## 2018-12-01 RX ADMIN — SODIUM CHLORIDE 8 MG/HR: 900 INJECTION INTRAVENOUS at 17:35

## 2018-12-01 RX ADMIN — PIPERACILLIN SODIUM,TAZOBACTAM SODIUM 3.38 G: 3; .375 INJECTION, POWDER, FOR SOLUTION INTRAVENOUS at 10:33

## 2018-12-01 RX ADMIN — SODIUM CHLORIDE 125 ML/HR: 9 INJECTION, SOLUTION INTRAVENOUS at 08:35

## 2018-12-01 RX ADMIN — METOCLOPRAMIDE 10 MG: 5 INJECTION, SOLUTION INTRAMUSCULAR; INTRAVENOUS at 08:35

## 2018-12-01 RX ADMIN — CARVEDILOL 6.25 MG: 6.25 TABLET, FILM COATED ORAL at 21:33

## 2018-12-01 RX ADMIN — METOCLOPRAMIDE 10 MG: 5 INJECTION, SOLUTION INTRAMUSCULAR; INTRAVENOUS at 17:22

## 2018-12-01 RX ADMIN — SODIUM CHLORIDE 125 ML/HR: 9 INJECTION, SOLUTION INTRAVENOUS at 17:35

## 2018-12-01 RX ADMIN — SODIUM CHLORIDE 125 ML/HR: 9 INJECTION, SOLUTION INTRAVENOUS at 02:06

## 2018-12-01 RX ADMIN — SODIUM CHLORIDE 8 MG/HR: 900 INJECTION INTRAVENOUS at 11:23

## 2018-12-01 RX ADMIN — SODIUM CHLORIDE 8 MG/HR: 900 INJECTION INTRAVENOUS at 02:06

## 2018-12-01 RX ADMIN — PIPERACILLIN SODIUM,TAZOBACTAM SODIUM 3.38 G: 3; .375 INJECTION, POWDER, FOR SOLUTION INTRAVENOUS at 17:21

## 2018-12-01 RX ADMIN — METOCLOPRAMIDE 10 MG: 5 INJECTION, SOLUTION INTRAMUSCULAR; INTRAVENOUS at 10:33

## 2018-12-01 RX ADMIN — SODIUM CHLORIDE 8 MG/HR: 900 INJECTION INTRAVENOUS at 21:37

## 2018-12-01 RX ADMIN — METOCLOPRAMIDE 10 MG: 5 INJECTION, SOLUTION INTRAMUSCULAR; INTRAVENOUS at 21:33

## 2018-12-01 NOTE — PLAN OF CARE
Problem: Nausea/Vomiting (Adult)  Goal: Symptom Relief  Outcome: Ongoing (interventions implemented as appropriate)    Goal: Adequate Hydration  Outcome: Ongoing (interventions implemented as appropriate)      Problem: Pain, Acute (Adult)  Goal: Acceptable Pain Control/Comfort Level  Outcome: Ongoing (interventions implemented as appropriate)      Problem: Patient Care Overview  Goal: Plan of Care Review  Outcome: Ongoing (interventions implemented as appropriate)   12/01/18 1423   Coping/Psychosocial   Plan of Care Reviewed With patient   Plan of Care Review   Progress no change   OTHER   Outcome Summary VSS, no complaints of pain. no n/v.      Goal: Individualization and Mutuality  Outcome: Ongoing (interventions implemented as appropriate)

## 2018-12-01 NOTE — PROGRESS NOTES
SUBJECTIVE:   12/1/2018  Chief Complaint:     Subjective      Patient is 43 y.o. male who states marked improvement in his nausea but still does have some.  Patient has not had any further episodes of hematemesis.  Patient is currently receiving Reglan and Protonix.  Patient is also on Zosyn     CURRENT MEDICATIONS/OBJECTIVE/VS/PE:     Current Medications:     Current Facility-Administered Medications   Medication Dose Route Frequency Provider Last Rate Last Dose   • carvedilol (COREG) tablet 6.25 mg  6.25 mg Oral Q12H Elana Verde MD   6.25 mg at 11/30/18 2107   • HYDROmorphone (DILAUDID) injection 0.5 mg  0.5 mg Intravenous Q2H PRN Elana Verde MD   0.5 mg at 11/30/18 2107    And   • naloxone (NARCAN) injection 0.4 mg  0.4 mg Intravenous Q5 Min PRN Elana Verde MD       • metoclopramide (REGLAN) injection 10 mg  10 mg Intravenous 4x Daily AC & at Bedtime Jass Bhatt DO   10 mg at 12/01/18 0835   • ondansetron (ZOFRAN) tablet 4 mg  4 mg Oral Q6H PRN Elana Verde MD       • pantoprazole (PROTONIX) 40 mg/100 mL (0.4 mg/mL) in 0.9% NS IVPB  8 mg/hr Intravenous Continuous Jass Bhatt DO 20 mL/hr at 12/01/18 0903 8 mg/hr at 12/01/18 0903   • piperacillin-tazobactam (ZOSYN) 3.375 g/100 mL 0.9% NS IVPB (mbp)  3.375 g Intravenous Q8H Beto Stevens, DO       • sodium chloride 0.9 % flush 10 mL  10 mL Intravenous PRN Jass Bhatt DO   10 mL at 11/30/18 0213   • sodium chloride 0.9 % flush 3 mL  3 mL Intravenous Q12H Elana Verde MD       • sodium chloride 0.9 % flush 3-10 mL  3-10 mL Intravenous PRN Elana Verde MD       • sodium chloride 0.9 % infusion  125 mL/hr Intravenous Continuous [place], [place],  mL/hr at 12/01/18 0835 125 mL/hr at 12/01/18 0835       Objective     Physical Exam:   Temp:  [98.3 °F (36.8 °C)-101.4 °F (38.6 °C)] 99.1 °F (37.3 °C)  Heart Rate:  [75-88] 81  Resp:  [18-20] 18  BP: (139-152)/(76-88) 139/81     Physical  Exam:  General Appearance:    Alert, cooperative, in no acute distress   Head:    Normocephalic, without obvious abnormality, atraumatic   Eyes:            Lids and lashes normal, conjunctivae and sclerae normal, no   icterus, no pallor, corneas clear, PERRLA   Ears:    Ears appear intact with no abnormalities noted   Throat:   No oral lesions, no thrush, oral mucosa moist   Neck:   No adenopathy, supple, trachea midline, no thyromegaly, no     carotid bruit, no JVD   Back:     No kyphosis present, no scoliosis present, no skin lesions,       erythema or scars, no tenderness to percussion or                   palpation,   range of motion normal   Lungs:     Clear to auscultation,respirations regular, even and                   unlabored    Heart:    Regular rhythm and normal rate, normal S1 and S2, no            murmur, no gallop, no rub, no click   Breast Exam:    Deferred   Abdomen:     Normal bowel sounds, no masses, no organomegaly, soft        non-tender, non-distended, no guarding, no rebound                 tenderness   Genitalia:    Deferred   Extremities:   Moves all extremities well, no edema, no cyanosis, no              redness   Pulses:   Pulses palpable and equal bilaterally   Skin:   No bleeding, bruising or rash   Lymph nodes:   No palpable adenopathy   Neurologic:   Cranial nerves 2 - 12 grossly intact, sensation intact, DTR        present and equal bilaterally      Results Review:     Lab Results (last 24 hours)     ** No results found for the last 24 hours. **           I reviewed the patient's new clinical results.  I reviewed the patient's new imaging results and agree with the interpretation.     ASSESSMENT/PLAN:   ASSESSMENT: Patient with nausea and vomiting with hematemesis.  Patient's as some improvement in nausea at this time.  Patient continues to have mild elevation of white count and epigastric abdominal pain.    PLAN: #1 we'll schedule patient for EGD to be done on Monday.  #2 continue  patient on proton pump inhibitor and antiemetics.  The risks, benefits, and alternatives of this procedure have been discussed with the patient or the responsible party- the patient understands and agrees to proceed.         Edwin Medina MD  12/01/18  9:39 AM           This document has been electronically signed by Edwin Medina MD on December 1, 2018 9:39 AM

## 2018-12-01 NOTE — PLAN OF CARE
Problem: Patient Care Overview  Goal: Plan of Care Review  Outcome: Ongoing (interventions implemented as appropriate)   12/01/18 0301   Coping/Psychosocial   Plan of Care Reviewed With patient   Plan of Care Review   Progress improving   OTHER   Outcome Summary minimal complaints of pain/discomfort, sleeping most of shift.

## 2018-12-01 NOTE — PROGRESS NOTES
[facility] Medicine Services  INPATIENT PROGRESS NOTE    Length of Stay: 0  Date of Admission: 11/29/2018  Primary Care Physician: Irma Schuster APRN    Subjective   Chief Complaint: Nausea and vomiting  HPI:  Patient has not had any vomiting episodes since yesterday.  Spiked a fever today.  Start the patient on Zosyn and ordered blood cultures    Review of Systems   Respiratory: Negative for shortness of breath.    Cardiovascular: Negative for chest pain.   Gastrointestinal: Positive for abdominal pain.        All pertinent negatives and positives are as above. All other systems have been reviewed and are negative unless otherwise stated.     Objective    Temp:  [98.3 °F (36.8 °C)-101.4 °F (38.6 °C)] 98.9 °F (37.2 °C)  Heart Rate:  [75-88] 85  Resp:  [18-20] 20  BP: (135-152)/(75-84) 135/75    Physical Exam   Constitutional: He appears well-developed.   HENT:   Head: Normocephalic and atraumatic.   Eyes: Pupils are equal, round, and reactive to light.   Neck: Normal range of motion.   Cardiovascular: Normal rate.   Pulmonary/Chest: He has decreased breath sounds. He has no wheezes.   Abdominal: Soft. There is tenderness in the periumbilical area.   Musculoskeletal: He exhibits no edema.   Neurological: He is alert.   Skin: Skin is warm and dry.   Psychiatric: He has a normal mood and affect.           Results Review:  I have reviewed the labs, radiology results, and diagnostic studies.    Laboratory Data:   Results from last 7 days   Lab Units  11/30/18   0716  11/29/18   2055  11/29/18   0833   SODIUM mmol/L  141  137  139   POTASSIUM mmol/L  4.4  4.5  4.2   CHLORIDE mmol/L  103  101  101   CO2 mmol/L  23.0  24.0  27.0   BUN mg/dL  17  20  17   CREATININE mg/dL  0.95  1.06  1.25   GLUCOSE mg/dL  112*  103*  87   CALCIUM mg/dL  9.2  9.3  9.1   BILIRUBIN mg/dL   --   1.0  0.7   ALK PHOS U/L   --   37*  32*   ALT (SGPT) U/L   --   55  55   AST (SGOT) U/L   --    42  43   ANION GAP mmol/L  15.0  12.0  11.0     Estimated Creatinine Clearance: 139.5 mL/min (by C-G formula based on SCr of 0.95 mg/dL).          Results from last 7 days   Lab Units  11/30/18   0716  11/29/18   2055  11/29/18   0833   WBC 10*3/mm3  14.04*  17.15*  8.72   HEMOGLOBIN g/dL  17.2  17.7*  17.1   HEMATOCRIT %  50.1*  52.1*  50.2*   PLATELETS 10*3/mm3  221  228  224           Culture Data:   No results found for: BLOODCX  No results found for: URINECX  No results found for: RESPCX  No results found for: WOUNDCX  No results found for: STOOLCX  No components found for: BODYFLD    Radiology Data:   Imaging Results (last 24 hours)     ** No results found for the last 24 hours. **          I have reviewed the patient's current medications.     Assessment/Plan     Active Hospital Problems    Diagnosis   • Intractable vomiting with nausea       Plan:    #1 intractable nausea vomiting: Resolved.  Zofran when necessary.  Monitor  #2 hematemesis: Resolved.  Likely secondary to #1.  Hemoglobin stable.  Continue Protonix drip per GI recommendations.  EGD on Monday  #3 fever: New.  Leukocytosis improving.  Start patient on Zosyn empirically.  Blood cultures pending.  Check respiratory viral panel given his URI symptoms.      Discharge Planning: I expect patient to be discharged to home in 2-3 days.    Signed     Dr Beto Stevens,    12/1/2018  3:02 PM

## 2018-12-02 LAB — BACTERIA BLD CULT: ABNORMAL

## 2018-12-02 PROCEDURE — 25010000002 HYDROMORPHONE 1 MG/ML SOLUTION: Performed by: FAMILY MEDICINE

## 2018-12-02 PROCEDURE — G0378 HOSPITAL OBSERVATION PER HR: HCPCS

## 2018-12-02 PROCEDURE — 99212 OFFICE O/P EST SF 10 MIN: CPT | Performed by: INTERNAL MEDICINE

## 2018-12-02 PROCEDURE — 25010000002 METOCLOPRAMIDE PER 10 MG: Performed by: EMERGENCY MEDICINE

## 2018-12-02 PROCEDURE — 96376 TX/PRO/DX INJ SAME DRUG ADON: CPT

## 2018-12-02 PROCEDURE — 25010000002 PIPERACILLIN SOD-TAZOBACTAM PER 1 G: Performed by: HOSPITALIST

## 2018-12-02 PROCEDURE — 96366 THER/PROPH/DIAG IV INF ADDON: CPT

## 2018-12-02 RX ADMIN — SODIUM CHLORIDE 125 ML/HR: 9 INJECTION, SOLUTION INTRAVENOUS at 11:11

## 2018-12-02 RX ADMIN — PIPERACILLIN SODIUM,TAZOBACTAM SODIUM 3.38 G: 3; .375 INJECTION, POWDER, FOR SOLUTION INTRAVENOUS at 08:44

## 2018-12-02 RX ADMIN — METOCLOPRAMIDE 10 MG: 5 INJECTION, SOLUTION INTRAMUSCULAR; INTRAVENOUS at 21:54

## 2018-12-02 RX ADMIN — PIPERACILLIN SODIUM,TAZOBACTAM SODIUM 3.38 G: 3; .375 INJECTION, POWDER, FOR SOLUTION INTRAVENOUS at 02:45

## 2018-12-02 RX ADMIN — HYDROMORPHONE HYDROCHLORIDE 0.5 MG: 1 INJECTION, SOLUTION INTRAMUSCULAR; INTRAVENOUS; SUBCUTANEOUS at 22:44

## 2018-12-02 RX ADMIN — SODIUM CHLORIDE, PRESERVATIVE FREE 3 ML: 5 INJECTION INTRAVENOUS at 21:54

## 2018-12-02 RX ADMIN — CARVEDILOL 6.25 MG: 6.25 TABLET, FILM COATED ORAL at 11:08

## 2018-12-02 RX ADMIN — SODIUM CHLORIDE 125 ML/HR: 9 INJECTION, SOLUTION INTRAVENOUS at 02:45

## 2018-12-02 RX ADMIN — SODIUM CHLORIDE 8 MG/HR: 900 INJECTION INTRAVENOUS at 02:45

## 2018-12-02 RX ADMIN — CARVEDILOL 6.25 MG: 6.25 TABLET, FILM COATED ORAL at 17:30

## 2018-12-02 RX ADMIN — CARVEDILOL 6.25 MG: 6.25 TABLET, FILM COATED ORAL at 21:54

## 2018-12-02 RX ADMIN — SODIUM CHLORIDE, PRESERVATIVE FREE 3 ML: 5 INJECTION INTRAVENOUS at 09:00

## 2018-12-02 RX ADMIN — SODIUM CHLORIDE, PRESERVATIVE FREE 3 ML: 5 INJECTION INTRAVENOUS at 17:16

## 2018-12-02 RX ADMIN — PIPERACILLIN SODIUM,TAZOBACTAM SODIUM 3.38 G: 3; .375 INJECTION, POWDER, FOR SOLUTION INTRAVENOUS at 17:30

## 2018-12-02 RX ADMIN — SODIUM CHLORIDE 8 MG/HR: 900 INJECTION INTRAVENOUS at 08:42

## 2018-12-02 RX ADMIN — METOCLOPRAMIDE 10 MG: 5 INJECTION, SOLUTION INTRAMUSCULAR; INTRAVENOUS at 17:15

## 2018-12-02 RX ADMIN — SODIUM CHLORIDE 125 ML/HR: 9 INJECTION, SOLUTION INTRAVENOUS at 19:03

## 2018-12-02 RX ADMIN — METOCLOPRAMIDE 10 MG: 5 INJECTION, SOLUTION INTRAMUSCULAR; INTRAVENOUS at 08:43

## 2018-12-02 RX ADMIN — HYDROMORPHONE HYDROCHLORIDE 0.5 MG: 1 INJECTION, SOLUTION INTRAMUSCULAR; INTRAVENOUS; SUBCUTANEOUS at 04:00

## 2018-12-02 RX ADMIN — SODIUM CHLORIDE 8 MG/HR: 900 INJECTION INTRAVENOUS at 15:52

## 2018-12-02 NOTE — PLAN OF CARE
Problem: Patient Care Overview  Goal: Plan of Care Review  Outcome: Ongoing (interventions implemented as appropriate)   12/02/18 0414   Coping/Psychosocial   Plan of Care Reviewed With patient   Plan of Care Review   Progress no change   OTHER   Outcome Summary Afebrile this shift, vitals stable, resting between care, pain controlled.

## 2018-12-02 NOTE — PROGRESS NOTES
[facility] Medicine Services  INPATIENT PROGRESS NOTE     LOS: 0 days   Patient Care Team:  Irma Schuster APRN as PCP - General (Family Medicine)  Jose Banks MD as Consulting Physician (Hematology and Oncology)  Brea Kerns APRN as Nurse Practitioner (Oncology)    Chief Complaint:  <principal problem not specified>      Subjective     Interval History:     Patient Complaints: Patient seen and examined.  Patient resting comfortably.  Patient denies any complaints.  States he is significantly better.    History taken from: Patient.    Review of Systems:    Review of Systems   Respiratory: Negative for shortness of breath.    Cardiovascular: Negative for chest pain.   Gastrointestinal: Negative for abdominal pain, blood in stool, nausea and vomiting.         Objective     Vital Signs  Temp:  [97.3 °F (36.3 °C)-98.9 °F (37.2 °C)] 97.3 °F (36.3 °C)  Heart Rate:  [75-86] 75  Resp:  [18-20] 18  BP: (130-168)/(75-95) 149/90    Physical Exam:   Physical Exam   Constitutional: He appears well-developed.   HENT:   Head: Normocephalic and atraumatic.   Eyes: Pupils are equal, round, and reactive to light.   Neck: Normal range of motion.   Cardiovascular: Normal rate.   Pulmonary/Chest: Effort normal and breath sounds normal. No stridor. No respiratory distress. He has no wheezes.   Abdominal: Soft. There is tenderness in the periumbilical area.   Musculoskeletal: He exhibits no edema.   Neurological: He is alert.   Skin: Skin is warm and dry.   Psychiatric: He has a normal mood and affect.          Results Review:       Results from last 7 days   Lab Units  11/30/18   0716  11/29/18   2055  11/29/18   0833   SODIUM mmol/L  141  137  139   POTASSIUM mmol/L  4.4  4.5  4.2   CHLORIDE mmol/L  103  101  101   CO2 mmol/L  23.0  24.0  27.0   BUN mg/dL  17  20  17   CREATININE mg/dL  0.95  1.06  1.25   GLUCOSE mg/dL  112*  103*  87   CALCIUM mg/dL  9.2  9.3  9.1    BILIRUBIN mg/dL   --   1.0  0.7   ALK PHOS U/L   --   37*  32*   ALT (SGPT) U/L   --   55  55   AST (SGOT) U/L   --   42  43             Results from last 7 days   Lab Units  11/30/18   0716  11/29/18 2055 11/29/18   0833   WBC 10*3/mm3  14.04*  17.15*  8.72   HEMOGLOBIN g/dL  17.2  17.7*  17.1   HEMATOCRIT %  50.1*  52.1*  50.2*   PLATELETS 10*3/mm3  221  228  224       Lab Results   Component Value Date    CKTOTAL 295 (H) 06/27/2018    CKMB 3.21 03/19/2018    TROPONINI <0.012 06/27/2018       CO2   Date Value Ref Range Status   11/30/2018 23.0 22.0 - 31.0 mmol/L Final              Imaging Results (last 7 days)     Procedure Component Value Units Date/Time    CT Abdomen Pelvis Without Contrast [[id number]] Collected:  11/29/18 2335     Updated:  11/30/18 0008    Narrative:       Exam: CT abdomen pelvis without contrast    INDICATION: Mid abdominal pain, vomiting    TECHNIQUE: Routine CT abdomen pelvis without contrast. Sagittal  coronal reconstructions were obtained This exam was performed  according to our departmental dose-optimization program, which  includes automated exposure control, adjustment of the mA and/or  kV according to patient size and/or use of iterative  reconstruction technique.    COMPARISON: 10/13/2018    FINDINGS: There is mild dependent atelectasis in the lungs.    The evaluation of the visceral organs and bowel is limited by the  lack of intravenous and oral contrast. The liver, spleen,  pancreas and adrenal glands are unremarkable. There is mild  nonspecific bilateral perinephric stranding. No hydronephrosis or  ureteral calculi.    The aorta is normal in caliber. No significant adenopathy. No  bowel obstruction or abnormal bowel wall thickening. No appendix.  Diverticulosis without evidence of diverticulitis.    Bladder is unremarkable. No pelvic mass or adenopathy.    Degenerative disc disease is present at L5-S1.      Impression:       .  1. No obvious abnormality.  2.  Diverticulosis.    Electronically signed by:  Franco Saenz MD  11/30/2018 12:07 AM  CST Workstation: [id number]           Blood Culture   Date Value Ref Range Status   12/01/2018 Abnormal Stain (A)  Preliminary     BCID, PCR   Date Value Ref Range Status   12/01/2018 (C) No organism detected by BCID PCR. Final    Staphylococcus species, not aureus. mecA (methicillin resistance gene) detected. Identification by BCID PCR.                          Medication Review:   Current Facility-Administered Medications   Medication Dose Route Frequency Provider Last Rate Last Dose   • carvedilol (COREG) tablet 6.25 mg  6.25 mg Oral Q12H Elana Verde MD   6.25 mg at 12/01/18 2133   • HYDROmorphone (DILAUDID) injection 0.5 mg  0.5 mg Intravenous Q2H PRN Elana Verde MD   0.5 mg at 12/02/18 0400    And   • naloxone (NARCAN) injection 0.4 mg  0.4 mg Intravenous Q5 Min PRN Elana Verde MD       • metoclopramide (REGLAN) injection 10 mg  10 mg Intravenous 4x Daily AC & at Bedtime Jass Bhatt, DO   10 mg at 12/02/18 0843   • ondansetron (ZOFRAN) tablet 4 mg  4 mg Oral Q6H PRN Elana Verde MD       • pantoprazole (PROTONIX) 40 mg/100 mL (0.4 mg/mL) in 0.9% NS IVPB  8 mg/hr Intravenous Continuous Jass Bhatt DO 20 mL/hr at 12/02/18 0842 8 mg/hr at 12/02/18 0842   • piperacillin-tazobactam (ZOSYN) 3.375 g/100 mL 0.9% NS IVPB (mbp)  3.375 g Intravenous Q8H Beto Stevens DO 0 mL/hr at 12/02/18 0755 3.375 g at 12/02/18 0844   • sodium chloride 0.9 % flush 10 mL  10 mL Intravenous PRN Jass Bhatt DO   10 mL at 11/30/18 0213   • sodium chloride 0.9 % flush 3 mL  3 mL Intravenous Q12H Elana Verde MD       • sodium chloride 0.9 % flush 3-10 mL  3-10 mL Intravenous PRN Elana Verde MD       • sodium chloride 0.9 % infusion  125 mL/hr Intravenous Continuous Elana Verde  mL/hr at 12/02/18 0815 125 mL/hr at 12/02/18 0815         Assessment/Plan       Intractable  vomiting with nausea          -We'll get GI evaluation and follow up.  -We'll start patient on clear liquid diet.  -We'll continue with proton pump inhibitors and antibiotics.  -DVT and GI prophylaxis in place.    -We'll continue monitoring patient hospital setting and treat patient as course dictates.  -Patient scheduled for EGD tomorrow.          This document has been electronically signed by Salena Alaniz MD on December 2, 2018 10:23 AM        EMR Dragon/Transcription disclaimer:   Dictated utilizing Dragon dictation.

## 2018-12-02 NOTE — PROGRESS NOTES
SUBJECTIVE:   12/2/2018  Chief Complaint:     Subjective      Patient is 43 y.o. male who continues to have some nausea but no vomiting at this time.  Patient is nothing by mouth at this time.     CURRENT MEDICATIONS/OBJECTIVE/VS/PE:     Current Medications:     Current Facility-Administered Medications   Medication Dose Route Frequency Provider Last Rate Last Dose   • carvedilol (COREG) tablet 6.25 mg  6.25 mg Oral Q12H Elana Verde MD   6.25 mg at 12/02/18 1108   • HYDROmorphone (DILAUDID) injection 0.5 mg  0.5 mg Intravenous Q2H PRN Elana Verde MD   0.5 mg at 12/02/18 0400    And   • naloxone (NARCAN) injection 0.4 mg  0.4 mg Intravenous Q5 Min PRN Elana Verde MD       • metoclopramide (REGLAN) injection 10 mg  10 mg Intravenous 4x Daily AC & at Bedtime Jass Bhatt, DO   10 mg at 12/02/18 0843   • ondansetron (ZOFRAN) tablet 4 mg  4 mg Oral Q6H PRN Elaan Verde MD       • pantoprazole (PROTONIX) 40 mg/100 mL (0.4 mg/mL) in 0.9% NS IVPB  8 mg/hr Intravenous Continuous Jass Bhatt DO 20 mL/hr at 12/02/18 1059 8 mg/hr at 12/02/18 1059   • piperacillin-tazobactam (ZOSYN) 3.375 g/100 mL 0.9% NS IVPB (mbp)  3.375 g Intravenous Q8H Beto Stevens, DO 0 mL/hr at 12/02/18 0755 3.375 g at 12/02/18 1059   • sodium chloride 0.9 % flush 10 mL  10 mL Intravenous PRN Jass Bhatt DO   10 mL at 11/30/18 0213   • sodium chloride 0.9 % flush 3 mL  3 mL Intravenous Q12H Elana Verde MD       • sodium chloride 0.9 % flush 3-10 mL  3-10 mL Intravenous PRN Elana Verde MD       • sodium chloride 0.9 % infusion  125 mL/hr Intravenous Continuous Elana Verde  mL/hr at 12/02/18 1111 125 mL/hr at 12/02/18 1111       Objective     Physical Exam:   Temp:  [97.3 °F (36.3 °C)-98.9 °F (37.2 °C)] 97.3 °F (36.3 °C)  Heart Rate:  [75-86] 75  Resp:  [18-20] 18  BP: (130-168)/(75-95) 149/90     Physical Exam:  General Appearance:    Alert, cooperative, in no acute  distress   Head:    Normocephalic, without obvious abnormality, atraumatic   Eyes:            Lids and lashes normal, conjunctivae and sclerae normal, no   icterus, no pallor, corneas clear, PERRLA   Ears:    Ears appear intact with no abnormalities noted   Throat:   No oral lesions, no thrush, oral mucosa moist   Neck:   No adenopathy, supple, trachea midline, no thyromegaly, no     carotid bruit, no JVD   Back:     No kyphosis present, no scoliosis present, no skin lesions,       erythema or scars, no tenderness to percussion or                   palpation,   range of motion normal   Lungs:     Clear to auscultation,respirations regular, even and                   unlabored    Heart:    Regular rhythm and normal rate, normal S1 and S2, no            murmur, no gallop, no rub, no click   Breast Exam:    Deferred   Abdomen:     Normal bowel sounds, no masses, no organomegaly, soft        non-tender, non-distended, no guarding, no rebound                 tenderness   Genitalia:    Deferred   Extremities:   Moves all extremities well, no edema, no cyanosis, no              redness   Pulses:   Pulses palpable and equal bilaterally   Skin:   No bleeding, bruising or rash   Lymph nodes:   No palpable adenopathy   Neurologic:   Cranial nerves 2 - 12 grossly intact, sensation intact, DTR        present and equal bilaterally      Results Review:     Lab Results (last 24 hours)     Procedure Component Value Units Date/Time    Blood Culture - Blood, Hand, Right [[id number]]  (Abnormal) Collected:  12/01/18 0937    Specimen:  Blood from Hand, Right Updated:  12/02/18 0820     Blood Culture Abnormal Stain     Gram Stain Anaerobic Bottle Gram positive cocci in clusters     Comment: 2 nd bottle positive of 4 drawn       Blood Culture - Blood, Hand, Left [[id number]]  (Abnormal) Collected:  12/01/18 0935    Specimen:  Blood from Hand, Left Updated:  12/02/18 0815     Blood Culture Abnormal Stain     Gram Stain Aerobic Bottle Gram  positive cocci in clusters     Comment: 1 ST BOTTLE POSITIVE OF 4 DRAWN       Blood Culture ID, PCR - Blood, Hand, Left [[id number]]  (Abnormal) Collected:  12/01/18 0935    Specimen:  Blood from Hand, Left Updated:  12/02/18 0815     BCID, PCR Staphylococcus species, not aureus. mecA (methicillin resistance gene) detected. Identification by BCID PCR.    Respiratory Panel, PCR - Swab, Nasopharynx [[id number]]  (Normal) Collected:  12/01/18 1611    Specimen:  Swab from Nasopharynx Updated:  12/01/18 2042     ADENOVIRUS, PCR Not Detected     Coronavirus 229E Not Detected     Coronavirus HKU1 Not Detected     Coronavirus NL63 Not Detected     Coronavirus OC43 Not Detected     Human Metapneumovirus Not Detected     Human Rhinovirus/Enterovirus Not Detected     Influenza B PCR Not Detected     Parainfluenza Virus 1 Not Detected     Parainfluenza Virus 2 Not Detected     Parainfluenza Virus 3 Not Detected     Parainfluenza Virus 4 Not Detected     Bordetella pertussis pcr Not Detected     Influenza A H1 2009 PCR Not Detected     Chlamydophila pneumoniae PCR Not Detected     Mycoplasma pneumo by PCR Not Detected     Influenza A PCR Not Detected     Influenza A H3 Not Detected     Influenza A H1 Not Detected     RSV, PCR Not Detected           I reviewed the patient's new clinical results.  I reviewed the patient's new imaging results and agree with the interpretation.     ASSESSMENT/PLAN:   ASSESSMENT: Patient with nausea and vomiting.  Patient states that the nausea has significantly decreased and has had no vomiting.  Patient states his appetite is still poor.    PLAN: 1 continue patient on proton pump inhibitor and antiemetics.  #2 we'll do EGD tomorrow.  The risks, benefits, and alternatives of this procedure have been discussed with the patient or the responsible party- the patient understands and agrees to proceed.         Edwin Medina MD  12/02/18  11:25 AM           This document has been electronically signed by  Edwin Medina MD on December 2, 2018 11:25 AM

## 2018-12-02 NOTE — NURSING NOTE
Pt altered pump at end of dayshift and had run entire bag of protonix in over one hour.   Pt educated on not touching pump controls and pump put in lockout mode.  Dr. Stevens notified and nurse instructed to hang next dose when it would have been due.

## 2018-12-02 NOTE — H&P (VIEW-ONLY)
SUBJECTIVE:   12/2/2018  Chief Complaint:     Subjective      Patient is 43 y.o. male who continues to have some nausea but no vomiting at this time.  Patient is nothing by mouth at this time.     CURRENT MEDICATIONS/OBJECTIVE/VS/PE:     Current Medications:     Current Facility-Administered Medications   Medication Dose Route Frequency Provider Last Rate Last Dose   • carvedilol (COREG) tablet 6.25 mg  6.25 mg Oral Q12H Elana Verde MD   6.25 mg at 12/02/18 1108   • HYDROmorphone (DILAUDID) injection 0.5 mg  0.5 mg Intravenous Q2H PRN Elana Verde MD   0.5 mg at 12/02/18 0400    And   • naloxone (NARCAN) injection 0.4 mg  0.4 mg Intravenous Q5 Min PRN Elana Verde MD       • metoclopramide (REGLAN) injection 10 mg  10 mg Intravenous 4x Daily AC & at Bedtime Jass Bhatt, DO   10 mg at 12/02/18 0843   • ondansetron (ZOFRAN) tablet 4 mg  4 mg Oral Q6H PRN Elana Verde MD       • pantoprazole (PROTONIX) 40 mg/100 mL (0.4 mg/mL) in 0.9% NS IVPB  8 mg/hr Intravenous Continuous Jass Bhatt DO 20 mL/hr at 12/02/18 1059 8 mg/hr at 12/02/18 1059   • piperacillin-tazobactam (ZOSYN) 3.375 g/100 mL 0.9% NS IVPB (mbp)  3.375 g Intravenous Q8H Beto Stevens, DO 0 mL/hr at 12/02/18 0755 3.375 g at 12/02/18 1059   • sodium chloride 0.9 % flush 10 mL  10 mL Intravenous PRN Jass Bhatt DO   10 mL at 11/30/18 0213   • sodium chloride 0.9 % flush 3 mL  3 mL Intravenous Q12H Elana Verde MD       • sodium chloride 0.9 % flush 3-10 mL  3-10 mL Intravenous PRN Elana Verde MD       • sodium chloride 0.9 % infusion  125 mL/hr Intravenous Continuous Elana Verde  mL/hr at 12/02/18 1111 125 mL/hr at 12/02/18 1111       Objective     Physical Exam:   Temp:  [97.3 °F (36.3 °C)-98.9 °F (37.2 °C)] 97.3 °F (36.3 °C)  Heart Rate:  [75-86] 75  Resp:  [18-20] 18  BP: (130-168)/(75-95) 149/90     Physical Exam:  General Appearance:    Alert, cooperative, in no acute  distress   Head:    Normocephalic, without obvious abnormality, atraumatic   Eyes:            Lids and lashes normal, conjunctivae and sclerae normal, no   icterus, no pallor, corneas clear, PERRLA   Ears:    Ears appear intact with no abnormalities noted   Throat:   No oral lesions, no thrush, oral mucosa moist   Neck:   No adenopathy, supple, trachea midline, no thyromegaly, no     carotid bruit, no JVD   Back:     No kyphosis present, no scoliosis present, no skin lesions,       erythema or scars, no tenderness to percussion or                   palpation,   range of motion normal   Lungs:     Clear to auscultation,respirations regular, even and                   unlabored    Heart:    Regular rhythm and normal rate, normal S1 and S2, no            murmur, no gallop, no rub, no click   Breast Exam:    Deferred   Abdomen:     Normal bowel sounds, no masses, no organomegaly, soft        non-tender, non-distended, no guarding, no rebound                 tenderness   Genitalia:    Deferred   Extremities:   Moves all extremities well, no edema, no cyanosis, no              redness   Pulses:   Pulses palpable and equal bilaterally   Skin:   No bleeding, bruising or rash   Lymph nodes:   No palpable adenopathy   Neurologic:   Cranial nerves 2 - 12 grossly intact, sensation intact, DTR        present and equal bilaterally      Results Review:     Lab Results (last 24 hours)     Procedure Component Value Units Date/Time    Blood Culture - Blood, Hand, Right [[id number]]  (Abnormal) Collected:  12/01/18 0937    Specimen:  Blood from Hand, Right Updated:  12/02/18 0820     Blood Culture Abnormal Stain     Gram Stain Anaerobic Bottle Gram positive cocci in clusters     Comment: 2 nd bottle positive of 4 drawn       Blood Culture - Blood, Hand, Left [[id number]]  (Abnormal) Collected:  12/01/18 0935    Specimen:  Blood from Hand, Left Updated:  12/02/18 0815     Blood Culture Abnormal Stain     Gram Stain Aerobic Bottle Gram  positive cocci in clusters     Comment: 1 ST BOTTLE POSITIVE OF 4 DRAWN       Blood Culture ID, PCR - Blood, Hand, Left [[id number]]  (Abnormal) Collected:  12/01/18 0935    Specimen:  Blood from Hand, Left Updated:  12/02/18 0815     BCID, PCR Staphylococcus species, not aureus. mecA (methicillin resistance gene) detected. Identification by BCID PCR.    Respiratory Panel, PCR - Swab, Nasopharynx [[id number]]  (Normal) Collected:  12/01/18 1611    Specimen:  Swab from Nasopharynx Updated:  12/01/18 2042     ADENOVIRUS, PCR Not Detected     Coronavirus 229E Not Detected     Coronavirus HKU1 Not Detected     Coronavirus NL63 Not Detected     Coronavirus OC43 Not Detected     Human Metapneumovirus Not Detected     Human Rhinovirus/Enterovirus Not Detected     Influenza B PCR Not Detected     Parainfluenza Virus 1 Not Detected     Parainfluenza Virus 2 Not Detected     Parainfluenza Virus 3 Not Detected     Parainfluenza Virus 4 Not Detected     Bordetella pertussis pcr Not Detected     Influenza A H1 2009 PCR Not Detected     Chlamydophila pneumoniae PCR Not Detected     Mycoplasma pneumo by PCR Not Detected     Influenza A PCR Not Detected     Influenza A H3 Not Detected     Influenza A H1 Not Detected     RSV, PCR Not Detected           I reviewed the patient's new clinical results.  I reviewed the patient's new imaging results and agree with the interpretation.     ASSESSMENT/PLAN:   ASSESSMENT: Patient with nausea and vomiting.  Patient states that the nausea has significantly decreased and has had no vomiting.  Patient states his appetite is still poor.    PLAN: 1 continue patient on proton pump inhibitor and antiemetics.  #2 we'll do EGD tomorrow.  The risks, benefits, and alternatives of this procedure have been discussed with the patient or the responsible party- the patient understands and agrees to proceed.         Edwin Medina MD  12/02/18  11:25 AM           This document has been electronically signed by  Edwin Medina MD on December 2, 2018 11:25 AM

## 2018-12-02 NOTE — PLAN OF CARE
Problem: Nausea/Vomiting (Adult)  Goal: Symptom Relief  Outcome: Ongoing (interventions implemented as appropriate)      Problem: Pain, Acute (Adult)  Goal: Acceptable Pain Control/Comfort Level  Outcome: Ongoing (interventions implemented as appropriate)      Problem: Patient Care Overview  Goal: Plan of Care Review  Outcome: Ongoing (interventions implemented as appropriate)   12/02/18 0804   Coping/Psychosocial   Plan of Care Reviewed With patient;spouse   Plan of Care Review   Progress improving   OTHER   Outcome Summary EGD tomorrow; NPO at midnight

## 2018-12-03 ENCOUNTER — ANESTHESIA (OUTPATIENT)
Dept: GASTROENTEROLOGY | Facility: HOSPITAL | Age: 43
End: 2018-12-03

## 2018-12-03 ENCOUNTER — ANESTHESIA EVENT (OUTPATIENT)
Dept: GASTROENTEROLOGY | Facility: HOSPITAL | Age: 43
End: 2018-12-03

## 2018-12-03 VITALS
SYSTOLIC BLOOD PRESSURE: 117 MMHG | HEART RATE: 79 BPM | WEIGHT: 212.44 LBS | DIASTOLIC BLOOD PRESSURE: 75 MMHG | TEMPERATURE: 98.2 F | OXYGEN SATURATION: 98 % | BODY MASS INDEX: 25.87 KG/M2 | HEIGHT: 76 IN | RESPIRATION RATE: 19 BRPM

## 2018-12-03 PROBLEM — IMO0001 ALCOHOLISM /ALCOHOL ABUSE: Status: ACTIVE | Noted: 2017-02-15

## 2018-12-03 PROBLEM — IMO0001 ALCOHOLISM /ALCOHOL ABUSE: Chronic | Status: ACTIVE | Noted: 2017-02-15

## 2018-12-03 PROBLEM — I10 HYPERTENSION: Chronic | Status: ACTIVE | Noted: 2018-03-28

## 2018-12-03 PROBLEM — K29.20 CHRONIC ALCOHOLIC GASTRITIS WITHOUT HEMORRHAGE: Chronic | Status: ACTIVE | Noted: 2018-03-27

## 2018-12-03 PROBLEM — K44.9 HIATAL HERNIA: Chronic | Status: ACTIVE | Noted: 2018-12-03

## 2018-12-03 PROBLEM — K20.90 ESOPHAGITIS: Chronic | Status: ACTIVE | Noted: 2018-04-09

## 2018-12-03 PROBLEM — K20.90 ESOPHAGITIS: Status: ACTIVE | Noted: 2018-04-09

## 2018-12-03 PROBLEM — K29.70 GASTRITIS: Chronic | Status: ACTIVE | Noted: 2018-12-03

## 2018-12-03 PROCEDURE — 96376 TX/PRO/DX INJ SAME DRUG ADON: CPT

## 2018-12-03 PROCEDURE — 88305 TISSUE EXAM BY PATHOLOGIST: CPT | Performed by: INTERNAL MEDICINE

## 2018-12-03 PROCEDURE — 25010000002 METOCLOPRAMIDE PER 10 MG: Performed by: EMERGENCY MEDICINE

## 2018-12-03 PROCEDURE — 25010000002 PIPERACILLIN SOD-TAZOBACTAM PER 1 G: Performed by: HOSPITALIST

## 2018-12-03 PROCEDURE — 25010000002 PROPOFOL 10 MG/ML EMULSION: Performed by: NURSE ANESTHETIST, CERTIFIED REGISTERED

## 2018-12-03 PROCEDURE — 43239 EGD BIOPSY SINGLE/MULTIPLE: CPT | Performed by: INTERNAL MEDICINE

## 2018-12-03 PROCEDURE — G0378 HOSPITAL OBSERVATION PER HR: HCPCS

## 2018-12-03 PROCEDURE — 25010000002 HYDROMORPHONE 1 MG/ML SOLUTION: Performed by: FAMILY MEDICINE

## 2018-12-03 PROCEDURE — 88305 TISSUE EXAM BY PATHOLOGIST: CPT | Performed by: PATHOLOGY

## 2018-12-03 RX ORDER — LIDOCAINE HYDROCHLORIDE 10 MG/ML
INJECTION, SOLUTION INFILTRATION; PERINEURAL AS NEEDED
Status: DISCONTINUED | OUTPATIENT
Start: 2018-12-03 | End: 2018-12-03 | Stop reason: SURG

## 2018-12-03 RX ORDER — ONDANSETRON 4 MG/1
4 TABLET, FILM COATED ORAL EVERY 6 HOURS PRN
Qty: 30 TABLET | Refills: 0 | Status: SHIPPED | OUTPATIENT
Start: 2018-12-03 | End: 2019-06-19

## 2018-12-03 RX ORDER — PANTOPRAZOLE SODIUM 40 MG/1
40 TABLET, DELAYED RELEASE ORAL DAILY
Qty: 30 TABLET | Refills: 1 | Status: SHIPPED | OUTPATIENT
Start: 2018-12-03 | End: 2019-01-05

## 2018-12-03 RX ORDER — DEXTROSE AND SODIUM CHLORIDE 5; .45 G/100ML; G/100ML
30 INJECTION, SOLUTION INTRAVENOUS CONTINUOUS PRN
Status: DISCONTINUED | OUTPATIENT
Start: 2018-12-03 | End: 2018-12-03 | Stop reason: HOSPADM

## 2018-12-03 RX ORDER — PROPOFOL 10 MG/ML
VIAL (ML) INTRAVENOUS AS NEEDED
Status: DISCONTINUED | OUTPATIENT
Start: 2018-12-03 | End: 2018-12-03 | Stop reason: SURG

## 2018-12-03 RX ADMIN — CARVEDILOL 6.25 MG: 6.25 TABLET, FILM COATED ORAL at 09:50

## 2018-12-03 RX ADMIN — LIDOCAINE HYDROCHLORIDE 100 MG: 10 INJECTION, SOLUTION INFILTRATION; PERINEURAL at 14:43

## 2018-12-03 RX ADMIN — HYDROMORPHONE HYDROCHLORIDE 0.5 MG: 1 INJECTION, SOLUTION INTRAMUSCULAR; INTRAVENOUS; SUBCUTANEOUS at 06:09

## 2018-12-03 RX ADMIN — METOCLOPRAMIDE 10 MG: 5 INJECTION, SOLUTION INTRAMUSCULAR; INTRAVENOUS at 12:37

## 2018-12-03 RX ADMIN — PIPERACILLIN SODIUM,TAZOBACTAM SODIUM 3.38 G: 3; .375 INJECTION, POWDER, FOR SOLUTION INTRAVENOUS at 09:04

## 2018-12-03 RX ADMIN — SODIUM CHLORIDE 8 MG/HR: 900 INJECTION INTRAVENOUS at 09:50

## 2018-12-03 RX ADMIN — METOCLOPRAMIDE 10 MG: 5 INJECTION, SOLUTION INTRAMUSCULAR; INTRAVENOUS at 09:04

## 2018-12-03 RX ADMIN — SODIUM CHLORIDE 8 MG/HR: 900 INJECTION INTRAVENOUS at 03:20

## 2018-12-03 RX ADMIN — SODIUM CHLORIDE 125 ML/HR: 9 INJECTION, SOLUTION INTRAVENOUS at 09:04

## 2018-12-03 RX ADMIN — PIPERACILLIN SODIUM,TAZOBACTAM SODIUM 3.38 G: 3; .375 INJECTION, POWDER, FOR SOLUTION INTRAVENOUS at 01:35

## 2018-12-03 RX ADMIN — HYDROMORPHONE HYDROCHLORIDE 0.5 MG: 1 INJECTION, SOLUTION INTRAMUSCULAR; INTRAVENOUS; SUBCUTANEOUS at 01:35

## 2018-12-03 RX ADMIN — PROPOFOL 200 MG: 10 INJECTION, EMULSION INTRAVENOUS at 14:43

## 2018-12-03 NOTE — PROGRESS NOTES
Progress Note  Alexandre Borja MD  Hospitalist    Date of visit: 12/3/2018     LOS: 0 days   Patient Care Team:  Irma Schuster APRN as PCP - General (Family Medicine)  Jose Banks MD as Consulting Physician (Hematology and Oncology)    Chief Complaint: Hematemesis    Subjective     Interval History:     Patient Complaints: Nausea/vomiting, improved.  No further hematemesis    History taken from: patient    Medication Review:   Current Facility-Administered Medications   Medication Dose Route Frequency Provider Last Rate Last Dose   • carvedilol (COREG) tablet 6.25 mg  6.25 mg Oral Q12H Elana Verde MD   6.25 mg at 12/03/18 0950   • HYDROmorphone (DILAUDID) injection 0.5 mg  0.5 mg Intravenous Q2H PRN Elana Verde MD   0.5 mg at 12/03/18 0609    And   • naloxone (NARCAN) injection 0.4 mg  0.4 mg Intravenous Q5 Min PRN Elana Verde MD       • metoclopramide (REGLAN) injection 10 mg  10 mg Intravenous 4x Daily AC & at Bedtime Jass Bhatt DO   10 mg at 12/03/18 1237   • ondansetron (ZOFRAN) tablet 4 mg  4 mg Oral Q6H PRN Elana Verde MD       • pantoprazole (PROTONIX) 40 mg/100 mL (0.4 mg/mL) in 0.9% NS IVPB  8 mg/hr Intravenous Continuous Jass Bhatt DO 20 mL/hr at 12/03/18 1216 8 mg/hr at 12/03/18 1216   • piperacillin-tazobactam (ZOSYN) 3.375 g/100 mL 0.9% NS IVPB (mbp)  3.375 g Intravenous Q8H Beto Stevens DO 0 mL/hr at 12/02/18 1530 3.375 g at 12/03/18 1216   • sodium chloride 0.9 % flush 10 mL  10 mL Intravenous PRN Jass Bhatt DO   10 mL at 11/30/18 0213   • sodium chloride 0.9 % flush 3 mL  3 mL Intravenous Q12H Elana Verde MD   3 mL at 12/02/18 2154   • sodium chloride 0.9 % flush 3-10 mL  3-10 mL Intravenous PRN Elana Verde MD       • sodium chloride 0.9 % infusion  125 mL/hr Intravenous Continuous Elana Verde  mL/hr at 12/03/18 1216 125 mL/hr at 12/03/18 1216       Review of Systems:   Review of Systems    Constitutional: Positive for fatigue.   Respiratory: Negative for cough and shortness of breath.    Cardiovascular: Negative for chest pain and leg swelling.   Gastrointestinal: Positive for abdominal pain, blood in stool and nausea. Negative for abdominal distention and vomiting.   Genitourinary: Negative for dysuria, frequency, hematuria and urgency.   Musculoskeletal: Negative for arthralgias, back pain and joint swelling.   Skin: Positive for pallor.   Neurological: Positive for weakness. Negative for speech difficulty and headaches.   Psychiatric/Behavioral: Negative for agitation, behavioral problems and confusion.   All other systems reviewed and are negative.      Objective     Vital Signs  Temp:  [96.7 °F (35.9 °C)-98.6 °F (37 °C)] 98.6 °F (37 °C)  Heart Rate:  [59-79] 63  Resp:  [18-20] 20  BP: (136-159)/(82-98) 140/93    Physical Exam:  Physical Exam   Constitutional: He is oriented to person, place, and time. He appears well-developed and well-nourished. No distress.   HENT:   Head: Normocephalic and atraumatic.   Eyes: EOM are normal. Pupils are equal, round, and reactive to light. No scleral icterus.   Neck: Normal range of motion. Neck supple.   Cardiovascular: Normal rate and regular rhythm.   Pulmonary/Chest: Effort normal and breath sounds normal. No stridor. No respiratory distress.   Abdominal: Soft. Bowel sounds are normal. He exhibits no distension. There is no tenderness.   Musculoskeletal: Normal range of motion. He exhibits no edema or tenderness.   Neurological: He is alert and oriented to person, place, and time. He displays normal reflexes. No cranial nerve deficit. Coordination normal.   Skin: Skin is warm and dry. No erythema. There is pallor.   Psychiatric: He has a normal mood and affect. His behavior is normal.   Vitals reviewed.       Results Review:    Lab Results (last 24 hours)     Procedure Component Value Units Date/Time    Blood Culture - Blood, Hand, Left [[id number]]   (Abnormal) Collected:  12/01/18 0935    Specimen:  Blood from Hand, Left Updated:  12/03/18 0622     Blood Culture Staphylococcus, coagulase negative     Isolated from Aerobic and Anaerobic Bottles     Gram Stain Aerobic Bottle Gram positive cocci in clusters     Comment: 1 ST BOTTLE POSITIVE OF 4 DRAWN       Blood Culture - Blood, Hand, Right [[id number]]  (Abnormal) Collected:  12/01/18 0937    Specimen:  Blood from Hand, Right Updated:  12/03/18 0622     Blood Culture Staphylococcus, coagulase negative     Gram Stain Anaerobic Bottle Gram positive cocci in clusters     Comment: 2 nd bottle positive of 4 drawn             Imaging Results (last 24 hours)     ** No results found for the last 24 hours. **          Assessment/Plan       Intractable vomiting with nausea    Hematemesis with nausea    Chronic alcoholic gastritis without hemorrhage    Continue with IV Protonix.  He is scheduled for upper endoscopy later on today.  Repeat CBC in AM.    Alexandre Borja MD  12/03/18  2:33 PM

## 2018-12-03 NOTE — ANESTHESIA POSTPROCEDURE EVALUATION
Patient: Stevenson Chilel    Procedure Summary     Date:  12/03/18 Room / Location:  [facility] END[facility]COPY 1 / [facility]    Anesthesia Start:  1430 Anesthesia Stop:  1448    Procedure:  ESOPHAGOGASTRODUODENOSCOPY (N/A ) Diagnosis:       Intractable vomiting with nausea, unspecified vomiting type      Hematemesis with nausea      (Intractable vomiting with nausea, unspecified vomiting type [R11.2])      (Hematemesis with nausea [K92.0])    Surgeon:  Edwin Medina MD Provider:  Deisi Juárez CRNA    Anesthesia Type:  MAC ASA Status:  3          Anesthesia Type: MAC  Last vitals  BP   140/93 (12/03/18 1414)   Temp   98.6 °F (37 °C) (12/03/18 1414)   Pulse   63 (12/03/18 1414)   Resp   20 (12/03/18 1414)     SpO2   93 % (12/03/18 1414)     Post Anesthesia Care and Evaluation    Patient location during evaluation: bedside  Patient participation: waiting for patient participation  Level of consciousness: sleepy but conscious  Pain score: 0  Pain management: adequate  Airway patency: patent  Anesthetic complications: No anesthetic complications  PONV Status: none  Cardiovascular status: acceptable  Respiratory status: acceptable  Hydration status: acceptable

## 2018-12-03 NOTE — PROGRESS NOTES
SUBJECTIVE:   12/3/2018  Chief Complaint:     Subjective      Patient is 43 y.o. male with complaint of nausea and vomiting.  Patient Epigastric Abdominal Discomfort.  Patient States That This Time He Feels Hungry and like on a Regular Diet.     CURRENT MEDICATIONS/OBJECTIVE/VS/PE:     Current Medications:     Current Facility-Administered Medications   Medication Dose Route Frequency Provider Last Rate Last Dose   • carvedilol (COREG) tablet 6.25 mg  6.25 mg Oral Q12H Elana Verde MD   6.25 mg at 12/03/18 0950   • HYDROmorphone (DILAUDID) injection 0.5 mg  0.5 mg Intravenous Q2H PRN Elana Verde MD   0.5 mg at 12/03/18 0609    And   • naloxone (NARCAN) injection 0.4 mg  0.4 mg Intravenous Q5 Min PRN Elana Verde MD       • metoclopramide (REGLAN) injection 10 mg  10 mg Intravenous 4x Daily AC & at Bedtime Jass Bhatt, DO   10 mg at 12/03/18 1237   • ondansetron (ZOFRAN) tablet 4 mg  4 mg Oral Q6H PRN Elana Verde MD       • pantoprazole (PROTONIX) 40 mg/100 mL (0.4 mg/mL) in 0.9% NS IVPB  8 mg/hr Intravenous Continuous Jass Bhatt, DO 20 mL/hr at 12/03/18 1216 8 mg/hr at 12/03/18 1216   • piperacillin-tazobactam (ZOSYN) 3.375 g/100 mL 0.9% NS IVPB (mbp)  3.375 g Intravenous Q8H Beto Stevens, DO 0 mL/hr at 12/02/18 1530 3.375 g at 12/03/18 1216   • sodium chloride 0.9 % flush 10 mL  10 mL Intravenous PRN Jass Bhatt DO   10 mL at 11/30/18 0213   • sodium chloride 0.9 % flush 3 mL  3 mL Intravenous Q12H Elana Verde MD   3 mL at 12/02/18 2154   • sodium chloride 0.9 % flush 3-10 mL  3-10 mL Intravenous PRN Elana Verde MD       • sodium chloride 0.9 % infusion  125 mL/hr Intravenous Continuous Elana Verde  mL/hr at 12/03/18 1216 125 mL/hr at 12/03/18 1216       Objective     Physical Exam:   Temp:  [96.7 °F (35.9 °C)-98.6 °F (37 °C)] 98.6 °F (37 °C)  Heart Rate:  [59-79] 63  Resp:  [18-20] 20  BP: (136-159)/(82-98) 140/93     Physical  Exam:  General Appearance:    Alert, cooperative, in no acute distress   Head:    Normocephalic, without obvious abnormality, atraumatic   Eyes:            Lids and lashes normal, conjunctivae and sclerae normal, no   icterus, no pallor, corneas clear, PERRLA   Ears:    Ears appear intact with no abnormalities noted   Throat:   No oral lesions, no thrush, oral mucosa moist   Neck:   No adenopathy, supple, trachea midline, no thyromegaly, no     carotid bruit, no JVD   Back:     No kyphosis present, no scoliosis present, no skin lesions,       erythema or scars, no tenderness to percussion or                   palpation,   range of motion normal   Lungs:     Clear to auscultation,respirations regular, even and                   unlabored    Heart:    Regular rhythm and normal rate, normal S1 and S2, no            murmur, no gallop, no rub, no click   Breast Exam:    Deferred   Abdomen:     Normal bowel sounds, no masses, no organomegaly, soft        non-tender, non-distended, no guarding, no rebound                 tenderness   Genitalia:    Deferred   Extremities:   Moves all extremities well, no edema, no cyanosis, no              redness   Pulses:   Pulses palpable and equal bilaterally   Skin:   No bleeding, bruising or rash   Lymph nodes:   No palpable adenopathy   Neurologic:   Cranial nerves 2 - 12 grossly intact, sensation intact, DTR        present and equal bilaterally      Results Review:     Lab Results (last 24 hours)     Procedure Component Value Units Date/Time    Blood Culture - Blood, Hand, Left [[id number]]  (Abnormal) Collected:  12/01/18 0935    Specimen:  Blood from Hand, Left Updated:  12/03/18 0622     Blood Culture Staphylococcus, coagulase negative     Isolated from Aerobic and Anaerobic Bottles     Gram Stain Aerobic Bottle Gram positive cocci in clusters     Comment: 1 ST BOTTLE POSITIVE OF 4 DRAWN       Blood Culture - Blood, Hand, Right [[id number]]  (Abnormal) Collected:  12/01/18 0937     Specimen:  Blood from Hand, Right Updated:  12/03/18 0622     Blood Culture Staphylococcus, coagulase negative     Gram Stain Anaerobic Bottle Gram positive cocci in clusters     Comment: 2 nd bottle positive of 4 drawn              I reviewed the patient's new clinical results.  I reviewed the patient's new imaging results and agree with the interpretation.     ASSESSMENT/PLAN:   ASSESSMENT: Patient with esophagitis and gastritis marked improvement in nausea and vomiting.  We'll start patient on regular diet patient be discharged from GI standpoint.    PLAN: #1 patient be discharged home from a GI standpoint.  Patient follow-up in GI within 1 week.  The risks, benefits, and alternatives of this procedure have been discussed with the patient or the responsible party- the patient understands and agrees to proceed.         Edwin Medina MD  12/03/18  2:52 PM           This document has been electronically signed by Edwin Medina MD on December 3, 2018 2:52 PM

## 2018-12-03 NOTE — PLAN OF CARE
Problem: Nausea/Vomiting (Adult)  Goal: Symptom Relief  Outcome: Ongoing (interventions implemented as appropriate)    Goal: Adequate Hydration  Outcome: Ongoing (interventions implemented as appropriate)      Problem: Pain, Acute (Adult)  Goal: Acceptable Pain Control/Comfort Level  Outcome: Ongoing (interventions implemented as appropriate)      Problem: Patient Care Overview  Goal: Plan of Care Review  Outcome: Ongoing (interventions implemented as appropriate)   12/03/18 0369   Coping/Psychosocial   Plan of Care Reviewed With patient   Plan of Care Review   Progress improving   OTHER   Outcome Summary vss, pain controlled, EGD today

## 2018-12-03 NOTE — ANESTHESIA PREPROCEDURE EVALUATION
Anesthesia Evaluation     Patient summary reviewed and Nursing notes reviewed   NPO Solid Status: > 8 hours  NPO Liquid Status: > 8 hours           Airway   Mallampati: II  TM distance: >3 FB  Neck ROM: full  No difficulty expected  Dental - normal exam     Pulmonary - negative pulmonary ROS and normal exam   Cardiovascular - normal exam    (+) hypertension well controlled less than 2 medications, dysrhythmias Atrial Fib,       Neuro/Psych  (+) psychiatric history Depression and Anxiety,     GI/Hepatic/Renal/Endo    (+)  GERD well controlled,      Musculoskeletal (-) negative ROS    Abdominal  - normal exam   Substance History   (+) drug use      Comment: MJ QD   OB/GYN          Other - negative ROS                       Anesthesia Plan    ASA 3     MAC     intravenous induction   Anesthetic plan, all risks, benefits, and alternatives have been provided, discussed and informed consent has been obtained with: patient.

## 2018-12-04 ENCOUNTER — READMISSION MANAGEMENT (OUTPATIENT)
Dept: CALL CENTER | Facility: HOSPITAL | Age: 43
End: 2018-12-04

## 2018-12-04 LAB
BACTERIA SPEC AEROBE CULT: ABNORMAL
BACTERIA SPEC AEROBE CULT: ABNORMAL
GRAM STN SPEC: ABNORMAL
GRAM STN SPEC: ABNORMAL
ISOLATED FROM: ABNORMAL

## 2018-12-04 NOTE — OUTREACH NOTE
Prep Survey      Responses   Facility patient discharged from?  [place]   Is patient eligible?  Yes   Discharge diagnosis  Intractable vomiting with nausea   Does the patient have one of the following disease processes/diagnoses(primary or secondary)?  Other   Does the patient have Home health ordered?  No   Is there a DME ordered?  No   Prep survey completed?  Yes          Lakeisha Castro RN

## 2018-12-05 ENCOUNTER — READMISSION MANAGEMENT (OUTPATIENT)
Dept: CALL CENTER | Facility: HOSPITAL | Age: 43
End: 2018-12-05

## 2018-12-05 LAB
LAB AP CASE REPORT: NORMAL
PATH REPORT.FINAL DX SPEC: NORMAL
PATH REPORT.GROSS SPEC: NORMAL

## 2018-12-05 NOTE — OUTREACH NOTE
Medical Week 1 Survey      Responses   Facility patient discharged from?  [place]   Does the patient have one of the following disease processes/diagnoses(primary or secondary)?  Other   Is there a successful TCM telephone encounter documented?  No   Week 1 attempt successful?  No   Unsuccessful attempts  Attempt 1          Cristin Russell RN

## 2018-12-06 ENCOUNTER — HOSPITAL ENCOUNTER (OUTPATIENT)
Dept: ULTRASOUND IMAGING | Facility: HOSPITAL | Age: 43
Discharge: HOME | End: 2018-12-06
Admitting: UROLOGY

## 2018-12-06 ENCOUNTER — READMISSION MANAGEMENT (OUTPATIENT)
Dept: CALL CENTER | Facility: HOSPITAL | Age: 43
End: 2018-12-06

## 2018-12-06 DIAGNOSIS — N20.0 CALCULUS OF KIDNEY: ICD-10-CM

## 2018-12-06 PROCEDURE — 76775 US EXAM ABDO BACK WALL LIM: CPT

## 2018-12-06 NOTE — OUTREACH NOTE
Medical Week 1 Survey      Responses   Facility patient discharged from?  [place]   Does the patient have one of the following disease processes/diagnoses(primary or secondary)?  Other   Is there a successful TCM telephone encounter documented?  No   Week 1 attempt successful?  No   Unsuccessful attempts  Attempt 2          Jessica Clarke RN

## 2018-12-07 ENCOUNTER — READMISSION MANAGEMENT (OUTPATIENT)
Dept: CALL CENTER | Facility: HOSPITAL | Age: 43
End: 2018-12-07

## 2018-12-07 NOTE — OUTREACH NOTE
Medical Week 1 Survey      Responses   Facility patient discharged from?  [place]   Does the patient have one of the following disease processes/diagnoses(primary or secondary)?  Other   Is there a successful TCM telephone encounter documented?  No   Week 1 attempt successful?  No   Unsuccessful attempts  Attempt 3          Deepa Day RN

## 2018-12-10 ENCOUNTER — APPOINTMENT (OUTPATIENT)
Dept: ULTRASOUND IMAGING | Facility: HOSPITAL | Age: 43
End: 2018-12-10

## 2018-12-12 ENCOUNTER — READMISSION MANAGEMENT (OUTPATIENT)
Dept: CALL CENTER | Facility: HOSPITAL | Age: 43
End: 2018-12-12

## 2018-12-12 NOTE — OUTREACH NOTE
Medical Week 1 Survey      Responses   Facility patient discharged from?  [place] [facility]   Does the patient have one of the following disease processes/diagnoses(primary or secondary)?  Other   Is there a successful TCM telephone encounter documented?  No   Week 1 attempt successful?  No   Unsuccessful attempts  Attempt 4          Emilia Fraser RN

## 2018-12-13 ENCOUNTER — READMISSION MANAGEMENT (OUTPATIENT)
Dept: CALL CENTER | Facility: HOSPITAL | Age: 43
End: 2018-12-13

## 2018-12-13 NOTE — OUTREACH NOTE
Medical Week 2 Survey      Responses   Facility patient discharged from?  [place]   Does the patient have one of the following disease processes/diagnoses(primary or secondary)?  Other   Week 2 attempt successful?  No   Unsuccessful attempts  Attempt 2          Maria Elena Castellon RN

## 2018-12-14 ENCOUNTER — OFFICE VISIT (OUTPATIENT)
Dept: SURGERY | Facility: CLINIC | Age: 43
End: 2018-12-14

## 2018-12-14 VITALS
SYSTOLIC BLOOD PRESSURE: 120 MMHG | WEIGHT: 211.8 LBS | BODY MASS INDEX: 25.79 KG/M2 | TEMPERATURE: 98 F | HEART RATE: 95 BPM | HEIGHT: 76 IN | DIASTOLIC BLOOD PRESSURE: 80 MMHG

## 2018-12-14 DIAGNOSIS — K57.32 DIVERTICULITIS OF LARGE INTESTINE WITHOUT PERFORATION OR ABSCESS WITHOUT BLEEDING: Primary | ICD-10-CM

## 2018-12-14 PROCEDURE — 99213 OFFICE O/P EST LOW 20 MIN: CPT | Performed by: SURGERY

## 2018-12-14 RX ORDER — SODIUM CHLORIDE, SODIUM LACTATE, POTASSIUM CHLORIDE, CALCIUM CHLORIDE 600; 310; 30; 20 MG/100ML; MG/100ML; MG/100ML; MG/100ML
100 INJECTION, SOLUTION INTRAVENOUS CONTINUOUS
Status: CANCELLED | OUTPATIENT
Start: 2018-12-20

## 2018-12-14 RX ORDER — SODIUM CHLORIDE 0.9 % (FLUSH) 0.9 %
3 SYRINGE (ML) INJECTION EVERY 12 HOURS SCHEDULED
Status: CANCELLED | OUTPATIENT
Start: 2018-12-20

## 2018-12-14 RX ORDER — SODIUM CHLORIDE 0.9 % (FLUSH) 0.9 %
3-10 SYRINGE (ML) INJECTION AS NEEDED
Status: CANCELLED | OUTPATIENT
Start: 2018-12-20

## 2018-12-14 RX ORDER — TAMSULOSIN HYDROCHLORIDE 0.4 MG/1
CAPSULE ORAL
Refills: 11 | COMMUNITY
Start: 2018-12-07 | End: 2018-12-18

## 2018-12-14 NOTE — PATIENT INSTRUCTIONS

## 2018-12-14 NOTE — PROGRESS NOTES
Chief Complaint   Patient presents with   • Follow-up     Recheck diverticulitis        HPI  43 year old man with a hx of recurrent sigmoid diverticulitis over the last several months. He has had 3 visits, 2 stays for diverticulitis. All have resolved to some extent with IV antibiotics only to recur. Most recent CT shows:  Study Result     Exam: CT abdomen pelvis without contrast     INDICATION: Mid abdominal pain, vomiting     TECHNIQUE: Routine CT abdomen pelvis without contrast. Sagittal  coronal reconstructions were obtained This exam was performed  according to our departmental dose-optimization program, which  includes automated exposure control, adjustment of the mA and/or  kV according to patient size and/or use of iterative  reconstruction technique.     COMPARISON: 10/13/2018     FINDINGS: There is mild dependent atelectasis in the lungs.     The evaluation of the visceral organs and bowel is limited by the  lack of intravenous and oral contrast. The liver, spleen,  pancreas and adrenal glands are unremarkable. There is mild  nonspecific bilateral perinephric stranding. No hydronephrosis or  ureteral calculi.     The aorta is normal in caliber. No significant adenopathy. No  bowel obstruction or abnormal bowel wall thickening. No appendix.  Diverticulosis without evidence of diverticulitis.     Bladder is unremarkable. No pelvic mass or adenopathy.     Degenerative disc disease is present at L5-S1.     IMPRESSION:  .  1. No obvious abnormality.  2. Diverticulosis.     Electronically signed by:  Franco Saenz MD  11/30/2018 12:07 AM  CST Workstation: [id number]     Saw Dr. Stallings Monday- no evidence of colobladder fistula. Some bloody vomitus on his last admission.  EGD showed:  - Mildly severe esophagitis with no bleeding was found. Several biopsies were obtained in the lower third of the  esophagus with cold forceps for histology. Verification of patient identification for the specimen was done by  the nurse  using the patient's name, birth date and medical record number.  Findings:  - Diffuse moderate inflammation characterized by erythema was found in the stomach. Several biopsies were obtained  in the gastric antrum with cold forceps for histology. Verification of patient identification for the specimen was done by  the nurse using the patient's name, birth date and medical record number.  - The examined duodenum was normal.  - A medium-sized hiatal hernia was present.    Colonoscopy in May:  - The perianal and digital rectal examinations were normal.  Findings:  - External and internal hemorrhoids were found.  - Many small and large-mouthed diverticula were found in the sigmoid colon, descending colon and transverse colon.  - Several biopsies were obtained in the entire colon with cold forceps for histology. Verification of patient identification  for the specimen was done.    No UT episodes of significance- improved with Flomax.    Past Medical History:   Diagnosis Date   • Alcoholism /alcohol abuse (CMS/HCC)    • Anxiety    • Arthritis    • Atrial flutter (CMS/HCC)    • Depression    • Diverticulitis    • Esophagitis    • Gastritis    • GERD (gastroesophageal reflux disease)    • Hypertension    • Kidney stone    • Pancreatitis    • Sleep apnea        Past Surgical History:   Procedure Laterality Date   • COLONOSCOPY     • SHOULDER SURGERY Right 2014   • TONSILLECTOMY  12/2015   • UPPER GASTROINTESTINAL ENDOSCOPY  03/30/2018   • UPPER GASTROINTESTINAL ENDOSCOPY     • UPPER GASTROINTESTINAL ENDOSCOPY  05/30/2018         Current Outpatient Medications:   •  carvedilol (COREG) 6.25 MG tablet, TAKE ONE TABLET BY MOUTH EVERY 12 HOURS, Disp: 60 tablet, Rfl: 6  •  clonazePAM (KlonoPIN) 1 MG tablet, Take 1 mg by mouth 2 (Two) Times a Day As Needed for Anxiety., Disp: , Rfl:   •  DULoxetine (CYMBALTA) 60 MG capsule, Take 60 mg by mouth Daily., Disp: , Rfl: 2  •  lisinopril (PRINIVIL,ZESTRIL) 5 MG tablet, Take 5  mg by mouth Daily., Disp: , Rfl:   •  magnesium oxide (MAGOX) 400 (241.3 MG) MG tablet tablet, Take 400 mg by mouth 2 (Two) Times a Day., Disp: , Rfl:   •  mesalamine (APRISO) 0.375 g 24 hr capsule, Take 4 capsules by mouth Daily., Disp: 120 capsule, Rfl: 2  •  ondansetron (ZOFRAN) 4 MG tablet, Take 1 tablet by mouth Every 6 (Six) Hours As Needed for Nausea or Vomiting., Disp: 30 tablet, Rfl: 0  •  pantoprazole (PROTONIX) 40 MG EC tablet, Take 1 tablet by mouth Daily., Disp: 30 tablet, Rfl: 1  •  Probiotic Product (PROBIOTIC-10 PO), Take  by mouth., Disp: , Rfl:   •  tamsulosin (FLOMAX) 0.4 MG capsule 24 hr capsule, TAKE 1 CAPSULE BY MOUTH AT BEDTIME FOR PROSTATE  TAKE WITH GLASS OF WATER  HOLD FOR HYPOTENSION, Disp: , Rfl: 11  •  tiZANidine (ZANAFLEX) 4 MG tablet, Take 4 mg by mouth At Night As Needed for Muscle Spasms., Disp: , Rfl:     Allergies   Allergen Reactions   • Contrast Dye Anaphylaxis     ANAPHYLAXIS   • Iodinated Diagnostic Agents Anaphylaxis       Family History   Problem Relation Age of Onset   • Heart disease Father    • Depression Father    • Cancer Maternal Grandmother         lung   • Cancer Paternal Grandmother         pancreatic   • Heart disease Paternal Grandfather        Social History     Socioeconomic History   • Marital status:      Spouse name: Not on file   • Number of children: Not on file   • Years of education: Not on file   • Highest education level: Not on file   Social Needs   • Financial resource strain: Not on file   • Food insecurity - worry: Not on file   • Food insecurity - inability: Not on file   • Transportation needs - medical: Not on file   • Transportation needs - non-medical: Not on file   Occupational History   • Not on file   Tobacco Use   • Smoking status: Never Smoker   • Smokeless tobacco: Never Used   Substance and Sexual Activity   • Alcohol use: No     Comment: quit 3/17/18   • Drug use: Yes     Frequency: 7.0 times per week     Types: Marijuana      "Comment: states he uses \"a little\" every day   • Sexual activity: Defer   Other Topics Concern   • Not on file   Social History Narrative    Pt states has not had drink of any alcohol in 3 weeks as of 4/5/2018       Review of Systems   Constitutional: Negative for activity change, appetite change, chills and fever.   HENT: Negative for hearing loss, nosebleeds and trouble swallowing.    Cardiovascular: Negative for chest pain, palpitations and leg swelling.   Gastrointestinal: Positive for abdominal pain, nausea and vomiting. Negative for abdominal distention, anal bleeding, blood in stool, constipation, diarrhea and rectal pain.   Endocrine: Negative for cold intolerance, heat intolerance, polydipsia and polyuria.   Genitourinary: Negative for decreased urine volume, difficulty urinating, dysuria, enuresis, frequency, hematuria and urgency.   Musculoskeletal: Negative for arthralgias, back pain, gait problem, myalgias and neck pain.   Skin: Negative for pallor, rash and wound.   Allergic/Immunologic: Negative for immunocompromised state.   Neurological: Negative for dizziness, seizures, weakness, light-headedness, numbness and headaches.   Psychiatric/Behavioral: Negative for agitation and behavioral problems. The patient is not nervous/anxious.        Physical Exam   Constitutional: He is oriented to person, place, and time. He appears well-developed and well-nourished.   HENT:   Head: Normocephalic and atraumatic.   Nose: Nose normal.   Eyes: Conjunctivae and EOM are normal. Right eye exhibits no discharge. Left eye exhibits no discharge.   Neck: Trachea normal, normal range of motion and phonation normal. Neck supple. No JVD present. No tracheal deviation and no edema present. No thyromegaly present.   Cardiovascular: Normal rate, regular rhythm and normal heart sounds. Exam reveals no gallop and no friction rub.   No murmur heard.  Pulmonary/Chest: Effort normal and breath sounds normal. No accessory muscle " usage. No respiratory distress. He has no decreased breath sounds. He has no wheezes. He has no rales. He exhibits no tenderness.   Abdominal: Soft. He exhibits no distension, no fluid wave, no ascites, no pulsatile midline mass and no mass. There is no tenderness. There is no rebound and no guarding. No hernia.   Musculoskeletal: Normal range of motion. He exhibits no edema, tenderness or deformity.   Lymphadenopathy:     He has no cervical adenopathy.        Left: No supraclavicular adenopathy present.   Neurological: He is alert and oriented to person, place, and time. He has normal strength. No cranial nerve deficit.   Skin: Skin is warm and dry. No rash noted. He is not diaphoretic. No erythema. No pallor.   Psychiatric: He has a normal mood and affect. His speech is normal and behavior is normal. Judgment and thought content normal. Cognition and memory are normal.   Vitals reviewed.        ASSESSMENT    Stevenson was seen today for follow-up.    Diagnoses and all orders for this visit:    Diverticulitis of large intestine without perforation or abscess without bleeding        PLAN    1. Laparoscopic possible open sigmoid/left colon resection is planned    The following were discussed:    What are the indications that have led your doctor to the opinion that an operation is necessary?    In order to treat diverticulitis of the colon, it is necessary to remove the part of the colon that contains the disease. It is planned that a part of the large bowel will be removed through a cut in the abdomen.  While the majority of patients can be reconnected, occasionally a piece of the bowel may be brought out through the wall of the abdomen as a colostomy. This is more likely in the circumstance of acute infection. This is usually temporary and allows the bowel content to drain into a bag worn over the colostomy until the bowel connection has healed.    What, if any, alternative treatments are available for your  condition?      Diverticulitis that is not ruptured can be managed with antibiotics, although recurrence rates may be high, especially after multiple courses of antibiotics.  Multiple episodes of diverticulitis do not necessarily predispose patients to life threatening complications. However, there are infectious complications that can occur  with the use of antibiotics.    Diverticulitis that is ruptured, has abscessed, or is actively bleeding can only be managed with invasive procedures or operation.    What will be the likely result if you don't have the operation?    Failure to operate on recurrent diverticulitis may result in more episodes, although data suggests that there is a low rate of progression to life-threatening complication.    What are the basic procedures involved in the operation?    All attempts are made to perform the surgery using minimally invasive techniques- laparoscopy and small incisions. Occasionally, longer incisions are necessary. The diseased segment is removed, as well as attached lymph nodes if necessary. The intestine is reattached unless it is not safe (such as acute infection or a low lying rectal lesion).    What are the risks?    There are risks and complications with this procedure. Any complication may require a prolonged hospitalization, a modified incision or additional surgery.They include but are not limited to the following:      General risks:   • Infection can occur, requiring antibiotics and further treatment.   • Bleeding could occur and may require a return to the operating room. Bleeding is more common if you have been taking blood thinning drugs such as warfarin, asprin, clopidogrel (Plavix or Iscover) or dipyridamole (Persantin or Asasantin).   • Small areas of the lung can collapse, increasing the risk of chest infection. This may need antibiotics and physiotherapy.   • Increased risk in obese people of wound infection, chest infection, heart and lung  complications, and thrombosis.   • Heart attack or stroke could occur due to the strain on the heart.   • Blood clot in the leg (DVT) causing pain and swelling. In rare cases part of the clot may break off and go to the lungs.   • Death as a result of this procedure is possible.     Specific risks of a resection of colon:    • Leakage where the bowel was stitched together. This may need further surgery.   • Deep bleeding in the abdomen. This may need fluid replacement or further surgery.   • Bowel is paralyzed, causing abdominal bloating and vomiting. This is usually temporary.   • Incisional hernias are possible and usually require another operation.  • The wound may become infected. This is usually treated with antibiotics or the wound may need to be opened.   • Urinary tract infection. Antibiotics may be used to control the infection.   • Infection in the abdominal cavity. This may form an abscess, may need drainage and antibiotics.   • The bowel may be unable to be joined and may be brought to the surface as a stoma, with the following problems:   - The blood supply to the stoma may fail and cause damage. This may need further surgery.   - Excess fluid loss from the stoma.   - Stoma prolapse - the bowel protrudes passed skin.   - Parastomal hernia - the bowel pushes through a weak point in the muscle wall, causing pain.   - Local skin irritation - reddening of the skin and a rash in reaction to the stoma bag glue.   • Bleeding into the abdomen. A blood transfusion and further surgery may be necessary.   • Damage to the tube bringing the urine from the kidney to the bladder.   • Abnormal emptying of the bladder. It may empty without control or may not empty at all.   • Inability to have and/or maintain an erection in men. In women, it can cause pain during or after intercourse.   • The wound may be abnormal and the wound can be thickened, red and painful.   • The bowel actions may be much looser after the  operation than before.   • Adhesions (bands of scar tissue) develop in the abdominal cavity and the bowel may block.   • Death within 30 days of surgery is estimated at 1 in 16 to 1 in 63.     How is the operation expected to improve your health or quality of life?    Diverticulitis treated with surgery cures the problem 90% of the time. Recurrent disease may happen 10% of the time.    Is hospitalization necessary and, if so, how long can you expect to be hospitalized?    These operations are performed under general anesthesia. Generally, 5-7 days of hospitalization are required after surgery, although longer and shorter hospitalization may be necessary depending on clinical course.    What can you expect during your recovery period?    Immediately after surgery, pain is controlled by a combination of narcotic and non-narcotic measures. Nausea is controlled with IV medication.  Catheters in the bladder are removed as quickly as possible, usually in the first 24 hours. Large IV's placed in surgery are likewise removed in the first day.   Occasionally, it is necessary to leave a tube in the stomach, placed through the nose to control nausea and protect surgery. Most often this is not necessary.  Depending on your overall health, admission to intensive care may be necessary. Most patients are managed on the surgical floor on a cardiac monitor.  Coughing and early ambulation help prevent post operative pneumonia. Early ambulation, along with leg devices can decrease the rate of blood clots in the legs.    When can you expect to resume normal activities?    Excluding lifting and straining, most activity can be resumed after 1-2 weeks. Lifting under 5 pounds and no straining is recommended for 6 weeks. Driving may be resumed once pain medicine is stopped and the patient is fully capable of operating a vehicle.    Are there likely to be residual effects from the operation?    Persons having colon resections occasionally  experience frequency, urgency, or diarrhea. Most of this is temporary resolves within a few months after surgery.     .   All questions were answered. The patient agrees to operation.                This document has been electronically signed by Michel Lala MD on December 14, 2018 2:31 PM

## 2018-12-18 ENCOUNTER — APPOINTMENT (OUTPATIENT)
Dept: PREADMISSION TESTING | Facility: HOSPITAL | Age: 43
End: 2018-12-18

## 2018-12-18 VITALS
HEIGHT: 72 IN | WEIGHT: 211 LBS | SYSTOLIC BLOOD PRESSURE: 151 MMHG | OXYGEN SATURATION: 96 % | RESPIRATION RATE: 16 BRPM | DIASTOLIC BLOOD PRESSURE: 82 MMHG | BODY MASS INDEX: 28.58 KG/M2 | HEART RATE: 88 BPM

## 2018-12-18 DIAGNOSIS — K57.32 DIVERTICULITIS OF LARGE INTESTINE WITHOUT PERFORATION OR ABSCESS WITHOUT BLEEDING: ICD-10-CM

## 2018-12-18 LAB
ABO GROUP BLD: NORMAL
ALBUMIN SERPL-MCNC: 3.6 G/DL (ref 3.4–4.8)
ALBUMIN/GLOB SERPL: 1.6 G/DL (ref 1.1–1.8)
ALP SERPL-CCNC: 34 U/L (ref 38–126)
ALT SERPL W P-5'-P-CCNC: 51 U/L (ref 21–72)
ANION GAP SERPL CALCULATED.3IONS-SCNC: 9 MMOL/L (ref 5–15)
AST SERPL-CCNC: 45 U/L (ref 17–59)
BASOPHILS # BLD AUTO: 0.01 10*3/MM3 (ref 0–0.2)
BASOPHILS NFR BLD AUTO: 0.2 % (ref 0–2)
BILIRUB SERPL-MCNC: 0.8 MG/DL (ref 0.2–1.3)
BLD GP AB SCN SERPL QL: NEGATIVE
BUN BLD-MCNC: 18 MG/DL (ref 7–21)
BUN/CREAT SERPL: 19.6 (ref 7–25)
CALCIUM SPEC-SCNC: 9.2 MG/DL (ref 8.4–10.2)
CHLORIDE SERPL-SCNC: 104 MMOL/L (ref 95–110)
CO2 SERPL-SCNC: 26 MMOL/L (ref 22–31)
CREAT BLD-MCNC: 0.92 MG/DL (ref 0.7–1.3)
DEPRECATED RDW RBC AUTO: 46.8 FL (ref 35.1–43.9)
EOSINOPHIL # BLD AUTO: 0.15 10*3/MM3 (ref 0–0.7)
EOSINOPHIL NFR BLD AUTO: 2.8 % (ref 0–7)
ERYTHROCYTE [DISTWIDTH] IN BLOOD BY AUTOMATED COUNT: 14.7 % (ref 11.5–14.5)
GFR SERPL CREATININE-BSD FRML MDRD: 91 ML/MIN/1.73 (ref 63–147)
GLOBULIN UR ELPH-MCNC: 2.3 GM/DL (ref 2.3–3.5)
GLUCOSE BLD-MCNC: 116 MG/DL (ref 60–100)
HCT VFR BLD AUTO: 46.1 % (ref 39–49)
HGB BLD-MCNC: 15.7 G/DL (ref 13.7–17.3)
IMM GRANULOCYTES # BLD: 0.01 10*3/MM3 (ref 0–0.02)
IMM GRANULOCYTES NFR BLD: 0.2 % (ref 0–0.5)
LYMPHOCYTES # BLD AUTO: 0.88 10*3/MM3 (ref 0.6–4.2)
LYMPHOCYTES NFR BLD AUTO: 16.4 % (ref 10–50)
Lab: NORMAL
MCH RBC QN AUTO: 29.6 PG (ref 26.5–34)
MCHC RBC AUTO-ENTMCNC: 34.1 G/DL (ref 31.5–36.3)
MCV RBC AUTO: 86.8 FL (ref 80–98)
MONOCYTES # BLD AUTO: 0.36 10*3/MM3 (ref 0–0.9)
MONOCYTES NFR BLD AUTO: 6.7 % (ref 0–12)
NEUTROPHILS # BLD AUTO: 3.97 10*3/MM3 (ref 2–8.6)
NEUTROPHILS NFR BLD AUTO: 73.7 % (ref 37–80)
PLATELET # BLD AUTO: 207 10*3/MM3 (ref 150–450)
PMV BLD AUTO: 10.1 FL (ref 8–12)
POTASSIUM BLD-SCNC: 4 MMOL/L (ref 3.5–5.1)
PROT SERPL-MCNC: 5.9 G/DL (ref 6.3–8.6)
RBC # BLD AUTO: 5.31 10*6/MM3 (ref 4.37–5.74)
RH BLD: POSITIVE
SODIUM BLD-SCNC: 139 MMOL/L (ref 137–145)
T&S EXPIRATION DATE: NORMAL
WBC NRBC COR # BLD: 5.38 10*3/MM3 (ref 3.2–9.8)

## 2018-12-18 PROCEDURE — 93010 ELECTROCARDIOGRAM REPORT: CPT | Performed by: INTERNAL MEDICINE

## 2018-12-18 PROCEDURE — 36415 COLL VENOUS BLD VENIPUNCTURE: CPT

## 2018-12-18 PROCEDURE — 93005 ELECTROCARDIOGRAM TRACING: CPT

## 2018-12-18 PROCEDURE — 86901 BLOOD TYPING SEROLOGIC RH(D): CPT | Performed by: ANESTHESIOLOGY

## 2018-12-18 PROCEDURE — 80053 COMPREHEN METABOLIC PANEL: CPT | Performed by: SURGERY

## 2018-12-18 PROCEDURE — 86850 RBC ANTIBODY SCREEN: CPT | Performed by: ANESTHESIOLOGY

## 2018-12-18 PROCEDURE — 85025 COMPLETE CBC W/AUTO DIFF WBC: CPT | Performed by: SURGERY

## 2018-12-18 PROCEDURE — 86900 BLOOD TYPING SEROLOGIC ABO: CPT | Performed by: ANESTHESIOLOGY

## 2018-12-18 RX ORDER — ASPIRIN 81 MG/1
81 TABLET ORAL DAILY
COMMUNITY

## 2018-12-18 RX ORDER — TAMSULOSIN HYDROCHLORIDE 0.4 MG/1
1 CAPSULE ORAL NIGHTLY
COMMUNITY
End: 2019-04-25

## 2018-12-18 RX ORDER — MIRTAZAPINE 15 MG/1
15 TABLET, FILM COATED ORAL NIGHTLY
COMMUNITY
End: 2023-01-20

## 2018-12-18 RX ORDER — CARVEDILOL 6.25 MG/1
6.25 TABLET ORAL DAILY
COMMUNITY
End: 2020-03-09 | Stop reason: SDUPTHER

## 2018-12-19 ENCOUNTER — ANESTHESIA EVENT (OUTPATIENT)
Dept: PERIOP | Facility: HOSPITAL | Age: 43
End: 2018-12-19

## 2018-12-20 ENCOUNTER — ANESTHESIA (OUTPATIENT)
Dept: PERIOP | Facility: HOSPITAL | Age: 43
End: 2018-12-20

## 2018-12-20 ENCOUNTER — HOSPITAL ENCOUNTER (INPATIENT)
Facility: HOSPITAL | Age: 43
LOS: 4 days | Discharge: HOME | End: 2018-12-24
Attending: SURGERY | Admitting: SURGERY

## 2018-12-20 DIAGNOSIS — K57.32 DIVERTICULITIS OF LARGE INTESTINE WITHOUT PERFORATION OR ABSCESS WITHOUT BLEEDING: ICD-10-CM

## 2018-12-20 LAB
ABO GROUP BLD: NORMAL
AMPHET+METHAMPHET UR QL: NEGATIVE
ANION GAP SERPL CALCULATED.3IONS-SCNC: 9 MMOL/L (ref 5–15)
BARBITURATES UR QL SCN: NEGATIVE
BASOPHILS # BLD AUTO: 0.01 10*3/MM3 (ref 0–0.2)
BASOPHILS NFR BLD AUTO: 0.1 % (ref 0–2)
BENZODIAZ UR QL SCN: NEGATIVE
BLD GP AB SCN SERPL QL: NEGATIVE
BUN BLD-MCNC: 13 MG/DL (ref 7–21)
BUN/CREAT SERPL: 12.1 (ref 7–25)
CALCIUM SPEC-SCNC: 8 MG/DL (ref 8.4–10.2)
CANNABINOIDS SERPL QL: POSITIVE
CHLORIDE SERPL-SCNC: 100 MMOL/L (ref 95–110)
CO2 SERPL-SCNC: 26 MMOL/L (ref 22–31)
COCAINE UR QL: NEGATIVE
CREAT BLD-MCNC: 1.07 MG/DL (ref 0.7–1.3)
DEPRECATED RDW RBC AUTO: 48 FL (ref 35.1–43.9)
EOSINOPHIL # BLD AUTO: 0.03 10*3/MM3 (ref 0–0.7)
EOSINOPHIL NFR BLD AUTO: 0.2 % (ref 0–7)
ERYTHROCYTE [DISTWIDTH] IN BLOOD BY AUTOMATED COUNT: 15 % (ref 11.5–14.5)
GFR SERPL CREATININE-BSD FRML MDRD: 76 ML/MIN/1.73 (ref 63–147)
GLUCOSE BLD-MCNC: 133 MG/DL (ref 60–100)
HCT VFR BLD AUTO: 47.1 % (ref 39–49)
HGB BLD-MCNC: 15.7 G/DL (ref 13.7–17.3)
IMM GRANULOCYTES # BLD AUTO: 0.04 10*3/MM3 (ref 0–0.02)
IMM GRANULOCYTES NFR BLD AUTO: 0.3 % (ref 0–0.5)
LYMPHOCYTES # BLD AUTO: 0.86 10*3/MM3 (ref 0.6–4.2)
LYMPHOCYTES NFR BLD AUTO: 6.1 % (ref 10–50)
Lab: NORMAL
MAGNESIUM SERPL-MCNC: 2.5 MG/DL (ref 1.6–2.3)
MCH RBC QN AUTO: 29.3 PG (ref 26.5–34)
MCHC RBC AUTO-ENTMCNC: 33.3 G/DL (ref 31.5–36.3)
MCV RBC AUTO: 87.9 FL (ref 80–98)
METHADONE UR QL SCN: NEGATIVE
MONOCYTES # BLD AUTO: 0.2 10*3/MM3 (ref 0–0.9)
MONOCYTES NFR BLD AUTO: 1.4 % (ref 0–12)
NEUTROPHILS # BLD AUTO: 12.91 10*3/MM3 (ref 2–8.6)
NEUTROPHILS NFR BLD AUTO: 91.9 % (ref 37–80)
OPIATES UR QL: NEGATIVE
OXYCODONE UR QL SCN: NEGATIVE
PHOSPHATE SERPL-MCNC: 2.7 MG/DL (ref 2.4–4.4)
PLATELET # BLD AUTO: 238 10*3/MM3 (ref 150–450)
PMV BLD AUTO: 9.3 FL (ref 8–12)
POTASSIUM BLD-SCNC: 4.1 MMOL/L (ref 3.5–5.1)
RBC # BLD AUTO: 5.36 10*6/MM3 (ref 4.37–5.74)
RH BLD: POSITIVE
SODIUM BLD-SCNC: 135 MMOL/L (ref 137–145)
T&S EXPIRATION DATE: NORMAL
WBC NRBC COR # BLD: 14.05 10*3/MM3 (ref 3.2–9.8)

## 2018-12-20 PROCEDURE — 80048 BASIC METABOLIC PNL TOTAL CA: CPT | Performed by: SURGERY

## 2018-12-20 PROCEDURE — 44213 LAP MOBIL SPLENIC FL ADD-ON: CPT | Performed by: SURGERY

## 2018-12-20 PROCEDURE — 25010000002 ONDANSETRON PER 1 MG: Performed by: NURSE ANESTHETIST, CERTIFIED REGISTERED

## 2018-12-20 PROCEDURE — 80307 DRUG TEST PRSMV CHEM ANLYZR: CPT | Performed by: ANESTHESIOLOGY

## 2018-12-20 PROCEDURE — 86900 BLOOD TYPING SEROLOGIC ABO: CPT | Performed by: SURGERY

## 2018-12-20 PROCEDURE — 94799 UNLISTED PULMONARY SVC/PX: CPT

## 2018-12-20 PROCEDURE — 25010000002 SUCCINYLCHOLINE PER 20 MG: Performed by: NURSE ANESTHETIST, CERTIFIED REGISTERED

## 2018-12-20 PROCEDURE — 86901 BLOOD TYPING SEROLOGIC RH(D): CPT | Performed by: SURGERY

## 2018-12-20 PROCEDURE — 25010000002 FENTANYL CITRATE (PF) 100 MCG/2ML SOLUTION: Performed by: NURSE ANESTHETIST, CERTIFIED REGISTERED

## 2018-12-20 PROCEDURE — 84100 ASSAY OF PHOSPHORUS: CPT | Performed by: SURGERY

## 2018-12-20 PROCEDURE — 25010000002 MIDAZOLAM PER 1 MG: Performed by: NURSE ANESTHETIST, CERTIFIED REGISTERED

## 2018-12-20 PROCEDURE — 94760 N-INVAS EAR/PLS OXIMETRY 1: CPT

## 2018-12-20 PROCEDURE — 25010000002 CEFOXITIN PER 1 G: Performed by: SURGERY

## 2018-12-20 PROCEDURE — 88307 TISSUE EXAM BY PATHOLOGIST: CPT | Performed by: PATHOLOGY

## 2018-12-20 PROCEDURE — 25010000002 ONDANSETRON PER 1 MG: Performed by: SURGERY

## 2018-12-20 PROCEDURE — 86850 RBC ANTIBODY SCREEN: CPT | Performed by: SURGERY

## 2018-12-20 PROCEDURE — 83735 ASSAY OF MAGNESIUM: CPT | Performed by: SURGERY

## 2018-12-20 PROCEDURE — 25010000002 DEXAMETHASONE PER 1 MG: Performed by: NURSE ANESTHETIST, CERTIFIED REGISTERED

## 2018-12-20 PROCEDURE — 85025 COMPLETE CBC W/AUTO DIFF WBC: CPT | Performed by: SURGERY

## 2018-12-20 PROCEDURE — 25010000002 PROPOFOL 10 MG/ML EMULSION: Performed by: NURSE ANESTHETIST, CERTIFIED REGISTERED

## 2018-12-20 PROCEDURE — 25010000002 NEOSTIGMINE 4 MG/4ML SOLUTION PREFILLED SYRINGE: Performed by: NURSE ANESTHETIST, CERTIFIED REGISTERED

## 2018-12-20 PROCEDURE — 88307 TISSUE EXAM BY PATHOLOGIST: CPT | Performed by: SURGERY

## 2018-12-20 PROCEDURE — 25010000003 MORPHINE PER 10 MG: Performed by: SURGERY

## 2018-12-20 PROCEDURE — 25010000002 HYDROMORPHONE PER 4 MG: Performed by: SURGERY

## 2018-12-20 PROCEDURE — 0DBN0ZZ EXCISION OF SIGMOID COLON, OPEN APPROACH: ICD-10-PCS | Performed by: SURGERY

## 2018-12-20 PROCEDURE — 44204 LAPARO PARTIAL COLECTOMY: CPT | Performed by: SURGERY

## 2018-12-20 DEVICE — PROXIMATE LINEAR CUTTER RELOAD (STNADARD) , BLUE, 55MM
Type: IMPLANTABLE DEVICE | Site: SIGMOID COLON | Status: FUNCTIONAL
Brand: PROXIMATE

## 2018-12-20 RX ORDER — CLONAZEPAM 0.5 MG/1
1 TABLET ORAL 2 TIMES DAILY PRN
Status: DISCONTINUED | OUTPATIENT
Start: 2018-12-20 | End: 2018-12-24 | Stop reason: HOSPADM

## 2018-12-20 RX ORDER — SUCCINYLCHOLINE CHLORIDE 20 MG/ML
INJECTION INTRAMUSCULAR; INTRAVENOUS AS NEEDED
Status: DISCONTINUED | OUTPATIENT
Start: 2018-12-20 | End: 2018-12-20 | Stop reason: SURG

## 2018-12-20 RX ORDER — DIPHENHYDRAMINE HYDROCHLORIDE 50 MG/ML
12.5 INJECTION INTRAMUSCULAR; INTRAVENOUS
Status: DISCONTINUED | OUTPATIENT
Start: 2018-12-20 | End: 2018-12-20 | Stop reason: HOSPADM

## 2018-12-20 RX ORDER — HYDROMORPHONE HCL IN 0.9% NACL 0.2 MG/ML
PLASTIC BAG, INJECTION (ML) INTRAVENOUS CONTINUOUS
Status: DISCONTINUED | OUTPATIENT
Start: 2018-12-20 | End: 2018-12-24 | Stop reason: HOSPADM

## 2018-12-20 RX ORDER — SODIUM CHLORIDE, SODIUM LACTATE, POTASSIUM CHLORIDE, CALCIUM CHLORIDE 600; 310; 30; 20 MG/100ML; MG/100ML; MG/100ML; MG/100ML
100 INJECTION, SOLUTION INTRAVENOUS CONTINUOUS
Status: DISCONTINUED | OUTPATIENT
Start: 2018-12-20 | End: 2018-12-20

## 2018-12-20 RX ORDER — EPHEDRINE SULFATE 50 MG/ML
5 INJECTION, SOLUTION INTRAVENOUS ONCE AS NEEDED
Status: DISCONTINUED | OUTPATIENT
Start: 2018-12-20 | End: 2018-12-20 | Stop reason: HOSPADM

## 2018-12-20 RX ORDER — SODIUM CHLORIDE, SODIUM GLUCONATE, SODIUM ACETATE, POTASSIUM CHLORIDE, AND MAGNESIUM CHLORIDE 526; 502; 368; 37; 30 MG/100ML; MG/100ML; MG/100ML; MG/100ML; MG/100ML
INJECTION, SOLUTION INTRAVENOUS CONTINUOUS PRN
Status: DISCONTINUED | OUTPATIENT
Start: 2018-12-20 | End: 2018-12-20 | Stop reason: SURG

## 2018-12-20 RX ORDER — ONDANSETRON 2 MG/ML
4 INJECTION INTRAMUSCULAR; INTRAVENOUS EVERY 6 HOURS PRN
Status: DISCONTINUED | OUTPATIENT
Start: 2018-12-20 | End: 2018-12-24 | Stop reason: HOSPADM

## 2018-12-20 RX ORDER — LIDOCAINE HYDROCHLORIDE 20 MG/ML
INJECTION, SOLUTION INFILTRATION; PERINEURAL AS NEEDED
Status: DISCONTINUED | OUTPATIENT
Start: 2018-12-20 | End: 2018-12-20 | Stop reason: SURG

## 2018-12-20 RX ORDER — SODIUM CHLORIDE 0.9 % (FLUSH) 0.9 %
3-10 SYRINGE (ML) INJECTION AS NEEDED
Status: DISCONTINUED | OUTPATIENT
Start: 2018-12-20 | End: 2018-12-20 | Stop reason: HOSPADM

## 2018-12-20 RX ORDER — ONDANSETRON 4 MG/1
4 TABLET, FILM COATED ORAL EVERY 6 HOURS PRN
Status: DISCONTINUED | OUTPATIENT
Start: 2018-12-20 | End: 2018-12-24 | Stop reason: HOSPADM

## 2018-12-20 RX ORDER — CARVEDILOL 6.25 MG/1
6.25 TABLET ORAL 2 TIMES DAILY WITH MEALS
Status: DISCONTINUED | OUTPATIENT
Start: 2018-12-20 | End: 2018-12-24 | Stop reason: HOSPADM

## 2018-12-20 RX ORDER — ACETAMINOPHEN 325 MG/1
650 TABLET ORAL ONCE AS NEEDED
Status: DISCONTINUED | OUTPATIENT
Start: 2018-12-20 | End: 2018-12-20 | Stop reason: HOSPADM

## 2018-12-20 RX ORDER — NALOXONE HCL 0.4 MG/ML
0.2 VIAL (ML) INJECTION AS NEEDED
Status: DISCONTINUED | OUTPATIENT
Start: 2018-12-20 | End: 2018-12-20 | Stop reason: HOSPADM

## 2018-12-20 RX ORDER — FLUMAZENIL 0.1 MG/ML
0.2 INJECTION INTRAVENOUS AS NEEDED
Status: DISCONTINUED | OUTPATIENT
Start: 2018-12-20 | End: 2018-12-20 | Stop reason: HOSPADM

## 2018-12-20 RX ORDER — ASPIRIN 81 MG/1
81 TABLET ORAL DAILY
Status: DISCONTINUED | OUTPATIENT
Start: 2018-12-20 | End: 2018-12-24 | Stop reason: HOSPADM

## 2018-12-20 RX ORDER — ONDANSETRON 2 MG/ML
4 INJECTION INTRAMUSCULAR; INTRAVENOUS ONCE AS NEEDED
Status: COMPLETED | OUTPATIENT
Start: 2018-12-20 | End: 2018-12-20

## 2018-12-20 RX ORDER — EPHEDRINE SULFATE 50 MG/ML
INJECTION, SOLUTION INTRAVENOUS AS NEEDED
Status: DISCONTINUED | OUTPATIENT
Start: 2018-12-20 | End: 2018-12-20 | Stop reason: SURG

## 2018-12-20 RX ORDER — SODIUM CHLORIDE, SODIUM LACTATE, POTASSIUM CHLORIDE, CALCIUM CHLORIDE 600; 310; 30; 20 MG/100ML; MG/100ML; MG/100ML; MG/100ML
100 INJECTION, SOLUTION INTRAVENOUS CONTINUOUS
Status: DISCONTINUED | OUTPATIENT
Start: 2018-12-20 | End: 2018-12-21

## 2018-12-20 RX ORDER — MORPHINE SULFATE 1 MG/ML
INJECTION INTRAVENOUS CONTINUOUS
Status: DISCONTINUED | OUTPATIENT
Start: 2018-12-20 | End: 2018-12-20

## 2018-12-20 RX ORDER — ENALAPRILAT 2.5 MG/2ML
1.25 INJECTION INTRAVENOUS EVERY 6 HOURS PRN
Status: DISCONTINUED | OUTPATIENT
Start: 2018-12-20 | End: 2018-12-24 | Stop reason: HOSPADM

## 2018-12-20 RX ORDER — NEOSTIGMINE METHYLSULFATE 4 MG/4 ML
SYRINGE (ML) INTRAVENOUS AS NEEDED
Status: DISCONTINUED | OUTPATIENT
Start: 2018-12-20 | End: 2018-12-20 | Stop reason: SURG

## 2018-12-20 RX ORDER — MIDAZOLAM HYDROCHLORIDE 1 MG/ML
INJECTION INTRAMUSCULAR; INTRAVENOUS AS NEEDED
Status: DISCONTINUED | OUTPATIENT
Start: 2018-12-20 | End: 2018-12-20 | Stop reason: SURG

## 2018-12-20 RX ORDER — ROCURONIUM BROMIDE 10 MG/ML
INJECTION, SOLUTION INTRAVENOUS AS NEEDED
Status: DISCONTINUED | OUTPATIENT
Start: 2018-12-20 | End: 2018-12-20 | Stop reason: SURG

## 2018-12-20 RX ORDER — MIRTAZAPINE 15 MG/1
15 TABLET, FILM COATED ORAL NIGHTLY
Status: DISCONTINUED | OUTPATIENT
Start: 2018-12-20 | End: 2018-12-24 | Stop reason: HOSPADM

## 2018-12-20 RX ORDER — DEXAMETHASONE SODIUM PHOSPHATE 4 MG/ML
INJECTION, SOLUTION INTRA-ARTICULAR; INTRALESIONAL; INTRAMUSCULAR; INTRAVENOUS; SOFT TISSUE AS NEEDED
Status: DISCONTINUED | OUTPATIENT
Start: 2018-12-20 | End: 2018-12-20 | Stop reason: SURG

## 2018-12-20 RX ORDER — GLYCOPYRROLATE 0.2 MG/ML
INJECTION INTRAMUSCULAR; INTRAVENOUS AS NEEDED
Status: DISCONTINUED | OUTPATIENT
Start: 2018-12-20 | End: 2018-12-20 | Stop reason: SURG

## 2018-12-20 RX ORDER — LABETALOL HYDROCHLORIDE 5 MG/ML
5 INJECTION, SOLUTION INTRAVENOUS
Status: DISCONTINUED | OUTPATIENT
Start: 2018-12-20 | End: 2018-12-20 | Stop reason: HOSPADM

## 2018-12-20 RX ORDER — FENTANYL CITRATE 50 UG/ML
INJECTION, SOLUTION INTRAMUSCULAR; INTRAVENOUS AS NEEDED
Status: DISCONTINUED | OUTPATIENT
Start: 2018-12-20 | End: 2018-12-20 | Stop reason: SURG

## 2018-12-20 RX ORDER — BUPIVACAINE HYDROCHLORIDE AND EPINEPHRINE 5; 5 MG/ML; UG/ML
INJECTION, SOLUTION EPIDURAL; INTRACAUDAL; PERINEURAL AS NEEDED
Status: DISCONTINUED | OUTPATIENT
Start: 2018-12-20 | End: 2018-12-24 | Stop reason: HOSPADM

## 2018-12-20 RX ORDER — PROPOFOL 10 MG/ML
VIAL (ML) INTRAVENOUS AS NEEDED
Status: DISCONTINUED | OUTPATIENT
Start: 2018-12-20 | End: 2018-12-20 | Stop reason: SURG

## 2018-12-20 RX ORDER — ONDANSETRON 4 MG/1
4 TABLET, ORALLY DISINTEGRATING ORAL EVERY 6 HOURS PRN
Status: DISCONTINUED | OUTPATIENT
Start: 2018-12-20 | End: 2018-12-24 | Stop reason: HOSPADM

## 2018-12-20 RX ORDER — NALOXONE HCL 0.4 MG/ML
0.1 VIAL (ML) INJECTION
Status: DISCONTINUED | OUTPATIENT
Start: 2018-12-20 | End: 2018-12-20

## 2018-12-20 RX ORDER — SODIUM CHLORIDE 0.9 % (FLUSH) 0.9 %
3 SYRINGE (ML) INJECTION EVERY 12 HOURS SCHEDULED
Status: DISCONTINUED | OUTPATIENT
Start: 2018-12-20 | End: 2018-12-20 | Stop reason: HOSPADM

## 2018-12-20 RX ORDER — MAGNESIUM HYDROXIDE 1200 MG/15ML
LIQUID ORAL AS NEEDED
Status: DISCONTINUED | OUTPATIENT
Start: 2018-12-20 | End: 2018-12-24 | Stop reason: HOSPADM

## 2018-12-20 RX ORDER — NALOXONE HCL 0.4 MG/ML
0.1 VIAL (ML) INJECTION
Status: DISCONTINUED | OUTPATIENT
Start: 2018-12-20 | End: 2018-12-24 | Stop reason: HOSPADM

## 2018-12-20 RX ORDER — ACETAMINOPHEN 650 MG/1
650 SUPPOSITORY RECTAL ONCE AS NEEDED
Status: DISCONTINUED | OUTPATIENT
Start: 2018-12-20 | End: 2018-12-20 | Stop reason: HOSPADM

## 2018-12-20 RX ORDER — TAMSULOSIN HYDROCHLORIDE 0.4 MG/1
0.4 CAPSULE ORAL NIGHTLY
Status: DISCONTINUED | OUTPATIENT
Start: 2018-12-20 | End: 2018-12-24 | Stop reason: HOSPADM

## 2018-12-20 RX ORDER — DULOXETIN HYDROCHLORIDE 60 MG/1
60 CAPSULE, DELAYED RELEASE ORAL NIGHTLY
Status: DISCONTINUED | OUTPATIENT
Start: 2018-12-20 | End: 2018-12-24 | Stop reason: HOSPADM

## 2018-12-20 RX ORDER — LISINOPRIL 5 MG/1
5 TABLET ORAL DAILY
Status: DISCONTINUED | OUTPATIENT
Start: 2018-12-20 | End: 2018-12-24 | Stop reason: HOSPADM

## 2018-12-20 RX ADMIN — PROPOFOL 150 MG: 10 INJECTION, EMULSION INTRAVENOUS at 09:56

## 2018-12-20 RX ADMIN — FENTANYL CITRATE 100 MCG: 50 INJECTION, SOLUTION INTRAMUSCULAR; INTRAVENOUS at 09:50

## 2018-12-20 RX ADMIN — CEFOXITIN SODIUM 2 G: 1 POWDER, FOR SOLUTION INTRAVENOUS at 10:04

## 2018-12-20 RX ADMIN — DULOXETINE 60 MG: 60 CAPSULE, DELAYED RELEASE ORAL at 20:58

## 2018-12-20 RX ADMIN — CEFOXITIN SODIUM 2 G: 1 POWDER, FOR SOLUTION INTRAVENOUS at 22:17

## 2018-12-20 RX ADMIN — ROCURONIUM BROMIDE 20 MG: 10 INJECTION INTRAVENOUS at 12:09

## 2018-12-20 RX ADMIN — MIDAZOLAM HYDROCHLORIDE 2 MG: 2 INJECTION, SOLUTION INTRAMUSCULAR; INTRAVENOUS at 09:45

## 2018-12-20 RX ADMIN — SODIUM CHLORIDE, POTASSIUM CHLORIDE, SODIUM LACTATE AND CALCIUM CHLORIDE 100 ML/HR: 600; 310; 30; 20 INJECTION, SOLUTION INTRAVENOUS at 16:30

## 2018-12-20 RX ADMIN — GLYCOPYRROLATE 0.8 MG: 0.2 INJECTION, SOLUTION INTRAMUSCULAR; INTRAVENOUS at 13:12

## 2018-12-20 RX ADMIN — ONDANSETRON 4 MG: 2 INJECTION INTRAMUSCULAR; INTRAVENOUS at 16:30

## 2018-12-20 RX ADMIN — MORPHINE SULFATE: 1 INJECTION INTRAVENOUS at 13:50

## 2018-12-20 RX ADMIN — SODIUM CHLORIDE, SODIUM GLUCONATE, SODIUM ACETATE, POTASSIUM CHLORIDE, AND MAGNESIUM CHLORIDE: 526; 502; 368; 37; 30 INJECTION, SOLUTION INTRAVENOUS at 11:06

## 2018-12-20 RX ADMIN — CEFOXITIN SODIUM 2 G: 1 POWDER, FOR SOLUTION INTRAVENOUS at 16:29

## 2018-12-20 RX ADMIN — FENTANYL CITRATE 50 MCG: 50 INJECTION, SOLUTION INTRAMUSCULAR; INTRAVENOUS at 10:50

## 2018-12-20 RX ADMIN — SODIUM CHLORIDE, SODIUM GLUCONATE, SODIUM ACETATE, POTASSIUM CHLORIDE, AND MAGNESIUM CHLORIDE: 526; 502; 368; 37; 30 INJECTION, SOLUTION INTRAVENOUS at 11:45

## 2018-12-20 RX ADMIN — ONDANSETRON 4 MG: 2 INJECTION, SOLUTION INTRAMUSCULAR; INTRAVENOUS at 14:07

## 2018-12-20 RX ADMIN — ROCURONIUM BROMIDE 5 MG: 10 INJECTION INTRAVENOUS at 09:56

## 2018-12-20 RX ADMIN — ROCURONIUM BROMIDE 45 MG: 10 INJECTION INTRAVENOUS at 10:05

## 2018-12-20 RX ADMIN — Medication 4 MG: at 13:12

## 2018-12-20 RX ADMIN — EPHEDRINE SULFATE 15 MG: 50 INJECTION INTRAVENOUS at 10:35

## 2018-12-20 RX ADMIN — TAMSULOSIN HYDROCHLORIDE 0.4 MG: 0.4 CAPSULE ORAL at 20:58

## 2018-12-20 RX ADMIN — SODIUM CHLORIDE, SODIUM GLUCONATE, SODIUM ACETATE, POTASSIUM CHLORIDE, AND MAGNESIUM CHLORIDE: 526; 502; 368; 37; 30 INJECTION, SOLUTION INTRAVENOUS at 10:05

## 2018-12-20 RX ADMIN — SUCCINYLCHOLINE CHLORIDE 140 MG: 20 INJECTION, SOLUTION INTRAMUSCULAR; INTRAVENOUS at 09:56

## 2018-12-20 RX ADMIN — LIDOCAINE HYDROCHLORIDE 50 MG: 20 INJECTION, SOLUTION INFILTRATION; PERINEURAL at 09:56

## 2018-12-20 RX ADMIN — SODIUM CHLORIDE: 9 INJECTION INTRAMUSCULAR; INTRAVENOUS; SUBCUTANEOUS at 19:27

## 2018-12-20 RX ADMIN — LISINOPRIL 5 MG: 5 TABLET ORAL at 16:29

## 2018-12-20 RX ADMIN — MIRTAZAPINE 15 MG: 15 TABLET, FILM COATED ORAL at 20:58

## 2018-12-20 RX ADMIN — ASPIRIN 81 MG: 81 TABLET, COATED ORAL at 16:29

## 2018-12-20 RX ADMIN — SODIUM CHLORIDE, POTASSIUM CHLORIDE, SODIUM LACTATE AND CALCIUM CHLORIDE 100 ML/HR: 600; 310; 30; 20 INJECTION, SOLUTION INTRAVENOUS at 07:55

## 2018-12-20 RX ADMIN — DEXAMETHASONE SODIUM PHOSPHATE 4 MG: 4 INJECTION, SOLUTION INTRAMUSCULAR; INTRAVENOUS at 10:04

## 2018-12-20 RX ADMIN — ROCURONIUM BROMIDE 20 MG: 10 INJECTION INTRAVENOUS at 11:43

## 2018-12-20 NOTE — ANESTHESIA PROCEDURE NOTES
Peripheral IV    Patient location during procedure: OR  Start time: 12/20/2018 10:05 AM  End time: 12/20/2018 10:07 AM  Line placed for Fluids/Medication Admin.  Performed By   CRNA: Steven Patel CRNA  Preanesthetic Checklist  Completed: patient identified, site marked, surgical consent, pre-op evaluation, timeout performed, IV checked, risks and benefits discussed and monitors and equipment checked  Peripheral IV Prep   Patient position: supine   Prep: ChloraPrep  Patient monitoring: heart rate, cardiac monitor and continuous pulse ox  Peripheral IV Procedure   Laterality:left  Location:  Forearm  Catheter size: 16 G         Post Assessment   Dressing Type: tape and transparent.    IV Dressing/Site: clean, dry and intact

## 2018-12-20 NOTE — ANESTHESIA PROCEDURE NOTES
ANESTHESIA INTUBATION  Urgency: elective    Airway not difficult    General Information and Staff    Patient location during procedure: OR  CRNA: Steven Patel CRNA    Indications and Patient Condition  Indications for airway management: airway protection    Preoxygenated: yes  Mask difficulty assessment: 2 - vent by mask + OA or adjuvant +/- NMBA    Final Airway Details  Final airway type: endotracheal airway      Successful airway: ETT  Cuffed: yes   Successful intubation technique: direct laryngoscopy  Facilitating devices/methods: intubating stylet  Endotracheal tube insertion site: oral  Blade: Bridgett  Blade size: 4  ETT size (mm): 7.5  Cormack-Lehane Classification: grade I - full view of glottis  Placement verified by: chest auscultation and capnometry   Measured from: lips  ETT to lips (cm): 20  Number of attempts at approach: 1    Additional Comments  Per ANDRES BROOKE 1attempt atraumatic

## 2018-12-20 NOTE — ANESTHESIA PREPROCEDURE EVALUATION
Anesthesia Evaluation     Patient summary reviewed and Nursing notes reviewed   NPO Solid Status: > 8 hours  NPO Liquid Status: > 8 hours           Airway   Mallampati: II  TM distance: >3 FB  Neck ROM: full  Possible difficult intubation and Large neck circumference  Dental - normal exam     Pulmonary - normal exam   (+) a smoker Former, COPD mild, sleep apnea,   Cardiovascular - normal exam  Exercise tolerance: good (4-7 METS)    (+) hypertension well controlled less than 2 medications, dysrhythmias Atrial Fib,       Neuro/Psych  (+) psychiatric history Depression and Anxiety,     GI/Hepatic/Renal/Endo    (+)  hiatal hernia, GERD poorly controlled, GI bleeding,     Musculoskeletal     Abdominal  - normal exam   Substance History   (+) drug use      Comment: MJ QD   OB/GYN          Other   (+) arthritis                       Anesthesia Plan    ASA 3     general   Rapid sequence  intravenous induction   Anesthetic plan, all risks, benefits, and alternatives have been provided, discussed and informed consent has been obtained with: patient.

## 2018-12-20 NOTE — ANESTHESIA POSTPROCEDURE EVALUATION
Patient: Stevenson Chilel    Procedure Summary     Date:  12/20/18 Room / Location:  [facility] OR 03 / [facility] OR    Anesthesia Start:  0947 Anesthesia Stop:  1353    Procedure:  LAPAROSCOPIC SIGMOID  RESECTION, TAKE DOWN OF SPLENIC FLEXURE (Left Abdomen) Diagnosis:       Diverticulitis of large intestine without perforation or abscess without bleeding      (Diverticulitis of large intestine without perforation or abscess without bleeding [K57.32])    Surgeon:  Michel Lala MD Provider:  Steven Patel CRNA    Anesthesia Type:  general ASA Status:  3          Anesthesia Type: general  Last vitals  BP   147/86 (12/20/18 1346)   Temp   98 °F (36.7 °C) (12/20/18 1331)   Pulse   89 (12/20/18 1346)   Resp   16 (12/20/18 1346)     SpO2   96 % (12/20/18 1346)     Post Anesthesia Care and Evaluation    Patient location during evaluation: PACU  Patient participation: complete - patient participated  Level of consciousness: awake and alert  Pain score: 0  Pain management: adequate  Airway patency: patent  Anesthetic complications: No anesthetic complications  PONV Status: none  Cardiovascular status: acceptable  Respiratory status: acceptable  Hydration status: acceptable

## 2018-12-21 LAB
ANION GAP SERPL CALCULATED.3IONS-SCNC: 11 MMOL/L (ref 5–15)
BASOPHILS # BLD AUTO: 0.01 10*3/MM3 (ref 0–0.2)
BASOPHILS NFR BLD AUTO: 0.1 % (ref 0–2)
BUN BLD-MCNC: 12 MG/DL (ref 7–21)
BUN/CREAT SERPL: 11.5 (ref 7–25)
CALCIUM SPEC-SCNC: 8.6 MG/DL (ref 8.4–10.2)
CHLORIDE SERPL-SCNC: 100 MMOL/L (ref 95–110)
CO2 SERPL-SCNC: 27 MMOL/L (ref 22–31)
CREAT BLD-MCNC: 1.04 MG/DL (ref 0.7–1.3)
DEPRECATED RDW RBC AUTO: 46.9 FL (ref 35.1–43.9)
EOSINOPHIL # BLD AUTO: 0 10*3/MM3 (ref 0–0.7)
EOSINOPHIL NFR BLD AUTO: 0 % (ref 0–7)
ERYTHROCYTE [DISTWIDTH] IN BLOOD BY AUTOMATED COUNT: 14.7 % (ref 11.5–14.5)
GFR SERPL CREATININE-BSD FRML MDRD: 79 ML/MIN/1.73 (ref 63–147)
GLUCOSE BLD-MCNC: 113 MG/DL (ref 60–100)
HCT VFR BLD AUTO: 48 % (ref 39–49)
HGB BLD-MCNC: 16.2 G/DL (ref 13.7–17.3)
IMM GRANULOCYTES # BLD AUTO: 0.03 10*3/MM3 (ref 0–0.02)
IMM GRANULOCYTES NFR BLD AUTO: 0.2 % (ref 0–0.5)
LAB AP CASE REPORT: NORMAL
LYMPHOCYTES # BLD AUTO: 1.03 10*3/MM3 (ref 0.6–4.2)
LYMPHOCYTES NFR BLD AUTO: 7.5 % (ref 10–50)
MAGNESIUM SERPL-MCNC: 2.1 MG/DL (ref 1.6–2.3)
MCH RBC QN AUTO: 29.7 PG (ref 26.5–34)
MCHC RBC AUTO-ENTMCNC: 33.8 G/DL (ref 31.5–36.3)
MCV RBC AUTO: 87.9 FL (ref 80–98)
MONOCYTES # BLD AUTO: 0.74 10*3/MM3 (ref 0–0.9)
MONOCYTES NFR BLD AUTO: 5.4 % (ref 0–12)
NEUTROPHILS # BLD AUTO: 11.85 10*3/MM3 (ref 2–8.6)
NEUTROPHILS NFR BLD AUTO: 86.8 % (ref 37–80)
PATH REPORT.FINAL DX SPEC: NORMAL
PATH REPORT.GROSS SPEC: NORMAL
PHOSPHATE SERPL-MCNC: 3.4 MG/DL (ref 2.4–4.4)
PLATELET # BLD AUTO: 238 10*3/MM3 (ref 150–450)
PMV BLD AUTO: 9.2 FL (ref 8–12)
POTASSIUM BLD-SCNC: 4.3 MMOL/L (ref 3.5–5.1)
RBC # BLD AUTO: 5.46 10*6/MM3 (ref 4.37–5.74)
SODIUM BLD-SCNC: 138 MMOL/L (ref 137–145)
WBC NRBC COR # BLD: 13.66 10*3/MM3 (ref 3.2–9.8)

## 2018-12-21 PROCEDURE — 99024 POSTOP FOLLOW-UP VISIT: CPT | Performed by: SURGERY

## 2018-12-21 PROCEDURE — 85025 COMPLETE CBC W/AUTO DIFF WBC: CPT | Performed by: SURGERY

## 2018-12-21 PROCEDURE — 25010000002 CEFOXITIN PER 1 G: Performed by: SURGERY

## 2018-12-21 PROCEDURE — 80048 BASIC METABOLIC PNL TOTAL CA: CPT | Performed by: SURGERY

## 2018-12-21 PROCEDURE — 25010000002 HYDROMORPHONE 1 MG/ML SOLUTION: Performed by: SURGERY

## 2018-12-21 PROCEDURE — 84100 ASSAY OF PHOSPHORUS: CPT | Performed by: SURGERY

## 2018-12-21 PROCEDURE — 25010000002 ENOXAPARIN PER 10 MG: Performed by: SURGERY

## 2018-12-21 PROCEDURE — 83735 ASSAY OF MAGNESIUM: CPT | Performed by: SURGERY

## 2018-12-21 RX ORDER — ECHINACEA PURPUREA EXTRACT 125 MG
1 TABLET ORAL AS NEEDED
Status: DISCONTINUED | OUTPATIENT
Start: 2018-12-21 | End: 2018-12-24 | Stop reason: HOSPADM

## 2018-12-21 RX ORDER — DEXTROSE, SODIUM CHLORIDE, AND POTASSIUM CHLORIDE 5; .45; .15 G/100ML; G/100ML; G/100ML
50 INJECTION INTRAVENOUS CONTINUOUS
Status: DISCONTINUED | OUTPATIENT
Start: 2018-12-21 | End: 2018-12-24 | Stop reason: HOSPADM

## 2018-12-21 RX ADMIN — CEFOXITIN SODIUM 2 G: 1 POWDER, FOR SOLUTION INTRAVENOUS at 04:23

## 2018-12-21 RX ADMIN — TAMSULOSIN HYDROCHLORIDE 0.4 MG: 0.4 CAPSULE ORAL at 20:04

## 2018-12-21 RX ADMIN — SODIUM CHLORIDE, POTASSIUM CHLORIDE, SODIUM LACTATE AND CALCIUM CHLORIDE 100 ML/HR: 600; 310; 30; 20 INJECTION, SOLUTION INTRAVENOUS at 04:23

## 2018-12-21 RX ADMIN — LISINOPRIL 5 MG: 5 TABLET ORAL at 08:05

## 2018-12-21 RX ADMIN — HYDROMORPHONE HYDROCHLORIDE 1 MG: 1 INJECTION, SOLUTION INTRAMUSCULAR; INTRAVENOUS; SUBCUTANEOUS at 20:04

## 2018-12-21 RX ADMIN — CARVEDILOL 6.25 MG: 6.25 TABLET, FILM COATED ORAL at 17:24

## 2018-12-21 RX ADMIN — DULOXETINE 60 MG: 60 CAPSULE, DELAYED RELEASE ORAL at 20:05

## 2018-12-21 RX ADMIN — CARVEDILOL 6.25 MG: 6.25 TABLET, FILM COATED ORAL at 08:05

## 2018-12-21 RX ADMIN — ASPIRIN 81 MG: 81 TABLET, COATED ORAL at 08:05

## 2018-12-21 RX ADMIN — CLONAZEPAM 1 MG: 0.5 TABLET ORAL at 20:04

## 2018-12-21 RX ADMIN — ENOXAPARIN SODIUM 40 MG: 40 INJECTION SUBCUTANEOUS at 08:04

## 2018-12-21 RX ADMIN — POTASSIUM CHLORIDE, DEXTROSE MONOHYDRATE AND SODIUM CHLORIDE 100 ML/HR: 150; 5; 450 INJECTION, SOLUTION INTRAVENOUS at 20:24

## 2018-12-21 RX ADMIN — MIRTAZAPINE 15 MG: 15 TABLET, FILM COATED ORAL at 20:05

## 2018-12-21 RX ADMIN — POTASSIUM CHLORIDE, DEXTROSE MONOHYDRATE AND SODIUM CHLORIDE 100 ML/HR: 150; 5; 450 INJECTION, SOLUTION INTRAVENOUS at 11:09

## 2018-12-22 PROCEDURE — 94799 UNLISTED PULMONARY SVC/PX: CPT

## 2018-12-22 PROCEDURE — 25010000002 ENOXAPARIN PER 10 MG: Performed by: SURGERY

## 2018-12-22 PROCEDURE — 99024 POSTOP FOLLOW-UP VISIT: CPT | Performed by: SURGERY

## 2018-12-22 PROCEDURE — 25010000002 HYDROMORPHONE 1 MG/ML SOLUTION: Performed by: SURGERY

## 2018-12-22 PROCEDURE — 25010000002 HYDROMORPHONE PER 4 MG: Performed by: SURGERY

## 2018-12-22 RX ADMIN — POTASSIUM CHLORIDE, DEXTROSE MONOHYDRATE AND SODIUM CHLORIDE 100 ML/HR: 150; 5; 450 INJECTION, SOLUTION INTRAVENOUS at 05:55

## 2018-12-22 RX ADMIN — CARVEDILOL 6.25 MG: 6.25 TABLET, FILM COATED ORAL at 17:27

## 2018-12-22 RX ADMIN — DULOXETINE 60 MG: 60 CAPSULE, DELAYED RELEASE ORAL at 20:43

## 2018-12-22 RX ADMIN — CLONAZEPAM 1 MG: 0.5 TABLET ORAL at 08:07

## 2018-12-22 RX ADMIN — HYDROMORPHONE HYDROCHLORIDE 1 MG: 1 INJECTION, SOLUTION INTRAMUSCULAR; INTRAVENOUS; SUBCUTANEOUS at 13:27

## 2018-12-22 RX ADMIN — SODIUM CHLORIDE: 9 INJECTION INTRAMUSCULAR; INTRAVENOUS; SUBCUTANEOUS at 03:19

## 2018-12-22 RX ADMIN — HYDROMORPHONE HYDROCHLORIDE 1 MG: 1 INJECTION, SOLUTION INTRAMUSCULAR; INTRAVENOUS; SUBCUTANEOUS at 20:43

## 2018-12-22 RX ADMIN — ENOXAPARIN SODIUM 40 MG: 40 INJECTION SUBCUTANEOUS at 08:05

## 2018-12-22 RX ADMIN — POTASSIUM CHLORIDE, DEXTROSE MONOHYDRATE AND SODIUM CHLORIDE 50 ML/HR: 150; 5; 450 INJECTION, SOLUTION INTRAVENOUS at 17:27

## 2018-12-22 RX ADMIN — CARVEDILOL 6.25 MG: 6.25 TABLET, FILM COATED ORAL at 08:05

## 2018-12-22 RX ADMIN — ASPIRIN 81 MG: 81 TABLET, COATED ORAL at 08:05

## 2018-12-22 RX ADMIN — MIRTAZAPINE 15 MG: 15 TABLET, FILM COATED ORAL at 20:43

## 2018-12-22 RX ADMIN — TAMSULOSIN HYDROCHLORIDE 0.4 MG: 0.4 CAPSULE ORAL at 20:43

## 2018-12-22 RX ADMIN — SODIUM CHLORIDE: 9 INJECTION INTRAMUSCULAR; INTRAVENOUS; SUBCUTANEOUS at 20:36

## 2018-12-22 RX ADMIN — LISINOPRIL 5 MG: 5 TABLET ORAL at 08:05

## 2018-12-23 PROCEDURE — 25010000002 HYDROMORPHONE 1 MG/ML SOLUTION: Performed by: SURGERY

## 2018-12-23 PROCEDURE — 99024 POSTOP FOLLOW-UP VISIT: CPT | Performed by: SURGERY

## 2018-12-23 PROCEDURE — 25010000002 HYDROMORPHONE PER 4 MG: Performed by: SURGERY

## 2018-12-23 PROCEDURE — 25010000002 ENOXAPARIN PER 10 MG: Performed by: SURGERY

## 2018-12-23 RX ADMIN — CARVEDILOL 6.25 MG: 6.25 TABLET, FILM COATED ORAL at 08:17

## 2018-12-23 RX ADMIN — LISINOPRIL 5 MG: 5 TABLET ORAL at 08:17

## 2018-12-23 RX ADMIN — SODIUM CHLORIDE: 9 INJECTION INTRAMUSCULAR; INTRAVENOUS; SUBCUTANEOUS at 10:52

## 2018-12-23 RX ADMIN — CLONAZEPAM 1 MG: 0.5 TABLET ORAL at 11:01

## 2018-12-23 RX ADMIN — DULOXETINE 60 MG: 60 CAPSULE, DELAYED RELEASE ORAL at 20:14

## 2018-12-23 RX ADMIN — CARVEDILOL 6.25 MG: 6.25 TABLET, FILM COATED ORAL at 16:29

## 2018-12-23 RX ADMIN — POTASSIUM CHLORIDE, DEXTROSE MONOHYDRATE AND SODIUM CHLORIDE 50 ML/HR: 150; 5; 450 INJECTION, SOLUTION INTRAVENOUS at 11:01

## 2018-12-23 RX ADMIN — HYDROMORPHONE HYDROCHLORIDE 1 MG: 1 INJECTION, SOLUTION INTRAMUSCULAR; INTRAVENOUS; SUBCUTANEOUS at 20:18

## 2018-12-23 RX ADMIN — ASPIRIN 81 MG: 81 TABLET, COATED ORAL at 08:17

## 2018-12-23 RX ADMIN — TAMSULOSIN HYDROCHLORIDE 0.4 MG: 0.4 CAPSULE ORAL at 20:14

## 2018-12-23 RX ADMIN — CLONAZEPAM 1 MG: 0.5 TABLET ORAL at 20:18

## 2018-12-23 RX ADMIN — MIRTAZAPINE 15 MG: 15 TABLET, FILM COATED ORAL at 20:14

## 2018-12-23 RX ADMIN — ENOXAPARIN SODIUM 40 MG: 40 INJECTION SUBCUTANEOUS at 08:17

## 2018-12-23 RX ADMIN — HYDROMORPHONE HYDROCHLORIDE 1 MG: 1 INJECTION, SOLUTION INTRAMUSCULAR; INTRAVENOUS; SUBCUTANEOUS at 08:17

## 2018-12-24 VITALS
RESPIRATION RATE: 18 BRPM | SYSTOLIC BLOOD PRESSURE: 136 MMHG | HEART RATE: 82 BPM | WEIGHT: 210.76 LBS | HEIGHT: 72 IN | BODY MASS INDEX: 28.55 KG/M2 | OXYGEN SATURATION: 92 % | TEMPERATURE: 96.5 F | DIASTOLIC BLOOD PRESSURE: 86 MMHG

## 2018-12-24 PROBLEM — K57.32 DIVERTICULITIS OF LARGE INTESTINE WITHOUT PERFORATION OR ABSCESS WITHOUT BLEEDING: Status: RESOLVED | Noted: 2018-12-14 | Resolved: 2018-12-24

## 2018-12-24 PROCEDURE — 25010000002 HYDROMORPHONE 1 MG/ML SOLUTION: Performed by: SURGERY

## 2018-12-24 PROCEDURE — 25010000002 ENOXAPARIN PER 10 MG: Performed by: SURGERY

## 2018-12-24 PROCEDURE — 25010000002 HYDROMORPHONE PER 4 MG: Performed by: SURGERY

## 2018-12-24 RX ORDER — HYDROCODONE BITARTRATE AND ACETAMINOPHEN 7.5; 325 MG/1; MG/1
1 TABLET ORAL EVERY 6 HOURS PRN
Qty: 20 TABLET | Refills: 0 | Status: SHIPPED | OUTPATIENT
Start: 2018-12-24 | End: 2019-03-22 | Stop reason: SDUPTHER

## 2018-12-24 RX ADMIN — LISINOPRIL 5 MG: 5 TABLET ORAL at 08:23

## 2018-12-24 RX ADMIN — CLONAZEPAM 1 MG: 0.5 TABLET ORAL at 08:23

## 2018-12-24 RX ADMIN — SODIUM CHLORIDE: 9 INJECTION INTRAMUSCULAR; INTRAVENOUS; SUBCUTANEOUS at 07:16

## 2018-12-24 RX ADMIN — ENOXAPARIN SODIUM 40 MG: 40 INJECTION SUBCUTANEOUS at 08:23

## 2018-12-24 RX ADMIN — HYDROMORPHONE HYDROCHLORIDE 1 MG: 1 INJECTION, SOLUTION INTRAMUSCULAR; INTRAVENOUS; SUBCUTANEOUS at 08:23

## 2018-12-24 RX ADMIN — CARVEDILOL 6.25 MG: 6.25 TABLET, FILM COATED ORAL at 08:23

## 2018-12-24 RX ADMIN — ASPIRIN 81 MG: 81 TABLET, COATED ORAL at 08:23

## 2018-12-25 ENCOUNTER — READMISSION MANAGEMENT (OUTPATIENT)
Dept: CALL CENTER | Facility: HOSPITAL | Age: 43
End: 2018-12-25

## 2018-12-25 NOTE — OUTREACH NOTE
Prep Survey      Responses   Facility patient discharged from?  [facility]   Is patient eligible?  Yes   Discharge diagnosis  Diverticulitis of large intestine. Lap sigmoid colectomy and takedown of splenic flexure   Does the patient have one of the following disease processes/diagnoses(primary or secondary)?  General Surgery   Does the patient have Home health ordered?  No   Is there a DME ordered?  No   Comments regarding appointments  See AVS   General alerts for this patient  Chronic aloholism   Prep survey completed?  Yes          Brea Mckenzie RN

## 2018-12-27 ENCOUNTER — READMISSION MANAGEMENT (OUTPATIENT)
Dept: CALL CENTER | Facility: HOSPITAL | Age: 43
End: 2018-12-27

## 2018-12-27 NOTE — OUTREACH NOTE
General Surgery Week 1 Survey      Responses   Facility patient discharged from?  [facility]   Does the patient have one of the following disease processes/diagnoses(primary or secondary)?  General Surgery   Is there a successful TCM telephone encounter documented?  No   Week 1 attempt successful?  Yes   Call start time  1606   Rescheduled  Revoked [declined to participate]   Call end time  1608   Discharge diagnosis  Diverticulitis of large intestine. Lap sigmoid colectomy and takedown of splenic flexure          Genia Castro RN

## 2019-01-02 ENCOUNTER — OFFICE VISIT (OUTPATIENT)
Dept: SURGERY | Facility: CLINIC | Age: 44
End: 2019-01-02

## 2019-01-02 VITALS
DIASTOLIC BLOOD PRESSURE: 78 MMHG | TEMPERATURE: 98.1 F | HEIGHT: 72 IN | WEIGHT: 214.4 LBS | HEART RATE: 86 BPM | BODY MASS INDEX: 29.04 KG/M2 | SYSTOLIC BLOOD PRESSURE: 142 MMHG

## 2019-01-02 DIAGNOSIS — Z90.49 S/P PARTIAL RESECTION OF COLON: Primary | ICD-10-CM

## 2019-01-02 PROCEDURE — 99024 POSTOP FOLLOW-UP VISIT: CPT | Performed by: SURGERY

## 2019-01-02 NOTE — PATIENT INSTRUCTIONS

## 2019-01-03 PROBLEM — Z90.49 S/P PARTIAL RESECTION OF COLON: Status: ACTIVE | Noted: 2019-01-03

## 2019-01-04 ENCOUNTER — TELEPHONE (OUTPATIENT)
Dept: GASTROENTEROLOGY | Facility: CLINIC | Age: 44
End: 2019-01-04

## 2019-01-04 NOTE — TELEPHONE ENCOUNTER
----- Message from Rene Saenz PA-C sent at 1/4/2019  1:12 PM CST -----  He doesn't have to follow up. He should have stopped the Apriso.  ----- Message -----  From: Litzy Daigle MA  Sent: 1/4/2019  12:04 PM  To: Rene Saenz PA-C    Patient had a colon resection on 12/20/18 he was not sure if he needed to follow up with you? He was recently seen by Dr. Lala for a follow up after surgery but nothing was mentioned to him regarding a follow up with our office.

## 2019-01-04 NOTE — PROGRESS NOTES
"CHIEF COMPLAINT:   Chief Complaint   Patient presents with   • Post-op Follow-up     Post operative laparoscopic Colon resection 12-20-18.       HPI: This patient is seen for a post-operatively following laparoscopic sigmoid colectomy for diverticulitis.  Patient  is doing well. Eating well without any significant nausea. Having good bowel function. No problems with constipation or diarrhea. No urinary complaints. Denies fever. Ambulating well and slowly returning to normal activities.    PATHOLOGY:   Tissue Pathology Exam: [id number]   Order: [id number]   Collected: 12/20/2018 11:54   Status: Final result   Visible to patient: Yes (MyChart)   Dx: Diverticulitis of large intestine wit...   Component      Final Diagnosis   SEGMENT, SIGMOID COLON:   DIVERTICULOSIS, EXTENSIVE.    Electronically signed by Mitchell Coleman MD on 12/21/2018 at 1206   Gross Description    The container is labeled \"sigmoid colon\" and has a segment of colon measuring 19.0 cm long and 7.0 cm in circumference. The mesentery measures 2.0 cm in radial extent and 1.0 cm thick. The serosa is intact and free of apparent adhesions. The wall has several diverticula measuring 1.0 cm in greatest dimension. Polyps, ulcers and mucosal tumor are not identified. Representative sections are embedded. 1A surgical margins; 1B through 1E diverticula.        PHYSICAL EXAM:    ABD: Incisions are healing well without any erythema or signs of infection.    ASSESSMENT    Stevenson was seen today for post-op follow-up.    Diagnoses and all orders for this visit:    S/P partial resection of colon        PLAN:    1. The patient will follow-up 1 month unless there are any problems.  2. May shower.   3. Gradually return to normal activity without restrictions.          This document has been electronically signed by Michel Lala MD on January 3, 2019 11:25 PM        "

## 2019-01-04 NOTE — TELEPHONE ENCOUNTER
Patient has been contacted and made aware that he does not need a follow up with our office however he should have stopped the Apriso. Patient voiced understanding.

## 2019-01-31 ENCOUNTER — OFFICE VISIT (OUTPATIENT)
Dept: ONCOLOGY | Facility: CLINIC | Age: 44
End: 2019-01-31

## 2019-01-31 ENCOUNTER — LAB (OUTPATIENT)
Dept: ONCOLOGY | Facility: HOSPITAL | Age: 44
End: 2019-01-31

## 2019-01-31 VITALS
WEIGHT: 217.8 LBS | HEART RATE: 67 BPM | RESPIRATION RATE: 18 BRPM | HEIGHT: 72 IN | SYSTOLIC BLOOD PRESSURE: 125 MMHG | OXYGEN SATURATION: 97 % | TEMPERATURE: 98.6 F | DIASTOLIC BLOOD PRESSURE: 78 MMHG | BODY MASS INDEX: 29.5 KG/M2

## 2019-01-31 DIAGNOSIS — D75.1 SECONDARY POLYCYTHEMIA: ICD-10-CM

## 2019-01-31 DIAGNOSIS — D75.1 SECONDARY POLYCYTHEMIA: Primary | ICD-10-CM

## 2019-01-31 LAB
BASOPHILS # BLD AUTO: 0.02 10*3/MM3 (ref 0–0.2)
BASOPHILS NFR BLD AUTO: 0.3 % (ref 0–2)
DEPRECATED RDW RBC AUTO: 44.8 FL (ref 35.1–43.9)
EOSINOPHIL # BLD AUTO: 0.3 10*3/MM3 (ref 0–0.7)
EOSINOPHIL NFR BLD AUTO: 4.7 % (ref 0–7)
ERYTHROCYTE [DISTWIDTH] IN BLOOD BY AUTOMATED COUNT: 14.2 % (ref 11.5–14.5)
HCT VFR BLD AUTO: 43.5 % (ref 39–49)
HGB BLD-MCNC: 15.2 G/DL (ref 13.7–17.3)
IMM GRANULOCYTES # BLD AUTO: 0.01 10*3/MM3 (ref 0–0.02)
IMM GRANULOCYTES NFR BLD AUTO: 0.2 % (ref 0–0.5)
LYMPHOCYTES # BLD AUTO: 1.28 10*3/MM3 (ref 0.6–4.2)
LYMPHOCYTES NFR BLD AUTO: 20.1 % (ref 10–50)
MCH RBC QN AUTO: 30.3 PG (ref 26.5–34)
MCHC RBC AUTO-ENTMCNC: 34.9 G/DL (ref 31.5–36.3)
MCV RBC AUTO: 86.8 FL (ref 80–98)
MONOCYTES # BLD AUTO: 0.54 10*3/MM3 (ref 0–0.9)
MONOCYTES NFR BLD AUTO: 8.5 % (ref 0–12)
NEUTROPHILS # BLD AUTO: 4.21 10*3/MM3 (ref 2–8.6)
NEUTROPHILS NFR BLD AUTO: 66.2 % (ref 37–80)
PLATELET # BLD AUTO: 159 10*3/MM3 (ref 150–450)
PMV BLD AUTO: 10 FL (ref 8–12)
RBC # BLD AUTO: 5.01 10*6/MM3 (ref 4.37–5.74)
WBC NRBC COR # BLD: 6.36 10*3/MM3 (ref 3.2–9.8)

## 2019-01-31 PROCEDURE — 99213 OFFICE O/P EST LOW 20 MIN: CPT | Performed by: NURSE PRACTITIONER

## 2019-01-31 PROCEDURE — 85025 COMPLETE CBC W/AUTO DIFF WBC: CPT | Performed by: NURSE PRACTITIONER

## 2019-01-31 PROCEDURE — G0463 HOSPITAL OUTPT CLINIC VISIT: HCPCS | Performed by: NURSE PRACTITIONER

## 2019-01-31 PROCEDURE — 36415 COLL VENOUS BLD VENIPUNCTURE: CPT | Performed by: NURSE PRACTITIONER

## 2019-01-31 NOTE — PROGRESS NOTES
"DATE OF VISIT: 1/31/2019    REASON FOR VISIT:  Follow-up for secondary polycythemia    HISTORY OF PRESENT ILLNESS:  43 yr old male with history of sleep apnea diagnosed 3 yrs ago; recently has been more compliant with use of CPAP;  He has a past history of testosterone use and a history of atrial flutter that has required ablation. He was initially seen by Dr. Monet in October for elevated H&H. He had an extensive work up that included a negative BAKARI 2 mutation, Normal EPO level and Ultrasound of abdomen that was unremarkable. He has had couple therapeutic phlebotomy. Most recent was in September when he was symptomatic. He is taking daily baby ASA and does admit to still taking testosterone supplement.     Since here last he was hospitalized and had partial colon resection for diverticulitis. He is recovering well; seeing surgeon tomorrow for follow-up. He denies any erythromelalgia symptoms. He is taking baby ASA daily and admits he is still using some testosterone replacement/supplement.            PAST MEDICAL HISTORY:    Past Medical History:   Diagnosis Date   • Alcoholism /alcohol abuse (CMS/HCC)    • Anxiety    • Arthritis     shoulders and knees   • Atrial flutter (CMS/HCC)    • Depression    • Diverticulitis    • Esophagitis    • Gastritis    • GERD (gastroesophageal reflux disease)    • Hypertension    • Kidney stone    • Pancreatitis     patient denies   • Sleep apnea     has c-pap       SOCIAL HISTORY:    Social History     Tobacco Use   • Smoking status: Never Smoker   • Smokeless tobacco: Never Used   Substance Use Topics   • Alcohol use: No     Comment: quit 3/17/18   • Drug use: Yes     Frequency: 7.0 times per week     Types: Marijuana     Comment: states he uses \"a little\" every day       Surgical History :  Past Surgical History:   Procedure Laterality Date   • CARDIAC ELECTROPHYSIOLOGY PROCEDURE N/A 5/2/2017    Procedure: Ablation atrial flutter;  Surgeon: Ramirez Reza MD;  Location: [facility]" [facility];  Service:    • CARDIAC ELECTROPHYSIOLOGY PROCEDURE N/A 9/5/2017    Procedure: Ablation atrial fibrillation PVA ;  Surgeon: Ramirez Reza MD;  Location:  [facility];  Service:    • COLON RESECTION Left 12/20/2018    Procedure: LAPAROSCOPIC SIGMOID  RESECTION, TAKE DOWN OF SPLENIC FLEXURE;  Surgeon: Michel Lala MD;  Location: [facility] OR;  Service: General   • COLONOSCOPY     • COLONOSCOPY N/A 5/30/2018    Procedure: COLONOSCOPY;  Surgeon: Edwin Medina MD;  Location: [facility] ENDOSCOPY;  Service: Gastroenterology   • ENDOSCOPY N/A 3/30/2018    Procedure: ESOPHAGOGASTRODUODENOSCOPY;  Surgeon: Edwin Medina MD;  Location: [facility] ENDOSCOPY;  Service: Gastroenterology   • ENDOSCOPY N/A 5/30/2018    Procedure: ESOPHAGOGASTRODUODENOSCOPY;  Surgeon: Edwin Medina MD;  Location: [facility] ENDOSCOPY;  Service: Gastroenterology   • ENDOSCOPY N/A 12/3/2018    Procedure: ESOPHAGOGASTRODUODENOSCOPY;  Surgeon: Edwin Medina MD;  Location: [facility] ENDOSCOPY;  Service: Gastroenterology   • SHOULDER SURGERY Right 2014   • TONSILLECTOMY  12/2015   • UPPER GASTROINTESTINAL ENDOSCOPY  03/30/2018   • UPPER GASTROINTESTINAL ENDOSCOPY     • UPPER GASTROINTESTINAL ENDOSCOPY  05/30/2018       ALLERGIES:    Allergies   Allergen Reactions   • Contrast Dye Anaphylaxis     ANAPHYLAXIS   • Iodinated Diagnostic Agents Anaphylaxis       REVIEW OF SYSTEMS:      CONSTITUTIONAL:  No fever, chills, or night sweats.     HEENT:  No epistaxis, mouth sores, or difficulty swallowing.    RESPIRATORY:  No new shortness of breath or cough at present.    CARDIOVASCULAR:  No chest pain or palpitations.    GASTROINTESTINAL:  No abdominal pain, nausea, vomiting, or blood in the stool.    GENITOURINARY:  No dysuria or hematuria.    MUSCULOSKELETAL:  No any new back pain or arthralgias.     NEUROLOGICAL:  No tingling or numbness. No new headache or dizziness.     LYMPHATICS:  Denies any abnormal swollen and anywhere  "in the body.    SKIN:  Denies any new skin rash.    PHYSICAL EXAMINATION:      VITAL SIGNS:  /78   Pulse 67   Temp 98.6 °F (37 °C) (Temporal)   Resp 18   Ht 182.9 cm (72.01\")   Wt 98.8 kg (217 lb 12.8 oz)   SpO2 97%   BMI 29.53 kg/m²     GENERAL:  Not in any distress.    HEENT:  Normocephalic, Atraumatic.Mild Conjunctival pallor. No icterus.  No Facial Asymmetry noted.    NECK:  No adenopathy. No JVD.    RESPIRATORY:  Fair air entry bilateral. No rhonchi or wheezing.    CARDIOVASCULAR:  S1, S2. Regular rate and rhythm. No murmur or gallop appreciated.    ABDOMEN:  Soft, obese, nontender. Bowel sounds present in all four quadrants.  No organomegaly appreciated.    EXTREMITIES:  No edema.No Calf Tenderness.    NEUROLOGIC:  Alert, awake and oriented ×3.      SKIN : No new skin lesion identified  DIAGNOSTIC DATA:    Glucose   Date Value Ref Range Status   12/21/2018 113 (H) 60 - 100 mg/dL Final     Sodium   Date Value Ref Range Status   12/21/2018 138 137 - 145 mmol/L Final     Potassium   Date Value Ref Range Status   12/21/2018 4.3 3.5 - 5.1 mmol/L Final     CO2   Date Value Ref Range Status   12/21/2018 27.0 22.0 - 31.0 mmol/L Final     Chloride   Date Value Ref Range Status   12/21/2018 100 95 - 110 mmol/L Final     Anion Gap   Date Value Ref Range Status   12/21/2018 11.0 5.0 - 15.0 mmol/L Final     Creatinine   Date Value Ref Range Status   12/21/2018 1.04 0.70 - 1.30 mg/dL Final     BUN   Date Value Ref Range Status   12/21/2018 12 7 - 21 mg/dL Final     BUN/Creatinine Ratio   Date Value Ref Range Status   12/21/2018 11.5 7.0 - 25.0 Final     Calcium   Date Value Ref Range Status   12/21/2018 8.6 8.4 - 10.2 mg/dL Final     eGFR Non  Amer   Date Value Ref Range Status   12/21/2018 79 63 - 147 mL/min/1.73 Final     Alkaline Phosphatase   Date Value Ref Range Status   12/18/2018 34 (L) 38 - 126 U/L Final     Total Protein   Date Value Ref Range Status   12/18/2018 5.9 (L) 6.3 - 8.6 g/dL Final "     ALT (SGPT)   Date Value Ref Range Status   12/18/2018 51 21 - 72 U/L Final     AST (SGOT)   Date Value Ref Range Status   12/18/2018 45 17 - 59 U/L Final     Total Bilirubin   Date Value Ref Range Status   12/18/2018 0.8 0.2 - 1.3 mg/dL Final     Albumin   Date Value Ref Range Status   12/18/2018 3.60 3.40 - 4.80 g/dL Final     Globulin   Date Value Ref Range Status   12/18/2018 2.3 2.3 - 3.5 gm/dL Final     Lab Results   Component Value Date    WBC 6.36 01/31/2019    HGB 15.2 01/31/2019    HCT 43.5 01/31/2019    MCV 86.8 01/31/2019     01/31/2019     Lab Results   Component Value Date    NEUTROABS 4.21 01/31/2019    IRON 126 04/10/2018    TIBC 345 04/10/2018    LABIRON 37 04/10/2018    FERRITIN 179.00 04/10/2018     Lab Results   Component Value Date    AFPTM 2.2 04/10/2018    AFPTM 182 04/10/2018   ]        ASSESSMENT AND PLAN:       1. Erythrocytosis,secondary to testosterone use and an element of DONNIE;  Hct today is 43.5;  he is not having any erythromyalgia symptoms; no heme intervention needed at this time. Advised him to continue to stay well hydrated and to continue with daily ASA. Recheck again in 3 mos.     2. Atrial fibrillation, pt was  taken off the Eliquis by his cardiologist and is currently taking baby ASA daily.      3. Health maintenance, he does not smoke; he has had a EGD and colonoscopy; recently colon resection for diverticulitis; follows with surgical clinic tomorrow.    This document has been signed by VIKASH Clinton on January 31, 2019 1:09 PM

## 2019-02-22 RX ORDER — CARVEDILOL 6.25 MG/1
TABLET ORAL
Qty: 60 TABLET | Refills: 11 | Status: SHIPPED | OUTPATIENT
Start: 2019-02-22 | End: 2019-03-22

## 2019-03-22 ENCOUNTER — APPOINTMENT (OUTPATIENT)
Dept: CT IMAGING | Facility: HOSPITAL | Age: 44
End: 2019-03-22

## 2019-03-22 ENCOUNTER — HOSPITAL ENCOUNTER (OUTPATIENT)
Facility: HOSPITAL | Age: 44
Setting detail: OBSERVATION
Discharge: HOME | End: 2019-03-24
Attending: FAMILY MEDICINE | Admitting: FAMILY MEDICINE

## 2019-03-22 DIAGNOSIS — I10 HYPERTENSION, UNSPECIFIED TYPE: ICD-10-CM

## 2019-03-22 DIAGNOSIS — R10.33 PERIUMBILICAL ABDOMINAL PAIN: Primary | ICD-10-CM

## 2019-03-22 DIAGNOSIS — R11.2 INTRACTABLE VOMITING WITH NAUSEA, UNSPECIFIED VOMITING TYPE: ICD-10-CM

## 2019-03-22 LAB
ALBUMIN SERPL-MCNC: 4.2 G/DL (ref 3.4–4.8)
ALBUMIN/GLOB SERPL: 1.5 G/DL (ref 1.1–1.8)
ALP SERPL-CCNC: 61 U/L (ref 38–126)
ALT SERPL W P-5'-P-CCNC: 44 U/L (ref 21–72)
AMYLASE SERPL-CCNC: 81 U/L (ref 50–130)
ANION GAP SERPL CALCULATED.3IONS-SCNC: 10 MMOL/L (ref 5–15)
AST SERPL-CCNC: 45 U/L (ref 17–59)
BACTERIA UR QL AUTO: ABNORMAL /HPF
BASOPHILS # BLD AUTO: 0.06 10*3/MM3 (ref 0–0.2)
BASOPHILS NFR BLD AUTO: 0.7 % (ref 0–1.5)
BILIRUB SERPL-MCNC: 1 MG/DL (ref 0.2–1.3)
BILIRUB UR QL STRIP: NEGATIVE
BUN BLD-MCNC: 14 MG/DL (ref 7–21)
BUN/CREAT SERPL: 12.6 (ref 7–25)
CALCIUM SPEC-SCNC: 8.9 MG/DL (ref 8.4–10.2)
CHLORIDE SERPL-SCNC: 102 MMOL/L (ref 95–110)
CK MB SERPL-CCNC: 3.38 NG/ML (ref 0–5)
CK SERPL-CCNC: 397 U/L (ref 55–170)
CK SERPL-CCNC: 440 U/L (ref 55–170)
CLARITY UR: CLEAR
CO2 SERPL-SCNC: 26 MMOL/L (ref 22–31)
COLOR UR: ABNORMAL
CREAT BLD-MCNC: 1.11 MG/DL (ref 0.7–1.3)
DEPRECATED RDW RBC AUTO: 48.3 FL (ref 37–54)
EOSINOPHIL # BLD AUTO: 0.06 10*3/MM3 (ref 0–0.4)
EOSINOPHIL NFR BLD AUTO: 0.7 % (ref 0.3–6.2)
ERYTHROCYTE [DISTWIDTH] IN BLOOD BY AUTOMATED COUNT: 15.1 % (ref 12.3–15.4)
FLUAV AG NPH QL: NEGATIVE
FLUBV AG NPH QL IA: NEGATIVE
GFR SERPL CREATININE-BSD FRML MDRD: 73 ML/MIN/1.73 (ref 63–147)
GLOBULIN UR ELPH-MCNC: 2.8 GM/DL (ref 2.3–3.5)
GLUCOSE BLD-MCNC: 96 MG/DL (ref 60–100)
GLUCOSE UR STRIP-MCNC: NEGATIVE MG/DL
HCT VFR BLD AUTO: 49.2 % (ref 37.5–51)
HGB BLD-MCNC: 16 G/DL (ref 13–17.7)
HGB UR QL STRIP.AUTO: NEGATIVE
HOLD SPECIMEN: NORMAL
HYALINE CASTS UR QL AUTO: ABNORMAL /LPF
IMM GRANULOCYTES # BLD AUTO: 0.03 10*3/MM3 (ref 0–0.05)
IMM GRANULOCYTES NFR BLD AUTO: 0.4 % (ref 0–0.5)
KETONES UR QL STRIP: ABNORMAL
LEUKOCYTE ESTERASE UR QL STRIP.AUTO: ABNORMAL
LIPASE SERPL-CCNC: 78 U/L (ref 23–300)
LYMPHOCYTES # BLD AUTO: 1.34 10*3/MM3 (ref 0.7–3.1)
LYMPHOCYTES NFR BLD AUTO: 15.7 % (ref 19.6–45.3)
MAGNESIUM SERPL-MCNC: 2.2 MG/DL (ref 1.6–2.3)
MCH RBC QN AUTO: 28.7 PG (ref 26.6–33)
MCHC RBC AUTO-ENTMCNC: 32.5 G/DL (ref 31.5–35.7)
MCV RBC AUTO: 88.2 FL (ref 79–97)
MONOCYTES # BLD AUTO: 0.4 10*3/MM3 (ref 0.1–0.9)
MONOCYTES NFR BLD AUTO: 4.7 % (ref 5–12)
NEUTROPHILS # BLD AUTO: 6.66 10*3/MM3 (ref 1.4–7)
NEUTROPHILS NFR BLD AUTO: 77.8 % (ref 42.7–76)
NITRITE UR QL STRIP: NEGATIVE
NRBC BLD AUTO-RTO: 0 /100 WBC (ref 0–0)
PH UR STRIP.AUTO: 7.5 [PH] (ref 5–9)
PLATELET # BLD AUTO: 305 10*3/MM3 (ref 140–450)
PMV BLD AUTO: 9.9 FL (ref 6–12)
POTASSIUM BLD-SCNC: 4.2 MMOL/L (ref 3.5–5.1)
PROT SERPL-MCNC: 7 G/DL (ref 6.3–8.6)
PROT UR QL STRIP: ABNORMAL
RBC # BLD AUTO: 5.58 10*6/MM3 (ref 4.14–5.8)
RBC # UR: ABNORMAL /HPF
REF LAB TEST METHOD: ABNORMAL
SODIUM BLD-SCNC: 138 MMOL/L (ref 137–145)
SP GR UR STRIP: 1.03 (ref 1–1.03)
SQUAMOUS #/AREA URNS HPF: ABNORMAL /HPF
TROPONIN I SERPL-MCNC: <0.012 NG/ML
UROBILINOGEN UR QL STRIP: ABNORMAL
WBC NRBC COR # BLD: 8.55 10*3/MM3 (ref 3.4–10.8)
WBC UR QL AUTO: ABNORMAL /HPF
WHOLE BLOOD HOLD SPECIMEN: NORMAL

## 2019-03-22 PROCEDURE — 85025 COMPLETE CBC W/AUTO DIFF WBC: CPT | Performed by: FAMILY MEDICINE

## 2019-03-22 PROCEDURE — 99284 EMERGENCY DEPT VISIT MOD MDM: CPT

## 2019-03-22 PROCEDURE — 84484 ASSAY OF TROPONIN QUANT: CPT | Performed by: FAMILY MEDICINE

## 2019-03-22 PROCEDURE — 25010000002 ONDANSETRON PER 1 MG: Performed by: FAMILY MEDICINE

## 2019-03-22 PROCEDURE — 96374 THER/PROPH/DIAG INJ IV PUSH: CPT

## 2019-03-22 PROCEDURE — 36415 COLL VENOUS BLD VENIPUNCTURE: CPT | Performed by: FAMILY MEDICINE

## 2019-03-22 PROCEDURE — 96375 TX/PRO/DX INJ NEW DRUG ADDON: CPT

## 2019-03-22 PROCEDURE — 87804 INFLUENZA ASSAY W/OPTIC: CPT | Performed by: FAMILY MEDICINE

## 2019-03-22 PROCEDURE — 74176 CT ABD & PELVIS W/O CONTRAST: CPT

## 2019-03-22 PROCEDURE — 25010000002 MORPHINE PER 10 MG: Performed by: FAMILY MEDICINE

## 2019-03-22 PROCEDURE — 83735 ASSAY OF MAGNESIUM: CPT | Performed by: FAMILY MEDICINE

## 2019-03-22 PROCEDURE — 82553 CREATINE MB FRACTION: CPT | Performed by: FAMILY MEDICINE

## 2019-03-22 PROCEDURE — 96376 TX/PRO/DX INJ SAME DRUG ADON: CPT

## 2019-03-22 PROCEDURE — 83690 ASSAY OF LIPASE: CPT | Performed by: FAMILY MEDICINE

## 2019-03-22 PROCEDURE — 25010000002 HYDRALAZINE PER 20 MG: Performed by: FAMILY MEDICINE

## 2019-03-22 PROCEDURE — 96361 HYDRATE IV INFUSION ADD-ON: CPT

## 2019-03-22 PROCEDURE — 82150 ASSAY OF AMYLASE: CPT | Performed by: FAMILY MEDICINE

## 2019-03-22 PROCEDURE — G0378 HOSPITAL OBSERVATION PER HR: HCPCS

## 2019-03-22 PROCEDURE — 93005 ELECTROCARDIOGRAM TRACING: CPT | Performed by: FAMILY MEDICINE

## 2019-03-22 PROCEDURE — 25010000002 KETOROLAC TROMETHAMINE PER 15 MG: Performed by: FAMILY MEDICINE

## 2019-03-22 PROCEDURE — 82550 ASSAY OF CK (CPK): CPT | Performed by: FAMILY MEDICINE

## 2019-03-22 PROCEDURE — 81001 URINALYSIS AUTO W/SCOPE: CPT | Performed by: FAMILY MEDICINE

## 2019-03-22 PROCEDURE — 80053 COMPREHEN METABOLIC PANEL: CPT | Performed by: FAMILY MEDICINE

## 2019-03-22 PROCEDURE — 25010000002 METOCLOPRAMIDE PER 10 MG: Performed by: FAMILY MEDICINE

## 2019-03-22 PROCEDURE — 93010 ELECTROCARDIOGRAM REPORT: CPT | Performed by: INTERNAL MEDICINE

## 2019-03-22 RX ORDER — ALUMINA, MAGNESIA, AND SIMETHICONE 2400; 2400; 240 MG/30ML; MG/30ML; MG/30ML
15 SUSPENSION ORAL ONCE
Status: COMPLETED | OUTPATIENT
Start: 2019-03-22 | End: 2019-03-22

## 2019-03-22 RX ORDER — PROMETHAZINE HYDROCHLORIDE 12.5 MG/1
12.5 TABLET ORAL EVERY 6 HOURS PRN
Status: DISCONTINUED | OUTPATIENT
Start: 2019-03-22 | End: 2019-03-24 | Stop reason: HOSPADM

## 2019-03-22 RX ORDER — ACETAMINOPHEN 325 MG/1
650 TABLET ORAL EVERY 4 HOURS PRN
Status: DISCONTINUED | OUTPATIENT
Start: 2019-03-22 | End: 2019-03-24 | Stop reason: HOSPADM

## 2019-03-22 RX ORDER — PROMETHAZINE HYDROCHLORIDE 25 MG/ML
12.5 INJECTION, SOLUTION INTRAMUSCULAR; INTRAVENOUS ONCE
Status: DISCONTINUED | OUTPATIENT
Start: 2019-03-22 | End: 2019-03-22

## 2019-03-22 RX ORDER — SODIUM CHLORIDE 9 MG/ML
125 INJECTION, SOLUTION INTRAVENOUS CONTINUOUS
Status: DISCONTINUED | OUTPATIENT
Start: 2019-03-22 | End: 2019-03-24

## 2019-03-22 RX ORDER — ONDANSETRON 2 MG/ML
4 INJECTION INTRAMUSCULAR; INTRAVENOUS ONCE
Status: COMPLETED | OUTPATIENT
Start: 2019-03-22 | End: 2019-03-22

## 2019-03-22 RX ORDER — KETOROLAC TROMETHAMINE 30 MG/ML
15 INJECTION, SOLUTION INTRAMUSCULAR; INTRAVENOUS ONCE AS NEEDED
Status: COMPLETED | OUTPATIENT
Start: 2019-03-22 | End: 2019-03-22

## 2019-03-22 RX ORDER — SODIUM CHLORIDE 0.9 % (FLUSH) 0.9 %
3-10 SYRINGE (ML) INJECTION AS NEEDED
Status: DISCONTINUED | OUTPATIENT
Start: 2019-03-22 | End: 2019-03-24 | Stop reason: HOSPADM

## 2019-03-22 RX ORDER — SODIUM CHLORIDE 9 MG/ML
100 INJECTION, SOLUTION INTRAVENOUS CONTINUOUS
Status: ACTIVE | OUTPATIENT
Start: 2019-03-22 | End: 2019-03-23

## 2019-03-22 RX ORDER — LABETALOL HYDROCHLORIDE 5 MG/ML
10 INJECTION, SOLUTION INTRAVENOUS EVERY 6 HOURS PRN
Status: DISCONTINUED | OUTPATIENT
Start: 2019-03-22 | End: 2019-03-24 | Stop reason: HOSPADM

## 2019-03-22 RX ORDER — NITROFURANTOIN 25; 75 MG/1; MG/1
100 CAPSULE ORAL 2 TIMES DAILY
Qty: 14 CAPSULE | Refills: 0 | Status: SHIPPED | OUTPATIENT
Start: 2019-03-22 | End: 2019-03-24 | Stop reason: HOSPADM

## 2019-03-22 RX ORDER — PROMETHAZINE HYDROCHLORIDE 25 MG/ML
12.5 INJECTION, SOLUTION INTRAMUSCULAR; INTRAVENOUS EVERY 6 HOURS PRN
Status: DISCONTINUED | OUTPATIENT
Start: 2019-03-22 | End: 2019-03-24 | Stop reason: HOSPADM

## 2019-03-22 RX ORDER — PROMETHAZINE HYDROCHLORIDE 25 MG/1
25 SUPPOSITORY RECTAL EVERY 6 HOURS PRN
Qty: 15 SUPPOSITORY | Refills: 0 | Status: SHIPPED | OUTPATIENT
Start: 2019-03-22 | End: 2019-06-19

## 2019-03-22 RX ORDER — ASPIRIN 81 MG/1
81 TABLET ORAL DAILY
Status: DISCONTINUED | OUTPATIENT
Start: 2019-03-23 | End: 2019-03-24 | Stop reason: HOSPADM

## 2019-03-22 RX ORDER — CLONAZEPAM 0.5 MG/1
1 TABLET ORAL 2 TIMES DAILY PRN
Status: DISCONTINUED | OUTPATIENT
Start: 2019-03-22 | End: 2019-03-24 | Stop reason: HOSPADM

## 2019-03-22 RX ORDER — KETOROLAC TROMETHAMINE 30 MG/ML
30 INJECTION, SOLUTION INTRAMUSCULAR; INTRAVENOUS ONCE
Status: COMPLETED | OUTPATIENT
Start: 2019-03-22 | End: 2019-03-22

## 2019-03-22 RX ORDER — SODIUM CHLORIDE 0.9 % (FLUSH) 0.9 %
10 SYRINGE (ML) INJECTION AS NEEDED
Status: DISCONTINUED | OUTPATIENT
Start: 2019-03-22 | End: 2019-03-24 | Stop reason: HOSPADM

## 2019-03-22 RX ORDER — HYDROCODONE BITARTRATE AND ACETAMINOPHEN 7.5; 325 MG/1; MG/1
1 TABLET ORAL EVERY 6 HOURS PRN
Qty: 12 TABLET | Refills: 0 | Status: SHIPPED | OUTPATIENT
Start: 2019-03-22 | End: 2019-03-27

## 2019-03-22 RX ORDER — CARVEDILOL 6.25 MG/1
6.25 TABLET ORAL DAILY
Status: DISCONTINUED | OUTPATIENT
Start: 2019-03-23 | End: 2019-03-23

## 2019-03-22 RX ORDER — SODIUM CHLORIDE 9 MG/ML
125 INJECTION, SOLUTION INTRAVENOUS CONTINUOUS
Status: DISCONTINUED | OUTPATIENT
Start: 2019-03-22 | End: 2019-03-23

## 2019-03-22 RX ORDER — PROMETHAZINE HYDROCHLORIDE 12.5 MG/1
12.5 SUPPOSITORY RECTAL EVERY 6 HOURS PRN
Status: DISCONTINUED | OUTPATIENT
Start: 2019-03-22 | End: 2019-03-24 | Stop reason: HOSPADM

## 2019-03-22 RX ORDER — SODIUM CHLORIDE 0.9 % (FLUSH) 0.9 %
3 SYRINGE (ML) INJECTION EVERY 12 HOURS SCHEDULED
Status: DISCONTINUED | OUTPATIENT
Start: 2019-03-22 | End: 2019-03-24 | Stop reason: HOSPADM

## 2019-03-22 RX ORDER — PROMETHAZINE HYDROCHLORIDE 25 MG/1
25 TABLET ORAL EVERY 6 HOURS PRN
COMMUNITY
End: 2019-06-19

## 2019-03-22 RX ORDER — METOCLOPRAMIDE HYDROCHLORIDE 5 MG/ML
10 INJECTION INTRAMUSCULAR; INTRAVENOUS
Status: DISCONTINUED | OUTPATIENT
Start: 2019-03-22 | End: 2019-03-22 | Stop reason: ALTCHOICE

## 2019-03-22 RX ORDER — DULOXETIN HYDROCHLORIDE 60 MG/1
60 CAPSULE, DELAYED RELEASE ORAL NIGHTLY
Status: DISCONTINUED | OUTPATIENT
Start: 2019-03-22 | End: 2019-03-24 | Stop reason: HOSPADM

## 2019-03-22 RX ORDER — ENALAPRILAT 2.5 MG/2ML
1.25 INJECTION INTRAVENOUS ONCE
Status: COMPLETED | OUTPATIENT
Start: 2019-03-22 | End: 2019-03-22

## 2019-03-22 RX ORDER — HYDRALAZINE HYDROCHLORIDE 20 MG/ML
20 INJECTION INTRAMUSCULAR; INTRAVENOUS ONCE
Status: COMPLETED | OUTPATIENT
Start: 2019-03-22 | End: 2019-03-22

## 2019-03-22 RX ORDER — PANTOPRAZOLE SODIUM 40 MG/1
40 TABLET, DELAYED RELEASE ORAL DAILY
Status: ON HOLD | COMMUNITY
End: 2019-04-28 | Stop reason: SDUPTHER

## 2019-03-22 RX ORDER — SODIUM CHLORIDE 9 MG/ML
INJECTION, SOLUTION INTRAVENOUS
Status: COMPLETED
Start: 2019-03-22 | End: 2019-03-22

## 2019-03-22 RX ADMIN — ALUMINUM HYDROXIDE, MAGNESIUM HYDROXIDE, AND DIMETHICONE 15 ML: 400; 400; 40 SUSPENSION ORAL at 19:32

## 2019-03-22 RX ADMIN — LABETALOL HYDROCHLORIDE 10 MG: 5 INJECTION INTRAVENOUS at 22:18

## 2019-03-22 RX ADMIN — KETOROLAC TROMETHAMINE 30 MG: 30 INJECTION, SOLUTION INTRAMUSCULAR; INTRAVENOUS at 20:45

## 2019-03-22 RX ADMIN — KETOROLAC TROMETHAMINE 15 MG: 30 INJECTION, SOLUTION INTRAMUSCULAR; INTRAVENOUS at 22:37

## 2019-03-22 RX ADMIN — SODIUM CHLORIDE 125 ML/HR: 900 INJECTION, SOLUTION INTRAVENOUS at 20:45

## 2019-03-22 RX ADMIN — ALUMINUM HYDROXIDE, MAGNESIUM HYDROXIDE, AND DIMETHICONE 15 ML: 400; 400; 40 SUSPENSION ORAL at 22:36

## 2019-03-22 RX ADMIN — ENALAPRILAT 1.25 MG: 1.25 INJECTION INTRAVENOUS at 19:37

## 2019-03-22 RX ADMIN — SODIUM CHLORIDE 100 ML/HR: 9 INJECTION, SOLUTION INTRAVENOUS at 22:03

## 2019-03-22 RX ADMIN — CLONAZEPAM 1 MG: 0.5 TABLET ORAL at 22:44

## 2019-03-22 RX ADMIN — SODIUM CHLORIDE 1000 ML: 9 INJECTION, SOLUTION INTRAVENOUS at 15:19

## 2019-03-22 RX ADMIN — METOCLOPRAMIDE 10 MG: 5 INJECTION, SOLUTION INTRAMUSCULAR; INTRAVENOUS at 15:17

## 2019-03-22 RX ADMIN — ONDANSETRON 4 MG: 2 INJECTION INTRAMUSCULAR; INTRAVENOUS at 14:38

## 2019-03-22 RX ADMIN — LIDOCAINE HYDROCHLORIDE 15 ML: 20 SOLUTION ORAL; TOPICAL at 19:33

## 2019-03-22 RX ADMIN — MORPHINE SULFATE 4 MG: 4 INJECTION INTRAVENOUS at 15:06

## 2019-03-22 RX ADMIN — HYDRALAZINE HYDROCHLORIDE 20 MG: 20 INJECTION INTRAMUSCULAR; INTRAVENOUS at 18:07

## 2019-03-22 RX ADMIN — LIDOCAINE HYDROCHLORIDE 15 ML: 20 SOLUTION ORAL; TOPICAL at 22:37

## 2019-03-22 RX ADMIN — SODIUM CHLORIDE 1000 ML: 9 INJECTION, SOLUTION INTRAVENOUS at 14:37

## 2019-03-22 NOTE — ED PROVIDER NOTES
Subjective     Nausea   The primary symptoms include abdominal pain, nausea, vomiting and diarrhea. Primary symptoms do not include fever, fatigue, hematemesis, jaundice, hematochezia, dysuria, myalgias, arthralgias or rash. The illness began 2 days ago.   The abdominal pain is located in the epigastric region. Relieved by: hot showers.   The illness is also significant for chills. Significant associated medical issues include bowel resection and diverticulitis.   Vomiting   The primary symptoms include abdominal pain, nausea, vomiting and diarrhea. Primary symptoms do not include fever, fatigue, hematemesis, jaundice, hematochezia, dysuria, myalgias, arthralgias or rash.   The illness is also significant for chills. Significant associated medical issues include bowel resection and diverticulitis.   Diverticulitis   Associated symptoms: abdominal pain, diarrhea, nausea and vomiting    Associated symptoms: no chest pain, no congestion, no cough, no ear pain, no fatigue, no fever, no headaches, no myalgias, no rash, no rhinorrhea, no shortness of breath, no sore throat and no wheezing        Review of Systems   Constitutional: Positive for chills. Negative for appetite change, diaphoresis, fatigue and fever.   HENT: Negative for congestion, ear discharge, ear pain, nosebleeds, rhinorrhea, sinus pressure, sore throat and trouble swallowing.    Eyes: Negative for discharge and redness.   Respiratory: Negative for apnea, cough, chest tightness, shortness of breath and wheezing.    Cardiovascular: Negative for chest pain.   Gastrointestinal: Positive for abdominal pain, diarrhea, nausea and vomiting. Negative for hematemesis, hematochezia and jaundice.   Endocrine: Negative for polyuria.   Genitourinary: Negative for dysuria, frequency and urgency.   Musculoskeletal: Negative for arthralgias, myalgias and neck pain.   Skin: Negative for color change and rash.   Allergic/Immunologic: Negative for immunocompromised state.    Neurological: Negative for dizziness, seizures, syncope, weakness, light-headedness and headaches.   Hematological: Negative for adenopathy. Does not bruise/bleed easily.   Psychiatric/Behavioral: Negative for behavioral problems and confusion.   All other systems reviewed and are negative.      Past Medical History:   Diagnosis Date   • Alcoholism /alcohol abuse (CMS/HCC)    • Anxiety    • Arthritis     shoulders and knees   • Atrial flutter (CMS/HCC)    • Depression    • Diverticulitis    • Esophagitis    • Gastritis    • GERD (gastroesophageal reflux disease)    • Hypertension    • Kidney stone    • Pancreatitis     patient denies   • Sleep apnea     has c-pap       Allergies   Allergen Reactions   • Contrast Dye Anaphylaxis     ANAPHYLAXIS   • Iodinated Diagnostic Agents Anaphylaxis       Past Surgical History:   Procedure Laterality Date   • CARDIAC ELECTROPHYSIOLOGY PROCEDURE N/A 5/2/2017    Procedure: Ablation atrial flutter;  Surgeon: Ramirez Reza MD;  Location: [facility] EP INVASIVE LOCATION;  Service:    • CARDIAC ELECTROPHYSIOLOGY PROCEDURE N/A 9/5/2017    Procedure: Ablation atrial fibrillation PVA ;  Surgeon: Ramirez Reza MD;  Location:  [facility];  Service:    • COLON RESECTION Left 12/20/2018    Procedure: LAPAROSCOPIC SIGMOID  RESECTION, TAKE DOWN OF SPLENIC FLEXURE;  Surgeon: Michel Lala MD;  Location: [facility] OR;  Service: General   • COLONOSCOPY     • COLONOSCOPY N/A 5/30/2018    Procedure: COLONOSCOPY;  Surgeon: Edwin Medina MD;  Location: [facility] ENDOSCOPY;  Service: Gastroenterology   • ENDOSCOPY N/A 3/30/2018    Procedure: ESOPHAGOGASTRODUODENOSCOPY;  Surgeon: Edwin Medina MD;  Location: [facility] ENDOSCOPY;  Service: Gastroenterology   • ENDOSCOPY N/A 5/30/2018    Procedure: ESOPHAGOGASTRODUODENOSCOPY;  Surgeon: Edwin Medina MD;  Location: [facility] ENDOSCOPY;  Service: Gastroenterology   • ENDOSCOPY N/A 12/3/2018    Procedure: ESOPHAGOGASTRODUODENOSCOPY;   "Surgeon: Edwin Medina MD;  Location: [facility] ENDOSCOPY;  Service: Gastroenterology   • SHOULDER SURGERY Right 2014   • TONSILLECTOMY  12/2015   • UPPER GASTROINTESTINAL ENDOSCOPY  03/30/2018   • UPPER GASTROINTESTINAL ENDOSCOPY     • UPPER GASTROINTESTINAL ENDOSCOPY  05/30/2018       Family History   Problem Relation Age of Onset   • Heart disease Father    • Depression Father    • Cancer Maternal Grandmother         lung   • Cancer Paternal Grandmother         pancreatic   • Heart disease Paternal Grandfather        Social History     Socioeconomic History   • Marital status:      Spouse name: Not on file   • Number of children: Not on file   • Years of education: Not on file   • Highest education level: Not on file   Tobacco Use   • Smoking status: Never Smoker   • Smokeless tobacco: Never Used   Substance and Sexual Activity   • Alcohol use: No     Comment: quit 3/17/18   • Drug use: Yes     Frequency: 7.0 times per week     Types: Marijuana     Comment: states he uses \"a little\" every day   • Sexual activity: Defer   Social History Narrative    Pt states has not had drink of any alcohol in 3 weeks as of 4/5/2018           Objective   Physical Exam   Constitutional: He is oriented to person, place, and time. He appears well-developed and well-nourished.   HENT:   Head: Normocephalic and atraumatic.   Nose: Nose normal.   Mouth/Throat: Oropharynx is clear and moist.   Eyes: Conjunctivae and EOM are normal. Pupils are equal, round, and reactive to light. Right eye exhibits no discharge. Left eye exhibits no discharge. No scleral icterus.   Neck: Normal range of motion. Neck supple. No tracheal deviation present.   Cardiovascular: Normal rate, regular rhythm and normal heart sounds.   No murmur heard.  Pulmonary/Chest: Effort normal and breath sounds normal. No stridor. No respiratory distress. He has no wheezes. He has no rales.   Abdominal: Soft. He exhibits no distension and no mass. Bowel sounds are " decreased. There is generalized tenderness (mild). There is no rebound and no guarding.   Musculoskeletal: He exhibits no edema.   Neurological: He is alert and oriented to person, place, and time. Coordination normal.   Skin: Skin is warm and dry. No rash noted. No erythema.   Psychiatric: He has a normal mood and affect. His behavior is normal. Thought content normal.   Nursing note and vitals reviewed.      ECG 12 Lead    Date/Time: 3/22/2019 6:44 PM  Performed by: Rios Mart MD  Authorized by: Rios Mart MD   Interpreted by physician  Rhythm: sinus rhythm  Rate: normal  BPM: 81  ST Segments: ST segments normal                   ED Course        Labs Reviewed   CBC WITH AUTO DIFFERENTIAL - Abnormal; Notable for the following components:       Result Value    Neutrophil % 77.8 (*)     Lymphocyte % 15.7 (*)     Monocyte % 4.7 (*)     All other components within normal limits   URINALYSIS W/ CULTURE IF INDICATED - Abnormal; Notable for the following components:    Ketones, UA 15 mg/dL (1+) (*)     Protein,  mg/dL (2+) (*)     Leuk Esterase, UA Trace (*)     All other components within normal limits   CK - Abnormal; Notable for the following components:    Creatine Kinase 440 (*)     All other components within normal limits   URINALYSIS, MICROSCOPIC ONLY - Abnormal; Notable for the following components:    RBC, UA 3-5 (*)     All other components within normal limits   COMPREHENSIVE METABOLIC PANEL - Normal   LIPASE - Normal   AMYLASE - Normal   MAGNESIUM - Normal   CK   CK MB   TROPONIN (IN-HOUSE)   CBC AND DIFFERENTIAL    Narrative:     The following orders were created for panel order CBC & Differential.  Procedure                               Abnormality         Status                     ---------                               -----------         ------                     CBC Auto Differential[[id number]]        Abnormal            Final result                 Please view results  for these tests on the individual orders.   EXTRA TUBES    Narrative:     The following orders were created for panel order Extra Tubes.  Procedure                               Abnormality         Status                     ---------                               -----------         ------                     Light Blue Top[[id number]]                                   Final result               Gold Top - [facility][[id number]]                                   Final result                 Please view results for these tests on the individual orders.   LIGHT BLUE TOP   GOLD [facility]       CT Abdomen Pelvis Without Contrast   Final Result   Conclusion:   No acute process identified in the abdomen or pelvis.   Diverticulosis of the colon.   Anastomotic sutures sigmoid colon.   Degenerative disc disease L5-S1.      [id number]      Electronically signed by:  Pedrito Lyons MD  3/22/2019 5:02 PM CDT   Workstation: [id number]                  [facility]      Final diagnoses:   Periumbilical abdominal pain   Intractable vomiting with nausea, unspecified vomiting type   Hypertension, unspecified type            Rios Mart MD  03/22/19 1751       Rios Mart MD  03/22/19 1846       Rios Mart MD  03/22/19 2026

## 2019-03-23 VITALS
BODY MASS INDEX: 29.92 KG/M2 | SYSTOLIC BLOOD PRESSURE: 137 MMHG | WEIGHT: 220.9 LBS | OXYGEN SATURATION: 94 % | TEMPERATURE: 98.7 F | HEART RATE: 106 BPM | HEIGHT: 72 IN | RESPIRATION RATE: 18 BRPM | DIASTOLIC BLOOD PRESSURE: 82 MMHG

## 2019-03-23 LAB
ANION GAP SERPL CALCULATED.3IONS-SCNC: 9 MMOL/L (ref 5–15)
BASOPHILS # BLD AUTO: 0.03 10*3/MM3 (ref 0–0.2)
BASOPHILS NFR BLD AUTO: 0.2 % (ref 0–1.5)
BUN BLD-MCNC: 11 MG/DL (ref 7–21)
BUN/CREAT SERPL: 11.1 (ref 7–25)
CALCIUM SPEC-SCNC: 9.3 MG/DL (ref 8.4–10.2)
CHLORIDE SERPL-SCNC: 101 MMOL/L (ref 95–110)
CO2 SERPL-SCNC: 26 MMOL/L (ref 22–31)
CREAT BLD-MCNC: 0.99 MG/DL (ref 0.7–1.3)
DEPRECATED RDW RBC AUTO: 48.2 FL (ref 37–54)
EOSINOPHIL # BLD AUTO: 0 10*3/MM3 (ref 0–0.4)
EOSINOPHIL NFR BLD AUTO: 0 % (ref 0.3–6.2)
ERYTHROCYTE [DISTWIDTH] IN BLOOD BY AUTOMATED COUNT: 15 % (ref 12.3–15.4)
GFR SERPL CREATININE-BSD FRML MDRD: 83 ML/MIN/1.73 (ref 63–147)
GLUCOSE BLD-MCNC: 116 MG/DL (ref 60–100)
HCT VFR BLD AUTO: 46.2 % (ref 37.5–51)
HGB BLD-MCNC: 15 G/DL (ref 13–17.7)
IMM GRANULOCYTES # BLD AUTO: 0.04 10*3/MM3 (ref 0–0.05)
IMM GRANULOCYTES NFR BLD AUTO: 0.3 % (ref 0–0.5)
LYMPHOCYTES # BLD AUTO: 0.9 10*3/MM3 (ref 0.7–3.1)
LYMPHOCYTES NFR BLD AUTO: 7.4 % (ref 19.6–45.3)
MCH RBC QN AUTO: 28.3 PG (ref 26.6–33)
MCHC RBC AUTO-ENTMCNC: 32.5 G/DL (ref 31.5–35.7)
MCV RBC AUTO: 87.2 FL (ref 79–97)
MONOCYTES # BLD AUTO: 0.32 10*3/MM3 (ref 0.1–0.9)
MONOCYTES NFR BLD AUTO: 2.6 % (ref 5–12)
NEUTROPHILS # BLD AUTO: 10.9 10*3/MM3 (ref 1.4–7)
NEUTROPHILS NFR BLD AUTO: 89.5 % (ref 42.7–76)
NRBC BLD AUTO-RTO: 0 /100 WBC (ref 0–0)
PLATELET # BLD AUTO: 302 10*3/MM3 (ref 140–450)
PMV BLD AUTO: 10.4 FL (ref 6–12)
POTASSIUM BLD-SCNC: 4.2 MMOL/L (ref 3.5–5.1)
RBC # BLD AUTO: 5.3 10*6/MM3 (ref 4.14–5.8)
SODIUM BLD-SCNC: 136 MMOL/L (ref 137–145)
WBC NRBC COR # BLD: 12.19 10*3/MM3 (ref 3.4–10.8)

## 2019-03-23 PROCEDURE — 96361 HYDRATE IV INFUSION ADD-ON: CPT

## 2019-03-23 PROCEDURE — G0378 HOSPITAL OBSERVATION PER HR: HCPCS

## 2019-03-23 PROCEDURE — 85025 COMPLETE CBC W/AUTO DIFF WBC: CPT | Performed by: FAMILY MEDICINE

## 2019-03-23 RX ORDER — PANTOPRAZOLE SODIUM 40 MG/1
40 TABLET, DELAYED RELEASE ORAL
Status: DISCONTINUED | OUTPATIENT
Start: 2019-03-24 | End: 2019-03-24 | Stop reason: HOSPADM

## 2019-03-23 RX ORDER — ALUMINA, MAGNESIA, AND SIMETHICONE 2400; 2400; 240 MG/30ML; MG/30ML; MG/30ML
15 SUSPENSION ORAL ONCE
Status: COMPLETED | OUTPATIENT
Start: 2019-03-23 | End: 2019-03-23

## 2019-03-23 RX ORDER — HYDROCODONE BITARTRATE AND ACETAMINOPHEN 5; 325 MG/1; MG/1
1 TABLET ORAL ONCE AS NEEDED
Status: COMPLETED | OUTPATIENT
Start: 2019-03-23 | End: 2019-03-23

## 2019-03-23 RX ORDER — LANOLIN ALCOHOL/MO/W.PET/CERES
3 CREAM (GRAM) TOPICAL NIGHTLY PRN
Status: DISCONTINUED | OUTPATIENT
Start: 2019-03-23 | End: 2019-03-24 | Stop reason: HOSPADM

## 2019-03-23 RX ORDER — PANTOPRAZOLE SODIUM 40 MG/1
40 TABLET, DELAYED RELEASE ORAL
Status: COMPLETED | OUTPATIENT
Start: 2019-03-23 | End: 2019-03-23

## 2019-03-23 RX ORDER — DIPHENHYDRAMINE HCL 50 MG
50 CAPSULE ORAL NIGHTLY PRN
Status: DISCONTINUED | OUTPATIENT
Start: 2019-03-23 | End: 2019-03-24 | Stop reason: HOSPADM

## 2019-03-23 RX ORDER — KETOROLAC TROMETHAMINE 30 MG/ML
30 INJECTION, SOLUTION INTRAMUSCULAR; INTRAVENOUS ONCE AS NEEDED
Status: DISCONTINUED | OUTPATIENT
Start: 2019-03-23 | End: 2019-03-24 | Stop reason: HOSPADM

## 2019-03-23 RX ORDER — CARVEDILOL 12.5 MG/1
12.5 TABLET ORAL DAILY
Status: DISCONTINUED | OUTPATIENT
Start: 2019-03-24 | End: 2019-03-24 | Stop reason: HOSPADM

## 2019-03-23 RX ORDER — MIRTAZAPINE 15 MG/1
15 TABLET, FILM COATED ORAL NIGHTLY
Status: DISCONTINUED | OUTPATIENT
Start: 2019-03-23 | End: 2019-03-24 | Stop reason: HOSPADM

## 2019-03-23 RX ADMIN — LIDOCAINE HYDROCHLORIDE 15 ML: 20 SOLUTION ORAL; TOPICAL at 21:00

## 2019-03-23 RX ADMIN — ALUMINUM HYDROXIDE, MAGNESIUM HYDROXIDE, AND DIMETHICONE 15 ML: 400; 400; 40 SUSPENSION ORAL at 21:00

## 2019-03-23 RX ADMIN — CARVEDILOL 6.25 MG: 6.25 TABLET, FILM COATED ORAL at 13:41

## 2019-03-23 RX ADMIN — PANTOPRAZOLE SODIUM 40 MG: 40 TABLET, DELAYED RELEASE ORAL at 13:40

## 2019-03-23 RX ADMIN — CLONAZEPAM 1 MG: 0.5 TABLET ORAL at 21:00

## 2019-03-23 RX ADMIN — HYDROCODONE BITARTRATE AND ACETAMINOPHEN 1 TABLET: 5; 325 TABLET ORAL at 03:23

## 2019-03-23 RX ADMIN — DULOXETINE 60 MG: 60 CAPSULE, DELAYED RELEASE ORAL at 21:01

## 2019-03-23 RX ADMIN — MIRTAZAPINE 15 MG: 15 TABLET, FILM COATED ORAL at 22:11

## 2019-03-23 NOTE — PROGRESS NOTES
[facility] Medicine Services  INPATIENT PROGRESS NOTE    Length of Stay: 0  Date of Admission: 3/22/2019  Primary Care Physician: Irma Schuster APRN    Subjective   Chief Complaint: No new complaints.    HPI: Stevenson Chilel is a 43 y.o. male with medical history significant for anxiety, GERD, bowel resection presents with nausea and vomiting. He is feeling better, tolerating clear liquid diet and complains of periodic nausea.       Review of Systems   Constitutional: Positive for activity change, appetite change and fatigue. Negative for chills, diaphoresis and fever.   HENT: Negative for trouble swallowing and voice change.    Eyes: Negative for photophobia and visual disturbance.   Respiratory: Negative for cough, choking, chest tightness, shortness of breath, wheezing and stridor.    Cardiovascular: Negative for chest pain, palpitations and leg swelling.   Gastrointestinal: Positive for nausea. Negative for abdominal distention, abdominal pain, blood in stool, constipation, diarrhea and vomiting.   Endocrine: Negative for cold intolerance, heat intolerance, polydipsia, polyphagia and polyuria.   Genitourinary: Negative for decreased urine volume, difficulty urinating, dysuria, enuresis, flank pain, frequency, hematuria and urgency.   Musculoskeletal: Negative for arthralgias, gait problem, myalgias, neck pain and neck stiffness.   Skin: Negative for pallor, rash and wound.   Neurological: Negative for dizziness, tremors, seizures, syncope, facial asymmetry, speech difficulty, weakness, light-headedness, numbness and headaches.   Hematological: Does not bruise/bleed easily.   Psychiatric/Behavioral: Negative for agitation, behavioral problems and confusion.       Objective    Temp:  [98 °F (36.7 °C)-98.8 °F (37.1 °C)] 98 °F (36.7 °C)  Heart Rate:  [] 106  Resp:  [18] 18  BP: (155-203)/() 155/88    Physical Exam   Constitutional: He is oriented  to person, place, and time. He appears well-developed and well-nourished. No distress.   HENT:   Head: Normocephalic and atraumatic.   Eyes: EOM are normal. Pupils are equal, round, and reactive to light. No scleral icterus.   Neck: Normal range of motion. Neck supple. No JVD present. No thyromegaly present.   Cardiovascular: Regular rhythm and normal heart sounds. Tachycardia present. Exam reveals no gallop and no friction rub.   No murmur heard.  Pulmonary/Chest: Effort normal and breath sounds normal. He has no wheezes. He has no rales. He exhibits no tenderness.   Abdominal: Soft. Bowel sounds are normal. He exhibits no distension and no mass. There is no tenderness. There is no rebound and no guarding.   Musculoskeletal: He exhibits no edema, tenderness or deformity.   Neurological: He is alert and oriented to person, place, and time. No cranial nerve deficit. He exhibits normal muscle tone. Coordination normal.   Skin: Skin is warm and dry. No rash noted. He is not diaphoretic. No erythema. No pallor.   Psychiatric: He has a normal mood and affect. His behavior is normal. Judgment and thought content normal.   Nursing note and vitals reviewed.        Medication Review:    Current Facility-Administered Medications:   •  acetaminophen (TYLENOL) tablet 650 mg, 650 mg, Oral, Q4H PRN, Cristian Daigle MD  •  aspirin EC tablet 81 mg, 81 mg, Oral, Daily, Cristian Daigle MD  •  carvedilol (COREG) tablet 6.25 mg, 6.25 mg, Oral, Daily, Cristian Daigle MD, 6.25 mg at 03/23/19 1341  •  clonazePAM (KlonoPIN) tablet 1 mg, 1 mg, Oral, BID PRN, Cristian Daigle MD, 1 mg at 03/22/19 2244  •  DULoxetine (CYMBALTA) DR capsule 60 mg, 60 mg, Oral, Nightly, Cristian Daigle MD  •  labetalol (NORMODYNE,TRANDATE) injection 10 mg, 10 mg, Intravenous, Q6H PRN, Cristian Daigle MD, 10 mg at 03/22/19 2218  •  morphine injection 4 mg, 4 mg, Intravenous, Once, Rios Mart MD  •  [START ON 3/24/2019] pantoprazole (PROTONIX) EC tablet 40 mg,  40 mg, Oral, Q AM, Royer Patel MD  •  promethazine (PHENERGAN) tablet 12.5 mg, 12.5 mg, Oral, Q6H PRN **OR** promethazine (PHENERGAN) injection 12.5 mg, 12.5 mg, Intramuscular, Q6H PRN **OR** promethazine (PHENERGAN) injection 12.5 mg, 12.5 mg, Intravenous, Q6H PRN **OR** promethazine (PHENERGAN) suppository 12.5 mg, 12.5 mg, Rectal, Q6H PRN, Cristian Daigle MD  •  [COMPLETED] Insert peripheral IV, , , Once **AND** sodium chloride 0.9 % flush 10 mL, 10 mL, Intravenous, PRN, Rios Mart MD  •  sodium chloride 0.9 % flush 3 mL, 3 mL, Intravenous, Q12H, Cristian Daigle MD  •  sodium chloride 0.9 % flush 3-10 mL, 3-10 mL, Intravenous, PRN, Cristian Daigle MD  •  sodium chloride 0.9 % infusion, 125 mL/hr, Intravenous, Continuous, Rios Mart MD, Last Rate: 125 mL/hr at 03/22/19 2045, 125 mL/hr at 03/22/19 2045    Results Review:  I have reviewed the labs, radiology results, and diagnostic studies.    Laboratory Data:   Results from last 7 days   Lab Units 03/22/19 2034 03/22/19  1407   SODIUM mmol/L 136* 138   POTASSIUM mmol/L 4.2 4.2   CHLORIDE mmol/L 101 102   CO2 mmol/L 26.0 26.0   BUN mg/dL 11 14   CREATININE mg/dL 0.99 1.11   GLUCOSE mg/dL 116* 96   CALCIUM mg/dL 9.3 8.9   BILIRUBIN mg/dL  --  1.0   ALK PHOS U/L  --  61   ALT (SGPT) U/L  --  44   AST (SGOT) U/L  --  45   ANION GAP mmol/L 9.0 10.0     Estimated Creatinine Clearance: 120.3 mL/min (by C-G formula based on SCr of 0.99 mg/dL).  Results from last 7 days   Lab Units 03/22/19  1414   MAGNESIUM mg/dL 2.2         Results from last 7 days   Lab Units 03/23/19  0540 03/22/19  1407   WBC 10*3/mm3 12.19* 8.55   HEMOGLOBIN g/dL 15.0 16.0   HEMATOCRIT % 46.2 49.2   PLATELETS 10*3/mm3 302 305           Culture Data:   No results found for: BLOODCX  No results found for: URINECX  No results found for: RESPCX  No results found for: WOUNDCX  No results found for: STOOLCX  No components found for: BODYFLD    Radiology Data:   Imaging Results  (last 24 hours)     Procedure Component Value Units Date/Time    CT Abdomen Pelvis Without Contrast [[id number]] Collected:  03/22/19 1628     Updated:  03/22/19 1703    Narrative:         CT Abdomen and Pelvis Without Contrast    History: Abdominal pain.    Axials spiral scans of the abdomen and pelvis were obtained  without contrast.  Coronal and sagital reconstructions were  preformed.      This exam was performed according to our departmental  dose-optimization program, which includes automated exposure  control, adjustment of the mA and/or kV according to patient size  and/or use of iterative reconstruction technique.    DLP: 392.10    Comparison: November 29, 2018    Scans of the lung bases demonstrate minimal dependent atelectasis  are unremarkable.    No acute osseous abnormality.  Degenerative disc disease L5-S1.    The liver is unremarkable.  The gallbladder is unremarkable.  The spleen is unremarkable.  The pancreas is unremarkable.  The adrenal glands are unremarkable.    No renal or ureteral calculi.  No renal mass.  No hydronephrosis.    Unremarkable bladder.  No pelvic mass.    No abdominal aortic aneurysm.  No adenopathy.    Diverticulosis of the colon.  Anastomotic sutures sigmoid colon.  No bowel obstruction.  Normal appendix.  No free air.  No free fluid.    No hernia.      Impression:       Conclusion:  No acute process identified in the abdomen or pelvis.  Diverticulosis of the colon.  Anastomotic sutures sigmoid colon.  Degenerative disc disease L5-S1.    [id number]    Electronically signed by:  Pedrito Lyons MD  3/22/2019 5:02 PM CDT  Workstation: [id number]          I have reviewed the patient's current medications.     Assessment/Plan     Hospital Problem List:  Principal Problem:   - Intractable nausea and vomiting: Improving.  Continue IV hydration, antiemetic and supportive management.  CT scan of the abdomen and pelvis was unremarkable.  Influenza screen was negative.  Advance diet as  tolerated.    -  Hypertension: Stable.  Continue current medications with adjustments as needed for optimal blood pressure control.     - Anxiety: Continue Klonopin.    -Continue GI and DVT prophylaxis.            Discharge Planning: Likely discharge in a..    Royer Patel MD   03/23/19   2:33 PM

## 2019-03-24 LAB
ANION GAP SERPL CALCULATED.3IONS-SCNC: 5 MMOL/L (ref 5–15)
BASOPHILS # BLD AUTO: 0.04 10*3/MM3 (ref 0–0.2)
BASOPHILS NFR BLD AUTO: 0.5 % (ref 0–1.5)
BUN BLD-MCNC: 9 MG/DL (ref 7–21)
BUN/CREAT SERPL: 8.3 (ref 7–25)
CALCIUM SPEC-SCNC: 8.3 MG/DL (ref 8.4–10.2)
CHLORIDE SERPL-SCNC: 104 MMOL/L (ref 95–110)
CO2 SERPL-SCNC: 28 MMOL/L (ref 22–31)
CREAT BLD-MCNC: 1.08 MG/DL (ref 0.7–1.3)
DEPRECATED RDW RBC AUTO: 49.7 FL (ref 37–54)
EOSINOPHIL # BLD AUTO: 0.04 10*3/MM3 (ref 0–0.4)
EOSINOPHIL NFR BLD AUTO: 0.5 % (ref 0.3–6.2)
ERYTHROCYTE [DISTWIDTH] IN BLOOD BY AUTOMATED COUNT: 15.2 % (ref 12.3–15.4)
GFR SERPL CREATININE-BSD FRML MDRD: 75 ML/MIN/1.73 (ref 63–147)
GLUCOSE BLD-MCNC: 72 MG/DL (ref 60–100)
HCT VFR BLD AUTO: 48.1 % (ref 37.5–51)
HGB BLD-MCNC: 15.6 G/DL (ref 13–17.7)
IMM GRANULOCYTES # BLD AUTO: 0.01 10*3/MM3 (ref 0–0.05)
IMM GRANULOCYTES NFR BLD AUTO: 0.1 % (ref 0–0.5)
LYMPHOCYTES # BLD AUTO: 1.96 10*3/MM3 (ref 0.7–3.1)
LYMPHOCYTES NFR BLD AUTO: 25.1 % (ref 19.6–45.3)
MCH RBC QN AUTO: 29 PG (ref 26.6–33)
MCHC RBC AUTO-ENTMCNC: 32.4 G/DL (ref 31.5–35.7)
MCV RBC AUTO: 89.4 FL (ref 79–97)
MONOCYTES # BLD AUTO: 0.7 10*3/MM3 (ref 0.1–0.9)
MONOCYTES NFR BLD AUTO: 9 % (ref 5–12)
NEUTROPHILS # BLD AUTO: 5.07 10*3/MM3 (ref 1.4–7)
NEUTROPHILS NFR BLD AUTO: 64.8 % (ref 42.7–76)
NRBC BLD AUTO-RTO: 0 /100 WBC (ref 0–0)
PLATELET # BLD AUTO: 214 10*3/MM3 (ref 140–450)
PMV BLD AUTO: 9.8 FL (ref 6–12)
POTASSIUM BLD-SCNC: 3.7 MMOL/L (ref 3.5–5.1)
RBC # BLD AUTO: 5.38 10*6/MM3 (ref 4.14–5.8)
SODIUM BLD-SCNC: 137 MMOL/L (ref 137–145)
WBC NRBC COR # BLD: 7.82 10*3/MM3 (ref 3.4–10.8)

## 2019-03-24 PROCEDURE — 85025 COMPLETE CBC W/AUTO DIFF WBC: CPT | Performed by: FAMILY MEDICINE

## 2019-03-24 PROCEDURE — G0378 HOSPITAL OBSERVATION PER HR: HCPCS

## 2019-03-24 PROCEDURE — 63710000001 DIPHENHYDRAMINE PER 50 MG: Performed by: FAMILY MEDICINE

## 2019-03-24 PROCEDURE — 80048 BASIC METABOLIC PNL TOTAL CA: CPT | Performed by: FAMILY MEDICINE

## 2019-03-24 PROCEDURE — 96361 HYDRATE IV INFUSION ADD-ON: CPT

## 2019-03-24 RX ADMIN — PANTOPRAZOLE SODIUM 40 MG: 40 TABLET, DELAYED RELEASE ORAL at 06:04

## 2019-03-24 RX ADMIN — SODIUM CHLORIDE 125 ML/HR: 900 INJECTION, SOLUTION INTRAVENOUS at 06:04

## 2019-03-24 RX ADMIN — MELATONIN 3 MG: 3 TAB ORAL at 01:38

## 2019-03-24 RX ADMIN — DIPHENHYDRAMINE HYDROCHLORIDE 50 MG: 50 CAPSULE ORAL at 01:38

## 2019-03-24 NOTE — NURSING NOTE
Pt refused AM meds stating he was going home and would take them from his supply at home rather than paying for meds twice.

## 2019-03-24 NOTE — DISCHARGE INSTR - LAB
Irma Schuster, APRN  1 Week  [phone]  Info was sent to the office. If they do not contact you within one business day with appointment info, please call.

## 2019-03-24 NOTE — DISCHARGE SUMMARY
[facility] Medicine Services  DISCHARGE SUMMARY       Date of Admission: 3/22/2019  Date of Discharge:  3/24/2019  Primary Care Physician: Irma Schuster APRN    Presenting Problem/History of Present Illness:  Periumbilical abdominal pain [R10.33]  Intractable vomiting with nausea, unspecified vomiting type [R11.2]  Hypertension, unspecified type [I10]     Stevenson Chilel is a 43 y.o. male with medical history significant for anxiety, GERD, bowel resection presents with nausea and vomiting.      Patient states he was his normal self until he became nauseated, developed chills, and was otherwise generally unwell.  Patient also reports some diarrhea over the last couple of days, but this has slowed as his p.o. intake is also slowed.  Patient does not really endorse any abdominal pain although there is some epigastric dullness on questioning.  Patient denies any fevers.     In the emergency department patient's amylase and lipase were both normal.  CBC and CMP were unremarkable and creatinine kinase was slightly elevated.  Repeat creatinine kinase improving.  Urinalysis essentially unremarkable as well, troponin was negative and CT scan of the abdomen and pelvis shows no acute process.        Final Discharge Diagnoses:  Active Hospital Problems    Diagnosis   • **Intractable nausea and vomiting   • Hypertension   • Anxiety       Consults:   Consults     Date and Time Order Name Status Description    3/22/2019 2025 Hospitalist (on-call MD unless specified)            Procedures Performed: None.                Pertinent Test Results:   Lab Results (last 72 hours)     Procedure Component Value Units Date/Time    Basic Metabolic Panel [[id number]]  (Abnormal) Collected:  03/24/19 0552    Specimen:  Blood Updated:  03/24/19 0638     Glucose 72 mg/dL      BUN 9 mg/dL      Creatinine 1.08 mg/dL      Sodium 137 mmol/L      Potassium 3.7 mmol/L      Chloride 104 mmol/L       CO2 28.0 mmol/L      Calcium 8.3 mg/dL      eGFR Non African Amer 75 mL/min/1.73      BUN/Creatinine Ratio 8.3     Anion Gap 5.0 mmol/L     CBC & Differential [[id number]] Collected:  03/24/19 0552    Specimen:  Blood Updated:  03/24/19 0609    Narrative:       The following orders were created for panel order CBC & Differential.  Procedure                               Abnormality         Status                     ---------                               -----------         ------                     CBC Auto Differential[[id number]]        Normal              Final result                 Please view results for these tests on the individual orders.    CBC Auto Differential [[id number]]  (Normal) Collected:  03/24/19 0552    Specimen:  Blood Updated:  03/24/19 0609     WBC 7.82 10*3/mm3      RBC 5.38 10*6/mm3      Hemoglobin 15.6 g/dL      Hematocrit 48.1 %      MCV 89.4 fL      MCH 29.0 pg      MCHC 32.4 g/dL      RDW 15.2 %      RDW-SD 49.7 fl      MPV 9.8 fL      Platelets 214 10*3/mm3      Neutrophil % 64.8 %      Lymphocyte % 25.1 %      Monocyte % 9.0 %      Eosinophil % 0.5 %      Basophil % 0.5 %      Immature Grans % 0.1 %      Neutrophils, Absolute 5.07 10*3/mm3      Lymphocytes, Absolute 1.96 10*3/mm3      Monocytes, Absolute 0.70 10*3/mm3      Eosinophils, Absolute 0.04 10*3/mm3      Basophils, Absolute 0.04 10*3/mm3      Immature Grans, Absolute 0.01 10*3/mm3      nRBC 0.0 /100 WBC     CBC & Differential [[id number]] Collected:  03/23/19 0540    Specimen:  Blood Updated:  03/23/19 0649    Narrative:       The following orders were created for panel order CBC & Differential.  Procedure                               Abnormality         Status                     ---------                               -----------         ------                     CBC Auto Differential[[id number]]        Abnormal            Final result                 Please view results for these tests on the individual orders.    CBC Auto  Differential [[id number]]  (Abnormal) Collected:  03/23/19 0540    Specimen:  Blood Updated:  03/23/19 0649     WBC 12.19 10*3/mm3      RBC 5.30 10*6/mm3      Hemoglobin 15.0 g/dL      Hematocrit 46.2 %      MCV 87.2 fL      MCH 28.3 pg      MCHC 32.5 g/dL      RDW 15.0 %      RDW-SD 48.2 fl      MPV 10.4 fL      Platelets 302 10*3/mm3      Neutrophil % 89.5 %      Lymphocyte % 7.4 %      Monocyte % 2.6 %      Eosinophil % 0.0 %      Basophil % 0.2 %      Immature Grans % 0.3 %      Neutrophils, Absolute 10.90 10*3/mm3      Lymphocytes, Absolute 0.90 10*3/mm3      Monocytes, Absolute 0.32 10*3/mm3      Eosinophils, Absolute 0.00 10*3/mm3      Basophils, Absolute 0.03 10*3/mm3      Immature Grans, Absolute 0.04 10*3/mm3      nRBC 0.0 /100 WBC     Basic Metabolic Panel [[id number]]  (Abnormal) Collected:  03/22/19 2034    Specimen:  Blood from Arm, Right Updated:  03/23/19 0023     Glucose 116 mg/dL      BUN 11 mg/dL      Creatinine 0.99 mg/dL      Sodium 136 mmol/L      Potassium 4.2 mmol/L      Chloride 101 mmol/L      CO2 26.0 mmol/L      Calcium 9.3 mg/dL      eGFR Non African Amer 83 mL/min/1.73      BUN/Creatinine Ratio 11.1     Anion Gap 9.0 mmol/L     Influenza Antigen, Rapid - Swab, Nasopharynx [[id number]]  (Normal) Collected:  03/22/19 2219    Specimen:  Swab from Nasopharynx Updated:  03/22/19 2239     Influenza A Ag, EIA Negative     Influenza B Ag, EIA Negative    CK-MB [[id number]]  (Normal) Collected:  03/22/19 2034    Specimen:  Blood from Arm, Right Updated:  03/22/19 2112     CKMB 3.38 ng/mL     Troponin [[id number]]  (Normal) Collected:  03/22/19 2034    Specimen:  Blood from Arm, Right Updated:  03/22/19 2112     Troponin I <0.012 ng/mL     CK [[id number]]  (Abnormal) Collected:  03/22/19 2034    Specimen:  Blood from Arm, Right Updated:  03/22/19 2054     Creatine Kinase 397 U/L     Urinalysis With Culture If Indicated - Urine, Clean Catch [[id number]]  (Abnormal) Collected:  03/22/19 1602    Specimen:   Urine, Clean Catch Updated:  03/22/19 1624     Color, UA Dark Yellow     Appearance, UA Clear     pH, UA 7.5     Specific Gravity, UA 1.029     Comment: Result obtained by Refractometer        Glucose, UA Negative     Ketones, UA 15 mg/dL (1+)     Bilirubin, UA Negative     Blood, UA Negative     Protein,  mg/dL (2+)     Leuk Esterase, UA Trace     Nitrite, UA Negative     Urobilinogen, UA 1.0 E.U./dL    Urinalysis, Microscopic Only - Urine, Clean Catch [[id number]]  (Abnormal) Collected:  03/22/19 1602    Specimen:  Urine, Clean Catch Updated:  03/22/19 1624     RBC, UA 3-5 /HPF      WBC, UA 0-2 /HPF      Bacteria, UA None Seen /HPF      Squamous Epithelial Cells, UA None Seen /HPF      Hyaline Casts, UA 0-2 /LPF      Methodology Automated Microscopy    Amylase [[id number]]  (Normal) Collected:  03/22/19 1414    Specimen:  Blood Updated:  03/22/19 1522     Amylase 81 U/L     CK [[id number]]  (Abnormal) Collected:  03/22/19 1414    Specimen:  Blood Updated:  03/22/19 1522     Creatine Kinase 440 U/L     Magnesium [[id number]]  (Normal) Collected:  03/22/19 1414    Specimen:  Blood Updated:  03/22/19 1522     Magnesium 2.2 mg/dL     Extra Tubes [[id number]] Collected:  03/22/19 1414    Specimen:  Blood, Venous Line Updated:  03/22/19 1516    Narrative:       The following orders were created for panel order Extra Tubes.  Procedure                               Abnormality         Status                     ---------                               -----------         ------                     Light Blue Top[[id number]]                                   Final result               Gold Top - SST[[id number]]                                   Final result                 Please view results for these tests on the individual orders.    Light Blue Top [[id number]] Collected:  03/22/19 1414    Specimen:  Blood Updated:  03/22/19 1516     Extra Tube hold for add-on     Comment: Auto resulted       Gold Top - SST [[id number]]  Collected:  03/22/19 1414    Specimen:  Blood Updated:  03/22/19 1516     Extra Tube Hold for add-ons.     Comment: Auto resulted.       Lipase [[id number]]  (Normal) Collected:  03/22/19 1407    Specimen:  Blood Updated:  03/22/19 1449     Lipase 78 U/L     Comprehensive Metabolic Panel [[id number]]  (Normal) Collected:  03/22/19 1407    Specimen:  Blood Updated:  03/22/19 1449     Glucose 96 mg/dL      BUN 14 mg/dL      Creatinine 1.11 mg/dL      Sodium 138 mmol/L      Potassium 4.2 mmol/L      Chloride 102 mmol/L      CO2 26.0 mmol/L      Calcium 8.9 mg/dL      Total Protein 7.0 g/dL      Albumin 4.20 g/dL      ALT (SGPT) 44 U/L      AST (SGOT) 45 U/L      Alkaline Phosphatase 61 U/L      Total Bilirubin 1.0 mg/dL      eGFR Non African Amer 73 mL/min/1.73      Globulin 2.8 gm/dL      A/G Ratio 1.5 g/dL      BUN/Creatinine Ratio 12.6     Anion Gap 10.0 mmol/L     CBC & Differential [[id number]] Collected:  03/22/19 1407    Specimen:  Blood Updated:  03/22/19 1417    Narrative:       The following orders were created for panel order CBC & Differential.  Procedure                               Abnormality         Status                     ---------                               -----------         ------                     CBC Auto Differential[[id number]]        Abnormal            Final result                 Please view results for these tests on the individual orders.    CBC Auto Differential [[id number]]  (Abnormal) Collected:  03/22/19 1407    Specimen:  Blood Updated:  03/22/19 1417     WBC 8.55 10*3/mm3      RBC 5.58 10*6/mm3      Hemoglobin 16.0 g/dL      Hematocrit 49.2 %      MCV 88.2 fL      MCH 28.7 pg      MCHC 32.5 g/dL      RDW 15.1 %      RDW-SD 48.3 fl      MPV 9.9 fL      Platelets 305 10*3/mm3      Neutrophil % 77.8 %      Lymphocyte % 15.7 %      Monocyte % 4.7 %      Eosinophil % 0.7 %      Basophil % 0.7 %      Immature Grans % 0.4 %      Neutrophils, Absolute 6.66 10*3/mm3      Lymphocytes,  Absolute 1.34 10*3/mm3      Monocytes, Absolute 0.40 10*3/mm3      Eosinophils, Absolute 0.06 10*3/mm3      Basophils, Absolute 0.06 10*3/mm3      Immature Grans, Absolute 0.03 10*3/mm3      nRBC 0.0 /100 WBC         Imaging Results (last 7 days)     Procedure Component Value Units Date/Time    CT Abdomen Pelvis Without Contrast [[id number]] Collected:  03/22/19 1628     Updated:  03/22/19 1703    Narrative:         CT Abdomen and Pelvis Without Contrast    History: Abdominal pain.    Axials spiral scans of the abdomen and pelvis were obtained  without contrast.  Coronal and sagital reconstructions were  preformed.      This exam was performed according to our departmental  dose-optimization program, which includes automated exposure  control, adjustment of the mA and/or kV according to patient size  and/or use of iterative reconstruction technique.    DLP: 392.10    Comparison: November 29, 2018    Scans of the lung bases demonstrate minimal dependent atelectasis  are unremarkable.    No acute osseous abnormality.  Degenerative disc disease L5-S1.    The liver is unremarkable.  The gallbladder is unremarkable.  The spleen is unremarkable.  The pancreas is unremarkable.  The adrenal glands are unremarkable.    No renal or ureteral calculi.  No renal mass.  No hydronephrosis.    Unremarkable bladder.  No pelvic mass.    No abdominal aortic aneurysm.  No adenopathy.    Diverticulosis of the colon.  Anastomotic sutures sigmoid colon.  No bowel obstruction.  Normal appendix.  No free air.  No free fluid.    No hernia.      Impression:       Conclusion:  No acute process identified in the abdomen or pelvis.  Diverticulosis of the colon.  Anastomotic sutures sigmoid colon.  Degenerative disc disease L5-S1.    [id number]    Electronically signed by:  Pedrito Lyons MD  3/22/2019 5:02 PM CDT  Workstation: [id number]            Chief Complaint on Day of Discharge: None.    Hospital Course:  The patient was admitted for intractable  "nausea and vomiting and started on IV hydration, antiemetic and made n.p.o.  He did improve, was less symptomatic and started on diet as tolerated.  He was able to tolerate regular diet prior to discharge.    Patient was also continued on his routine outpatient  medications for hypertension and anxiety disorder.  Was also put placed on GI and DVT prophylaxis.    Condition on Discharge: Stable and improved.    Physical Exam on Discharge:  /82 (BP Location: Right arm, Patient Position: Lying)   Pulse 106   Temp 98.7 °F (37.1 °C) (Oral)   Resp 18   Ht 182.9 cm (72\")   Wt 100 kg (220 lb 14.4 oz)   SpO2 94%   BMI 29.96 kg/m²   Physical Exam   Constitutional: He is oriented to person, place, and time. He appears well-developed and well-nourished. No distress.   HENT:   Head: Normocephalic and atraumatic.   Eyes: EOM are normal. Pupils are equal, round, and reactive to light. No scleral icterus.   Neck: Normal range of motion. Neck supple. No JVD present. No thyromegaly present.   Cardiovascular: Normal rate, regular rhythm and normal heart sounds. Exam reveals no gallop and no friction rub.   No murmur heard.  Pulmonary/Chest: Effort normal and breath sounds normal. He has no wheezes. He has no rales. He exhibits no tenderness.   Abdominal: Soft. Bowel sounds are normal. He exhibits no distension and no mass. There is no tenderness. There is no rebound and no guarding.   Musculoskeletal: He exhibits no edema, tenderness or deformity.   Neurological: He is alert and oriented to person, place, and time. No cranial nerve deficit or sensory deficit. He exhibits normal muscle tone. Coordination normal.   Skin: Skin is warm and dry. No rash noted. He is not diaphoretic. No erythema. No pallor.   Psychiatric: He has a normal mood and affect. His behavior is normal. Judgment and thought content normal.   Nursing note and vitals reviewed.        Discharge Disposition:  Home or Self Care    Discharge Medications:   "   Discharge Medications      Changes to Medications      Instructions Start Date   HYDROcodone-acetaminophen 7.5-325 MG per tablet  Commonly known as:  NORCO  What changed:  reasons to take this   1 tablet, Oral, Every 6 Hours PRN      promethazine 25 MG tablet  Commonly known as:  PHENERGAN  What changed:  Another medication with the same name was added. Make sure you understand how and when to take each.   25 mg, Oral, Every 6 Hours PRN      promethazine 25 MG suppository  Commonly known as:  PHENERGAN  What changed:  You were already taking a medication with the same name, and this prescription was added. Make sure you understand how and when to take each.   25 mg, Rectal, Every 6 Hours PRN         Continue These Medications      Instructions Start Date   aspirin 81 MG EC tablet   81 mg, Oral, Daily      CALCIUM-MAGNESIUM-ZINC-D3 PO   1 tablet, Oral, Daily      carvedilol 6.25 MG tablet  Commonly known as:  COREG   6.25 mg, Oral, Daily      clonazePAM 1 MG tablet  Commonly known as:  KlonoPIN   1 mg, Oral, 2 Times Daily PRN      DULoxetine 60 MG capsule  Commonly known as:  CYMBALTA   60 mg, Oral, Nightly      magnesium oxide 400 (241.3 Mg) MG tablet tablet  Commonly known as:  MAGOX   400 mg, Oral, 2 Times Daily      mesalamine 0.375 g 24 hr capsule  Commonly known as:  APRISO   1,500 mg, Oral, Daily      mirtazapine 15 MG tablet  Commonly known as:  REMERON   15 mg, Oral, Nightly      ondansetron 4 MG tablet  Commonly known as:  ZOFRAN   4 mg, Oral, Every 6 Hours PRN      pantoprazole 40 MG EC tablet  Commonly known as:  PROTONIX   40 mg, Oral, Daily      PROBIOTIC-10 PO   1 tablet, Oral, Daily      SUPER B COMPLEX PO   1 tablet, Oral, Daily      tamsulosin 0.4 MG capsule 24 hr capsule  Commonly known as:  FLOMAX   1 capsule, Oral, Nightly      tiZANidine 4 MG tablet  Commonly known as:  ZANAFLEX   4 mg, Oral, Every 8 Hours PRN             Discharge Diet: Cardiac.    Activity at Discharge: As  tolerated.    Discharge Care Plan/Instructions: Patient has been advised to take his medications as prescribed and to return to the emergency room in the event of any worsening symptoms.    Follow-up Appointments:   Future Appointments   Date Time Provider Department Center   4/30/2019  8:15 AM NURSE  AMINAH  AMINAH OPI [facility]   4/30/2019  8:45 AM Brea Kerns APRN [facility]   8/6/2019 10:00 AM Doug Lezama MD [facility] None   8/13/2019 10:45 AM Ramirez Reza MD [facility] None       Test Results Pending at Discharge:     Royer Patel MD  03/24/19  10:37 AM    Time: 35 minutes

## 2019-03-27 ENCOUNTER — OFFICE VISIT (OUTPATIENT)
Dept: SURGERY | Facility: CLINIC | Age: 44
End: 2019-03-27

## 2019-03-27 VITALS
HEART RATE: 72 BPM | TEMPERATURE: 98.1 F | WEIGHT: 232.2 LBS | DIASTOLIC BLOOD PRESSURE: 82 MMHG | BODY MASS INDEX: 31.45 KG/M2 | HEIGHT: 72 IN | SYSTOLIC BLOOD PRESSURE: 140 MMHG

## 2019-03-27 DIAGNOSIS — R10.84 GENERALIZED ABDOMINAL PAIN: Primary | ICD-10-CM

## 2019-03-27 PROCEDURE — 99212 OFFICE O/P EST SF 10 MIN: CPT | Performed by: SURGERY

## 2019-03-27 NOTE — PROGRESS NOTES
"Chief Complaint   Patient presents with   • Follow-up     Recheck abdominal pain.        HPI    HPI: This patient is seen for a post-operatively following laparoscopic sigmoid colectomy for diverticulitis.  Patient  is doing well. Eating well without any significant nausea. Having good bowel function. No problems with constipation or diarrhea. No urinary complaints. Denies fever. Ambulating well and slowly returning to normal activities.  Recent hospitalization for nausea and vomiting.     PATHOLOGY:   Tissue Pathology Exam: [id number]   Order: [id number]   Collected: 12/20/2018 11:54   Status: Final result   Visible to patient: Yes (MyChart)   Dx: Diverticulitis of large intestine wit...   Component       Final Diagnosis   SEGMENT, SIGMOID COLON:   DIVERTICULOSIS, EXTENSIVE.    Electronically signed by Mitchell Coleman MD on 12/21/2018 at 1206   Gross Description     The container is labeled \"sigmoid colon\" and has a segment of colon measuring 19.0 cm long and 7.0 cm in circumference. The mesentery measures 2.0 cm in radial extent and 1.0 cm thick. The serosa is intact and free of apparent adhesions. The wall has several diverticula measuring 1.0 cm in greatest dimension. Polyps, ulcers and mucosal tumor are not identified. Representative sections are embedded. 1A surgical margins; 1B through 1E diverticula.     Study Result   CT Abdomen and Pelvis Without Contrast   History: Abdominal pain.   Axials spiral scans of the abdomen and pelvis were obtained   without contrast. Coronal and sagital reconstructions were   preformed.   This exam was performed according to our departmental   dose-optimization program, which includes automated exposure   control, adjustment of the mA and/or kV according to patient size   and/or use of iterative reconstruction technique.   DLP: 392.10   Comparison: November 29, 2018   Scans of the lung bases demonstrate minimal dependent atelectasis   are unremarkable.   No acute osseous " abnormality.   Degenerative disc disease L5-S1.   The liver is unremarkable.   The gallbladder is unremarkable.   The spleen is unremarkable.   The pancreas is unremarkable.   The adrenal glands are unremarkable.   No renal or ureteral calculi.   No renal mass.   No hydronephrosis.   Unremarkable bladder.   No pelvic mass.   No abdominal aortic aneurysm.   No adenopathy.   Diverticulosis of the colon.   Anastomotic sutures sigmoid colon.   No bowel obstruction.   Normal appendix.   No free air.   No free fluid.   No hernia.   IMPRESSION:   Conclusion:   No acute process identified in the abdomen or pelvis.   Diverticulosis of the colon.   Anastomotic sutures sigmoid colon.   Degenerative disc disease L5-S1.   [id number]   Electronically signed by: Pedrito Lyons MD 3/22/2019 5:02 PM CDT   Workstation: [id number]          PHYSICAL EXAM:     ABD: Incisions are healing well without any erythema or signs of infection.           Past Medical History:   Diagnosis Date   • Alcoholism /alcohol abuse (CMS/HCC)    • Anxiety    • Arthritis     shoulders and knees   • Atrial flutter (CMS/HCC)    • Depression    • Diverticulitis    • Esophagitis    • Gastritis    • GERD (gastroesophageal reflux disease)    • Hypertension    • Kidney stone    • Pancreatitis     patient denies   • Sleep apnea     has c-pap       Past Surgical History:   Procedure Laterality Date   • CARDIAC ELECTROPHYSIOLOGY PROCEDURE N/A 5/2/2017    Procedure: Ablation atrial flutter;  Surgeon: Ramirez Reza MD;  Location: [facility] INVASIVE LOCATION;  Service:    • CARDIAC ELECTROPHYSIOLOGY PROCEDURE N/A 9/5/2017    Procedure: Ablation atrial fibrillation PVA ;  Surgeon: Ramirez Reza MD;  Location: [facility] EP INVASIVE LOCATION;  Service:    • COLON RESECTION Left 12/20/2018    Procedure: LAPAROSCOPIC SIGMOID  RESECTION, TAKE DOWN OF SPLENIC FLEXURE;  Surgeon: Michel Lala MD;  Location: [facility] OR;  Service: General   • COLONOSCOPY     • COLONOSCOPY N/A 5/30/2018     Procedure: COLONOSCOPY;  Surgeon: Edwin Medina MD;  Location: [facility] ENDOSCOPY;  Service: Gastroenterology   • ENDOSCOPY N/A 3/30/2018    Procedure: ESOPHAGOGASTRODUODENOSCOPY;  Surgeon: Edwin Medina MD;  Location: [facility] ENDOSCOPY;  Service: Gastroenterology   • ENDOSCOPY N/A 5/30/2018    Procedure: ESOPHAGOGASTRODUODENOSCOPY;  Surgeon: Edwin Medina MD;  Location: [facility] ENDOSCOPY;  Service: Gastroenterology   • ENDOSCOPY N/A 12/3/2018    Procedure: ESOPHAGOGASTRODUODENOSCOPY;  Surgeon: Edwin Medina MD;  Location: [facility] ENDOSCOPY;  Service: Gastroenterology   • SHOULDER SURGERY Right 2014   • TONSILLECTOMY  12/2015   • UPPER GASTROINTESTINAL ENDOSCOPY  03/30/2018   • UPPER GASTROINTESTINAL ENDOSCOPY     • UPPER GASTROINTESTINAL ENDOSCOPY  05/30/2018         Current Outpatient Medications:   •  aspirin 81 MG EC tablet, Take 81 mg by mouth Daily., Disp: , Rfl:   •  B Complex-C (SUPER B COMPLEX PO), Take 1 tablet by mouth Daily., Disp: , Rfl:   •  carvedilol (COREG) 6.25 MG tablet, Take 6.25 mg by mouth Daily., Disp: , Rfl:   •  clonazePAM (KlonoPIN) 1 MG tablet, Take 1 mg by mouth 2 (Two) Times a Day As Needed for Anxiety., Disp: , Rfl:   •  DULoxetine (CYMBALTA) 60 MG capsule, Take 60 mg by mouth Every Night., Disp: , Rfl: 2  •  magnesium oxide (MAGOX) 400 (241.3 MG) MG tablet tablet, Take 400 mg by mouth 2 (Two) Times a Day., Disp: , Rfl:   •  mirtazapine (REMERON) 15 MG tablet, Take 15 mg by mouth Every Night., Disp: , Rfl:   •  Multiple Minerals-Vitamins (CALCIUM-MAGNESIUM-ZINC-D3 PO), Take 1 tablet by mouth Daily., Disp: , Rfl:   •  ondansetron (ZOFRAN) 4 MG tablet, Take 1 tablet by mouth Every 6 (Six) Hours As Needed for Nausea or Vomiting., Disp: 30 tablet, Rfl: 0  •  pantoprazole (PROTONIX) 40 MG EC tablet, Take 40 mg by mouth Daily., Disp: , Rfl:   •  Probiotic Product (PROBIOTIC-10 PO), Take 1 tablet by mouth Daily., Disp: , Rfl:   •  promethazine (PHENERGAN) 25 MG suppository, Insert 1  "suppository into the rectum Every 6 (Six) Hours As Needed for Nausea or Vomiting., Disp: 15 suppository, Rfl: 0  •  promethazine (PHENERGAN) 25 MG tablet, Take 25 mg by mouth Every 6 (Six) Hours As Needed for Nausea or Vomiting., Disp: , Rfl:   •  mesalamine (APRISO) 0.375 g 24 hr capsule, Take 4 capsules by mouth Daily., Disp: 120 capsule, Rfl: 2  •  tamsulosin (FLOMAX) 0.4 MG capsule 24 hr capsule, Take 1 capsule by mouth Every Night., Disp: , Rfl:   •  tiZANidine (ZANAFLEX) 4 MG tablet, Take 4 mg by mouth Every 8 (Eight) Hours As Needed for Muscle Spasms., Disp: , Rfl:     Allergies   Allergen Reactions   • Contrast Dye Anaphylaxis     ANAPHYLAXIS   • Iodinated Diagnostic Agents Anaphylaxis   • Morphine Nausea And Vomiting   • Zofran [Ondansetron Hcl] Other (See Comments)     IV gives hiccups       Family History   Problem Relation Age of Onset   • Heart disease Father    • Depression Father    • Cancer Maternal Grandmother         lung   • Cancer Paternal Grandmother         pancreatic   • Heart disease Paternal Grandfather        Social History     Socioeconomic History   • Marital status:      Spouse name: Not on file   • Number of children: Not on file   • Years of education: Not on file   • Highest education level: Not on file   Tobacco Use   • Smoking status: Never Smoker   • Smokeless tobacco: Never Used   Substance and Sexual Activity   • Alcohol use: No     Comment: quit 3/17/18   • Drug use: Yes     Frequency: 7.0 times per week     Types: Marijuana     Comment: states he uses \"a little\" every day   • Sexual activity: Defer   Social History Narrative    Pt states has not had drink of any alcohol in 3 weeks as of 4/5/2018       Physical Exam   Abdominal: Soft. Bowel sounds are normal. He exhibits no distension and no mass. There is no tenderness. There is no rebound and no guarding. No hernia.         ASSESSMENT    Stevenson was seen today for follow-up.    Diagnoses and all orders for this " visit:    Generalized abdominal pain        PLAN    1. Recheck as needed          This document has been electronically signed by Michel Lala MD on March 30, 2019 5:15 PM

## 2019-03-27 NOTE — PATIENT INSTRUCTIONS

## 2019-04-25 ENCOUNTER — HOSPITAL ENCOUNTER (OUTPATIENT)
Facility: HOSPITAL | Age: 44
Setting detail: OBSERVATION
Discharge: HOME | End: 2019-04-28
Attending: EMERGENCY MEDICINE | Admitting: EMERGENCY MEDICINE

## 2019-04-25 ENCOUNTER — APPOINTMENT (OUTPATIENT)
Dept: CT IMAGING | Facility: HOSPITAL | Age: 44
End: 2019-04-25

## 2019-04-25 ENCOUNTER — APPOINTMENT (OUTPATIENT)
Dept: GENERAL RADIOLOGY | Facility: HOSPITAL | Age: 44
End: 2019-04-25

## 2019-04-25 DIAGNOSIS — R10.13 EPIGASTRIC PAIN: ICD-10-CM

## 2019-04-25 DIAGNOSIS — R11.2 NON-INTRACTABLE VOMITING WITH NAUSEA, UNSPECIFIED VOMITING TYPE: ICD-10-CM

## 2019-04-25 DIAGNOSIS — N17.9 AKI (ACUTE KIDNEY INJURY) (HCC): Primary | ICD-10-CM

## 2019-04-25 LAB
ALBUMIN SERPL-MCNC: 4.4 G/DL (ref 3.5–5.2)
ALBUMIN/GLOB SERPL: 1.3 G/DL
ALP SERPL-CCNC: 50 U/L (ref 39–117)
ALT SERPL W P-5'-P-CCNC: 34 U/L (ref 1–41)
ANION GAP SERPL CALCULATED.3IONS-SCNC: 18 MMOL/L
ANION GAP SERPL CALCULATED.3IONS-SCNC: 18 MMOL/L
AST SERPL-CCNC: 38 U/L (ref 1–40)
BASOPHILS # BLD AUTO: 0.02 10*3/MM3 (ref 0–0.2)
BASOPHILS # BLD AUTO: 0.02 10*3/MM3 (ref 0–0.2)
BASOPHILS NFR BLD AUTO: 0.1 % (ref 0–1.5)
BASOPHILS NFR BLD AUTO: 0.1 % (ref 0–1.5)
BILIRUB SERPL-MCNC: 1.2 MG/DL (ref 0.2–1.2)
BUN BLD-MCNC: 19 MG/DL (ref 6–20)
BUN BLD-MCNC: 20 MG/DL (ref 6–20)
BUN/CREAT SERPL: 13.4 (ref 7–25)
BUN/CREAT SERPL: 14.5 (ref 7–25)
CALCIUM SPEC-SCNC: 8.9 MG/DL (ref 8.6–10.5)
CALCIUM SPEC-SCNC: 9.7 MG/DL (ref 8.6–10.5)
CHLORIDE SERPL-SCNC: 100 MMOL/L (ref 98–107)
CHLORIDE SERPL-SCNC: 101 MMOL/L (ref 98–107)
CO2 SERPL-SCNC: 21 MMOL/L (ref 22–29)
CO2 SERPL-SCNC: 22 MMOL/L (ref 22–29)
CREAT BLD-MCNC: 1.31 MG/DL (ref 0.76–1.27)
CREAT BLD-MCNC: 1.49 MG/DL (ref 0.76–1.27)
CRP SERPL-MCNC: 0.16 MG/DL (ref 0–0.5)
D-LACTATE SERPL-SCNC: 1.7 MMOL/L (ref 0.5–2)
DEPRECATED RDW RBC AUTO: 43.3 FL (ref 37–54)
DEPRECATED RDW RBC AUTO: 45 FL (ref 37–54)
EOSINOPHIL # BLD AUTO: 0 10*3/MM3 (ref 0–0.4)
EOSINOPHIL # BLD AUTO: 0 10*3/MM3 (ref 0–0.4)
EOSINOPHIL NFR BLD AUTO: 0 % (ref 0.3–6.2)
EOSINOPHIL NFR BLD AUTO: 0 % (ref 0.3–6.2)
ERYTHROCYTE [DISTWIDTH] IN BLOOD BY AUTOMATED COUNT: 14.9 % (ref 12.3–15.4)
ERYTHROCYTE [DISTWIDTH] IN BLOOD BY AUTOMATED COUNT: 15.3 % (ref 12.3–15.4)
GFR SERPL CREATININE-BSD FRML MDRD: 52 ML/MIN/1.73
GFR SERPL CREATININE-BSD FRML MDRD: 60 ML/MIN/1.73
GLOBULIN UR ELPH-MCNC: 3.4 GM/DL
GLUCOSE BLD-MCNC: 109 MG/DL (ref 65–99)
GLUCOSE BLD-MCNC: 115 MG/DL (ref 65–99)
HCT VFR BLD AUTO: 52.8 % (ref 37.5–51)
HCT VFR BLD AUTO: 55.4 % (ref 37.5–51)
HGB BLD-MCNC: 17.3 G/DL (ref 13–17.7)
HGB BLD-MCNC: 18.5 G/DL (ref 13–17.7)
HOLD SPECIMEN: NORMAL
IMM GRANULOCYTES # BLD AUTO: 0.06 10*3/MM3 (ref 0–0.05)
IMM GRANULOCYTES # BLD AUTO: 0.08 10*3/MM3 (ref 0–0.05)
IMM GRANULOCYTES NFR BLD AUTO: 0.3 % (ref 0–0.5)
IMM GRANULOCYTES NFR BLD AUTO: 0.5 % (ref 0–0.5)
LIPASE SERPL-CCNC: 21 U/L (ref 13–60)
LYMPHOCYTES # BLD AUTO: 1.42 10*3/MM3 (ref 0.7–3.1)
LYMPHOCYTES # BLD AUTO: 1.59 10*3/MM3 (ref 0.7–3.1)
LYMPHOCYTES NFR BLD AUTO: 8.7 % (ref 19.6–45.3)
LYMPHOCYTES NFR BLD AUTO: 8.8 % (ref 19.6–45.3)
MCH RBC QN AUTO: 27.9 PG (ref 26.6–33)
MCH RBC QN AUTO: 28.1 PG (ref 26.6–33)
MCHC RBC AUTO-ENTMCNC: 32.8 G/DL (ref 31.5–35.7)
MCHC RBC AUTO-ENTMCNC: 33.4 G/DL (ref 31.5–35.7)
MCV RBC AUTO: 83.7 FL (ref 79–97)
MCV RBC AUTO: 85.9 FL (ref 79–97)
MONOCYTES # BLD AUTO: 0.76 10*3/MM3 (ref 0.1–0.9)
MONOCYTES # BLD AUTO: 0.83 10*3/MM3 (ref 0.1–0.9)
MONOCYTES NFR BLD AUTO: 4.6 % (ref 5–12)
MONOCYTES NFR BLD AUTO: 4.6 % (ref 5–12)
NEUTROPHILS # BLD AUTO: 14.11 10*3/MM3 (ref 1.7–7)
NEUTROPHILS # BLD AUTO: 15.53 10*3/MM3 (ref 1.7–7)
NEUTROPHILS NFR BLD AUTO: 86.1 % (ref 42.7–76)
NEUTROPHILS NFR BLD AUTO: 86.2 % (ref 42.7–76)
NRBC BLD AUTO-RTO: 0 /100 WBC (ref 0–0.2)
NRBC BLD AUTO-RTO: 0 /100 WBC (ref 0–0.2)
PLATELET # BLD AUTO: 331 10*3/MM3 (ref 140–450)
PLATELET # BLD AUTO: 359 10*3/MM3 (ref 140–450)
PMV BLD AUTO: 10 FL (ref 6–12)
PMV BLD AUTO: 9.7 FL (ref 6–12)
POTASSIUM BLD-SCNC: 4.2 MMOL/L (ref 3.5–5.2)
POTASSIUM BLD-SCNC: 4.2 MMOL/L (ref 3.5–5.2)
PROT SERPL-MCNC: 7.8 G/DL (ref 6–8.5)
RBC # BLD AUTO: 6.15 10*6/MM3 (ref 4.14–5.8)
RBC # BLD AUTO: 6.62 10*6/MM3 (ref 4.14–5.8)
SODIUM BLD-SCNC: 140 MMOL/L (ref 136–145)
SODIUM BLD-SCNC: 140 MMOL/L (ref 136–145)
WBC NRBC COR # BLD: 16.39 10*3/MM3 (ref 3.4–10.8)
WBC NRBC COR # BLD: 18.03 10*3/MM3 (ref 3.4–10.8)
WHOLE BLOOD HOLD SPECIMEN: NORMAL

## 2019-04-25 PROCEDURE — 86140 C-REACTIVE PROTEIN: CPT | Performed by: NURSE PRACTITIONER

## 2019-04-25 PROCEDURE — 83690 ASSAY OF LIPASE: CPT | Performed by: STUDENT IN AN ORGANIZED HEALTH CARE EDUCATION/TRAINING PROGRAM

## 2019-04-25 PROCEDURE — 96375 TX/PRO/DX INJ NEW DRUG ADDON: CPT

## 2019-04-25 PROCEDURE — 25010000002 METOCLOPRAMIDE PER 10 MG: Performed by: NURSE PRACTITIONER

## 2019-04-25 PROCEDURE — 87040 BLOOD CULTURE FOR BACTERIA: CPT | Performed by: NURSE PRACTITIONER

## 2019-04-25 PROCEDURE — 85025 COMPLETE CBC W/AUTO DIFF WBC: CPT | Performed by: NURSE PRACTITIONER

## 2019-04-25 PROCEDURE — 96361 HYDRATE IV INFUSION ADD-ON: CPT

## 2019-04-25 PROCEDURE — G0378 HOSPITAL OBSERVATION PER HR: HCPCS

## 2019-04-25 PROCEDURE — 85025 COMPLETE CBC W/AUTO DIFF WBC: CPT | Performed by: STUDENT IN AN ORGANIZED HEALTH CARE EDUCATION/TRAINING PROGRAM

## 2019-04-25 PROCEDURE — 99284 EMERGENCY DEPT VISIT MOD MDM: CPT

## 2019-04-25 PROCEDURE — 74022 RADEX COMPL AQT ABD SERIES: CPT

## 2019-04-25 PROCEDURE — 25010000002 HYDROMORPHONE PER 4 MG: Performed by: EMERGENCY MEDICINE

## 2019-04-25 PROCEDURE — 96374 THER/PROPH/DIAG INJ IV PUSH: CPT

## 2019-04-25 PROCEDURE — 25010000002 LORAZEPAM PER 2 MG: Performed by: STUDENT IN AN ORGANIZED HEALTH CARE EDUCATION/TRAINING PROGRAM

## 2019-04-25 PROCEDURE — 25010000002 METOCLOPRAMIDE PER 10 MG: Performed by: EMERGENCY MEDICINE

## 2019-04-25 PROCEDURE — 74176 CT ABD & PELVIS W/O CONTRAST: CPT

## 2019-04-25 PROCEDURE — 80053 COMPREHEN METABOLIC PANEL: CPT | Performed by: STUDENT IN AN ORGANIZED HEALTH CARE EDUCATION/TRAINING PROGRAM

## 2019-04-25 PROCEDURE — 83605 ASSAY OF LACTIC ACID: CPT | Performed by: NURSE PRACTITIONER

## 2019-04-25 RX ORDER — ACETAMINOPHEN 325 MG/1
650 TABLET ORAL EVERY 4 HOURS PRN
Status: DISCONTINUED | OUTPATIENT
Start: 2019-04-25 | End: 2019-04-28 | Stop reason: HOSPADM

## 2019-04-25 RX ORDER — SODIUM CHLORIDE 0.9 % (FLUSH) 0.9 %
3-10 SYRINGE (ML) INJECTION AS NEEDED
Status: DISCONTINUED | OUTPATIENT
Start: 2019-04-25 | End: 2019-04-28 | Stop reason: HOSPADM

## 2019-04-25 RX ORDER — METOCLOPRAMIDE HYDROCHLORIDE 5 MG/ML
10 INJECTION INTRAMUSCULAR; INTRAVENOUS ONCE
Status: COMPLETED | OUTPATIENT
Start: 2019-04-25 | End: 2019-04-25

## 2019-04-25 RX ORDER — SODIUM CHLORIDE 0.9 % (FLUSH) 0.9 %
3 SYRINGE (ML) INJECTION EVERY 12 HOURS SCHEDULED
Status: DISCONTINUED | OUTPATIENT
Start: 2019-04-25 | End: 2019-04-28 | Stop reason: HOSPADM

## 2019-04-25 RX ORDER — SODIUM CHLORIDE 9 MG/ML
100 INJECTION, SOLUTION INTRAVENOUS CONTINUOUS
Status: DISCONTINUED | OUTPATIENT
Start: 2019-04-25 | End: 2019-04-27

## 2019-04-25 RX ORDER — ONDANSETRON 4 MG/1
4 TABLET, ORALLY DISINTEGRATING ORAL EVERY 6 HOURS PRN
Status: DISCONTINUED | OUTPATIENT
Start: 2019-04-25 | End: 2019-04-28 | Stop reason: HOSPADM

## 2019-04-25 RX ORDER — SODIUM CHLORIDE 0.9 % (FLUSH) 0.9 %
10 SYRINGE (ML) INJECTION AS NEEDED
Status: DISCONTINUED | OUTPATIENT
Start: 2019-04-25 | End: 2019-04-28 | Stop reason: HOSPADM

## 2019-04-25 RX ORDER — CLONAZEPAM 0.5 MG/1
1 TABLET ORAL 2 TIMES DAILY PRN
Status: DISCONTINUED | OUTPATIENT
Start: 2019-04-25 | End: 2019-04-28 | Stop reason: HOSPADM

## 2019-04-25 RX ORDER — MIRTAZAPINE 15 MG/1
15 TABLET, FILM COATED ORAL NIGHTLY
Status: DISCONTINUED | OUTPATIENT
Start: 2019-04-25 | End: 2019-04-28 | Stop reason: HOSPADM

## 2019-04-25 RX ORDER — CARVEDILOL 6.25 MG/1
6.25 TABLET ORAL
Status: DISCONTINUED | OUTPATIENT
Start: 2019-04-26 | End: 2019-04-28 | Stop reason: HOSPADM

## 2019-04-25 RX ORDER — METOCLOPRAMIDE HYDROCHLORIDE 5 MG/ML
5 INJECTION INTRAMUSCULAR; INTRAVENOUS
Status: DISCONTINUED | OUTPATIENT
Start: 2019-04-25 | End: 2019-04-28 | Stop reason: HOSPADM

## 2019-04-25 RX ORDER — PANTOPRAZOLE SODIUM 40 MG/1
40 TABLET, DELAYED RELEASE ORAL
Status: DISCONTINUED | OUTPATIENT
Start: 2019-04-26 | End: 2019-04-26

## 2019-04-25 RX ORDER — LORAZEPAM 2 MG/ML
1 INJECTION INTRAMUSCULAR ONCE
Status: COMPLETED | OUTPATIENT
Start: 2019-04-25 | End: 2019-04-25

## 2019-04-25 RX ORDER — ALUMINA, MAGNESIA, AND SIMETHICONE 2400; 2400; 240 MG/30ML; MG/30ML; MG/30ML
15 SUSPENSION ORAL ONCE
Status: COMPLETED | OUTPATIENT
Start: 2019-04-25 | End: 2019-04-25

## 2019-04-25 RX ORDER — HYDRALAZINE HYDROCHLORIDE 20 MG/ML
10 INJECTION INTRAMUSCULAR; INTRAVENOUS EVERY 6 HOURS PRN
Status: DISCONTINUED | OUTPATIENT
Start: 2019-04-25 | End: 2019-04-28 | Stop reason: HOSPADM

## 2019-04-25 RX ORDER — HYDROMORPHONE HCL 110MG/55ML
1 PATIENT CONTROLLED ANALGESIA SYRINGE INTRAVENOUS ONCE
Status: COMPLETED | OUTPATIENT
Start: 2019-04-25 | End: 2019-04-25

## 2019-04-25 RX ORDER — DULOXETIN HYDROCHLORIDE 60 MG/1
60 CAPSULE, DELAYED RELEASE ORAL NIGHTLY
Status: DISCONTINUED | OUTPATIENT
Start: 2019-04-25 | End: 2019-04-28 | Stop reason: HOSPADM

## 2019-04-25 RX ORDER — ONDANSETRON 4 MG/1
8 TABLET, ORALLY DISINTEGRATING ORAL ONCE
Status: COMPLETED | OUTPATIENT
Start: 2019-04-25 | End: 2019-04-25

## 2019-04-25 RX ADMIN — METOCLOPRAMIDE 5 MG: 5 INJECTION, SOLUTION INTRAMUSCULAR; INTRAVENOUS at 22:30

## 2019-04-25 RX ADMIN — ONDANSETRON 8 MG: 4 TABLET, ORALLY DISINTEGRATING ORAL at 16:35

## 2019-04-25 RX ADMIN — LORAZEPAM 1 MG: 2 INJECTION INTRAMUSCULAR; INTRAVENOUS at 16:36

## 2019-04-25 RX ADMIN — ALUMINUM HYDROXIDE, MAGNESIUM HYDROXIDE, AND DIMETHICONE 15 ML: 400; 400; 40 SUSPENSION ORAL at 16:35

## 2019-04-25 RX ADMIN — DULOXETINE 60 MG: 60 CAPSULE, DELAYED RELEASE ORAL at 21:56

## 2019-04-25 RX ADMIN — CLONAZEPAM 1 MG: 0.5 TABLET ORAL at 21:55

## 2019-04-25 RX ADMIN — MAGNESIUM OXIDE TAB 400 MG (241.3 MG ELEMENTAL MG) 400 MG: 400 (241.3 MG) TAB at 21:55

## 2019-04-25 RX ADMIN — SODIUM CHLORIDE 1000 ML: 9 INJECTION, SOLUTION INTRAVENOUS at 17:55

## 2019-04-25 RX ADMIN — ONDANSETRON 4 MG: 4 TABLET, ORALLY DISINTEGRATING ORAL at 21:56

## 2019-04-25 RX ADMIN — LIDOCAINE HYDROCHLORIDE 15 ML: 20 SOLUTION ORAL; TOPICAL at 16:36

## 2019-04-25 RX ADMIN — HYDROMORPHONE HYDROCHLORIDE 1 MG: 2 INJECTION, SOLUTION INTRAMUSCULAR; INTRAVENOUS; SUBCUTANEOUS at 17:55

## 2019-04-25 RX ADMIN — MIRTAZAPINE 15 MG: 15 TABLET, FILM COATED ORAL at 21:56

## 2019-04-25 RX ADMIN — SODIUM CHLORIDE 1000 ML: 900 INJECTION, SOLUTION INTRAVENOUS at 16:36

## 2019-04-25 RX ADMIN — METOCLOPRAMIDE 10 MG: 5 INJECTION, SOLUTION INTRAMUSCULAR; INTRAVENOUS at 17:55

## 2019-04-25 RX ADMIN — SODIUM CHLORIDE 100 ML/HR: 9 INJECTION, SOLUTION INTRAVENOUS at 21:56

## 2019-04-26 LAB
ANION GAP SERPL CALCULATED.3IONS-SCNC: 13 MMOL/L
BACTERIA UR QL AUTO: ABNORMAL /HPF
BASOPHILS # BLD AUTO: 0.04 10*3/MM3 (ref 0–0.2)
BASOPHILS NFR BLD AUTO: 0.3 % (ref 0–1.5)
BILIRUB UR QL STRIP: ABNORMAL
BUN BLD-MCNC: 15 MG/DL (ref 6–20)
BUN/CREAT SERPL: 12.8 (ref 7–25)
CALCIUM SPEC-SCNC: 8.6 MG/DL (ref 8.6–10.5)
CHLORIDE SERPL-SCNC: 103 MMOL/L (ref 98–107)
CLARITY UR: ABNORMAL
CO2 SERPL-SCNC: 23 MMOL/L (ref 22–29)
COLOR UR: ABNORMAL
CREAT BLD-MCNC: 1.17 MG/DL (ref 0.76–1.27)
DEPRECATED RDW RBC AUTO: 45 FL (ref 37–54)
EOSINOPHIL # BLD AUTO: 0 10*3/MM3 (ref 0–0.4)
EOSINOPHIL NFR BLD AUTO: 0 % (ref 0.3–6.2)
ERYTHROCYTE [DISTWIDTH] IN BLOOD BY AUTOMATED COUNT: 14.6 % (ref 12.3–15.4)
GFR SERPL CREATININE-BSD FRML MDRD: 69 ML/MIN/1.73
GLUCOSE BLD-MCNC: 114 MG/DL (ref 65–99)
GLUCOSE UR STRIP-MCNC: NEGATIVE MG/DL
HCT VFR BLD AUTO: 51.1 % (ref 37.5–51)
HGB BLD-MCNC: 16.8 G/DL (ref 13–17.7)
HGB UR QL STRIP.AUTO: NEGATIVE
HYALINE CASTS UR QL AUTO: ABNORMAL /LPF
IMM GRANULOCYTES # BLD AUTO: 0.05 10*3/MM3 (ref 0–0.05)
IMM GRANULOCYTES NFR BLD AUTO: 0.3 % (ref 0–0.5)
KETONES UR QL STRIP: ABNORMAL
LEUKOCYTE ESTERASE UR QL STRIP.AUTO: ABNORMAL
LYMPHOCYTES # BLD AUTO: 1.42 10*3/MM3 (ref 0.7–3.1)
LYMPHOCYTES NFR BLD AUTO: 9.9 % (ref 19.6–45.3)
MCH RBC QN AUTO: 28.1 PG (ref 26.6–33)
MCHC RBC AUTO-ENTMCNC: 32.9 G/DL (ref 31.5–35.7)
MCV RBC AUTO: 85.6 FL (ref 79–97)
MONOCYTES # BLD AUTO: 0.85 10*3/MM3 (ref 0.1–0.9)
MONOCYTES NFR BLD AUTO: 5.9 % (ref 5–12)
NEUTROPHILS # BLD AUTO: 12.02 10*3/MM3 (ref 1.7–7)
NEUTROPHILS NFR BLD AUTO: 83.6 % (ref 42.7–76)
NITRITE UR QL STRIP: NEGATIVE
NRBC BLD AUTO-RTO: 0 /100 WBC (ref 0–0.2)
PH UR STRIP.AUTO: 6.5 [PH] (ref 5–9)
PLATELET # BLD AUTO: 308 10*3/MM3 (ref 140–450)
PMV BLD AUTO: 9.7 FL (ref 6–12)
POTASSIUM BLD-SCNC: 3.8 MMOL/L (ref 3.5–5.2)
PROT UR QL STRIP: ABNORMAL
RBC # BLD AUTO: 5.97 10*6/MM3 (ref 4.14–5.8)
RBC # UR: ABNORMAL /HPF
REF LAB TEST METHOD: ABNORMAL
SODIUM BLD-SCNC: 139 MMOL/L (ref 136–145)
SP GR UR STRIP: 1.03 (ref 1–1.03)
SQUAMOUS #/AREA URNS HPF: ABNORMAL /HPF
TRANS CELLS #/AREA URNS HPF: ABNORMAL /HPF
UROBILINOGEN UR QL STRIP: ABNORMAL
WBC NRBC COR # BLD: 14.38 10*3/MM3 (ref 3.4–10.8)
WBC UR QL AUTO: ABNORMAL /HPF

## 2019-04-26 PROCEDURE — 85025 COMPLETE CBC W/AUTO DIFF WBC: CPT | Performed by: NURSE PRACTITIONER

## 2019-04-26 PROCEDURE — 99214 OFFICE O/P EST MOD 30 MIN: CPT | Performed by: INTERNAL MEDICINE

## 2019-04-26 PROCEDURE — 25010000002 METOCLOPRAMIDE PER 10 MG: Performed by: NURSE PRACTITIONER

## 2019-04-26 PROCEDURE — 81001 URINALYSIS AUTO W/SCOPE: CPT | Performed by: STUDENT IN AN ORGANIZED HEALTH CARE EDUCATION/TRAINING PROGRAM

## 2019-04-26 PROCEDURE — 96375 TX/PRO/DX INJ NEW DRUG ADDON: CPT

## 2019-04-26 PROCEDURE — 96376 TX/PRO/DX INJ SAME DRUG ADON: CPT

## 2019-04-26 PROCEDURE — 25010000002 HYDRALAZINE PER 20 MG: Performed by: NURSE PRACTITIONER

## 2019-04-26 PROCEDURE — G0378 HOSPITAL OBSERVATION PER HR: HCPCS

## 2019-04-26 PROCEDURE — 96361 HYDRATE IV INFUSION ADD-ON: CPT

## 2019-04-26 PROCEDURE — 80048 BASIC METABOLIC PNL TOTAL CA: CPT | Performed by: NURSE PRACTITIONER

## 2019-04-26 RX ORDER — ONDANSETRON 2 MG/ML
4 INJECTION INTRAMUSCULAR; INTRAVENOUS EVERY 6 HOURS PRN
Status: CANCELLED | OUTPATIENT
Start: 2019-04-26

## 2019-04-26 RX ORDER — ONDANSETRON 2 MG/ML
4 INJECTION INTRAMUSCULAR; INTRAVENOUS EVERY 6 HOURS PRN
Status: DISCONTINUED | OUTPATIENT
Start: 2019-04-26 | End: 2019-04-28 | Stop reason: HOSPADM

## 2019-04-26 RX ORDER — PANTOPRAZOLE SODIUM 40 MG/1
80 TABLET, DELAYED RELEASE ORAL
Status: DISCONTINUED | OUTPATIENT
Start: 2019-04-27 | End: 2019-04-27

## 2019-04-26 RX ORDER — CALCIUM CARBONATE 200(500)MG
1 TABLET,CHEWABLE ORAL 3 TIMES DAILY PRN
Status: DISCONTINUED | OUTPATIENT
Start: 2019-04-26 | End: 2019-04-28 | Stop reason: HOSPADM

## 2019-04-26 RX ORDER — AMLODIPINE BESYLATE 10 MG/1
10 TABLET ORAL
Status: DISCONTINUED | OUTPATIENT
Start: 2019-04-26 | End: 2019-04-28 | Stop reason: HOSPADM

## 2019-04-26 RX ORDER — LOSARTAN POTASSIUM 50 MG/1
50 TABLET ORAL
Status: DISCONTINUED | OUTPATIENT
Start: 2019-04-26 | End: 2019-04-28 | Stop reason: HOSPADM

## 2019-04-26 RX ADMIN — HYDRALAZINE HYDROCHLORIDE 10 MG: 20 INJECTION INTRAMUSCULAR; INTRAVENOUS at 07:59

## 2019-04-26 RX ADMIN — LOSARTAN POTASSIUM 50 MG: 50 TABLET, FILM COATED ORAL at 20:14

## 2019-04-26 RX ADMIN — MAGNESIUM OXIDE TAB 400 MG (241.3 MG ELEMENTAL MG) 400 MG: 400 (241.3 MG) TAB at 20:15

## 2019-04-26 RX ADMIN — METOCLOPRAMIDE 5 MG: 5 INJECTION, SOLUTION INTRAMUSCULAR; INTRAVENOUS at 07:37

## 2019-04-26 RX ADMIN — METOCLOPRAMIDE 5 MG: 5 INJECTION, SOLUTION INTRAMUSCULAR; INTRAVENOUS at 20:15

## 2019-04-26 RX ADMIN — PANTOPRAZOLE SODIUM 40 MG: 40 TABLET, DELAYED RELEASE ORAL at 05:49

## 2019-04-26 RX ADMIN — AMLODIPINE BESYLATE 10 MG: 10 TABLET ORAL at 20:14

## 2019-04-26 RX ADMIN — ONDANSETRON 4 MG: 4 TABLET, ORALLY DISINTEGRATING ORAL at 05:16

## 2019-04-26 RX ADMIN — HYDRALAZINE HYDROCHLORIDE 10 MG: 20 INJECTION INTRAMUSCULAR; INTRAVENOUS at 13:56

## 2019-04-26 RX ADMIN — METOCLOPRAMIDE 5 MG: 5 INJECTION, SOLUTION INTRAMUSCULAR; INTRAVENOUS at 12:00

## 2019-04-26 RX ADMIN — ACETAMINOPHEN 650 MG: 325 TABLET, FILM COATED ORAL at 07:59

## 2019-04-26 RX ADMIN — CARVEDILOL 6.25 MG: 6.25 TABLET, FILM COATED ORAL at 07:44

## 2019-04-26 RX ADMIN — SODIUM CHLORIDE, PRESERVATIVE FREE 3 ML: 5 INJECTION INTRAVENOUS at 08:00

## 2019-04-26 RX ADMIN — METOCLOPRAMIDE 5 MG: 5 INJECTION, SOLUTION INTRAMUSCULAR; INTRAVENOUS at 17:05

## 2019-04-26 RX ADMIN — MAGNESIUM OXIDE TAB 400 MG (241.3 MG ELEMENTAL MG) 400 MG: 400 (241.3 MG) TAB at 09:15

## 2019-04-26 RX ADMIN — MIRTAZAPINE 15 MG: 15 TABLET, FILM COATED ORAL at 20:15

## 2019-04-26 RX ADMIN — DULOXETINE 60 MG: 60 CAPSULE, DELAYED RELEASE ORAL at 20:15

## 2019-04-26 RX ADMIN — CALCIUM CARBONATE (ANTACID) CHEW TAB 500 MG 1 TABLET: 500 CHEW TAB at 17:05

## 2019-04-26 RX ADMIN — SODIUM CHLORIDE 100 ML/HR: 9 INJECTION, SOLUTION INTRAVENOUS at 17:05

## 2019-04-27 LAB
ANION GAP SERPL CALCULATED.3IONS-SCNC: 14 MMOL/L
BASOPHILS # BLD AUTO: 0.04 10*3/MM3 (ref 0–0.2)
BASOPHILS NFR BLD AUTO: 0.3 % (ref 0–1.5)
BUN BLD-MCNC: 10 MG/DL (ref 6–20)
BUN/CREAT SERPL: 10.1 (ref 7–25)
CALCIUM SPEC-SCNC: 9.1 MG/DL (ref 8.6–10.5)
CHLORIDE SERPL-SCNC: 101 MMOL/L (ref 98–107)
CO2 SERPL-SCNC: 23 MMOL/L (ref 22–29)
CREAT BLD-MCNC: 0.99 MG/DL (ref 0.76–1.27)
DEPRECATED RDW RBC AUTO: 43.9 FL (ref 37–54)
EOSINOPHIL # BLD AUTO: 0.01 10*3/MM3 (ref 0–0.4)
EOSINOPHIL NFR BLD AUTO: 0.1 % (ref 0.3–6.2)
ERYTHROCYTE [DISTWIDTH] IN BLOOD BY AUTOMATED COUNT: 15.1 % (ref 12.3–15.4)
GFR SERPL CREATININE-BSD FRML MDRD: 83 ML/MIN/1.73
GLUCOSE BLD-MCNC: 102 MG/DL (ref 65–99)
HCT VFR BLD AUTO: 56.5 % (ref 37.5–51)
HGB BLD-MCNC: 18.7 G/DL (ref 13–17.7)
IMM GRANULOCYTES # BLD AUTO: 0.06 10*3/MM3 (ref 0–0.05)
IMM GRANULOCYTES NFR BLD AUTO: 0.4 % (ref 0–0.5)
LYMPHOCYTES # BLD AUTO: 1.97 10*3/MM3 (ref 0.7–3.1)
LYMPHOCYTES NFR BLD AUTO: 14 % (ref 19.6–45.3)
MCH RBC QN AUTO: 28.3 PG (ref 26.6–33)
MCHC RBC AUTO-ENTMCNC: 33.1 G/DL (ref 31.5–35.7)
MCV RBC AUTO: 85.5 FL (ref 79–97)
MONOCYTES # BLD AUTO: 1.03 10*3/MM3 (ref 0.1–0.9)
MONOCYTES NFR BLD AUTO: 7.3 % (ref 5–12)
NEUTROPHILS # BLD AUTO: 10.93 10*3/MM3 (ref 1.7–7)
NEUTROPHILS NFR BLD AUTO: 77.9 % (ref 42.7–76)
NRBC BLD AUTO-RTO: 0 /100 WBC (ref 0–0.2)
PLATELET # BLD AUTO: 354 10*3/MM3 (ref 140–450)
PMV BLD AUTO: 9.6 FL (ref 6–12)
POTASSIUM BLD-SCNC: 4.1 MMOL/L (ref 3.5–5.2)
RBC # BLD AUTO: 6.61 10*6/MM3 (ref 4.14–5.8)
SODIUM BLD-SCNC: 138 MMOL/L (ref 136–145)
WBC NRBC COR # BLD: 14.04 10*3/MM3 (ref 3.4–10.8)

## 2019-04-27 PROCEDURE — 25010000002 METOCLOPRAMIDE PER 10 MG: Performed by: NURSE PRACTITIONER

## 2019-04-27 PROCEDURE — 96361 HYDRATE IV INFUSION ADD-ON: CPT

## 2019-04-27 PROCEDURE — 85025 COMPLETE CBC W/AUTO DIFF WBC: CPT | Performed by: NURSE PRACTITIONER

## 2019-04-27 PROCEDURE — G0378 HOSPITAL OBSERVATION PER HR: HCPCS

## 2019-04-27 PROCEDURE — 96376 TX/PRO/DX INJ SAME DRUG ADON: CPT

## 2019-04-27 PROCEDURE — 25010000002 HYDRALAZINE PER 20 MG: Performed by: NURSE PRACTITIONER

## 2019-04-27 PROCEDURE — 80048 BASIC METABOLIC PNL TOTAL CA: CPT | Performed by: NURSE PRACTITIONER

## 2019-04-27 PROCEDURE — 96375 TX/PRO/DX INJ NEW DRUG ADDON: CPT

## 2019-04-27 RX ORDER — PANTOPRAZOLE SODIUM 40 MG/10ML
40 INJECTION, POWDER, LYOPHILIZED, FOR SOLUTION INTRAVENOUS
Status: DISCONTINUED | OUTPATIENT
Start: 2019-04-27 | End: 2019-04-28 | Stop reason: HOSPADM

## 2019-04-27 RX ORDER — PANTOPRAZOLE SODIUM 40 MG/10ML
40 INJECTION, POWDER, LYOPHILIZED, FOR SOLUTION INTRAVENOUS
Status: DISCONTINUED | OUTPATIENT
Start: 2019-04-28 | End: 2019-04-27

## 2019-04-27 RX ADMIN — MIRTAZAPINE 15 MG: 15 TABLET, FILM COATED ORAL at 20:24

## 2019-04-27 RX ADMIN — SODIUM CHLORIDE, PRESERVATIVE FREE 3 ML: 5 INJECTION INTRAVENOUS at 20:25

## 2019-04-27 RX ADMIN — MAGNESIUM OXIDE TAB 400 MG (241.3 MG ELEMENTAL MG) 400 MG: 400 (241.3 MG) TAB at 08:25

## 2019-04-27 RX ADMIN — METOCLOPRAMIDE 5 MG: 5 INJECTION, SOLUTION INTRAMUSCULAR; INTRAVENOUS at 20:25

## 2019-04-27 RX ADMIN — MAGNESIUM OXIDE TAB 400 MG (241.3 MG ELEMENTAL MG) 400 MG: 400 (241.3 MG) TAB at 20:24

## 2019-04-27 RX ADMIN — DULOXETINE 60 MG: 60 CAPSULE, DELAYED RELEASE ORAL at 20:24

## 2019-04-27 RX ADMIN — SODIUM CHLORIDE 100 ML/HR: 9 INJECTION, SOLUTION INTRAVENOUS at 06:00

## 2019-04-27 RX ADMIN — HYDRALAZINE HYDROCHLORIDE 10 MG: 20 INJECTION INTRAMUSCULAR; INTRAVENOUS at 00:19

## 2019-04-27 RX ADMIN — METOCLOPRAMIDE 5 MG: 5 INJECTION, SOLUTION INTRAMUSCULAR; INTRAVENOUS at 07:42

## 2019-04-27 RX ADMIN — PANTOPRAZOLE SODIUM 40 MG: 40 INJECTION, POWDER, LYOPHILIZED, FOR SOLUTION INTRAVENOUS at 17:23

## 2019-04-27 RX ADMIN — CARVEDILOL 6.25 MG: 6.25 TABLET, FILM COATED ORAL at 08:26

## 2019-04-27 RX ADMIN — PANTOPRAZOLE SODIUM 80 MG: 40 TABLET, DELAYED RELEASE ORAL at 06:00

## 2019-04-27 RX ADMIN — METOCLOPRAMIDE 5 MG: 5 INJECTION, SOLUTION INTRAMUSCULAR; INTRAVENOUS at 11:26

## 2019-04-27 RX ADMIN — CALCIUM CARBONATE (ANTACID) CHEW TAB 500 MG 1 TABLET: 500 CHEW TAB at 07:42

## 2019-04-27 RX ADMIN — LOSARTAN POTASSIUM 50 MG: 50 TABLET, FILM COATED ORAL at 08:25

## 2019-04-27 RX ADMIN — AMLODIPINE BESYLATE 10 MG: 10 TABLET ORAL at 08:26

## 2019-04-27 RX ADMIN — METOCLOPRAMIDE 5 MG: 5 INJECTION, SOLUTION INTRAMUSCULAR; INTRAVENOUS at 17:22

## 2019-04-27 RX ADMIN — CALCIUM CARBONATE (ANTACID) CHEW TAB 500 MG 1 TABLET: 500 CHEW TAB at 17:26

## 2019-04-28 VITALS
DIASTOLIC BLOOD PRESSURE: 91 MMHG | RESPIRATION RATE: 18 BRPM | OXYGEN SATURATION: 95 % | SYSTOLIC BLOOD PRESSURE: 147 MMHG | HEART RATE: 107 BPM | BODY MASS INDEX: 28.73 KG/M2 | HEIGHT: 72 IN | TEMPERATURE: 96.9 F | WEIGHT: 212.1 LBS

## 2019-04-28 PROBLEM — D64.9 ERYTHROCYTOPENIA: Chronic | Status: ACTIVE | Noted: 2019-04-28

## 2019-04-28 LAB
ANION GAP SERPL CALCULATED.3IONS-SCNC: 10 MMOL/L
BASOPHILS # BLD AUTO: 0.05 10*3/MM3 (ref 0–0.2)
BASOPHILS NFR BLD AUTO: 0.4 % (ref 0–1.5)
BUN BLD-MCNC: 13 MG/DL (ref 6–20)
BUN/CREAT SERPL: 11 (ref 7–25)
CALCIUM SPEC-SCNC: 9.4 MG/DL (ref 8.6–10.5)
CHLORIDE SERPL-SCNC: 94 MMOL/L (ref 98–107)
CO2 SERPL-SCNC: 28 MMOL/L (ref 22–29)
CREAT BLD-MCNC: 1.18 MG/DL (ref 0.76–1.27)
DEPRECATED RDW RBC AUTO: 43.8 FL (ref 37–54)
EOSINOPHIL # BLD AUTO: 0.03 10*3/MM3 (ref 0–0.4)
EOSINOPHIL NFR BLD AUTO: 0.2 % (ref 0.3–6.2)
ERYTHROCYTE [DISTWIDTH] IN BLOOD BY AUTOMATED COUNT: 15.2 % (ref 12.3–15.4)
GFR SERPL CREATININE-BSD FRML MDRD: 68 ML/MIN/1.73
GLUCOSE BLD-MCNC: 131 MG/DL (ref 65–99)
HCT VFR BLD AUTO: 58.5 % (ref 37.5–51)
HGB BLD-MCNC: 19.3 G/DL (ref 13–17.7)
IMM GRANULOCYTES # BLD AUTO: 0.08 10*3/MM3 (ref 0–0.05)
IMM GRANULOCYTES NFR BLD AUTO: 0.6 % (ref 0–0.5)
LYMPHOCYTES # BLD AUTO: 2.07 10*3/MM3 (ref 0.7–3.1)
LYMPHOCYTES NFR BLD AUTO: 15.6 % (ref 19.6–45.3)
MCH RBC QN AUTO: 28.2 PG (ref 26.6–33)
MCHC RBC AUTO-ENTMCNC: 33 G/DL (ref 31.5–35.7)
MCV RBC AUTO: 85.5 FL (ref 79–97)
MONOCYTES # BLD AUTO: 1.06 10*3/MM3 (ref 0.1–0.9)
MONOCYTES NFR BLD AUTO: 8 % (ref 5–12)
NEUTROPHILS # BLD AUTO: 10 10*3/MM3 (ref 1.7–7)
NEUTROPHILS NFR BLD AUTO: 75.2 % (ref 42.7–76)
NRBC BLD AUTO-RTO: 0 /100 WBC (ref 0–0.2)
PLATELET # BLD AUTO: 374 10*3/MM3 (ref 140–450)
PMV BLD AUTO: 9.7 FL (ref 6–12)
POTASSIUM BLD-SCNC: 3.9 MMOL/L (ref 3.5–5.2)
RBC # BLD AUTO: 6.84 10*6/MM3 (ref 4.14–5.8)
SODIUM BLD-SCNC: 132 MMOL/L (ref 136–145)
WBC NRBC COR # BLD: 13.29 10*3/MM3 (ref 3.4–10.8)

## 2019-04-28 PROCEDURE — 80048 BASIC METABOLIC PNL TOTAL CA: CPT | Performed by: NURSE PRACTITIONER

## 2019-04-28 PROCEDURE — 25010000002 METOCLOPRAMIDE PER 10 MG: Performed by: NURSE PRACTITIONER

## 2019-04-28 PROCEDURE — 85025 COMPLETE CBC W/AUTO DIFF WBC: CPT | Performed by: NURSE PRACTITIONER

## 2019-04-28 PROCEDURE — G0378 HOSPITAL OBSERVATION PER HR: HCPCS

## 2019-04-28 PROCEDURE — 96376 TX/PRO/DX INJ SAME DRUG ADON: CPT

## 2019-04-28 RX ORDER — PANTOPRAZOLE SODIUM 40 MG/1
40 TABLET, DELAYED RELEASE ORAL 2 TIMES DAILY
Qty: 60 TABLET | Refills: 1 | Status: SHIPPED | OUTPATIENT
Start: 2019-04-28 | End: 2019-09-19 | Stop reason: SDUPTHER

## 2019-04-28 RX ORDER — AMLODIPINE BESYLATE 10 MG/1
10 TABLET ORAL
Qty: 30 TABLET | Refills: 1 | Status: SHIPPED | OUTPATIENT
Start: 2019-04-29 | End: 2019-06-19

## 2019-04-28 RX ORDER — LOSARTAN POTASSIUM 50 MG/1
50 TABLET ORAL
Qty: 30 TABLET | Refills: 1 | Status: SHIPPED | OUTPATIENT
Start: 2019-04-29 | End: 2019-06-19

## 2019-04-28 RX ORDER — CALCIUM CARBONATE 200(500)MG
1 TABLET,CHEWABLE ORAL 3 TIMES DAILY PRN
Start: 2019-04-28 | End: 2019-06-19

## 2019-04-28 RX ADMIN — CARVEDILOL 6.25 MG: 6.25 TABLET, FILM COATED ORAL at 07:55

## 2019-04-28 RX ADMIN — METOCLOPRAMIDE 5 MG: 5 INJECTION, SOLUTION INTRAMUSCULAR; INTRAVENOUS at 07:55

## 2019-04-28 RX ADMIN — PANTOPRAZOLE SODIUM 40 MG: 40 INJECTION, POWDER, LYOPHILIZED, FOR SOLUTION INTRAVENOUS at 07:55

## 2019-04-28 RX ADMIN — MAGNESIUM OXIDE TAB 400 MG (241.3 MG ELEMENTAL MG) 400 MG: 400 (241.3 MG) TAB at 09:04

## 2019-04-28 RX ADMIN — LOSARTAN POTASSIUM 50 MG: 50 TABLET, FILM COATED ORAL at 09:04

## 2019-04-28 RX ADMIN — AMLODIPINE BESYLATE 10 MG: 10 TABLET ORAL at 09:04

## 2019-04-28 RX ADMIN — SODIUM CHLORIDE, PRESERVATIVE FREE 3 ML: 5 INJECTION INTRAVENOUS at 09:04

## 2019-04-29 ENCOUNTER — HOSPITAL ENCOUNTER (OUTPATIENT)
Facility: HOSPITAL | Age: 44
Setting detail: OBSERVATION
Discharge: HOME | End: 2019-05-03
Attending: SURGERY | Admitting: SURGERY

## 2019-04-29 ENCOUNTER — OFFICE VISIT (OUTPATIENT)
Dept: SURGERY | Facility: CLINIC | Age: 44
End: 2019-04-29

## 2019-04-29 VITALS
SYSTOLIC BLOOD PRESSURE: 142 MMHG | BODY MASS INDEX: 28.36 KG/M2 | HEART RATE: 96 BPM | HEIGHT: 72 IN | DIASTOLIC BLOOD PRESSURE: 90 MMHG | WEIGHT: 209.4 LBS | TEMPERATURE: 98.5 F

## 2019-04-29 DIAGNOSIS — R10.13 EPIGASTRIC PAIN: ICD-10-CM

## 2019-04-29 DIAGNOSIS — E86.0 DEHYDRATION: Primary | ICD-10-CM

## 2019-04-29 LAB
ALBUMIN SERPL-MCNC: 3.7 G/DL (ref 3.5–5.2)
ALBUMIN/GLOB SERPL: 1.4 G/DL
ALP SERPL-CCNC: 36 U/L (ref 39–117)
ALT SERPL W P-5'-P-CCNC: 24 U/L (ref 1–41)
ANION GAP SERPL CALCULATED.3IONS-SCNC: 12 MMOL/L
AST SERPL-CCNC: 21 U/L (ref 1–40)
BILIRUB SERPL-MCNC: 1.4 MG/DL (ref 0.2–1.2)
BUN BLD-MCNC: 25 MG/DL (ref 6–20)
BUN/CREAT SERPL: 14 (ref 7–25)
CALCIUM SPEC-SCNC: 8.9 MG/DL (ref 8.6–10.5)
CHLORIDE SERPL-SCNC: 101 MMOL/L (ref 98–107)
CO2 SERPL-SCNC: 26 MMOL/L (ref 22–29)
CREAT BLD-MCNC: 1.78 MG/DL (ref 0.76–1.27)
DEPRECATED RDW RBC AUTO: 43.8 FL (ref 37–54)
ERYTHROCYTE [DISTWIDTH] IN BLOOD BY AUTOMATED COUNT: 14.5 % (ref 12.3–15.4)
GFR SERPL CREATININE-BSD FRML MDRD: 42 ML/MIN/1.73
GLOBULIN UR ELPH-MCNC: 2.6 GM/DL
GLUCOSE BLD-MCNC: 110 MG/DL (ref 65–99)
HCT VFR BLD AUTO: 53 % (ref 37.5–51)
HGB BLD-MCNC: 17.2 G/DL (ref 13–17.7)
MAGNESIUM SERPL-MCNC: 2.3 MG/DL (ref 1.6–2.6)
MCH RBC QN AUTO: 27.9 PG (ref 26.6–33)
MCHC RBC AUTO-ENTMCNC: 32.5 G/DL (ref 31.5–35.7)
MCV RBC AUTO: 85.9 FL (ref 79–97)
PHOSPHATE SERPL-MCNC: 3.5 MG/DL (ref 2.5–4.5)
PLATELET # BLD AUTO: 293 10*3/MM3 (ref 140–450)
PMV BLD AUTO: 9.5 FL (ref 6–12)
POTASSIUM BLD-SCNC: 3.8 MMOL/L (ref 3.5–5.2)
PROT SERPL-MCNC: 6.3 G/DL (ref 6–8.5)
RBC # BLD AUTO: 6.17 10*6/MM3 (ref 4.14–5.8)
SODIUM BLD-SCNC: 139 MMOL/L (ref 136–145)
WBC NRBC COR # BLD: 8.25 10*3/MM3 (ref 3.4–10.8)

## 2019-04-29 PROCEDURE — 83735 ASSAY OF MAGNESIUM: CPT | Performed by: SURGERY

## 2019-04-29 PROCEDURE — 96374 THER/PROPH/DIAG INJ IV PUSH: CPT

## 2019-04-29 PROCEDURE — 63710000001 PROMETHAZINE PER 12.5 MG: Performed by: SURGERY

## 2019-04-29 PROCEDURE — G0378 HOSPITAL OBSERVATION PER HR: HCPCS

## 2019-04-29 PROCEDURE — 84100 ASSAY OF PHOSPHORUS: CPT | Performed by: SURGERY

## 2019-04-29 PROCEDURE — 96376 TX/PRO/DX INJ SAME DRUG ADON: CPT

## 2019-04-29 PROCEDURE — 80053 COMPREHEN METABOLIC PANEL: CPT | Performed by: SURGERY

## 2019-04-29 PROCEDURE — 99219 PR INITIAL OBSERVATION CARE/DAY 50 MINUTES: CPT | Performed by: SURGERY

## 2019-04-29 PROCEDURE — 96372 THER/PROPH/DIAG INJ SC/IM: CPT

## 2019-04-29 PROCEDURE — 85027 COMPLETE CBC AUTOMATED: CPT | Performed by: SURGERY

## 2019-04-29 PROCEDURE — 25010000002 ENOXAPARIN PER 10 MG: Performed by: SURGERY

## 2019-04-29 PROCEDURE — 99024 POSTOP FOLLOW-UP VISIT: CPT | Performed by: SURGERY

## 2019-04-29 PROCEDURE — G0379 DIRECT REFER HOSPITAL OBSERV: HCPCS

## 2019-04-29 PROCEDURE — 96361 HYDRATE IV INFUSION ADD-ON: CPT

## 2019-04-29 RX ORDER — CARVEDILOL 6.25 MG/1
6.25 TABLET ORAL EVERY 12 HOURS SCHEDULED
Status: DISCONTINUED | OUTPATIENT
Start: 2019-04-30 | End: 2019-05-03 | Stop reason: HOSPADM

## 2019-04-29 RX ORDER — MIRTAZAPINE 15 MG/1
15 TABLET, FILM COATED ORAL NIGHTLY
Status: DISCONTINUED | OUTPATIENT
Start: 2019-04-29 | End: 2019-05-03 | Stop reason: HOSPADM

## 2019-04-29 RX ORDER — SODIUM CHLORIDE 0.9 % (FLUSH) 0.9 %
3-10 SYRINGE (ML) INJECTION AS NEEDED
Status: DISCONTINUED | OUTPATIENT
Start: 2019-04-29 | End: 2019-05-03 | Stop reason: HOSPADM

## 2019-04-29 RX ORDER — ACETAMINOPHEN 325 MG/1
650 TABLET ORAL EVERY 4 HOURS PRN
Status: DISCONTINUED | OUTPATIENT
Start: 2019-04-29 | End: 2019-05-03 | Stop reason: HOSPADM

## 2019-04-29 RX ORDER — DULOXETIN HYDROCHLORIDE 60 MG/1
60 CAPSULE, DELAYED RELEASE ORAL NIGHTLY
Status: DISCONTINUED | OUTPATIENT
Start: 2019-04-29 | End: 2019-05-03 | Stop reason: HOSPADM

## 2019-04-29 RX ORDER — SODIUM CHLORIDE 9 MG/ML
100 INJECTION, SOLUTION INTRAVENOUS CONTINUOUS
Status: DISCONTINUED | OUTPATIENT
Start: 2019-04-29 | End: 2019-05-01

## 2019-04-29 RX ORDER — PANTOPRAZOLE SODIUM 40 MG/10ML
40 INJECTION, POWDER, LYOPHILIZED, FOR SOLUTION INTRAVENOUS 2 TIMES DAILY
Status: DISCONTINUED | OUTPATIENT
Start: 2019-04-29 | End: 2019-05-02

## 2019-04-29 RX ORDER — ENALAPRILAT 2.5 MG/2ML
0.62 INJECTION INTRAVENOUS EVERY 6 HOURS PRN
Status: DISCONTINUED | OUTPATIENT
Start: 2019-04-29 | End: 2019-05-03 | Stop reason: HOSPADM

## 2019-04-29 RX ORDER — AMLODIPINE BESYLATE 10 MG/1
10 TABLET ORAL
Status: DISCONTINUED | OUTPATIENT
Start: 2019-04-30 | End: 2019-05-03 | Stop reason: HOSPADM

## 2019-04-29 RX ORDER — PROMETHAZINE HYDROCHLORIDE 12.5 MG/1
12.5 TABLET ORAL EVERY 6 HOURS PRN
Status: DISCONTINUED | OUTPATIENT
Start: 2019-04-29 | End: 2019-05-03 | Stop reason: HOSPADM

## 2019-04-29 RX ORDER — CLONAZEPAM 0.5 MG/1
1 TABLET ORAL 2 TIMES DAILY PRN
Status: DISCONTINUED | OUTPATIENT
Start: 2019-04-29 | End: 2019-05-03 | Stop reason: HOSPADM

## 2019-04-29 RX ORDER — LOSARTAN POTASSIUM 50 MG/1
50 TABLET ORAL
Status: DISCONTINUED | OUTPATIENT
Start: 2019-04-30 | End: 2019-05-03 | Stop reason: HOSPADM

## 2019-04-29 RX ORDER — SODIUM CHLORIDE 0.9 % (FLUSH) 0.9 %
3 SYRINGE (ML) INJECTION EVERY 12 HOURS SCHEDULED
Status: DISCONTINUED | OUTPATIENT
Start: 2019-04-29 | End: 2019-05-03 | Stop reason: HOSPADM

## 2019-04-29 RX ADMIN — DULOXETINE 60 MG: 60 CAPSULE, DELAYED RELEASE ORAL at 22:42

## 2019-04-29 RX ADMIN — ENOXAPARIN SODIUM 40 MG: 40 INJECTION SUBCUTANEOUS at 12:47

## 2019-04-29 RX ADMIN — PANTOPRAZOLE SODIUM 40 MG: 40 INJECTION, POWDER, LYOPHILIZED, FOR SOLUTION INTRAVENOUS at 12:47

## 2019-04-29 RX ADMIN — SODIUM CHLORIDE 100 ML/HR: 9 INJECTION, SOLUTION INTRAVENOUS at 12:43

## 2019-04-29 RX ADMIN — MIRTAZAPINE 15 MG: 15 TABLET, FILM COATED ORAL at 22:42

## 2019-04-29 RX ADMIN — PROMETHAZINE HYDROCHLORIDE 12.5 MG: 12.5 TABLET ORAL at 12:43

## 2019-04-29 RX ADMIN — CLONAZEPAM 1 MG: 0.5 TABLET ORAL at 22:44

## 2019-04-29 RX ADMIN — SODIUM CHLORIDE 1000 ML: 9 INJECTION, SOLUTION INTRAVENOUS at 12:43

## 2019-04-29 RX ADMIN — PANTOPRAZOLE SODIUM 40 MG: 40 INJECTION, POWDER, LYOPHILIZED, FOR SOLUTION INTRAVENOUS at 19:37

## 2019-04-29 NOTE — PATIENT INSTRUCTIONS

## 2019-04-29 NOTE — PROGRESS NOTES
Chief Complaint   Patient presents with   • Follow-up     Recheck abdominal pain.        HPI  Hx of colon resection for diverticulitis in 2018.  Nausea and vomiting prompting hospitalization over the weekend. Non contrasted CT scans were normal. No better- continued nausea and vomiting last night and today. Has only had 2 popsicles. Vomited in the office this am. Low grade fever, chills.    Past Medical History:   Diagnosis Date   • Alcoholism /alcohol abuse (CMS/HCC)    • Anxiety    • Arthritis     shoulders and knees   • Atrial flutter (CMS/HCC)    • Depression    • Diverticulitis    • Esophagitis    • Gastritis    • GERD (gastroesophageal reflux disease)    • Hypertension    • Kidney stone    • Pancreatitis     patient denies   • Sleep apnea     has c-pap       Past Surgical History:   Procedure Laterality Date   • CARDIAC ELECTROPHYSIOLOGY PROCEDURE N/A 5/2/2017    Procedure: Ablation atrial flutter;  Surgeon: Ramirez Reza MD;  Location: [facility] EP INVASIVE LOCATION;  Service:    • CARDIAC ELECTROPHYSIOLOGY PROCEDURE N/A 9/5/2017    Procedure: Ablation atrial fibrillation PVA ;  Surgeon: Ramirez Reza MD;  Location: [facility] EP INVASIVE LOCATION;  Service:    • COLON RESECTION Left 12/20/2018    Procedure: LAPAROSCOPIC SIGMOID  RESECTION, TAKE DOWN OF SPLENIC FLEXURE;  Surgeon: Michel Lala MD;  Location: [facility] OR;  Service: General   • COLONOSCOPY     • COLONOSCOPY N/A 5/30/2018    Procedure: COLONOSCOPY;  Surgeon: Edwin Medina MD;  Location: [facility] ENDOSCOPY;  Service: Gastroenterology   • ENDOSCOPY N/A 3/30/2018    Procedure: ESOPHAGOGASTRODUODENOSCOPY;  Surgeon: Edwin Medina MD;  Location: [facility] ENDOSCOPY;  Service: Gastroenterology   • ENDOSCOPY N/A 5/30/2018    Procedure: ESOPHAGOGASTRODUODENOSCOPY;  Surgeon: Edwni Medina MD;  Location: [facility] ENDOSCOPY;  Service: Gastroenterology   • ENDOSCOPY N/A 12/3/2018    Procedure: ESOPHAGOGASTRODUODENOSCOPY;  Surgeon: Edwin Medina MD;   Location: [facility] ENDOSCOPY;  Service: Gastroenterology   • SHOULDER SURGERY Right 2014   • TONSILLECTOMY  12/2015   • UPPER GASTROINTESTINAL ENDOSCOPY  03/30/2018   • UPPER GASTROINTESTINAL ENDOSCOPY     • UPPER GASTROINTESTINAL ENDOSCOPY  05/30/2018         Current Outpatient Medications:   •  amLODIPine (NORVASC) 10 MG tablet, Take 1 tablet by mouth Daily., Disp: 30 tablet, Rfl: 1  •  aspirin 81 MG EC tablet, Take 81 mg by mouth Daily., Disp: , Rfl:   •  B Complex-C (SUPER B COMPLEX PO), Take 1 tablet by mouth Daily., Disp: , Rfl:   •  calcium carbonate (TUMS) 500 MG chewable tablet, Chew 500 mg 3 (Three) Times a Day As Needed for Indigestion or Heartburn., Disp: , Rfl:   •  carvedilol (COREG) 6.25 MG tablet, Take 6.25 mg by mouth Daily., Disp: , Rfl:   •  clonazePAM (KlonoPIN) 1 MG tablet, Take 1 mg by mouth 2 (Two) Times a Day As Needed for Anxiety., Disp: , Rfl:   •  DULoxetine (CYMBALTA) 60 MG capsule, Take 60 mg by mouth Every Night., Disp: , Rfl: 2  •  losartan (COZAAR) 50 MG tablet, Take 1 tablet by mouth Daily., Disp: 30 tablet, Rfl: 1  •  magnesium oxide (MAGOX) 400 (241.3 MG) MG tablet tablet, Take 400 mg by mouth 2 (Two) Times a Day., Disp: , Rfl:   •  mirtazapine (REMERON) 15 MG tablet, Take 15 mg by mouth Every Night., Disp: , Rfl:   •  ondansetron (ZOFRAN) 4 MG tablet, Take 1 tablet by mouth Every 6 (Six) Hours As Needed for Nausea or Vomiting., Disp: 30 tablet, Rfl: 0  •  pantoprazole (PROTONIX) 40 MG EC tablet, Take 1 tablet by mouth 2 (Two) Times a Day., Disp: 60 tablet, Rfl: 1  •  Probiotic Product (PROBIOTIC-10 PO), Take 1 tablet by mouth Daily., Disp: , Rfl:   •  promethazine (PHENERGAN) 25 MG suppository, Insert 1 suppository into the rectum Every 6 (Six) Hours As Needed for Nausea or Vomiting., Disp: 15 suppository, Rfl: 0  •  promethazine (PHENERGAN) 25 MG tablet, Take 25 mg by mouth Every 6 (Six) Hours As Needed for Nausea or Vomiting., Disp: , Rfl:   No current facility-administered  "medications for this visit.     Allergies   Allergen Reactions   • Contrast Dye Anaphylaxis     ANAPHYLAXIS   • Iodinated Diagnostic Agents Anaphylaxis   • Morphine Nausea And Vomiting   • Zofran [Ondansetron Hcl] Other (See Comments)     IV gives hiccups       Family History   Problem Relation Age of Onset   • Heart disease Father    • Depression Father    • Cancer Maternal Grandmother         lung   • Cancer Paternal Grandmother         pancreatic   • Heart disease Paternal Grandfather        Social History     Socioeconomic History   • Marital status:      Spouse name: Not on file   • Number of children: Not on file   • Years of education: Not on file   • Highest education level: Not on file   Tobacco Use   • Smoking status: Never Smoker   • Smokeless tobacco: Never Used   Substance and Sexual Activity   • Alcohol use: No     Comment: quit 3/17/18   • Drug use: Yes     Frequency: 7.0 times per week     Types: Marijuana     Comment: states he uses \"a little\" every day   • Sexual activity: Defer   Social History Narrative    Pt states has not had drink of any alcohol in 3 weeks as of 4/5/2018       Review of Systems   Constitutional: Negative for activity change, appetite change, chills and fever.   HENT: Negative for hearing loss, nosebleeds and trouble swallowing.    Cardiovascular: Negative for chest pain, palpitations and leg swelling.   Gastrointestinal: Positive for constipation, nausea and vomiting. Negative for abdominal distention, abdominal pain, anal bleeding, blood in stool, diarrhea and rectal pain.   Endocrine: Negative for cold intolerance, heat intolerance, polydipsia and polyuria.   Genitourinary: Negative for decreased urine volume, difficulty urinating, dysuria, enuresis, frequency, hematuria and urgency.   Musculoskeletal: Negative for arthralgias, back pain, gait problem, myalgias and neck pain.   Skin: Negative for pallor, rash and wound.   Allergic/Immunologic: Negative for " immunocompromised state.   Neurological: Positive for dizziness. Negative for seizures, weakness, light-headedness, numbness and headaches.   Psychiatric/Behavioral: Negative for agitation and behavioral problems. The patient is not nervous/anxious.        Physical Exam   Constitutional: He is oriented to person, place, and time. He appears well-developed and well-nourished.   HENT:   Head: Normocephalic and atraumatic.   Nose: Nose normal.   Eyes: Conjunctivae and EOM are normal. Right eye exhibits no discharge. Left eye exhibits no discharge.   Neck: Trachea normal, normal range of motion and phonation normal. Neck supple. No JVD present. No tracheal deviation and no edema present. No thyromegaly present.   Cardiovascular: Normal rate, regular rhythm and normal heart sounds. Exam reveals no gallop and no friction rub.   No murmur heard.  Pulmonary/Chest: Effort normal and breath sounds normal. No accessory muscle usage. No respiratory distress. He has no decreased breath sounds. He has no wheezes. He has no rales. He exhibits no tenderness.   Abdominal: Soft. Bowel sounds are normal. He exhibits no distension, no fluid wave, no ascites, no pulsatile midline mass and no mass. There is no tenderness. There is no rebound and no guarding. No hernia.   Musculoskeletal: Normal range of motion. He exhibits no edema, tenderness or deformity.   Lymphadenopathy:     He has no cervical adenopathy.        Left: No supraclavicular adenopathy present.   Neurological: He is alert and oriented to person, place, and time. He has normal strength. No cranial nerve deficit.   Skin: Skin is warm and dry. No rash noted. He is not diaphoretic. No erythema. No pallor.   Psychiatric: He has a normal mood and affect. His speech is normal and behavior is normal. Judgment and thought content normal. Cognition and memory are normal.   Vitals reviewed.  Labs yesterday:      Ref Range & Units 1d ago  WBC 3.40 - 10.80 10*3/mm3 13.29 Abnormally  high RBC 4.14 - 5.80 10*6/mm3 6.84 Abnormally high Hemoglobin 13.0 - 17.7 g/dL 19.3 Abnormally high Hematocrit 37.5 - 51.0 % 58.5 Abnormally high MCV 79.0 - 97.0 fL 85.5 MCH 26.6 - 33.0 pg 28.2 MCHC 31.5 - 35.7 g/dL 33.0 RDW 12.3 - 15.4 % 15.2 RDW-SD 37.0 - 54.0 fl 43.8 MPV 6.0 - 12.0 fL 9.7 Platelets 140 - 450 10*3/mm3 374 Neutrophil % 42.7 - 76.0 % 75.2 Lymphocyte % 19.6 - 45.3 % 15.6 Abnormally low Monocyte % 5.0 - 12.0 % 8.0 Eosinophil % 0.3 - 6.2 % 0.2 Abnormally low Basophil % 0.0 - 1.5 % 0.4 Immature Grans % 0.0 - 0.5 % 0.6 Abnormally high Neutrophils, Absolute 1.70 - 7.00 10*3/mm3 10.00 Abnormally high Lymphocytes, Absolute 0.70 - 3.10 10*3/mm3 2.07 Monocytes, Absolute 0.10 - 0.90 10*3/mm3 1.06 Abnormally high Eosinophils, Absolute 0.00 - 0.40 10*3/mm3 0.03 Basophils, Absolute 0.00 - 0.20 10*3/mm3 0.05 Immature Grans, Absolute 0.00 - 0.05 10*3/mm3 0.08 Abnormally high nRBC 0.0 - 0.2 /100 WBC 0.0     ASSESSMENT    Stevenson was seen today for follow-up.    Diagnoses and all orders for this visit:    Dehydration    Epigastric pain        PLAN    1. Hospital admission today for IVF, NPO, symptomatic treatment of nausea/vomiting              This document has been electronically signed by Michel Lala MD on April 29, 2019 10:27 AM

## 2019-04-30 ENCOUNTER — APPOINTMENT (OUTPATIENT)
Dept: ONCOLOGY | Facility: HOSPITAL | Age: 44
End: 2019-04-30

## 2019-04-30 LAB
ANION GAP SERPL CALCULATED.3IONS-SCNC: 8 MMOL/L
BACTERIA SPEC AEROBE CULT: NORMAL
BACTERIA SPEC AEROBE CULT: NORMAL
BUN BLD-MCNC: 19 MG/DL (ref 6–20)
BUN/CREAT SERPL: 14.8 (ref 7–25)
CALCIUM SPEC-SCNC: 8.3 MG/DL (ref 8.6–10.5)
CHLORIDE SERPL-SCNC: 101 MMOL/L (ref 98–107)
CO2 SERPL-SCNC: 25 MMOL/L (ref 22–29)
CREAT BLD-MCNC: 1.28 MG/DL (ref 0.76–1.27)
DEPRECATED RDW RBC AUTO: 43 FL (ref 37–54)
ERYTHROCYTE [DISTWIDTH] IN BLOOD BY AUTOMATED COUNT: 14 % (ref 12.3–15.4)
GFR SERPL CREATININE-BSD FRML MDRD: 62 ML/MIN/1.73
GLUCOSE BLD-MCNC: 78 MG/DL (ref 65–99)
HCT VFR BLD AUTO: 45.4 % (ref 37.5–51)
HGB BLD-MCNC: 15 G/DL (ref 13–17.7)
MAGNESIUM SERPL-MCNC: 2 MG/DL (ref 1.6–2.6)
MCH RBC QN AUTO: 28.2 PG (ref 26.6–33)
MCHC RBC AUTO-ENTMCNC: 33 G/DL (ref 31.5–35.7)
MCV RBC AUTO: 85.5 FL (ref 79–97)
PHOSPHATE SERPL-MCNC: 3.2 MG/DL (ref 2.5–4.5)
PLATELET # BLD AUTO: 242 10*3/MM3 (ref 140–450)
PMV BLD AUTO: 10.1 FL (ref 6–12)
POTASSIUM BLD-SCNC: 3.3 MMOL/L (ref 3.5–5.2)
RBC # BLD AUTO: 5.31 10*6/MM3 (ref 4.14–5.8)
SODIUM BLD-SCNC: 134 MMOL/L (ref 136–145)
WBC NRBC COR # BLD: 6.67 10*3/MM3 (ref 3.4–10.8)

## 2019-04-30 PROCEDURE — 80048 BASIC METABOLIC PNL TOTAL CA: CPT | Performed by: SURGERY

## 2019-04-30 PROCEDURE — 96361 HYDRATE IV INFUSION ADD-ON: CPT

## 2019-04-30 PROCEDURE — 96376 TX/PRO/DX INJ SAME DRUG ADON: CPT

## 2019-04-30 PROCEDURE — 96372 THER/PROPH/DIAG INJ SC/IM: CPT

## 2019-04-30 PROCEDURE — 25010000002 ENOXAPARIN PER 10 MG: Performed by: SURGERY

## 2019-04-30 PROCEDURE — G0378 HOSPITAL OBSERVATION PER HR: HCPCS

## 2019-04-30 PROCEDURE — 83735 ASSAY OF MAGNESIUM: CPT | Performed by: SURGERY

## 2019-04-30 PROCEDURE — 84100 ASSAY OF PHOSPHORUS: CPT | Performed by: SURGERY

## 2019-04-30 PROCEDURE — 85027 COMPLETE CBC AUTOMATED: CPT | Performed by: SURGERY

## 2019-04-30 RX ADMIN — ENOXAPARIN SODIUM 40 MG: 40 INJECTION SUBCUTANEOUS at 14:05

## 2019-04-30 RX ADMIN — SODIUM CHLORIDE, PRESERVATIVE FREE 3 ML: 5 INJECTION INTRAVENOUS at 20:44

## 2019-04-30 RX ADMIN — CARVEDILOL 6.25 MG: 6.25 TABLET, FILM COATED ORAL at 08:45

## 2019-04-30 RX ADMIN — PANTOPRAZOLE SODIUM 40 MG: 40 INJECTION, POWDER, LYOPHILIZED, FOR SOLUTION INTRAVENOUS at 20:44

## 2019-04-30 RX ADMIN — LOSARTAN POTASSIUM 50 MG: 50 TABLET, FILM COATED ORAL at 08:45

## 2019-04-30 RX ADMIN — MIRTAZAPINE 15 MG: 15 TABLET, FILM COATED ORAL at 20:44

## 2019-04-30 RX ADMIN — DULOXETINE 60 MG: 60 CAPSULE, DELAYED RELEASE ORAL at 20:44

## 2019-04-30 RX ADMIN — SODIUM CHLORIDE 100 ML/HR: 9 INJECTION, SOLUTION INTRAVENOUS at 07:11

## 2019-04-30 RX ADMIN — SODIUM CHLORIDE 100 ML/HR: 9 INJECTION, SOLUTION INTRAVENOUS at 16:41

## 2019-04-30 RX ADMIN — CLONAZEPAM 1 MG: 0.5 TABLET ORAL at 20:44

## 2019-04-30 RX ADMIN — PANTOPRAZOLE SODIUM 40 MG: 40 INJECTION, POWDER, LYOPHILIZED, FOR SOLUTION INTRAVENOUS at 08:45

## 2019-04-30 RX ADMIN — AMLODIPINE BESYLATE 10 MG: 10 TABLET ORAL at 08:45

## 2019-04-30 RX ADMIN — CARVEDILOL 6.25 MG: 6.25 TABLET, FILM COATED ORAL at 20:44

## 2019-05-01 PROCEDURE — 96372 THER/PROPH/DIAG INJ SC/IM: CPT

## 2019-05-01 PROCEDURE — 25010000002 ENOXAPARIN PER 10 MG: Performed by: SURGERY

## 2019-05-01 PROCEDURE — 96376 TX/PRO/DX INJ SAME DRUG ADON: CPT

## 2019-05-01 PROCEDURE — 96361 HYDRATE IV INFUSION ADD-ON: CPT

## 2019-05-01 PROCEDURE — 99224 PR SBSQ OBSERVATION CARE/DAY 15 MINUTES: CPT | Performed by: SURGERY

## 2019-05-01 PROCEDURE — G0378 HOSPITAL OBSERVATION PER HR: HCPCS

## 2019-05-01 RX ORDER — DEXTROSE, SODIUM CHLORIDE, AND POTASSIUM CHLORIDE 5; .45; .15 G/100ML; G/100ML; G/100ML
75 INJECTION INTRAVENOUS CONTINUOUS
Status: DISCONTINUED | OUTPATIENT
Start: 2019-05-01 | End: 2019-05-03 | Stop reason: HOSPADM

## 2019-05-01 RX ADMIN — SODIUM CHLORIDE, PRESERVATIVE FREE 3 ML: 5 INJECTION INTRAVENOUS at 20:48

## 2019-05-01 RX ADMIN — LOSARTAN POTASSIUM 50 MG: 50 TABLET, FILM COATED ORAL at 08:42

## 2019-05-01 RX ADMIN — DULOXETINE 60 MG: 60 CAPSULE, DELAYED RELEASE ORAL at 20:48

## 2019-05-01 RX ADMIN — CLONAZEPAM 1 MG: 0.5 TABLET ORAL at 20:47

## 2019-05-01 RX ADMIN — MIRTAZAPINE 15 MG: 15 TABLET, FILM COATED ORAL at 20:47

## 2019-05-01 RX ADMIN — AMLODIPINE BESYLATE 10 MG: 10 TABLET ORAL at 08:43

## 2019-05-01 RX ADMIN — SODIUM CHLORIDE 100 ML/HR: 9 INJECTION, SOLUTION INTRAVENOUS at 11:56

## 2019-05-01 RX ADMIN — CARVEDILOL 6.25 MG: 6.25 TABLET, FILM COATED ORAL at 20:47

## 2019-05-01 RX ADMIN — CARVEDILOL 6.25 MG: 6.25 TABLET, FILM COATED ORAL at 08:42

## 2019-05-01 RX ADMIN — POTASSIUM CHLORIDE, DEXTROSE MONOHYDRATE AND SODIUM CHLORIDE 75 ML/HR: 150; 5; 450 INJECTION, SOLUTION INTRAVENOUS at 20:48

## 2019-05-01 RX ADMIN — ENOXAPARIN SODIUM 40 MG: 40 INJECTION SUBCUTANEOUS at 14:58

## 2019-05-01 RX ADMIN — PANTOPRAZOLE SODIUM 40 MG: 40 INJECTION, POWDER, LYOPHILIZED, FOR SOLUTION INTRAVENOUS at 20:48

## 2019-05-01 RX ADMIN — PANTOPRAZOLE SODIUM 40 MG: 40 INJECTION, POWDER, LYOPHILIZED, FOR SOLUTION INTRAVENOUS at 08:43

## 2019-05-01 NOTE — PLAN OF CARE
Problem: Nausea/Vomiting (Adult)  Goal: Identify Related Risk Factors and Signs and Symptoms  Outcome: Ongoing (interventions implemented as appropriate)   04/29/19 2305 04/30/19 9916   Nausea/Vomiting (Adult)   Related Risk Factors (Nausea/Vomiting) --  gastric irritation;gastrointestinal dysfunction   Signs and Symptoms (Nausea/Vomiting) report of queasy sensation;report of emesis --        Problem: Pain, Acute (Adult)  Goal: Identify Related Risk Factors and Signs and Symptoms  Outcome: Ongoing (interventions implemented as appropriate)   04/30/19 2247   Pain, Acute (Adult)   Related Risk Factors (Acute Pain) patient perception   Signs and Symptoms (Acute Pain) verbalization of pain descriptors     Goal: Acceptable Pain Control/Comfort Level  Outcome: Ongoing (interventions implemented as appropriate)   04/30/19 2247   Pain, Acute (Adult)   Acceptable Pain Control/Comfort Level achieves outcome       Problem: Patient Care Overview  Goal: Plan of Care Review  Outcome: Ongoing (interventions implemented as appropriate)   04/30/19 2247   Coping/Psychosocial   Plan of Care Reviewed With patient   Plan of Care Review   Progress improving   OTHER   Outcome Summary VSS, tolerating full liquid diet, ambulating well, showered during shift, no c/o pain or n/v.

## 2019-05-01 NOTE — PLAN OF CARE
Problem: Nausea/Vomiting (Adult)  Goal: Identify Related Risk Factors and Signs and Symptoms  Outcome: Ongoing (interventions implemented as appropriate)    Goal: Symptom Relief  Outcome: Ongoing (interventions implemented as appropriate)    Goal: Adequate Hydration  Outcome: Ongoing (interventions implemented as appropriate)      Problem: Pain, Acute (Adult)  Goal: Identify Related Risk Factors and Signs and Symptoms  Outcome: Ongoing (interventions implemented as appropriate)    Goal: Acceptable Pain Control/Comfort Level  Outcome: Ongoing (interventions implemented as appropriate)      Problem: Patient Care Overview  Goal: Plan of Care Review  Outcome: Ongoing (interventions implemented as appropriate)   05/01/19 6765   Coping/Psychosocial   Plan of Care Reviewed With patient   Plan of Care Review   Progress no change   OTHER   Outcome Summary VS stable; cont full liquid diet and ambulation; no N/V or pain     Goal: Individualization and Mutuality  Outcome: Ongoing (interventions implemented as appropriate)    Goal: Discharge Needs Assessment  Outcome: Ongoing (interventions implemented as appropriate)    Goal: Interprofessional Rounds/Family Conf  Outcome: Ongoing (interventions implemented as appropriate)

## 2019-05-01 NOTE — PROGRESS NOTES
Chief Complaint   Patient presents with   • Follow-up       Recheck abdominal pain.      Hx of colon resection for diverticulitis in 2018.  Reports tolerating liquid diet well. Denies any Nausea and vomiting. Denies any pain. Says he had a bowel movement yesterday after eating. No blood in stool. Urine output is good. Still having some gas and bloating but reports that he is able to pass gas.   No bleeding, no nausea or vomiting.     Current Outpatient Medications:   •  amLODIPine (NORVASC) 10 MG tablet, Take 1 tablet by mouth Daily., Disp: 30 tablet, Rfl: 1  •  aspirin 81 MG EC tablet, Take 81 mg by mouth Daily., Disp: , Rfl:   •  B Complex-C (SUPER B COMPLEX PO), Take 1 tablet by mouth Daily., Disp: , Rfl:   •  calcium carbonate (TUMS) 500 MG chewable tablet, Chew 500 mg 3 (Three) Times a Day As Needed for Indigestion or Heartburn., Disp: , Rfl:   •  carvedilol (COREG) 6.25 MG tablet, Take 6.25 mg by mouth Daily., Disp: , Rfl:   •  clonazePAM (KlonoPIN) 1 MG tablet, Take 1 mg by mouth 2 (Two) Times a Day As Needed for Anxiety., Disp: , Rfl:   •  DULoxetine (CYMBALTA) 60 MG capsule, Take 60 mg by mouth Every Night., Disp: , Rfl: 2  •  losartan (COZAAR) 50 MG tablet, Take 1 tablet by mouth Daily., Disp: 30 tablet, Rfl: 1  •  magnesium oxide (MAGOX) 400 (241.3 MG) MG tablet tablet, Take 400 mg by mouth 2 (Two) Times a Day., Disp: , Rfl:   •  mirtazapine (REMERON) 15 MG tablet, Take 15 mg by mouth Every Night., Disp: , Rfl:   •  ondansetron (ZOFRAN) 4 MG tablet, Take 1 tablet by mouth Every 6 (Six) Hours As Needed for Nausea or Vomiting., Disp: 30 tablet, Rfl: 0  •  pantoprazole (PROTONIX) 40 MG EC tablet, Take 1 tablet by mouth 2 (Two) Times a Day., Disp: 60 tablet, Rfl: 1  •  Probiotic Product (PROBIOTIC-10 PO), Take 1 tablet by mouth Daily., Disp: , Rfl:   •  promethazine (PHENERGAN) 25 MG suppository, Insert 1 suppository into the rectum Every 6 (Six) Hours As Needed for Nausea or Vomiting., Disp: 15 suppository,  Rfl: 0  •  promethazine (PHENERGAN) 25 MG tablet, Take 25 mg by mouth Every 6 (Six) Hours As Needed for Nausea or Vomiting., Disp: , Rfl:   No current facility-administered medications for this visit.      Physical Exam   Constitutional: He is oriented to person, place, and time. He appears well-developed and well-nourished.   HENT:   Head: Normocephalic and atraumatic.   Nose: Nose normal.   Eyes: Conjunctivae and EOM are normal. Right eye exhibits no discharge. Left eye exhibits no discharge.   Neck: Trachea normal, normal range of motion and phonation normal. Neck supple. No JVD present. No tracheal deviation and no edema present. No thyromegaly present.   Cardiovascular: Normal rate, regular rhythm and normal heart sounds. Exam reveals no gallop and no friction rub.   No murmur heard.  Pulmonary/Chest: Effort normal and breath sounds normal. No accessory muscle usage. No respiratory distress. He has no decreased breath sounds. He has no wheezes. He has no rales. He exhibits no tenderness.   Abdominal: Soft. Bowel sounds are normal. He exhibits no distension, no fluid wave, no ascites, no pulsatile midline mass and no mass. There is no tenderness. There is no rebound and no guarding. No hernia.   Musculoskeletal: Normal range of motion. He exhibits no edema, tenderness or deformity.   Lymphadenopathy:     He has no cervical adenopathy.        Left: No supraclavicular adenopathy present.   Neurological: He is alert and oriented to person, place, and time. He has normal strength. No cranial nerve deficit.   Skin: Skin is warm and dry. No rash noted. He is not diaphoretic. No erythema. No pallor.   Psychiatric: He has a normal mood and affect. His speech is normal and behavior is normal. Judgment and thought content normal. Cognition and memory are normal.   Vitals reviewed.  Labs yesterday:       LYDIA Gamino was seen today for follow-up.     Diagnoses and all orders for this  visit:     Dehydration     Epigastric pain- improving        PLAN     1. Nausa/vomiting controlled with current therapy. Will continue to monitor.     2. Slow diet advancement      3. Plan for outpatient EGD for Esophagitis

## 2019-05-02 PROCEDURE — 63710000001 PROMETHAZINE PER 12.5 MG: Performed by: SURGERY

## 2019-05-02 PROCEDURE — G0378 HOSPITAL OBSERVATION PER HR: HCPCS

## 2019-05-02 PROCEDURE — 96372 THER/PROPH/DIAG INJ SC/IM: CPT

## 2019-05-02 PROCEDURE — 96361 HYDRATE IV INFUSION ADD-ON: CPT

## 2019-05-02 PROCEDURE — 96376 TX/PRO/DX INJ SAME DRUG ADON: CPT

## 2019-05-02 PROCEDURE — 25010000002 ENOXAPARIN PER 10 MG: Performed by: SURGERY

## 2019-05-02 RX ORDER — CALCIUM CARBONATE 200(500)MG
1 TABLET,CHEWABLE ORAL 3 TIMES DAILY PRN
Status: DISCONTINUED | OUTPATIENT
Start: 2019-05-02 | End: 2019-05-03 | Stop reason: HOSPADM

## 2019-05-02 RX ORDER — PANTOPRAZOLE SODIUM 20 MG/1
20 TABLET, DELAYED RELEASE ORAL 2 TIMES DAILY
Status: DISCONTINUED | OUTPATIENT
Start: 2019-05-02 | End: 2019-05-03 | Stop reason: HOSPADM

## 2019-05-02 RX ADMIN — POTASSIUM CHLORIDE, DEXTROSE MONOHYDRATE AND SODIUM CHLORIDE 75 ML/HR: 150; 5; 450 INJECTION, SOLUTION INTRAVENOUS at 16:06

## 2019-05-02 RX ADMIN — MIRTAZAPINE 15 MG: 15 TABLET, FILM COATED ORAL at 23:04

## 2019-05-02 RX ADMIN — PROMETHAZINE HYDROCHLORIDE 12.5 MG: 12.5 TABLET ORAL at 03:32

## 2019-05-02 RX ADMIN — ENOXAPARIN SODIUM 40 MG: 40 INJECTION SUBCUTANEOUS at 12:19

## 2019-05-02 RX ADMIN — PANTOPRAZOLE SODIUM 40 MG: 40 INJECTION, POWDER, LYOPHILIZED, FOR SOLUTION INTRAVENOUS at 09:15

## 2019-05-02 RX ADMIN — LOSARTAN POTASSIUM 50 MG: 50 TABLET, FILM COATED ORAL at 09:16

## 2019-05-02 RX ADMIN — CLONAZEPAM 1 MG: 0.5 TABLET ORAL at 12:51

## 2019-05-02 RX ADMIN — AMLODIPINE BESYLATE 10 MG: 10 TABLET ORAL at 09:16

## 2019-05-02 RX ADMIN — CALCIUM CARBONATE (ANTACID) CHEW TAB 500 MG 1 TABLET: 500 CHEW TAB at 17:23

## 2019-05-02 RX ADMIN — CARVEDILOL 6.25 MG: 6.25 TABLET, FILM COATED ORAL at 09:16

## 2019-05-02 NOTE — PLAN OF CARE
Problem: Patient Care Overview  Goal: Plan of Care Review  Outcome: Ongoing (interventions implemented as appropriate)   05/02/19 5032   Coping/Psychosocial   Plan of Care Reviewed With caregiver;patient;friend   Plan of Care Review   Progress improving   OTHER   Outcome Summary Pt now on a soft diet with grd meats. Improved GI symptoms. Appetite slowly improving.

## 2019-05-02 NOTE — PROGRESS NOTES
LOS: 0 days   Patient Care Team:  Irma Schuster APRN as PCP - General (Family Medicine)  Jose Banks MD as Consulting Physician (Hematology and Oncology)  Brea Kerns APRN as Nurse Practitioner (Oncology)    Chief Complaint: Recheck abdominal pain    Subjective   Pt reports having stomach pain and loose bowel movement yesterday after changing to soft diet of ice cream. Was able to tolerate crackers afterwards. No nausea or vomiting. Urine output is good.      History of Present Illness   Hx of colon resection for diverticulitis in 2018. Has been having vomiting and nausea for 1 week. Low grade fever, chills during this time as well.     History taken from: patient    Objective     Vital Signs  Temp:  [96.9 °F (36.1 °C)-99.1 °F (37.3 °C)] 97.4 °F (36.3 °C)  Heart Rate:  [67-82] 70  Resp:  [18] 18  BP: (117-159)/(71-90) 134/76     Physical Exam   Constitutional: He is oriented to person, place, and time. He appears well-developed and well-nourished.   HENT:   Head: Normocephalic and atraumatic.   Nose: Nose normal.   Eyes: Conjunctivae and EOM are normal. Right eye exhibits no discharge. Left eye exhibits no discharge.   Neck: Trachea normal, normal range of motion and phonation normal. Neck supple. No JVD present. No tracheal deviation and no edema present. No thyromegaly present.   Cardiovascular: Normal rate, regular rhythm and normal heart sounds. Exam reveals no gallop and no friction rub.   No murmur heard.  Pulmonary/Chest: Effort normal and breath sounds normal. No accessory muscle usage. No respiratory distress. He has no decreased breath sounds. He has no wheezes. He has no rales. He exhibits no tenderness.   Abdominal: Soft. Bowel sounds are normal. He exhibits no distension, no fluid wave, no ascites, no pulsatile midline mass and no mass. There is no tenderness. There is no rebound and no guarding. No hernia.   Musculoskeletal: Normal range of motion. He exhibits  no edema, tenderness or deformity.   Lymphadenopathy:     He has no cervical adenopathy.        Left: No supraclavicular adenopathy present.   Neurological: He is alert and oriented to person, place, and time. He has normal strength. No cranial nerve deficit.   Skin: Skin is warm and dry. No rash noted. He is not diaphoretic. No erythema. No pallor.   Psychiatric: He has a normal mood and affect. His speech is normal and behavior is normal. Judgment and thought content normal. Cognition and memory are normal.     Results Review:     I reviewed the patient's other test results and agree with the interpretation    Medication Review:    amLODIPine (NORVASC) 10 MG tablet, Take 1 tablet by mouth Daily., Disp: 30 tablet, Rfl: 1  •  aspirin 81 MG EC tablet, Take 81 mg by mouth Daily., Disp: , Rfl:   •  B Complex-C (SUPER B COMPLEX PO), Take 1 tablet by mouth Daily., Disp: , Rfl:   •  calcium carbonate (TUMS) 500 MG chewable tablet, Chew 500 mg 3 (Three) Times a Day As Needed for Indigestion or Heartburn., Disp: , Rfl:   •  carvedilol (COREG) 6.25 MG tablet, Take 6.25 mg by mouth Daily., Disp: , Rfl:   •  clonazePAM (KlonoPIN) 1 MG tablet, Take 1 mg by mouth 2 (Two) Times a Day As Needed for Anxiety., Disp: , Rfl:   •  DULoxetine (CYMBALTA) 60 MG capsule, Take 60 mg by mouth Every Night., Disp: , Rfl: 2  •  losartan (COZAAR) 50 MG tablet, Take 1 tablet by mouth Daily., Disp: 30 tablet, Rfl: 1  •  magnesium oxide (MAGOX) 400 (241.3 MG) MG tablet tablet, Take 400 mg by mouth 2 (Two) Times a Day., Disp: , Rfl:   •  mirtazapine (REMERON) 15 MG tablet, Take 15 mg by mouth Every Night., Disp: , Rfl:   •  ondansetron (ZOFRAN) 4 MG tablet, Take 1 tablet by mouth Every 6 (Six) Hours As Needed for Nausea or Vomiting., Disp: 30 tablet, Rfl: 0  •  pantoprazole (PROTONIX) 40 MG EC tablet, Take 1 tablet by mouth 2 (Two) Times a Day., Disp: 60 tablet, Rfl: 1  •  Probiotic Product (PROBIOTIC-10 PO), Take 1 tablet by mouth Daily., Disp: ,  Rfl:   •  promethazine (PHENERGAN) 25 MG suppository, Insert 1 suppository into the rectum Every 6 (Six) Hours As Needed for Nausea or Vomiting., Disp: 15 suppository, Rfl: 0  •  promethazine (PHENERGAN) 25 MG tablet, Take 25 mg by mouth Every 6 (Six) Hours As Needed for Nausea or Vomiting., Disp: , Rfl:   No current facility-administered medications for this visit.     Assessment/Plan       Dehydration      Assessment & Plan   Stevenson was seen today for follow-up.     Diagnoses and all orders for this visit:     Dehydration     Epigastric pain- improving        PLAN     1. Nausa/vomiting controlled with current therapy. Will continue to monitor.      2. Slow diet advancement      3. Plan for outpatient EGD for Esophagitis    4. Will plan to discharge today    Xu Livingston  05/02/19  5:49 AM    Time: Discharge 2 min

## 2019-05-02 NOTE — CONSULTS
Adult Nutrition  Assessment    Patient Name:  Stevenson Chilel  YOB: 1975  MRN: [id number]  Admit Date:  4/29/2019    Assessment Date:  5/2/2019    Comments:  Pt now on soft diet with grd meats.  GI symptoms improved and his appetite is slowly improving.  Declines supplements.  RD encouraged healthy eating and 3 meals a day.  Pt voided understanding.  Rd will monitor      Reason for Assessment     Row Name 05/02/19 1620          Reason for Assessment    Reason For Assessment  follow-up protocol         Nutrition/Diet History     Row Name 05/02/19 1621          Nutrition/Diet History    Typical Food/Fluid Intake  Pt claims that he is doing better with his food.  Slowly feeling better.  This is a reoccuring problem.  N&V better.  Appetite slowly returning.  He reports that he eats very healthy.             Labs/Tests/Procedures/Meds     Row Name 05/02/19 1622          Labs/Procedures/Meds    Lab Results Reviewed  reviewed, pertinent        Diagnostic Tests/Procedures    Diagnostic Test/Procedure Reviewed  reviewed, pertinent        Medications    Pertinent Medications Reviewed  reviewed, pertinent         Physical Findings     Row Name 05/02/19 1622          Physical Findings    Gastrointestinal  -- resolved at this time           Nutrition Prescription Ordered     Row Name 05/02/19 1622          Nutrition Prescription PO    Current PO Diet  Soft Texture     Texture  Ground     Fluid Consistency  Thin         Evaluation of Received Nutrient/Fluid Intake     Row Name 05/02/19 1623          PO Evaluation    Number of Days PO Intake Evaluated  Insufficient Data     Number of Meals  2     % PO Intake  50/50               Electronically signed by:  Sushma Best RD  05/02/19 4:26 PM

## 2019-05-02 NOTE — PLAN OF CARE
Problem: Nausea/Vomiting (Adult)  Goal: Identify Related Risk Factors and Signs and Symptoms  Outcome: Ongoing (interventions implemented as appropriate)    Goal: Symptom Relief  Outcome: Ongoing (interventions implemented as appropriate)    Goal: Adequate Hydration  Outcome: Ongoing (interventions implemented as appropriate)      Problem: Pain, Acute (Adult)  Goal: Identify Related Risk Factors and Signs and Symptoms  Outcome: Ongoing (interventions implemented as appropriate)    Goal: Acceptable Pain Control/Comfort Level  Outcome: Ongoing (interventions implemented as appropriate)      Problem: Patient Care Overview  Goal: Plan of Care Review  Outcome: Ongoing (interventions implemented as appropriate)   05/02/19 0037   Coping/Psychosocial   Plan of Care Reviewed With patient   Plan of Care Review   Progress no change   OTHER   Outcome Summary Patient VSS, advanced to soft diet, tolerating well, no nausea/vomiting/pain, ambulating, continue to monitor     Goal: Individualization and Mutuality  Outcome: Ongoing (interventions implemented as appropriate)    Goal: Discharge Needs Assessment  Outcome: Ongoing (interventions implemented as appropriate)

## 2019-05-02 NOTE — PLAN OF CARE
Problem: Nausea/Vomiting (Adult)  Goal: Identify Related Risk Factors and Signs and Symptoms  Outcome: Outcome(s) achieved Date Met: 05/02/19    Goal: Symptom Relief  Outcome: Ongoing (interventions implemented as appropriate)    Goal: Adequate Hydration  Outcome: Ongoing (interventions implemented as appropriate)      Problem: Pain, Acute (Adult)  Goal: Identify Related Risk Factors and Signs and Symptoms  Outcome: Outcome(s) achieved Date Met: 05/02/19    Goal: Acceptable Pain Control/Comfort Level  Outcome: Ongoing (interventions implemented as appropriate)      Problem: Patient Care Overview  Goal: Plan of Care Review  Outcome: Ongoing (interventions implemented as appropriate)   05/02/19 1000   Coping/Psychosocial   Plan of Care Reviewed With patient   Plan of Care Review   Progress no change   OTHER   Outcome Summary pt states having diarhhea last night. Ambulating in halls. no other changes; will continue to monitor.     Goal: Individualization and Mutuality  Outcome: Ongoing (interventions implemented as appropriate)    Goal: Discharge Needs Assessment  Outcome: Ongoing (interventions implemented as appropriate)    Goal: Interprofessional Rounds/Family Conf  Outcome: Ongoing (interventions implemented as appropriate)

## 2019-05-03 VITALS
TEMPERATURE: 97.2 F | WEIGHT: 212.7 LBS | DIASTOLIC BLOOD PRESSURE: 64 MMHG | RESPIRATION RATE: 18 BRPM | HEIGHT: 72 IN | HEART RATE: 74 BPM | SYSTOLIC BLOOD PRESSURE: 122 MMHG | OXYGEN SATURATION: 95 % | BODY MASS INDEX: 28.81 KG/M2

## 2019-05-03 PROBLEM — E86.0 DEHYDRATION: Status: RESOLVED | Noted: 2019-04-29 | Resolved: 2019-05-03

## 2019-05-03 PROCEDURE — 99217 PR OBSERVATION CARE DISCHARGE MANAGEMENT: CPT | Performed by: SURGERY

## 2019-05-03 PROCEDURE — 96361 HYDRATE IV INFUSION ADD-ON: CPT

## 2019-05-03 PROCEDURE — G0378 HOSPITAL OBSERVATION PER HR: HCPCS

## 2019-05-03 RX ADMIN — DULOXETINE 60 MG: 60 CAPSULE, DELAYED RELEASE ORAL at 01:32

## 2019-05-03 RX ADMIN — AMLODIPINE BESYLATE 10 MG: 10 TABLET ORAL at 09:21

## 2019-05-03 RX ADMIN — LOSARTAN POTASSIUM 50 MG: 50 TABLET, FILM COATED ORAL at 09:21

## 2019-05-03 RX ADMIN — PANTOPRAZOLE SODIUM 20 MG: 20 TABLET, DELAYED RELEASE ORAL at 09:21

## 2019-05-03 RX ADMIN — PANTOPRAZOLE SODIUM 20 MG: 20 TABLET, DELAYED RELEASE ORAL at 01:32

## 2019-05-03 RX ADMIN — CARVEDILOL 6.25 MG: 6.25 TABLET, FILM COATED ORAL at 09:21

## 2019-05-03 RX ADMIN — POTASSIUM CHLORIDE, DEXTROSE MONOHYDRATE AND SODIUM CHLORIDE 75 ML/HR: 150; 5; 450 INJECTION, SOLUTION INTRAVENOUS at 06:27

## 2019-05-03 RX ADMIN — CARVEDILOL 6.25 MG: 6.25 TABLET, FILM COATED ORAL at 01:32

## 2019-05-03 NOTE — PLAN OF CARE
Problem: Nausea/Vomiting (Adult)  Goal: Symptom Relief  Outcome: Ongoing (interventions implemented as appropriate)    Goal: Adequate Hydration  Outcome: Ongoing (interventions implemented as appropriate)      Problem: Pain, Acute (Adult)  Goal: Acceptable Pain Control/Comfort Level  Outcome: Ongoing (interventions implemented as appropriate)      Problem: Patient Care Overview  Goal: Plan of Care Review  Outcome: Ongoing (interventions implemented as appropriate)   05/03/19 0255   Coping/Psychosocial   Plan of Care Reviewed With patient   Plan of Care Review   Progress improving   OTHER   Outcome Summary Patient reports no nausea or vomiting. Discussed health diet options when disharged.      Goal: Individualization and Mutuality  Outcome: Ongoing (interventions implemented as appropriate)    Goal: Discharge Needs Assessment  Outcome: Ongoing (interventions implemented as appropriate)

## 2019-05-03 NOTE — DISCHARGE SUMMARY
Discharge Summary  Date: 05/03/19  Service: General Surgery  Attending: Michel Lala MD    Procedures: None  Consults: None  Discharge Diagnoses: Dehydration secondary to gastroenteritis  Secondary Diagnosis: Hypertension  Reason for hospitalization: Ability to eat  Significant Findings: No evidence of intestinal obstruction  Patient Condition on Discharge: Improved  Hospital Course: The patient was admitted to the hospital with dehydration.  He had marked elevation in his hemoglobin concentration as well as an elevation in his creatinine.  His prerenal azotemia was treated with IV fluid.  His intestinal symptoms improved significantly over the weekend and he was finally able to tolerate a regular diet prior to discharge.  He has had no recurrent symptoms  The following discharge instructions were provided to the patient: Regular diet; activity ad jazmín.  Discharge Medications:     Your medication list      CONTINUE taking these medications      Instructions Last Dose Given Next Dose Due   amLODIPine 10 MG tablet  Commonly known as:  NORVASC      Take 1 tablet by mouth Daily.       aspirin 81 MG EC tablet      Take 81 mg by mouth Daily.       calcium carbonate 500 MG chewable tablet  Commonly known as:  TUMS      Chew 500 mg 3 (Three) Times a Day As Needed for Indigestion or Heartburn.       carvedilol 6.25 MG tablet  Commonly known as:  COREG      Take 6.25 mg by mouth Daily.       clonazePAM 1 MG tablet  Commonly known as:  KlonoPIN      Take 1 mg by mouth 2 (Two) Times a Day As Needed for Anxiety.       DULoxetine 60 MG capsule  Commonly known as:  CYMBALTA      Take 60 mg by mouth Every Night.       losartan 50 MG tablet  Commonly known as:  COZAAR      Take 1 tablet by mouth Daily.       magnesium oxide 400 (241.3 Mg) MG tablet tablet  Commonly known as:  MAGOX      Take 400 mg by mouth 2 (Two) Times a Day.       mirtazapine 15 MG tablet  Commonly known as:  REMERON      Take 15 mg by mouth Every Night.        ondansetron 4 MG tablet  Commonly known as:  ZOFRAN      Take 1 tablet by mouth Every 6 (Six) Hours As Needed for Nausea or Vomiting.       pantoprazole 40 MG EC tablet  Commonly known as:  PROTONIX      Take 1 tablet by mouth 2 (Two) Times a Day.       PROBIOTIC-10 PO      Take 1 tablet by mouth Daily.       promethazine 25 MG tablet  Commonly known as:  PHENERGAN      Take 25 mg by mouth Every 6 (Six) Hours As Needed for Nausea or Vomiting.       promethazine 25 MG suppository  Commonly known as:  PHENERGAN      Insert 1 suppository into the rectum Every 6 (Six) Hours As Needed for Nausea or Vomiting.       SUPER B COMPLEX PO      Take 1 tablet by mouth Daily.              Your Scheduled Appointments    Aug 06, 2019 10:00 AM CDT  Follow Up with Doug Lezama MD  [facility] (--) [address] [place] [address]  [place] [address] [phone]   Arrive 15 minutes prior to appointment.   Aug 13, 2019 10:45 AM EDT  Follow Up with Ramirez Reza MD  [facility] CARDIOLOGY (--) [address] [place] [id number]  [phone]   Arrive 15 minutes prior to appointment.                This document has been electronically signed by Michel Lala MD on May 3, 2019 8:34 AM

## 2019-05-03 NOTE — PLAN OF CARE
Problem: Nausea/Vomiting (Adult)  Goal: Symptom Relief  Outcome: Ongoing (interventions implemented as appropriate)    Goal: Adequate Hydration  Outcome: Ongoing (interventions implemented as appropriate)      Problem: Pain, Acute (Adult)  Goal: Acceptable Pain Control/Comfort Level  Outcome: Ongoing (interventions implemented as appropriate)      Problem: Patient Care Overview  Goal: Plan of Care Review  Outcome: Ongoing (interventions implemented as appropriate)   05/03/19 0730   Coping/Psychosocial   Plan of Care Reviewed With patient   Plan of Care Review   Progress improving   OTHER   Outcome Summary no acute changes; will continue to monitor.     Goal: Individualization and Mutuality  Outcome: Ongoing (interventions implemented as appropriate)    Goal: Discharge Needs Assessment  Outcome: Ongoing (interventions implemented as appropriate)    Goal: Interprofessional Rounds/Family Conf  Outcome: Ongoing (interventions implemented as appropriate)

## 2019-05-03 NOTE — PROGRESS NOTES
"   LOS: 0 days   Patient Care Team:  Irma Schuster APRN as PCP - General (Family Medicine)  Jose Banks MD as Consulting Physician (Hematology and Oncology)  Brea Kerns APRN as Nurse Practitioner (Oncology)    Chief Complaint: Recheck abdominal pain and dehydration    Subjective     History of Present Illness    Subjective:  Symptoms:  Resolved.  No shortness of breath, malaise, cough, chest pain, weakness, headache, chest pressure, anorexia, diarrhea or anxiety.    Diet:  Adequate intake.  No nausea or vomiting.    Activity level: Normal.    Pain:  He reports no pain.        History taken from: patient    Objective     Vital Signs  Temp:  [96.1 °F (35.6 °C)-97.4 °F (36.3 °C)] 97.3 °F (36.3 °C)  Heart Rate:  [70-79] 70  Resp:  [16-18] 18  BP: (119-139)/(66-77) 126/66    Objective:  General Appearance:  Comfortable and in no acute distress.    Vital signs: (most recent): Blood pressure 126/66, pulse 70, temperature 97.3 °F (36.3 °C), temperature source Oral, resp. rate 18, height 182.9 cm (72\"), weight 97.3 kg (214 lb 6.4 oz), SpO2 92 %.  Vital signs are normal.  No fever.    Output: Producing urine and producing stool.    Lungs:  Normal effort and normal respiratory rate.    Heart: Normal rate.  Regular rhythm.  S1 normal and S2 normal.  No murmur, gallop or friction rub.   Abdomen: Abdomen is soft and non-distended.  There are no signs of ascites.  Bowel sounds are normal.   There is no abdominal tenderness.     Extremities: Normal range of motion.  There is no dependent edema.    Pulses: Distal pulses are intact.    Neurological: Patient is alert and oriented to person, place and time.    Skin:  Warm and dry.              Results Review:     I reviewed the patient's new clinical results.    Medication Review: Done    Assessment/Plan       Dehydration      Assessment:    Condition: In stable condition.   (Symptoms controlled with current therapy. ).     Plan:   Discharge home.  Regular diet.  " Resume home regimen.         Xu Livingston  05/03/19  5:26 AM    Time: 5 minutes

## 2019-05-04 ENCOUNTER — READMISSION MANAGEMENT (OUTPATIENT)
Dept: CALL CENTER | Facility: HOSPITAL | Age: 44
End: 2019-05-04

## 2019-05-04 NOTE — OUTREACH NOTE
Prep Survey      Responses   Facility patient discharged from?  [facility]   Is patient eligible?  Yes   Discharge diagnosis  Dehydration d/t gastroeneteritis,HTN   Does the patient have one of the following disease processes/diagnoses(primary or secondary)?  Other   Does the patient have Home health ordered?  No   Is there a DME ordered?  No   Comments regarding appointments  See AVS   Prep survey completed?  Yes          Yudith Sunshine RN

## 2019-05-07 ENCOUNTER — READMISSION MANAGEMENT (OUTPATIENT)
Dept: CALL CENTER | Facility: HOSPITAL | Age: 44
End: 2019-05-07

## 2019-05-07 NOTE — OUTREACH NOTE
Medical Week 1 Survey      Responses   Facility patient discharged from?  [facility]   Does the patient have one of the following disease processes/diagnoses(primary or secondary)?  Other   Is there a successful TCM telephone encounter documented?  No   Week 1 attempt successful?  Yes   Call start time  1200   Call end time  1205   Discharge diagnosis  Dehydration d/t gastroeneteritis,HTN   Is patient permission given to speak with other caregiver?  Yes   Meds reviewed with patient/caregiver?  Yes   Is the patient having any side effects they believe may be caused by any medication additions or changes?  No   Does the patient have all medications ordered at discharge?  Yes   Is the patient taking all medications as directed (includes completed medication regime)?  Yes   Does the patient have a primary care provider?   Yes   Does the patient have an appointment with their PCP within 7 days of discharge?  Yes   Comments regarding PCP  Dr. Lala   Has the patient kept scheduled appointments due by today?  N/A   Has home health visited the patient within 72 hours of discharge?  N/A   Psychosocial issues?  No   Did the patient receive a copy of their discharge instructions?  Yes   Nursing interventions  Reviewed instructions with patient   What is the patient's perception of their health status since discharge?  Improving   Is the patient/caregiver able to teach back signs and symptoms related to disease process for when to call PCP?  Yes   Is the patient/caregiver able to teach back signs and symptoms related to disease process for when to call 911?  Yes   Is the patient/caregiver able to teach back the hierarchy of who to call/visit for symptoms/problems? PCP, Specialist, Home health nurse, Urgent Care, ED, 911  Yes   Week 1 call completed?  Yes   Graduated  Yes   Graduated/Revoked comments  States he is doing well. Denies any needs. All goals met. States he will call if he needs assistance.   Wrap up additional  comments  States first admit wasn't very good. States they didn't follow the usual protocol they treat him with but states this admit was much better.           Genia Castro RN

## 2019-05-10 ENCOUNTER — OFFICE VISIT (OUTPATIENT)
Dept: SURGERY | Facility: CLINIC | Age: 44
End: 2019-05-10

## 2019-05-10 VITALS
BODY MASS INDEX: 29.8 KG/M2 | SYSTOLIC BLOOD PRESSURE: 124 MMHG | HEIGHT: 72 IN | TEMPERATURE: 97.3 F | WEIGHT: 220 LBS | HEART RATE: 74 BPM | DIASTOLIC BLOOD PRESSURE: 80 MMHG

## 2019-05-10 DIAGNOSIS — Z90.49 S/P PARTIAL RESECTION OF COLON: Primary | ICD-10-CM

## 2019-05-10 PROBLEM — R11.2 INTRACTABLE NAUSEA AND VOMITING: Status: RESOLVED | Noted: 2019-03-22 | Resolved: 2019-05-10

## 2019-05-10 PROCEDURE — 99212 OFFICE O/P EST SF 10 MIN: CPT | Performed by: SURGERY

## 2019-05-10 NOTE — PROGRESS NOTES
Chief Complaint   Patient presents with   • Follow-up     Recheck abdominal pain.        HPI  Afebrile. No further abdominal pain. Good bowel function.  Past Medical History:   Diagnosis Date   • Alcoholism /alcohol abuse (CMS/HCC)    • Anxiety    • Arthritis     shoulders and knees   • Atrial flutter (CMS/HCC)    • Depression    • Diverticulitis    • Esophagitis    • Gastritis    • GERD (gastroesophageal reflux disease)    • Hypertension    • Kidney stone    • Pancreatitis     patient denies   • Sleep apnea     has c-pap       Past Surgical History:   Procedure Laterality Date   • CARDIAC ELECTROPHYSIOLOGY PROCEDURE N/A 5/2/2017    Procedure: Ablation atrial flutter;  Surgeon: Ramirez Reza MD;  [facility]:  [facility];  Service:    • CARDIAC ELECTROPHYSIOLOGY PROCEDURE N/A 9/5/2017    Procedure: Ablation atrial fibrillation PVA ;  Surgeon: Ramirez Reza MD;  Location:  [facility];  Service:    • COLON RESECTION Left 12/20/2018    Procedure: LAPAROSCOPIC SIGMOID  RESECTION, TAKE DOWN OF SPLENIC FLEXURE;  Surgeon: Michel Lala MD;  Location: [facility] OR;  Service: General   • COLONOSCOPY     • COLONOSCOPY N/A 5/30/2018    Procedure: COLONOSCOPY;  Surgeon: Edwin Medina MD;  Location: [facility] ENDOSCOPY;  Service: Gastroenterology   • ENDOSCOPY N/A 3/30/2018    Procedure: ESOPHAGOGASTRODUODENOSCOPY;  Surgeon: Edwin Medina MD;  Location: [facility] ENDOSCOPY;  Service: Gastroenterology   • ENDOSCOPY N/A 5/30/2018    Procedure: ESOPHAGOGASTRODUODENOSCOPY;  Surgeon: Edwin Medina MD;  Location: [facility] ENDOSCOPY;  Service: Gastroenterology   • ENDOSCOPY N/A 12/3/2018    Procedure: ESOPHAGOGASTRODUODENOSCOPY;  Surgeon: Edwin Medina MD;  Location: [facility] ENDOSCOPY;  Service: Gastroenterology   • SHOULDER SURGERY Right 2014   • TONSILLECTOMY  12/2015   • UPPER GASTROINTESTINAL ENDOSCOPY  03/30/2018   • UPPER GASTROINTESTINAL ENDOSCOPY     • UPPER GASTROINTESTINAL ENDOSCOPY   05/30/2018         Current Outpatient Medications:   •  aspirin 81 MG EC tablet, Take 81 mg by mouth Daily., Disp: , Rfl:   •  B Complex-C (SUPER B COMPLEX PO), Take 1 tablet by mouth Daily., Disp: , Rfl:   •  calcium carbonate (TUMS) 500 MG chewable tablet, Chew 500 mg 3 (Three) Times a Day As Needed for Indigestion or Heartburn., Disp: , Rfl:   •  carvedilol (COREG) 6.25 MG tablet, Take 6.25 mg by mouth Daily., Disp: , Rfl:   •  clonazePAM (KlonoPIN) 1 MG tablet, Take 1 mg by mouth 2 (Two) Times a Day As Needed for Anxiety., Disp: , Rfl:   •  DULoxetine (CYMBALTA) 60 MG capsule, Take 60 mg by mouth Every Night., Disp: , Rfl: 2  •  magnesium oxide (MAGOX) 400 (241.3 MG) MG tablet tablet, Take 400 mg by mouth 2 (Two) Times a Day., Disp: , Rfl:   •  mirtazapine (REMERON) 15 MG tablet, Take 15 mg by mouth Every Night., Disp: , Rfl:   •  ondansetron (ZOFRAN) 4 MG tablet, Take 1 tablet by mouth Every 6 (Six) Hours As Needed for Nausea or Vomiting., Disp: 30 tablet, Rfl: 0  •  pantoprazole (PROTONIX) 40 MG EC tablet, Take 1 tablet by mouth 2 (Two) Times a Day., Disp: 60 tablet, Rfl: 1  •  Probiotic Product (PROBIOTIC-10 PO), Take 1 tablet by mouth Daily., Disp: , Rfl:   •  promethazine (PHENERGAN) 25 MG suppository, Insert 1 suppository into the rectum Every 6 (Six) Hours As Needed for Nausea or Vomiting., Disp: 15 suppository, Rfl: 0  •  promethazine (PHENERGAN) 25 MG tablet, Take 25 mg by mouth Every 6 (Six) Hours As Needed for Nausea or Vomiting., Disp: , Rfl:   •  amLODIPine (NORVASC) 10 MG tablet, Take 1 tablet by mouth Daily., Disp: 30 tablet, Rfl: 1  •  losartan (COZAAR) 50 MG tablet, Take 1 tablet by mouth Daily., Disp: 30 tablet, Rfl: 1    Allergies   Allergen Reactions   • Contrast Dye Anaphylaxis     ANAPHYLAXIS   • Iodinated Diagnostic Agents Anaphylaxis   • Morphine Nausea And Vomiting   • Zofran [Ondansetron Hcl] Other (See Comments)     IV gives hiccups       Family History   Problem Relation Age of Onset  "  • Heart disease Father    • Depression Father    • Cancer Maternal Grandmother         lung   • Cancer Paternal Grandmother         pancreatic   • Heart disease Paternal Grandfather        Social History     Socioeconomic History   • Marital status:      Spouse name: Not on file   • Number of children: Not on file   • Years of education: Not on file   • Highest education level: Not on file   Tobacco Use   • Smoking status: Never Smoker   • Smokeless tobacco: Never Used   Substance and Sexual Activity   • Alcohol use: No     Comment: quit 3/17/18   • Drug use: Yes     Frequency: 7.0 times per week     Types: Marijuana     Comment: states he uses \"a little\" every day   • Sexual activity: Defer   Social History Narrative    Pt states has not had drink of any alcohol in 3 weeks as of 4/5/2018           Physical Exam   Abdominal: Soft. Bowel sounds are normal. He exhibits no distension and no mass. There is no tenderness. There is no rebound and no guarding. No hernia.             ASSESSMENT    Stevenson was seen today for follow-up.    Diagnoses and all orders for this visit:    S/P partial resection of colon        PLAN    1. Recheck as needed              This document has been electronically signed by Michel Lala MD on May 10, 2019 9:23 AM    "

## 2019-05-10 NOTE — PATIENT INSTRUCTIONS

## 2019-05-14 ENCOUNTER — OFFICE VISIT (OUTPATIENT)
Dept: ONCOLOGY | Facility: CLINIC | Age: 44
End: 2019-05-14

## 2019-05-14 ENCOUNTER — LAB (OUTPATIENT)
Dept: ONCOLOGY | Facility: HOSPITAL | Age: 44
End: 2019-05-14

## 2019-05-14 VITALS
HEART RATE: 89 BPM | RESPIRATION RATE: 18 BRPM | HEIGHT: 72 IN | DIASTOLIC BLOOD PRESSURE: 94 MMHG | SYSTOLIC BLOOD PRESSURE: 153 MMHG | WEIGHT: 220.6 LBS | BODY MASS INDEX: 29.88 KG/M2 | TEMPERATURE: 99.2 F

## 2019-05-14 DIAGNOSIS — D69.6 THROMBOCYTOPENIA (HCC): ICD-10-CM

## 2019-05-14 DIAGNOSIS — D75.1 SECONDARY POLYCYTHEMIA: Primary | ICD-10-CM

## 2019-05-14 DIAGNOSIS — D75.1 SECONDARY POLYCYTHEMIA: ICD-10-CM

## 2019-05-14 LAB
ALBUMIN SERPL-MCNC: 4 G/DL (ref 3.5–5.2)
ALBUMIN/GLOB SERPL: 1.5 G/DL
ALP SERPL-CCNC: 42 U/L (ref 39–117)
ALT SERPL W P-5'-P-CCNC: 26 U/L (ref 1–41)
ANION GAP SERPL CALCULATED.3IONS-SCNC: 12 MMOL/L
AST SERPL-CCNC: 35 U/L (ref 1–40)
BASOPHILS # BLD AUTO: 0.04 10*3/MM3 (ref 0–0.2)
BASOPHILS NFR BLD AUTO: 0.4 % (ref 0–1.5)
BILIRUB SERPL-MCNC: 0.5 MG/DL (ref 0.2–1.2)
BUN BLD-MCNC: 12 MG/DL (ref 6–20)
BUN/CREAT SERPL: 11.9 (ref 7–25)
CALCIUM SPEC-SCNC: 9.5 MG/DL (ref 8.6–10.5)
CHLORIDE SERPL-SCNC: 104 MMOL/L (ref 98–107)
CO2 SERPL-SCNC: 26 MMOL/L (ref 22–29)
CREAT BLD-MCNC: 1.01 MG/DL (ref 0.76–1.27)
DEPRECATED RDW RBC AUTO: 47.4 FL (ref 37–54)
EOSINOPHIL # BLD AUTO: 0.11 10*3/MM3 (ref 0–0.4)
EOSINOPHIL NFR BLD AUTO: 1.2 % (ref 0.3–6.2)
ERYTHROCYTE [DISTWIDTH] IN BLOOD BY AUTOMATED COUNT: 15 % (ref 12.3–15.4)
GFR SERPL CREATININE-BSD FRML MDRD: 81 ML/MIN/1.73
GLOBULIN UR ELPH-MCNC: 2.6 GM/DL
GLUCOSE BLD-MCNC: 91 MG/DL (ref 65–99)
HCT VFR BLD AUTO: 48.8 % (ref 37.5–51)
HGB BLD-MCNC: 15.9 G/DL (ref 13–17.7)
IMM GRANULOCYTES # BLD AUTO: 0.03 10*3/MM3 (ref 0–0.05)
IMM GRANULOCYTES NFR BLD AUTO: 0.3 % (ref 0–0.5)
LYMPHOCYTES # BLD AUTO: 1.33 10*3/MM3 (ref 0.7–3.1)
LYMPHOCYTES NFR BLD AUTO: 14.9 % (ref 19.6–45.3)
MCH RBC QN AUTO: 28.2 PG (ref 26.6–33)
MCHC RBC AUTO-ENTMCNC: 32.6 G/DL (ref 31.5–35.7)
MCV RBC AUTO: 86.5 FL (ref 79–97)
MONOCYTES # BLD AUTO: 0.52 10*3/MM3 (ref 0.1–0.9)
MONOCYTES NFR BLD AUTO: 5.8 % (ref 5–12)
NEUTROPHILS # BLD AUTO: 6.92 10*3/MM3 (ref 1.7–7)
NEUTROPHILS NFR BLD AUTO: 77.4 % (ref 42.7–76)
NRBC BLD AUTO-RTO: 0 /100 WBC (ref 0–0.2)
PLATELET # BLD AUTO: 268 10*3/MM3 (ref 140–450)
PMV BLD AUTO: 9.8 FL (ref 6–12)
POTASSIUM BLD-SCNC: 4.6 MMOL/L (ref 3.5–5.2)
PROT SERPL-MCNC: 6.6 G/DL (ref 6–8.5)
RBC # BLD AUTO: 5.64 10*6/MM3 (ref 4.14–5.8)
SODIUM BLD-SCNC: 142 MMOL/L (ref 136–145)
WBC NRBC COR # BLD: 8.95 10*3/MM3 (ref 3.4–10.8)

## 2019-05-14 PROCEDURE — 99213 OFFICE O/P EST LOW 20 MIN: CPT | Performed by: NURSE PRACTITIONER

## 2019-05-14 PROCEDURE — 80053 COMPREHEN METABOLIC PANEL: CPT | Performed by: NURSE PRACTITIONER

## 2019-05-14 PROCEDURE — G0463 HOSPITAL OUTPT CLINIC VISIT: HCPCS | Performed by: NURSE PRACTITIONER

## 2019-05-14 PROCEDURE — 85025 COMPLETE CBC W/AUTO DIFF WBC: CPT | Performed by: NURSE PRACTITIONER

## 2019-05-14 PROCEDURE — 36415 COLL VENOUS BLD VENIPUNCTURE: CPT | Performed by: NURSE PRACTITIONER

## 2019-05-15 NOTE — PROGRESS NOTES
"DATE OF VISIT: 5/14/2019    REASON FOR VISIT:  Follow-up for secondary polycythemia    HISTORY OF PRESENT ILLNESS:  43 yr old male with history of sleep apnea diagnosed 3 yrs ago; recently has been more compliant with use of CPAP;  He has a past history of testosterone use and a history of atrial flutter that has required ablation. He was initially seen by Dr. Monet in October for elevated H&H. He had an extensive work up that included a negative BAKARI 2 mutation, Normal EPO level and Ultrasound of abdomen that was unremarkable. He has had couple therapeutic phlebotomy. Most recent was in September when he was symptomatic. He is taking daily baby ASA and does admit to still taking testosterone supplement.   In December had a partial colon resection; Recently hospitalized for uncontrolled nausea and vomiting. Doing ok at the moment. He denies any erythromelalgia symptoms. He is taking baby ASA daily.         PAST MEDICAL HISTORY:    Past Medical History:   Diagnosis Date   • Alcoholism /alcohol abuse (CMS/HCC)    • Anxiety    • Arthritis     shoulders and knees   • Atrial flutter (CMS/HCC)    • Depression    • Diverticulitis    • Esophagitis    • Gastritis    • GERD (gastroesophageal reflux disease)    • Hypertension    • Kidney stone    • Pancreatitis     patient denies   • Sleep apnea     has c-pap       SOCIAL HISTORY:    Social History     Tobacco Use   • Smoking status: Never Smoker   • Smokeless tobacco: Never Used   Substance Use Topics   • Alcohol use: No     Comment: quit 3/17/18   • Drug use: Yes     Frequency: 7.0 times per week     Types: Marijuana     Comment: states he uses \"a little\" every day       Surgical History :  Past Surgical History:   Procedure Laterality Date   • CARDIAC ELECTROPHYSIOLOGY PROCEDURE N/A 5/2/2017    Procedure: Ablation atrial flutter;  Surgeon: Ramirez Reza MD;  Location: [facility] INVASIVE LOCATION;  Service:    • CARDIAC ELECTROPHYSIOLOGY PROCEDURE N/A 9/5/2017    " Procedure: Ablation atrial fibrillation PVA ;  Surgeon: Ramirez Reza MD;  Location: [facility] INVASIVE LOCATION;  Service:    • COLON RESECTION Left 12/20/2018    Procedure: LAPAROSCOPIC SIGMOID  RESECTION, TAKE DOWN OF SPLENIC FLEXURE;  Surgeon: Michel Lala MD;  Location: [facility] OR;  Service: General   • COLONOSCOPY     • COLONOSCOPY N/A 5/30/2018    Procedure: COLONOSCOPY;  Surgeon: Edwin Medina MD;  Location: [facility] ENDOSCOPY;  Service: Gastroenterology   • ENDOSCOPY N/A 3/30/2018    Procedure: ESOPHAGOGASTRODUODENOSCOPY;  Surgeon: Edwin Medina MD;  Location: [facility] ENDOSCOPY;  Service: Gastroenterology   • ENDOSCOPY N/A 5/30/2018    Procedure: ESOPHAGOGASTRODUODENOSCOPY;  Surgeon: Edwin Medina MD;  Location: [facility] ENDOSCOPY;  Service: Gastroenterology   • ENDOSCOPY N/A 12/3/2018    Procedure: ESOPHAGOGASTRODUODENOSCOPY;  Surgeon: Edwin Medina MD;  Location: [facility] ENDOSCOPY;  Service: Gastroenterology   • SHOULDER SURGERY Right 2014   • TONSILLECTOMY  12/2015   • UPPER GASTROINTESTINAL ENDOSCOPY  03/30/2018   • UPPER GASTROINTESTINAL ENDOSCOPY     • UPPER GASTROINTESTINAL ENDOSCOPY  05/30/2018       ALLERGIES:    Allergies   Allergen Reactions   • Contrast Dye Anaphylaxis     ANAPHYLAXIS   • Iodinated Diagnostic Agents Anaphylaxis   • Morphine Nausea And Vomiting   • Zofran [Ondansetron Hcl] Other (See Comments)     IV gives hiccups       REVIEW OF SYSTEMS:      CONSTITUTIONAL:  No fever, chills, or night sweats.     HEENT:  No epistaxis, mouth sores, or difficulty swallowing.    RESPIRATORY:  No new shortness of breath or cough at present.    CARDIOVASCULAR:  No chest pain or palpitations.    GASTROINTESTINAL:  No current abdominal pain, nausea, vomiting, or blood in the stool.    GENITOURINARY:  No dysuria or hematuria.    MUSCULOSKELETAL:  No any new back pain or arthralgias.     NEUROLOGICAL:  No tingling or numbness. No new headache or dizziness.     LYMPHATICS:  Denies any abnormal  "swollen and anywhere in the body.    SKIN:  Denies any new skin rash.    PHYSICAL EXAMINATION:      VITAL SIGNS:  /94 Comment: left arm  Pulse 89   Temp 99.2 °F (37.3 °C) (Temporal)   Resp 18   Ht 182.9 cm (72.01\")   Wt 100 kg (220 lb 9.6 oz)   BMI 29.91 kg/m²     GENERAL:  Not in any distress.    HEENT:  Normocephalic, Atraumatic.Mild Conjunctival pallor. No icterus. No Facial Asymmetry noted.    NECK:  No adenopathy. No JVD.    RESPIRATORY:  Fair air entry bilateral. No rhonchi or wheezing.    CARDIOVASCULAR:  S1, S2. Regular rate and rhythm. No murmur or gallop appreciated.    ABDOMEN:  Soft, obese, nontender. Bowel sounds present in all four quadrants.  No organomegaly appreciated.    EXTREMITIES:  No edema.No Calf Tenderness.    NEUROLOGIC:  Alert, awake and oriented ×3.      SKIN : No new skin lesion identified    DIAGNOSTIC DATA:    Glucose   Date Value Ref Range Status   05/14/2019 91 65 - 99 mg/dL Final     Sodium   Date Value Ref Range Status   05/14/2019 142 136 - 145 mmol/L Final     Potassium   Date Value Ref Range Status   05/14/2019 4.6 3.5 - 5.2 mmol/L Final     CO2   Date Value Ref Range Status   05/14/2019 26.0 22.0 - 29.0 mmol/L Final     Chloride   Date Value Ref Range Status   05/14/2019 104 98 - 107 mmol/L Final     Anion Gap   Date Value Ref Range Status   05/14/2019 12.0 mmol/L Final     Creatinine   Date Value Ref Range Status   05/14/2019 1.01 0.76 - 1.27 mg/dL Final     BUN   Date Value Ref Range Status   05/14/2019 12 6 - 20 mg/dL Final     BUN/Creatinine Ratio   Date Value Ref Range Status   05/14/2019 11.9 7.0 - 25.0 Final     Calcium   Date Value Ref Range Status   05/14/2019 9.5 8.6 - 10.5 mg/dL Final     eGFR Non  Amer   Date Value Ref Range Status   05/14/2019 81 >60 mL/min/1.73 Final     Alkaline Phosphatase   Date Value Ref Range Status   05/14/2019 42 39 - 117 U/L Final     Total Protein   Date Value Ref Range Status   05/14/2019 6.6 6.0 - 8.5 g/dL Final "     ALT (SGPT)   Date Value Ref Range Status   05/14/2019 26 1 - 41 U/L Final     AST (SGOT)   Date Value Ref Range Status   05/14/2019 35 1 - 40 U/L Final     Total Bilirubin   Date Value Ref Range Status   05/14/2019 0.5 0.2 - 1.2 mg/dL Final     Albumin   Date Value Ref Range Status   05/14/2019 4.00 3.50 - 5.20 g/dL Final     Globulin   Date Value Ref Range Status   05/14/2019 2.6 gm/dL Final     Lab Results   Component Value Date    WBC 8.95 05/14/2019    HGB 15.9 05/14/2019    HCT 48.8 05/14/2019    MCV 86.5 05/14/2019     05/14/2019     Lab Results   Component Value Date    NEUTROABS 6.92 05/14/2019    IRON 126 04/10/2018    TIBC 345 04/10/2018    LABIRON 37 04/10/2018    FERRITIN 179.00 04/10/2018     Lab Results   Component Value Date    AFPTM 2.2 04/10/2018    AFPTM 182 04/10/2018   ]        ASSESSMENT AND PLAN:       1. Erythrocytosis,secondary to testosterone use and an element of DONNIE;  Hct today is 48.8;  he is not having any erythromyalgia symptoms; no heme intervention needed at this time. Advised him to continue to stay well hydrated and to continue with daily ASA. Recheck again in 3 mos.     2. Atrial fibrillation, pt was  taken off the Eliquis by his cardiologist and is currently taking baby ASA daily. Scheduled to see him in August.     3. Health maintenance, he does not smoke; he has had a EGD and colonoscopy.      This document has been signed by VIKASH Clinton on May 15, 2019 9:39 AM

## 2019-06-12 ENCOUNTER — HOSPITAL ENCOUNTER (OUTPATIENT)
Dept: GENERAL RADIOLOGY | Facility: HOSPITAL | Age: 44
Discharge: HOME | End: 2019-06-12
Admitting: ORTHOPAEDIC SURGERY

## 2019-06-12 DIAGNOSIS — M25.552 LEFT HIP PAIN: ICD-10-CM

## 2019-06-12 PROCEDURE — 73502 X-RAY EXAM HIP UNI 2-3 VIEWS: CPT

## 2019-06-13 ENCOUNTER — HOSPITAL ENCOUNTER (OUTPATIENT)
Dept: GENERAL RADIOLOGY | Facility: HOSPITAL | Age: 44
Discharge: HOME | End: 2019-06-13
Admitting: ORTHOPAEDIC SURGERY

## 2019-06-13 DIAGNOSIS — M25.551 PAIN IN RIGHT HIP: ICD-10-CM

## 2019-06-13 PROCEDURE — 73502 X-RAY EXAM HIP UNI 2-3 VIEWS: CPT

## 2019-07-13 ENCOUNTER — APPOINTMENT (OUTPATIENT)
Dept: CT IMAGING | Facility: HOSPITAL | Age: 44
End: 2019-07-13

## 2019-07-13 ENCOUNTER — HOSPITAL ENCOUNTER (EMERGENCY)
Facility: HOSPITAL | Age: 44
Discharge: HOME | End: 2019-07-13
Attending: FAMILY MEDICINE | Admitting: FAMILY MEDICINE

## 2019-07-13 VITALS
HEART RATE: 87 BPM | WEIGHT: 224 LBS | DIASTOLIC BLOOD PRESSURE: 79 MMHG | TEMPERATURE: 97 F | SYSTOLIC BLOOD PRESSURE: 178 MMHG | HEIGHT: 72 IN | OXYGEN SATURATION: 97 % | RESPIRATION RATE: 18 BRPM | BODY MASS INDEX: 30.34 KG/M2

## 2019-07-13 DIAGNOSIS — R10.32 LEFT LOWER QUADRANT PAIN: Primary | ICD-10-CM

## 2019-07-13 DIAGNOSIS — E87.5 HYPERKALEMIA: ICD-10-CM

## 2019-07-13 LAB
ALBUMIN SERPL-MCNC: 4.8 G/DL (ref 3.5–5.2)
ALBUMIN/GLOB SERPL: 1.8 G/DL
ALP SERPL-CCNC: 50 U/L (ref 39–117)
ALT SERPL W P-5'-P-CCNC: 75 U/L (ref 1–41)
ANION GAP SERPL CALCULATED.3IONS-SCNC: 13 MMOL/L (ref 5–15)
AST SERPL-CCNC: 64 U/L (ref 1–40)
BASOPHILS # BLD AUTO: 0.05 10*3/MM3 (ref 0–0.2)
BASOPHILS NFR BLD AUTO: 0.3 % (ref 0–1.5)
BILIRUB SERPL-MCNC: 1.1 MG/DL (ref 0.2–1.2)
BUN BLD-MCNC: 19 MG/DL (ref 6–20)
BUN/CREAT SERPL: 13.2 (ref 7–25)
CALCIUM SPEC-SCNC: 10.1 MG/DL (ref 8.6–10.5)
CHLORIDE SERPL-SCNC: 111 MMOL/L (ref 98–107)
CO2 SERPL-SCNC: 24 MMOL/L (ref 22–29)
CREAT BLD-MCNC: 1.44 MG/DL (ref 0.76–1.27)
DEPRECATED RDW RBC AUTO: 48.2 FL (ref 37–54)
EOSINOPHIL # BLD AUTO: 0.04 10*3/MM3 (ref 0–0.4)
EOSINOPHIL NFR BLD AUTO: 0.2 % (ref 0.3–6.2)
ERYTHROCYTE [DISTWIDTH] IN BLOOD BY AUTOMATED COUNT: 15.9 % (ref 12.3–15.4)
GFR SERPL CREATININE-BSD FRML MDRD: 54 ML/MIN/1.73
GLOBULIN UR ELPH-MCNC: 2.7 GM/DL
GLUCOSE BLD-MCNC: 114 MG/DL (ref 65–99)
HCT VFR BLD AUTO: 48.9 % (ref 37.5–51)
HGB BLD-MCNC: 16 G/DL (ref 13–17.7)
HOLD SPECIMEN: NORMAL
HOLD SPECIMEN: NORMAL
IMM GRANULOCYTES # BLD AUTO: 0.08 10*3/MM3 (ref 0–0.05)
IMM GRANULOCYTES NFR BLD AUTO: 0.5 % (ref 0–0.5)
LIPASE SERPL-CCNC: 23 U/L (ref 13–60)
LYMPHOCYTES # BLD AUTO: 1.02 10*3/MM3 (ref 0.7–3.1)
LYMPHOCYTES NFR BLD AUTO: 6 % (ref 19.6–45.3)
MCH RBC QN AUTO: 27.8 PG (ref 26.6–33)
MCHC RBC AUTO-ENTMCNC: 32.7 G/DL (ref 31.5–35.7)
MCV RBC AUTO: 84.9 FL (ref 79–97)
MONOCYTES # BLD AUTO: 0.6 10*3/MM3 (ref 0.1–0.9)
MONOCYTES NFR BLD AUTO: 3.5 % (ref 5–12)
NEUTROPHILS # BLD AUTO: 15.3 10*3/MM3 (ref 1.7–7)
NEUTROPHILS NFR BLD AUTO: 89.5 % (ref 42.7–76)
NRBC BLD AUTO-RTO: 0 /100 WBC (ref 0–0.2)
PLATELET # BLD AUTO: 296 10*3/MM3 (ref 140–450)
PMV BLD AUTO: 10.2 FL (ref 6–12)
POTASSIUM BLD-SCNC: 5.8 MMOL/L (ref 3.5–5.2)
PROT SERPL-MCNC: 7.5 G/DL (ref 6–8.5)
RBC # BLD AUTO: 5.76 10*6/MM3 (ref 4.14–5.8)
SODIUM BLD-SCNC: 148 MMOL/L (ref 136–145)
TROPONIN T SERPL-MCNC: <0.01 NG/ML (ref 0–0.03)
WBC NRBC COR # BLD: 17.09 10*3/MM3 (ref 3.4–10.8)
WHOLE BLOOD HOLD SPECIMEN: NORMAL
WHOLE BLOOD HOLD SPECIMEN: NORMAL

## 2019-07-13 PROCEDURE — 93005 ELECTROCARDIOGRAM TRACING: CPT | Performed by: FAMILY MEDICINE

## 2019-07-13 PROCEDURE — 80053 COMPREHEN METABOLIC PANEL: CPT | Performed by: FAMILY MEDICINE

## 2019-07-13 PROCEDURE — 96374 THER/PROPH/DIAG INJ IV PUSH: CPT

## 2019-07-13 PROCEDURE — 83690 ASSAY OF LIPASE: CPT | Performed by: FAMILY MEDICINE

## 2019-07-13 PROCEDURE — 85025 COMPLETE CBC W/AUTO DIFF WBC: CPT | Performed by: FAMILY MEDICINE

## 2019-07-13 PROCEDURE — 74176 CT ABD & PELVIS W/O CONTRAST: CPT

## 2019-07-13 PROCEDURE — 84484 ASSAY OF TROPONIN QUANT: CPT | Performed by: FAMILY MEDICINE

## 2019-07-13 PROCEDURE — 25010000002 LORAZEPAM PER 2 MG: Performed by: FAMILY MEDICINE

## 2019-07-13 PROCEDURE — 93010 ELECTROCARDIOGRAM REPORT: CPT | Performed by: INTERNAL MEDICINE

## 2019-07-13 PROCEDURE — 99284 EMERGENCY DEPT VISIT MOD MDM: CPT

## 2019-07-13 PROCEDURE — 25010000002 PROMETHAZINE PER 50 MG: Performed by: FAMILY MEDICINE

## 2019-07-13 PROCEDURE — 96375 TX/PRO/DX INJ NEW DRUG ADDON: CPT

## 2019-07-13 RX ORDER — SODIUM CHLORIDE 0.9 % (FLUSH) 0.9 %
10 SYRINGE (ML) INJECTION AS NEEDED
Status: DISCONTINUED | OUTPATIENT
Start: 2019-07-13 | End: 2019-07-13 | Stop reason: HOSPADM

## 2019-07-13 RX ORDER — SODIUM POLYSTYRENE SULFONATE 15 G/60ML
30 SUSPENSION ORAL; RECTAL ONCE
Status: COMPLETED | OUTPATIENT
Start: 2019-07-13 | End: 2019-07-13

## 2019-07-13 RX ORDER — PROMETHAZINE HYDROCHLORIDE 25 MG/ML
12.5 INJECTION, SOLUTION INTRAMUSCULAR; INTRAVENOUS ONCE
Status: COMPLETED | OUTPATIENT
Start: 2019-07-13 | End: 2019-07-13

## 2019-07-13 RX ORDER — SODIUM CHLORIDE 9 MG/ML
INJECTION, SOLUTION INTRAVENOUS
Status: COMPLETED
Start: 2019-07-13 | End: 2019-07-13

## 2019-07-13 RX ORDER — LORAZEPAM 2 MG/ML
1 INJECTION INTRAMUSCULAR ONCE
Status: COMPLETED | OUTPATIENT
Start: 2019-07-13 | End: 2019-07-13

## 2019-07-13 RX ORDER — DICYCLOMINE HYDROCHLORIDE 10 MG/1
CAPSULE ORAL
Status: COMPLETED
Start: 2019-07-13 | End: 2019-07-13

## 2019-07-13 RX ORDER — DICYCLOMINE HYDROCHLORIDE 10 MG/1
20 CAPSULE ORAL ONCE
Status: COMPLETED | OUTPATIENT
Start: 2019-07-13 | End: 2019-07-13

## 2019-07-13 RX ORDER — DICYCLOMINE HCL 20 MG
20 TABLET ORAL EVERY 6 HOURS
Qty: 20 TABLET | Refills: 0 | Status: SHIPPED | OUTPATIENT
Start: 2019-07-13 | End: 2019-09-10

## 2019-07-13 RX ADMIN — SODIUM CHLORIDE 1000 ML: 900 INJECTION, SOLUTION INTRAVENOUS at 17:06

## 2019-07-13 RX ADMIN — PROMETHAZINE HYDROCHLORIDE 12.5 MG: 25 INJECTION INTRAMUSCULAR; INTRAVENOUS at 17:55

## 2019-07-13 RX ADMIN — LORAZEPAM 1 MG: 2 INJECTION INTRAMUSCULAR; INTRAVENOUS at 17:08

## 2019-07-13 RX ADMIN — SODIUM POLYSTYRENE SULFONATE 30 G: 15 SUSPENSION ORAL; RECTAL at 17:34

## 2019-07-13 RX ADMIN — DICYCLOMINE HYDROCHLORIDE 20 MG: 10 CAPSULE ORAL at 17:07

## 2019-07-13 NOTE — ED PROVIDER NOTES
Subjective     History provided by:  Patient   used: No    Abdominal Pain   Pain location:  LLQ  Pain quality: aching    Pain radiates to:  Does not radiate  Pain severity:  Mild  Onset quality:  Gradual  Duration:  2 days  Timing:  Constant  Chronicity:  New  Worsened by:  Nothing  Ineffective treatments:  None tried  Associated symptoms: no constipation, no cough, no dysuria, no nausea, no shortness of breath and no vomiting        Review of Systems   Respiratory: Negative for cough and shortness of breath.    Gastrointestinal: Positive for abdominal pain. Negative for constipation, nausea and vomiting.   Genitourinary: Negative for dysuria.   All other systems reviewed and are negative.      Past Medical History:   Diagnosis Date   • Alcoholism /alcohol abuse (CMS/[facility])    • Anxiety    • Arthritis     shoulders and knees   • Atrial flutter (CMS/HCC)    • Depression    • Diverticulitis    • Esophagitis    • Gastritis    • GERD (gastroesophageal reflux disease)    • Hypertension    • Kidney stone    • Pancreatitis     patient denies   • Sleep apnea     has c-pap       Allergies   Allergen Reactions   • Contrast Dye Anaphylaxis     ANAPHYLAXIS   • Iodinated Diagnostic Agents Anaphylaxis   • Morphine Nausea And Vomiting   • Zofran [Ondansetron Hcl] Other (See Comments)     IV gives hiccups       Past Surgical History:   Procedure Laterality Date   • CARDIAC ELECTROPHYSIOLOGY PROCEDURE N/A 5/2/2017    Procedure: Ablation atrial flutter;  Surgeon: Ramirez Reza MD;  Location: [facility] INVASIVE LOCATION;  Service:    • CARDIAC ELECTROPHYSIOLOGY PROCEDURE N/A 9/5/2017    Procedure: Ablation atrial fibrillation PVA ;  Surgeon: Ramirez Reza MD;  Location: [facility] INVASIVE LOCATION;  Service:    • COLON RESECTION Left 12/20/2018    Procedure: LAPAROSCOPIC SIGMOID  RESECTION, TAKE DOWN OF SPLENIC FLEXURE;  Surgeon: Michel Lala MD;  Location: [facility] OR;  Service: General   • COLONOSCOPY     •  "COLONOSCOPY N/A 5/30/2018    Procedure: COLONOSCOPY;  Surgeon: Edwin Medina MD;  Location: [facility] ENDOSCOPY;  Service: Gastroenterology   • ENDOSCOPY N/A 3/30/2018    Procedure: ESOPHAGOGASTRODUODENOSCOPY;  Surgeon: Edwin Medina MD;  Location: [facility] ENDOSCOPY;  Service: Gastroenterology   • ENDOSCOPY N/A 5/30/2018    Procedure: ESOPHAGOGASTRODUODENOSCOPY;  Surgeon: Edwin Medina MD;  Location: [facility] ENDOSCOPY;  Service: Gastroenterology   • ENDOSCOPY N/A 12/3/2018    Procedure: ESOPHAGOGASTRODUODENOSCOPY;  Surgeon: Edwin Medina MD;  Location: [facility] ENDOSCOPY;  Service: Gastroenterology   • SHOULDER SURGERY Right 2014   • TONSILLECTOMY  12/2015   • UPPER GASTROINTESTINAL ENDOSCOPY  03/30/2018   • UPPER GASTROINTESTINAL ENDOSCOPY     • UPPER GASTROINTESTINAL ENDOSCOPY  05/30/2018       Family History   Problem Relation Age of Onset   • Heart disease Father    • Depression Father    • Cancer Maternal Grandmother         lung   • Cancer Paternal Grandmother         pancreatic   • Heart disease Paternal Grandfather        Social History     Socioeconomic History   • Marital status:      Spouse name: Not on file   • Number of children: Not on file   • Years of education: Not on file   • Highest education level: Not on file   Tobacco Use   • Smoking status: Never Smoker   • Smokeless tobacco: Never Used   Substance and Sexual Activity   • Alcohol use: No     Comment: quit 3/17/18   • Drug use: Yes     Frequency: 7.0 times per week     Types: Marijuana     Comment: states he uses \"a little\" every day   • Sexual activity: Defer   Social History Narrative    Pt states has not had drink of any alcohol in 3 weeks as of 4/5/2018           Objective   Physical Exam   Constitutional: He is oriented to person, place, and time. He appears well-developed and well-nourished.   HENT:   Head: Normocephalic and atraumatic.   Cardiovascular: Normal rate and regular rhythm.   Pulmonary/Chest: Effort normal and breath " sounds normal.   Abdominal: Soft. Normal appearance and normal aorta. Bowel sounds are absent. There is tenderness in the left lower quadrant. There is no rigidity, no rebound and no guarding.   Neurological: He is alert and oriented to person, place, and time.   Skin: Skin is warm. Capillary refill takes less than 2 seconds.   Psychiatric: He has a normal mood and affect. His behavior is normal.   Nursing note and vitals reviewed.      Procedures           ED Course      Labs Reviewed   COMPREHENSIVE METABOLIC PANEL - Abnormal; Notable for the following components:       Result Value    Glucose 114 (*)     Creatinine 1.44 (*)     Sodium 148 (*)     Potassium 5.8 (*)     Chloride 111 (*)     ALT (SGPT) 75 (*)     AST (SGOT) 64 (*)     eGFR Non  Amer 54 (*)     All other components within normal limits    Narrative:     GFR Normal >60  Chronic Kidney Disease <60  Kidney Failure <15   CBC WITH AUTO DIFFERENTIAL - Abnormal; Notable for the following components:    WBC 17.09 (*)     RDW 15.9 (*)     Neutrophil % 89.5 (*)     Lymphocyte % 6.0 (*)     Monocyte % 3.5 (*)     Eosinophil % 0.2 (*)     Neutrophils, Absolute 15.30 (*)     Immature Grans, Absolute 0.08 (*)     All other components within normal limits   LIPASE - Normal   TROPONIN (IN-HOUSE) - Normal    Narrative:     Troponin T Reference Range:  <= 0.03 ng/mL-   Negative for AMI  >0.03 ng/mL-     Abnormal for myocardial necrosis.  Clinicians would have to utilize clinical acumen, EKG, Troponin and serial changes to determine if it is an Acute Myocardial Infarction or myocardial injury due to an underlying chronic condition.    RAINBOW DRAW    Narrative:     The following orders were created for panel order [facility] Draw.  Procedure                               Abnormality         Status                     ---------                               -----------         ------                     Light Blue Top[[id number]]                                    Final result               Green Top (Gel)[[id number]]                                  Final result               Lavender Top[[id number]]                                     Final result               Gold Top - SST[[id number]]                                   Final result                 Please view results for these tests on the individual orders.   URINALYSIS W/ MICROSCOPIC IF INDICATED (NO CULTURE)   CBC AND DIFFERENTIAL    Narrative:     The following orders were created for panel order CBC & Differential.  Procedure                               Abnormality         Status                     ---------                               -----------         ------                     CBC Auto Differential[[id number]]        Abnormal            Final result                 Please view results for these tests on the individual orders.   LIGHT BLUE TOP   GREEN TOP   LAVENDER TOP   GOLD TOP - SST       CT Abdomen Pelvis Without Contrast   Final Result   Conclusion:   No process in the abdomen or pelvis.   Degenerative disc disease and spondylotic change L5-S1 with   stenosis neural foramina for the L5 nerve roots.      [id number]      Electronically signed by:  Pedrito Lyons MD  7/13/2019 5:08 PM CDT   Workstation: [id number]        Discussed with patient and family.  Told to follow with the primary care doctor in 3 days.  Come back to the ER if the symptoms get worse.          MDM      Final diagnoses:   Left lower quadrant pain   Hyperkalemia            Rayray Salmon MD  07/13/19 [id number]

## 2019-07-15 ENCOUNTER — OFFICE VISIT (OUTPATIENT)
Dept: SURGERY | Facility: CLINIC | Age: 44
End: 2019-07-15

## 2019-07-15 ENCOUNTER — LAB (OUTPATIENT)
Dept: LAB | Facility: HOSPITAL | Age: 44
End: 2019-07-15

## 2019-07-15 VITALS
BODY MASS INDEX: 30.48 KG/M2 | SYSTOLIC BLOOD PRESSURE: 130 MMHG | HEIGHT: 72 IN | TEMPERATURE: 98 F | HEART RATE: 82 BPM | WEIGHT: 225 LBS | DIASTOLIC BLOOD PRESSURE: 80 MMHG

## 2019-07-15 DIAGNOSIS — R10.84 GENERALIZED ABDOMINAL PAIN: Primary | ICD-10-CM

## 2019-07-15 DIAGNOSIS — R10.84 GENERALIZED ABDOMINAL PAIN: ICD-10-CM

## 2019-07-15 LAB
ALBUMIN SERPL-MCNC: 4.1 G/DL (ref 3.5–5.2)
ALBUMIN/GLOB SERPL: 1.8 G/DL
ALP SERPL-CCNC: 41 U/L (ref 39–117)
ALT SERPL W P-5'-P-CCNC: 53 U/L (ref 1–41)
ANION GAP SERPL CALCULATED.3IONS-SCNC: 9.7 MMOL/L (ref 5–15)
AST SERPL-CCNC: 45 U/L (ref 1–40)
BASOPHILS # BLD AUTO: 0.05 10*3/MM3 (ref 0–0.2)
BASOPHILS NFR BLD AUTO: 0.6 % (ref 0–1.5)
BILIRUB SERPL-MCNC: 1.6 MG/DL (ref 0.2–1.2)
BUN BLD-MCNC: 14 MG/DL (ref 6–20)
BUN/CREAT SERPL: 10.1 (ref 7–25)
CALCIUM SPEC-SCNC: 9.1 MG/DL (ref 8.6–10.5)
CHLORIDE SERPL-SCNC: 103 MMOL/L (ref 98–107)
CO2 SERPL-SCNC: 30.3 MMOL/L (ref 22–29)
CREAT BLD-MCNC: 1.38 MG/DL (ref 0.76–1.27)
DEPRECATED RDW RBC AUTO: 50.2 FL (ref 37–54)
EOSINOPHIL # BLD AUTO: 0.1 10*3/MM3 (ref 0–0.4)
EOSINOPHIL NFR BLD AUTO: 1.2 % (ref 0.3–6.2)
ERYTHROCYTE [DISTWIDTH] IN BLOOD BY AUTOMATED COUNT: 15.7 % (ref 12.3–15.4)
GFR SERPL CREATININE-BSD FRML MDRD: 57 ML/MIN/1.73
GLOBULIN UR ELPH-MCNC: 2.3 GM/DL
GLUCOSE BLD-MCNC: 76 MG/DL (ref 65–99)
HCT VFR BLD AUTO: 49.7 % (ref 37.5–51)
HGB BLD-MCNC: 15.9 G/DL (ref 13–17.7)
IMM GRANULOCYTES # BLD AUTO: 0.03 10*3/MM3 (ref 0–0.05)
IMM GRANULOCYTES NFR BLD AUTO: 0.4 % (ref 0–0.5)
LYMPHOCYTES # BLD AUTO: 1.72 10*3/MM3 (ref 0.7–3.1)
LYMPHOCYTES NFR BLD AUTO: 20.9 % (ref 19.6–45.3)
MAGNESIUM SERPL-MCNC: 2.4 MG/DL (ref 1.6–2.6)
MCH RBC QN AUTO: 28.3 PG (ref 26.6–33)
MCHC RBC AUTO-ENTMCNC: 32 G/DL (ref 31.5–35.7)
MCV RBC AUTO: 88.4 FL (ref 79–97)
MONOCYTES # BLD AUTO: 0.79 10*3/MM3 (ref 0.1–0.9)
MONOCYTES NFR BLD AUTO: 9.6 % (ref 5–12)
NEUTROPHILS # BLD AUTO: 5.55 10*3/MM3 (ref 1.7–7)
NEUTROPHILS NFR BLD AUTO: 67.3 % (ref 42.7–76)
NRBC BLD AUTO-RTO: 0 /100 WBC (ref 0–0.2)
PHOSPHATE SERPL-MCNC: 2.8 MG/DL (ref 2.5–4.5)
PLATELET # BLD AUTO: 282 10*3/MM3 (ref 140–450)
PMV BLD AUTO: 10.9 FL (ref 6–12)
POTASSIUM BLD-SCNC: 4.9 MMOL/L (ref 3.5–5.2)
PROT SERPL-MCNC: 6.4 G/DL (ref 6–8.5)
RBC # BLD AUTO: 5.62 10*6/MM3 (ref 4.14–5.8)
SODIUM BLD-SCNC: 143 MMOL/L (ref 136–145)
WBC NRBC COR # BLD: 8.24 10*3/MM3 (ref 3.4–10.8)

## 2019-07-15 PROCEDURE — 83735 ASSAY OF MAGNESIUM: CPT

## 2019-07-15 PROCEDURE — 85025 COMPLETE CBC W/AUTO DIFF WBC: CPT

## 2019-07-15 PROCEDURE — 99212 OFFICE O/P EST SF 10 MIN: CPT | Performed by: SURGERY

## 2019-07-15 PROCEDURE — 36415 COLL VENOUS BLD VENIPUNCTURE: CPT

## 2019-07-15 PROCEDURE — 80053 COMPREHEN METABOLIC PANEL: CPT

## 2019-07-15 PROCEDURE — 84100 ASSAY OF PHOSPHORUS: CPT

## 2019-07-15 NOTE — PROGRESS NOTES
Chief Complaint   Patient presents with   • Follow-up     Recheck nausea and vomiting        HPI  Seen in the ER with nausea and vomiting. Has resolved. Had elevated potassium and WBC.   Sx have abated since then. Able to drink and eat liquids. Hx of diverticular resection remotely. No N/V since last weekend. No diarrhea. Normal bowel function  Past Medical History:   Diagnosis Date   • Alcoholism /alcohol abuse (CMS/HCC)    • Anxiety    • Arthritis     shoulders and knees   • Atrial flutter (CMS/HCC)    • Depression    • Diverticulitis    • Esophagitis    • Gastritis    • GERD (gastroesophageal reflux disease)    • Hypertension    • Kidney stone    • Pancreatitis     patient denies   • Sleep apnea     has c-pap       Past Surgical History:   Procedure Laterality Date   • CARDIAC ELECTROPHYSIOLOGY PROCEDURE N/A 5/2/2017    Procedure: Ablation atrial flutter;  Surgeon: Ramirez Reza MD;  [facility];  Service:    • CARDIAC ELECTROPHYSIOLOGY PROCEDURE N/A 9/5/2017    Procedure: Ablation atrial fibrillation PVA ;  Surgeon: Ramirez Reza MD;  [facility];  Service:    • COLON RESECTION Left 12/20/2018    Procedure: LAPAROSCOPIC SIGMOID  RESECTION, TAKE DOWN OF SPLENIC FLEXURE;  Surgeon: Michel Lala MD;  Location: [facility] OR;  Service: General   • COLONOSCOPY     • COLONOSCOPY N/A 5/30/2018    Procedure: COLONOSCOPY;  Surgeon: Edwin Medina MD;  Location: [facility] ENDOSCOPY;  Service: Gastroenterology   • ENDOSCOPY N/A 3/30/2018    Procedure: ESOPHAGOGASTRODUODENOSCOPY;  Surgeon: Edwin Medina MD;  Location: [facility] ENDOSCOPY;  Service: Gastroenterology   • ENDOSCOPY N/A 5/30/2018    Procedure: ESOPHAGOGASTRODUODENOSCOPY;  Surgeon: Edwin Medina MD;  Location: [facility] ENDOSCOPY;  Service: Gastroenterology   • ENDOSCOPY N/A 12/3/2018    Procedure: ESOPHAGOGASTRODUODENOSCOPY;  Surgeon: Edwin Medina MD;  Location: [facility] ENDOSCOPY;  Service: Gastroenterology    • SHOULDER SURGERY Right 2014   • TONSILLECTOMY  12/2015   • UPPER GASTROINTESTINAL ENDOSCOPY  03/30/2018   • UPPER GASTROINTESTINAL ENDOSCOPY     • UPPER GASTROINTESTINAL ENDOSCOPY  05/30/2018         Current Outpatient Medications:   •  aspirin 81 MG EC tablet, Take 81 mg by mouth Daily., Disp: , Rfl:   •  B Complex-C (SUPER B COMPLEX PO), Take 1 tablet by mouth Daily., Disp: , Rfl:   •  carvedilol (COREG) 6.25 MG tablet, Take 6.25 mg by mouth Daily., Disp: , Rfl:   •  clonazePAM (KlonoPIN) 1 MG tablet, Take 1 mg by mouth 2 (Two) Times a Day As Needed for Anxiety., Disp: , Rfl:   •  dicyclomine (BENTYL) 20 MG tablet, Take 1 tablet by mouth Every 6 (Six) Hours., Disp: 20 tablet, Rfl: 0  •  DULoxetine (CYMBALTA) 60 MG capsule, Take 60 mg by mouth Every Night., Disp: , Rfl: 2  •  magnesium oxide (MAGOX) 400 (241.3 MG) MG tablet tablet, Take 400 mg by mouth 2 (Two) Times a Day., Disp: , Rfl:   •  mirtazapine (REMERON) 15 MG tablet, Take 15 mg by mouth Every Night., Disp: , Rfl:   •  pantoprazole (PROTONIX) 40 MG EC tablet, Take 1 tablet by mouth 2 (Two) Times a Day., Disp: 60 tablet, Rfl: 1  •  Probiotic Product (PROBIOTIC-10 PO), Take 1 tablet by mouth Daily., Disp: , Rfl:     Allergies   Allergen Reactions   • Contrast Dye Anaphylaxis     ANAPHYLAXIS   • Iodinated Diagnostic Agents Anaphylaxis   • Morphine Nausea And Vomiting   • Zofran [Ondansetron Hcl] Other (See Comments)     IV gives hiccups       Family History   Problem Relation Age of Onset   • Heart disease Father    • Depression Father    • Cancer Maternal Grandmother         lung   • Cancer Paternal Grandmother         pancreatic   • Heart disease Paternal Grandfather        Social History     Socioeconomic History   • Marital status:      Spouse name: Not on file   • Number of children: Not on file   • Years of education: Not on file   • Highest education level: Not on file   Tobacco Use   • Smoking status: Never Smoker   • Smokeless tobacco:  "Never Used   Substance and Sexual Activity   • Alcohol use: No     Comment: quit 3/17/18   • Drug use: Yes     Frequency: 7.0 times per week     Types: Marijuana     Comment: states he uses \"a little\" every day   • Sexual activity: Defer   Social History Narrative    Pt states has not had drink of any alcohol in 3 weeks as of 4/5/2018       Review of Systems   Gastrointestinal: Negative for abdominal distention, abdominal pain, anal bleeding, blood in stool, constipation, diarrhea, nausea, rectal pain and vomiting.       Physical Exam   Cardiovascular: Normal rate and regular rhythm.   Pulmonary/Chest: Effort normal and breath sounds normal.   Abdominal: Soft. Bowel sounds are normal. He exhibits no distension and no mass. There is no tenderness. There is no rebound and no guarding. No hernia.         ASSESSMENT    Stevenson was seen today for follow-up.    Diagnoses and all orders for this visit:    Generalized abdominal pain  Comments:  Improved  Orders:  -     CBC & Differential; Future  -     Comprehensive Metabolic Panel; Future  -     Magnesium; Future  -     Phosphorus; Future        PLAN    1. Repeat labs  2. Recheck Weds  3. Liquids till then              This document has been electronically signed by Michel Lala MD on July 15, 2019 9:32 AM    "

## 2019-07-15 NOTE — PATIENT INSTRUCTIONS

## 2019-07-17 ENCOUNTER — OFFICE VISIT (OUTPATIENT)
Dept: SURGERY | Facility: CLINIC | Age: 44
End: 2019-07-17

## 2019-07-17 VITALS
DIASTOLIC BLOOD PRESSURE: 88 MMHG | HEIGHT: 72 IN | TEMPERATURE: 98.2 F | SYSTOLIC BLOOD PRESSURE: 136 MMHG | BODY MASS INDEX: 30.72 KG/M2 | HEART RATE: 83 BPM | WEIGHT: 226.8 LBS

## 2019-07-17 DIAGNOSIS — R10.84 GENERALIZED ABDOMINAL PAIN: Primary | ICD-10-CM

## 2019-07-17 PROCEDURE — 99212 OFFICE O/P EST SF 10 MIN: CPT | Performed by: SURGERY

## 2019-07-17 NOTE — PATIENT INSTRUCTIONS

## 2019-07-17 NOTE — PROGRESS NOTES
Chief Complaint   Patient presents with   • Follow-up     Recheck nausea aand vomiting.        HPI  Improved- Having diarrhea. Nausea and vomiting has improved. On lite diet  Past Medical History:   Diagnosis Date   • Alcoholism /alcohol abuse (CMS/HCC)    • Anxiety    • Arthritis     shoulders and knees   • Atrial flutter (CMS/HCC)    • Depression    • Diverticulitis    • Esophagitis    • Gastritis    • GERD (gastroesophageal reflux disease)    • Hypertension    • Kidney stone    • Pancreatitis     patient denies   • Sleep apnea     has c-pap       Past Surgical History:   Procedure Laterality Date   • CARDIAC ELECTROPHYSIOLOGY PROCEDURE N/A 5/2/2017    Procedure: Ablation atrial flutter;  Surgeon: Ramirez Reza MD;  [facility];  Service:    • CARDIAC ELECTROPHYSIOLOGY PROCEDURE N/A 9/5/2017    Procedure: Ablation atrial fibrillation PVA ;  Surgeon: Ramirez Reza MD;  [facility];  Service:    • COLON RESECTION Left 12/20/2018    Procedure: LAPAROSCOPIC SIGMOID  RESECTION, TAKE DOWN OF SPLENIC FLEXURE;  Surgeon: Michel Lala MD;  Location: [facility] OR;  Service: General   • COLONOSCOPY     • COLONOSCOPY N/A 5/30/2018    Procedure: COLONOSCOPY;  Surgeon: Edwin Medina MD;  Location: [facility] ENDOSCOPY;  Service: Gastroenterology   • ENDOSCOPY N/A 3/30/2018    Procedure: ESOPHAGOGASTRODUODENOSCOPY;  Surgeon: Edwin Medina MD;  Location: [facility] ENDOSCOPY;  Service: Gastroenterology   • ENDOSCOPY N/A 5/30/2018    Procedure: ESOPHAGOGASTRODUODENOSCOPY;  Surgeon: Edwin Medina MD;  Location: [facility] ENDOSCOPY;  Service: Gastroenterology   • ENDOSCOPY N/A 12/3/2018    Procedure: ESOPHAGOGASTRODUODENOSCOPY;  Surgeon: Edwin Medina MD;  Location: [facility] ENDOSCOPY;  Service: Gastroenterology   • SHOULDER SURGERY Right 2014   • TONSILLECTOMY  12/2015   • UPPER GASTROINTESTINAL ENDOSCOPY  03/30/2018   • UPPER GASTROINTESTINAL ENDOSCOPY     • UPPER  GASTROINTESTINAL ENDOSCOPY  05/30/2018         Current Outpatient Medications:   •  aspirin 81 MG EC tablet, Take 81 mg by mouth Daily., Disp: , Rfl:   •  B Complex-C (SUPER B COMPLEX PO), Take 1 tablet by mouth Daily., Disp: , Rfl:   •  carvedilol (COREG) 6.25 MG tablet, Take 6.25 mg by mouth Daily., Disp: , Rfl:   •  clonazePAM (KlonoPIN) 1 MG tablet, Take 1 mg by mouth 2 (Two) Times a Day As Needed for Anxiety., Disp: , Rfl:   •  dicyclomine (BENTYL) 20 MG tablet, Take 1 tablet by mouth Every 6 (Six) Hours., Disp: 20 tablet, Rfl: 0  •  DULoxetine (CYMBALTA) 60 MG capsule, Take 60 mg by mouth Every Night., Disp: , Rfl: 2  •  magnesium oxide (MAGOX) 400 (241.3 MG) MG tablet tablet, Take 400 mg by mouth 2 (Two) Times a Day., Disp: , Rfl:   •  mirtazapine (REMERON) 15 MG tablet, Take 15 mg by mouth Every Night., Disp: , Rfl:   •  pantoprazole (PROTONIX) 40 MG EC tablet, Take 1 tablet by mouth 2 (Two) Times a Day., Disp: 60 tablet, Rfl: 1  •  Probiotic Product (PROBIOTIC-10 PO), Take 1 tablet by mouth Daily., Disp: , Rfl:     Allergies   Allergen Reactions   • Contrast Dye Anaphylaxis     ANAPHYLAXIS   • Iodinated Diagnostic Agents Anaphylaxis   • Morphine Nausea And Vomiting   • Zofran [Ondansetron Hcl] Other (See Comments)     IV gives hiccups       Family History   Problem Relation Age of Onset   • Heart disease Father    • Depression Father    • Cancer Maternal Grandmother         lung   • Cancer Paternal Grandmother         pancreatic   • Heart disease Paternal Grandfather        Social History     Socioeconomic History   • Marital status:      Spouse name: Not on file   • Number of children: Not on file   • Years of education: Not on file   • Highest education level: Not on file   Tobacco Use   • Smoking status: Never Smoker   • Smokeless tobacco: Never Used   Substance and Sexual Activity   • Alcohol use: No     Comment: quit 3/17/18   • Drug use: Yes     Frequency: 7.0 times per week     Types:  "Marijuana     Comment: states he uses \"a little\" every day   • Sexual activity: Defer   Social History Narrative    Pt states has not had drink of any alcohol in 3 weeks as of 4/5/2018       Review of Systems   Gastrointestinal: Positive for diarrhea. Negative for abdominal distention, abdominal pain, anal bleeding, blood in stool, constipation, nausea, rectal pain and vomiting.       Physical Exam   Abdominal: Soft. Bowel sounds are normal. He exhibits no distension and no mass. There is no tenderness. There is no rebound and no guarding. No hernia.         ASSESSMENT    Stevenson was seen today for follow-up.    Diagnoses and all orders for this visit:    Generalized abdominal pain        PLAN    1. Recheck as needed  2. Regular diet              This document has been electronically signed by Michel Lala MD on July 17, 2019 10:47 AM    "

## 2019-08-14 ENCOUNTER — OFFICE VISIT (OUTPATIENT)
Dept: ONCOLOGY | Facility: CLINIC | Age: 44
End: 2019-08-14

## 2019-08-14 ENCOUNTER — LAB (OUTPATIENT)
Dept: ONCOLOGY | Facility: HOSPITAL | Age: 44
End: 2019-08-14

## 2019-08-14 VITALS
DIASTOLIC BLOOD PRESSURE: 87 MMHG | HEIGHT: 72 IN | RESPIRATION RATE: 18 BRPM | TEMPERATURE: 98.6 F | OXYGEN SATURATION: 97 % | SYSTOLIC BLOOD PRESSURE: 143 MMHG | HEART RATE: 72 BPM | WEIGHT: 226.6 LBS | BODY MASS INDEX: 30.69 KG/M2

## 2019-08-14 DIAGNOSIS — D75.1 SECONDARY POLYCYTHEMIA: Primary | ICD-10-CM

## 2019-08-14 DIAGNOSIS — D69.6 THROMBOCYTOPENIA (HCC): ICD-10-CM

## 2019-08-14 DIAGNOSIS — D75.1 SECONDARY POLYCYTHEMIA: ICD-10-CM

## 2019-08-14 LAB
ALBUMIN SERPL-MCNC: 4 G/DL (ref 3.5–5.2)
ALBUMIN/GLOB SERPL: 1.5 G/DL
ALP SERPL-CCNC: 44 U/L (ref 39–117)
ALT SERPL W P-5'-P-CCNC: 31 U/L (ref 1–41)
ANION GAP SERPL CALCULATED.3IONS-SCNC: 10 MMOL/L (ref 5–15)
AST SERPL-CCNC: 35 U/L (ref 1–40)
BASOPHILS # BLD AUTO: 0.04 10*3/MM3 (ref 0–0.2)
BASOPHILS NFR BLD AUTO: 0.5 % (ref 0–1.5)
BILIRUB SERPL-MCNC: 0.5 MG/DL (ref 0.2–1.2)
BUN BLD-MCNC: 16 MG/DL (ref 6–20)
BUN/CREAT SERPL: 14.2 (ref 7–25)
CALCIUM SPEC-SCNC: 8.9 MG/DL (ref 8.6–10.5)
CHLORIDE SERPL-SCNC: 104 MMOL/L (ref 98–107)
CO2 SERPL-SCNC: 26 MMOL/L (ref 22–29)
CREAT BLD-MCNC: 1.13 MG/DL (ref 0.76–1.27)
DEPRECATED RDW RBC AUTO: 45.6 FL (ref 37–54)
EOSINOPHIL # BLD AUTO: 0.25 10*3/MM3 (ref 0–0.4)
EOSINOPHIL NFR BLD AUTO: 3.2 % (ref 0.3–6.2)
ERYTHROCYTE [DISTWIDTH] IN BLOOD BY AUTOMATED COUNT: 14.6 % (ref 12.3–15.4)
GFR SERPL CREATININE-BSD FRML MDRD: 71 ML/MIN/1.73
GLOBULIN UR ELPH-MCNC: 2.6 GM/DL
GLUCOSE BLD-MCNC: 88 MG/DL (ref 65–99)
HCT VFR BLD AUTO: 46.9 % (ref 37.5–51)
HGB BLD-MCNC: 15.4 G/DL (ref 13–17.7)
IMM GRANULOCYTES # BLD AUTO: 0.03 10*3/MM3 (ref 0–0.05)
IMM GRANULOCYTES NFR BLD AUTO: 0.4 % (ref 0–0.5)
LYMPHOCYTES # BLD AUTO: 1.18 10*3/MM3 (ref 0.7–3.1)
LYMPHOCYTES NFR BLD AUTO: 15.1 % (ref 19.6–45.3)
MCH RBC QN AUTO: 28.2 PG (ref 26.6–33)
MCHC RBC AUTO-ENTMCNC: 32.8 G/DL (ref 31.5–35.7)
MCV RBC AUTO: 85.7 FL (ref 79–97)
MONOCYTES # BLD AUTO: 0.57 10*3/MM3 (ref 0.1–0.9)
MONOCYTES NFR BLD AUTO: 7.3 % (ref 5–12)
NEUTROPHILS # BLD AUTO: 5.73 10*3/MM3 (ref 1.7–7)
NEUTROPHILS NFR BLD AUTO: 73.5 % (ref 42.7–76)
NRBC BLD AUTO-RTO: 0 /100 WBC (ref 0–0.2)
PLATELET # BLD AUTO: 249 10*3/MM3 (ref 140–450)
PMV BLD AUTO: 9.9 FL (ref 6–12)
POTASSIUM BLD-SCNC: 4.3 MMOL/L (ref 3.5–5.2)
PROT SERPL-MCNC: 6.6 G/DL (ref 6–8.5)
RBC # BLD AUTO: 5.47 10*6/MM3 (ref 4.14–5.8)
SODIUM BLD-SCNC: 140 MMOL/L (ref 136–145)
WBC NRBC COR # BLD: 7.8 10*3/MM3 (ref 3.4–10.8)

## 2019-08-14 PROCEDURE — 85025 COMPLETE CBC W/AUTO DIFF WBC: CPT | Performed by: NURSE PRACTITIONER

## 2019-08-14 PROCEDURE — 36415 COLL VENOUS BLD VENIPUNCTURE: CPT | Performed by: NURSE PRACTITIONER

## 2019-08-14 PROCEDURE — 99213 OFFICE O/P EST LOW 20 MIN: CPT | Performed by: NURSE PRACTITIONER

## 2019-08-14 PROCEDURE — 80053 COMPREHEN METABOLIC PANEL: CPT | Performed by: NURSE PRACTITIONER

## 2019-08-14 NOTE — PROGRESS NOTES
"DATE OF VISIT: 8/14/2019    REASON FOR VISIT:  Follow-up for secondary polycythemia     HISTORY OF PRESENT ILLNESS:  44 yr old male with history of sleep apnea diagnosed 3 yrs ago; recently has been more compliant with use of CPAP;  He has a past history of testosterone use and a history of atrial flutter that has required ablation. He was initially seen by Dr. Monet in October for elevated H&H. He had an extensive work up that included a negative BAKARI 2 mutation, Normal EPO level and Ultrasound of abdomen that was unremarkable. He has had couple therapeutic phlebotomy. Most recent was in September when he was symptomatic. He is taking daily baby ASA and does admit to still taking testosterone supplement.   He denies any erythemalgia symptoms. He is taking baby ASA daily.     PAST MEDICAL HISTORY:    Past Medical History:   Diagnosis Date   • Alcoholism /alcohol abuse (CMS/HCC)    • Anxiety    • Arthritis     shoulders and knees   • Atrial flutter (CMS/HCC)    • Depression    • Diverticulitis    • Esophagitis    • Gastritis    • GERD (gastroesophageal reflux disease)    • Hypertension    • Kidney stone    • Pancreatitis     patient denies   • Sleep apnea     has c-pap       SOCIAL HISTORY:    Social History     Tobacco Use   • Smoking status: Never Smoker   • Smokeless tobacco: Never Used   Substance Use Topics   • Alcohol use: No     Comment: quit 3/17/18   • Drug use: Yes     Frequency: 7.0 times per week     Types: Marijuana     Comment: states he uses \"a little\" every day       Surgical History :  Past Surgical History:   Procedure Laterality Date   • CARDIAC ELECTROPHYSIOLOGY PROCEDURE N/A 5/2/2017    Procedure: Ablation atrial flutter;  Surgeon: Ramirez Reza MD;  [facility];  Service:    • CARDIAC ELECTROPHYSIOLOGY PROCEDURE N/A 9/5/2017    Procedure: Ablation atrial fibrillation PVA ;  Surgeon: Ramirez Reza MD;  [facility];  Service:    • COLON " RESECTION Left 12/20/2018    Procedure: LAPAROSCOPIC SIGMOID  RESECTION, TAKE DOWN OF SPLENIC FLEXURE;  Surgeon: Michel Lala MD;  Location: [facility] OR;  Service: General   • COLONOSCOPY     • COLONOSCOPY N/A 5/30/2018    Procedure: COLONOSCOPY;  Surgeon: Edwin Medina MD;  Location: [facility] ENDOSCOPY;  Service: Gastroenterology   • ENDOSCOPY N/A 3/30/2018    Procedure: ESOPHAGOGASTRODUODENOSCOPY;  Surgeon: Edwin Medina MD;  Location: [facility] ENDOSCOPY;  Service: Gastroenterology   • ENDOSCOPY N/A 5/30/2018    Procedure: ESOPHAGOGASTRODUODENOSCOPY;  Surgeon: Edwin Medina MD;  Location: [facility] ENDOSCOPY;  Service: Gastroenterology   • ENDOSCOPY N/A 12/3/2018    Procedure: ESOPHAGOGASTRODUODENOSCOPY;  Surgeon: Edwin Medina MD;  Location: [facility] ENDOSCOPY;  Service: Gastroenterology   • SHOULDER SURGERY Right 2014   • TONSILLECTOMY  12/2015   • UPPER GASTROINTESTINAL ENDOSCOPY  03/30/2018   • UPPER GASTROINTESTINAL ENDOSCOPY     • UPPER GASTROINTESTINAL ENDOSCOPY  05/30/2018       ALLERGIES:    Allergies   Allergen Reactions   • Contrast Dye Anaphylaxis     ANAPHYLAXIS   • Iodinated Diagnostic Agents Anaphylaxis   • Morphine Nausea And Vomiting   • Zofran [Ondansetron Hcl] Other (See Comments)     IV gives hiccups       REVIEW OF SYSTEMS:      CONSTITUTIONAL:  No fever, chills, or night sweats.     HEENT:  No epistaxis, mouth sores, or difficulty swallowing.    RESPIRATORY:  No new shortness of breath or cough at present.    CARDIOVASCULAR:  No chest pain or palpitations.    GASTROINTESTINAL:  No abdominal pain, nausea, vomiting, or blood in the stool.    GENITOURINARY:  No dysuria or hematuria.    MUSCULOSKELETAL:  No any new back pain or arthralgias.     NEUROLOGICAL:  No tingling or numbness. No new headache or dizziness.     LYMPHATICS:  Denies any abnormal swollen and anywhere in the body.    SKIN:  Denies any new skin rash.    PHYSICAL EXAMINATION:      VITAL SIGNS:  /87   Pulse 72   Temp 98.6  "°F (37 °C) (Temporal)   Resp 18   Ht 182.9 cm (72.01\")   Wt 103 kg (226 lb 9.6 oz)   SpO2 97%   BMI 30.73 kg/m²     GENERAL:  Not in any distress.    HEENT:  Normocephalic, Atraumatic.Mild Conjunctival pallor. No icterus. Extraocular Movements Intact. No Facial Asymmetry noted.    NECK:  No adenopathy. No JVD.    RESPIRATORY:  Fair air entry bilateral. No rhonchi or wheezing.    CARDIOVASCULAR:  S1, S2. Regular rate and rhythm. No murmur or gallop appreciated.    ABDOMEN:  Soft, obese, nontender. Bowel sounds present in all four quadrants.  No organomegaly appreciated.    EXTREMITIES:  No edema.No Calf Tenderness.    NEUROLOGIC:  Alert, awake and oriented ×3.  No  Motor or sensory deficit appreciated. Cranial Nerves 2-12 grossly intact.    SKIN : No new skin lesion identified  DIAGNOSTIC DATA:    Glucose   Date Value Ref Range Status   07/15/2019 76 65 - 99 mg/dL Final     Sodium   Date Value Ref Range Status   07/15/2019 143 136 - 145 mmol/L Final   03/21/2018 137 136 - 145 MMOL/L Final     Potassium   Date Value Ref Range Status   07/15/2019 4.9 3.5 - 5.2 mmol/L Final   03/21/2018 4.3 3.5 - 5.4 MMOL/L Final     CO2   Date Value Ref Range Status   07/15/2019 30.3 (H) 22.0 - 29.0 mmol/L Final   03/21/2018 29 20 - 33 MMOL/L Final     Chloride   Date Value Ref Range Status   07/15/2019 103 98 - 107 mmol/L Final   03/21/2018 97 (L) 98 - 107 MMOL/L Final     Anion Gap   Date Value Ref Range Status   07/15/2019 9.7 5.0 - 15.0 mmol/L Final   03/21/2018 15 (H) 7 - 14 MMOL/L Final     Creatinine   Date Value Ref Range Status   07/15/2019 1.38 (H) 0.76 - 1.27 mg/dL Final   03/21/2018 1.0 0.6 - 1.2 MG/DL Final     BUN   Date Value Ref Range Status   07/15/2019 14 6 - 20 mg/dL Final   03/21/2018 12 8 - 23 MG/DL Final     BUN/Creatinine Ratio   Date Value Ref Range Status   07/15/2019 10.1 7.0 - 25.0 Final     Calcium   Date Value Ref Range Status   07/15/2019 9.1 8.6 - 10.5 mg/dL Final   03/21/2018 9.3 8.7 - 10.4 MG/DL " Final     eGFR Non  Amer   Date Value Ref Range Status   07/15/2019 57 (L) >60 mL/min/1.73 Final     Alkaline Phosphatase   Date Value Ref Range Status   07/15/2019 41 39 - 117 U/L Final   03/21/2018 36 25 - 100 U/L Final     Total Protein   Date Value Ref Range Status   07/15/2019 6.4 6.0 - 8.5 g/dL Final   03/21/2018 7.2 6.0 - 8.2 G/DL Final     ALT (SGPT)   Date Value Ref Range Status   07/15/2019 53 (H) 1 - 41 U/L Final   03/21/2018 37 10 - 40 U/L Final     AST (SGOT)   Date Value Ref Range Status   07/15/2019 45 (H) 1 - 40 U/L Final   03/21/2018 44 (H) 0 - 39 U/L Final     Total Bilirubin   Date Value Ref Range Status   07/15/2019 1.6 (H) 0.2 - 1.2 mg/dL Final   03/21/2018 1.1 0.3 - 1.2 MG/DL Final     Albumin   Date Value Ref Range Status   07/15/2019 4.10 3.50 - 5.20 g/dL Final   03/21/2018 4.4 3.4 - 4.8 G/DL Final     Globulin   Date Value Ref Range Status   07/15/2019 2.3 gm/dL Final     A/G Ratio   Date Value Ref Range Status   03/21/2018 1.6  Final     Lab Results   Component Value Date    WBC 7.80 08/14/2019    HGB 15.4 08/14/2019    HCT 46.9 08/14/2019    MCV 85.7 08/14/2019     08/14/2019     Lab Results   Component Value Date    NEUTROABS 5.73 08/14/2019    IRON 126 04/10/2018    TIBC 345 04/10/2018    LABIRON 37 04/10/2018    FERRITIN 179.00 04/10/2018     Lab Results   Component Value Date    AFPTM 2.2 04/10/2018    AFPTM 182 04/10/2018   ]      ASSESSMENT AND PLAN:       1.  Erythrocytosis,secondary to testosterone use and an element of DONNIE;  Hct today is 46.9;  he is not having any erythromyalgia symptoms; no heme intervention needed at this time. Advised him to continue to stay well hydrated and to continue with daily ASA. Recheck again in 4 mos.     2. Atrial fibrillation, pt was  taken off the Eliquis by his cardiologist and is currently taking baby ASA daily. Scheduled to see him in August.     3. Health maintenance, he does not smoke; he has had a EGD and colonoscopy        This  document has been signed by VIKASH Clinton on August 14, 2019 9:22 AM

## 2019-09-03 ENCOUNTER — OFFICE VISIT (OUTPATIENT)
Dept: SLEEP MEDICINE | Facility: HOSPITAL | Age: 44
End: 2019-09-03

## 2019-09-03 VITALS
SYSTOLIC BLOOD PRESSURE: 140 MMHG | WEIGHT: 225 LBS | HEIGHT: 72 IN | DIASTOLIC BLOOD PRESSURE: 90 MMHG | OXYGEN SATURATION: 97 % | HEART RATE: 92 BPM | BODY MASS INDEX: 30.48 KG/M2

## 2019-09-03 DIAGNOSIS — G47.33 OBSTRUCTIVE SLEEP APNEA, ADULT: Primary | ICD-10-CM

## 2019-09-03 PROCEDURE — 99213 OFFICE O/P EST LOW 20 MIN: CPT | Performed by: NURSE PRACTITIONER

## 2019-09-03 NOTE — PROGRESS NOTES
Sleep Clinic Follow Up    Date: 9/3/2019  Primary Care Physician: Irma Schuster APRN    Last office visit: 2018 (I reviewed this note)    CC: Follow up: DONNIE on CPAP      Interim History:  Since the last visit:    1) moderate DONNIE -  Stevenson Chilel has not been wearing CPAP for ~1 year. He was hospitalized last year and lost over 30 lbs. He had dietary changes and stopped drinking alcohol and has kept his weight off. Since then, he has had no symptoms of DONNIE - no daytime sleepiness, no restless sleep, no snoring or gasping for air at night.  He denies abnormal dreams, sleep paralysis, nasal congestion, URI sx.    Sleep Testin. PSG on 2013, AHI of 15   2. Currently on 5-20 cm H2O    PAP Data:  Has not been wearing CPAP for ~1 year      Bed time: 5961-8770  Sleep latency: 5 minutes  Number of times awakens during the night: 1-2  Wake time: 0700  Estimated total sleep time at night: 7-9 hours  Caffeine intake: 2 cups of coffee, 0 cups of tea, and 0 sodas per day  Alcohol intake: 0 drinks per week  Nap time: none   Sleepiness with Driving: some with long distances     [place] - 7    2) Patient reports frequent RLS symptoms - usually starts once he gets sleepy, but does not disturb sleep.    PMHx, FH, SH reviewed and pertinent changes are: Had colon resection last , has had dissipation in symptoms since then.  Has stopped drinking alcohol.  Has not had issues with snoring or disturbed sleep.      REVIEW OF SYSTEMS:   Had chest pressure last night - thinks it was gas pain related to eating chicken.  Negative for chest pain, SOA, fever, chills, cough, N/V/D, abdominal pain.    Smoking:none      Exam:  Vitals:    19 0849   BP: 140/90   Pulse: 92   SpO2: 97%           19  0849   Weight: 102 kg (225 lb)     Body mass index is 30.51 kg/m². Patient's Body mass index is 30.51 kg/m². BMI is above normal parameters. Recommendations include: referral to primary care.      Gen:                 No distress, conversant, pleasant, appears stated age, alert, oriented  Eyes:               Anicteric sclera, moist conjunctiva, no lid lag                           PERRL, EOMI   Heent:             NC/AT                          Oropharynx clear                          Normal hearing  Lungs:             Normal effort, non-labored breathing                          Clear to auscultation bilaterally          CV:                  Normal S1/S2, no murmur                          No lower extremity edema  ABD:               Soft, rounded, non-distended                          Normal bowel sounds                    Psych:             Appropriate affect  Neuro:             CN 2-12 appear intact    Past Medical History:   Diagnosis Date   • Alcoholism /alcohol abuse (CMS/HCC)    • Anxiety    • Arthritis     shoulders and knees   • Atrial flutter (CMS/HCC)    • Depression    • Diverticulitis    • Esophagitis    • Gastritis    • GERD (gastroesophageal reflux disease)    • Hypertension    • Kidney stone    • Pancreatitis     patient denies   • Sleep apnea     has c-pap       Current Outpatient Medications:   •  aspirin 81 MG EC tablet, Take 81 mg by mouth Daily., Disp: , Rfl:   •  B Complex-C (SUPER B COMPLEX PO), Take 1 tablet by mouth Daily., Disp: , Rfl:   •  carvedilol (COREG) 6.25 MG tablet, Take 6.25 mg by mouth Daily., Disp: , Rfl:   •  clonazePAM (KlonoPIN) 1 MG tablet, Take 1 mg by mouth 2 (Two) Times a Day As Needed for Anxiety., Disp: , Rfl:   •  dicyclomine (BENTYL) 20 MG tablet, Take 1 tablet by mouth Every 6 (Six) Hours., Disp: 20 tablet, Rfl: 0  •  DULoxetine (CYMBALTA) 60 MG capsule, Take 60 mg by mouth Every Night., Disp: , Rfl: 2  •  magnesium oxide (MAGOX) 400 (241.3 MG) MG tablet tablet, Take 400 mg by mouth 2 (Two) Times a Day., Disp: , Rfl:   •  mirtazapine (REMERON) 15 MG tablet, Take 15 mg by mouth Every Night., Disp: , Rfl:   •  pantoprazole (PROTONIX) 40 MG EC tablet, Take 1 tablet by  mouth 2 (Two) Times a Day., Disp: 60 tablet, Rfl: 1  •  Probiotic Product (PROBIOTIC-10 PO), Take 1 tablet by mouth Daily., Disp: , Rfl:       Assessment and Plan:    1. Obstructive sleep apnea Established, stable (1)  1. Stable without CPAP therapy  2. If symptoms return, may need F/U sleep study  3. Encouraged proper sleep hygiene and continuation of lifestyle modifications  4. RTC in 1 year or sooner if needed  2. Depression and anxiety  3. A fib/ flutter - stable      Total time 15 min, more than half spent in face to face counseling and coordination of care.    RTC in 12 months. Patient agrees to return sooner if changes in symptoms.        This document has been electronically signed by VIKASH Florence on September 3, 2019 8:56 AM          CC: Irma Schuster APRN          No ref. provider found

## 2019-09-05 ENCOUNTER — APPOINTMENT (OUTPATIENT)
Dept: GENERAL RADIOLOGY | Facility: HOSPITAL | Age: 44
End: 2019-09-05

## 2019-09-05 ENCOUNTER — HOSPITAL ENCOUNTER (EMERGENCY)
Facility: HOSPITAL | Age: 44
Discharge: HOME | End: 2019-09-05
Attending: EMERGENCY MEDICINE | Admitting: EMERGENCY MEDICINE

## 2019-09-05 VITALS
HEART RATE: 90 BPM | BODY MASS INDEX: 30.45 KG/M2 | SYSTOLIC BLOOD PRESSURE: 159 MMHG | DIASTOLIC BLOOD PRESSURE: 95 MMHG | WEIGHT: 224.8 LBS | HEIGHT: 72 IN | OXYGEN SATURATION: 96 % | RESPIRATION RATE: 16 BRPM | TEMPERATURE: 98.1 F

## 2019-09-05 DIAGNOSIS — J06.9 UPPER RESPIRATORY TRACT INFECTION, UNSPECIFIED TYPE: ICD-10-CM

## 2019-09-05 DIAGNOSIS — R05.8 POST-TUSSIVE SYNCOPE: Primary | ICD-10-CM

## 2019-09-05 PROBLEM — J20.9 ACUTE BRONCHITIS: Status: ACTIVE | Noted: 2019-09-05

## 2019-09-05 LAB
ALBUMIN SERPL-MCNC: 4.1 G/DL (ref 3.5–5.2)
ALBUMIN/GLOB SERPL: 1.6 G/DL
ALP SERPL-CCNC: 53 U/L (ref 39–117)
ALT SERPL W P-5'-P-CCNC: 38 U/L (ref 1–41)
AMPHET+METHAMPHET UR QL: NEGATIVE
AMPHETAMINES UR QL: NEGATIVE
ANION GAP SERPL CALCULATED.3IONS-SCNC: 11 MMOL/L (ref 5–15)
AST SERPL-CCNC: 39 U/L (ref 1–40)
BACTERIA UR QL AUTO: ABNORMAL /HPF
BARBITURATES UR QL SCN: NEGATIVE
BASOPHILS # BLD AUTO: 0.05 10*3/MM3 (ref 0–0.2)
BASOPHILS NFR BLD AUTO: 0.5 % (ref 0–1.5)
BENZODIAZ UR QL SCN: NEGATIVE
BILIRUB SERPL-MCNC: 1.1 MG/DL (ref 0.2–1.2)
BILIRUB UR QL STRIP: NEGATIVE
BUN BLD-MCNC: 16 MG/DL (ref 6–20)
BUN/CREAT SERPL: 13 (ref 7–25)
BUPRENORPHINE SERPL-MCNC: NEGATIVE NG/ML
CALCIUM SPEC-SCNC: 9.5 MG/DL (ref 8.6–10.5)
CANNABINOIDS SERPL QL: POSITIVE
CHLORIDE SERPL-SCNC: 101 MMOL/L (ref 98–107)
CLARITY UR: CLEAR
CO2 SERPL-SCNC: 26 MMOL/L (ref 22–29)
COCAINE UR QL: NEGATIVE
COLOR UR: ABNORMAL
CREAT BLD-MCNC: 1.23 MG/DL (ref 0.76–1.27)
DEPRECATED RDW RBC AUTO: 44.3 FL (ref 37–54)
EOSINOPHIL # BLD AUTO: 0.31 10*3/MM3 (ref 0–0.4)
EOSINOPHIL NFR BLD AUTO: 2.8 % (ref 0.3–6.2)
ERYTHROCYTE [DISTWIDTH] IN BLOOD BY AUTOMATED COUNT: 14.7 % (ref 12.3–15.4)
ETHANOL BLD-MCNC: <10 MG/DL (ref 0–10)
ETHANOL UR QL: <0.01 %
GFR SERPL CREATININE-BSD FRML MDRD: 65 ML/MIN/1.73
GLOBULIN UR ELPH-MCNC: 2.6 GM/DL
GLUCOSE BLD-MCNC: 87 MG/DL (ref 65–99)
GLUCOSE UR STRIP-MCNC: NEGATIVE MG/DL
HCT VFR BLD AUTO: 49.8 % (ref 37.5–51)
HGB BLD-MCNC: 16.5 G/DL (ref 13–17.7)
HGB UR QL STRIP.AUTO: NEGATIVE
HOLD SPECIMEN: NORMAL
HOLD SPECIMEN: NORMAL
HYALINE CASTS UR QL AUTO: ABNORMAL /LPF
IMM GRANULOCYTES # BLD AUTO: 0.02 10*3/MM3 (ref 0–0.05)
IMM GRANULOCYTES NFR BLD AUTO: 0.2 % (ref 0–0.5)
INR PPP: 0.99 (ref 0.8–1.2)
KETONES UR QL STRIP: ABNORMAL
LEUKOCYTE ESTERASE UR QL STRIP.AUTO: NEGATIVE
LYMPHOCYTES # BLD AUTO: 1.36 10*3/MM3 (ref 0.7–3.1)
LYMPHOCYTES NFR BLD AUTO: 12.3 % (ref 19.6–45.3)
MCH RBC QN AUTO: 27.6 PG (ref 26.6–33)
MCHC RBC AUTO-ENTMCNC: 33.1 G/DL (ref 31.5–35.7)
MCV RBC AUTO: 83.4 FL (ref 79–97)
METHADONE UR QL SCN: NEGATIVE
MONOCYTES # BLD AUTO: 0.67 10*3/MM3 (ref 0.1–0.9)
MONOCYTES NFR BLD AUTO: 6 % (ref 5–12)
NEUTROPHILS # BLD AUTO: 8.67 10*3/MM3 (ref 1.7–7)
NEUTROPHILS NFR BLD AUTO: 78.2 % (ref 42.7–76)
NITRITE UR QL STRIP: NEGATIVE
NRBC BLD AUTO-RTO: 0 /100 WBC (ref 0–0.2)
NT-PROBNP SERPL-MCNC: 78.2 PG/ML (ref 5–450)
OPIATES UR QL: NEGATIVE
OXYCODONE UR QL SCN: NEGATIVE
PCP UR QL SCN: NEGATIVE
PH UR STRIP.AUTO: 7 [PH] (ref 5–9)
PLATELET # BLD AUTO: 291 10*3/MM3 (ref 140–450)
PMV BLD AUTO: 10.3 FL (ref 6–12)
POTASSIUM BLD-SCNC: 4.3 MMOL/L (ref 3.5–5.2)
PROPOXYPH UR QL: NEGATIVE
PROT SERPL-MCNC: 6.7 G/DL (ref 6–8.5)
PROT UR QL STRIP: ABNORMAL
PROTHROMBIN TIME: 12.9 SECONDS (ref 11.1–15.3)
RBC # BLD AUTO: 5.97 10*6/MM3 (ref 4.14–5.8)
RBC # UR: ABNORMAL /HPF
REF LAB TEST METHOD: ABNORMAL
SODIUM BLD-SCNC: 138 MMOL/L (ref 136–145)
SP GR UR STRIP: 1.02 (ref 1–1.03)
SQUAMOUS #/AREA URNS HPF: ABNORMAL /HPF
TRICYCLICS UR QL SCN: NEGATIVE
TROPONIN T SERPL-MCNC: <0.01 NG/ML (ref 0–0.03)
UROBILINOGEN UR QL STRIP: ABNORMAL
WBC NRBC COR # BLD: 11.08 10*3/MM3 (ref 3.4–10.8)
WBC UR QL AUTO: ABNORMAL /HPF
WHOLE BLOOD HOLD SPECIMEN: NORMAL
WHOLE BLOOD HOLD SPECIMEN: NORMAL

## 2019-09-05 PROCEDURE — 93010 ELECTROCARDIOGRAM REPORT: CPT | Performed by: INTERNAL MEDICINE

## 2019-09-05 PROCEDURE — 25010000002 LORAZEPAM PER 2 MG: Performed by: EMERGENCY MEDICINE

## 2019-09-05 PROCEDURE — 93005 ELECTROCARDIOGRAM TRACING: CPT

## 2019-09-05 PROCEDURE — 85025 COMPLETE CBC W/AUTO DIFF WBC: CPT | Performed by: EMERGENCY MEDICINE

## 2019-09-05 PROCEDURE — 96374 THER/PROPH/DIAG INJ IV PUSH: CPT

## 2019-09-05 PROCEDURE — 80307 DRUG TEST PRSMV CHEM ANLYZR: CPT | Performed by: EMERGENCY MEDICINE

## 2019-09-05 PROCEDURE — 94640 AIRWAY INHALATION TREATMENT: CPT

## 2019-09-05 PROCEDURE — 93005 ELECTROCARDIOGRAM TRACING: CPT | Performed by: EMERGENCY MEDICINE

## 2019-09-05 PROCEDURE — 83880 ASSAY OF NATRIURETIC PEPTIDE: CPT | Performed by: EMERGENCY MEDICINE

## 2019-09-05 PROCEDURE — 80306 DRUG TEST PRSMV INSTRMNT: CPT | Performed by: EMERGENCY MEDICINE

## 2019-09-05 PROCEDURE — 85610 PROTHROMBIN TIME: CPT | Performed by: EMERGENCY MEDICINE

## 2019-09-05 PROCEDURE — 84484 ASSAY OF TROPONIN QUANT: CPT | Performed by: EMERGENCY MEDICINE

## 2019-09-05 PROCEDURE — 81001 URINALYSIS AUTO W/SCOPE: CPT | Performed by: EMERGENCY MEDICINE

## 2019-09-05 PROCEDURE — 99284 EMERGENCY DEPT VISIT MOD MDM: CPT

## 2019-09-05 PROCEDURE — 80053 COMPREHEN METABOLIC PANEL: CPT | Performed by: EMERGENCY MEDICINE

## 2019-09-05 PROCEDURE — 71045 X-RAY EXAM CHEST 1 VIEW: CPT

## 2019-09-05 RX ORDER — AZITHROMYCIN 250 MG/1
TABLET, FILM COATED ORAL
Qty: 6 TABLET | Refills: 0 | Status: SHIPPED | OUTPATIENT
Start: 2019-09-05 | End: 2019-09-05

## 2019-09-05 RX ORDER — IPRATROPIUM BROMIDE AND ALBUTEROL SULFATE 2.5; .5 MG/3ML; MG/3ML
3 SOLUTION RESPIRATORY (INHALATION) ONCE
Status: COMPLETED | OUTPATIENT
Start: 2019-09-05 | End: 2019-09-05

## 2019-09-05 RX ORDER — LORAZEPAM 2 MG/ML
1 INJECTION INTRAMUSCULAR ONCE
Status: COMPLETED | OUTPATIENT
Start: 2019-09-05 | End: 2019-09-05

## 2019-09-05 RX ORDER — SODIUM CHLORIDE 0.9 % (FLUSH) 0.9 %
10 SYRINGE (ML) INJECTION AS NEEDED
Status: DISCONTINUED | OUTPATIENT
Start: 2019-09-05 | End: 2019-09-05 | Stop reason: HOSPADM

## 2019-09-05 RX ADMIN — IPRATROPIUM BROMIDE AND ALBUTEROL SULFATE 3 ML: 2.5; .5 SOLUTION RESPIRATORY (INHALATION) at 17:15

## 2019-09-05 RX ADMIN — LORAZEPAM 1 MG: 2 INJECTION INTRAMUSCULAR; INTRAVENOUS at 17:08

## 2019-09-05 NOTE — ED PROVIDER NOTES
Subjective   44-year-old male presents emergency department complaint of posttussive syncope, multiple episodes.  Patient has had a history of this.  Patient does have a cardiology outpatient for conductive heart disease, patient has multiple ablations for A. fib flutter.  Patient denies any the symptoms today.  Patient states that he has had an upper respiratory infection causing a cough.  The wife at the bedside brought the patient in today as she thought his passing out look different than his normal.  The patient evidently was shaking with the eyes rolled back in his head this lasted for seconds, the patient then came back to.  There was no postictal period noted.  There was no loss of bowel or bladder control.  Patient had no fevers or chills.  Patient had no significant chest pain at this time.            Review of Systems   Constitutional: Negative.  Negative for appetite change, chills and fever.   HENT: Negative.  Negative for congestion.    Eyes: Negative.  Negative for photophobia and visual disturbance.   Respiratory: Positive for cough, chest tightness and shortness of breath.    Cardiovascular: Negative for chest pain and palpitations.   Gastrointestinal: Negative.  Negative for abdominal pain, constipation, diarrhea, nausea and vomiting.   Endocrine: Negative.    Genitourinary: Negative.  Negative for decreased urine volume, dysuria, flank pain and hematuria.   Musculoskeletal: Negative.  Negative for arthralgias, back pain, myalgias, neck pain and neck stiffness.   Skin: Negative.  Negative for pallor.   Neurological: Positive for dizziness and syncope. Negative for weakness, light-headedness, numbness and headaches.   Psychiatric/Behavioral: Negative.  Negative for confusion and suicidal ideas. The patient is not nervous/anxious.    All other systems reviewed and are negative.      Past Medical History:   Diagnosis Date   • Alcoholism /alcohol abuse (CMS/[facility])    • Anxiety    • Arthritis      shoulders and knees   • Atrial flutter (CMS/HCC)    • Depression    • Diverticulitis    • Esophagitis    • Gastritis    • GERD (gastroesophageal reflux disease)    • Hypertension    • Kidney stone    • Pancreatitis     patient denies   • Sleep apnea     has c-pap       Allergies   Allergen Reactions   • Contrast Dye Anaphylaxis     ANAPHYLAXIS   • Iodinated Diagnostic Agents Anaphylaxis   • Morphine Nausea And Vomiting   • Zofran [Ondansetron Hcl] Other (See Comments)     IV gives hiccups       Past Surgical History:   Procedure Laterality Date   • CARDIAC ELECTROPHYSIOLOGY PROCEDURE N/A 5/2/2017    Procedure: Ablation atrial flutter;  Surgeon: Ramirez Reza MD;  Location:  [facility];  Service:    • CARDIAC ELECTROPHYSIOLOGY PROCEDURE N/A 9/5/2017    Procedure: Ablation atrial fibrillation PVA ;  Surgeon: Ramirez Reza MD;  Location:  [facility];  Service:    • COLON RESECTION Left 12/20/2018    Procedure: LAPAROSCOPIC SIGMOID  RESECTION, TAKE DOWN OF SPLENIC FLEXURE;  Surgeon: Michel Lala MD;  Location: [facility] OR;  Service: General   • COLONOSCOPY     • COLONOSCOPY N/A 5/30/2018    Procedure: COLONOSCOPY;  Surgeon: Edwin Medina MD;  Location: [facility] ENDOSCOPY;  Service: Gastroenterology   • ENDOSCOPY N/A 3/30/2018    Procedure: ESOPHAGOGASTRODUODENOSCOPY;  Surgeon: Edwin Medina MD;  Location: [facility] ENDOSCOPY;  Service: Gastroenterology   • ENDOSCOPY N/A 5/30/2018    Procedure: ESOPHAGOGASTRODUODENOSCOPY;  Surgeon: Edwin Medina MD;  Location: [facility] ENDOSCOPY;  Service: Gastroenterology   • ENDOSCOPY N/A 12/3/2018    Procedure: ESOPHAGOGASTRODUODENOSCOPY;  Surgeon: Edwin Medina MD;  Location: [facility] ENDOSCOPY;  Service: Gastroenterology   • SHOULDER SURGERY Right 2014   • TONSILLECTOMY  12/2015   • UPPER GASTROINTESTINAL ENDOSCOPY  03/30/2018   • UPPER GASTROINTESTINAL ENDOSCOPY     • UPPER GASTROINTESTINAL ENDOSCOPY  05/30/2018       Family History   Problem  "Relation Age of Onset   • Heart disease Father    • Depression Father    • Cancer Maternal Grandmother         lung   • Cancer Paternal Grandmother         pancreatic   • Heart disease Paternal Grandfather        Social History     Socioeconomic History   • Marital status:      Spouse name: Not on file   • Number of children: Not on file   • Years of education: Not on file   • Highest education level: Not on file   Tobacco Use   • Smoking status: Never Smoker   • Smokeless tobacco: Never Used   Substance and Sexual Activity   • Alcohol use: No     Comment: quit 3/17/18   • Drug use: Yes     Frequency: 7.0 times per week     Types: Marijuana     Comment: states he uses \"a little\" every day   • Sexual activity: Defer   Social History Narrative    Pt states has not had drink of any alcohol in 3 weeks as of 4/5/2018           Objective   Physical Exam   Constitutional: He is oriented to person, place, and time. He appears well-developed and well-nourished.  Non-toxic appearance. He does not appear ill. No distress.   HENT:   Head: Normocephalic and atraumatic.   Eyes: Conjunctivae and EOM are normal.   Neck: Normal range of motion. Neck supple. No JVD present.   Cardiovascular: Normal rate, regular rhythm, normal heart sounds and intact distal pulses. Exam reveals no gallop and no friction rub.   No murmur heard.  Pulmonary/Chest: Effort normal. No respiratory distress. He has wheezes in the right upper field and the left upper field. He has no rales. He exhibits no tenderness.   Abdominal: Soft. Bowel sounds are normal. He exhibits no distension and no mass. There is no tenderness. There is no rebound and no guarding.   Musculoskeletal: Normal range of motion.   Neurological: He is alert and oriented to person, place, and time.   Skin: Skin is warm and dry. Capillary refill takes less than 2 seconds.   Psychiatric: He has a normal mood and affect. His behavior is normal. Judgment and thought content normal. "   Nursing note and vitals reviewed.      Procedures           ED Course      Labs Reviewed   CBC WITH AUTO DIFFERENTIAL - Abnormal; Notable for the following components:       Result Value    WBC 11.08 (*)     RBC 5.97 (*)     Neutrophil % 78.2 (*)     Lymphocyte % 12.3 (*)     Neutrophils, Absolute 8.67 (*)     All other components within normal limits   URINE DRUG SCREEN - Abnormal; Notable for the following components:    THC, Screen, Urine Positive (*)     All other components within normal limits    Narrative:     Cutoff For Drugs Screened:    Amphetamines               500 ng/ml  Barbiturates               200 ng/ml  Benzodiazepines            150 ng/ml  Cocaine                    150 ng/ml  Methadone                  200 ng/ml  Opiates                    100 ng/ml  Phencyclidine               25 ng/ml  THC                            50 ng/ml  Methamphetamine            500 ng/ml  Tricyclic Antidepressants  300 ng/ml  Oxycodone                  100 ng/ml  Propoxyphene               300 ng/ml  Buprenorphine               10 ng/ml    The normal value for all drugs tested is negative. This report includes unconfirmed screening results, with the cutoff values listed, to be used for medical treatment purposes only.  Unconfirmed results must not be used for non-medical purposes such as employment or legal testing.  Clinical consideration should be applied to any drug of abuse test, particularly when unconfirmed results are used.     URINALYSIS W/ MICROSCOPIC IF INDICATED (NO CULTURE) - Abnormal; Notable for the following components:    Ketones, UA 15 mg/dL (1+) (*)     Protein, UA 30 mg/dL (1+) (*)     All other components within normal limits   URINALYSIS, MICROSCOPIC ONLY - Abnormal; Notable for the following components:    RBC, UA 0-2 (*)     All other components within normal limits   TROPONIN (IN-HOUSE) - Normal    Narrative:     Troponin T Reference Range:  <= 0.03 ng/mL-   Negative for AMI  >0.03 ng/mL-      Abnormal for myocardial necrosis.  Clinicians would have to utilize clinical acumen, EKG, Troponin and serial changes to determine if it is an Acute Myocardial Infarction or myocardial injury due to an underlying chronic condition.    BNP (IN-HOUSE) - Normal    Narrative:     Among patients with dyspnea, NT-proBNP is highly sensitive for the detection of acute congestive heart failure. In addition NT-proBNP of <300 pg/ml effectively rules out acute congestive heart failure with 99% negative predictive value.   PROTIME-INR - Normal    Narrative:     Therapeutic range for most indications is 2.0-3.0 INR,  or 2.5-3.5 for mechanical heart valves.   RAINBOW DRAW    Narrative:     The following orders were created for panel order [place] Draw.  Procedure                               Abnormality         Status                     ---------                               -----------         ------                     Light Blue Top[[id number]]                                   Final result               Green Top (Gel)[[id number]]                                  Final result               Lavender Top[[id number]]                                     Final result               Gold Top - SST[[id number]]                                   Final result                 Please view results for these tests on the individual orders.   COMPREHENSIVE METABOLIC PANEL    Narrative:     GFR Normal >60  Chronic Kidney Disease <60  Kidney Failure <15   ETHANOL   CBC AND DIFFERENTIAL    Narrative:     The following orders were created for panel order CBC & Differential.  Procedure                               Abnormality         Status                     ---------                               -----------         ------                     CBC Auto Differential[[id number]]        Abnormal            Final result                 Please view results for these tests on the individual orders.   LIGHT BLUE TOP   GREEN TOP   LAVENDER TOP   GOLD TOP  - SST       XR Chest 1 View   Final Result   No acute cardiopulmonary abnormality.      Electronically signed by:  Mike Obrien MD  9/5/2019 4:47 PM CDT   Workstation: [id number]        Patient with posttussive syncope with his URI.  Patient will follow-up with his cardiologist on Tuesday, September 10 for further evaluation of this and possible echocardiogram.  In the interim patient's wife will drive for him.  No evidence of seizure activity on history or exam.            MDM    Final diagnoses:   Post-tussive syncope   Upper respiratory tract infection, unspecified type              Toby Turner MD  09/06/19 0054

## 2019-09-10 ENCOUNTER — OFFICE VISIT (OUTPATIENT)
Dept: CARDIOLOGY | Facility: CLINIC | Age: 44
End: 2019-09-10

## 2019-09-10 VITALS
HEIGHT: 72 IN | BODY MASS INDEX: 30.34 KG/M2 | HEART RATE: 79 BPM | SYSTOLIC BLOOD PRESSURE: 170 MMHG | DIASTOLIC BLOOD PRESSURE: 110 MMHG | WEIGHT: 224 LBS

## 2019-09-10 DIAGNOSIS — R55 VASOVAGAL SYNCOPE: ICD-10-CM

## 2019-09-10 DIAGNOSIS — I48.19 PERSISTENT ATRIAL FIBRILLATION (HCC): Primary | ICD-10-CM

## 2019-09-10 DIAGNOSIS — I10 ESSENTIAL HYPERTENSION: Chronic | ICD-10-CM

## 2019-09-10 PROCEDURE — 99213 OFFICE O/P EST LOW 20 MIN: CPT | Performed by: NURSE PRACTITIONER

## 2019-09-10 RX ORDER — DOXYCYCLINE HYCLATE 100 MG/1
100 CAPSULE ORAL 2 TIMES DAILY
COMMUNITY
End: 2019-10-21

## 2019-09-10 NOTE — PROGRESS NOTES
Stevenson Huynh Ranjana  1975    There is no work phone number on file.      09/10/2019    [facility]     Sharonda Willoughby, APRN  [address] [address]    Chief Complaint   Patient presents with   • Persistent atrial fibrillation (CMS/HCC)       Problem List:   1. Typical atrial flutter  a. Hospitilization 7/2016 for Aflutter in setting of phentermine - cardioverted with Corvert discharged on betablocker  b. Cardiolite stress test 8/2016 Negative for ischemia  c. Echo 7/2016: mild LVH, biatrial enlargement, EF 55%-60%  d. EPS with RFA atrial flutter 11/21/16 with Dr. Alaniz- [facility], [place], [place], recurrence with ECV and initiation of Amiodarone  e. Recurrence with repeat EPS 12/16/17: with bidirectional block documented, Dr. Alaniz  f. RFA of RA flutter 5/2/2017     2. AF   A.  CHADS-Vasc 0 on Eliquis.  B.  Cryoablation of pulmonary veins, 8/3/2017, successful cryoablation, transseptal cath tto LA, intracardiac US, esophageal temperature monitoring. Severe LV dysfunction with LV EF of < 25% by ICE under general anesthesia noted. Transient periods of hypotension associated with drop in o2 sat from 100-92% requiring IV Mundo-Synephrine as well as dopamine with resolution following discontinuation of general anesthesia  C. Echo 9/6/2017: EF 50-55%. Trace MR and TR.  3. CKD  g. BL Renal US 10/11/2017, Increased echogenicity of kidneys, compatible with medical renal disease. No hydronephrosis.  h. Negative abdominal US, 10/20/2017  4    Anxiety/Depression  5    UC - diagnosed at age 23, but not being treated currently  6    Obesity              7    ETOH abuse  8    DONNIE- non compliant  9    Polycytemia: plhebotomy as needed  10  Surgical Hx:  i. Right shoulder  j. Tonsillectomy  k. Colon resection    Allergies  Allergies   Allergen Reactions   • Contrast Dye Anaphylaxis     ANAPHYLAXIS   • Iodinated Diagnostic Agents Anaphylaxis   • Morphine Nausea  And Vomiting   • Zofran [Ondansetron Hcl] Other (See Comments)     IV gives hiccups       Current Medications    Current Outpatient Medications:   •  albuterol (PROVENTIL) (2.5 MG/3ML) 0.083% nebulizer solution, Take 2.5 mg by nebulization Every 6 (Six) Hours As Needed for Wheezing., Disp: 60 mL, Rfl: 0  •  aspirin 81 MG EC tablet, Take 81 mg by mouth Daily., Disp: , Rfl:   •  B Complex-C (SUPER B COMPLEX PO), Take 1 tablet by mouth Daily., Disp: , Rfl:   •  benzonatate (TESSALON) 200 MG capsule, Take 1 capsule by mouth 3 (Three) Times a Day As Needed for Cough for up to 10 days., Disp: 30 capsule, Rfl: 0  •  carvedilol (COREG) 6.25 MG tablet, Take 6.25 mg by mouth Daily., Disp: , Rfl:   •  clonazePAM (KlonoPIN) 1 MG tablet, Take 1 mg by mouth 2 (Two) Times a Day As Needed for Anxiety., Disp: , Rfl:   •  doxycycline (VIBRAMYCIN) 100 MG capsule, Take 100 mg by mouth 2 (Two) Times a Day., Disp: , Rfl:   •  DULoxetine (CYMBALTA) 60 MG capsule, Take 60 mg by mouth Every Night., Disp: , Rfl: 2  •  magnesium oxide (MAGOX) 400 (241.3 MG) MG tablet tablet, Take 400 mg by mouth 2 (Two) Times a Day., Disp: , Rfl:   •  mirtazapine (REMERON) 15 MG tablet, Take 15 mg by mouth Every Night., Disp: , Rfl:   •  pantoprazole (PROTONIX) 40 MG EC tablet, Take 1 tablet by mouth 2 (Two) Times a Day., Disp: 60 tablet, Rfl: 1  •  Probiotic Product (PROBIOTIC-10 PO), Take 1 tablet by mouth Daily., Disp: , Rfl:   •  promethazine-dextromethorphan (PROMETHAZINE-DM) 6.25-15 MG/5ML syrup, Take 5 mL by mouth At Night As Needed for Cough., Disp: 150 mL, Rfl: 0    History of Present Illness   HPI    Pt presents for follow up of atrial fibrillation. Since we last saw the pt, he has had multiple ED visits over the last year with visits for abdominal pain, N/V/dehydration post-colon resection in December secondary to diverticulitis.  His most recent ED visit was for a syncopal episode in the setting of coughing with URI.  He states he was in the  "recliner and had a significant coughing episode and passed out for approximately 30 seconds.  He is currently being treated for pneumonia.  He states prior to getting pneumonia, he was having episodes of near syncope over the last 6 months, usually with position changes.  He states this was worse when he was taking Lisinopril but this has improved some since coming off of his blood pressure medication.  BP's currently running 120's-130's at home.  He has not checked his BP when he is dizzy.  Dizziness will usually resolve once he sits down after a few seconds.  As above, he has had an issue with N/V and dehydration post-colon resection.  He denies any AF episodes, SOB, CP. Denies any bleeding, or TIA/CVA symptoms.     ROS:  General:  Denies fatigue, weight gain or loss  Cardiovascular:  Denies CP, PND, syncope, near syncope, edema or palpitations.  Pulmonary:  Denies HOLLINS, + cough, no wheezing      Vitals:    09/10/19 1056   BP: (!) 170/110   BP Location: Right arm   Patient Position: Sitting   Pulse: 79   Weight: 102 kg (224 lb)   Height: 182.9 cm (72\")     PE:  General: NAD  Neck: no JVD, no carotid bruits, no TM  Heart RRR, NL S1, S2, no rubs, murmurs  Lungs: CTA, no wheezes, rhonchi, or rales  Abd: soft, non-tender, NL BS  Ext: No musculoskeletal deformities, no edema, cyanosis, or clubbing  Psych: normal mood and affect    Diagnostic Data:      Procedures    1. Persistent atrial fibrillation (CMS/HCC)    2. Essential hypertension    3. Vasovagal syncope          Plan:  1) Persistent atrial fibrillation:  - S/p PVA in 2017.  EKG reviewed in system from 9/5 demonstrating sinus rhythm.    2) Anticoagulation:  - Continue ASA  3) HTN:  - Elevated today.  Reports normal BP's at home.  Also having issues with low BP's in the setting of dehydration and nausea and vomiting.    - Wt loss, exercise, salt reduction  4) Vasovagal syncope:  - Episodes of near syncope over the last 6 months, worsening in the setting of " dehydration, nausea, vomiting and low BP's.  Last syncopal episode likely vasovagal secondary to significant coughing episode.  No obvious cardiac etiology.  Would defer further cardiac work up at this time.  Encouraged hydration, slow position changes.    F/up in 12 months    [facility]  9/10/2019  11:46 AM

## 2019-09-19 ENCOUNTER — OFFICE VISIT (OUTPATIENT)
Dept: GASTROENTEROLOGY | Facility: CLINIC | Age: 44
End: 2019-09-19

## 2019-09-19 VITALS
BODY MASS INDEX: 30.45 KG/M2 | WEIGHT: 224.8 LBS | SYSTOLIC BLOOD PRESSURE: 150 MMHG | DIASTOLIC BLOOD PRESSURE: 90 MMHG | OXYGEN SATURATION: 98 % | HEART RATE: 80 BPM | HEIGHT: 72 IN

## 2019-09-19 DIAGNOSIS — R10.13 EPIGASTRIC PAIN: Primary | ICD-10-CM

## 2019-09-19 PROCEDURE — 99213 OFFICE O/P EST LOW 20 MIN: CPT | Performed by: INTERNAL MEDICINE

## 2019-09-19 RX ORDER — PANTOPRAZOLE SODIUM 40 MG/1
40 TABLET, DELAYED RELEASE ORAL 2 TIMES DAILY
Qty: 30 TABLET | Refills: 1 | Status: SHIPPED | OUTPATIENT
Start: 2019-09-19 | End: 2019-09-27 | Stop reason: SDUPTHER

## 2019-09-19 RX ORDER — SUCRALFATE 1 G/1
1 TABLET ORAL 4 TIMES DAILY
Qty: 120 TABLET | Refills: 5 | Status: SHIPPED | OUTPATIENT
Start: 2019-09-19 | End: 2019-10-21

## 2019-09-19 NOTE — PROGRESS NOTES
[facility]      Chief Complaint:   Chief Complaint   Patient presents with   • Diverticulosis of large intestine without hemorrhage +3 more       Subjective     HPI:   Patient with epigastric abdominal pain and a fullness.  Patient states he is doing much better with the recent change in his diet.  Patient denies any nausea vomiting.  Patient denies any changes in bowel habits patient has diverticular disease but is currently on an almost completely meat free diet.    Plan; discussed with patient continuing with current diet we will start patient on Carafate once a day continue patient on Protonix once a day.    Past Medical History:   Past Medical History:   Diagnosis Date   • Alcoholism /alcohol abuse (CMS/[facility])    • Anxiety    • Arthritis     shoulders and knees   • Atrial flutter (CMS/[facility])    • Depression    • Diverticulitis    • Esophagitis    • Gastritis    • GERD (gastroesophageal reflux disease)    • Hypertension    • Kidney stone    • Pancreatitis     patient denies   • Sleep apnea     has c-pap       Past Surgical History:    Past Surgical History:   Procedure Laterality Date   • CARDIAC ELECTROPHYSIOLOGY PROCEDURE N/A 5/2/2017    Procedure: Ablation atrial flutter;  Surgeon: Ramirez Reza MD;  Location: [facility] INVASIVE LOCATION;  Service:    • CARDIAC ELECTROPHYSIOLOGY PROCEDURE N/A 9/5/2017    Procedure: Ablation atrial fibrillation PVA ;  Surgeon: Ramirez Reza MD;  Location: [facility] EP INVASIVE LOCATION;  Service:    • COLON RESECTION Left 12/20/2018    Procedure: LAPAROSCOPIC SIGMOID  RESECTION, TAKE DOWN OF SPLENIC FLEXURE;  Surgeon: Michel Lala MD;  Location: [facility] OR;  Service: General   • COLONOSCOPY     • COLONOSCOPY N/A 5/30/2018    Procedure: COLONOSCOPY;  Surgeon: Edwin Medina MD;  Location: [facility] ENDOSCOPY;  Service: Gastroenterology   • ENDOSCOPY N/A 3/30/2018    Procedure: ESOPHAGOGASTRODUODENOSCOPY;  Surgeon: Edwin Medina MD;  Location: [facility]  ENDOSCOPY;  Service: Gastroenterology   • ENDOSCOPY N/A 5/30/2018    Procedure: ESOPHAGOGASTRODUODENOSCOPY;  Surgeon: Edwin Medina MD;  Location: [facility] ENDOSCOPY;  Service: Gastroenterology   • ENDOSCOPY N/A 12/3/2018    Procedure: ESOPHAGOGASTRODUODENOSCOPY;  Surgeon: Edwin Medina MD;  Location: [facility] ENDOSCOPY;  Service: Gastroenterology   • SHOULDER SURGERY Right 2014   • TONSILLECTOMY  12/2015   • UPPER GASTROINTESTINAL ENDOSCOPY  03/30/2018   • UPPER GASTROINTESTINAL ENDOSCOPY     • UPPER GASTROINTESTINAL ENDOSCOPY  05/30/2018       Family History:  Family History   Problem Relation Age of Onset   • Heart disease Father    • Depression Father    • Cancer Maternal Grandmother         lung   • Cancer Paternal Grandmother         pancreatic   • Heart disease Paternal Grandfather        Social History:   reports that he has never smoked. He has never used smokeless tobacco. He reports that he uses drugs. Drug: Marijuana. Frequency: 7.00 times per week. He reports that he does not drink alcohol.    Medications:   Prior to Admission medications    Medication Sig Start Date End Date Taking? Authorizing Provider   albuterol (PROVENTIL) (2.5 MG/3ML) 0.083% nebulizer solution Take 2.5 mg by nebulization Every 6 (Six) Hours As Needed for Wheezing. 9/5/19  Yes Tia Doherty APRN   aspirin 81 MG EC tablet Take 81 mg by mouth Daily.   Yes Idalmis Medina MD   B Complex-C (SUPER B COMPLEX PO) Take 1 tablet by mouth Daily.   Yes Idalmis Medina MD   carvedilol (COREG) 6.25 MG tablet Take 6.25 mg by mouth Daily.   Yes Idalmis Medina MD   clonazePAM (KlonoPIN) 1 MG tablet Take 1 mg by mouth 2 (Two) Times a Day As Needed for Anxiety.   Yes Idalmis Medina MD   doxycycline (VIBRAMYCIN) 100 MG capsule Take 100 mg by mouth 2 (Two) Times a Day.   Yes Idalmis Medina MD   DULoxetine (CYMBALTA) 60 MG capsule Take 60 mg by mouth Every Night. 8/21/18  Yes Idalmis Medina MD   magnesium  "oxide (MAGOX) 400 (241.3 MG) MG tablet tablet Take 400 mg by mouth 2 (Two) Times a Day.   Yes Idalmis Medina MD   mirtazapine (REMERON) 15 MG tablet Take 15 mg by mouth Every Night.   Yes Idalmis Medina MD   pantoprazole (PROTONIX) 40 MG EC tablet Take 1 tablet by mouth 2 (Two) Times a Day. 9/19/19  Yes Edwin Medina MD   Probiotic Product (PROBIOTIC-10 PO) Take 1 tablet by mouth Daily.   Yes ProviderIdalmis MD   promethazine-dextromethorphan (PROMETHAZINE-DM) 6.25-15 MG/5ML syrup Take 5 mL by mouth At Night As Needed for Cough. 9/5/19  Yes Tia Doherty APRN   pantoprazole (PROTONIX) 40 MG EC tablet Take 1 tablet by mouth 2 (Two) Times a Day. 4/28/19 9/19/19 Yes Alexandre Borja MD   sucralfate (CARAFATE) 1 g tablet Take 1 tablet by mouth 4 (Four) Times a Day. 9/19/19   Edwin Medina MD       Allergies:  Contrast dye; Iodinated diagnostic agents; Morphine; and Zofran [ondansetron hcl]    ROS:    Review of Systems   Constitutional: Negative for activity change, appetite change and unexpected weight change.   HENT: Negative for congestion, sore throat and trouble swallowing.    Respiratory: Negative for cough, choking and shortness of breath.    Cardiovascular: Negative for chest pain.   Gastrointestinal: Negative for abdominal distention, abdominal pain, anal bleeding, blood in stool, constipation, diarrhea, nausea, rectal pain and vomiting.   Endocrine: Negative for heat intolerance, polydipsia and polyphagia.   Genitourinary: Negative for difficulty urinating.   Musculoskeletal: Negative for arthralgias.   Skin: Negative for color change, pallor, rash and wound.   Allergic/Immunologic: Negative for food allergies.   Neurological: Negative for dizziness, syncope, weakness and headaches.   Psychiatric/Behavioral: Negative for agitation, behavioral problems, confusion and decreased concentration.     Objective     Blood pressure 150/90, pulse 80, height 182.9 cm (72\"), weight 102 kg " (224 lb 12.8 oz), SpO2 98 %.    Physical Exam   Constitutional: He is oriented to person, place, and time. He appears well-developed and well-nourished. No distress.   HENT:   Head: Normocephalic and atraumatic.   Cardiovascular: Normal rate, regular rhythm, normal heart sounds and intact distal pulses. Exam reveals no gallop and no friction rub.   No murmur heard.  Pulmonary/Chest: Breath sounds normal. No respiratory distress. He has no wheezes. He has no rales. He exhibits no tenderness.   Abdominal: Soft. Bowel sounds are normal. He exhibits no distension and no mass. There is no tenderness. There is no rebound and no guarding. No hernia.   Musculoskeletal: Normal range of motion. He exhibits no edema.   Neurological: He is alert and oriented to person, place, and time.   Skin: Skin is warm and dry. No rash noted. He is not diaphoretic. No erythema. No pallor.   Psychiatric: He has a normal mood and affect. His behavior is normal. Judgment and thought content normal.        Assessment/Plan   Stevenson was seen today for diverticulosis of large intestine without hemorrhage +3 more.    Diagnoses and all orders for this visit:    Epigastric pain    Other orders  -     pantoprazole (PROTONIX) 40 MG EC tablet; Take 1 tablet by mouth 2 (Two) Times a Day.  -     sucralfate (CARAFATE) 1 g tablet; Take 1 tablet by mouth 4 (Four) Times a Day.        * Surgery not found *     Diagnosis Plan   1. Epigastric pain         Anticipated Surgical Procedure:  No orders of the defined types were placed in this encounter.      The risks, benefits, and alternatives of this procedure have been discussed with the patient or the responsible party- the patient understands and agrees to proceed.

## 2019-09-25 ENCOUNTER — HOSPITAL ENCOUNTER (EMERGENCY)
Facility: HOSPITAL | Age: 44
Discharge: HOME | End: 2019-09-25
Attending: FAMILY MEDICINE | Admitting: FAMILY MEDICINE

## 2019-09-25 ENCOUNTER — HOSPITAL ENCOUNTER (EMERGENCY)
Facility: HOSPITAL | Age: 44
Discharge: HOME | End: 2019-09-25
Attending: EMERGENCY MEDICINE | Admitting: EMERGENCY MEDICINE

## 2019-09-25 ENCOUNTER — TELEPHONE (OUTPATIENT)
Dept: GASTROENTEROLOGY | Facility: CLINIC | Age: 44
End: 2019-09-25

## 2019-09-25 ENCOUNTER — APPOINTMENT (OUTPATIENT)
Dept: GENERAL RADIOLOGY | Facility: HOSPITAL | Age: 44
End: 2019-09-25

## 2019-09-25 VITALS
OXYGEN SATURATION: 98 % | BODY MASS INDEX: 29.12 KG/M2 | RESPIRATION RATE: 20 BRPM | DIASTOLIC BLOOD PRESSURE: 68 MMHG | HEART RATE: 116 BPM | HEIGHT: 72 IN | SYSTOLIC BLOOD PRESSURE: 121 MMHG | TEMPERATURE: 99.5 F | WEIGHT: 215 LBS

## 2019-09-25 VITALS
HEIGHT: 72 IN | SYSTOLIC BLOOD PRESSURE: 182 MMHG | HEART RATE: 99 BPM | TEMPERATURE: 97.8 F | DIASTOLIC BLOOD PRESSURE: 99 MMHG | RESPIRATION RATE: 18 BRPM | BODY MASS INDEX: 30.61 KG/M2 | OXYGEN SATURATION: 96 % | WEIGHT: 226 LBS

## 2019-09-25 DIAGNOSIS — D75.1 SECONDARY POLYCYTHEMIA: ICD-10-CM

## 2019-09-25 DIAGNOSIS — R11.2 NON-INTRACTABLE VOMITING WITH NAUSEA, UNSPECIFIED VOMITING TYPE: Primary | ICD-10-CM

## 2019-09-25 DIAGNOSIS — R11.15 NON-INTRACTABLE CYCLICAL VOMITING WITH NAUSEA: Primary | ICD-10-CM

## 2019-09-25 LAB
ALBUMIN SERPL-MCNC: 4.1 G/DL (ref 3.5–5.2)
ALBUMIN SERPL-MCNC: 4.3 G/DL (ref 3.5–5.2)
ALBUMIN/GLOB SERPL: 1.3 G/DL
ALBUMIN/GLOB SERPL: 1.4 G/DL
ALP SERPL-CCNC: 39 U/L (ref 39–117)
ALP SERPL-CCNC: 41 U/L (ref 39–117)
ALT SERPL W P-5'-P-CCNC: 39 U/L (ref 1–41)
ALT SERPL W P-5'-P-CCNC: 45 U/L (ref 1–41)
ANION GAP SERPL CALCULATED.3IONS-SCNC: 17 MMOL/L (ref 5–15)
ANION GAP SERPL CALCULATED.3IONS-SCNC: 17 MMOL/L (ref 5–15)
AST SERPL-CCNC: 37 U/L (ref 1–40)
AST SERPL-CCNC: 47 U/L (ref 1–40)
BASOPHILS # BLD AUTO: 0.02 10*3/MM3 (ref 0–0.2)
BASOPHILS # BLD AUTO: 0.04 10*3/MM3 (ref 0–0.2)
BASOPHILS NFR BLD AUTO: 0.2 % (ref 0–1.5)
BASOPHILS NFR BLD AUTO: 0.5 % (ref 0–1.5)
BILIRUB SERPL-MCNC: 0.7 MG/DL (ref 0.2–1.2)
BILIRUB SERPL-MCNC: 1 MG/DL (ref 0.2–1.2)
BUN BLD-MCNC: 10 MG/DL (ref 6–20)
BUN BLD-MCNC: 11 MG/DL (ref 6–20)
BUN/CREAT SERPL: 8.8 (ref 7–25)
BUN/CREAT SERPL: 9.6 (ref 7–25)
CALCIUM SPEC-SCNC: 9.2 MG/DL (ref 8.6–10.5)
CALCIUM SPEC-SCNC: 9.6 MG/DL (ref 8.6–10.5)
CHLORIDE SERPL-SCNC: 101 MMOL/L (ref 98–107)
CHLORIDE SERPL-SCNC: 103 MMOL/L (ref 98–107)
CO2 SERPL-SCNC: 23 MMOL/L (ref 22–29)
CO2 SERPL-SCNC: 23 MMOL/L (ref 22–29)
CREAT BLD-MCNC: 1.13 MG/DL (ref 0.76–1.27)
CREAT BLD-MCNC: 1.15 MG/DL (ref 0.76–1.27)
DEPRECATED RDW RBC AUTO: 48.2 FL (ref 37–54)
DEPRECATED RDW RBC AUTO: 48.5 FL (ref 37–54)
EOSINOPHIL # BLD AUTO: 0 10*3/MM3 (ref 0–0.4)
EOSINOPHIL # BLD AUTO: 0.04 10*3/MM3 (ref 0–0.4)
EOSINOPHIL NFR BLD AUTO: 0 % (ref 0.3–6.2)
EOSINOPHIL NFR BLD AUTO: 0.5 % (ref 0.3–6.2)
ERYTHROCYTE [DISTWIDTH] IN BLOOD BY AUTOMATED COUNT: 16.8 % (ref 12.3–15.4)
ERYTHROCYTE [DISTWIDTH] IN BLOOD BY AUTOMATED COUNT: 17.2 % (ref 12.3–15.4)
GFR SERPL CREATININE-BSD FRML MDRD: 70 ML/MIN/1.73
GFR SERPL CREATININE-BSD FRML MDRD: 71 ML/MIN/1.73
GLOBULIN UR ELPH-MCNC: 3 GM/DL
GLOBULIN UR ELPH-MCNC: 3.2 GM/DL
GLUCOSE BLD-MCNC: 101 MG/DL (ref 65–99)
GLUCOSE BLD-MCNC: 115 MG/DL (ref 65–99)
HCT VFR BLD AUTO: 49 % (ref 37.5–51)
HCT VFR BLD AUTO: 50.3 % (ref 37.5–51)
HGB BLD-MCNC: 16.1 G/DL (ref 13–17.7)
HGB BLD-MCNC: 16.5 G/DL (ref 13–17.7)
HOLD SPECIMEN: NORMAL
IMM GRANULOCYTES # BLD AUTO: 0.02 10*3/MM3 (ref 0–0.05)
IMM GRANULOCYTES # BLD AUTO: 0.03 10*3/MM3 (ref 0–0.05)
IMM GRANULOCYTES NFR BLD AUTO: 0.2 % (ref 0–0.5)
IMM GRANULOCYTES NFR BLD AUTO: 0.3 % (ref 0–0.5)
LIPASE SERPL-CCNC: 20 U/L (ref 13–60)
LYMPHOCYTES # BLD AUTO: 0.58 10*3/MM3 (ref 0.7–3.1)
LYMPHOCYTES # BLD AUTO: 0.76 10*3/MM3 (ref 0.7–3.1)
LYMPHOCYTES NFR BLD AUTO: 6.5 % (ref 19.6–45.3)
LYMPHOCYTES NFR BLD AUTO: 9.4 % (ref 19.6–45.3)
MCH RBC QN AUTO: 27.4 PG (ref 26.6–33)
MCH RBC QN AUTO: 27.4 PG (ref 26.6–33)
MCHC RBC AUTO-ENTMCNC: 32.8 G/DL (ref 31.5–35.7)
MCHC RBC AUTO-ENTMCNC: 32.9 G/DL (ref 31.5–35.7)
MCV RBC AUTO: 83.3 FL (ref 79–97)
MCV RBC AUTO: 83.4 FL (ref 79–97)
MONOCYTES # BLD AUTO: 0.34 10*3/MM3 (ref 0.1–0.9)
MONOCYTES # BLD AUTO: 0.47 10*3/MM3 (ref 0.1–0.9)
MONOCYTES NFR BLD AUTO: 3.8 % (ref 5–12)
MONOCYTES NFR BLD AUTO: 5.8 % (ref 5–12)
NEUTROPHILS # BLD AUTO: 6.73 10*3/MM3 (ref 1.7–7)
NEUTROPHILS # BLD AUTO: 8 10*3/MM3 (ref 1.7–7)
NEUTROPHILS NFR BLD AUTO: 83.6 % (ref 42.7–76)
NEUTROPHILS NFR BLD AUTO: 89.2 % (ref 42.7–76)
NRBC BLD AUTO-RTO: 0 /100 WBC (ref 0–0.2)
NRBC BLD AUTO-RTO: 0 /100 WBC (ref 0–0.2)
PLATELET # BLD AUTO: 238 10*3/MM3 (ref 140–450)
PLATELET # BLD AUTO: 244 10*3/MM3 (ref 140–450)
PMV BLD AUTO: 10.1 FL (ref 6–12)
PMV BLD AUTO: 9.9 FL (ref 6–12)
POTASSIUM BLD-SCNC: 4.1 MMOL/L (ref 3.5–5.2)
POTASSIUM BLD-SCNC: 4.1 MMOL/L (ref 3.5–5.2)
PROT SERPL-MCNC: 7.3 G/DL (ref 6–8.5)
PROT SERPL-MCNC: 7.3 G/DL (ref 6–8.5)
RBC # BLD AUTO: 5.88 10*6/MM3 (ref 4.14–5.8)
RBC # BLD AUTO: 6.03 10*6/MM3 (ref 4.14–5.8)
SODIUM BLD-SCNC: 141 MMOL/L (ref 136–145)
SODIUM BLD-SCNC: 143 MMOL/L (ref 136–145)
TROPONIN T SERPL-MCNC: <0.01 NG/ML (ref 0–0.03)
WBC NRBC COR # BLD: 8.06 10*3/MM3 (ref 3.4–10.8)
WBC NRBC COR # BLD: 8.97 10*3/MM3 (ref 3.4–10.8)
WHOLE BLOOD HOLD SPECIMEN: NORMAL

## 2019-09-25 PROCEDURE — 96374 THER/PROPH/DIAG INJ IV PUSH: CPT

## 2019-09-25 PROCEDURE — 93010 ELECTROCARDIOGRAM REPORT: CPT | Performed by: INTERNAL MEDICINE

## 2019-09-25 PROCEDURE — 25010000002 HALOPERIDOL LACTATE PER 5 MG: Performed by: EMERGENCY MEDICINE

## 2019-09-25 PROCEDURE — 85025 COMPLETE CBC W/AUTO DIFF WBC: CPT | Performed by: NURSE PRACTITIONER

## 2019-09-25 PROCEDURE — 93005 ELECTROCARDIOGRAM TRACING: CPT | Performed by: EMERGENCY MEDICINE

## 2019-09-25 PROCEDURE — 25010000002 PROMETHAZINE PER 50 MG: Performed by: FAMILY MEDICINE

## 2019-09-25 PROCEDURE — 80053 COMPREHEN METABOLIC PANEL: CPT | Performed by: NURSE PRACTITIONER

## 2019-09-25 PROCEDURE — 85025 COMPLETE CBC W/AUTO DIFF WBC: CPT

## 2019-09-25 PROCEDURE — 84484 ASSAY OF TROPONIN QUANT: CPT | Performed by: EMERGENCY MEDICINE

## 2019-09-25 PROCEDURE — 93005 ELECTROCARDIOGRAM TRACING: CPT | Performed by: FAMILY MEDICINE

## 2019-09-25 PROCEDURE — 93005 ELECTROCARDIOGRAM TRACING: CPT

## 2019-09-25 PROCEDURE — 96375 TX/PRO/DX INJ NEW DRUG ADDON: CPT

## 2019-09-25 PROCEDURE — 99283 EMERGENCY DEPT VISIT LOW MDM: CPT

## 2019-09-25 PROCEDURE — 71046 X-RAY EXAM CHEST 2 VIEWS: CPT

## 2019-09-25 PROCEDURE — 83690 ASSAY OF LIPASE: CPT | Performed by: EMERGENCY MEDICINE

## 2019-09-25 PROCEDURE — 25010000002 HALOPERIDOL LACTATE PER 5 MG: Performed by: FAMILY MEDICINE

## 2019-09-25 PROCEDURE — 80053 COMPREHEN METABOLIC PANEL: CPT | Performed by: EMERGENCY MEDICINE

## 2019-09-25 RX ORDER — PANTOPRAZOLE SODIUM 40 MG/10ML
40 INJECTION, POWDER, LYOPHILIZED, FOR SOLUTION INTRAVENOUS ONCE
Status: COMPLETED | OUTPATIENT
Start: 2019-09-25 | End: 2019-09-25

## 2019-09-25 RX ORDER — HALOPERIDOL 5 MG/ML
2 INJECTION INTRAMUSCULAR ONCE
Status: COMPLETED | OUTPATIENT
Start: 2019-09-25 | End: 2019-09-25

## 2019-09-25 RX ORDER — SODIUM CHLORIDE 0.9 % (FLUSH) 0.9 %
10 SYRINGE (ML) INJECTION AS NEEDED
Status: DISCONTINUED | OUTPATIENT
Start: 2019-09-25 | End: 2019-09-25 | Stop reason: HOSPADM

## 2019-09-25 RX ORDER — SODIUM CHLORIDE 9 MG/ML
125 INJECTION, SOLUTION INTRAVENOUS CONTINUOUS
Status: DISCONTINUED | OUTPATIENT
Start: 2019-09-25 | End: 2019-09-25 | Stop reason: HOSPADM

## 2019-09-25 RX ORDER — PROMETHAZINE HYDROCHLORIDE 25 MG/1
25 TABLET ORAL EVERY 6 HOURS PRN
Qty: 15 TABLET | Refills: 0 | Status: SHIPPED | OUTPATIENT
Start: 2019-09-25 | End: 2019-11-05

## 2019-09-25 RX ORDER — HALOPERIDOL 5 MG/ML
5 INJECTION INTRAMUSCULAR ONCE
Status: COMPLETED | OUTPATIENT
Start: 2019-09-25 | End: 2019-09-25

## 2019-09-25 RX ORDER — PROMETHAZINE HYDROCHLORIDE 25 MG/ML
12.5 INJECTION, SOLUTION INTRAMUSCULAR; INTRAVENOUS ONCE
Status: COMPLETED | OUTPATIENT
Start: 2019-09-25 | End: 2019-09-25

## 2019-09-25 RX ADMIN — HALOPERIDOL LACTATE 2 MG: 5 INJECTION, SOLUTION INTRAMUSCULAR at 21:26

## 2019-09-25 RX ADMIN — PANTOPRAZOLE SODIUM 40 MG: 40 INJECTION, POWDER, FOR SOLUTION INTRAVENOUS at 08:02

## 2019-09-25 RX ADMIN — PROMETHAZINE HYDROCHLORIDE 12.5 MG: 25 INJECTION INTRAMUSCULAR; INTRAVENOUS at 19:41

## 2019-09-25 RX ADMIN — HALOPERIDOL LACTATE 5 MG: 5 INJECTION, SOLUTION INTRAMUSCULAR at 08:03

## 2019-09-25 RX ADMIN — SODIUM CHLORIDE 1000 ML: 9 INJECTION, SOLUTION INTRAVENOUS at 19:41

## 2019-09-25 NOTE — TELEPHONE ENCOUNTER
09/25/2019, 1637 - Patient's wife, Gunjan olson, telephoned this staff member with request for A.M. clinical appointment with Dr. Edwin Ureña M.D. tomorrow, Thursday, September 26, 2019.  Wife made aware Dr. Edwin Ureña M.D. will be at in office at [place], [place] in A.M. tomorrow.  Wife offered A.M. clinical appointment with Dr. Siva Marie M.D. Friday, September 27, 2019 at 9:45 A.M.  Patient accepted offer.  Wife instructed to inform patient to seek medical care at Emergency Department should symptoms of nausea and vomiting persist or worsen.  Wife verbalized understanding.

## 2019-09-25 NOTE — TELEPHONE ENCOUNTER
----- Message from Litzy Daigle MA sent at 9/25/2019  4:19 PM CDT -----  Please call the patient he was in the ED this morning with nausea vomiting after med change by Dr. Medina last week. Patient states the ED did nothing for him they did not even run a bag of fluids through him claiming that his labs did not show infection. His wife states that he is gray in color and she knows that he is very very ill. I put him on Dr. Marie today he was unable to attend that appointment because he was so sick he could not get out of bed. He is requesting an appointment with Dr. Medina tomorrow.

## 2019-09-25 NOTE — ED PROVIDER NOTES
"Subjective   44-year-old male presents the emergency department with complaint of chest pain that began after multiple episodes of vomiting.  He reports history of cyclic vomiting disease as well as significant gastric reflux disease.  He states that he saw GI this week and had a medication change.  He reports that his PPI was cut down to once per day and he was started on Carafate to be taken up to 4 times a day but at least at night before bed.  He states he has been taking his medications but his symptoms have increased throughout the week.  He states that he has had \"14 hours of continuous vomiting\".  Denies any blood in his emesis.  Denies any diarrhea.  States history of diverticulitis.  Denies any abdominal pain.  States sweats but denies fevers.  Denies shortness of breath.    Family history, surgical history, social history, current medications and allergies are reviewed with the patient and triage documentation and vitals are reviewed.          History provided by:  Patient and relative   used: No        Review of Systems   Constitutional: Positive for diaphoresis. Negative for appetite change, chills and fever.   HENT: Negative.    Eyes: Negative.    Respiratory: Negative for cough, shortness of breath and wheezing.    Cardiovascular: Positive for chest pain. Negative for palpitations and leg swelling.   Gastrointestinal: Positive for nausea and vomiting. Negative for abdominal pain, constipation and diarrhea.   Endocrine: Negative.    Genitourinary: Negative for dysuria, frequency and urgency.   Musculoskeletal: Negative for arthralgias, back pain, myalgias and neck pain.   Skin: Negative for color change, pallor, rash and wound.   Allergic/Immunologic: Negative.    Neurological: Negative for weakness, light-headedness and numbness.   Hematological: Negative.    Psychiatric/Behavioral: Negative.        Past Medical History:   Diagnosis Date   • Alcoholism /alcohol abuse (CMS/[facility])    • " Anxiety    • Arthritis     shoulders and knees   • Atrial flutter (CMS/[facility])    • Depression    • Diverticulitis    • Esophagitis    • Gastritis    • GERD (gastroesophageal reflux disease)    • Hypertension    • Kidney stone    • Pancreatitis     patient denies   • Sleep apnea     has c-pap       Allergies   Allergen Reactions   • Contrast Dye Anaphylaxis     ANAPHYLAXIS   • Iodinated Diagnostic Agents Anaphylaxis   • Morphine Nausea And Vomiting   • Zofran [Ondansetron Hcl] Other (See Comments)     IV gives hiccups       Past Surgical History:   Procedure Laterality Date   • CARDIAC ELECTROPHYSIOLOGY PROCEDURE N/A 5/2/2017    Procedure: Ablation atrial flutter;  Surgeon: Ramirez Reza MD;  Location:  [facility] INVASIVE LOCATION;  Service:    • CARDIAC ELECTROPHYSIOLOGY PROCEDURE N/A 9/5/2017    Procedure: Ablation atrial fibrillation PVA ;  Surgeon: Ramirez Reza MD;  Location:  [facility] INVASIVE LOCATION;  Service:    • COLON RESECTION Left 12/20/2018    Procedure: LAPAROSCOPIC SIGMOID  RESECTION, TAKE DOWN OF SPLENIC FLEXURE;  Surgeon: Michel Lala MD;  Location: [facility] OR;  Service: General   • COLONOSCOPY     • COLONOSCOPY N/A 5/30/2018    Procedure: COLONOSCOPY;  Surgeon: Edwin Medina MD;  Location: [facility] ENDOSCOPY;  Service: Gastroenterology   • ENDOSCOPY N/A 3/30/2018    Procedure: ESOPHAGOGASTRODUODENOSCOPY;  Surgeon: Edwin Medina MD;  Location: [facility] ENDOSCOPY;  Service: Gastroenterology   • ENDOSCOPY N/A 5/30/2018    Procedure: ESOPHAGOGASTRODUODENOSCOPY;  Surgeon: Edwin Medina MD;  Location: [facility] ENDOSCOPY;  Service: Gastroenterology   • ENDOSCOPY N/A 12/3/2018    Procedure: ESOPHAGOGASTRODUODENOSCOPY;  Surgeon: Edwin Medina MD;  Location: [facility] ENDOSCOPY;  Service: Gastroenterology   • SHOULDER SURGERY Right 2014   • TONSILLECTOMY  12/2015   • UPPER GASTROINTESTINAL ENDOSCOPY  03/30/2018   • UPPER GASTROINTESTINAL ENDOSCOPY     • UPPER GASTROINTESTINAL ENDOSCOPY  05/30/2018  "      Family History   Problem Relation Age of Onset   • Heart disease Father    • Depression Father    • Cancer Maternal Grandmother         lung   • Cancer Paternal Grandmother         pancreatic   • Heart disease Paternal Grandfather        Social History     Socioeconomic History   • Marital status:      Spouse name: Not on file   • Number of children: Not on file   • Years of education: Not on file   • Highest education level: Not on file   Tobacco Use   • Smoking status: Never Smoker   • Smokeless tobacco: Never Used   Substance and Sexual Activity   • Alcohol use: No     Comment: quit 3/17/18   • Drug use: Yes     Frequency: 7.0 times per week     Types: Marijuana     Comment: states he uses \"a little\" every day   • Sexual activity: Defer   Social History Narrative    Pt states has not had drink of any alcohol in 3 weeks as of 4/5/2018           Objective   Physical Exam   Constitutional: He is oriented to person, place, and time. He appears well-developed and well-nourished.  Non-toxic appearance. He does not appear ill. No distress.   HENT:   Head: Normocephalic.   Eyes: Pupils are equal, round, and reactive to light.   Neck: Normal range of motion. Neck supple.   Cardiovascular: Normal rate, regular rhythm, intact distal pulses and normal pulses.  No extrasystoles are present.   No murmur heard.  Pulses:       Radial pulses are 2+ on the right side, and 2+ on the left side.        Dorsalis pedis pulses are 2+ on the right side, and 2+ on the left side.   Pulmonary/Chest: Effort normal and breath sounds normal. No accessory muscle usage. No respiratory distress. He has no decreased breath sounds. He has no wheezes. He has no rhonchi. He has no rales.   Abdominal: Soft. Bowel sounds are normal. He exhibits no distension and no ascites. There is no tenderness. There is no rebound and no guarding.   Musculoskeletal: Normal range of motion.        Right lower leg: Normal. He exhibits no tenderness and no " edema.        Left lower leg: Normal. He exhibits no tenderness and no edema.   Neurological: He is alert and oriented to person, place, and time.   Skin: Skin is warm and dry. Capillary refill takes less than 2 seconds. He is not diaphoretic.   Psychiatric: He has a normal mood and affect. His behavior is normal.   Nursing note and vitals reviewed.      Procedures  none         ED Course      Labs Reviewed   COMPREHENSIVE METABOLIC PANEL - Abnormal; Notable for the following components:       Result Value    Glucose 115 (*)     ALT (SGPT) 45 (*)     AST (SGOT) 47 (*)     Anion Gap 17.0 (*)     All other components within normal limits    Narrative:     GFR Normal >60  Chronic Kidney Disease <60  Kidney Failure <15   CBC WITH AUTO DIFFERENTIAL - Abnormal; Notable for the following components:    RBC 6.03 (*)     RDW 16.8 (*)     Neutrophil % 83.6 (*)     Lymphocyte % 9.4 (*)     All other components within normal limits   LIPASE - Normal   TROPONIN (IN-HOUSE) - Normal    Narrative:     Troponin T Reference Range:  <= 0.03 ng/mL-   Negative for AMI  >0.03 ng/mL-     Abnormal for myocardial necrosis.  Clinicians would have to utilize clinical acumen, EKG, Troponin and serial changes to determine if it is an Acute Myocardial Infarction or myocardial injury due to an underlying chronic condition.    RAINBOW DRAW    Narrative:     The following orders were created for panel order [place] Draw.  Procedure                               Abnormality         Status                     ---------                               -----------         ------                     Light Blue Top[[id number]]                                   Final result               Green Top (Gel)[[id number]]                                  Final result               Lavender Top[[id number]]                                     Final result               Gold Top - SST[[id number]]                                   Final result                 Please view  results for these tests on the individual orders.   URINALYSIS W/ MICROSCOPIC IF INDICATED (NO CULTURE)   CBC AND DIFFERENTIAL    Narrative:     The following orders were created for panel order CBC & Differential.  Procedure                               Abnormality         Status                     ---------                               -----------         ------                     CBC Auto Differential[[id number]]        Abnormal            Final result                 Please view results for these tests on the individual orders.   LIGHT BLUE TOP   GREEN TOP   LAVENDER TOP   GOLD TOP - SST     Xr Chest 2 View    Result Date: 9/25/2019  Narrative: Chest 2 view on  9/25/2019 CLINICAL INDICATION: Upper abdominal pain, vomiting, weakness COMPARISON: 9/5/2019 FINDINGS: The lungs are clear. Cardiac, hilar and mediastinal contours are within normal limits. Pulmonary vascularity is within normal limits. There has been prior resection of the right distal clavicle. No acute bony abnormality is noted.     Impression: No active disease. Electronically signed by:  Carlos Gilman  9/25/2019 6:57 AM CDT Workstation: [id number]    Xr Chest 1 View    Result Date: 9/5/2019  Narrative: PROCEDURE: Single chest view portable erect REASON FOR EXAM:Chest Pain triage protocol Chest Pain Triage Protocol FINDINGS: Comparison exam dated April 25, 2019. Cardiac and pulmonary vasculature are normal. Lungs are clear. Pleural spaces are normal. No acute osseous abnormality. Postsurgical changes consistent with distal right clavicle partial resection.     Impression: No acute cardiopulmonary abnormality. Electronically signed by:  Mike Obrien MD  9/5/2019 4:47 PM CDT Workstation: [id number]    EKG September 25, 2019 at 0 633 reveals normal sinus rhythm rate of 97 bpm.  There is T wave flattening in aVL without inversion.  No ST elevation or depression.  No evidence of acute ischemia.  Unchanged from previous.          MDM  Number of Diagnoses or  Management Options     Amount and/or Complexity of Data Reviewed  Clinical lab tests: reviewed  Tests in the radiology section of CPT®: reviewed    Patient Progress  Patient progress: stable    Patient's laboratory studies are unremarkable other than a slight increase in anion gap.  No signs of dehydration.  Patient tolerates p.o. fluids without further vomiting after medications in the emergency department.  Advised to follow-up with GI primary care.    Final diagnoses:   Non-intractable cyclical vomiting with nausea              Jass Bhatt, DO  09/25/19 0955

## 2019-09-25 NOTE — DISCHARGE INSTRUCTIONS
Please return with new or worsening symptoms.  Follow-up with primary care and GI.  Continue medications prescribed.  Drink plenty of water.

## 2019-09-26 NOTE — ED PROVIDER NOTES
Subjective   Pt was seen by Dr Adelita al past week and had his protonix bid changed to daily and added carafate and has follow up in 2 days.         Chest Pain   Pain location:  Substernal area  Pain quality: aching    Pain radiates to:  Does not radiate  Pain severity:  Mild  Onset quality:  Unable to specify  Duration:  2 days  Timing:  Intermittent  Progression:  Waxing and waning  Chronicity:  Chronic  Associated symptoms: vomiting    Associated symptoms: no abdominal pain, no cough, no diaphoresis, no dizziness, no dysphagia, no fatigue, no fever, no headache, no nausea, no shortness of breath and no weakness    Vomiting:     Quality:  Unable to specify    Severity:  Moderate    Duration:  2 days    Timing:  Intermittent    Progression:  Unchanged  Risk factors: not obese    Hypertension   Associated symptoms: chest pain and vomiting    Associated symptoms: no abdominal pain, no confusion, no dizziness, no ear pain, no epistaxis, no fatigue, no fever, no headaches, no nausea, no neck pain, no shortness of breath and no weakness        Review of Systems   Constitutional: Negative for appetite change, chills, diaphoresis, fatigue and fever.   HENT: Negative for congestion, ear discharge, ear pain, nosebleeds, rhinorrhea, sinus pressure, sore throat and trouble swallowing.    Eyes: Negative for discharge and redness.   Respiratory: Negative for apnea, cough, chest tightness, shortness of breath and wheezing.    Cardiovascular: Positive for chest pain.   Gastrointestinal: Positive for vomiting. Negative for abdominal pain, diarrhea and nausea.   Endocrine: Negative for polyuria.   Genitourinary: Negative for dysuria, frequency and urgency.   Musculoskeletal: Negative for myalgias and neck pain.   Skin: Negative for color change and rash.   Allergic/Immunologic: Negative for immunocompromised state.   Neurological: Negative for dizziness, seizures, syncope, weakness, light-headedness and headaches.    Hematological: Negative for adenopathy. Does not bruise/bleed easily.   Psychiatric/Behavioral: Negative for behavioral problems and confusion.   All other systems reviewed and are negative.      Past Medical History:   Diagnosis Date   • Alcoholism /alcohol abuse (CMS/HCC)    • Anxiety    • Arthritis     shoulders and knees   • Atrial flutter (CMS/HCC)    • Depression    • Diverticulitis    • Esophagitis    • Gastritis    • GERD (gastroesophageal reflux disease)    • Hypertension    • Kidney stone    • Pancreatitis     patient denies   • Sleep apnea     has c-pap       Allergies   Allergen Reactions   • Contrast Dye Anaphylaxis     ANAPHYLAXIS   • Iodinated Diagnostic Agents Anaphylaxis   • Morphine Nausea And Vomiting   • Zofran [Ondansetron Hcl] Other (See Comments)     IV gives hiccups       Past Surgical History:   Procedure Laterality Date   • CARDIAC ELECTROPHYSIOLOGY PROCEDURE N/A 5/2/2017    Procedure: Ablation atrial flutter;  Surgeon: Ramirez Reza MD;  Location: [facility] EP INVASIVE LOCATION;  Service:    • CARDIAC ELECTROPHYSIOLOGY PROCEDURE N/A 9/5/2017    Procedure: Ablation atrial fibrillation PVA ;  Surgeon: Ramirez Reza MD;  Location: [facility] EP INVASIVE LOCATION;  Service:    • COLON RESECTION Left 12/20/2018    Procedure: LAPAROSCOPIC SIGMOID  RESECTION, TAKE DOWN OF SPLENIC FLEXURE;  Surgeon: Michel Lala MD;  Location: [facility] OR;  Service: General   • COLONOSCOPY     • COLONOSCOPY N/A 5/30/2018    Procedure: COLONOSCOPY;  Surgeon: Edwin Medina MD;  Location: [facility] ENDOSCOPY;  Service: Gastroenterology   • ENDOSCOPY N/A 3/30/2018    Procedure: ESOPHAGOGASTRODUODENOSCOPY;  Surgeon: Edwin Medina MD;  Location: [facility] ENDOSCOPY;  Service: Gastroenterology   • ENDOSCOPY N/A 5/30/2018    Procedure: ESOPHAGOGASTRODUODENOSCOPY;  Surgeon: Edwin Medina MD;  Location: [facility] ENDOSCOPY;  Service: Gastroenterology   • ENDOSCOPY N/A 12/3/2018    Procedure: ESOPHAGOGASTRODUODENOSCOPY;   "Surgeon: Edwin Medina MD;  Location: [facility] ENDOSCOPY;  Service: Gastroenterology   • SHOULDER SURGERY Right 2014   • TONSILLECTOMY  12/2015   • UPPER GASTROINTESTINAL ENDOSCOPY  03/30/2018   • UPPER GASTROINTESTINAL ENDOSCOPY     • UPPER GASTROINTESTINAL ENDOSCOPY  05/30/2018       Family History   Problem Relation Age of Onset   • Heart disease Father    • Depression Father    • Cancer Maternal Grandmother         lung   • Cancer Paternal Grandmother         pancreatic   • Heart disease Paternal Grandfather        Social History     Socioeconomic History   • Marital status:      Spouse name: Not on file   • Number of children: Not on file   • Years of education: Not on file   • Highest education level: Not on file   Tobacco Use   • Smoking status: Never Smoker   • Smokeless tobacco: Never Used   Substance and Sexual Activity   • Alcohol use: No     Comment: quit 3/17/18   • Drug use: Yes     Frequency: 7.0 times per week     Types: Marijuana     Comment: states he uses \"a little\" every day   • Sexual activity: Defer   Social History Narrative    Pt states has not had drink of any alcohol in 3 weeks as of 4/5/2018           Objective   Physical Exam   Constitutional: He is oriented to person, place, and time. He appears well-developed and well-nourished.   HENT:   Head: Normocephalic and atraumatic.   Nose: Nose normal.   Mouth/Throat: Oropharynx is clear and moist.   Eyes: Conjunctivae and EOM are normal. Pupils are equal, round, and reactive to light. Right eye exhibits no discharge. Left eye exhibits no discharge. No scleral icterus.   Neck: Normal range of motion. Neck supple. No tracheal deviation present.   Cardiovascular: Normal rate, regular rhythm and normal heart sounds.   No murmur heard.  Pulmonary/Chest: Effort normal and breath sounds normal. No stridor. No respiratory distress. He has no wheezes. He has no rales.   Abdominal: Soft. Bowel sounds are normal. He exhibits no distension and no " mass. Tenderness: mild. There is no rebound and no guarding.   Musculoskeletal: He exhibits no edema.   Neurological: He is alert and oriented to person, place, and time. Coordination normal.   Skin: Skin is warm and dry. No rash noted. No erythema.   Psychiatric: He has a normal mood and affect. His behavior is normal. Thought content normal.   Nursing note and vitals reviewed.      ECG 12 Lead    Date/Time: 9/25/2019 9:27 PM  Performed by: Rios Mart MD  Authorized by: Rios Mart MD   Interpreted by physician  Rhythm: sinus tachycardia  Rate: tachycardic  BPM: 114  ST Segments: ST segments normal                   ED Course        Labs Reviewed   COMPREHENSIVE METABOLIC PANEL - Abnormal; Notable for the following components:       Result Value    Glucose 101 (*)     Anion Gap 17.0 (*)     All other components within normal limits    Narrative:     GFR Normal >60  Chronic Kidney Disease <60  Kidney Failure <15   CBC WITH AUTO DIFFERENTIAL - Abnormal; Notable for the following components:    RBC 5.88 (*)     RDW 17.2 (*)     Neutrophil % 89.2 (*)     Lymphocyte % 6.5 (*)     Monocyte % 3.8 (*)     Eosinophil % 0.0 (*)     Neutrophils, Absolute 8.00 (*)     Lymphocytes, Absolute 0.58 (*)     All other components within normal limits   CBC AND DIFFERENTIAL    Narrative:     The following orders were created for panel order CBC & Differential.  Procedure                               Abnormality         Status                     ---------                               -----------         ------                     CBC Auto Differential[[id number]]        Abnormal            Final result                 Please view results for these tests on the individual orders.   EXTRA TUBES    Narrative:     The following orders were created for panel order Extra Tubes.  Procedure                               Abnormality         Status                     ---------                               -----------          ------                     Light Blue Top[[id number]]                                   In process                 Gold Top - [facility][[id number]]                                   In process                   Please view results for these tests on the individual orders.   LIGHT BLUE TOP   GOLD TOP - [facility]       No orders to display                   MDM    Final diagnoses:   Non-intractable vomiting with nausea, unspecified vomiting type              Rios Mart MD  09/25/19 0089

## 2019-09-27 ENCOUNTER — TELEPHONE (OUTPATIENT)
Dept: GASTROENTEROLOGY | Facility: CLINIC | Age: 44
End: 2019-09-27

## 2019-09-27 ENCOUNTER — OFFICE VISIT (OUTPATIENT)
Dept: GASTROENTEROLOGY | Facility: CLINIC | Age: 44
End: 2019-09-27

## 2019-09-27 VITALS
WEIGHT: 210.2 LBS | HEART RATE: 80 BPM | HEIGHT: 72 IN | SYSTOLIC BLOOD PRESSURE: 140 MMHG | DIASTOLIC BLOOD PRESSURE: 86 MMHG | BODY MASS INDEX: 28.47 KG/M2

## 2019-09-27 DIAGNOSIS — R11.15 INTRACTABLE CYCLICAL VOMITING WITH NAUSEA: Primary | ICD-10-CM

## 2019-09-27 PROCEDURE — 99214 OFFICE O/P EST MOD 30 MIN: CPT | Performed by: INTERNAL MEDICINE

## 2019-09-27 RX ORDER — RANITIDINE 300 MG/1
300 TABLET ORAL DAILY
Qty: 90 TABLET | Refills: 3 | Status: SHIPPED | OUTPATIENT
Start: 2019-09-27 | End: 2019-10-21

## 2019-09-27 RX ORDER — PANTOPRAZOLE SODIUM 40 MG/1
40 TABLET, DELAYED RELEASE ORAL DAILY
Qty: 30 TABLET | Refills: 1 | Status: SHIPPED | OUTPATIENT
Start: 2019-09-27 | End: 2020-01-14 | Stop reason: SDUPTHER

## 2019-09-27 RX ORDER — DEXTROSE AND SODIUM CHLORIDE 5; .45 G/100ML; G/100ML
30 INJECTION, SOLUTION INTRAVENOUS CONTINUOUS PRN
Status: CANCELLED | OUTPATIENT
Start: 2019-10-22

## 2019-09-27 NOTE — PATIENT INSTRUCTIONS
Nausea, Adult  Nausea is the feeling of an upset stomach or having to vomit. Nausea on its own is not usually a serious concern, but it may be an early sign of a more serious medical problem. As nausea gets worse, it can lead to vomiting. If vomiting develops, or if you are not able to drink enough fluids, you are at risk of becoming dehydrated. Dehydration can make you tired and thirsty, cause you to have a dry mouth, and decrease how often you urinate. Older adults and people with other diseases or a weak immune system are at higher risk for dehydration. The main goals of treating your nausea are:  · To limit repeated nausea episodes.  · To prevent vomiting and dehydration.  Follow these instructions at home:  Follow instructions from your health care provider about how to care for yourself at home.  Eating and drinking  Follow these recommendations as told by your health care provider:  · Take an oral rehydration solution (ORS). This is a drink that is sold at pharmacies and retail stores.  · Drink clear fluids in small amounts as you are able. Clear fluids include water, ice chips, diluted fruit juice, and low-calorie sports drinks.  · Eat bland, easy-to-digest foods in small amounts as you are able. These foods include bananas, applesauce, rice, lean meats, toast, and crackers.  · Avoid drinking fluids that contain a lot of sugar or caffeine, such as energy drinks, sports drinks, and soda.  · Avoid alcohol.  · Avoid spicy or fatty foods.  General instructions  · Drink enough fluid to keep your urine clear or pale yellow.  · Wash your hands often. If soap and water are not available, use hand .  · Make sure that all people in your household wash their hands well and often.  · Rest at home while you recover.  · Take over-the-counter and prescription medicines only as told by your health care provider.  · Breathe slowly and deeply when you feel nauseous.  · Watch your condition for any changes.  · Keep  all follow-up visits as told by your health care provider. This is important.  Contact a health care provider if:  · You have a headache.  · You have new symptoms.  · Your nausea gets worse.  · You have a fever.  · You feel light-headed or dizzy.  · You vomit.  · You cannot keep fluids down.  Get help right away if:  · You have pain in your chest, neck, arm, or jaw.  · You feel extremely weak or you faint.  · You have vomit that is bright red or looks like coffee grounds.  · You have bloody or black stools or stools that look like tar.  · You have a severe headache, a stiff neck, or both.  · You have severe pain, cramping, or bloating in your abdomen.  · You have a rash.  · You have difficulty breathing or are breathing very quickly.  · Your heart is beating very quickly.  · Your skin feels cold and clammy.  · You feel confused.  · You have pain when you urinate.  · You have signs of dehydration, such as:  ? Dark urine, very little, or no urine.  ? Cracked lips.  ? Dry mouth.  ? Sunken eyes.  ? Sleepiness.  ? Weakness.  These symptoms may represent a serious problem that is an emergency. Do not wait to see if the symptoms will go away. Get medical help right away. Call your local emergency services (911 in the [place] Do not drive yourself to the hospital.  This information is not intended to replace advice given to you by your health care provider. Make sure you discuss any questions you have with your health care provider.  Document Released: 01/25/2006 Document Revised: 05/22/2017 Document Reviewed: 08/23/2016  [employer] Interactive Patient Education © 2019 [employer]

## 2019-09-27 NOTE — PROGRESS NOTES
Chief Complaint   Patient presents with   • Nausea   • Vomiting       Subjective    Stevenson Chilel is a 44 y.o. male.  With bouts of nausea vomiting occurring periodically for past 2 to 3 years    History of Present Illness  Patient reports having intermittent bouts of intractable nausea vomiting with significant weight loss on intermittent basis for the past 3 years. he reports with a recent bout 2 days ago he lost 12 pounds weight.  He was seen by Dr. Ureña earlier this week and was prescribed Carafate daily.  He was already taking Protonix on daily basis.  He reports symptoms being slightly improved with the Protonix and twice daily dosage instead which was changed to daily because of the insurance denial.  His last EGD in December of last year was consistent with the esophagitis, gastritis and hiatal hernia.  Biopsies were unremarkable.  Colonoscopy in May of last year was consistent with hemorrhoids.  Biopsies were unremarkable.  He denied abdominal pain, diarrhea, constipation, rectal bleeding, melena or NSAID usage.  He smokes cannabis on regular basis.       The following portions of the patient's history were reviewed and updated as appropriate:   Past Medical History:   Diagnosis Date   • Alcoholism /alcohol abuse (CMS/HCC)    • Anxiety    • Arthritis     shoulders and knees   • Atrial flutter (CMS/HCC)    • Depression    • Diverticulitis    • Esophagitis    • Gastritis    • GERD (gastroesophageal reflux disease)    • Hypertension    • Kidney stone    • Pancreatitis     patient denies   • Sleep apnea     has c-pap     Past Surgical History:   Procedure Laterality Date   • CARDIAC ELECTROPHYSIOLOGY PROCEDURE N/A 5/2/2017    Procedure: Ablation atrial flutter;  Surgeon: Ramirez Reza MD;  Location: [facility] EP INVASIVE LOCATION;  Service:    • CARDIAC ELECTROPHYSIOLOGY PROCEDURE N/A 9/5/2017    Procedure: Ablation atrial fibrillation PVA ;  Surgeon: Ramirez Reza MD;  Location: [facility] EP INVASIVE  LOCATION;  Service:    • COLON RESECTION Left 12/20/2018    Procedure: LAPAROSCOPIC SIGMOID  RESECTION, TAKE DOWN OF SPLENIC FLEXURE;  Surgeon: Michel Lala MD;  Location: [facility] OR;  Service: General   • COLONOSCOPY     • COLONOSCOPY N/A 5/30/2018    Procedure: COLONOSCOPY;  Surgeon: Edwin Medina MD;  Location: [facility] ENDOSCOPY;  Service: Gastroenterology   • ENDOSCOPY N/A 3/30/2018    Procedure: ESOPHAGOGASTRODUODENOSCOPY;  Surgeon: Edwin Medina MD;  Location: [facility] ENDOSCOPY;  Service: Gastroenterology   • ENDOSCOPY N/A 5/30/2018    Procedure: ESOPHAGOGASTRODUODENOSCOPY;  Surgeon: Edwin Medina MD;  Location: [facility] ENDOSCOPY;  Service: Gastroenterology   • ENDOSCOPY N/A 12/3/2018    Procedure: ESOPHAGOGASTRODUODENOSCOPY;  Surgeon: Edwin Medina MD;  Location: [facility] ENDOSCOPY;  Service: Gastroenterology   • SHOULDER SURGERY Right 2014   • TONSILLECTOMY  12/2015   • UPPER GASTROINTESTINAL ENDOSCOPY  03/30/2018   • UPPER GASTROINTESTINAL ENDOSCOPY     • UPPER GASTROINTESTINAL ENDOSCOPY  05/30/2018     Family History   Problem Relation Age of Onset   • Heart disease Father    • Depression Father    • Cancer Maternal Grandmother         lung   • Cancer Paternal Grandmother         pancreatic   • Heart disease Paternal Grandfather        Prior to Admission medications    Medication Sig Start Date End Date Taking? Authorizing Provider   albuterol (PROVENTIL) (2.5 MG/3ML) 0.083% nebulizer solution Take 2.5 mg by nebulization Every 6 (Six) Hours As Needed for Wheezing. 9/5/19  Yes Tia Doherty APRN   aspirin 81 MG EC tablet Take 81 mg by mouth Daily.   Yes ProviderIdalmis MD   B Complex-C (SUPER B COMPLEX PO) Take 1 tablet by mouth Daily.   Yes Idalmis Medina MD   carvedilol (COREG) 6.25 MG tablet Take 6.25 mg by mouth Daily.   Yes Idalmis Medina MD   clonazePAM (KlonoPIN) 1 MG tablet Take 1 mg by mouth 2 (Two) Times a Day As Needed for Anxiety.   Yes Idalmis Medina MD  "  doxycycline (VIBRAMYCIN) 100 MG capsule Take 100 mg by mouth 2 (Two) Times a Day.   Yes ProviderIdalmis MD   DULoxetine (CYMBALTA) 60 MG capsule Take 60 mg by mouth Every Night. 8/21/18  Yes Idalmis Medina MD   magnesium oxide (MAGOX) 400 (241.3 MG) MG tablet tablet Take 400 mg by mouth 2 (Two) Times a Day.   Yes Idalmis Medina MD   mirtazapine (REMERON) 15 MG tablet Take 15 mg by mouth Every Night.   Yes ProviderIdalmis MD   pantoprazole (PROTONIX) 40 MG EC tablet Take 1 tablet by mouth 2 (Two) Times a Day. 9/19/19  Yes Edwin Medina MD   Probiotic Product (PROBIOTIC-10 PO) Take 1 tablet by mouth Daily.   Yes ProviderIdalmis MD   promethazine (PHENERGAN) 25 MG tablet Take 1 tablet by mouth Every 6 (Six) Hours As Needed for Nausea or Vomiting. 9/25/19  Yes Rios Mart MD   promethazine-dextromethorphan (PROMETHAZINE-DM) 6.25-15 MG/5ML syrup Take 5 mL by mouth At Night As Needed for Cough. 9/5/19  Yes Tia Doherty APRN   sucralfate (CARAFATE) 1 g tablet Take 1 tablet by mouth 4 (Four) Times a Day. 9/19/19  Yes Edwin Medina MD   raNITIdine (ZANTAC) 300 MG tablet Take 1 tablet by mouth Daily. 9/27/19   Siva Marie MD     Allergies   Allergen Reactions   • Contrast Dye Anaphylaxis     ANAPHYLAXIS   • Iodinated Diagnostic Agents Anaphylaxis   • Morphine Nausea And Vomiting   • Zofran [Ondansetron Hcl] Other (See Comments)     IV gives hiccups     Social History     Socioeconomic History   • Marital status:      Spouse name: Not on file   • Number of children: Not on file   • Years of education: Not on file   • Highest education level: Not on file   Tobacco Use   • Smoking status: Never Smoker   • Smokeless tobacco: Never Used   Substance and Sexual Activity   • Alcohol use: No     Comment: quit 3/17/18   • Drug use: Yes     Frequency: 7.0 times per week     Types: Marijuana     Comment: states he uses \"a little\" every day   • Sexual activity: Defer " "  Social History Narrative    Pt states has not had drink of any alcohol in 3 weeks as of 4/5/2018       Review of Systems  Review of Systems   Constitutional: Positive for unexpected weight change. Negative for chills, fatigue and fever.   HENT: Negative for congestion, ear discharge, hearing loss, nosebleeds and sore throat.    Eyes: Negative for pain, discharge and redness.   Respiratory: Negative for cough, chest tightness, shortness of breath and wheezing.    Cardiovascular: Negative for chest pain and palpitations.   Gastrointestinal: Positive for nausea and vomiting. Negative for abdominal distention, abdominal pain, blood in stool, constipation and diarrhea.   Endocrine: Negative for cold intolerance, polydipsia, polyphagia and polyuria.   Genitourinary: Negative for dysuria, flank pain, frequency, hematuria and urgency.   Musculoskeletal: Negative for arthralgias, back pain, joint swelling and myalgias.   Skin: Negative for color change, pallor and rash.   Neurological: Negative for tremors, seizures, syncope, weakness and headaches.   Hematological: Negative for adenopathy. Does not bruise/bleed easily.   Psychiatric/Behavioral: Negative for behavioral problems, confusion, dysphoric mood, hallucinations and suicidal ideas. The patient is not nervous/anxious.         /86 (BP Location: Left arm)   Pulse 80   Ht 182.9 cm (72\")   Wt 95.3 kg (210 lb 3.2 oz)   BMI 28.51 kg/m²     Objective    Physical Exam   Constitutional: He is oriented to person, place, and time. He appears well-developed and well-nourished.   HENT:   Head: Normocephalic and atraumatic.   Mouth/Throat: Oropharynx is clear and moist.   Eyes: Conjunctivae and EOM are normal. Pupils are equal, round, and reactive to light.   Neck: Normal range of motion. Neck supple. No thyromegaly present.   Cardiovascular: Normal rate, regular rhythm and normal heart sounds.   No murmur heard.  Pulmonary/Chest: Effort normal and breath sounds normal. " He has no wheezes.   Abdominal: Soft. Bowel sounds are normal. He exhibits no distension and no mass. There is no tenderness. No hernia.   Genitourinary:   Genitourinary Comments: No lesions noted   Musculoskeletal: Normal range of motion. He exhibits no edema or tenderness.   Lymphadenopathy:     He has no cervical adenopathy.   Neurological: He is alert and oriented to person, place, and time. No cranial nerve deficit.   Skin: Skin is warm and dry. No rash noted.   Psychiatric: He has a normal mood and affect. Thought content normal.     Admission on 09/25/2019, Discharged on 09/25/2019   Component Date Value Ref Range Status   • Glucose 09/25/2019 101* 65 - 99 mg/dL Final   • BUN 09/25/2019 11  6 - 20 mg/dL Final   • Creatinine 09/25/2019 1.15  0.76 - 1.27 mg/dL Final   • Sodium 09/25/2019 141  136 - 145 mmol/L Final   • Potassium 09/25/2019 4.1  3.5 - 5.2 mmol/L Final   • Chloride 09/25/2019 101  98 - 107 mmol/L Final   • CO2 09/25/2019 23.0  22.0 - 29.0 mmol/L Final   • Calcium 09/25/2019 9.2  8.6 - 10.5 mg/dL Final   • Total Protein 09/25/2019 7.3  6.0 - 8.5 g/dL Final   • Albumin 09/25/2019 4.10  3.50 - 5.20 g/dL Final   • ALT (SGPT) 09/25/2019 39  1 - 41 U/L Final   • AST (SGOT) 09/25/2019 37  1 - 40 U/L Final   • Alkaline Phosphatase 09/25/2019 39  39 - 117 U/L Final   • Total Bilirubin 09/25/2019 0.7  0.2 - 1.2 mg/dL Final   • eGFR Non African Amer 09/25/2019 70  >60 mL/min/1.73 Final   • Globulin 09/25/2019 3.2  gm/dL Final   • A/G Ratio 09/25/2019 1.3  g/dL Final   • BUN/Creatinine Ratio 09/25/2019 9.6  7.0 - 25.0 Final   • Anion Gap 09/25/2019 17.0* 5.0 - 15.0 mmol/L Final   • WBC 09/25/2019 8.97  3.40 - 10.80 10*3/mm3 Final   • RBC 09/25/2019 5.88* 4.14 - 5.80 10*6/mm3 Final   • Hemoglobin 09/25/2019 16.1  13.0 - 17.7 g/dL Final   • Hematocrit 09/25/2019 49.0  37.5 - 51.0 % Final   • MCV 09/25/2019 83.3  79.0 - 97.0 fL Final   • MCH 09/25/2019 27.4  26.6 - 33.0 pg Final   • MCHC 09/25/2019 32.9  31.5 -  35.7 g/dL Final   • RDW 09/25/2019 17.2* 12.3 - 15.4 % Final   • RDW-SD 09/25/2019 48.5  37.0 - 54.0 fl Final   • MPV 09/25/2019 10.1  6.0 - 12.0 fL Final   • Platelets 09/25/2019 244  140 - 450 10*3/mm3 Final   • Neutrophil % 09/25/2019 89.2* 42.7 - 76.0 % Final   • Lymphocyte % 09/25/2019 6.5* 19.6 - 45.3 % Final   • Monocyte % 09/25/2019 3.8* 5.0 - 12.0 % Final   • Eosinophil % 09/25/2019 0.0* 0.3 - 6.2 % Final   • Basophil % 09/25/2019 0.2  0.0 - 1.5 % Final   • Immature Grans % 09/25/2019 0.3  0.0 - 0.5 % Final   • Neutrophils, Absolute 09/25/2019 8.00* 1.70 - 7.00 10*3/mm3 Final   • Lymphocytes, Absolute 09/25/2019 0.58* 0.70 - 3.10 10*3/mm3 Final   • Monocytes, Absolute 09/25/2019 0.34  0.10 - 0.90 10*3/mm3 Final   • Eosinophils, Absolute 09/25/2019 0.00  0.00 - 0.40 10*3/mm3 Final   • Basophils, Absolute 09/25/2019 0.02  0.00 - 0.20 10*3/mm3 Final   • Immature Grans, Absolute 09/25/2019 0.03  0.00 - 0.05 10*3/mm3 Final   • nRBC 09/25/2019 0.0  0.0 - 0.2 /100 WBC Final   • Extra Tube 09/25/2019 hold for add-on   Final    Auto resulted   • Extra Tube 09/25/2019 Hold for add-ons.   Final    Auto resulted.     Assessment/Plan      1. Intractable cyclical vomiting with nausea    .   1.  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  Could also be due to cyclic vomiting and other pathology.  Need to rule out gastroparesis.  Also need to rule out peptic ulcer disease and gastritis due to the patient's improvement of symptomatology to some extent with the twice daily PPI.  Continue Protonix.  Add Zantac 300 mg p.o. nightly.  Schedule EGD for further evaluation.  Gastric emptying scan if EGD is unremarkable.  Recommend strict cannabis abstinence.  Patient agreeable.  Continue Phenergan as needed in the interim.  2.  Weight loss likely due to dehydration.  Encourage p.o. fluid intake.  3.  High BMI with recent weight loss.  4.  Cannabis usage, recommend  abstinence.    Orders placed during this encounter include:  Orders Placed This Encounter   Procedures   • Follow Anesthesia Guidelines / Standing Orders     Standing Status:   Future   • Obtain Informed Consent     Standing Status:   Future     Order Specific Question:   Informed Consent Given For     Answer:   ESOPHAGOGASTRODUODENOSCOPY       ESOPHAGOGASTRODUODENOSCOPY (N/A)    Review and/or summary of lab tests, radiology, procedures, medications. Review and summary of old records and obtaining of history. The risks and benefits of my recommendations, as well as other treatment options were discussed with the patient today. Questions were answered.    New Medications Ordered This Visit   Medications   • raNITIdine (ZANTAC) 300 MG tablet     Sig: Take 1 tablet by mouth Daily.     Dispense:  90 tablet     Refill:  3       Follow-up: No Follow-up on file.               Results for orders placed or performed during the hospital encounter of 09/25/19   [facility]   Result Value Ref Range    Extra Tube Hold for add-ons.    CBC Auto Differential   Result Value Ref Range    WBC 8.97 3.40 - 10.80 10*3/mm3    RBC 5.88 (H) 4.14 - 5.80 10*6/mm3    Hemoglobin 16.1 13.0 - 17.7 g/dL    Hematocrit 49.0 37.5 - 51.0 %    MCV 83.3 79.0 - 97.0 fL    MCH 27.4 26.6 - 33.0 pg    MCHC 32.9 31.5 - 35.7 g/dL    RDW 17.2 (H) 12.3 - 15.4 %    RDW-SD 48.5 37.0 - 54.0 fl    MPV 10.1 6.0 - 12.0 fL    Platelets 244 140 - 450 10*3/mm3    Neutrophil % 89.2 (H) 42.7 - 76.0 %    Lymphocyte % 6.5 (L) 19.6 - 45.3 %    Monocyte % 3.8 (L) 5.0 - 12.0 %    Eosinophil % 0.0 (L) 0.3 - 6.2 %    Basophil % 0.2 0.0 - 1.5 %    Immature Grans % 0.3 0.0 - 0.5 %    Neutrophils, Absolute 8.00 (H) 1.70 - 7.00 10*3/mm3    Lymphocytes, Absolute 0.58 (L) 0.70 - 3.10 10*3/mm3    Monocytes, Absolute 0.34 0.10 - 0.90 10*3/mm3    Eosinophils, Absolute 0.00 0.00 - 0.40 10*3/mm3    Basophils, Absolute 0.02 0.00 - 0.20 10*3/mm3    Immature Grans, Absolute 0.03 0.00 -  0.05 10*3/mm3    nRBC 0.0 0.0 - 0.2 /100 WBC   Light Blue Top   Result Value Ref Range    Extra Tube hold for add-on    Comprehensive Metabolic Panel   Result Value Ref Range    Glucose 101 (H) 65 - 99 mg/dL    BUN 11 6 - 20 mg/dL    Creatinine 1.15 0.76 - 1.27 mg/dL    Sodium 141 136 - 145 mmol/L    Potassium 4.1 3.5 - 5.2 mmol/L    Chloride 101 98 - 107 mmol/L    CO2 23.0 22.0 - 29.0 mmol/L    Calcium 9.2 8.6 - 10.5 mg/dL    Total Protein 7.3 6.0 - 8.5 g/dL    Albumin 4.10 3.50 - 5.20 g/dL    ALT (SGPT) 39 1 - 41 U/L    AST (SGOT) 37 1 - 40 U/L    Alkaline Phosphatase 39 39 - 117 U/L    Total Bilirubin 0.7 0.2 - 1.2 mg/dL    eGFR Non African Amer 70 >60 mL/min/1.73    Globulin 3.2 gm/dL    A/G Ratio 1.3 g/dL    BUN/Creatinine Ratio 9.6 7.0 - 25.0    Anion Gap 17.0 (H) 5.0 - 15.0 mmol/L   Results for orders placed or performed during the hospital encounter of 09/25/19   Gold Top - SST   Result Value Ref Range    Extra Tube Hold for add-ons.    Green Top (Gel)   Result Value Ref Range    Extra Tube Hold for add-ons.    CBC Auto Differential   Result Value Ref Range    WBC 8.06 3.40 - 10.80 10*3/mm3    RBC 6.03 (H) 4.14 - 5.80 10*6/mm3    Hemoglobin 16.5 13.0 - 17.7 g/dL    Hematocrit 50.3 37.5 - 51.0 %    MCV 83.4 79.0 - 97.0 fL    MCH 27.4 26.6 - 33.0 pg    MCHC 32.8 31.5 - 35.7 g/dL    RDW 16.8 (H) 12.3 - 15.4 %    RDW-SD 48.2 37.0 - 54.0 fl    MPV 9.9 6.0 - 12.0 fL    Platelets 238 140 - 450 10*3/mm3    Neutrophil % 83.6 (H) 42.7 - 76.0 %    Lymphocyte % 9.4 (L) 19.6 - 45.3 %    Monocyte % 5.8 5.0 - 12.0 %    Eosinophil % 0.5 0.3 - 6.2 %    Basophil % 0.5 0.0 - 1.5 %    Immature Grans % 0.2 0.0 - 0.5 %    Neutrophils, Absolute 6.73 1.70 - 7.00 10*3/mm3    Lymphocytes, Absolute 0.76 0.70 - 3.10 10*3/mm3    Monocytes, Absolute 0.47 0.10 - 0.90 10*3/mm3    Eosinophils, Absolute 0.04 0.00 - 0.40 10*3/mm3    Basophils, Absolute 0.04 0.00 - 0.20 10*3/mm3    Immature Grans, Absolute 0.02 0.00 - 0.05 10*3/mm3    nRBC 0.0  0.0 - 0.2 /100 WBC   Lavender Top   Result Value Ref Range    Extra Tube hold for add-on    Light Blue Top   Result Value Ref Range    Extra Tube hold for add-on    Troponin   Result Value Ref Range    Troponin T <0.010 0.000 - 0.030 ng/mL   Lipase   Result Value Ref Range    Lipase 20 13 - 60 U/L   Comprehensive Metabolic Panel   Result Value Ref Range    Glucose 115 (H) 65 - 99 mg/dL    BUN 10 6 - 20 mg/dL    Creatinine 1.13 0.76 - 1.27 mg/dL    Sodium 143 136 - 145 mmol/L    Potassium 4.1 3.5 - 5.2 mmol/L    Chloride 103 98 - 107 mmol/L    CO2 23.0 22.0 - 29.0 mmol/L    Calcium 9.6 8.6 - 10.5 mg/dL    Total Protein 7.3 6.0 - 8.5 g/dL    Albumin 4.30 3.50 - 5.20 g/dL    ALT (SGPT) 45 (H) 1 - 41 U/L    AST (SGOT) 47 (H) 1 - 40 U/L    Alkaline Phosphatase 41 39 - 117 U/L    Total Bilirubin 1.0 0.2 - 1.2 mg/dL    eGFR Non African Amer 71 >60 mL/min/1.73    Globulin 3.0 gm/dL    A/G Ratio 1.4 g/dL    BUN/Creatinine Ratio 8.8 7.0 - 25.0    Anion Gap 17.0 (H) 5.0 - 15.0 mmol/L   Results for orders placed or performed during the hospital encounter of 09/05/19   Gold Top - SST   Result Value Ref Range    Extra Tube Hold for add-ons.    Green Top (Gel)   Result Value Ref Range    Extra Tube Hold for add-ons.    Urinalysis, Microscopic Only - Urine, Clean Catch   Result Value Ref Range    RBC, UA 0-2 (A) None Seen /HPF    WBC, UA 0-2 None Seen, 0-2, 3-5 /HPF    Bacteria, UA None Seen None Seen /HPF    Squamous Epithelial Cells, UA None Seen None Seen, 0-2 /HPF    Hyaline Casts, UA 3-6 None Seen /LPF    Methodology Automated Microscopy      *Note: Due to a large number of results and/or encounters for the requested time period, some results have not been displayed. A complete set of results can be found in Results Review.         This document has been electronically signed by Siva Marie MD on September 27, 2019 11:01 AM

## 2019-09-27 NOTE — TELEPHONE ENCOUNTER
09/27/2019, 1155 - Patient telephoned per this staff member [phone] with notification of clarification received per Dr. Edwin Ureña M.D. regarding prescription medication Pantoprazole 40 MG Tablets.  Patient to utilize Pantoprazole 40 MG Tablets once daily not twice daily as reflected per clinical documentation dated 09/19/2019 per Dr. Edwin Ureña M.D.  Patient made aware this staff member submitting corrected prescription via E-Script to his pharmacy of choice, [facility], [place], [place].  Patient verbalized understanding.

## 2019-10-22 ENCOUNTER — ANESTHESIA EVENT (OUTPATIENT)
Dept: GASTROENTEROLOGY | Facility: HOSPITAL | Age: 44
End: 2019-10-22

## 2019-10-22 ENCOUNTER — HOSPITAL ENCOUNTER (OUTPATIENT)
Facility: HOSPITAL | Age: 44
Setting detail: HOSPITAL OUTPATIENT SURGERY
Discharge: HOME | End: 2019-10-22
Attending: INTERNAL MEDICINE | Admitting: INTERNAL MEDICINE

## 2019-10-22 ENCOUNTER — ANESTHESIA (OUTPATIENT)
Dept: GASTROENTEROLOGY | Facility: HOSPITAL | Age: 44
End: 2019-10-22

## 2019-10-22 VITALS
RESPIRATION RATE: 19 BRPM | HEIGHT: 72 IN | DIASTOLIC BLOOD PRESSURE: 79 MMHG | SYSTOLIC BLOOD PRESSURE: 127 MMHG | WEIGHT: 226.25 LBS | HEART RATE: 106 BPM | OXYGEN SATURATION: 94 % | BODY MASS INDEX: 30.65 KG/M2 | TEMPERATURE: 98.9 F

## 2019-10-22 DIAGNOSIS — R11.15 INTRACTABLE CYCLICAL VOMITING WITH NAUSEA: ICD-10-CM

## 2019-10-22 PROCEDURE — 88305 TISSUE EXAM BY PATHOLOGIST: CPT | Performed by: PATHOLOGY

## 2019-10-22 PROCEDURE — 25010000002 PROPOFOL 10 MG/ML EMULSION: Performed by: NURSE ANESTHETIST, CERTIFIED REGISTERED

## 2019-10-22 PROCEDURE — 43239 EGD BIOPSY SINGLE/MULTIPLE: CPT | Performed by: INTERNAL MEDICINE

## 2019-10-22 PROCEDURE — 88305 TISSUE EXAM BY PATHOLOGIST: CPT | Performed by: INTERNAL MEDICINE

## 2019-10-22 RX ORDER — LIDOCAINE HYDROCHLORIDE 20 MG/ML
INJECTION, SOLUTION INTRAVENOUS AS NEEDED
Status: DISCONTINUED | OUTPATIENT
Start: 2019-10-22 | End: 2019-10-22 | Stop reason: SURG

## 2019-10-22 RX ORDER — PROPOFOL 10 MG/ML
VIAL (ML) INTRAVENOUS AS NEEDED
Status: DISCONTINUED | OUTPATIENT
Start: 2019-10-22 | End: 2019-10-22 | Stop reason: SURG

## 2019-10-22 RX ORDER — DEXTROSE AND SODIUM CHLORIDE 5; .45 G/100ML; G/100ML
30 INJECTION, SOLUTION INTRAVENOUS CONTINUOUS PRN
Status: DISCONTINUED | OUTPATIENT
Start: 2019-10-22 | End: 2019-10-22 | Stop reason: HOSPADM

## 2019-10-22 RX ADMIN — LIDOCAINE HYDROCHLORIDE 60 MG: 20 INJECTION, SOLUTION INTRAVENOUS at 15:37

## 2019-10-22 RX ADMIN — PROPOFOL 30 MG: 10 INJECTION, EMULSION INTRAVENOUS at 15:40

## 2019-10-22 RX ADMIN — PROPOFOL 50 MG: 10 INJECTION, EMULSION INTRAVENOUS at 15:42

## 2019-10-22 RX ADMIN — PROPOFOL 50 MG: 10 INJECTION, EMULSION INTRAVENOUS at 15:41

## 2019-10-22 RX ADMIN — PROPOFOL 30 MG: 10 INJECTION, EMULSION INTRAVENOUS at 15:39

## 2019-10-22 RX ADMIN — DEXTROSE AND SODIUM CHLORIDE 30 ML/HR: 5; 450 INJECTION, SOLUTION INTRAVENOUS at 15:15

## 2019-10-22 RX ADMIN — PROPOFOL 50 MG: 10 INJECTION, EMULSION INTRAVENOUS at 15:38

## 2019-10-22 RX ADMIN — PROPOFOL 120 MG: 10 INJECTION, EMULSION INTRAVENOUS at 15:37

## 2019-10-22 NOTE — ANESTHESIA PREPROCEDURE EVALUATION
Anesthesia Evaluation     Patient summary reviewed and Nursing notes reviewed   NPO Solid Status: > 8 hours  NPO Liquid Status: > 2 hours           Airway   Mallampati: II  TM distance: >3 FB  Neck ROM: full  Possible difficult intubation and Large neck circumference  Dental - normal exam     Pulmonary - normal exam   (+) a smoker Former, COPD mild, sleep apnea,   Cardiovascular - normal exam  Exercise tolerance: good (4-7 METS)    (+) hypertension well controlled less than 2 medications, dysrhythmias Atrial Fib,     ROS comment: · Left atrial cavity size is mildly dilated.  · Left ventricular systolic function is low normal.  · Estimated ejection fraction 50-55%.  · Trace mitral regurgitation, trace tricuspid regurgitant    Neuro/Psych  (+) psychiatric history Depression and Anxiety,     GI/Hepatic/Renal/Endo    (+) obesity,  hiatal hernia, GERD poorly controlled, GI bleeding,     Musculoskeletal     Abdominal   (+) obese,    Substance History   (+) drug use (marijuana)      Comment: MJ QD   OB/GYN          Other   (+) arthritis                     Anesthesia Plan    ASA 3     MAC     intravenous induction   Anesthetic plan, all risks, benefits, and alternatives have been provided, discussed and informed consent has been obtained with: patient.

## 2019-10-22 NOTE — ANESTHESIA POSTPROCEDURE EVALUATION
Patient: Stevenson Chilel    Procedure Summary     Date:  10/22/19 Room / Location:  [facility] ENDOSCOPY 2 / [facility] ENDOSCOPY    Anesthesia Start:  1534 Anesthesia Stop:  1547    Procedure:  ESOPHAGOGASTRODUODENOSCOPY (N/A ) Diagnosis:       Intractable cyclical vomiting with nausea      (Intractable cyclical vomiting with nausea [G43.A1])    Surgeon:  Edwin Medina MD Provider:  Kuldeep Mao CRNA    Anesthesia Type:  MAC ASA Status:  3          Anesthesia Type: MAC  Last vitals  BP   (!) 178/102 (10/22/19 1502)   Temp   98.4 °F (36.9 °C) (10/22/19 1502)   Pulse   88 (10/22/19 1502)   Resp   20 (10/22/19 1502)     SpO2   96 % (10/22/19 1502)     Post Anesthesia Care and Evaluation    Patient location during evaluation: PACU  Level of consciousness: sleepy but conscious  Pain score: 0  Pain management: adequate  Airway patency: patent  Anesthetic complications: No anesthetic complications  PONV Status: none  Cardiovascular status: acceptable and hemodynamically stable  Respiratory status: acceptable and spontaneous ventilation  Hydration status: acceptable

## 2019-10-24 LAB
LAB AP CASE REPORT: NORMAL
PATH REPORT.FINAL DX SPEC: NORMAL
PATH REPORT.GROSS SPEC: NORMAL

## 2019-10-31 ENCOUNTER — OFFICE VISIT (OUTPATIENT)
Dept: GASTROENTEROLOGY | Facility: CLINIC | Age: 44
End: 2019-10-31

## 2019-10-31 VITALS
HEART RATE: 85 BPM | WEIGHT: 230.8 LBS | DIASTOLIC BLOOD PRESSURE: 100 MMHG | HEIGHT: 72 IN | SYSTOLIC BLOOD PRESSURE: 140 MMHG | BODY MASS INDEX: 31.26 KG/M2

## 2019-10-31 DIAGNOSIS — K20.90 ESOPHAGITIS: Primary | Chronic | ICD-10-CM

## 2019-10-31 DIAGNOSIS — K29.71 GASTRITIS WITH HEMORRHAGE, UNSPECIFIED CHRONICITY, UNSPECIFIED GASTRITIS TYPE: Chronic | ICD-10-CM

## 2019-10-31 PROCEDURE — 99212 OFFICE O/P EST SF 10 MIN: CPT | Performed by: INTERNAL MEDICINE

## 2019-10-31 NOTE — PATIENT INSTRUCTIONS

## 2019-10-31 NOTE — PROGRESS NOTES
[facility]      Chief Complaint:   Chief Complaint   Patient presents with   • Vomiting   • Nausea       Subjective     HPI:   Patient with marked improvement in abdominal pain and nausea.  Patient states he no longer has nausea or vomiting at this time.  Patient denies needing any medications for nausea.  Patient had EGD which shows mild esophagitis and chronic duodenitis.  Otherwise normal.  No evidence of H. pylori.    Plan; we will have patient follow-up as needed if any nausea vomiting or abdominal pain develop again.    Past Medical History:   Past Medical History:   Diagnosis Date   • Alcoholism /alcohol abuse (CMS/HCC)    • Anxiety    • Arthritis     shoulders and knees   • Atrial fibrillation (CMS/HCC)    • Atrial flutter (CMS/HCC)    • Depression    • Diverticulitis    • Esophagitis    • Gastritis    • GERD (gastroesophageal reflux disease)    • Hypertension    • Kidney stone    • Pancreatitis     patient denies   • Sleep apnea     has c-pap       Past Surgical History:  Past Surgical History:   Procedure Laterality Date   • CARDIAC ELECTROPHYSIOLOGY PROCEDURE N/A 5/2/2017    Procedure: Ablation atrial flutter;  Surgeon: Ramirez Reza MD;  Location:  [facility];  Service:    • CARDIAC ELECTROPHYSIOLOGY PROCEDURE N/A 9/5/2017    Procedure: Ablation atrial fibrillation PVA ;  Surgeon: Ramirez Reza MD;  Location:  [facility];  Service:    • COLON RESECTION Left 12/20/2018    Procedure: LAPAROSCOPIC SIGMOID  RESECTION, TAKE DOWN OF SPLENIC FLEXURE;  Surgeon: Michel Lala MD;  Location: [facility] OR;  Service: General   • COLONOSCOPY     • COLONOSCOPY N/A 5/30/2018    Procedure: COLONOSCOPY;  Surgeon: Edwin Medina MD;  Location: [facility] ENDOSCOPY;  Service: Gastroenterology   • ENDOSCOPY N/A 3/30/2018    Procedure: ESOPHAGOGASTRODUODENOSCOPY;  Surgeon: Edwin Medina MD;  Location: [facility] ENDOSCOPY;  Service: Gastroenterology   • ENDOSCOPY N/A  5/30/2018    Procedure: ESOPHAGOGASTRODUODENOSCOPY;  Surgeon: Edwin Medina MD;  Location: [facility] ENDOSCOPY;  Service: Gastroenterology   • ENDOSCOPY N/A 12/3/2018    Procedure: ESOPHAGOGASTRODUODENOSCOPY;  Surgeon: Edwin Medina MD;  Location: [facility] ENDOSCOPY;  Service: Gastroenterology   • ENDOSCOPY N/A 10/22/2019    Procedure: ESOPHAGOGASTRODUODENOSCOPY;  Surgeon: Edwin Medina MD;  Location: [facility] ENDOSCOPY;  Service: Gastroenterology   • SHOULDER SURGERY Right 2014   • TONSILLECTOMY  12/2015   • UPPER GASTROINTESTINAL ENDOSCOPY  03/30/2018   • UPPER GASTROINTESTINAL ENDOSCOPY     • UPPER GASTROINTESTINAL ENDOSCOPY  05/30/2018       Family History:  Family History   Problem Relation Age of Onset   • Heart disease Father    • Depression Father    • Cancer Maternal Grandmother         lung   • Cancer Paternal Grandmother         pancreatic   • Heart disease Paternal Grandfather        Social History:   reports that he has never smoked. He has never used smokeless tobacco. He reports that he uses drugs. Drug: Marijuana. Frequency: 7.00 times per week. He reports that he does not drink alcohol.    Medications:   Prior to Admission medications    Medication Sig Start Date End Date Taking? Authorizing Provider   aspirin 81 MG EC tablet Take 81 mg by mouth Daily.   Yes Idalmis Medina MD   B Complex-C (SUPER B COMPLEX PO) Take 1 tablet by mouth Daily.   Yes Idalmis Medina MD   carvedilol (COREG) 6.25 MG tablet Take 6.25 mg by mouth Daily.   Yes Idalmis Medina MD   clonazePAM (KlonoPIN) 1 MG tablet Take 1 mg by mouth 2 (Two) Times a Day As Needed for Anxiety.   Yes Idalmis Medina MD   DULoxetine (CYMBALTA) 60 MG capsule Take 60 mg by mouth Every Night. 8/21/18  Yes Idalmis Medina MD   magnesium oxide (MAGOX) 400 (241.3 MG) MG tablet tablet Take 400 mg by mouth 2 (Two) Times a Day.   Yes Idalmis Medina MD   mirtazapine (REMERON) 15 MG tablet Take 15 mg by mouth Every  "Night.   Yes ProviderIdalmis MD   pantoprazole (PROTONIX) 40 MG EC tablet Take 1 tablet by mouth Daily. 9/27/19  Yes Edwin Medina MD   Probiotic Product (PROBIOTIC-10 PO) Take 1 tablet by mouth Daily.   Yes Idalmis Medina MD   promethazine (PHENERGAN) 25 MG tablet Take 1 tablet by mouth Every 6 (Six) Hours As Needed for Nausea or Vomiting. 9/25/19  Yes Rios Mart MD   promethazine-dextromethorphan (PROMETHAZINE-DM) 6.25-15 MG/5ML syrup Take 5 mL by mouth At Night As Needed for Cough. 9/5/19  Yes Tia Doherty APRN       Allergies:  Contrast dye; Iodinated diagnostic agents; Morphine; and Zofran [ondansetron hcl]    ROS:    Review of Systems   Constitutional: Negative for activity change, appetite change and unexpected weight change.   HENT: Negative for congestion, sore throat and trouble swallowing.    Respiratory: Negative for cough, choking and shortness of breath.    Cardiovascular: Negative for chest pain.   Gastrointestinal: Negative for abdominal distention, abdominal pain, anal bleeding, blood in stool, constipation, diarrhea, nausea, rectal pain and vomiting.   Endocrine: Negative for heat intolerance, polydipsia and polyphagia.   Genitourinary: Negative for difficulty urinating.   Musculoskeletal: Negative for arthralgias.   Skin: Negative for color change, pallor, rash and wound.   Allergic/Immunologic: Negative for food allergies.   Neurological: Negative for dizziness, syncope, weakness and headaches.   Psychiatric/Behavioral: Negative for agitation, behavioral problems, confusion and decreased concentration.     Objective     Blood pressure 140/100, pulse 85, height 182.9 cm (72\"), weight 105 kg (230 lb 12.8 oz).    Physical Exam   Constitutional: He is oriented to person, place, and time. He appears well-developed and well-nourished. No distress.   HENT:   Head: Normocephalic and atraumatic.   Cardiovascular: Normal rate, regular rhythm, normal heart sounds and intact " distal pulses. Exam reveals no gallop and no friction rub.   No murmur heard.  Pulmonary/Chest: Breath sounds normal. No respiratory distress. He has no wheezes. He has no rales. He exhibits no tenderness.   Abdominal: Soft. Bowel sounds are normal. He exhibits no distension and no mass. There is no tenderness. There is no rebound and no guarding. No hernia.   Musculoskeletal: Normal range of motion. He exhibits no edema.   Neurological: He is alert and oriented to person, place, and time.   Skin: Skin is warm and dry. No rash noted. He is not diaphoretic. No erythema. No pallor.   Psychiatric: He has a normal mood and affect. His behavior is normal. Judgment and thought content normal.        Assessment/Plan   Stevenson was seen today for vomiting and nausea.    Diagnoses and all orders for this visit:    Esophagitis    Gastritis with hemorrhage, unspecified chronicity, unspecified gastritis type        * Surgery not found *     Diagnosis Plan   1. Esophagitis     2. Gastritis with hemorrhage, unspecified chronicity, unspecified gastritis type         Anticipated Surgical Procedure:  No orders of the defined types were placed in this encounter.      The risks, benefits, and alternatives of this procedure have been discussed with the patient or the responsible party- the patient understands and agrees to proceed.

## 2019-11-05 ENCOUNTER — HOSPITAL ENCOUNTER (EMERGENCY)
Facility: HOSPITAL | Age: 44
Discharge: HOME | End: 2019-11-05
Attending: EMERGENCY MEDICINE | Admitting: EMERGENCY MEDICINE

## 2019-11-05 ENCOUNTER — APPOINTMENT (OUTPATIENT)
Dept: ULTRASOUND IMAGING | Facility: HOSPITAL | Age: 44
End: 2019-11-05

## 2019-11-05 ENCOUNTER — APPOINTMENT (OUTPATIENT)
Dept: CT IMAGING | Facility: HOSPITAL | Age: 44
End: 2019-11-05

## 2019-11-05 ENCOUNTER — APPOINTMENT (OUTPATIENT)
Dept: GENERAL RADIOLOGY | Facility: HOSPITAL | Age: 44
End: 2019-11-05

## 2019-11-05 VITALS
HEIGHT: 72 IN | BODY MASS INDEX: 29.93 KG/M2 | HEART RATE: 92 BPM | WEIGHT: 221 LBS | DIASTOLIC BLOOD PRESSURE: 83 MMHG | OXYGEN SATURATION: 98 % | TEMPERATURE: 97.4 F | RESPIRATION RATE: 17 BRPM | SYSTOLIC BLOOD PRESSURE: 164 MMHG

## 2019-11-05 DIAGNOSIS — I10 HYPERTENSION, UNCONTROLLED: ICD-10-CM

## 2019-11-05 DIAGNOSIS — K29.00 ACUTE GASTRITIS WITHOUT HEMORRHAGE, UNSPECIFIED GASTRITIS TYPE: ICD-10-CM

## 2019-11-05 DIAGNOSIS — R11.2 NAUSEA AND VOMITING, INTRACTABILITY OF VOMITING NOT SPECIFIED, UNSPECIFIED VOMITING TYPE: Primary | ICD-10-CM

## 2019-11-05 LAB
ALBUMIN SERPL-MCNC: 4.2 G/DL (ref 3.5–5.2)
ALBUMIN/GLOB SERPL: 1.2 G/DL
ALP SERPL-CCNC: 50 U/L (ref 39–117)
ALT SERPL W P-5'-P-CCNC: 35 U/L (ref 1–41)
AMYLASE SERPL-CCNC: 80 U/L (ref 28–100)
ANION GAP SERPL CALCULATED.3IONS-SCNC: 13 MMOL/L (ref 5–15)
AST SERPL-CCNC: 35 U/L (ref 1–40)
BASOPHILS # BLD AUTO: 0.03 10*3/MM3 (ref 0–0.2)
BASOPHILS NFR BLD AUTO: 0.2 % (ref 0–1.5)
BILIRUB SERPL-MCNC: 0.9 MG/DL (ref 0.2–1.2)
BUN BLD-MCNC: 13 MG/DL (ref 6–20)
BUN/CREAT SERPL: 11.3 (ref 7–25)
CALCIUM SPEC-SCNC: 9.9 MG/DL (ref 8.6–10.5)
CHLORIDE SERPL-SCNC: 106 MMOL/L (ref 98–107)
CO2 SERPL-SCNC: 26 MMOL/L (ref 22–29)
CREAT BLD-MCNC: 1.15 MG/DL (ref 0.76–1.27)
DEPRECATED RDW RBC AUTO: 49.1 FL (ref 37–54)
EOSINOPHIL # BLD AUTO: 0.02 10*3/MM3 (ref 0–0.4)
EOSINOPHIL NFR BLD AUTO: 0.2 % (ref 0.3–6.2)
ERYTHROCYTE [DISTWIDTH] IN BLOOD BY AUTOMATED COUNT: 17.4 % (ref 12.3–15.4)
GFR SERPL CREATININE-BSD FRML MDRD: 70 ML/MIN/1.73
GLOBULIN UR ELPH-MCNC: 3.4 GM/DL
GLUCOSE BLD-MCNC: 127 MG/DL (ref 65–99)
HCT VFR BLD AUTO: 52.5 % (ref 37.5–51)
HGB BLD-MCNC: 16.9 G/DL (ref 13–17.7)
IMM GRANULOCYTES # BLD AUTO: 0.05 10*3/MM3 (ref 0–0.05)
IMM GRANULOCYTES NFR BLD AUTO: 0.4 % (ref 0–0.5)
LIPASE SERPL-CCNC: 24 U/L (ref 13–60)
LYMPHOCYTES # BLD AUTO: 1.27 10*3/MM3 (ref 0.7–3.1)
LYMPHOCYTES NFR BLD AUTO: 10.6 % (ref 19.6–45.3)
MCH RBC QN AUTO: 26.9 PG (ref 26.6–33)
MCHC RBC AUTO-ENTMCNC: 32.2 G/DL (ref 31.5–35.7)
MCV RBC AUTO: 83.5 FL (ref 79–97)
MONOCYTES # BLD AUTO: 0.44 10*3/MM3 (ref 0.1–0.9)
MONOCYTES NFR BLD AUTO: 3.7 % (ref 5–12)
NEUTROPHILS # BLD AUTO: 10.2 10*3/MM3 (ref 1.7–7)
NEUTROPHILS NFR BLD AUTO: 84.9 % (ref 42.7–76)
NRBC BLD AUTO-RTO: 0 /100 WBC (ref 0–0.2)
PLATELET # BLD AUTO: 319 10*3/MM3 (ref 140–450)
PMV BLD AUTO: 9.9 FL (ref 6–12)
POTASSIUM BLD-SCNC: 4.8 MMOL/L (ref 3.5–5.2)
PROT SERPL-MCNC: 7.6 G/DL (ref 6–8.5)
RBC # BLD AUTO: 6.29 10*6/MM3 (ref 4.14–5.8)
SODIUM BLD-SCNC: 145 MMOL/L (ref 136–145)
WBC NRBC COR # BLD: 12.01 10*3/MM3 (ref 3.4–10.8)

## 2019-11-05 PROCEDURE — 85025 COMPLETE CBC W/AUTO DIFF WBC: CPT | Performed by: EMERGENCY MEDICINE

## 2019-11-05 PROCEDURE — 25010000002 PROMETHAZINE PER 50 MG: Performed by: EMERGENCY MEDICINE

## 2019-11-05 PROCEDURE — 82150 ASSAY OF AMYLASE: CPT | Performed by: EMERGENCY MEDICINE

## 2019-11-05 PROCEDURE — 96361 HYDRATE IV INFUSION ADD-ON: CPT

## 2019-11-05 PROCEDURE — 25010000002 METOCLOPRAMIDE PER 10 MG: Performed by: EMERGENCY MEDICINE

## 2019-11-05 PROCEDURE — 96374 THER/PROPH/DIAG INJ IV PUSH: CPT

## 2019-11-05 PROCEDURE — 25010000002 LORAZEPAM PER 2 MG: Performed by: EMERGENCY MEDICINE

## 2019-11-05 PROCEDURE — 80053 COMPREHEN METABOLIC PANEL: CPT | Performed by: EMERGENCY MEDICINE

## 2019-11-05 PROCEDURE — 96375 TX/PRO/DX INJ NEW DRUG ADDON: CPT

## 2019-11-05 PROCEDURE — 74176 CT ABD & PELVIS W/O CONTRAST: CPT

## 2019-11-05 PROCEDURE — 99285 EMERGENCY DEPT VISIT HI MDM: CPT

## 2019-11-05 PROCEDURE — 76705 ECHO EXAM OF ABDOMEN: CPT

## 2019-11-05 PROCEDURE — 83690 ASSAY OF LIPASE: CPT | Performed by: EMERGENCY MEDICINE

## 2019-11-05 PROCEDURE — 74022 RADEX COMPL AQT ABD SERIES: CPT

## 2019-11-05 RX ORDER — SODIUM CHLORIDE 0.9 % (FLUSH) 0.9 %
10 SYRINGE (ML) INJECTION AS NEEDED
Status: DISCONTINUED | OUTPATIENT
Start: 2019-11-05 | End: 2019-11-05 | Stop reason: HOSPADM

## 2019-11-05 RX ORDER — PANTOPRAZOLE SODIUM 40 MG/10ML
40 INJECTION, POWDER, LYOPHILIZED, FOR SOLUTION INTRAVENOUS ONCE
Status: COMPLETED | OUTPATIENT
Start: 2019-11-05 | End: 2019-11-05

## 2019-11-05 RX ORDER — CLONIDINE HYDROCHLORIDE 0.2 MG/1
0.2 TABLET ORAL ONCE
Status: COMPLETED | OUTPATIENT
Start: 2019-11-05 | End: 2019-11-05

## 2019-11-05 RX ORDER — CLONIDINE HYDROCHLORIDE 0.1 MG/1
0.1 TABLET ORAL ONCE
Status: COMPLETED | OUTPATIENT
Start: 2019-11-05 | End: 2019-11-05

## 2019-11-05 RX ORDER — AMLODIPINE BESYLATE 5 MG/1
5 TABLET ORAL DAILY
Qty: 15 TABLET | Refills: 0 | Status: SHIPPED | OUTPATIENT
Start: 2019-11-05 | End: 2020-10-27

## 2019-11-05 RX ORDER — PROCHLORPERAZINE MALEATE 5 MG/1
5 TABLET ORAL EVERY 6 HOURS PRN
Qty: 10 TABLET | Refills: 0 | Status: SHIPPED | OUTPATIENT
Start: 2019-11-05 | End: 2020-10-13

## 2019-11-05 RX ORDER — SODIUM CHLORIDE 9 MG/ML
125 INJECTION, SOLUTION INTRAVENOUS CONTINUOUS
Status: DISCONTINUED | OUTPATIENT
Start: 2019-11-05 | End: 2019-11-05 | Stop reason: HOSPADM

## 2019-11-05 RX ORDER — PROMETHAZINE HYDROCHLORIDE 25 MG/ML
12.5 INJECTION, SOLUTION INTRAMUSCULAR; INTRAVENOUS ONCE
Status: COMPLETED | OUTPATIENT
Start: 2019-11-05 | End: 2019-11-05

## 2019-11-05 RX ORDER — METOCLOPRAMIDE HYDROCHLORIDE 5 MG/ML
10 INJECTION INTRAMUSCULAR; INTRAVENOUS ONCE
Status: COMPLETED | OUTPATIENT
Start: 2019-11-05 | End: 2019-11-05

## 2019-11-05 RX ORDER — LORAZEPAM 2 MG/ML
1 INJECTION INTRAMUSCULAR ONCE
Status: COMPLETED | OUTPATIENT
Start: 2019-11-05 | End: 2019-11-05

## 2019-11-05 RX ADMIN — CLONIDINE HYDROCHLORIDE 0.1 MG: 0.1 TABLET ORAL at 17:41

## 2019-11-05 RX ADMIN — SODIUM CHLORIDE 1000 ML: 900 INJECTION, SOLUTION INTRAVENOUS at 15:07

## 2019-11-05 RX ADMIN — METOCLOPRAMIDE 10 MG: 5 INJECTION, SOLUTION INTRAMUSCULAR; INTRAVENOUS at 16:29

## 2019-11-05 RX ADMIN — SODIUM CHLORIDE 125 ML/HR: 900 INJECTION, SOLUTION INTRAVENOUS at 17:50

## 2019-11-05 RX ADMIN — PROMETHAZINE HYDROCHLORIDE 12.5 MG: 25 INJECTION INTRAMUSCULAR; INTRAVENOUS at 15:10

## 2019-11-05 RX ADMIN — PANTOPRAZOLE SODIUM 40 MG: 40 INJECTION, POWDER, FOR SOLUTION INTRAVENOUS at 15:08

## 2019-11-05 RX ADMIN — CLONIDINE HYDROCHLORIDE 0.2 MG: 0.2 TABLET ORAL at 16:28

## 2019-11-05 RX ADMIN — LORAZEPAM 1 MG: 2 INJECTION, SOLUTION INTRAMUSCULAR; INTRAVENOUS at 15:35

## 2019-11-05 NOTE — ED PROVIDER NOTES
"Subjective   43yo male pmh significant atrial fibrillation/gerd/esophagitis/duodenitis/connie/htn/substance abuse presents ED c/o 1d hx intractable nausea/vomiting associated with epigastric abdominal discomfort characterized as \"dull\"/nonradiating.  ROS (+) cough.  Denies chills/chest pain/soa/diarrhea/dysuria/melena/hematochoezia/hematemesis.        History provided by:  Patient and spouse  Abdominal Pain   Pain location:  Epigastric  Pain quality: dull    Pain radiates to:  Does not radiate  Onset quality:  Sudden  Duration:  1 day  Timing:  Constant  Chronicity:  Recurrent  Associated symptoms: cough, nausea and vomiting    Associated symptoms: no diarrhea        Review of Systems   Constitutional: Negative.    HENT: Negative.    Respiratory: Positive for cough.    Cardiovascular: Negative.    Gastrointestinal: Positive for abdominal pain, nausea and vomiting. Negative for blood in stool and diarrhea.   Genitourinary: Negative.    Musculoskeletal: Negative.    Skin: Negative.    Allergic/Immunologic: Negative for immunocompromised state.   All other systems reviewed and are negative.      Past Medical History:   Diagnosis Date   • Alcoholism /alcohol abuse (CMS/HCC)    • Anxiety    • Arthritis     shoulders and knees   • Atrial fibrillation (CMS/HCC)    • Atrial flutter (CMS/HCC)    • Depression    • Diverticulitis    • Esophagitis    • Gastritis    • GERD (gastroesophageal reflux disease)    • Hypertension    • Kidney stone    • Pancreatitis     patient denies   • Sleep apnea     has c-pap       Allergies   Allergen Reactions   • Contrast Dye Anaphylaxis     ANAPHYLAXIS   • Iodinated Diagnostic Agents Anaphylaxis   • Morphine Nausea And Vomiting   • Zofran [Ondansetron Hcl] Other (See Comments)     IV gives hiccups       Past Surgical History:   Procedure Laterality Date   • CARDIAC ELECTROPHYSIOLOGY PROCEDURE N/A 5/2/2017    Procedure: Ablation atrial flutter;  Surgeon: Ramirez Reza MD;  Location: [facility] " INVASIVE LOCATION;  Service:    • CARDIAC ELECTROPHYSIOLOGY PROCEDURE N/A 9/5/2017    Procedure: Ablation atrial fibrillation PVA ;  Surgeon: Ramirez Reza MD;  Location: [facility] INVASIVE LOCATION;  Service:    • COLON RESECTION Left 12/20/2018    Procedure: LAPAROSCOPIC SIGMOID  RESECTION, TAKE DOWN OF SPLENIC FLEXURE;  Surgeon: Michel Lala MD;  Location: [facility] OR;  Service: General   • COLONOSCOPY     • COLONOSCOPY N/A 5/30/2018    Procedure: COLONOSCOPY;  Surgeon: Edwin Medina MD;  Location: [facility] ENDOSCOPY;  Service: Gastroenterology   • ENDOSCOPY N/A 3/30/2018    Procedure: ESOPHAGOGASTRODUODENOSCOPY;  Surgeon: Edwin Medina MD;  Location: [facility] ENDOSCOPY;  Service: Gastroenterology   • ENDOSCOPY N/A 5/30/2018    Procedure: ESOPHAGOGASTRODUODENOSCOPY;  Surgeon: Edwin Medina MD;  Location: [facility] ENDOSCOPY;  Service: Gastroenterology   • ENDOSCOPY N/A 12/3/2018    Procedure: ESOPHAGOGASTRODUODENOSCOPY;  Surgeon: Edwin Medina MD;  Location: [facility] ENDOSCOPY;  Service: Gastroenterology   • ENDOSCOPY N/A 10/22/2019    Procedure: ESOPHAGOGASTRODUODENOSCOPY;  Surgeon: Edwin Medina MD;  Location: [facility] ENDOSCOPY;  Service: Gastroenterology   • SHOULDER SURGERY Right 2014   • TONSILLECTOMY  12/2015   • UPPER GASTROINTESTINAL ENDOSCOPY  03/30/2018   • UPPER GASTROINTESTINAL ENDOSCOPY     • UPPER GASTROINTESTINAL ENDOSCOPY  05/30/2018       Family History   Problem Relation Age of Onset   • Heart disease Father    • Depression Father    • Cancer Maternal Grandmother         lung   • Cancer Paternal Grandmother         pancreatic   • Heart disease Paternal Grandfather        Social History     Socioeconomic History   • Marital status:      Spouse name: Not on file   • Number of children: Not on file   • Years of education: Not on file   • Highest education level: Not on file   Tobacco Use   • Smoking status: Never Smoker   • Smokeless tobacco: Never Used   Substance and Sexual Activity  "  • Alcohol use: No     Comment: quit 3/17/18   • Drug use: Yes     Frequency: 7.0 times per week     Types: Marijuana     Comment: states he uses \"a little\" every day   • Sexual activity: Defer   Social History Narrative    Pt states has not had drink of any alcohol in 3 weeks as of 4/5/2018           Objective   Physical Exam   Constitutional: He appears well-developed and well-nourished.   HENT:   Head: Normocephalic and atraumatic.   Mouth/Throat: Oropharynx is clear and moist.   Eyes: Pupils are equal, round, and reactive to light.   Neck: Normal range of motion. Neck supple. No JVD present. No tracheal deviation present.   Cardiovascular: Normal rate, regular rhythm, normal heart sounds and intact distal pulses. Exam reveals no gallop and no friction rub.   No murmur heard.  Pulmonary/Chest: Effort normal and breath sounds normal. He has no wheezes. He has no rales.   Abdominal: Soft. Normal appearance and bowel sounds are normal. There is no hepatosplenomegaly. There is tenderness in the right upper quadrant and epigastric area. There is no rigidity, no rebound, no guarding, no tenderness at McBurney's point and negative Barber's sign.       Musculoskeletal: He exhibits no edema.   Lymphadenopathy:     He has no cervical adenopathy.   Neurological: He is alert.   Skin: Skin is warm and dry.   Nursing note and vitals reviewed.      Procedures           ED Course  ED Course as of Nov 05 1828   Tue Nov 05, 2019 1821 No further emesis noted in ED. Abdomen soft nontender. Neg r/r/g/hsm. Labs significant for mild leukocytosis with negative CT abd/pelvis, gallbladder US. BP improved. Will plan dc home with norvasc 5mg po daily/compazine prn. Pt with pmd followup in am.  [SD]      ED Course User Index  [SD] Jeffrey Bernabe MD      Labs Reviewed   COMPREHENSIVE METABOLIC PANEL - Abnormal; Notable for the following components:       Result Value    Glucose 127 (*)     All other components within normal limits    " Narrative:     GFR Normal >60  Chronic Kidney Disease <60  Kidney Failure <15   CBC WITH AUTO DIFFERENTIAL - Abnormal; Notable for the following components:    WBC 12.01 (*)     RBC 6.29 (*)     Hematocrit 52.5 (*)     RDW 17.4 (*)     Neutrophil % 84.9 (*)     Lymphocyte % 10.6 (*)     Monocyte % 3.7 (*)     Eosinophil % 0.2 (*)     Neutrophils, Absolute 10.20 (*)     All other components within normal limits   AMYLASE - Normal   LIPASE - Normal   URINALYSIS W/ MICROSCOPIC IF INDICATED (NO CULTURE)   CBC AND DIFFERENTIAL    Narrative:     The following orders were created for panel order CBC & Differential.  Procedure                               Abnormality         Status                     ---------                               -----------         ------                     CBC Auto Differential[[id number]]        Abnormal            Final result                 Please view results for these tests on the individual orders.     Ct Abdomen Pelvis Without Contrast    Result Date: 2019  Narrative: PROCEDURE: CT ABDOMEN PELVIS WO CONTRAST EXAMINATION:  Computed Tomography         REGION: Abdomen / Pelvis                 INDICATION: Abdominal pain and vomiting    HISTORY: [facility]. [id number]        TECHNIQUE:    - reconstructions: axial, coronal, sagittal       - contrast:  oral:  no ; intravenous:  no   - Please note:     - Lack of IV contrast limits assessment of solid organ parenchyma, urinary system, or vascular structures.     - Lack of oral contrast limits assessment of GI tract structures.   This exam was performed according to our departmental dose-optimization program, which includes automated exposure control, adjustment of the mA and/or kV according to patient size and/or use of iterative reconstruction technique. COMMENTS:       THORAX (INFERIOR):   Dependent atelectasis, right greater than left, with the right hemidiaphragm mildly elevated compared to the left. The pleura is without fluid or a  mass. The heart size is normal size and there is no pericardial fluid.     ABDOMEN:  Limited assessment of the solid organ parenchyma is grossly unremarkable demonstrating no evidence of organomegaly. Limited assessment of the viscera is grossly unremarkable demonstrating normal caliber bowel loops. No evidence of free fluid or free intraperitoneal air. Degenerative changes of the lumbosacral junction with disc space narrowing at L5-S1 and vacuum disc phenomenon..  RETROPERITONEUM: Limited assessment of the kidneys demonstrates overall normal size. Limited assessment of the ureters demonstrates normal caliber and course. The adrenal glands are of normal size and contour. No gross evidence of significant retroperitoneal adenopathy. The vascular structures are grossly within normal limits for age.  PELVIS: Limited assessment of the viscera is grossly unremarkable demonstrating normal caliber bowel loops. The colon is essentially collapsed to the level of anastomosis in the left lower, where postoperative change in the the junction of the descending colon and sigmoid colon is present. The appendix contains multiple tiny appendicoliths, but is otherwise unremarkable, with no evidence of inflammation. No evidence of free fluid or free intraperitoneal air. The osseous structures are grossly unremarkable for age. The vascular structures are grossly within normal limits for age.  .       Impression:  IMPRESSION: 1. Limited examination due to the lack of intravenous and oral contrast. 2. 2. The colon is decompressed the level of anastomosis in the left lower quadrant. This limits evaluation for evaluation of wall thickening, though no definite wall thickening identified. The appendix is visualized and appears normal 3. Please see findings section above for further details Electronically signed by:  Deanna Medina MD  11/5/2019 4:47 PM CST Workstation: [id number]    Xr Abdomen 2 View With Chest 1 View    Result Date:  11/5/2019  Narrative: Abdomen two view, chest CLINICAL INDICATION: Abdominal pain vomiting   COMPARISON: CT abdomen July 13, 2019.   FINDINGS: Upright PA chest, Supine and upright views of the abdomen (three views,    images). Nonobstructive bowel gas pattern. Increased stool in the distal colon and rectum. No evidence of free air. Degenerative changes lumbar spine. Left-sided lumbar scoliotic curvature. Heart normal size. Lung fields clear.     Impression: CONCLUSION: Increased stool in distal colon and rectum. Otherwise unremarkable abdomen. Electronically signed by:  Tahir Tuttle MD  11/5/2019 2:49 PM CST Workstation: [id number]    Us Gallbladder    Result Date: 11/5/2019  Narrative: PROCEDURE: US GALLBLADDER INDICATION:  Right upper quadrant pain COMPARISON:  None TECHNIQUE:  Ultrasound, limited, right upper quadrant FINDINGS: Liver:    size: Limited evaluation, grossly negative    echotexture Within normal limits    no focal mass or intrahepatic biliary ductal dilatation Biliary:   Gall bladder:  No cholelithiasis                          No wall thickening or pericholecystic fluid    Common bile duct:  Normal caliber 0.39 cm  Pancreas (visualized portions): Very limited evaluation, nearly completely obscured by overlying bowel gas. Kidney, right (limited):    size:  Normal, measuring 13.1 cm in length   echotexture:  Normal   No nephrolithiasis, solid mass, or collecting system dilation     Impression: Limited evaluation, with the pancreas almost completely obscured by overlying bowel gas. No other significant abnormality identified. Electronically signed by:  Deanna Medina MD  11/5/2019 3:35 PM CST Workstation: [id number]                [facility]    Final diagnoses:   Nausea and vomiting, intractability of vomiting not specified, unspecified vomiting type   Hypertension, uncontrolled   Acute gastritis without hemorrhage, unspecified gastritis type              Jeffrey Bernabe MD  11/05/19 [id number]

## 2019-11-05 NOTE — ED NOTES
This RN in room to administer medications, patient in CT at this time.      Candice Guerra, RN  11/05/19 [id number]

## 2019-11-05 NOTE — ED TRIAGE NOTES
"Pt presents c/o \"cyclical vomiting\" all morning and states he is \"shooting for protonix in an IV.\"   "

## 2019-11-06 NOTE — DISCHARGE INSTRUCTIONS
Clear liquid diet x24hrs  Return ED fever, vomiting, dehydration, bleeding, abdominal pain, worse condition, other concerns  Continue protonix as directed

## 2019-11-26 ENCOUNTER — HOSPITAL ENCOUNTER (EMERGENCY)
Facility: HOSPITAL | Age: 44
Discharge: HOME | End: 2019-11-27
Attending: EMERGENCY MEDICINE | Admitting: EMERGENCY MEDICINE

## 2019-11-26 VITALS
BODY MASS INDEX: 31.18 KG/M2 | WEIGHT: 230.2 LBS | RESPIRATION RATE: 18 BRPM | OXYGEN SATURATION: 92 % | DIASTOLIC BLOOD PRESSURE: 95 MMHG | SYSTOLIC BLOOD PRESSURE: 154 MMHG | TEMPERATURE: 97.7 F | HEIGHT: 72 IN | HEART RATE: 78 BPM

## 2019-11-26 DIAGNOSIS — M54.6 CHRONIC BILATERAL THORACIC BACK PAIN: Primary | ICD-10-CM

## 2019-11-26 DIAGNOSIS — M54.31 SCIATICA, RIGHT SIDE: ICD-10-CM

## 2019-11-26 DIAGNOSIS — G89.29 CHRONIC BILATERAL THORACIC BACK PAIN: Primary | ICD-10-CM

## 2019-11-26 PROCEDURE — 25010000002 HYDROMORPHONE 1 MG/ML SOLUTION: Performed by: EMERGENCY MEDICINE

## 2019-11-26 PROCEDURE — 25010000002 TRIAMCINOLONE PER 10 MG: Performed by: EMERGENCY MEDICINE

## 2019-11-26 PROCEDURE — 99283 EMERGENCY DEPT VISIT LOW MDM: CPT

## 2019-11-26 PROCEDURE — 96372 THER/PROPH/DIAG INJ SC/IM: CPT

## 2019-11-26 RX ORDER — METHYLPREDNISOLONE 4 MG/1
TABLET ORAL
Qty: 21 EACH | Refills: 0 | Status: SHIPPED | OUTPATIENT
Start: 2019-11-26 | End: 2019-12-18

## 2019-11-26 RX ORDER — TRIAMCINOLONE ACETONIDE 40 MG/ML
40 INJECTION, SUSPENSION INTRA-ARTICULAR; INTRAMUSCULAR ONCE
Status: COMPLETED | OUTPATIENT
Start: 2019-11-26 | End: 2019-11-26

## 2019-11-26 RX ORDER — CYCLOBENZAPRINE HCL 10 MG
10 TABLET ORAL 3 TIMES DAILY PRN
Qty: 15 TABLET | Refills: 0 | Status: SHIPPED | OUTPATIENT
Start: 2019-11-26 | End: 2020-10-13

## 2019-11-26 RX ADMIN — HYDROMORPHONE HYDROCHLORIDE 1 MG: 1 INJECTION, SOLUTION INTRAMUSCULAR; INTRAVENOUS; SUBCUTANEOUS at 23:43

## 2019-11-26 RX ADMIN — TRIAMCINOLONE ACETONIDE 40 MG: 40 INJECTION, SUSPENSION INTRA-ARTICULAR; INTRAMUSCULAR at 23:44

## 2019-12-04 ENCOUNTER — APPOINTMENT (OUTPATIENT)
Dept: GENERAL RADIOLOGY | Facility: HOSPITAL | Age: 44
End: 2019-12-04

## 2019-12-04 ENCOUNTER — HOSPITAL ENCOUNTER (EMERGENCY)
Facility: HOSPITAL | Age: 44
Discharge: HOME | End: 2019-12-04
Attending: FAMILY MEDICINE | Admitting: FAMILY MEDICINE

## 2019-12-04 VITALS
WEIGHT: 225 LBS | RESPIRATION RATE: 18 BRPM | SYSTOLIC BLOOD PRESSURE: 179 MMHG | TEMPERATURE: 97.7 F | HEIGHT: 72 IN | BODY MASS INDEX: 30.48 KG/M2 | HEART RATE: 94 BPM | OXYGEN SATURATION: 94 % | DIASTOLIC BLOOD PRESSURE: 108 MMHG

## 2019-12-04 DIAGNOSIS — R11.2 NON-INTRACTABLE VOMITING WITH NAUSEA, UNSPECIFIED VOMITING TYPE: Primary | ICD-10-CM

## 2019-12-04 LAB
ALBUMIN SERPL-MCNC: 4.1 G/DL (ref 3.5–5.2)
ALBUMIN/GLOB SERPL: 1.5 G/DL
ALP SERPL-CCNC: 50 U/L (ref 39–117)
ALT SERPL W P-5'-P-CCNC: 42 U/L (ref 1–41)
AMPHET+METHAMPHET UR QL: NEGATIVE
AMPHETAMINES UR QL: NEGATIVE
ANION GAP SERPL CALCULATED.3IONS-SCNC: 10 MMOL/L (ref 5–15)
AST SERPL-CCNC: 27 U/L (ref 1–40)
BACTERIA UR QL AUTO: ABNORMAL /HPF
BARBITURATES UR QL SCN: NEGATIVE
BASOPHILS # BLD AUTO: 0.06 10*3/MM3 (ref 0–0.2)
BASOPHILS NFR BLD AUTO: 0.4 % (ref 0–1.5)
BENZODIAZ UR QL SCN: NEGATIVE
BILIRUB SERPL-MCNC: 0.7 MG/DL (ref 0.2–1.2)
BILIRUB UR QL STRIP: NEGATIVE
BUN BLD-MCNC: 12 MG/DL (ref 6–20)
BUN/CREAT SERPL: 12.4 (ref 7–25)
BUPRENORPHINE SERPL-MCNC: NEGATIVE NG/ML
CALCIUM SPEC-SCNC: 9.5 MG/DL (ref 8.6–10.5)
CANNABINOIDS SERPL QL: POSITIVE
CHLORIDE SERPL-SCNC: 108 MMOL/L (ref 98–107)
CLARITY UR: ABNORMAL
CO2 SERPL-SCNC: 28 MMOL/L (ref 22–29)
COCAINE UR QL: NEGATIVE
COLOR UR: YELLOW
CREAT BLD-MCNC: 0.97 MG/DL (ref 0.76–1.27)
DEPRECATED RDW RBC AUTO: 49.5 FL (ref 37–54)
EOSINOPHIL # BLD AUTO: 0.1 10*3/MM3 (ref 0–0.4)
EOSINOPHIL NFR BLD AUTO: 0.7 % (ref 0.3–6.2)
ERYTHROCYTE [DISTWIDTH] IN BLOOD BY AUTOMATED COUNT: 17.7 % (ref 12.3–15.4)
ETHANOL BLD-MCNC: <10 MG/DL (ref 0–10)
ETHANOL UR QL: <0.01 %
GFR SERPL CREATININE-BSD FRML MDRD: 85 ML/MIN/1.73
GLOBULIN UR ELPH-MCNC: 2.7 GM/DL
GLUCOSE BLD-MCNC: 114 MG/DL (ref 65–99)
GLUCOSE UR STRIP-MCNC: NEGATIVE MG/DL
HCT VFR BLD AUTO: 52 % (ref 37.5–51)
HGB BLD-MCNC: 16.8 G/DL (ref 13–17.7)
HGB UR QL STRIP.AUTO: NEGATIVE
HOLD SPECIMEN: NORMAL
HOLD SPECIMEN: NORMAL
HYALINE CASTS UR QL AUTO: ABNORMAL /LPF
IMM GRANULOCYTES # BLD AUTO: 0.11 10*3/MM3 (ref 0–0.05)
IMM GRANULOCYTES NFR BLD AUTO: 0.7 % (ref 0–0.5)
KETONES UR QL STRIP: NEGATIVE
LEUKOCYTE ESTERASE UR QL STRIP.AUTO: NEGATIVE
LIPASE SERPL-CCNC: 27 U/L (ref 13–60)
LYMPHOCYTES # BLD AUTO: 1.63 10*3/MM3 (ref 0.7–3.1)
LYMPHOCYTES NFR BLD AUTO: 10.9 % (ref 19.6–45.3)
MCH RBC QN AUTO: 27.2 PG (ref 26.6–33)
MCHC RBC AUTO-ENTMCNC: 32.3 G/DL (ref 31.5–35.7)
MCV RBC AUTO: 84.1 FL (ref 79–97)
METHADONE UR QL SCN: NEGATIVE
MONOCYTES # BLD AUTO: 1.09 10*3/MM3 (ref 0.1–0.9)
MONOCYTES NFR BLD AUTO: 7.3 % (ref 5–12)
NEUTROPHILS # BLD AUTO: 11.91 10*3/MM3 (ref 1.7–7)
NEUTROPHILS NFR BLD AUTO: 80 % (ref 42.7–76)
NITRITE UR QL STRIP: NEGATIVE
NRBC BLD AUTO-RTO: 0 /100 WBC (ref 0–0.2)
OPIATES UR QL: NEGATIVE
OXYCODONE UR QL SCN: NEGATIVE
PCP UR QL SCN: NEGATIVE
PH UR STRIP.AUTO: 8 [PH] (ref 5–9)
PLATELET # BLD AUTO: 267 10*3/MM3 (ref 140–450)
PMV BLD AUTO: 10.3 FL (ref 6–12)
POTASSIUM BLD-SCNC: 4.2 MMOL/L (ref 3.5–5.2)
PROPOXYPH UR QL: NEGATIVE
PROT SERPL-MCNC: 6.8 G/DL (ref 6–8.5)
PROT UR QL STRIP: ABNORMAL
RBC # BLD AUTO: 6.18 10*6/MM3 (ref 4.14–5.8)
RBC # UR: ABNORMAL /HPF
REF LAB TEST METHOD: ABNORMAL
SODIUM BLD-SCNC: 146 MMOL/L (ref 136–145)
SP GR UR STRIP: 1.02 (ref 1–1.03)
SQUAMOUS #/AREA URNS HPF: ABNORMAL /HPF
TRICYCLICS UR QL SCN: NEGATIVE
UROBILINOGEN UR QL STRIP: ABNORMAL
WBC NRBC COR # BLD: 14.9 10*3/MM3 (ref 3.4–10.8)
WBC UR QL AUTO: ABNORMAL /HPF
WHOLE BLOOD HOLD SPECIMEN: NORMAL
WHOLE BLOOD HOLD SPECIMEN: NORMAL

## 2019-12-04 PROCEDURE — 96374 THER/PROPH/DIAG INJ IV PUSH: CPT

## 2019-12-04 PROCEDURE — 80307 DRUG TEST PRSMV CHEM ANLYZR: CPT | Performed by: PHYSICIAN ASSISTANT

## 2019-12-04 PROCEDURE — 83690 ASSAY OF LIPASE: CPT | Performed by: PHYSICIAN ASSISTANT

## 2019-12-04 PROCEDURE — 99283 EMERGENCY DEPT VISIT LOW MDM: CPT

## 2019-12-04 PROCEDURE — 96375 TX/PRO/DX INJ NEW DRUG ADDON: CPT

## 2019-12-04 PROCEDURE — 25010000002 LORAZEPAM PER 2 MG: Performed by: FAMILY MEDICINE

## 2019-12-04 PROCEDURE — 81001 URINALYSIS AUTO W/SCOPE: CPT | Performed by: PHYSICIAN ASSISTANT

## 2019-12-04 PROCEDURE — 80053 COMPREHEN METABOLIC PANEL: CPT | Performed by: PHYSICIAN ASSISTANT

## 2019-12-04 PROCEDURE — 25010000002 PROMETHAZINE PER 50 MG: Performed by: PHYSICIAN ASSISTANT

## 2019-12-04 PROCEDURE — 85025 COMPLETE CBC W/AUTO DIFF WBC: CPT | Performed by: PHYSICIAN ASSISTANT

## 2019-12-04 PROCEDURE — 96361 HYDRATE IV INFUSION ADD-ON: CPT

## 2019-12-04 PROCEDURE — 74018 RADEX ABDOMEN 1 VIEW: CPT

## 2019-12-04 RX ORDER — LORAZEPAM 2 MG/ML
INJECTION INTRAMUSCULAR
Status: DISCONTINUED
Start: 2019-12-04 | End: 2019-12-04 | Stop reason: HOSPADM

## 2019-12-04 RX ORDER — LORAZEPAM 2 MG/ML
1 INJECTION INTRAMUSCULAR ONCE
Status: COMPLETED | OUTPATIENT
Start: 2019-12-04 | End: 2019-12-04

## 2019-12-04 RX ORDER — CLONIDINE HYDROCHLORIDE 0.2 MG/1
0.2 TABLET ORAL ONCE
Status: DISCONTINUED | OUTPATIENT
Start: 2019-12-04 | End: 2019-12-04 | Stop reason: HOSPADM

## 2019-12-04 RX ORDER — PROMETHAZINE HYDROCHLORIDE 25 MG/ML
12.5 INJECTION, SOLUTION INTRAMUSCULAR; INTRAVENOUS ONCE
Status: COMPLETED | OUTPATIENT
Start: 2019-12-04 | End: 2019-12-04

## 2019-12-04 RX ORDER — SODIUM CHLORIDE 0.9 % (FLUSH) 0.9 %
10 SYRINGE (ML) INJECTION AS NEEDED
Status: DISCONTINUED | OUTPATIENT
Start: 2019-12-04 | End: 2019-12-04 | Stop reason: HOSPADM

## 2019-12-04 RX ORDER — FAMOTIDINE 10 MG/ML
20 INJECTION, SOLUTION INTRAVENOUS ONCE
Status: COMPLETED | OUTPATIENT
Start: 2019-12-04 | End: 2019-12-04

## 2019-12-04 RX ORDER — PROCHLORPERAZINE EDISYLATE 5 MG/ML
5 INJECTION INTRAMUSCULAR; INTRAVENOUS ONCE
Status: DISCONTINUED | OUTPATIENT
Start: 2019-12-04 | End: 2019-12-04 | Stop reason: HOSPADM

## 2019-12-04 RX ADMIN — SODIUM CHLORIDE 1000 ML: 900 INJECTION, SOLUTION INTRAVENOUS at 13:34

## 2019-12-04 RX ADMIN — FAMOTIDINE 20 MG: 10 INJECTION INTRAVENOUS at 14:28

## 2019-12-04 RX ADMIN — LORAZEPAM 1 MG: 2 INJECTION INTRAMUSCULAR; INTRAVENOUS at 14:28

## 2019-12-04 RX ADMIN — PROMETHAZINE HYDROCHLORIDE 12.5 MG: 25 INJECTION INTRAMUSCULAR; INTRAVENOUS at 13:34

## 2019-12-04 NOTE — ED PROVIDER NOTES
Subjective   Patient presents to emergency department for nausea vomiting and abdominal pain.  He is being seen by Dr. Marie/Dr Medina and evaluated for chronic symptoms.  He notes state this is likely cyclical vomiting and hyperemesis induced by cannabis.  Patient also has a history of alcoholism and pancreatitis.  States he was misdiagnosed with pancreatitis and does not drink alcohol.          History provided by:  Patient   used: No    Vomiting   The primary symptoms include fatigue, abdominal pain and vomiting. Primary symptoms do not include fever or dysuria. The illness began today (recurrent). The onset was sudden.   The illness is also significant for chills. The illness does not include back pain.   Abdominal Pain   Associated symptoms: chills, fatigue and vomiting    Associated symptoms: no chest pain, no dysuria, no fever, no shortness of breath and no sore throat        Review of Systems   Constitutional: Positive for chills and fatigue. Negative for fever.   HENT: Negative for sore throat and trouble swallowing.    Respiratory: Negative for shortness of breath and wheezing.    Cardiovascular: Negative for chest pain.   Gastrointestinal: Positive for abdominal pain and vomiting.   Genitourinary: Negative for dysuria and flank pain.   Musculoskeletal: Negative for back pain.   Skin: Negative for color change.   Allergic/Immunologic: Negative for immunocompromised state.   Neurological: Negative for syncope and weakness.   Hematological: Does not bruise/bleed easily.   Psychiatric/Behavioral: Negative for confusion.       Past Medical History:   Diagnosis Date   • Alcoholism /alcohol abuse (CMS/HCC)    • Anxiety    • Arthritis     shoulders and knees   • Atrial fibrillation (CMS/HCC)    • Atrial flutter (CMS/HCC)    • Depression    • Diverticulitis    • Esophagitis    • Gastritis    • GERD (gastroesophageal reflux disease)    • Hypertension    • Kidney stone    • Pancreatitis      patient denies   • Sleep apnea     has c-pap       Allergies   Allergen Reactions   • Contrast Dye Anaphylaxis     ANAPHYLAXIS   • Iodinated Diagnostic Agents Anaphylaxis   • Morphine Nausea And Vomiting   • Zofran [Ondansetron Hcl] Other (See Comments)     IV gives hiccups       Past Surgical History:   Procedure Laterality Date   • CARDIAC ELECTROPHYSIOLOGY PROCEDURE N/A 5/2/2017    Procedure: Ablation atrial flutter;  Surgeon: Ramirez Reza MD;  Location:  [facility];  Service:    • CARDIAC ELECTROPHYSIOLOGY PROCEDURE N/A 9/5/2017    Procedure: Ablation atrial fibrillation PVA ;  Surgeon: Ramirez Reza MD;  Location:  [facility];  Service:    • COLON RESECTION Left 12/20/2018    Procedure: LAPAROSCOPIC SIGMOID  RESECTION, TAKE DOWN OF SPLENIC FLEXURE;  Surgeon: Michel Lala MD;  Location: [facility] OR;  Service: General   • COLONOSCOPY     • COLONOSCOPY N/A 5/30/2018    Procedure: COLONOSCOPY;  Surgeon: Edwin Medina MD;  Location: [facility] ENDOSCOPY;  Service: Gastroenterology   • ENDOSCOPY N/A 3/30/2018    Procedure: ESOPHAGOGASTRODUODENOSCOPY;  Surgeon: Edwin Medina MD;  Location: [facility] ENDOSCOPY;  Service: Gastroenterology   • ENDOSCOPY N/A 5/30/2018    Procedure: ESOPHAGOGASTRODUODENOSCOPY;  Surgeon: Edwin Medina MD;  Location: [facility] ENDOSCOPY;  Service: Gastroenterology   • ENDOSCOPY N/A 12/3/2018    Procedure: ESOPHAGOGASTRODUODENOSCOPY;  Surgeon: Edwin Medina MD;  Location: [facility] ENDOSCOPY;  Service: Gastroenterology   • ENDOSCOPY N/A 10/22/2019    Procedure: ESOPHAGOGASTRODUODENOSCOPY;  Surgeon: Edwin Medina MD;  Location: [facility] ENDOSCOPY;  Service: Gastroenterology   • SHOULDER SURGERY Right 2014   • TONSILLECTOMY  12/2015   • UPPER GASTROINTESTINAL ENDOSCOPY  03/30/2018   • UPPER GASTROINTESTINAL ENDOSCOPY     • UPPER GASTROINTESTINAL ENDOSCOPY  05/30/2018       Family History   Problem Relation Age of Onset   • Heart disease Father    •  "Depression Father    • Cancer Maternal Grandmother         lung   • Cancer Paternal Grandmother         pancreatic   • Heart disease Paternal Grandfather        Social History     Socioeconomic History   • Marital status:      Spouse name: Not on file   • Number of children: Not on file   • Years of education: Not on file   • Highest education level: Not on file   Tobacco Use   • Smoking status: Never Smoker   • Smokeless tobacco: Never Used   Substance and Sexual Activity   • Alcohol use: No     Comment: quit 3/17/18   • Drug use: Yes     Frequency: 7.0 times per week     Types: Marijuana     Comment: states he uses \"a little\" every day   • Sexual activity: Defer   Social History Narrative    Pt states has not had drink of any alcohol in 3 weeks as of 4/5/2018           Objective   Physical Exam   Constitutional: He is oriented to person, place, and time. He appears well-developed and well-nourished. He does not appear ill.   HENT:   Head: Normocephalic and atraumatic.   Eyes: Conjunctivae are normal.   Cardiovascular: Normal rate, regular rhythm, normal heart sounds and intact distal pulses.   Pulmonary/Chest: Effort normal and breath sounds normal. No respiratory distress. He has no wheezes.   Abdominal: There is no tenderness (abdomen non-tender to palpation).   Musculoskeletal: He exhibits no edema.   Neurological: He is alert and oriented to person, place, and time.   Skin: Skin is warm. Capillary refill takes less than 2 seconds.   Psychiatric: He has a normal mood and affect. His behavior is normal. Thought content normal.   Nursing note and vitals reviewed.      Procedures           ED Course  ED Course as of Dec 04 1643   Wed Dec 04, 2019   1556 Likely due to nausea and vomiting.  No focal abdominal tenderness.   WBC: (!) 14.90 [LITO]      ED Course User Index  [LITO] Benedicto Beauchamp PA-C      Results for orders placed or performed during the hospital encounter of 12/04/19   Comprehensive " Metabolic Panel   Result Value Ref Range    Glucose 114 (H) 65 - 99 mg/dL    BUN 12 6 - 20 mg/dL    Creatinine 0.97 0.76 - 1.27 mg/dL    Sodium 146 (H) 136 - 145 mmol/L    Potassium 4.2 3.5 - 5.2 mmol/L    Chloride 108 (H) 98 - 107 mmol/L    CO2 28.0 22.0 - 29.0 mmol/L    Calcium 9.5 8.6 - 10.5 mg/dL    Total Protein 6.8 6.0 - 8.5 g/dL    Albumin 4.10 3.50 - 5.20 g/dL    ALT (SGPT) 42 (H) 1 - 41 U/L    AST (SGOT) 27 1 - 40 U/L    Alkaline Phosphatase 50 39 - 117 U/L    Total Bilirubin 0.7 0.2 - 1.2 mg/dL    eGFR Non African Amer 85 >60 mL/min/1.73    Globulin 2.7 gm/dL    A/G Ratio 1.5 g/dL    BUN/Creatinine Ratio 12.4 7.0 - 25.0    Anion Gap 10.0 5.0 - 15.0 mmol/L   Lipase   Result Value Ref Range    Lipase 27 13 - 60 U/L   Urinalysis With Microscopic If Indicated (No Culture) - Urine, Clean Catch   Result Value Ref Range    Color, UA Yellow Yellow, Straw, Dark Yellow, Amelia    Appearance, UA Cloudy (A) Clear    pH, UA 8.0 5.0 - 9.0    Specific Gravity, UA 1.016 1.003 - 1.030    Glucose, UA Negative Negative    Ketones, UA Negative Negative    Bilirubin, UA Negative Negative    Blood, UA Negative Negative    Protein, UA 30 mg/dL (1+) (A) Negative    Leuk Esterase, UA Negative Negative    Nitrite, UA Negative Negative    Urobilinogen, UA 0.2 E.U./dL 0.2 - 1.0 E.U./dL   CBC Auto Differential   Result Value Ref Range    WBC 14.90 (H) 3.40 - 10.80 10*3/mm3    RBC 6.18 (H) 4.14 - 5.80 10*6/mm3    Hemoglobin 16.8 13.0 - 17.7 g/dL    Hematocrit 52.0 (H) 37.5 - 51.0 %    MCV 84.1 79.0 - 97.0 fL    MCH 27.2 26.6 - 33.0 pg    MCHC 32.3 31.5 - 35.7 g/dL    RDW 17.7 (H) 12.3 - 15.4 %    RDW-SD 49.5 37.0 - 54.0 fl    MPV 10.3 6.0 - 12.0 fL    Platelets 267 140 - 450 10*3/mm3    Neutrophil % 80.0 (H) 42.7 - 76.0 %    Lymphocyte % 10.9 (L) 19.6 - 45.3 %    Monocyte % 7.3 5.0 - 12.0 %    Eosinophil % 0.7 0.3 - 6.2 %    Basophil % 0.4 0.0 - 1.5 %    Immature Grans % 0.7 (H) 0.0 - 0.5 %    Neutrophils, Absolute 11.91 (H) 1.70 - 7.00  10*3/mm3    Lymphocytes, Absolute 1.63 0.70 - 3.10 10*3/mm3    Monocytes, Absolute 1.09 (H) 0.10 - 0.90 10*3/mm3    Eosinophils, Absolute 0.10 0.00 - 0.40 10*3/mm3    Basophils, Absolute 0.06 0.00 - 0.20 10*3/mm3    Immature Grans, Absolute 0.11 (H) 0.00 - 0.05 10*3/mm3    nRBC 0.0 0.0 - 0.2 /100 WBC   Ethanol   Result Value Ref Range    Ethanol <10 0 - 10 mg/dL    Ethanol % <0.010 %   Urine Drug Screen - Urine, Clean Catch   Result Value Ref Range    THC, Screen, Urine Positive (A) Negative    Phencyclidine (PCP), Urine Negative Negative    Cocaine Screen, Urine Negative Negative    Methamphetamine, Ur Negative Negative    Opiate Screen Negative Negative    Amphetamine Screen, Urine Negative Negative    Benzodiazepine Screen, Urine Negative Negative    Tricyclic Antidepressants Screen Negative Negative    Methadone Screen, Urine Negative Negative    Barbiturates Screen, Urine Negative Negative    Oxycodone Screen, Urine Negative Negative    Propoxyphene Screen Negative Negative    Buprenorphine, Screen, Urine Negative Negative   Urinalysis, Microscopic Only - Urine, Clean Catch   Result Value Ref Range    RBC, UA 0-2 (A) None Seen /HPF    WBC, UA 0-2 None Seen, 0-2, 3-5 /HPF    Bacteria, UA None Seen None Seen /HPF    Squamous Epithelial Cells, UA None Seen None Seen, 0-2 /HPF    Hyaline Casts, UA 0-2 None Seen /LPF    Methodology Automated Microscopy    Light Blue Top   Result Value Ref Range    Extra Tube hold for add-on    Green Top (Gel)   Result Value Ref Range    Extra Tube Hold for add-ons.    Lavender Top   Result Value Ref Range    Extra Tube hold for add-on    Gold Top - SST   Result Value Ref Range    Extra Tube Hold for add-ons.        Ct Abdomen Pelvis Without Contrast    Result Date: 11/5/2019  Narrative: PROCEDURE: CT ABDOMEN PELVIS WO CONTRAST EXAMINATION:  Computed Tomography         REGION: Abdomen / Pelvis                 INDICATION: Abdominal pain and vomiting    HISTORY: [facility]. IMAGING:  7/13/2019        TECHNIQUE:    - reconstructions: axial, coronal, sagittal       - contrast:  oral:  no ; intravenous:  no   - Please note:     - Lack of IV contrast limits assessment of solid organ parenchyma, urinary system, or vascular structures.     - Lack of oral contrast limits assessment of GI tract structures.   This exam was performed according to our departmental dose-optimization program, which includes automated exposure control, adjustment of the mA and/or kV according to patient size and/or use of iterative reconstruction technique. COMMENTS:       THORAX (INFERIOR):   Dependent atelectasis, right greater than left, with the right hemidiaphragm mildly elevated compared to the left. The pleura is without fluid or a mass. The heart size is normal size and there is no pericardial fluid.     ABDOMEN:  Limited assessment of the solid organ parenchyma is grossly unremarkable demonstrating no evidence of organomegaly. Limited assessment of the viscera is grossly unremarkable demonstrating normal caliber bowel loops. No evidence of free fluid or free intraperitoneal air. Degenerative changes of the lumbosacral junction with disc space narrowing at L5-S1 and vacuum disc phenomenon..  RETROPERITONEUM: Limited assessment of the kidneys demonstrates overall normal size. Limited assessment of the ureters demonstrates normal caliber and course. The adrenal glands are of normal size and contour. No gross evidence of significant retroperitoneal adenopathy. The vascular structures are grossly within normal limits for age.  PELVIS: Limited assessment of the viscera is grossly unremarkable demonstrating normal caliber bowel loops. The colon is essentially collapsed to the level of anastomosis in the left lower, where postoperative change in the the junction of the descending colon and sigmoid colon is present. The appendix contains multiple tiny appendicoliths, but is otherwise unremarkable, with no evidence of  inflammation. No evidence of free fluid or free intraperitoneal air. The osseous structures are grossly unremarkable for age. The vascular structures are grossly within normal limits for age.  .       Impression:  IMPRESSION: 1. Limited examination due to the lack of intravenous and oral contrast. 2. 2. The colon is decompressed the level of anastomosis in the left lower quadrant. This limits evaluation for evaluation of wall thickening, though no definite wall thickening identified. The appendix is visualized and appears normal 3. Please see findings section above for further details Electronically signed by:  Deanna Medina MD  11/5/2019 4:47 PM CST Workstation: [id number]    Xr Abdomen 2 View With Chest 1 View    Result Date: 11/5/2019  Narrative: Abdomen two view, chest CLINICAL INDICATION: Abdominal pain vomiting   COMPARISON: CT abdomen July 13, 2019.   FINDINGS: Upright PA chest, Supine and upright views of the abdomen (three views,    images). Nonobstructive bowel gas pattern. Increased stool in the distal colon and rectum. No evidence of free air. Degenerative changes lumbar spine. Left-sided lumbar scoliotic curvature. Heart normal size. Lung fields clear.     Impression: CONCLUSION: Increased stool in distal colon and rectum. Otherwise unremarkable abdomen. Electronically signed by:  Tahir Tuttle MD  11/5/2019 2:49 PM CST Workstation: [id number]    Us Gallbladder    Result Date: 11/5/2019  Narrative: PROCEDURE: US GALLBLADDER INDICATION:  Right upper quadrant pain COMPARISON:  None TECHNIQUE:  Ultrasound, limited, right upper quadrant FINDINGS: Liver:    size: Limited evaluation, grossly negative    echotexture Within normal limits    no focal mass or intrahepatic biliary ductal dilatation Biliary:   Gall bladder:  No cholelithiasis                          No wall thickening or pericholecystic fluid    Common bile duct:  Normal caliber 0.39 cm  Pancreas (visualized portions): Very limited evaluation, nearly  completely obscured by overlying bowel gas. Kidney, right (limited):    size:  Normal, measuring 13.1 cm in length   echotexture:  Normal   No nephrolithiasis, solid mass, or collecting system dilation     Impression: Limited evaluation, with the pancreas almost completely obscured by overlying bowel gas. No other significant abnormality identified. Electronically signed by:  Deanna Medina MD  11/5/2019 3:35 PM CST Workstation: [id number]    Xr Abdomen Kub    Result Date: 12/4/2019  Narrative: Exam:  KUB History:  Nausea and vomiting Supine film of the abdomen was obtained. Comparison:  None. Correlation CT November 5, 2019. Moderate amount retained feces in the colon. No mechanical bowel obstruction. No organomegaly. Left pelvic phlebolith. No acute osseous abnormality. Minimal levoscoliosis lumbar spine. Degenerative changes and degenerative disc disease lumbar spine.     Impression: Conclusion: Moderate amount retained feces in the colon. [id number] Electronically signed by:  Pedrito Lyons MD  12/4/2019 3:45 PM CST Workstation: [id number]                MDM  Number of Diagnoses or Management Options  Non-intractable vomiting with nausea, unspecified vomiting type:   Diagnosis management comments: No active vomiting, empty emesis bags.  No focal abdominal tenderness.  X-ray KUB negative for bowel obstruction.  Discussed with patient likely cyclical vomiting from marijuana use and advised cessation of marijuana.  Patient has multiple nausea and vomiting medications at home.  Advised follow-up with Dr. Medina for chronic nausea vomiting.       Amount and/or Complexity of Data Reviewed  Clinical lab tests: reviewed  Tests in the radiology section of CPT®: reviewed  Decide to obtain previous medical records or to obtain history from someone other than the patient: yes        Final diagnoses:   Non-intractable vomiting with nausea, unspecified vomiting type              Benedicto Beauchamp PA-C  12/04/19 1754

## 2019-12-04 NOTE — ED NOTES
Pt given urinal and asked to provide a urine sample. Pt states he is unable to void at this time     Tahmina Potter  12/04/19 1420       Tahmina Potter  12/04/19 1429

## 2019-12-17 DIAGNOSIS — D69.6 THROMBOCYTOPENIA (HCC): Primary | ICD-10-CM

## 2019-12-18 ENCOUNTER — OFFICE VISIT (OUTPATIENT)
Dept: ONCOLOGY | Facility: CLINIC | Age: 44
End: 2019-12-18

## 2019-12-18 ENCOUNTER — LAB (OUTPATIENT)
Dept: ONCOLOGY | Facility: HOSPITAL | Age: 44
End: 2019-12-18

## 2019-12-18 ENCOUNTER — APPOINTMENT (OUTPATIENT)
Dept: ONCOLOGY | Facility: HOSPITAL | Age: 44
End: 2019-12-18

## 2019-12-18 VITALS
HEART RATE: 78 BPM | TEMPERATURE: 97.3 F | HEIGHT: 72 IN | WEIGHT: 227.2 LBS | SYSTOLIC BLOOD PRESSURE: 152 MMHG | DIASTOLIC BLOOD PRESSURE: 72 MMHG | RESPIRATION RATE: 18 BRPM | BODY MASS INDEX: 30.77 KG/M2

## 2019-12-18 DIAGNOSIS — D69.6 THROMBOCYTOPENIA (HCC): ICD-10-CM

## 2019-12-18 LAB
ALBUMIN SERPL-MCNC: 3.9 G/DL (ref 3.5–5.2)
ALBUMIN/GLOB SERPL: 1.5 G/DL
ALP SERPL-CCNC: 61 U/L (ref 39–117)
ALT SERPL W P-5'-P-CCNC: 57 U/L (ref 1–41)
ANION GAP SERPL CALCULATED.3IONS-SCNC: 8 MMOL/L (ref 5–15)
AST SERPL-CCNC: 38 U/L (ref 1–40)
BASOPHILS # BLD AUTO: 0.04 10*3/MM3 (ref 0–0.2)
BASOPHILS NFR BLD AUTO: 0.5 % (ref 0–1.5)
BILIRUB SERPL-MCNC: 0.7 MG/DL (ref 0.2–1.2)
BUN BLD-MCNC: 10 MG/DL (ref 6–20)
BUN/CREAT SERPL: 8.4 (ref 7–25)
CALCIUM SPEC-SCNC: 9.1 MG/DL (ref 8.6–10.5)
CHLORIDE SERPL-SCNC: 101 MMOL/L (ref 98–107)
CO2 SERPL-SCNC: 29 MMOL/L (ref 22–29)
CREAT BLD-MCNC: 1.19 MG/DL (ref 0.76–1.27)
DEPRECATED RDW RBC AUTO: 51.4 FL (ref 37–54)
EOSINOPHIL # BLD AUTO: 0.16 10*3/MM3 (ref 0–0.4)
EOSINOPHIL NFR BLD AUTO: 1.9 % (ref 0.3–6.2)
ERYTHROCYTE [DISTWIDTH] IN BLOOD BY AUTOMATED COUNT: 17.8 % (ref 12.3–15.4)
GFR SERPL CREATININE-BSD FRML MDRD: 67 ML/MIN/1.73
GLOBULIN UR ELPH-MCNC: 2.6 GM/DL
GLUCOSE BLD-MCNC: 107 MG/DL (ref 65–99)
HCT VFR BLD AUTO: 52 % (ref 37.5–51)
HGB BLD-MCNC: 16.6 G/DL (ref 13–17.7)
IMM GRANULOCYTES # BLD AUTO: 0.04 10*3/MM3 (ref 0–0.05)
IMM GRANULOCYTES NFR BLD AUTO: 0.5 % (ref 0–0.5)
LYMPHOCYTES # BLD AUTO: 1.47 10*3/MM3 (ref 0.7–3.1)
LYMPHOCYTES NFR BLD AUTO: 17.6 % (ref 19.6–45.3)
MCH RBC QN AUTO: 27 PG (ref 26.6–33)
MCHC RBC AUTO-ENTMCNC: 31.9 G/DL (ref 31.5–35.7)
MCV RBC AUTO: 84.6 FL (ref 79–97)
MONOCYTES # BLD AUTO: 0.35 10*3/MM3 (ref 0.1–0.9)
MONOCYTES NFR BLD AUTO: 4.2 % (ref 5–12)
NEUTROPHILS # BLD AUTO: 6.27 10*3/MM3 (ref 1.7–7)
NEUTROPHILS NFR BLD AUTO: 75.3 % (ref 42.7–76)
NRBC BLD AUTO-RTO: 0 /100 WBC (ref 0–0.2)
PLATELET # BLD AUTO: 236 10*3/MM3 (ref 140–450)
PMV BLD AUTO: 9.3 FL (ref 6–12)
POTASSIUM BLD-SCNC: 4.6 MMOL/L (ref 3.5–5.2)
PROT SERPL-MCNC: 6.5 G/DL (ref 6–8.5)
RBC # BLD AUTO: 6.15 10*6/MM3 (ref 4.14–5.8)
SODIUM BLD-SCNC: 138 MMOL/L (ref 136–145)
WBC NRBC COR # BLD: 8.33 10*3/MM3 (ref 3.4–10.8)

## 2019-12-18 PROCEDURE — 99213 OFFICE O/P EST LOW 20 MIN: CPT | Performed by: NURSE PRACTITIONER

## 2019-12-18 PROCEDURE — G0463 HOSPITAL OUTPT CLINIC VISIT: HCPCS | Performed by: NURSE PRACTITIONER

## 2019-12-18 PROCEDURE — 85025 COMPLETE CBC W/AUTO DIFF WBC: CPT | Performed by: NURSE PRACTITIONER

## 2019-12-18 PROCEDURE — 80053 COMPREHEN METABOLIC PANEL: CPT | Performed by: NURSE PRACTITIONER

## 2019-12-18 NOTE — PROGRESS NOTES
"DATE OF VISIT: 12/18/2019    REASON FOR VISIT:  Follow-up for secondary polycythemia     HISTORY OF PRESENT ILLNESS:  44 yr old male with history of sleep apnea diagnosed 3 yrs ago; recently has been more compliant with use of CPAP;  He has a past history of testosterone use and a history of atrial flutter that has required ablation. He was initially seen by Dr. Monet in October for elevated H&H. He had an extensive work up that included a negative BAKARI 2 mutation, Normal EPO level and Ultrasound of abdomen that was unremarkable. He has had couple therapeutic phlebotomy. Most recent was in September 2018 when he was symptomatic. He is taking daily baby ASA and does admit to still taking testosterone supplement.   He denies any erythemalgia symptoms. He is taking baby ASA daily.    PAST MEDICAL HISTORY:    Past Medical History:   Diagnosis Date   • Alcoholism /alcohol abuse (CMS/HCC)    • Anxiety    • Arthritis     shoulders and knees   • Atrial fibrillation (CMS/HCC)    • Atrial flutter (CMS/HCC)    • Depression    • Diverticulitis    • Esophagitis    • Gastritis    • GERD (gastroesophageal reflux disease)    • Hypertension    • Kidney stone    • Pancreatitis     patient denies   • Sleep apnea     has c-pap       SOCIAL HISTORY:    Social History     Tobacco Use   • Smoking status: Never Smoker   • Smokeless tobacco: Never Used   Substance Use Topics   • Alcohol use: No     Comment: quit 3/17/18   • Drug use: Yes     Frequency: 7.0 times per week     Types: Marijuana     Comment: states he uses \"a little\" every day       Surgical History :  Past Surgical History:   Procedure Laterality Date   • CARDIAC ELECTROPHYSIOLOGY PROCEDURE N/A 5/2/2017    Procedure: Ablation atrial flutter;  Surgeon: Ramirez Reza MD;  Location: [facility] INVASIVE LOCATION;  Service:    • CARDIAC ELECTROPHYSIOLOGY PROCEDURE N/A 9/5/2017    Procedure: Ablation atrial fibrillation PVA ;  Surgeon: Ramirez Reza MD;  Location: [facility] " INVASIVE LOCATION;  Service:    • COLON RESECTION Left 12/20/2018    Procedure: LAPAROSCOPIC SIGMOID  RESECTION, TAKE DOWN OF SPLENIC FLEXURE;  Surgeon: Michel Lala MD;  Location: [facility] OR;  Service: General   • COLONOSCOPY     • COLONOSCOPY N/A 5/30/2018    Procedure: COLONOSCOPY;  Surgeon: Edwin Medina MD;  Location: [facility] ENDOSCOPY;  Service: Gastroenterology   • ENDOSCOPY N/A 3/30/2018    Procedure: ESOPHAGOGASTRODUODENOSCOPY;  Surgeon: Edwin Medina MD;  Location: [facility] ENDOSCOPY;  Service: Gastroenterology   • ENDOSCOPY N/A 5/30/2018    Procedure: ESOPHAGOGASTRODUODENOSCOPY;  Surgeon: Edwin Medina MD;  Location: [facility] ENDOSCOPY;  Service: Gastroenterology   • ENDOSCOPY N/A 12/3/2018    Procedure: ESOPHAGOGASTRODUODENOSCOPY;  Surgeon: Edwin Medina MD;  Location: [facility] ENDOSCOPY;  Service: Gastroenterology   • ENDOSCOPY N/A 10/22/2019    Procedure: ESOPHAGOGASTRODUODENOSCOPY;  Surgeon: Edwin Medina MD;  Location: [facility] ENDOSCOPY;  Service: Gastroenterology   • SHOULDER SURGERY Right 2014   • TONSILLECTOMY  12/2015   • UPPER GASTROINTESTINAL ENDOSCOPY  03/30/2018   • UPPER GASTROINTESTINAL ENDOSCOPY     • UPPER GASTROINTESTINAL ENDOSCOPY  05/30/2018       ALLERGIES:    Allergies   Allergen Reactions   • Contrast Dye Anaphylaxis     ANAPHYLAXIS   • Iodinated Diagnostic Agents Anaphylaxis   • Morphine Nausea And Vomiting   • Zofran [Ondansetron Hcl] Other (See Comments)     IV gives hiccups       REVIEW OF SYSTEMS:      CONSTITUTIONAL:  No fever, chills, or night sweats.     HEENT:  No epistaxis, mouth sores, or difficulty swallowing.    RESPIRATORY:  No new shortness of breath or cough at present.    CARDIOVASCULAR:  No chest pain or palpitations.    GASTROINTESTINAL:  No abdominal pain, nausea, vomiting, or blood in the stool.    GENITOURINARY:  No dysuria or hematuria.    MUSCULOSKELETAL:  No any new back pain or arthralgias.     NEUROLOGICAL:  No tingling or numbness. No new headache or  "dizziness.     LYMPHATICS:  Denies any abnormal swollen and anywhere in the body.    SKIN:  Denies any new skin rash.    PHYSICAL EXAMINATION:      VITAL SIGNS:  /72 Comment: manual  Pulse 78   Temp 97.3 °F (36.3 °C) (Temporal)   Resp 18   Ht 182.9 cm (72.01\")   Wt 103 kg (227 lb 3.2 oz)   BMI 30.81 kg/m²     GENERAL:  Not in any distress.    HEENT:  Normocephalic, Atraumatic.Mild Conjunctival pallor. No icterus. Extraocular Movements Intact. No Facial Asymmetry noted.    NECK:  No adenopathy. No JVD.    RESPIRATORY:  Fair air entry bilateral. No rhonchi or wheezing.    CARDIOVASCULAR:  S1, S2. Regular rate and rhythm. No murmur or gallop appreciated.    ABDOMEN:  Soft, obese, nontender. Bowel sounds present in all four quadrants.  No organomegaly appreciated.    EXTREMITIES:  No edema.No Calf Tenderness.    NEUROLOGIC:  Alert, awake and oriented ×3.  No  Motor or sensory deficit appreciated. Cranial Nerves 2-12 grossly intact.    SKIN : No new skin lesion identified  DIAGNOSTIC DATA:    Glucose   Date Value Ref Range Status   12/18/2019 107 (H) 65 - 99 mg/dL Final     Sodium   Date Value Ref Range Status   12/18/2019 138 136 - 145 mmol/L Final   03/22/2018 139 136 - 145 MMOL/L Final     Potassium   Date Value Ref Range Status   12/18/2019 4.6 3.5 - 5.2 mmol/L Final   03/22/2018 4.2 3.5 - 5.4 MMOL/L Final     CO2   Date Value Ref Range Status   12/18/2019 29.0 22.0 - 29.0 mmol/L Final   03/22/2018 25 20 - 33 MMOL/L Final     Chloride   Date Value Ref Range Status   12/18/2019 101 98 - 107 mmol/L Final   03/22/2018 101 98 - 107 MMOL/L Final     Anion Gap   Date Value Ref Range Status   12/18/2019 8.0 5.0 - 15.0 mmol/L Final   03/22/2018 17 (H) 7 - 14 MMOL/L Final     Creatinine   Date Value Ref Range Status   12/18/2019 1.19 0.76 - 1.27 mg/dL Final   03/22/2018 1.0 0.6 - 1.2 MG/DL Final     BUN   Date Value Ref Range Status   12/18/2019 10 6 - 20 mg/dL Final   03/22/2018 10 8 - 23 MG/DL Final "     BUN/Creatinine Ratio   Date Value Ref Range Status   12/18/2019 8.4 7.0 - 25.0 Final     Calcium   Date Value Ref Range Status   12/18/2019 9.1 8.6 - 10.5 mg/dL Final   03/22/2018 8.1 (L) 8.7 - 10.4 MG/DL Final     eGFR Non  Amer   Date Value Ref Range Status   12/18/2019 67 >60 mL/min/1.73 Final     Alkaline Phosphatase   Date Value Ref Range Status   12/18/2019 61 39 - 117 U/L Final   03/21/2018 36 25 - 100 U/L Final     Total Protein   Date Value Ref Range Status   12/18/2019 6.5 6.0 - 8.5 g/dL Final   03/21/2018 7.2 6.0 - 8.2 G/DL Final     ALT (SGPT)   Date Value Ref Range Status   12/18/2019 57 (H) 1 - 41 U/L Final   03/21/2018 37 10 - 40 U/L Final     AST (SGOT)   Date Value Ref Range Status   12/18/2019 38 1 - 40 U/L Final   03/21/2018 44 (H) 0 - 39 U/L Final     Total Bilirubin   Date Value Ref Range Status   12/18/2019 0.7 0.2 - 1.2 mg/dL Final   03/21/2018 1.1 0.3 - 1.2 MG/DL Final     Albumin   Date Value Ref Range Status   12/18/2019 3.90 3.50 - 5.20 g/dL Final   03/21/2018 4.4 3.4 - 4.8 G/DL Final     Globulin   Date Value Ref Range Status   12/18/2019 2.6 gm/dL Final     A/G Ratio   Date Value Ref Range Status   03/21/2018 1.6  Final     Lab Results   Component Value Date    WBC 8.33 12/18/2019    HGB 16.6 12/18/2019    HCT 52.0 (H) 12/18/2019    MCV 84.6 12/18/2019     12/18/2019     Lab Results   Component Value Date    NEUTROABS 6.27 12/18/2019    IRON 126 04/10/2018    TIBC 345 04/10/2018    LABIRON 37 04/10/2018    FERRITIN 179.00 04/10/2018     Lab Results   Component Value Date    AFPTM 2.2 04/10/2018    AFPTM 182 04/10/2018   ]        ASSESSMENT AND PLAN:    1. Erythrocytosis,secondary to testosterone use and an element of DONNIE;  Hct today is 52%;  he is not having any erythromyalgia symptoms; no heme intervention needed at this time. Advised him to continue to stay well hydrated and to continue with daily ASA. Recheck again in 4 mos.     2. Atrial fibrillation, pt was  taken  off the Eliquis by his cardiologist and is currently taking baby ASA daily.      3. Health maintenance, he does not smoke; he has had a EGD and colonoscopy      This document has been signed by VIKASH Clinton on December 18, 2019 1:28 PM

## 2020-01-08 ENCOUNTER — APPOINTMENT (OUTPATIENT)
Dept: GENERAL RADIOLOGY | Facility: HOSPITAL | Age: 45
End: 2020-01-08

## 2020-01-08 ENCOUNTER — HOSPITAL ENCOUNTER (OUTPATIENT)
Facility: HOSPITAL | Age: 45
Setting detail: OBSERVATION
Discharge: HOME | End: 2020-01-10
Attending: EMERGENCY MEDICINE | Admitting: HOSPITALIST

## 2020-01-08 DIAGNOSIS — R07.9 CHEST PAIN, UNSPECIFIED TYPE: Primary | ICD-10-CM

## 2020-01-08 LAB
ALBUMIN SERPL-MCNC: 4.6 G/DL (ref 3.5–5.2)
ALBUMIN/GLOB SERPL: 2.4 G/DL
ALP SERPL-CCNC: 76 U/L (ref 39–117)
ALT SERPL W P-5'-P-CCNC: 28 U/L (ref 1–41)
ANION GAP SERPL CALCULATED.3IONS-SCNC: 14 MMOL/L (ref 5–15)
AST SERPL-CCNC: 29 U/L (ref 1–40)
BASOPHILS # BLD AUTO: 0.04 10*3/MM3 (ref 0–0.2)
BASOPHILS NFR BLD AUTO: 0.4 % (ref 0–1.5)
BILIRUB SERPL-MCNC: 0.9 MG/DL (ref 0.2–1.2)
BUN BLD-MCNC: 10 MG/DL (ref 6–20)
BUN/CREAT SERPL: 7.9 (ref 7–25)
CALCIUM SPEC-SCNC: 9.3 MG/DL (ref 8.6–10.5)
CHLORIDE SERPL-SCNC: 101 MMOL/L (ref 98–107)
CO2 SERPL-SCNC: 23 MMOL/L (ref 22–29)
CREAT BLD-MCNC: 1.27 MG/DL (ref 0.76–1.27)
DEPRECATED RDW RBC AUTO: 49.2 FL (ref 37–54)
EOSINOPHIL # BLD AUTO: 0.04 10*3/MM3 (ref 0–0.4)
EOSINOPHIL NFR BLD AUTO: 0.4 % (ref 0.3–6.2)
ERYTHROCYTE [DISTWIDTH] IN BLOOD BY AUTOMATED COUNT: 17.3 % (ref 12.3–15.4)
GFR SERPL CREATININE-BSD FRML MDRD: 62 ML/MIN/1.73
GLOBULIN UR ELPH-MCNC: 1.9 GM/DL
GLUCOSE BLD-MCNC: 102 MG/DL (ref 65–99)
HCT VFR BLD AUTO: 51.4 % (ref 37.5–51)
HGB BLD-MCNC: 16.8 G/DL (ref 13–17.7)
HOLD SPECIMEN: NORMAL
HOLD SPECIMEN: NORMAL
IMM GRANULOCYTES # BLD AUTO: 0.05 10*3/MM3 (ref 0–0.05)
IMM GRANULOCYTES NFR BLD AUTO: 0.4 % (ref 0–0.5)
INR PPP: 1 (ref 0.8–1.2)
LYMPHOCYTES # BLD AUTO: 0.63 10*3/MM3 (ref 0.7–3.1)
LYMPHOCYTES NFR BLD AUTO: 5.6 % (ref 19.6–45.3)
MCH RBC QN AUTO: 27.5 PG (ref 26.6–33)
MCHC RBC AUTO-ENTMCNC: 32.7 G/DL (ref 31.5–35.7)
MCV RBC AUTO: 84 FL (ref 79–97)
MONOCYTES # BLD AUTO: 0.29 10*3/MM3 (ref 0.1–0.9)
MONOCYTES NFR BLD AUTO: 2.6 % (ref 5–12)
NEUTROPHILS # BLD AUTO: 10.12 10*3/MM3 (ref 1.7–7)
NEUTROPHILS NFR BLD AUTO: 90.6 % (ref 42.7–76)
NRBC BLD AUTO-RTO: 0 /100 WBC (ref 0–0.2)
NT-PROBNP SERPL-MCNC: 48.3 PG/ML (ref 5–450)
PLATELET # BLD AUTO: 232 10*3/MM3 (ref 140–450)
PMV BLD AUTO: 9.7 FL (ref 6–12)
POTASSIUM BLD-SCNC: 4.9 MMOL/L (ref 3.5–5.2)
PROT SERPL-MCNC: 6.5 G/DL (ref 6–8.5)
PROTHROMBIN TIME: 13 SECONDS (ref 11.1–15.3)
RBC # BLD AUTO: 6.12 10*6/MM3 (ref 4.14–5.8)
SODIUM BLD-SCNC: 138 MMOL/L (ref 136–145)
TROPONIN T SERPL-MCNC: <0.01 NG/ML (ref 0–0.03)
TROPONIN T SERPL-MCNC: <0.01 NG/ML (ref 0–0.03)
WBC NRBC COR # BLD: 11.17 10*3/MM3 (ref 3.4–10.8)
WHOLE BLOOD HOLD SPECIMEN: NORMAL
WHOLE BLOOD HOLD SPECIMEN: NORMAL

## 2020-01-08 PROCEDURE — 36415 COLL VENOUS BLD VENIPUNCTURE: CPT | Performed by: EMERGENCY MEDICINE

## 2020-01-08 PROCEDURE — 96374 THER/PROPH/DIAG INJ IV PUSH: CPT

## 2020-01-08 PROCEDURE — 84484 ASSAY OF TROPONIN QUANT: CPT | Performed by: EMERGENCY MEDICINE

## 2020-01-08 PROCEDURE — 93010 ELECTROCARDIOGRAM REPORT: CPT | Performed by: INTERNAL MEDICINE

## 2020-01-08 PROCEDURE — 85025 COMPLETE CBC W/AUTO DIFF WBC: CPT | Performed by: EMERGENCY MEDICINE

## 2020-01-08 PROCEDURE — 85610 PROTHROMBIN TIME: CPT | Performed by: EMERGENCY MEDICINE

## 2020-01-08 PROCEDURE — 99285 EMERGENCY DEPT VISIT HI MDM: CPT

## 2020-01-08 PROCEDURE — 80053 COMPREHEN METABOLIC PANEL: CPT | Performed by: EMERGENCY MEDICINE

## 2020-01-08 PROCEDURE — G0378 HOSPITAL OBSERVATION PER HR: HCPCS

## 2020-01-08 PROCEDURE — 25010000002 ENOXAPARIN PER 10 MG: Performed by: INTERNAL MEDICINE

## 2020-01-08 PROCEDURE — 25010000002 HYDRALAZINE PER 20 MG: Performed by: INTERNAL MEDICINE

## 2020-01-08 PROCEDURE — 96372 THER/PROPH/DIAG INJ SC/IM: CPT

## 2020-01-08 PROCEDURE — 71045 X-RAY EXAM CHEST 1 VIEW: CPT

## 2020-01-08 PROCEDURE — 25010000002 PROMETHAZINE PER 50 MG: Performed by: INTERNAL MEDICINE

## 2020-01-08 PROCEDURE — 94799 UNLISTED PULMONARY SVC/PX: CPT

## 2020-01-08 PROCEDURE — 93005 ELECTROCARDIOGRAM TRACING: CPT | Performed by: EMERGENCY MEDICINE

## 2020-01-08 PROCEDURE — 83880 ASSAY OF NATRIURETIC PEPTIDE: CPT | Performed by: EMERGENCY MEDICINE

## 2020-01-08 PROCEDURE — 96361 HYDRATE IV INFUSION ADD-ON: CPT

## 2020-01-08 PROCEDURE — 94760 N-INVAS EAR/PLS OXIMETRY 1: CPT

## 2020-01-08 PROCEDURE — 96375 TX/PRO/DX INJ NEW DRUG ADDON: CPT

## 2020-01-08 RX ORDER — SODIUM CHLORIDE 0.9 % (FLUSH) 0.9 %
10 SYRINGE (ML) INJECTION AS NEEDED
Status: DISCONTINUED | OUTPATIENT
Start: 2020-01-08 | End: 2020-01-10 | Stop reason: HOSPADM

## 2020-01-08 RX ORDER — CARVEDILOL 6.25 MG/1
6.25 TABLET ORAL ONCE
Status: COMPLETED | OUTPATIENT
Start: 2020-01-08 | End: 2020-01-08

## 2020-01-08 RX ORDER — SODIUM CHLORIDE 0.9 % (FLUSH) 0.9 %
10 SYRINGE (ML) INJECTION EVERY 12 HOURS SCHEDULED
Status: DISCONTINUED | OUTPATIENT
Start: 2020-01-08 | End: 2020-01-10 | Stop reason: HOSPADM

## 2020-01-08 RX ORDER — NALOXONE HCL 0.4 MG/ML
0.4 VIAL (ML) INJECTION
Status: DISCONTINUED | OUTPATIENT
Start: 2020-01-08 | End: 2020-01-10 | Stop reason: HOSPADM

## 2020-01-08 RX ORDER — ASPIRIN 81 MG/1
81 TABLET ORAL DAILY
Status: DISCONTINUED | OUTPATIENT
Start: 2020-01-09 | End: 2020-01-10 | Stop reason: HOSPADM

## 2020-01-08 RX ORDER — CARVEDILOL 6.25 MG/1
6.25 TABLET ORAL
Status: DISCONTINUED | OUTPATIENT
Start: 2020-01-09 | End: 2020-01-08

## 2020-01-08 RX ORDER — CARVEDILOL 6.25 MG/1
6.25 TABLET ORAL 2 TIMES DAILY WITH MEALS
Status: DISCONTINUED | OUTPATIENT
Start: 2020-01-08 | End: 2020-01-10 | Stop reason: HOSPADM

## 2020-01-08 RX ORDER — AMLODIPINE BESYLATE 5 MG/1
5 TABLET ORAL DAILY
Status: DISCONTINUED | OUTPATIENT
Start: 2020-01-09 | End: 2020-01-08

## 2020-01-08 RX ORDER — DULOXETIN HYDROCHLORIDE 60 MG/1
60 CAPSULE, DELAYED RELEASE ORAL NIGHTLY
Status: DISCONTINUED | OUTPATIENT
Start: 2020-01-08 | End: 2020-01-10 | Stop reason: HOSPADM

## 2020-01-08 RX ORDER — ASPIRIN 81 MG/1
324 TABLET, CHEWABLE ORAL ONCE
Status: DISCONTINUED | OUTPATIENT
Start: 2020-01-08 | End: 2020-01-08

## 2020-01-08 RX ORDER — LABETALOL HYDROCHLORIDE 5 MG/ML
10 INJECTION, SOLUTION INTRAVENOUS ONCE
Status: COMPLETED | OUTPATIENT
Start: 2020-01-08 | End: 2020-01-08

## 2020-01-08 RX ORDER — LORAZEPAM 0.5 MG/1
0.5 TABLET ORAL ONCE
Status: COMPLETED | OUTPATIENT
Start: 2020-01-08 | End: 2020-01-08

## 2020-01-08 RX ORDER — AMLODIPINE BESYLATE 5 MG/1
5 TABLET ORAL ONCE
Status: COMPLETED | OUTPATIENT
Start: 2020-01-08 | End: 2020-01-08

## 2020-01-08 RX ORDER — PROMETHAZINE HYDROCHLORIDE 25 MG/ML
12.5 INJECTION, SOLUTION INTRAMUSCULAR; INTRAVENOUS EVERY 6 HOURS PRN
Status: DISCONTINUED | OUTPATIENT
Start: 2020-01-08 | End: 2020-01-09

## 2020-01-08 RX ORDER — PANTOPRAZOLE SODIUM 40 MG/10ML
40 INJECTION, POWDER, LYOPHILIZED, FOR SOLUTION INTRAVENOUS
Status: DISCONTINUED | OUTPATIENT
Start: 2020-01-09 | End: 2020-01-08

## 2020-01-08 RX ORDER — PANTOPRAZOLE SODIUM 40 MG/1
40 TABLET, DELAYED RELEASE ORAL EVERY MORNING
Status: DISCONTINUED | OUTPATIENT
Start: 2020-01-09 | End: 2020-01-10 | Stop reason: HOSPADM

## 2020-01-08 RX ORDER — SODIUM CHLORIDE, SODIUM LACTATE, POTASSIUM CHLORIDE, CALCIUM CHLORIDE 600; 310; 30; 20 MG/100ML; MG/100ML; MG/100ML; MG/100ML
100 INJECTION, SOLUTION INTRAVENOUS CONTINUOUS
Status: DISCONTINUED | OUTPATIENT
Start: 2020-01-08 | End: 2020-01-10 | Stop reason: HOSPADM

## 2020-01-08 RX ORDER — AMLODIPINE BESYLATE 10 MG/1
10 TABLET ORAL DAILY
Status: DISCONTINUED | OUTPATIENT
Start: 2020-01-09 | End: 2020-01-09

## 2020-01-08 RX ORDER — PROMETHAZINE HYDROCHLORIDE 25 MG/ML
INJECTION, SOLUTION INTRAMUSCULAR; INTRAVENOUS
Status: DISCONTINUED
Start: 2020-01-08 | End: 2020-01-10 | Stop reason: HOSPADM

## 2020-01-08 RX ORDER — HYDRALAZINE HYDROCHLORIDE 20 MG/ML
10 INJECTION INTRAMUSCULAR; INTRAVENOUS EVERY 6 HOURS PRN
Status: DISCONTINUED | OUTPATIENT
Start: 2020-01-08 | End: 2020-01-10 | Stop reason: HOSPADM

## 2020-01-08 RX ORDER — MORPHINE SULFATE 2 MG/ML
1 INJECTION, SOLUTION INTRAMUSCULAR; INTRAVENOUS EVERY 4 HOURS PRN
Status: DISCONTINUED | OUTPATIENT
Start: 2020-01-08 | End: 2020-01-10 | Stop reason: HOSPADM

## 2020-01-08 RX ORDER — MIRTAZAPINE 15 MG/1
15 TABLET, FILM COATED ORAL NIGHTLY
Status: DISCONTINUED | OUTPATIENT
Start: 2020-01-08 | End: 2020-01-10 | Stop reason: HOSPADM

## 2020-01-08 RX ORDER — CLONAZEPAM 0.5 MG/1
1 TABLET ORAL 2 TIMES DAILY PRN
Status: DISCONTINUED | OUTPATIENT
Start: 2020-01-08 | End: 2020-01-10 | Stop reason: HOSPADM

## 2020-01-08 RX ORDER — CYCLOBENZAPRINE HCL 10 MG
10 TABLET ORAL 3 TIMES DAILY PRN
Status: DISCONTINUED | OUTPATIENT
Start: 2020-01-08 | End: 2020-01-10 | Stop reason: HOSPADM

## 2020-01-08 RX ADMIN — CARVEDILOL 6.25 MG: 6.25 TABLET, FILM COATED ORAL at 16:46

## 2020-01-08 RX ADMIN — MIRTAZAPINE 15 MG: 15 TABLET, FILM COATED ORAL at 20:53

## 2020-01-08 RX ADMIN — SODIUM CHLORIDE 1000 ML: 900 INJECTION, SOLUTION INTRAVENOUS at 12:32

## 2020-01-08 RX ADMIN — CLONAZEPAM 1 MG: 0.5 TABLET ORAL at 20:56

## 2020-01-08 RX ADMIN — SODIUM CHLORIDE, PRESERVATIVE FREE 10 ML: 5 INJECTION INTRAVENOUS at 20:53

## 2020-01-08 RX ADMIN — LABETALOL HYDROCHLORIDE 10 MG: 5 INJECTION INTRAVENOUS at 14:25

## 2020-01-08 RX ADMIN — HYDRALAZINE HYDROCHLORIDE 10 MG: 20 INJECTION INTRAMUSCULAR; INTRAVENOUS at 23:46

## 2020-01-08 RX ADMIN — SODIUM CHLORIDE, POTASSIUM CHLORIDE, SODIUM LACTATE AND CALCIUM CHLORIDE 100 ML/HR: 600; 310; 30; 20 INJECTION, SOLUTION INTRAVENOUS at 16:47

## 2020-01-08 RX ADMIN — AMLODIPINE BESYLATE 5 MG: 5 TABLET ORAL at 16:46

## 2020-01-08 RX ADMIN — DULOXETINE HYDROCHLORIDE 60 MG: 60 CAPSULE, DELAYED RELEASE ORAL at 20:53

## 2020-01-08 RX ADMIN — PROMETHAZINE HYDROCHLORIDE 12.5 MG: 25 INJECTION INTRAMUSCULAR; INTRAVENOUS at 14:58

## 2020-01-08 RX ADMIN — Medication 400 MG: at 20:53

## 2020-01-08 RX ADMIN — LORAZEPAM 0.5 MG: 0.5 TABLET ORAL at 13:29

## 2020-01-08 RX ADMIN — CARVEDILOL 6.25 MG: 6.25 TABLET, FILM COATED ORAL at 20:53

## 2020-01-08 RX ADMIN — ENOXAPARIN SODIUM 40 MG: 40 INJECTION SUBCUTANEOUS at 17:48

## 2020-01-08 NOTE — H&P
[facility] Medicine Admission      Date of Admission: 1/8/2020      Primary Care Physician: Sharonda Willoughby APRN      Chief Complaint: I got worried about my heart    HPI:    Briefly this is a 44-year-old gentleman who presented the ED on 1/8/2020 carrying the following problem list    1.  A. fib/a flutter  -Post ablation x4 followed by cardiology here  2.  History diverticulosis/diverticulitis status post partial colectomy in the remote past  3.  History of GERD  4.  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  History of generalized anxiety  6.  Past history of alcohol and alcoholic pancreatitis but greater than 10 years prior    No exposure    He presents with nausea vomiting the past few hours which is had a history of GERD with patient having increased heart awareness with substernal chest discomfort and associated nausea.  He states because of his heart history he got very concerned Ansar decided to come in to be ruled out.  There is no family history for cardiac events.  Patient does not have a history of hypertension.  Patient not have a history of impaired fasting glucose or diabetes mellitus.  Patient does not have a history of hypertension.  Patient does not use NSAIDs.  Patient initial troponin was negative and EKG was normal.  Of note with patient having poor p.o. intake and nausea vomiting is creatinine 1.27 with baseline around 1.1.  He has been on a Hoahaoism fast    Concurrent Medical History:  has a past medical history of Alcoholism /alcohol abuse (CMS/[facility]), Anxiety, Arthritis, Atrial fibrillation (CMS/HCC), Atrial flutter (CMS/HCC), Depression, Diverticulitis, Esophagitis, Gastritis, GERD (gastroesophageal reflux disease), Hypertension, Kidney stone,  Pancreatitis, and Sleep apnea.    Past Surgical History:  has a past surgical history that includes Shoulder surgery (Right, 2014); Tonsillectomy (12/2015); Cardiac electrophysiology procedure (N/A, 5/2/2017); Colonoscopy; Cardiac electrophysiology procedure (N/A, 9/5/2017); Esophagogastroduodenoscopy (N/A, 3/30/2018); Esophagogastroduodenoscopy (N/A, 5/30/2018); Colonoscopy (N/A, 5/30/2018); Upper gastrointestinal endoscopy (03/30/2018); Upper gastrointestinal endoscopy; Upper gastrointestinal endoscopy (05/30/2018); Esophagogastroduodenoscopy (N/A, 12/3/2018); Colectomy (Left, 12/20/2018); and Esophagogastroduodenoscopy (N/A, 10/22/2019).    Family History: family history includes Cancer in his maternal grandmother and paternal grandmother; Depression in his father; Heart disease in his father and paternal grandfather.  There is no family history for the Gardasil event    Social History:  reports that he has never smoked. He has never used smokeless tobacco. He reports that he has current or past drug history. Drug: Marijuana. Frequency: 7.00 times per week. He reports that he does not drink alcohol.    Allergies:   Allergies   Allergen Reactions   • Contrast Dye Anaphylaxis     ANAPHYLAXIS   • Iodinated Diagnostic Agents Anaphylaxis   • Morphine Nausea And Vomiting   • Zofran [Ondansetron Hcl] Other (See Comments)     IV gives hiccups       Medications:   Prior to Admission medications    Medication Sig Start Date End Date Taking? Authorizing Provider   amLODIPine (NORVASC) 5 MG tablet Take 1 tablet by mouth Daily. 11/5/19   Jeffrey Bernabe MD   aspirin 81 MG EC tablet Take 81 mg by mouth Daily.    Provider, MD Idalmis   B Complex-C (SUPER B COMPLEX PO) Take 1 tablet by mouth Daily.    ProviderIdalmis MD   carvedilol (COREG) 6.25 MG tablet Take 6.25 mg by mouth Daily.    ProviderIdalmis MD   clonazePAM (KlonoPIN) 1 MG tablet Take 1 mg by mouth 2 (Two) Times a Day As Needed for Anxiety.     Idalmis Medina MD   cyclobenzaprine (FLEXERIL) 10 MG tablet Take 1 tablet by mouth 3 (Three) Times a Day As Needed for Muscle Spasms. 11/26/19   Toby Turner MD   DULoxetine (CYMBALTA) 60 MG capsule Take 60 mg by mouth Every Night. 8/21/18   Idalmis Medina MD   magnesium oxide (MAGOX) 400 (241.3 MG) MG tablet tablet Take 400 mg by mouth 2 (Two) Times a Day.    Idalmis Medina MD   mirtazapine (REMERON) 15 MG tablet Take 15 mg by mouth Every Night.    Idalmis Medina MD   pantoprazole (PROTONIX) 40 MG EC tablet Take 1 tablet by mouth Daily. 9/27/19   Edwin Medina MD   Probiotic Product (PROBIOTIC-10 PO) Take 1 tablet by mouth Daily.    Idalmis Medina MD   prochlorperazine (COMPAZINE) 5 MG tablet Take 1 tablet by mouth Every 6 (Six) Hours As Needed for Nausea or Vomiting. 11/5/19   Jeffrey Bernabe MD   promethazine-dextromethorphan (PROMETHAZINE-DM) 6.25-15 MG/5ML syrup Take 5 mL by mouth At Night As Needed for Cough. 9/5/19   Tia Doherty, APRN       Review of Systems:  Review of Systems   Constitutional: Positive for fatigue.   Eyes: Negative.    Respiratory: Negative.    Cardiovascular: Positive for chest pain.   Gastrointestinal: Positive for nausea and vomiting.   Endocrine: Negative.    Genitourinary: Negative.    Musculoskeletal: Negative.    Skin: Negative.    Allergic/Immunologic: Negative.    Neurological: Negative.    Hematological: Negative.    Psychiatric/Behavioral: Negative.       Otherwise complete ROS is negative except as mentioned above.    Physical Exam:   Temp:  [98.4 °F (36.9 °C)] 98.4 °F (36.9 °C)  Heart Rate:  [] 89  Resp:  [18-20] 18  BP: (145-175)/() 175/113  Physical Exam   Constitutional: He is oriented to person, place, and time. He appears well-developed and well-nourished.   HENT:   Head: Normocephalic and atraumatic.   Eyes: Pupils are equal, round, and reactive to light. EOM are normal.   Neck: Normal range of motion. Neck  supple.   Cardiovascular: Normal rate.   Pulmonary/Chest: Effort normal and breath sounds normal.   Abdominal: Soft. Bowel sounds are normal.   Musculoskeletal: Normal range of motion.   Neurological: He is alert and oriented to person, place, and time.   Skin: Skin is warm and dry.   Psychiatric: He has a normal mood and affect.   Nursing note and vitals reviewed.        Results Reviewed:  I have personally reviewed current lab, radiology, and data and agree with results.  Lab Results (last 24 hours)     Procedure Component Value Units Date/Time    [place] Draw [[id number]] Collected:  01/08/20 1222    Specimen:  Blood Updated:  01/08/20 1331    Narrative:       The following orders were created for panel order [place] Draw.  Procedure                               Abnormality         Status                     ---------                               -----------         ------                     Light Blue Top[[id number]]                                   Final result               Green Top (Gel)[[id number]]                                  Final result               Lavender Top[[id number]]                                     Final result               Gold Top - SST[[id number]]                                   Final result                 Please view results for these tests on the individual orders.    Light Blue Top [[id number]] Collected:  01/08/20 1222    Specimen:  Blood Updated:  01/08/20 1331     Extra Tube hold for add-on     Comment: Auto resulted       Green Top (Gel) [[id number]] Collected:  01/08/20 1222    Specimen:  Blood Updated:  01/08/20 1331     Extra Tube Hold for add-ons.     Comment: Auto resulted.       Lavender Top [[id number]] Collected:  01/08/20 1222    Specimen:  Blood Updated:  01/08/20 1331     Extra Tube hold for add-on     Comment: Auto resulted       Gold Top - SST [[id number]] Collected:  01/08/20 1222    Specimen:  Blood Updated:  01/08/20 1331     Extra Tube Hold for add-ons.      Comment: Auto resulted.       Comprehensive Metabolic Panel [[id number]]  (Abnormal) Collected:  01/08/20 1222    Specimen:  Blood Updated:  01/08/20 1301     Glucose 102 mg/dL      BUN 10 mg/dL      Creatinine 1.27 mg/dL      Sodium 138 mmol/L      Potassium 4.9 mmol/L      Chloride 101 mmol/L      CO2 23.0 mmol/L      Calcium 9.3 mg/dL      Total Protein 6.5 g/dL      Albumin 4.60 g/dL      ALT (SGPT) 28 U/L      AST (SGOT) 29 U/L      Comment: Specimen hemolyzed.  Results may be affected.        Alkaline Phosphatase 76 U/L      Total Bilirubin 0.9 mg/dL      eGFR Non African Amer 62 mL/min/1.73      Globulin 1.9 gm/dL      A/G Ratio 2.4 g/dL      BUN/Creatinine Ratio 7.9     Anion Gap 14.0 mmol/L     Narrative:       GFR Normal >60  Chronic Kidney Disease <60  Kidney Failure <15      Troponin [[id number]]  (Normal) Collected:  01/08/20 1222    Specimen:  Blood Updated:  01/08/20 1300     Troponin T <0.010 ng/mL     Narrative:       Troponin T Reference Range:  <= 0.03 ng/mL-   Negative for AMI  >0.03 ng/mL-     Abnormal for myocardial necrosis.  Clinicians would have to utilize clinical acumen, EKG, Troponin and serial changes to determine if it is an Acute Myocardial Infarction or myocardial injury due to an underlying chronic condition.       Results may be falsely decreased if patient taking Biotin.      BNP [[id number]]  (Normal) Collected:  01/08/20 1222    Specimen:  Blood Updated:  01/08/20 1258     proBNP 48.3 pg/mL     Narrative:       Among patients with dyspnea, NT-proBNP is highly sensitive for the detection of acute congestive heart failure. In addition NT-proBNP of <300 pg/ml effectively rules out acute congestive heart failure with 99% negative predictive value.    Results may be falsely decreased if patient taking Biotin.      Protime-INR [[id number]]  (Normal) Collected:  01/08/20 1222    Specimen:  Blood Updated:  01/08/20 1256     Protime 13.0 Seconds      INR 1.00    Narrative:       Therapeutic  range for most indications is 2.0-3.0 INR,  or 2.5-3.5 for mechanical heart valves.    CBC & Differential [[id number]] Collected:  01/08/20 1222    Specimen:  Blood Updated:  01/08/20 1234    Narrative:       The following orders were created for panel order CBC & Differential.  Procedure                               Abnormality         Status                     ---------                               -----------         ------                     CBC Auto Differential[[id number]]        Abnormal            Final result                 Please view results for these tests on the individual orders.    CBC Auto Differential [[id number]]  (Abnormal) Collected:  01/08/20 1222    Specimen:  Blood Updated:  01/08/20 1234     WBC 11.17 10*3/mm3      RBC 6.12 10*6/mm3      Hemoglobin 16.8 g/dL      Hematocrit 51.4 %      MCV 84.0 fL      MCH 27.5 pg      MCHC 32.7 g/dL      RDW 17.3 %      RDW-SD 49.2 fl      MPV 9.7 fL      Platelets 232 10*3/mm3      Neutrophil % 90.6 %      Lymphocyte % 5.6 %      Monocyte % 2.6 %      Eosinophil % 0.4 %      Basophil % 0.4 %      Immature Grans % 0.4 %      Neutrophils, Absolute 10.12 10*3/mm3      Lymphocytes, Absolute 0.63 10*3/mm3      Monocytes, Absolute 0.29 10*3/mm3      Eosinophils, Absolute 0.04 10*3/mm3      Basophils, Absolute 0.04 10*3/mm3      Immature Grans, Absolute 0.05 10*3/mm3      nRBC 0.0 /100 WBC         Imaging Results (Last 24 Hours)     Procedure Component Value Units Date/Time    XR Chest 1 View [[id number]] Collected:  01/08/20 1239     Updated:  01/08/20 1254    Narrative:       PROCEDURE: XR CHEST 1 VW    VIEWS: Two frontal    INDICATION: Chest pain    COMPARISON: CXR: 9/25/2019    FINDINGS:       - lines/tubes: None    - cardiac: Size within normal limits.    - mediastinum: Contour within normal limits.     - lungs: No evidence of a focal air space process, pulmonary  interstitial edema, nodule(s)/mass.     - pleura: No evidence of  fluid.      - osseous:  Unremarkable for age.      Impression:       Negative examination.      Electronically signed by:  Deanna Medina MD  1/8/2020 12:53 PM CST  Workstation: [id number]            Assessment:    Active Hospital Problems    Diagnosis   • Chest pain     1.  Chest pain rule out  -Serial troponins  -Place topical nitro, beta blockers as well as statin therapy  -Seems  reflux in nature currently    2.  LOIS  -Nausea vomiting poor p.o. intake gently hydrated follow-up repeat lab    3.  History of GERD  -She is GI locally  -Add IV PPI as well as IV antiemetics    4.  History of atrial arrhythmias  -Continue medications appears stable currently    Prophylaxis  -VT add subcu Lovenox  -GI add IV PPI    CODE STATUS patient is full code    Discussed with ED physician ED nursing as well as wife at bedside        Plan:      Dusty Caro MD

## 2020-01-08 NOTE — ED PROVIDER NOTES
Orders placed per request. Noted that results should be faxed to [phone].    Subjective   44-year-old male stress CAD, A. fib status post ablation, GERD, alcoholism presents with chest pain.  Patient reports ongoing chest pain since this morning.  Reports pressure-like pain.  Reports associated nausea and lightheadedness.  Is any cough or fever.  Denies any abdominal pain.  Denies any leg pain or swelling.  No history of DVT or PE.          Review of Systems   Constitutional: Negative for chills and fever.   HENT: Negative for rhinorrhea, sore throat and voice change.    Eyes: Negative for pain and redness.   Respiratory: Negative for cough, shortness of breath and wheezing.    Cardiovascular: Positive for chest pain. Negative for palpitations.   Gastrointestinal: Positive for nausea and vomiting. Negative for abdominal pain, constipation and diarrhea.   Genitourinary: Negative for dysuria and hematuria.   Musculoskeletal: Negative for joint swelling and myalgias.   Skin: Negative for pallor and rash.   Neurological: Negative for dizziness, syncope, weakness and headaches.       Past Medical History:   Diagnosis Date   • Alcoholism /alcohol abuse (CMS/[facility])    • Anxiety    • Arthritis     shoulders and knees   • Atrial fibrillation (CMS/HCC)    • Atrial flutter (CMS/HCC)    • Depression    • Diverticulitis    • Esophagitis    • Gastritis    • GERD (gastroesophageal reflux disease)    • Hypertension    • Kidney stone    • Pancreatitis     patient denies   • Sleep apnea     has c-pap       Allergies   Allergen Reactions   • Contrast Dye Anaphylaxis     ANAPHYLAXIS   • Iodinated Diagnostic Agents Anaphylaxis   • Morphine Nausea And Vomiting   • Zofran [Ondansetron Hcl] Other (See Comments)     IV gives hiccups       Past Surgical History:   Procedure Laterality Date   • CARDIAC ELECTROPHYSIOLOGY PROCEDURE N/A 5/2/2017    Procedure: Ablation atrial flutter;  Surgeon: Ramirez Reza MD;  Location: [facility] INVASIVE LOCATION;  Service:    • CARDIAC ELECTROPHYSIOLOGY PROCEDURE N/A 9/5/2017     Procedure: Ablation atrial fibrillation PVA ;  Surgeon: Ramirez Reza MD;  Location: [facility] INVASIVE LOCATION;  Service:    • COLON RESECTION Left 12/20/2018    Procedure: LAPAROSCOPIC SIGMOID  RESECTION, TAKE DOWN OF SPLENIC FLEXURE;  Surgeon: Michel Lala MD;  Location: [facility] OR;  Service: General   • COLONOSCOPY     • COLONOSCOPY N/A 5/30/2018    Procedure: COLONOSCOPY;  Surgeon: Edwin Medina MD;  Location: [facility] ENDOSCOPY;  Service: Gastroenterology   • ENDOSCOPY N/A 3/30/2018    Procedure: ESOPHAGOGASTRODUODENOSCOPY;  Surgeon: Edwin Medina MD;  Location: [facility] ENDOSCOPY;  Service: Gastroenterology   • ENDOSCOPY N/A 5/30/2018    Procedure: ESOPHAGOGASTRODUODENOSCOPY;  Surgeon: Edwin Medina MD;  Location: [facility] ENDOSCOPY;  Service: Gastroenterology   • ENDOSCOPY N/A 12/3/2018    Procedure: ESOPHAGOGASTRODUODENOSCOPY;  Surgeon: Edwin Medina MD;  Location: [facility] ENDOSCOPY;  Service: Gastroenterology   • ENDOSCOPY N/A 10/22/2019    Procedure: ESOPHAGOGASTRODUODENOSCOPY;  Surgeon: Edwin Medina MD;  Location: [facility] ENDOSCOPY;  Service: Gastroenterology   • SHOULDER SURGERY Right 2014   • TONSILLECTOMY  12/2015   • UPPER GASTROINTESTINAL ENDOSCOPY  03/30/2018   • UPPER GASTROINTESTINAL ENDOSCOPY     • UPPER GASTROINTESTINAL ENDOSCOPY  05/30/2018       Family History   Problem Relation Age of Onset   • Heart disease Father    • Depression Father    • Cancer Maternal Grandmother         lung   • Cancer Paternal Grandmother         pancreatic   • Heart disease Paternal Grandfather        Social History     Socioeconomic History   • Marital status:      Spouse name: Not on file   • Number of children: Not on file   • Years of education: Not on file   • Highest education level: Not on file   Tobacco Use   • Smoking status: Never Smoker   • Smokeless tobacco: Never Used   Substance and Sexual Activity   • Alcohol use: No     Comment: quit 3/17/18   • Drug use: Yes     Frequency: 7.0  "times per week     Types: Marijuana     Comment: states he uses \"a little\" every day   • Sexual activity: Defer   Social History Narrative    Pt states has not had drink of any alcohol in 3 weeks as of 4/5/2018           Objective   Physical Exam   Constitutional: He is oriented to person, place, and time. He appears well-developed and well-nourished. No distress.   HENT:   Head: Normocephalic and atraumatic.   Mouth/Throat: Oropharynx is clear and moist. No oropharyngeal exudate.   Eyes: Pupils are equal, round, and reactive to light. Conjunctivae and EOM are normal.   Neck: Normal range of motion. Neck supple. No JVD present.   Cardiovascular: Normal rate, regular rhythm, normal heart sounds and intact distal pulses. Exam reveals no gallop and no friction rub.   No murmur heard.  Pulmonary/Chest: Effort normal and breath sounds normal. No stridor. He has no wheezes. He has no rales.   Abdominal: Soft. Bowel sounds are normal. He exhibits no distension. There is no tenderness.   Musculoskeletal: Normal range of motion. He exhibits no edema or deformity.   Neurological: He is alert and oriented to person, place, and time. He exhibits normal muscle tone.   Skin: Skin is warm and dry. Capillary refill takes less than 2 seconds. No rash noted. He is not diaphoretic. No erythema.   Nursing note and vitals reviewed.      ECG 12 Lead    Date/Time: 1/8/2020 1:24 PM  Performed by: Glenn Staley MD  Authorized by: Glenn Staley MD   Interpreted by physician  Rhythm: sinus rhythm  Rate: normal  QRS axis: normal  Conduction: conduction normal  ST Segments: ST segments normal  T Waves: T waves normal  Other: no other findings  Clinical impression: normal ECG                 ED Course            Results for orders placed or performed during the hospital encounter of 01/08/20   Troponin   Result Value Ref Range    Troponin T <0.010 0.000 - 0.030 ng/mL   Comprehensive Metabolic Panel   Result Value Ref Range "    Glucose 102 (H) 65 - 99 mg/dL    BUN 10 6 - 20 mg/dL    Creatinine 1.27 0.76 - 1.27 mg/dL    Sodium 138 136 - 145 mmol/L    Potassium 4.9 3.5 - 5.2 mmol/L    Chloride 101 98 - 107 mmol/L    CO2 23.0 22.0 - 29.0 mmol/L    Calcium 9.3 8.6 - 10.5 mg/dL    Total Protein 6.5 6.0 - 8.5 g/dL    Albumin 4.60 3.50 - 5.20 g/dL    ALT (SGPT) 28 1 - 41 U/L    AST (SGOT) 29 1 - 40 U/L    Alkaline Phosphatase 76 39 - 117 U/L    Total Bilirubin 0.9 0.2 - 1.2 mg/dL    eGFR Non African Amer 62 >60 mL/min/1.73    Globulin 1.9 gm/dL    A/G Ratio 2.4 g/dL    BUN/Creatinine Ratio 7.9 7.0 - 25.0    Anion Gap 14.0 5.0 - 15.0 mmol/L   BNP   Result Value Ref Range    proBNP 48.3 5.0 - 450.0 pg/mL   Protime-INR   Result Value Ref Range    Protime 13.0 11.1 - 15.3 Seconds    INR 1.00 0.80 - 1.20   CBC Auto Differential   Result Value Ref Range    WBC 11.17 (H) 3.40 - 10.80 10*3/mm3    RBC 6.12 (H) 4.14 - 5.80 10*6/mm3    Hemoglobin 16.8 13.0 - 17.7 g/dL    Hematocrit 51.4 (H) 37.5 - 51.0 %    MCV 84.0 79.0 - 97.0 fL    MCH 27.5 26.6 - 33.0 pg    MCHC 32.7 31.5 - 35.7 g/dL    RDW 17.3 (H) 12.3 - 15.4 %    RDW-SD 49.2 37.0 - 54.0 fl    MPV 9.7 6.0 - 12.0 fL    Platelets 232 140 - 450 10*3/mm3    Neutrophil % 90.6 (H) 42.7 - 76.0 %    Lymphocyte % 5.6 (L) 19.6 - 45.3 %    Monocyte % 2.6 (L) 5.0 - 12.0 %    Eosinophil % 0.4 0.3 - 6.2 %    Basophil % 0.4 0.0 - 1.5 %    Immature Grans % 0.4 0.0 - 0.5 %    Neutrophils, Absolute 10.12 (H) 1.70 - 7.00 10*3/mm3    Lymphocytes, Absolute 0.63 (L) 0.70 - 3.10 10*3/mm3    Monocytes, Absolute 0.29 0.10 - 0.90 10*3/mm3    Eosinophils, Absolute 0.04 0.00 - 0.40 10*3/mm3    Basophils, Absolute 0.04 0.00 - 0.20 10*3/mm3    Immature Grans, Absolute 0.05 0.00 - 0.05 10*3/mm3    nRBC 0.0 0.0 - 0.2 /100 WBC   Light Blue Top   Result Value Ref Range    Extra Tube hold for add-on    Green Top (Gel)   Result Value Ref Range    Extra Tube Hold for add-ons.    Lavender Top   Result Value Ref Range    Extra Tube hold  for add-on    Gold Top - SST   Result Value Ref Range    Extra Tube Hold for add-ons.      Xr Chest 1 View    Result Date: 1/8/2020  PROCEDURE: XR CHEST 1 VW VIEWS: Two frontal INDICATION: Chest pain COMPARISON: CXR: 9/25/2019 FINDINGS:   - lines/tubes: None   - cardiac: Size within normal limits.   - mediastinum: Contour within normal limits.   - lungs: No evidence of a focal air space process, pulmonary interstitial edema, nodule(s)/mass.   - pleura: No evidence of  fluid.    - osseous: Unremarkable for age.     Negative examination. Electronically signed by:  Deanna Medina MD  1/8/2020 12:53 PM CST Workstation: [id number]                                          [facility]  Number of Diagnoses or Management Options  Diagnosis management comments: Patient is moderate risk for ACS, heart score 4, EKG, chest x-ray, labs unremarkable, however will admit for observation for serial enzymes and telemetry.       Amount and/or Complexity of Data Reviewed  Clinical lab tests: reviewed  Tests in the medicine section of CPT®: reviewed    Patient Progress  Patient progress: stable      Final diagnoses:   Chest pain, unspecified type            Glenn Staley MD  01/08/20 6025

## 2020-01-09 LAB
ALBUMIN SERPL-MCNC: 4.4 G/DL (ref 3.5–5.2)
AMPHET+METHAMPHET UR QL: NEGATIVE
AMPHETAMINES UR QL: NEGATIVE
ANION GAP SERPL CALCULATED.3IONS-SCNC: 21 MMOL/L (ref 5–15)
BARBITURATES UR QL SCN: NEGATIVE
BENZODIAZ UR QL SCN: NEGATIVE
BUN BLD-MCNC: 8 MG/DL (ref 6–20)
BUN/CREAT SERPL: 7.9 (ref 7–25)
BUPRENORPHINE SERPL-MCNC: NEGATIVE NG/ML
CALCIUM SPEC-SCNC: 9.5 MG/DL (ref 8.6–10.5)
CANNABINOIDS SERPL QL: POSITIVE
CHLORIDE SERPL-SCNC: 98 MMOL/L (ref 98–107)
CHOLEST SERPL-MCNC: 178 MG/DL (ref 0–200)
CO2 SERPL-SCNC: 18 MMOL/L (ref 22–29)
COCAINE UR QL: NEGATIVE
CREAT BLD-MCNC: 1.01 MG/DL (ref 0.76–1.27)
DEPRECATED RDW RBC AUTO: 47.6 FL (ref 37–54)
ERYTHROCYTE [DISTWIDTH] IN BLOOD BY AUTOMATED COUNT: 17.6 % (ref 12.3–15.4)
GFR SERPL CREATININE-BSD FRML MDRD: 81 ML/MIN/1.73
GLUCOSE BLD-MCNC: 85 MG/DL (ref 65–99)
GLUCOSE BLDC GLUCOMTR-MCNC: 89 MG/DL (ref 70–130)
HCT VFR BLD AUTO: 53.3 % (ref 37.5–51)
HDLC SERPL-MCNC: 40 MG/DL (ref 40–60)
HGB BLD-MCNC: 17.9 G/DL (ref 13–17.7)
LDLC SERPL CALC-MCNC: 117 MG/DL (ref 0–100)
LDLC/HDLC SERPL: 2.92 {RATIO}
LIPASE SERPL-CCNC: 18 U/L (ref 13–60)
MCH RBC QN AUTO: 27.7 PG (ref 26.6–33)
MCHC RBC AUTO-ENTMCNC: 33.6 G/DL (ref 31.5–35.7)
MCV RBC AUTO: 82.4 FL (ref 79–97)
METHADONE UR QL SCN: NEGATIVE
OPIATES UR QL: NEGATIVE
OXYCODONE UR QL SCN: NEGATIVE
PCP UR QL SCN: NEGATIVE
PHOSPHATE SERPL-MCNC: 2.3 MG/DL (ref 2.5–4.5)
PLATELET # BLD AUTO: 245 10*3/MM3 (ref 140–450)
PMV BLD AUTO: 9.7 FL (ref 6–12)
POTASSIUM BLD-SCNC: 3.8 MMOL/L (ref 3.5–5.2)
PROPOXYPH UR QL: NEGATIVE
RBC # BLD AUTO: 6.47 10*6/MM3 (ref 4.14–5.8)
SODIUM BLD-SCNC: 137 MMOL/L (ref 136–145)
TRICYCLICS UR QL SCN: NEGATIVE
TRIGL SERPL-MCNC: 107 MG/DL (ref 0–150)
VLDLC SERPL-MCNC: 21.4 MG/DL
WBC NRBC COR # BLD: 15.64 10*3/MM3 (ref 3.4–10.8)

## 2020-01-09 PROCEDURE — 96375 TX/PRO/DX INJ NEW DRUG ADDON: CPT

## 2020-01-09 PROCEDURE — 25010000002 PROMETHAZINE PER 50 MG: Performed by: INTERNAL MEDICINE

## 2020-01-09 PROCEDURE — 25010000002 MORPHINE PER 10 MG: Performed by: INTERNAL MEDICINE

## 2020-01-09 PROCEDURE — 96372 THER/PROPH/DIAG INJ SC/IM: CPT

## 2020-01-09 PROCEDURE — 80061 LIPID PANEL: CPT | Performed by: INTERNAL MEDICINE

## 2020-01-09 PROCEDURE — 96361 HYDRATE IV INFUSION ADD-ON: CPT

## 2020-01-09 PROCEDURE — G0378 HOSPITAL OBSERVATION PER HR: HCPCS

## 2020-01-09 PROCEDURE — 96366 THER/PROPH/DIAG IV INF ADDON: CPT

## 2020-01-09 PROCEDURE — 85027 COMPLETE CBC AUTOMATED: CPT | Performed by: INTERNAL MEDICINE

## 2020-01-09 PROCEDURE — 25010000002 ENOXAPARIN PER 10 MG: Performed by: INTERNAL MEDICINE

## 2020-01-09 PROCEDURE — 96365 THER/PROPH/DIAG IV INF INIT: CPT

## 2020-01-09 PROCEDURE — 82962 GLUCOSE BLOOD TEST: CPT

## 2020-01-09 PROCEDURE — 80069 RENAL FUNCTION PANEL: CPT | Performed by: INTERNAL MEDICINE

## 2020-01-09 PROCEDURE — 80306 DRUG TEST PRSMV INSTRMNT: CPT | Performed by: INTERNAL MEDICINE

## 2020-01-09 PROCEDURE — 96376 TX/PRO/DX INJ SAME DRUG ADON: CPT

## 2020-01-09 PROCEDURE — 25010000002 ONDANSETRON PER 1 MG: Performed by: INTERNAL MEDICINE

## 2020-01-09 PROCEDURE — 83690 ASSAY OF LIPASE: CPT | Performed by: INTERNAL MEDICINE

## 2020-01-09 RX ORDER — OXYCODONE AND ACETAMINOPHEN 7.5; 325 MG/1; MG/1
1 TABLET ORAL EVERY 4 HOURS PRN
Status: DISCONTINUED | OUTPATIENT
Start: 2020-01-09 | End: 2020-01-10 | Stop reason: HOSPADM

## 2020-01-09 RX ORDER — AMLODIPINE BESYLATE 5 MG/1
5 TABLET ORAL
Status: DISCONTINUED | OUTPATIENT
Start: 2020-01-09 | End: 2020-01-10 | Stop reason: HOSPADM

## 2020-01-09 RX ORDER — ONDANSETRON 2 MG/ML
4 INJECTION INTRAMUSCULAR; INTRAVENOUS EVERY 6 HOURS PRN
Status: DISCONTINUED | OUTPATIENT
Start: 2020-01-09 | End: 2020-01-10 | Stop reason: HOSPADM

## 2020-01-09 RX ADMIN — OXYCODONE HYDROCHLORIDE AND ACETAMINOPHEN 1 TABLET: 7.5; 325 TABLET ORAL at 18:33

## 2020-01-09 RX ADMIN — SODIUM CHLORIDE, POTASSIUM CHLORIDE, SODIUM LACTATE AND CALCIUM CHLORIDE 100 ML/HR: 600; 310; 30; 20 INJECTION, SOLUTION INTRAVENOUS at 05:14

## 2020-01-09 RX ADMIN — CARVEDILOL 6.25 MG: 6.25 TABLET, FILM COATED ORAL at 08:45

## 2020-01-09 RX ADMIN — ONDANSETRON HYDROCHLORIDE 4 MG: 2 INJECTION, SOLUTION INTRAMUSCULAR; INTRAVENOUS at 11:21

## 2020-01-09 RX ADMIN — MORPHINE SULFATE 1 MG: 2 INJECTION, SOLUTION INTRAMUSCULAR; INTRAVENOUS at 08:51

## 2020-01-09 RX ADMIN — MIRTAZAPINE 15 MG: 15 TABLET, FILM COATED ORAL at 20:54

## 2020-01-09 RX ADMIN — CARVEDILOL 6.25 MG: 6.25 TABLET, FILM COATED ORAL at 17:14

## 2020-01-09 RX ADMIN — SODIUM CHLORIDE 5 MG/HR: 9 INJECTION, SOLUTION INTRAVENOUS at 05:33

## 2020-01-09 RX ADMIN — CLONAZEPAM 1 MG: 0.5 TABLET ORAL at 08:58

## 2020-01-09 RX ADMIN — OXYCODONE HYDROCHLORIDE AND ACETAMINOPHEN 1 TABLET: 7.5; 325 TABLET ORAL at 22:16

## 2020-01-09 RX ADMIN — ENOXAPARIN SODIUM 40 MG: 40 INJECTION SUBCUTANEOUS at 17:37

## 2020-01-09 RX ADMIN — SODIUM CHLORIDE, PRESERVATIVE FREE 10 ML: 5 INJECTION INTRAVENOUS at 08:46

## 2020-01-09 RX ADMIN — Medication 400 MG: at 20:54

## 2020-01-09 RX ADMIN — AMLODIPINE BESYLATE 5 MG: 5 TABLET ORAL at 10:57

## 2020-01-09 RX ADMIN — SODIUM CHLORIDE, POTASSIUM CHLORIDE, SODIUM LACTATE AND CALCIUM CHLORIDE 100 ML/HR: 600; 310; 30; 20 INJECTION, SOLUTION INTRAVENOUS at 22:20

## 2020-01-09 RX ADMIN — SODIUM CHLORIDE, POTASSIUM CHLORIDE, SODIUM LACTATE AND CALCIUM CHLORIDE 100 ML/HR: 600; 310; 30; 20 INJECTION, SOLUTION INTRAVENOUS at 03:10

## 2020-01-09 RX ADMIN — PANTOPRAZOLE SODIUM 40 MG: 40 TABLET, DELAYED RELEASE ORAL at 06:23

## 2020-01-09 RX ADMIN — CYCLOBENZAPRINE HYDROCHLORIDE 10 MG: 10 TABLET, FILM COATED ORAL at 22:16

## 2020-01-09 RX ADMIN — SODIUM CHLORIDE, PRESERVATIVE FREE 10 ML: 5 INJECTION INTRAVENOUS at 22:22

## 2020-01-09 RX ADMIN — SODIUM CHLORIDE 5 MG/HR: 9 INJECTION, SOLUTION INTRAVENOUS at 10:56

## 2020-01-09 RX ADMIN — Medication 400 MG: at 08:45

## 2020-01-09 RX ADMIN — PROMETHAZINE HYDROCHLORIDE 12.5 MG: 25 INJECTION INTRAMUSCULAR; INTRAVENOUS at 05:29

## 2020-01-09 RX ADMIN — DULOXETINE HYDROCHLORIDE 60 MG: 60 CAPSULE, DELAYED RELEASE ORAL at 20:55

## 2020-01-09 RX ADMIN — CLONAZEPAM 1 MG: 0.5 TABLET ORAL at 22:17

## 2020-01-09 RX ADMIN — ASPIRIN 81 MG: 81 TABLET, COATED ORAL at 08:45

## 2020-01-09 NOTE — PLAN OF CARE
Patient brought down to CCU last night for cardene gtt; cardene on hold this evening, patient tolerating well; no other complaints at this time. VSS, will continue to monitor  Problem: Patient Care Overview  Goal: Plan of Care Review  Outcome: Ongoing (interventions implemented as appropriate)  Flowsheets (Taken 1/9/2020 4609)  Progress: improving  Plan of Care Reviewed With: patient;spouse

## 2020-01-09 NOTE — PROGRESS NOTES
[facility]  INPATIENT PROGRESS NOTE    Length of Stay: 0  Date of Admission: 1/8/2020  Primary Care Physician: Sharonda Willoughby APRN    Subjective   Chief Complaint: Still having belly pain  HPI:    Briefly this is a 42-year-old female who presented to ED 1/8 carrying the following problem list    1.  Atrial fibrillation/flutter  -Post ablation x4 followed by cardiology here  2.  History of diverticulosis as well as diverticulitis status post partial colectomy in the past  3.  History of GERD  4.  Polycythemia with patient seen by hematology here has had a fairly significant work-up of polycythemia including negative Santos mutation however he is on chronic testosterone placement as well as admits to chronic narrowing of smoking daily.  5.  History generalized anxiety  6.  Backslash alcohol abuse and alcohol pancreatitis pancreatitis continues prior no alcohol for the past 2 years.    She did with nausea vomiting and increased cardiac redness.  Hematocrit is greater than 50 he has ruled out overnight however by not taking antihypertensive medications developed hypotension transferred here was placed on a Cardene drip.  Has been weaned off and started on Coreg as well as amlodipine hopefully will transfer to the unit.  Of note with accepting hypertension a negative toxicology screen except for marijuana which he admitted to exposure.  His lipase level is normal he is having reflux and pathology.    Review of Systems   Constitutional: Negative.    HENT: Negative.    Eyes: Negative.    Respiratory: Negative.    Cardiovascular: Negative.    Gastrointestinal: Positive for nausea. Negative for abdominal pain.   Endocrine: Negative.    Genitourinary: Negative.    Musculoskeletal: Negative.    Skin: Negative.    Allergic/Immunologic: Negative.    Neurological: Positive for dizziness.   Hematological: Negative.    Psychiatric/Behavioral: Negative.         All pertinent  negatives and positives are as above. All other systems have been reviewed and are negative unless otherwise stated.     Objective    Temp:  [98.1 °F (36.7 °C)-98.8 °F (37.1 °C)] 98.4 °F (36.9 °C)  Heart Rate:  [] 112  Resp:  [16-20] 16  BP: (123-200)/() 164/88    Physical Exam   Constitutional: He is oriented to person, place, and time. He appears well-developed and well-nourished.   HENT:   Head: Normocephalic and atraumatic.   Eyes: EOM are normal.   Neck: Normal range of motion. Neck supple.   Cardiovascular: Normal rate and regular rhythm.   Pulmonary/Chest: Breath sounds normal.   Neurological: He is alert and oriented to person, place, and time.   Skin: Skin is warm and dry.   Psychiatric: He has a normal mood and affect.   Nursing note and vitals reviewed.          Results Review:  I have reviewed the labs, radiology results, and diagnostic studies.    Laboratory Data:   Results from last 7 days   Lab Units 01/09/20  0856 01/08/20  1222   SODIUM mmol/L 137 138   POTASSIUM mmol/L 3.8 4.9   CHLORIDE mmol/L 98 101   CO2 mmol/L 18.0* 23.0   BUN mg/dL 8 10   CREATININE mg/dL 1.01 1.27   GLUCOSE mg/dL 85 102*   CALCIUM mg/dL 9.5 9.3   BILIRUBIN mg/dL  --  0.9   ALK PHOS U/L  --  76   ALT (SGPT) U/L  --  28   AST (SGOT) U/L  --  29   ANION GAP mmol/L 21.0* 14.0     Estimated Creatinine Clearance: 116 mL/min (by C-G formula based on SCr of 1.01 mg/dL).  Results from last 7 days   Lab Units 01/09/20  0856   PHOSPHORUS mg/dL 2.3*         Results from last 7 days   Lab Units 01/09/20  0856 01/08/20  1222   WBC 10*3/mm3 15.64* 11.17*   HEMOGLOBIN g/dL 17.9* 16.8   HEMATOCRIT % 53.3* 51.4*   PLATELETS 10*3/mm3 245 232     Results from last 7 days   Lab Units 01/08/20  1222   INR  1.00       Culture Data:   No results found for: BLOODCX  No results found for: URINECX  No results found for: RESPCX  No results found for: WOUNDCX  No results found for: STOOLCX  No components found for: BODYFLD    Radiology Data:    Imaging Results (Last 24 Hours)     Procedure Component Value Units Date/Time    XR Chest 1 View [[id number]] Collected:  01/08/20 1239     Updated:  01/08/20 1254    Narrative:       PROCEDURE: XR CHEST 1 VW    VIEWS: Two frontal    INDICATION: Chest pain    COMPARISON: CXR: 9/25/2019    FINDINGS:       - lines/tubes: None    - cardiac: Size within normal limits.    - mediastinum: Contour within normal limits.     - lungs: No evidence of a focal air space process, pulmonary  interstitial edema, nodule(s)/mass.     - pleura: No evidence of  fluid.      - osseous: Unremarkable for age.      Impression:       Negative examination.      Electronically signed by:  Deanna Medina MD  1/8/2020 12:53 PM CST  Workstation: [id number]          I have reviewed the patient's current medications.     Assessment/Plan     Active Hospital Problems    Diagnosis   • Chest pain       Plan:      1.  Chest pain syndrome  -Ruled out  For infarction with serial negative troponins  - Risk no family history for cardiac events with patient only 44 he does have a noncholesterol 138 now with history of hypertension as well as daily marijuana exposure  -Follow-up with PCP as an outpatient    2.  Nausea vomiting  -GERD symptomatology  -On IV PPI  -Advance diet as tolerates    3.  Hypertension  -Negative toxicology screen  -Patient Cardene drip overnight starting amlodipine as well as correct he was not taking it as an outpatient  -With age we will check PRA and renin level  -Does use marijuana daily as outlined above    4.  History tachyarrhythmias    Prophylaxis  - With Lovenox    Code Status patient patient full code  -  Discussed with ICU nursing as well as with wife at bedside          Discharge Planning: I expect patient to be discharged to home in  1 to 2 days.    Dusty Caro MD

## 2020-01-10 VITALS
DIASTOLIC BLOOD PRESSURE: 87 MMHG | HEIGHT: 72 IN | BODY MASS INDEX: 29.32 KG/M2 | WEIGHT: 216.49 LBS | OXYGEN SATURATION: 95 % | RESPIRATION RATE: 14 BRPM | SYSTOLIC BLOOD PRESSURE: 137 MMHG | TEMPERATURE: 96.7 F | HEART RATE: 90 BPM

## 2020-01-10 PROCEDURE — 96361 HYDRATE IV INFUSION ADD-ON: CPT

## 2020-01-10 PROCEDURE — G0378 HOSPITAL OBSERVATION PER HR: HCPCS

## 2020-01-10 RX ADMIN — ASPIRIN 81 MG: 81 TABLET, COATED ORAL at 08:11

## 2020-01-10 RX ADMIN — OXYCODONE HYDROCHLORIDE AND ACETAMINOPHEN 1 TABLET: 7.5; 325 TABLET ORAL at 08:12

## 2020-01-10 RX ADMIN — Medication 400 MG: at 08:11

## 2020-01-10 RX ADMIN — AMLODIPINE BESYLATE 5 MG: 5 TABLET ORAL at 08:11

## 2020-01-10 RX ADMIN — CARVEDILOL 6.25 MG: 6.25 TABLET, FILM COATED ORAL at 08:12

## 2020-01-10 RX ADMIN — PANTOPRAZOLE SODIUM 40 MG: 40 TABLET, DELAYED RELEASE ORAL at 08:11

## 2020-01-10 NOTE — DISCHARGE SUMMARY
[facility]  DISCHARGE SUMMARY       Date of Admission: 1/8/2020  Date of Discharge:  1/10/2020  Primary Care Physician: Sharonda Willoughby APRN    Presenting Problem/History of Present Illness:  Chest pain, unspecified type [R07.9]       Final Discharge Diagnoses:  Active Hospital Problems    Diagnosis   • Chest pain       Consults:   Consults     Date and Time Order Name Status Description    1/10/2020 1057 Inpatient Cardiology Consult Completed     1/8/2020 1341 Hospitalist (on-call MD unless specified)            Pertinent Test Results:   Lab Results (most recent)     Procedure Component Value Units Date/Time    Renal Function Panel [[id number]]  (Abnormal) Collected:  01/09/20 0856    Specimen:  Blood Updated:  01/09/20 0926     Glucose 85 mg/dL      BUN 8 mg/dL      Creatinine 1.01 mg/dL      Sodium 137 mmol/L      Potassium 3.8 mmol/L      Chloride 98 mmol/L      CO2 18.0 mmol/L      Calcium 9.5 mg/dL      Albumin 4.40 g/dL      Phosphorus 2.3 mg/dL      Anion Gap 21.0 mmol/L      BUN/Creatinine Ratio 7.9     eGFR Non African Amer 81 mL/min/1.73     Narrative:       GFR Normal >60  Chronic Kidney Disease <60  Kidney Failure <15      Lipase [[id number]]  (Normal) Collected:  01/09/20 0856    Specimen:  Blood Updated:  01/09/20 0920     Lipase 18 U/L     Lipid Panel [[id number]]  (Abnormal) Collected:  01/09/20 0856    Specimen:  Blood Updated:  01/09/20 0920     Total Cholesterol 178 mg/dL      Triglycerides 107 mg/dL      HDL Cholesterol 40 mg/dL      LDL Cholesterol  117 mg/dL      VLDL Cholesterol 21.4 mg/dL      LDL/HDL Ratio 2.92    Narrative:       Cholesterol Reference Ranges  (U.S. Department of Health and Human Services ATP III Classifications)    Desirable          <200 mg/dL  Borderline High    200-239 mg/dL  High Risk          >240 mg/dL      Triglyceride Reference Ranges  (U.S. Department of Health and Human Services ATP III  Classifications)    Normal           <150 mg/dL  Borderline High  150-199 mg/dL  High             200-499 mg/dL  Very High        >500 mg/dL    HDL Reference Ranges  (U.S. Department of Health and Human Services ATP III Classifcations)    Low     <40 mg/dl (major risk factor for CHD)  High    >60 mg/dl ('negative' risk factor for CHD)        LDL Reference Ranges  (U.S. Department of Health and Human Services ATP III Classifcations)    Optimal          <100 mg/dL  Near Optimal     100-129 mg/dL  Borderline High  130-159 mg/dL  High             160-189 mg/dL  Very High        >189 mg/dL    Urine Drug Screen - Urine, Clean Catch [[id number]]  (Abnormal) Collected:  01/09/20 0844    Specimen:  Urine, Clean Catch Updated:  01/09/20 0910     THC, Screen, Urine Positive     Phencyclidine (PCP), Urine Negative     Cocaine Screen, Urine Negative     Methamphetamine, Ur Negative     Opiate Screen Negative     Amphetamine Screen, Urine Negative     Benzodiazepine Screen, Urine Negative     Tricyclic Antidepressants Screen Negative     Methadone Screen, Urine Negative     Barbiturates Screen, Urine Negative     Oxycodone Screen, Urine Negative     Propoxyphene Screen Negative     Buprenorphine, Screen, Urine Negative    Narrative:       Cutoff For Drugs Screened:    Amphetamines               500 ng/ml  Barbiturates               200 ng/ml  Benzodiazepines            150 ng/ml  Cocaine                    150 ng/ml  Methadone                  200 ng/ml  Opiates                    100 ng/ml  Phencyclidine               25 ng/ml  THC                            50 ng/ml  Methamphetamine            500 ng/ml  Tricyclic Antidepressants  300 ng/ml  Oxycodone                  100 ng/ml  Propoxyphene               300 ng/ml  Buprenorphine               10 ng/ml    The normal value for all drugs tested is negative. This report includes unconfirmed screening results, with the cutoff values listed, to be used for medical treatment purposes  only.  Unconfirmed results must not be used for non-medical purposes such as employment or legal testing.  Clinical consideration should be applied to any drug of abuse test, particularly when unconfirmed results are used.      CBC (No Diff) [[id number]]  (Abnormal) Collected:  01/09/20 0856    Specimen:  Blood Updated:  01/09/20 0902     WBC 15.64 10*3/mm3      RBC 6.47 10*6/mm3      Hemoglobin 17.9 g/dL      Hematocrit 53.3 %      MCV 82.4 fL      MCH 27.7 pg      MCHC 33.6 g/dL      RDW 17.6 %      RDW-SD 47.6 fl      MPV 9.7 fL      Platelets 245 10*3/mm3     POC Glucose Once [[id number]]  (Normal) Collected:  01/09/20 0104    Specimen:  Blood Updated:  01/09/20 0116     Glucose 89 mg/dL      Comment: RN NotifiedOperator: [id number] KAYLEY DOOLEYFERMeter ID: [id number]       Troponin [[id number]]  (Normal) Collected:  01/08/20 1611    Specimen:  Blood Updated:  01/08/20 1641     Troponin T <0.010 ng/mL     Narrative:       Troponin T Reference Range:  <= 0.03 ng/mL-   Negative for AMI  >0.03 ng/mL-     Abnormal for myocardial necrosis.  Clinicians would have to utilize clinical acumen, EKG, Troponin and serial changes to determine if it is an Acute Myocardial Infarction or myocardial injury due to an underlying chronic condition.       Results may be falsely decreased if patient taking Biotin.      [place] Draw [[id number]] Collected:  01/08/20 1222    Specimen:  Blood Updated:  01/08/20 1331    Narrative:       The following orders were created for panel order [place] Draw.  Procedure                               Abnormality         Status                     ---------                               -----------         ------                     Light Blue Top[[id number]]                                   Final result               Green Top (Gel)[[id number]]                                  Final result               Lavender Top[[id number]]                                     Final result               Gold Top -  SST[[id number]]                                   Final result                 Please view results for these tests on the individual orders.    Light Blue Top [[id number]] Collected:  01/08/20 1222    Specimen:  Blood Updated:  01/08/20 1331     Extra Tube hold for add-on     Comment: Auto resulted       Green Top (Gel) [[id number]] Collected:  01/08/20 1222    Specimen:  Blood Updated:  01/08/20 1331     Extra Tube Hold for add-ons.     Comment: Auto resulted.       Lavender Top [[id number]] Collected:  01/08/20 1222    Specimen:  Blood Updated:  01/08/20 1331     Extra Tube hold for add-on     Comment: Auto resulted       Gold Top - SST [[id number]] Collected:  01/08/20 1222    Specimen:  Blood Updated:  01/08/20 1331     Extra Tube Hold for add-ons.     Comment: Auto resulted.       Comprehensive Metabolic Panel [[id number]]  (Abnormal) Collected:  01/08/20 1222    Specimen:  Blood Updated:  01/08/20 1301     Glucose 102 mg/dL      BUN 10 mg/dL      Creatinine 1.27 mg/dL      Sodium 138 mmol/L      Potassium 4.9 mmol/L      Chloride 101 mmol/L      CO2 23.0 mmol/L      Calcium 9.3 mg/dL      Total Protein 6.5 g/dL      Albumin 4.60 g/dL      ALT (SGPT) 28 U/L      AST (SGOT) 29 U/L      Comment: Specimen hemolyzed.  Results may be affected.        Alkaline Phosphatase 76 U/L      Total Bilirubin 0.9 mg/dL      eGFR Non African Amer 62 mL/min/1.73      Globulin 1.9 gm/dL      A/G Ratio 2.4 g/dL      BUN/Creatinine Ratio 7.9     Anion Gap 14.0 mmol/L     Narrative:       GFR Normal >60  Chronic Kidney Disease <60  Kidney Failure <15      Troponin [[id number]]  (Normal) Collected:  01/08/20 1222    Specimen:  Blood Updated:  01/08/20 1300     Troponin T <0.010 ng/mL     Narrative:       Troponin T Reference Range:  <= 0.03 ng/mL-   Negative for AMI  >0.03 ng/mL-     Abnormal for myocardial necrosis.  Clinicians would have to utilize clinical acumen, EKG, Troponin and serial changes to determine if it is an Acute Myocardial  Infarction or myocardial injury due to an underlying chronic condition.       Results may be falsely decreased if patient taking Biotin.      BNP [[id number]]  (Normal) Collected:  01/08/20 1222    Specimen:  Blood Updated:  01/08/20 1258     proBNP 48.3 pg/mL     Narrative:       Among patients with dyspnea, NT-proBNP is highly sensitive for the detection of acute congestive heart failure. In addition NT-proBNP of <300 pg/ml effectively rules out acute congestive heart failure with 99% negative predictive value.    Results may be falsely decreased if patient taking Biotin.      Protime-INR [[id number]]  (Normal) Collected:  01/08/20 1222    Specimen:  Blood Updated:  01/08/20 1256     Protime 13.0 Seconds      INR 1.00    Narrative:       Therapeutic range for most indications is 2.0-3.0 INR,  or 2.5-3.5 for mechanical heart valves.    CBC & Differential [[id number]] Collected:  01/08/20 1222    Specimen:  Blood Updated:  01/08/20 1234    Narrative:       The following orders were created for panel order CBC & Differential.  Procedure                               Abnormality         Status                     ---------                               -----------         ------                     CBC Auto Differential[[id number]]        Abnormal            Final result                 Please view results for these tests on the individual orders.    CBC Auto Differential [[id number]]  (Abnormal) Collected:  01/08/20 1222    Specimen:  Blood Updated:  01/08/20 1234     WBC 11.17 10*3/mm3      RBC 6.12 10*6/mm3      Hemoglobin 16.8 g/dL      Hematocrit 51.4 %      MCV 84.0 fL      MCH 27.5 pg      MCHC 32.7 g/dL      RDW 17.3 %      RDW-SD 49.2 fl      MPV 9.7 fL      Platelets 232 10*3/mm3      Neutrophil % 90.6 %      Lymphocyte % 5.6 %      Monocyte % 2.6 %      Eosinophil % 0.4 %      Basophil % 0.4 %      Immature Grans % 0.4 %      Neutrophils, Absolute 10.12 10*3/mm3      Lymphocytes, Absolute 0.63 10*3/mm3       Monocytes, Absolute 0.29 10*3/mm3      Eosinophils, Absolute 0.04 10*3/mm3      Basophils, Absolute 0.04 10*3/mm3      Immature Grans, Absolute 0.05 10*3/mm3      nRBC 0.0 /100 WBC         Imaging Results (Most Recent)     Procedure Component Value Units Date/Time    XR Chest 1 View [[id number]] Collected:  01/08/20 1239     Updated:  01/08/20 1254    Narrative:       PROCEDURE: XR CHEST 1 VW    VIEWS: Two frontal    INDICATION: Chest pain    COMPARISON: CXR: 9/25/2019    FINDINGS:       - lines/tubes: None    - cardiac: Size within normal limits.    - mediastinum: Contour within normal limits.     - lungs: No evidence of a focal air space process, pulmonary  interstitial edema, nodule(s)/mass.     - pleura: No evidence of  fluid.      - osseous: Unremarkable for age.      Impression:       Negative examination.      Electronically signed by:  Deanna Medina MD  1/8/2020 12:53 PM CST  Workstation: [id number]          Chief Complaint on Day of Discharge:  Abdominal and chest pain    Hospital Course:  The patient is a 44 y.o. male who presented to [facility] with chest pain.  Myocardial infarction was excluded using cardiac enzymes x3, EKG, and telemetry.  Patient continued to have epigastric and chest type pain.  He was noted to be markedly hypertensive.  Blood pressure got worse and he was transferred down to the intensive care unit and placed on a Cardene drip.  Blood pressure improved and he was weaned off the Cardene drip.  Hemodynamically remained stable for the remainder of his hospital stay.  He was concerned about his chest and abdominal pain.  I discussed the patient with Dr. Ureña, his primary gastroenterologist, who stated that he has had recent endoscopies and did not need an inpatient endoscopy.  Dr. Ureña did say that he would see him as an outpatient approximately 1 week after discharge.  I discussed the case with Dr. Robbins, his primary cardiologist, who saw the patient in the  "hospital prior to discharge.  Dr. cárdenas did not feel like he needed any invasive work-up and that he was okay to go home.  Dr. Robbins will follow up with the patient in the office.  He was felt safe for discharge and was discharged home in stable condition.    Condition on Discharge:  Stable    Physical Exam on Discharge:  /69   Pulse 89   Temp 96.7 °F (35.9 °C)   Resp 14   Ht 182.9 cm (72\")   Wt 98.2 kg (216 lb 7.9 oz)   SpO2 95%   BMI 29.36 kg/m²      Physical Exam   Constitutional: He appears well-developed and well-nourished.   HENT:   Head: Normocephalic and atraumatic.   Eyes: Pupils are equal, round, and reactive to light. EOM are normal.   Neck: Normal range of motion. Neck supple.   Cardiovascular: Normal rate, regular rhythm, normal heart sounds and intact distal pulses. Exam reveals no gallop and no friction rub.   No murmur heard.  Pulmonary/Chest: Effort normal and breath sounds normal. No respiratory distress. He has no wheezes. He has no rales. He exhibits no tenderness.   Abdominal: Soft. Bowel sounds are normal. He exhibits no distension. There is no tenderness.   Psychiatric: He has a normal mood and affect. His behavior is normal.   Vitals reviewed.      Discharge Disposition:  Home or Self Care    Discharge Medications:     Discharge Medications      Continue These Medications      Instructions Start Date   amLODIPine 5 MG tablet  Commonly known as:  NORVASC   5 mg, Oral, Daily      aspirin 81 MG EC tablet   81 mg, Oral, Daily      carvedilol 6.25 MG tablet  Commonly known as:  COREG   6.25 mg, Oral, Daily      clonazePAM 1 MG tablet  Commonly known as:  KlonoPIN   1 mg, Oral, 2 Times Daily PRN      cyclobenzaprine 10 MG tablet  Commonly known as:  FLEXERIL   10 mg, Oral, 3 Times Daily PRN      DULoxetine 60 MG capsule  Commonly known as:  CYMBALTA   60 mg, Oral, Nightly      magnesium oxide 400 (241.3 Mg) MG tablet tablet  Commonly known as:  MAGOX   400 mg, Oral, 2 Times Daily    "   mirtazapine 15 MG tablet  Commonly known as:  REMERON   15 mg, Oral, Nightly      pantoprazole 40 MG EC tablet  Commonly known as:  PROTONIX   40 mg, Oral, Daily      PROBIOTIC-10 PO   1 tablet, Oral, Daily      prochlorperazine 5 MG tablet  Commonly known as:  COMPAZINE   5 mg, Oral, Every 6 Hours PRN      promethazine-dextromethorphan 6.25-15 MG/5ML syrup  Commonly known as:  PROMETHAZINE-DM   5 mL, Oral, Nightly PRN      SUPER B COMPLEX PO   1 tablet, Oral, Daily             Discharge Diet:   Diet Instructions     Advance Diet As Tolerated            Activity at Discharge:   Activity Instructions     Activity as Tolerated      Additional Activity Instructions:      Follow-up with your primary care provider within 1 week of discharge from Hospital.                   Follow-up Appointments:   Future Appointments   Date Time Provider Department Center   4/20/2020  9:30 AM NURSE  [facility]   4/20/2020 10:00 AM Brea Kerns APRN [facility]   9/3/2020  8:30 AM Brittny Pratt APRN [facility] None   10/13/2020 11:30 AM Ramirez Reza MD [facility] None   PCP 1 week  Dr Ureña 1 week  Dr Robbins on Monday as scheduled        This document has been electronically signed by Doug Bhatt MD on January 10, 2020 2:49 PM      Time: 35 min

## 2020-01-10 NOTE — CONSULTS
Cardiology Consultation Note.        Patient Name: Stevenson Chilel  Age/Sex: 44 y.o. male  : 1975  MRN: [id number]    Date of consultation: 1/10/2020  Consulting Physician: Joe Robbins MD  Primary care Physician: Sharonda Willoughby APRN  Requesting Physician:  Doug Bhatt MD     Reason for consultation:  Chest Pain    Subjective:       Chief Complaint: Chest pain and epigastric discomfort      History of Present Illness:  Stevenson Chilel is a 44 y.o. male     Body mass index is 29.36 kg/m². with a weight of 252 pounds and height of 6 feet, with previous history of atrial flutter, status post chemical cardioversion, history of alcohol abuse with previous history of phentermine abuse, biatrial enlargement, concentric left ventricular hypertrophy, with an ejection fraction of 55%, with a negative exercise stress test evaluation with patient exercising for a total duration of 12 minutes without any recurrence of atrial flutter, history of obstructive sleep apnea, noncompliant with the CPAP, anxiety and depression, who presents for further evaluation for symptoms of palpitation. Patient was last evaluated in July. Patient subsequently underwent an exercise Cardiolite stress test. Patient's exercise Cardiolite stress test did not show of any evidence of any stress-induced ischemia and no evidence of any arrhythmia. Patient had been on anticoagulation with Eliquis in the past.     Patient was hospitalized in 2017 with symptoms off shortness of breath and atrial flutter fibrillation.  Patient after the last evaluation underwent atrial flutter ablation with  Dr. Crandall at [place] .     Patient denies excessive intake of coffee or caffeinated beverage.  Patient is currently on a vegan diet.  Patient was evaluated by Dr. Medina and underwent upper endoscopy.  Patient had symptoms of epigastric discomfort followed by symptoms of substernal chest pain.  Due to the patient's symptoms of chest  pain patient wife had called paramedics.  Patient blood pressure was 200/110.  Due to the patient elevated blood pressure patient was transferred to [facility] for further evaluation.    On presentation patient complained of having symptoms of epigastric discomfort along with symptoms of chest pain.  Patient resting electrocardiogram done did not show any acute ST-T wave changes    Patient initial and subsequent troponin was negative.    Patient had not complained of having any bleeding diastasis of hemoptysis hematuria bright blood per rectum.    Patient 10 point review of system except for stated in the history of present illness is negative      Past Medical History:  1. Atrial fibrillation with the radiofrequency ablation ×2 done at [facility] and at [place] with Dr Crandall.   2.  Radiofrequency ablation for atrial flutter ×2 done at [facility] and [facility] in [place] with previous chemical cardioversion with [facility].   3. Negative exercise Cardiolite stress test for any evidence of any stress induced ischemia with patient exercising for a total duration of 12 minutes completing 3 minutes of José Luis stage 4 done in 08/2016.   4. Arterial hypertension.   5. Hypertensive heart disease.   6. Preserved left ventricular systolic function with an ejection fraction of 55%.   7. Mild mitral regurgitation   8. Obesity with a body mass index of 31.   9. Obstructive sleep apnea noncompliant with the C-PAP machine.   10. Anxiety.   11. Chronic anticoagulation with Eliquis.   12. History of alcohol abuse.         Past Surgical History:  1. Colonoscopy.   2. Tonsillectomy.   3. Shoulder surgery.   Family History:      Social History: Patient does smoke marijuana 7 times a week.       Cardiac Risk factor:   1. Male.   2. Arterial hypertension.   3. Obesity.     Allergies:  Allergies   Allergen Reactions   • Contrast Dye Anaphylaxis     ANAPHYLAXIS   • Iodinated Diagnostic Agents  Anaphylaxis   • Morphine Nausea And Vomiting   • Zofran [Ondansetron Hcl] Other (See Comments)     IV gives hiccups       Medication::  Medications Prior to Admission   Medication Sig Dispense Refill Last Dose   • aspirin 81 MG EC tablet Take 81 mg by mouth Daily.   1/8/2020 at Unknown time   • B Complex-C (SUPER B COMPLEX PO) Take 1 tablet by mouth Daily.   1/7/2020 at Unknown time   • carvedilol (COREG) 6.25 MG tablet Take 6.25 mg by mouth Daily.   1/8/2020 at Unknown time   • clonazePAM (KlonoPIN) 1 MG tablet Take 1 mg by mouth 2 (Two) Times a Day As Needed for Anxiety.   Past Month at Unknown time   • cyclobenzaprine (FLEXERIL) 10 MG tablet Take 1 tablet by mouth 3 (Three) Times a Day As Needed for Muscle Spasms. 15 tablet 0 1/7/2020 at Unknown time   • DULoxetine (CYMBALTA) 60 MG capsule Take 60 mg by mouth Every Night.  2 1/7/2020 at Unknown time   • magnesium oxide (MAGOX) 400 (241.3 MG) MG tablet tablet Take 400 mg by mouth 2 (Two) Times a Day.   1/8/2020 at Unknown time   • mirtazapine (REMERON) 15 MG tablet Take 15 mg by mouth Every Night.   1/7/2020 at Unknown time   • pantoprazole (PROTONIX) 40 MG EC tablet Take 1 tablet by mouth Daily. 30 tablet 1 1/8/2020 at Unknown time   • Probiotic Product (PROBIOTIC-10 PO) Take 1 tablet by mouth Daily.   Patient Taking Differently at Unknown time   • amLODIPine (NORVASC) 5 MG tablet Take 1 tablet by mouth Daily. (Patient not taking: Reported on 1/8/2020) 15 tablet 0 Not Taking at Unknown time   • prochlorperazine (COMPAZINE) 5 MG tablet Take 1 tablet by mouth Every 6 (Six) Hours As Needed for Nausea or Vomiting. 10 tablet 0 Unknown at Unknown time   • promethazine-dextromethorphan (PROMETHAZINE-DM) 6.25-15 MG/5ML syrup Take 5 mL by mouth At Night As Needed for Cough. 150 mL 0 Unknown at Unknown time           Review of Systems:       Constitutional:  Denies recent weight loss, weight gain, fever or chills, no change in exercise tolerance     HENT:  Denies any  hearing loss, epistaxis, hoarseness, or difficulty speaking.     Eyes: Wears eyeglasses or contact lenses     Respiratory:  Denies dyspnea with exertion,no cough, wheezing, or hemoptysis.     Cardiovascular: Positive for palpitation Negative for palpitations,  orthopnea, PND, peripheral edema, syncope, or claudication.     Gastrointestinal:  Denies change in bowel habits, dyspepsia, ulcer disease, hematochezia, or melena.  No nausea, no vomiting, no hematemesis, no diarrhea or constipation, no melena      Endocrine: Negative for cold intolerance, heat intolerance, polydipsia, polyphagia and polyuria. Denies any history of weight change, heat/cold intolerance, polydipsia, polyuria     Genitourinary: Negative for hematuria.      Musculoskeletal: Denies any history of arthritic symptoms or back problems .  No joint pain, joint stiffness, joint swelling, muscle pain, muscle weakness and neck pain    Skin:  Denies any change in hair or nails, rashes, or skin lesions.     Allergic/Immunologic: Negative.  Negative for environmental allergies, food allergies and immunocompromised state.     Neurological:  Denies any history of recurrent headaches, strokes, TIA, or seizure disorder.     Hematological: Denies excessive bleeding, easy bruising, fatigue, lymphadenopathy and petechiae or any bleeding disorders, or lymphadenopathy.     Psychiatric/Behavioral: Denies any history of depression, substance abuse, or change in cognitive function. Denies any psychomotor reaction or tangential thought.  No depression, homicidal ideations and suicidal ideations    Endocrine: No frequent urination and nocturia, temperature intolerance, weight gain, unintended and weight loss, unintended            Objective:     Objective:  Vitals:    01/10/20 1000   BP: 122/69   Pulse: 89   Resp:    Temp:    SpO2: 95%     .    Body mass index is 29.36 kg/m².           Physical Exam:   General Appearance:    Alert, oriented, cooperative, in no acute  distress   Head:    Normocephalic, atraumatic, without obvious abnormality   Eyes:           ELLIE  Lids and lashes normal, conjunctivae and sclerae normal, no icterus, no pallor   Ears:    Ears appear intact with no abnormalities noted   Throat:   Mucous membranes pink and moist   Neck:   Supple, trachea midline, no carotid bruit, no organomegaly or JVD   Lungs:     Clear to auscultation and percussion, respirations regular, even and Unlabored. No wheezes, rales, rhonchi    Heart:    Regular rhythm and normal rate, normal S1 and S2, no            murmur, no gallop, no rub, no click   Abdomen:     Soft, non-tender, non-distended, no guarding, no rebound tenderness, Normal bowel sounds in all four quadrants, no masses, liver and spleen nonpalpable,    Genitalia:    Deferred   Extremities:   Moves all extremities well, no edema, no cyanosis, no              Redness, no clubbing   Pulses:   Pulses palpable and equal bilaterally   Skin:   Moist and warm. No bleeding, bruising or rash   Neurologic/Psychiatric:   Alert and oriented to person, place, and time.  Motor, power and tone in upper and lower extremities are grossly intact.  No focal neurological deficits. Normal cognitive function. No psychomotor reaction or tangential thought. No depression, homicidal ideations and suicidal ideations           Lab Review:     Results from last 7 days   Lab Units 01/09/20  0856 01/08/20  1222   SODIUM mmol/L 137 138   POTASSIUM mmol/L 3.8 4.9   CHLORIDE mmol/L 98 101   CO2 mmol/L 18.0* 23.0   BUN mg/dL 8 10   CREATININE mg/dL 1.01 1.27   CALCIUM mg/dL 9.5 9.3   BILIRUBIN mg/dL  --  0.9   ALK PHOS U/L  --  76   ALT (SGPT) U/L  --  28   AST (SGOT) U/L  --  29   GLUCOSE mg/dL 85 102*     Results from last 7 days   Lab Units 01/08/20  1611 01/08/20  1222   TROPONIN T ng/mL <0.010 <0.010         Results from last 7 days   Lab Units 01/09/20  0856   WBC 10*3/mm3 15.64*   HEMOGLOBIN g/dL 17.9*   HEMATOCRIT % 53.3*   PLATELETS 10*3/mm3  245     Results from last 7 days   Lab Units 01/08/20  1222   INR  1.00         Results from last 7 days   Lab Units 01/09/20  0856   CHOLESTEROL mg/dL 178   TRIGLYCERIDES mg/dL 107   HDL CHOL mg/dL 40   LDL CHOL mg/dL 117*           Invalid input(s):  T4,  FREET4    EKG:   ECG/EMG Results (last 24 hours)     Procedure Component Value Units Date/Time    ECG 12 Lead [[id number]] Collected:  08/31/17 1625     Updated:  08/31/17 1635    Narrative:       Test Reason : chest pain  Blood Pressure : **/** mmHG  Vent. Rate : 110 BPM     Atrial Rate : 375 BPM     P-R Int : 000 ms          QRS Dur : 100 ms      QT Int : 344 ms       P-R-T Axes : 000 077 067 degrees     QTc Int : 465 ms    Atrial fibrillation with rapid ventricular response with premature  ventricular or aberrantly conducted complexes  Abnormal ECG      Referred By:             Confirmed By:     ECG 12 Lead [[id number]] Collected:  08/31/17 1229     Updated:  08/31/17 1639          Imaging:  Imaging Results (Last 24 Hours)     ** No results found for the last 24 hours. **          I personally viewed and interpreted the patient's EKG/Telemetry data.    Assessment:   1.  Atrial fibrillation with a rapid ventricular response.  2.  Arterial hypertension.  3.  Hypertensive heart disease.  4.  Obstructive sleep apnea.  5.  Chronic anticoagulation with Eliquis          Plan:     1.  Atypical symptoms of chest pain.  Patient had undergone previous stress test which was negative.  Patient has negative troponin.  Patient at the present time has been recommended medical management.  Patient would not be subjected to any invasive intervention.      2. Symptomatic palpitation with history of radiofrequency ablation for the atrial fibrillation and flutter done by Dr. Crandall in [place].  Patient is currently on Coreg and not on anticoagulation along with aspirin.  Patient had not had any recurrent episodes of atrial fibrillation flutter.      3.  Arterial hypertension.   Some blood pressure is mildly elevated to 122/70.  Patient denies any symptoms of headache.  Patient denies excessive intake of salt.      4. Obstructive sleep apnea with obesity with a body mass index of 31. Patient has been counseled on weight reduction and to be compliant with the CPAP machine. Patient has been explained the risks associated with not using the CPAP machine and causing right atrial and right ventricular enlargement and further increasing the pressure in the pulmonary artery were discussed with the patient.       5. Anxiety and depression. Patient is on Klonopin, BuSpar, and Cymbalta and has been followed by VIKASH Marx.         Thank you for the consultation.        Time: time spent in face-to-face evaluation of greater than 55 minutes and interacting and formulating examining and discussing the plan with the patient with 50% of greater time spent in face-to-face interaction.    Joe Robbins MD  01/10/20  11:35 AM    Dictated utilizing Dragon dictation.

## 2020-01-14 ENCOUNTER — OFFICE VISIT (OUTPATIENT)
Dept: GASTROENTEROLOGY | Facility: CLINIC | Age: 45
End: 2020-01-14

## 2020-01-14 VITALS
HEIGHT: 72 IN | DIASTOLIC BLOOD PRESSURE: 84 MMHG | BODY MASS INDEX: 29.8 KG/M2 | HEART RATE: 83 BPM | WEIGHT: 220 LBS | OXYGEN SATURATION: 98 % | SYSTOLIC BLOOD PRESSURE: 142 MMHG

## 2020-01-14 DIAGNOSIS — R10.13 EPIGASTRIC PAIN: Primary | ICD-10-CM

## 2020-01-14 PROCEDURE — 99212 OFFICE O/P EST SF 10 MIN: CPT | Performed by: INTERNAL MEDICINE

## 2020-01-14 RX ORDER — TIZANIDINE 4 MG/1
4 TABLET ORAL
COMMUNITY
Start: 2019-12-11 | End: 2020-10-27

## 2020-01-14 RX ORDER — MIRTAZAPINE 15 MG/1
TABLET, FILM COATED ORAL
Refills: 0 | COMMUNITY
Start: 2019-11-25 | End: 2020-10-13

## 2020-01-14 RX ORDER — PANTOPRAZOLE SODIUM 40 MG/1
40 TABLET, DELAYED RELEASE ORAL DAILY
Qty: 30 TABLET | Refills: 5 | Status: SHIPPED | OUTPATIENT
Start: 2020-01-14 | End: 2020-07-31 | Stop reason: SDUPTHER

## 2020-01-14 RX ORDER — BUPROPION HYDROCHLORIDE 150 MG/1
TABLET ORAL
COMMUNITY
End: 2020-10-13

## 2020-01-14 NOTE — PATIENT INSTRUCTIONS
Heartburn  Heartburn is a type of pain or discomfort that can happen in the throat or chest. It is often described as a burning pain. It may also cause a bad, acid-like taste in the mouth. Heartburn may feel worse when you lie down or bend over. It may be worse at night. It may be caused by stomach contents that move back up (reflux) into the tube that connects the mouth with the stomach (esophagus).  Follow these instructions at home:  Eating and drinking    · Avoid certain foods and drinks as told by your doctor. This may include:  ? Coffee and tea (with or without caffeine).  ? Drinks that have alcohol.  ? Energy drinks and sports drinks.  ? Carbonated drinks or sodas.  ? Chocolate and cocoa.  ? Peppermint and mint flavorings.  ? Garlic and onions.  ? Horseradish.  ? Spicy and acidic foods, such as:  § Peppers.  § Chili powder and del rio powder.  § Vinegar.  § Hot sauces and BBQ sauce.  ? Citrus fruit juices and citrus fruits, such as:  § Oranges.  § [place].  § Limes.  ? Tomato-based foods, such as:  § Red sauce and pizza with red sauce.  § Chili.  § Salsa.  ? Fried and fatty foods, such as:  § Donuts.  § French fries and potato chips.  § High-fat dressings.  ? High-fat meats, such as:  § Hot dogs and sausage.  § Rib eye steak.  § Ham and liu.  ? High-fat dairy items, such as:  § Whole milk.  § Butter.  § Cream cheese.  · Eat small meals often. Avoid eating large meals.  · Avoid drinking large amounts of liquid with your meals.  · Avoid eating meals during the 2-3 hours before bedtime.  · Avoid lying down right after you eat.  · Do not exercise right after you eat.  Lifestyle         · If you are overweight, lose an amount of weight that is healthy for you. Ask your doctor about a safe weight loss goal.  · Do not use any products that contain nicotine or tobacco, including cigarettes, e-cigarettes, and chewing tobacco. These can make your symptoms worse. If you need help quitting, ask your doctor.  · Wear loose  clothes. Do not wear anything tight around your waist.  · Raise (elevate) the head of your bed about 6 inches (15 cm) when you sleep.  · Try to lower your stress. If you need help doing this, ask your doctor.  General instructions  · Pay attention to any changes in your symptoms.  · Take over-the-counter and prescription medicines only as told by your doctor.  ? Do not take aspirin, ibuprofen, or other NSAIDs unless your doctor says it is okay.  ? Stop medicines only as told by your doctor.  · Keep all follow-up visits as told by your doctor. This is important.  Contact a doctor if:  · You have new symptoms.  · You lose weight and you do not know why it is happening.  · You have trouble swallowing, or it hurts to swallow.  · You have wheezing or a cough that keeps happening.  · Your symptoms do not get better with treatment.  · You have heartburn often for more than 2 weeks.  Get help right away if:  · You have pain in your arms, neck, jaw, teeth, or back.  · You feel sweaty, dizzy, or light-headed.  · You have chest pain or shortness of breath.  · You throw up (vomit) and your throw up looks like blood or coffee grounds.  · Your poop (stool) is bloody or black.  These symptoms may represent a serious problem that is an emergency. Do not wait to see if the symptoms will go away. Get medical help right away. Call your local emergency services (911 in the [place] Do not drive yourself to the hospital.  Summary  · Heartburn is a type of pain that can happen in the throat or chest. It can feel like a burning pain. It may also cause a bad, acid-like taste in the mouth.  · You may need to avoid certain foods and drinks to help your symptoms. Ask your doctor what foods and drinks you should avoid.  · Take over-the-counter and prescription medicines only as told by your doctor. Do not take aspirin, ibuprofen, or other NSAIDs unless your doctor told you to do so.  · Contact your doctor if your symptoms do not get better or  they get worse.  This information is not intended to replace advice given to you by your health care provider. Make sure you discuss any questions you have with your health care provider.  Document Released: 08/29/2012 Document Revised: 05/20/2019 Document Reviewed: 05/20/2019  [employer] Interactive Patient Education © 2019 [employer]  Gastroesophageal Reflux Disease, Adult  Gastroesophageal reflux (EARNEST) happens when acid from the stomach flows up into the tube that connects the mouth and the stomach (esophagus). Normally, food travels down the esophagus and stays in the stomach to be digested. With EARNEST, food and stomach acid sometimes move back up into the esophagus. You may have a disease called gastroesophageal reflux disease (GERD) if the reflux:  · Happens often.  · Causes frequent or very bad symptoms.  · Causes problems such as damage to the esophagus.  When this happens, the esophagus becomes sore and swollen (inflamed). Over time, GERD can make small holes (ulcers) in the lining of the esophagus.  What are the causes?  This condition is caused by a problem with the muscle between the esophagus and the stomach. When this muscle is weak or not normal, it does not close properly to keep food and acid from coming back up from the stomach. The muscle can be weak because of:  · Tobacco use.  · Pregnancy.  · Having a certain type of hernia (hiatal hernia).  · Alcohol use.  · Certain foods and drinks, such as coffee, chocolate, onions, and peppermint.  What increases the risk?  You are more likely to develop this condition if you:  · Are overweight.  · Have a disease that affects your connective tissue.  · Use NSAID medicines.  What are the signs or symptoms?  Symptoms of this condition include:  · Heartburn.  · Difficult or painful swallowing.  · The feeling of having a lump in the throat.  · A bitter taste in the mouth.  · Bad breath.  · Having a lot of saliva.  · Having an upset or bloated  stomach.  · Belching.  · Chest pain. Different conditions can cause chest pain. Make sure you see your doctor if you have chest pain.  · Shortness of breath or noisy breathing (wheezing).  · Ongoing (chronic) cough or a cough at night.  · Wearing away of the surface of teeth (tooth enamel).  · Weight loss.  How is this treated?  Treatment will depend on how bad your symptoms are. Your doctor may suggest:  · Changes to your diet.  · Medicine.  · Surgery.  Follow these instructions at home:  Eating and drinking    · Follow a diet as told by your doctor. You may need to avoid foods and drinks such as:  ? Coffee and tea (with or without caffeine).  ? Drinks that contain alcohol.  ? Energy drinks and sports drinks.  ? Bubbly (carbonated) drinks or sodas.  ? Chocolate and cocoa.  ? Peppermint and mint flavorings.  ? Garlic and onions.  ? Horseradish.  ? Spicy and acidic foods. These include peppers, chili powder, del rio powder, vinegar, hot sauces, and BBQ sauce.  ? Citrus fruit juices and citrus fruits, such as oranges, damien, and limes.  ? Tomato-based foods. These include red sauce, chili, salsa, and pizza with red sauce.  ? Fried and fatty foods. These include donuts, french fries, potato chips, and high-fat dressings.  ? High-fat meats. These include hot dogs, rib eye steak, sausage, ham, and liu.  ? High-fat dairy items, such as whole milk, butter, and cream cheese.  · Eat small meals often. Avoid eating large meals.  · Avoid drinking large amounts of liquid with your meals.  · Avoid eating meals during the 2-3 hours before bedtime.  · Avoid lying down right after you eat.  · Do not exercise right after you eat.  Lifestyle    · Do not use any products that contain nicotine or tobacco. These include cigarettes, e-cigarettes, and chewing tobacco. If you need help quitting, ask your doctor.  · Try to lower your stress. If you need help doing this, ask your doctor.  · If you are overweight, lose an amount of weight  that is healthy for you. Ask your doctor about a safe weight loss goal.  General instructions  · Pay attention to any changes in your symptoms.  · Take over-the-counter and prescription medicines only as told by your doctor. Do not take aspirin, ibuprofen, or other NSAIDs unless your doctor says it is okay.  · Wear loose clothes. Do not wear anything tight around your waist.  · Raise (elevate) the head of your bed about 6 inches (15 cm).  · Avoid bending over if this makes your symptoms worse.  · Keep all follow-up visits as told by your doctor. This is important.  Contact a doctor if:  · You have new symptoms.  · You lose weight and you do not know why.  · You have trouble swallowing or it hurts to swallow.  · You have wheezing or a cough that keeps happening.  · Your symptoms do not get better with treatment.  · You have a hoarse voice.  Get help right away if:  · You have pain in your arms, neck, jaw, teeth, or back.  · You feel sweaty, dizzy, or light-headed.  · You have chest pain or shortness of breath.  · You throw up (vomit) and your throw-up looks like blood or coffee grounds.  · You pass out (faint).  · Your poop (stool) is bloody or black.  · You cannot swallow, drink, or eat.  Summary  · If a person has gastroesophageal reflux disease (GERD), food and stomach acid move back up into the esophagus and cause symptoms or problems such as damage to the esophagus.  · Treatment will depend on how bad your symptoms are.  · Follow a diet as told by your doctor.  · Take all medicines only as told by your doctor.  This information is not intended to replace advice given to you by your health care provider. Make sure you discuss any questions you have with your health care provider.  Document Released: 06/05/2009 Document Revised: 06/26/2019 Document Reviewed: 06/26/2019  [employer] Interactive Patient Education © 2019 [employer]  Nausea and Vomiting, Adult  Nausea is feeling sick to your stomach or feeling that  you are about to throw up (vomit). Vomiting is when food in your stomach is thrown up and out of the mouth. Throwing up can make you feel weak. It can also make you lose too much water in your body (get dehydrated). If you lose too much water in your body, you may:  · Feel tired.  · Feel thirsty.  · Have a dry mouth.  · Have cracked lips.  · Go pee (urinate) less often.  Older adults and people with other diseases or a weak body defense system (immune system) are at higher risk for losing too much water in the body. If you feel sick to your stomach and you throw up, it is important to follow instructions from your doctor about how to take care of yourself.  Follow these instructions at home:  Watch your symptoms for any changes. Tell your doctor about them. Follow these instructions to care for yourself at home.  Eating and drinking         · Take an ORS (oral rehydration solution). This is a drink that is sold at pharmacies and stores.  · Drink clear fluids in small amounts as you are able, such as:  ? Water.  ? Ice chips.  ? Fruit juice that has water added (diluted fruit juice).  ? Low-calorie sports drinks.  · Eat bland, easy-to-digest foods in small amounts as you are able, such as:  ? Bananas.  ? Applesauce.  ? Rice.  ? Low-fat (lean) meats.  ? Toast.  ? Crackers.  · Avoid drinking fluids that have a lot of sugar or caffeine in them. This includes energy drinks, sports drinks, and soda.  · Avoid alcohol.  · Avoid spicy or fatty foods.  General instructions  · Take over-the-counter and prescription medicines only as told by your doctor.  · Drink enough fluid to keep your pee (urine) pale yellow.  · Wash your hands often with soap and water. If you cannot use soap and water, use hand .  · Make sure that all people in your home wash their hands well and often.  · Rest at home while you get better.  · Watch your condition for any changes.  · Take slow and deep breaths when you feel sick to your  stomach.  · Keep all follow-up visits as told by your doctor. This is important.  Contact a doctor if:  · Your symptoms get worse.  · You have new symptoms.  · You have a fever.  · You cannot drink fluids without throwing up.  · You feel sick to your stomach for more than 2 days.  · You feel light-headed or dizzy.  · You have a headache.  · You have muscle cramps.  · You have a rash.  · You have pain while peeing.  Get help right away if:  · You have pain in your chest, neck, arm, or jaw.  · You feel very weak or you pass out (faint).  · You throw up again and again.  · You have throw up that is bright red or looks like black coffee grounds.  · You have bloody or black poop (stools) or poop that looks like tar.  · You have a very bad headache, a stiff neck, or both.  · You have very bad pain, cramping, or bloating in your belly (abdomen).  · You have trouble breathing.  · You are breathing very quickly.  · Your heart is beating very quickly.  · Your skin feels cold and clammy.  · You feel confused.  · You have signs of losing too much water in your body, such as:  ? Dark pee, very little pee, or no pee.  ? Cracked lips.  ? Dry mouth.  ? Sunken eyes.  ? Sleepiness.  ? Weakness.  These symptoms may be an emergency. Do not wait to see if the symptoms will go away. Get medical help right away. Call your local emergency services (911 in the [place] Do not drive yourself to the hospital.  Summary  · Nausea is feeling sick to your stomach or feeling that you are about to throw up (vomit). Vomiting is when food in your stomach is thrown up and out of the mouth.  · Follow instructions from your doctor about eating and drinking to keep from losing too much water in your body.  · Take over-the-counter and prescription medicines only as told by your doctor.  · Contact your doctor if your symptoms get worse or you have new symptoms.  · Keep all follow-up visits as told by your doctor. This is important.  This information is not  intended to replace advice given to you by your health care provider. Make sure you discuss any questions you have with your health care provider.  Document Released: 06/05/2009 Document Revised: 05/28/2019 Document Reviewed: 05/28/2019  [employer] Interactive Patient Education © 2019 [employer]

## 2020-01-14 NOTE — PROGRESS NOTES
[facility]      Chief Complaint:   Chief Complaint   Patient presents with   • [facility] Emergency Department - Hospital Admission     01/08/2020 - 01/10/2020.  Chest Pain.  Epigastric Pain.  Nausea.  Vomiting.       Subjective     HPI:   Patient with recent evaluation the hospital for chest pain.  Patient states is completely resolved patient was checked out for cardiac standpoint was told this is noncardiac.  Patient has history of gastritis currently on Protonix doing well.  Discussed patient continuing takes medications with a full glass of water as this may be causing pill esophagitis.    Plan; we will have patient follow-up in 3 months.  Would not need reevaluation unless pain returns significantly.    Past Medical History:   Past Medical History:   Diagnosis Date   • Alcoholism /alcohol abuse (CMS/[facility])    • Anxiety    • Arthritis     shoulders and knees   • Atrial fibrillation (CMS/HCC)    • Atrial flutter (CMS/HCC)    • Depression    • Diverticulitis    • Esophagitis    • Gastritis    • GERD (gastroesophageal reflux disease)    • Hypertension    • Kidney stone    • Pancreatitis     patient denies   • Sleep apnea     has c-pap       Past Surgical History:    Past Surgical History:   Procedure Laterality Date   • CARDIAC ELECTROPHYSIOLOGY PROCEDURE N/A 5/2/2017    Procedure: Ablation atrial flutter;  Surgeon: Ramirez Reza MD;  Location: [facility] EP INVASIVE LOCATION;  Service:    • CARDIAC ELECTROPHYSIOLOGY PROCEDURE N/A 9/5/2017    Procedure: Ablation atrial fibrillation PVA ;  Surgeon: Ramirez Reza MD;  Location: [facility] EP INVASIVE LOCATION;  Service:    • COLON RESECTION Left 12/20/2018    Procedure: LAPAROSCOPIC SIGMOID  RESECTION, TAKE DOWN OF SPLENIC FLEXURE;  Surgeon: Michel Lala MD;  Location: [facility] OR;  Service: General   • COLONOSCOPY     • COLONOSCOPY N/A 5/30/2018    Procedure: COLONOSCOPY;  Surgeon: Edwin Medina MD;  Location: [facility] ENDOSCOPY;  Service:  Gastroenterology   • ENDOSCOPY N/A 3/30/2018    Procedure: ESOPHAGOGASTRODUODENOSCOPY;  Surgeon: Edwin Medina MD;  Location: [facility] ENDOSCOPY;  Service: Gastroenterology   • ENDOSCOPY N/A 5/30/2018    Procedure: ESOPHAGOGASTRODUODENOSCOPY;  Surgeon: Edwin Medina MD;  Location: [facility] ENDOSCOPY;  Service: Gastroenterology   • ENDOSCOPY N/A 12/3/2018    Procedure: ESOPHAGOGASTRODUODENOSCOPY;  Surgeon: Edwin Medina MD;  Location: [facility] ENDOSCOPY;  Service: Gastroenterology   • ENDOSCOPY N/A 10/22/2019    Procedure: ESOPHAGOGASTRODUODENOSCOPY;  Surgeon: Edwin Medina MD;  Location: [facility] ENDOSCOPY;  Service: Gastroenterology   • SHOULDER SURGERY Right 2014   • TONSILLECTOMY  12/2015   • UPPER GASTROINTESTINAL ENDOSCOPY  03/30/2018   • UPPER GASTROINTESTINAL ENDOSCOPY     • UPPER GASTROINTESTINAL ENDOSCOPY  05/30/2018   • UPPER GASTROINTESTINAL ENDOSCOPY  10/22/2019       Family History:  Family History   Problem Relation Age of Onset   • Heart disease Father    • Depression Father    • Cancer Maternal Grandmother         lung   • Cancer Paternal Grandmother         pancreatic   • Heart disease Paternal Grandfather        Social History:   reports that he has never smoked. He has never used smokeless tobacco. He reports that he has current or past drug history. Drug: Marijuana. Frequency: 7.00 times per week. He reports that he does not drink alcohol.    Medications:   Prior to Admission medications    Medication Sig Start Date End Date Taking? Authorizing Provider   aspirin 81 MG EC tablet Take 81 mg by mouth Daily.   Yes Idalmis Medina MD   B Complex-C (SUPER B COMPLEX PO) Take 1 tablet by mouth Daily.   Yes Idalmis Medina MD   carvedilol (COREG) 6.25 MG tablet Take 6.25 mg by mouth Daily.   Yes Idalmis Medina MD   clonazePAM (KlonoPIN) 1 MG tablet Take 1 mg by mouth 2 (Two) Times a Day As Needed for Anxiety.   Yes Idalmis Medina MD   cyclobenzaprine (FLEXERIL) 10 MG tablet  Take 1 tablet by mouth 3 (Three) Times a Day As Needed for Muscle Spasms. 11/26/19  Yes Toby Turner MD   DULoxetine (CYMBALTA) 60 MG capsule Take 60 mg by mouth Every Night. 8/21/18  Yes Idalmis Medina MD   magnesium oxide (MAGOX) 400 (241.3 MG) MG tablet tablet Take 400 mg by mouth 2 (Two) Times a Day.   Yes Idalmis Medina MD   mirtazapine (REMERON) 15 MG tablet Take 15 mg by mouth Every Night.   Yes Idalmis Medina MD   pantoprazole (PROTONIX) 40 MG EC tablet Take 1 tablet by mouth Daily. 1/14/20  Yes Edwin Medina MD   Probiotic Product (PROBIOTIC-10 PO) Take 1 tablet by mouth Daily.   Yes Idalmis Medina MD   prochlorperazine (COMPAZINE) 5 MG tablet Take 1 tablet by mouth Every 6 (Six) Hours As Needed for Nausea or Vomiting. 11/5/19  Yes Jeffrey Bernabe MD   promethazine-dextromethorphan (PROMETHAZINE-DM) 6.25-15 MG/5ML syrup Take 5 mL by mouth At Night As Needed for Cough. 9/5/19  Yes Tia Doherty APRN   pantoprazole (PROTONIX) 40 MG EC tablet Take 1 tablet by mouth Daily. 9/27/19 1/14/20 Yes Edwin Medina MD   amLODIPine (NORVASC) 5 MG tablet Take 1 tablet by mouth Daily. 11/5/19   Jeffrey Bernabe MD   buPROPion XL (WELLBUTRIN XL) 150 MG 24 hr tablet Wellbutrin  mg 24 hr tablet, extended release   Take 1 tablet every day by oral route.    Idalmis Medina MD   HYDROcod Polst-CPM Polst ER (TUSSIONEX PENNKINETIC) 10-8 MG/5ML ER suspension  12/11/19   Idalmis Medina MD   mirtazapine (REMERON) 15 MG tablet  11/25/19   Idalmis Medina MD   tiZANidine (ZANAFLEX) 4 MG tablet  12/11/19   Idalmis Medina MD       Allergies:  Contrast dye; Iodinated diagnostic agents; Morphine; and Zofran [ondansetron hcl]    ROS:    Review of Systems   Constitutional: Negative for activity change, appetite change and unexpected weight change.   HENT: Negative for congestion, sore throat and trouble swallowing.    Respiratory: Negative for cough, choking and  "shortness of breath.    Cardiovascular: Negative for chest pain.   Gastrointestinal: Negative for abdominal distention, abdominal pain, anal bleeding, blood in stool, constipation, diarrhea, nausea, rectal pain and vomiting.   Endocrine: Negative for heat intolerance, polydipsia and polyphagia.   Genitourinary: Negative for difficulty urinating.   Musculoskeletal: Negative for arthralgias.   Skin: Negative for color change, pallor, rash and wound.   Allergic/Immunologic: Negative for food allergies.   Neurological: Negative for dizziness, syncope, weakness and headaches.   Psychiatric/Behavioral: Negative for agitation, behavioral problems, confusion and decreased concentration.     Objective     Blood pressure 156/90, pulse 83, height 182.9 cm (72\"), weight 99.8 kg (220 lb), SpO2 98 %.    Physical Exam   Constitutional: He is oriented to person, place, and time. He appears well-developed and well-nourished. No distress.   HENT:   Head: Normocephalic and atraumatic.   Cardiovascular: Normal rate, regular rhythm, normal heart sounds and intact distal pulses. Exam reveals no gallop and no friction rub.   No murmur heard.  Pulmonary/Chest: Breath sounds normal. No respiratory distress. He has no wheezes. He has no rales. He exhibits no tenderness.   Abdominal: Soft. Bowel sounds are normal. He exhibits no distension and no mass. There is no tenderness. There is no rebound and no guarding. No hernia.   Musculoskeletal: Normal range of motion. He exhibits no edema.   Neurological: He is alert and oriented to person, place, and time.   Skin: Skin is warm and dry. No rash noted. He is not diaphoretic. No erythema. No pallor.   Psychiatric: He has a normal mood and affect. His behavior is normal. Judgment and thought content normal.        Assessment/Plan   Stevenson was seen today for [facility] emergency department - hospital admission.    Diagnoses and all orders for this visit:    Epigastric pain    Other orders  -     " pantoprazole (PROTONIX) 40 MG EC tablet; Take 1 tablet by mouth Daily.        * Surgery not found *     Diagnosis Plan   1. Epigastric pain         Anticipated Surgical Procedure:  No orders of the defined types were placed in this encounter.      The risks, benefits, and alternatives of this procedure have been discussed with the patient or the responsible party- the patient understands and agrees to proceed.

## 2020-03-09 RX ORDER — CARVEDILOL 6.25 MG/1
6.25 TABLET ORAL DAILY
Qty: 30 TABLET | Refills: 6 | Status: SHIPPED | OUTPATIENT
Start: 2020-03-09 | End: 2022-05-24

## 2020-04-07 ENCOUNTER — HOSPITAL ENCOUNTER (EMERGENCY)
Facility: HOSPITAL | Age: 45
Discharge: HOME | End: 2020-04-07
Attending: EMERGENCY MEDICINE | Admitting: EMERGENCY MEDICINE

## 2020-04-07 ENCOUNTER — APPOINTMENT (OUTPATIENT)
Dept: CT IMAGING | Facility: HOSPITAL | Age: 45
End: 2020-04-07

## 2020-04-07 VITALS
TEMPERATURE: 98.4 F | HEIGHT: 72 IN | SYSTOLIC BLOOD PRESSURE: 170 MMHG | BODY MASS INDEX: 29.12 KG/M2 | HEART RATE: 92 BPM | DIASTOLIC BLOOD PRESSURE: 108 MMHG | RESPIRATION RATE: 18 BRPM | OXYGEN SATURATION: 95 % | WEIGHT: 215 LBS

## 2020-04-07 DIAGNOSIS — R10.13 EPIGASTRIC PAIN: Primary | ICD-10-CM

## 2020-04-07 LAB
ALBUMIN SERPL-MCNC: 3.9 G/DL (ref 3.5–5.2)
ALBUMIN/GLOB SERPL: 1.5 G/DL
ALP SERPL-CCNC: 60 U/L (ref 39–117)
ALT SERPL W P-5'-P-CCNC: 51 U/L (ref 1–41)
ANION GAP SERPL CALCULATED.3IONS-SCNC: 12 MMOL/L (ref 5–15)
AST SERPL-CCNC: 36 U/L (ref 1–40)
BASOPHILS # BLD AUTO: 0.04 10*3/MM3 (ref 0–0.2)
BASOPHILS NFR BLD AUTO: 0.3 % (ref 0–1.5)
BILIRUB SERPL-MCNC: 1.6 MG/DL (ref 0.2–1.2)
BUN BLD-MCNC: 16 MG/DL (ref 6–20)
BUN/CREAT SERPL: 12.4 (ref 7–25)
CALCIUM SPEC-SCNC: 8.6 MG/DL (ref 8.6–10.5)
CHLORIDE SERPL-SCNC: 103 MMOL/L (ref 98–107)
CO2 SERPL-SCNC: 24 MMOL/L (ref 22–29)
CREAT BLD-MCNC: 1.29 MG/DL (ref 0.76–1.27)
DEPRECATED RDW RBC AUTO: 45.7 FL (ref 37–54)
EOSINOPHIL # BLD AUTO: 0.02 10*3/MM3 (ref 0–0.4)
EOSINOPHIL NFR BLD AUTO: 0.1 % (ref 0.3–6.2)
ERYTHROCYTE [DISTWIDTH] IN BLOOD BY AUTOMATED COUNT: 15.9 % (ref 12.3–15.4)
GFR SERPL CREATININE-BSD FRML MDRD: 61 ML/MIN/1.73
GLOBULIN UR ELPH-MCNC: 2.6 GM/DL
GLUCOSE BLD-MCNC: 109 MG/DL (ref 65–99)
HCT VFR BLD AUTO: 52.7 % (ref 37.5–51)
HGB BLD-MCNC: 17.4 G/DL (ref 13–17.7)
IMM GRANULOCYTES # BLD AUTO: 0.05 10*3/MM3 (ref 0–0.05)
IMM GRANULOCYTES NFR BLD AUTO: 0.3 % (ref 0–0.5)
LIPASE SERPL-CCNC: 20 U/L (ref 13–60)
LYMPHOCYTES # BLD AUTO: 1.26 10*3/MM3 (ref 0.7–3.1)
LYMPHOCYTES NFR BLD AUTO: 8.5 % (ref 19.6–45.3)
MCH RBC QN AUTO: 27.5 PG (ref 26.6–33)
MCHC RBC AUTO-ENTMCNC: 33 G/DL (ref 31.5–35.7)
MCV RBC AUTO: 83.4 FL (ref 79–97)
MONOCYTES # BLD AUTO: 0.57 10*3/MM3 (ref 0.1–0.9)
MONOCYTES NFR BLD AUTO: 3.9 % (ref 5–12)
NEUTROPHILS # BLD AUTO: 12.82 10*3/MM3 (ref 1.7–7)
NEUTROPHILS NFR BLD AUTO: 86.9 % (ref 42.7–76)
NRBC BLD AUTO-RTO: 0 /100 WBC (ref 0–0.2)
PLATELET # BLD AUTO: 239 10*3/MM3 (ref 140–450)
PMV BLD AUTO: 9.8 FL (ref 6–12)
POTASSIUM BLD-SCNC: 3.6 MMOL/L (ref 3.5–5.2)
PROT SERPL-MCNC: 6.5 G/DL (ref 6–8.5)
RBC # BLD AUTO: 6.32 10*6/MM3 (ref 4.14–5.8)
SODIUM BLD-SCNC: 139 MMOL/L (ref 136–145)
WBC NRBC COR # BLD: 14.76 10*3/MM3 (ref 3.4–10.8)

## 2020-04-07 PROCEDURE — 85025 COMPLETE CBC W/AUTO DIFF WBC: CPT | Performed by: EMERGENCY MEDICINE

## 2020-04-07 PROCEDURE — 96374 THER/PROPH/DIAG INJ IV PUSH: CPT

## 2020-04-07 PROCEDURE — 83690 ASSAY OF LIPASE: CPT | Performed by: EMERGENCY MEDICINE

## 2020-04-07 PROCEDURE — 80053 COMPREHEN METABOLIC PANEL: CPT | Performed by: EMERGENCY MEDICINE

## 2020-04-07 PROCEDURE — 99284 EMERGENCY DEPT VISIT MOD MDM: CPT

## 2020-04-07 PROCEDURE — 25010000002 PROMETHAZINE PER 50 MG: Performed by: EMERGENCY MEDICINE

## 2020-04-07 PROCEDURE — 74176 CT ABD & PELVIS W/O CONTRAST: CPT

## 2020-04-07 PROCEDURE — 96375 TX/PRO/DX INJ NEW DRUG ADDON: CPT

## 2020-04-07 RX ORDER — LIDOCAINE HYDROCHLORIDE 20 MG/ML
15 SOLUTION OROPHARYNGEAL ONCE
Status: COMPLETED | OUTPATIENT
Start: 2020-04-07 | End: 2020-04-07

## 2020-04-07 RX ORDER — PANTOPRAZOLE SODIUM 40 MG/10ML
80 INJECTION, POWDER, LYOPHILIZED, FOR SOLUTION INTRAVENOUS ONCE
Status: COMPLETED | OUTPATIENT
Start: 2020-04-07 | End: 2020-04-07

## 2020-04-07 RX ORDER — PROMETHAZINE HYDROCHLORIDE 25 MG/ML
12.5 INJECTION, SOLUTION INTRAMUSCULAR; INTRAVENOUS ONCE
Status: COMPLETED | OUTPATIENT
Start: 2020-04-07 | End: 2020-04-07

## 2020-04-07 RX ORDER — SODIUM CHLORIDE 0.9 % (FLUSH) 0.9 %
10 SYRINGE (ML) INJECTION AS NEEDED
Status: DISCONTINUED | OUTPATIENT
Start: 2020-04-07 | End: 2020-04-07 | Stop reason: HOSPADM

## 2020-04-07 RX ORDER — ALUMINA, MAGNESIA, AND SIMETHICONE 2400; 2400; 240 MG/30ML; MG/30ML; MG/30ML
15 SUSPENSION ORAL ONCE
Status: COMPLETED | OUTPATIENT
Start: 2020-04-07 | End: 2020-04-07

## 2020-04-07 RX ADMIN — ALUMINUM HYDROXIDE, MAGNESIUM HYDROXIDE, AND DIMETHICONE 15 ML: 400; 400; 40 SUSPENSION ORAL at 03:40

## 2020-04-07 RX ADMIN — PROMETHAZINE HYDROCHLORIDE 12.5 MG: 25 INJECTION INTRAMUSCULAR; INTRAVENOUS at 02:31

## 2020-04-07 RX ADMIN — SODIUM CHLORIDE 1000 ML: 900 INJECTION, SOLUTION INTRAVENOUS at 02:20

## 2020-04-07 RX ADMIN — PANTOPRAZOLE SODIUM 80 MG: 40 INJECTION, POWDER, FOR SOLUTION INTRAVENOUS at 03:31

## 2020-04-07 RX ADMIN — LIDOCAINE HYDROCHLORIDE 15 ML: 20 SOLUTION ORAL; TOPICAL at 03:41

## 2020-04-07 NOTE — ED PROVIDER NOTES
"Subjective   44-year-old male well-known to this emergency department for his chronic abdominal pain, nausea and vomiting presents with complaint of the same.  Gastric pain with nausea and vomiting that is not been controlled with medications that he has at home.  Reports that yesterday he took 2 Soma to \"try to relax\" and states that did not help.  He states this this evening he took 2 Klonopin to Soma and 2 tramadol which also did not help him sleep.  Reports that he use rectal Phenergan and pill form Phenergan without improvement of his nausea and vomiting.  He also reports that he is \"been using a lot of herbs to try to prevent himself from getting coronavirus\".  Denies any blood in his emesis or stool.  Reports that he thinks he might have a \"another case of diverticulitis\".    Family history, surgical history, social history, current medications and allergies are reviewed with the patient and triage documentation and vitals are reviewed.        History provided by:  Patient and medical records   used: No        Review of Systems   Constitutional: Negative for chills, diaphoresis and fever.   HENT: Negative.    Eyes: Negative.    Respiratory: Negative for cough, shortness of breath and wheezing.    Cardiovascular: Negative for chest pain and leg swelling.   Gastrointestinal: Positive for abdominal pain, nausea and vomiting. Negative for blood in stool, constipation and diarrhea.   Endocrine: Negative.    Genitourinary: Negative for discharge, dysuria, flank pain, frequency, penile pain, penile swelling, scrotal swelling, testicular pain and urgency.   Musculoskeletal: Negative for arthralgias, back pain, myalgias and neck pain.   Skin: Negative for color change, pallor, rash and wound.   Allergic/Immunologic: Negative.    Neurological: Negative.    Hematological: Negative.    Psychiatric/Behavioral: Negative.        Past Medical History:   Diagnosis Date   • Alcoholism /alcohol abuse " (CMS/HCC)    • Anxiety    • Arthritis     shoulders and knees   • Atrial fibrillation (CMS/HCC)    • Atrial flutter (CMS/HCC)    • Depression    • Diverticulitis    • Esophagitis    • Gastritis    • GERD (gastroesophageal reflux disease)    • Hypertension    • Kidney stone    • Pancreatitis     patient denies   • Sleep apnea     has c-pap       Allergies   Allergen Reactions   • Contrast Dye Anaphylaxis     ANAPHYLAXIS   • Iodinated Diagnostic Agents Anaphylaxis   • Morphine Nausea And Vomiting   • Zofran [Ondansetron Hcl] Other (See Comments)     IV gives hiccups       Past Surgical History:   Procedure Laterality Date   • CARDIAC ELECTROPHYSIOLOGY PROCEDURE N/A 5/2/2017    Procedure: Ablation atrial flutter;  Surgeon: Ramirez Reza MD;  Location:  [facility];  Service:    • CARDIAC ELECTROPHYSIOLOGY PROCEDURE N/A 9/5/2017    Procedure: Ablation atrial fibrillation PVA ;  Surgeon: Ramirez Reza MD;  Location:  [facility];  Service:    • COLON RESECTION Left 12/20/2018    Procedure: LAPAROSCOPIC SIGMOID  RESECTION, TAKE DOWN OF SPLENIC FLEXURE;  Surgeon: Michel Lala MD;  Location: [facility] OR;  Service: General   • COLONOSCOPY     • COLONOSCOPY N/A 5/30/2018    Procedure: COLONOSCOPY;  Surgeon: Edwin Medina MD;  Location: [facility] ENDOSCOPY;  Service: Gastroenterology   • ENDOSCOPY N/A 3/30/2018    Procedure: ESOPHAGOGASTRODUODENOSCOPY;  Surgeon: Edwin Medina MD;  Location: [facility] ENDOSCOPY;  Service: Gastroenterology   • ENDOSCOPY N/A 5/30/2018    Procedure: ESOPHAGOGASTRODUODENOSCOPY;  Surgeon: Edwin Medina MD;  Location: [facility] ENDOSCOPY;  Service: Gastroenterology   • ENDOSCOPY N/A 12/3/2018    Procedure: ESOPHAGOGASTRODUODENOSCOPY;  Surgeon: Edwin Medina MD;  Location: [facility] ENDOSCOPY;  Service: Gastroenterology   • ENDOSCOPY N/A 10/22/2019    Procedure: ESOPHAGOGASTRODUODENOSCOPY;  Surgeon: Edwin Medina MD;  Location: [facility] ENDOSCOPY;  Service:  "Gastroenterology   • SHOULDER SURGERY Right 2014   • TONSILLECTOMY  12/2015   • UPPER GASTROINTESTINAL ENDOSCOPY  03/30/2018   • UPPER GASTROINTESTINAL ENDOSCOPY     • UPPER GASTROINTESTINAL ENDOSCOPY  05/30/2018   • UPPER GASTROINTESTINAL ENDOSCOPY  10/22/2019       Family History   Problem Relation Age of Onset   • Heart disease Father    • Depression Father    • Cancer Maternal Grandmother         lung   • Cancer Paternal Grandmother         pancreatic   • Heart disease Paternal Grandfather        Social History     Socioeconomic History   • Marital status:      Spouse name: Not on file   • Number of children: Not on file   • Years of education: Not on file   • Highest education level: Not on file   Tobacco Use   • Smoking status: Never Smoker   • Smokeless tobacco: Never Used   Substance and Sexual Activity   • Alcohol use: No     Comment: quit 3/17/18   • Drug use: Yes     Frequency: 7.0 times per week     Types: Marijuana     Comment: states he uses \"a little\" every day   • Sexual activity: Defer   Social History Narrative    Pt states has not had drink of any alcohol in 3 weeks as of 4/5/2018           Objective   Physical Exam   Constitutional: He is oriented to person, place, and time. He appears well-developed and well-nourished.  Non-toxic appearance. He does not appear ill. No distress.   HENT:   Head: Normocephalic.   Mouth/Throat: Oropharynx is clear and moist.   Eyes: Pupils are equal, round, and reactive to light. EOM are normal. No scleral icterus.   Cardiovascular: Normal rate, regular rhythm, normal heart sounds and intact distal pulses.   No murmur heard.  Pulmonary/Chest: Effort normal and breath sounds normal. No respiratory distress. He has no wheezes. He has no rales.   Abdominal: Soft. Normal appearance. He exhibits no distension, no fluid wave and no ascites. Bowel sounds are decreased. There is no hepatosplenomegaly. There is tenderness in the epigastric area. There is no rigidity, " no rebound, no guarding, no CVA tenderness and negative Barber's sign.   Neurological: He is alert and oriented to person, place, and time.   Skin: Skin is warm and dry. Capillary refill takes less than 2 seconds.   Psychiatric: He has a normal mood and affect. His behavior is normal.   Nursing note and vitals reviewed.      Procedures  none         ED Course      Labs Reviewed   COMPREHENSIVE METABOLIC PANEL - Abnormal; Notable for the following components:       Result Value    Glucose 109 (*)     Creatinine 1.29 (*)     ALT (SGPT) 51 (*)     Total Bilirubin 1.6 (*)     All other components within normal limits    Narrative:     GFR Normal >60  Chronic Kidney Disease <60  Kidney Failure <15     CBC WITH AUTO DIFFERENTIAL - Abnormal; Notable for the following components:    WBC 14.76 (*)     RBC 6.32 (*)     Hematocrit 52.7 (*)     RDW 15.9 (*)     Neutrophil % 86.9 (*)     Lymphocyte % 8.5 (*)     Monocyte % 3.9 (*)     Eosinophil % 0.1 (*)     Neutrophils, Absolute 12.82 (*)     All other components within normal limits   LIPASE - Normal   CBC AND DIFFERENTIAL    Narrative:     The following orders were created for panel order CBC & Differential.  Procedure                               Abnormality         Status                     ---------                               -----------         ------                     CBC Auto Differential[[id number]]        Abnormal            Final result                 Please view results for these tests on the individual orders.     Ct Abdomen Pelvis Without Contrast    Result Date: 4/7/2020  Narrative: CT abdomen and pelvis without contrast on 4/7/2020 CLINICAL INDICATION: Generalized abdominal pain, nausea and vomiting TECHNIQUE: Multiple axial images are obtained throughout the abdomen and pelvis without the administration of contrast. This exam was performed according to our departmental dose-optimization program, which includes automated exposure control, adjustment of  the mA and/or kV according to patient size and/or use of iterative reconstruction technique. Total DLP is 403.5 mGy*cm. COMPARISON: 11/5/2019 FINDINGS: Abdomen: The lung bases are clear. There is a probable tiny nonobstructing left renal stone. There are no ureteral stones and no hydronephrosis. The unenhanced solid abdominal organs are otherwise unremarkable. There is no abdominal adenopathy. There is no free fluid or free air within the abdomen. The abdominal portion of the GI tract is unremarkable. Pelvis: There is no free fluid in the pelvis. There is no pelvic adenopathy. Pelvic portion of the GI tract including the appendix is unremarkable. Degenerative changes are noted in the spine.     Impression: No acute abnormality. Electronically signed by:  Carlos Gilman  4/7/2020 3:03 AM CDT Workstation: [id number]            MDM  Number of Diagnoses or Management Options  Epigastric pain:      Amount and/or Complexity of Data Reviewed  Clinical lab tests: reviewed  Tests in the radiology section of CPT®: reviewed    Patient Progress  Patient progress: stable    Laboratory studies and CT imaging unremarkable.  Patient feels better with medications in the emergency department agreeable to discharge with outpatient follow-up.    Final diagnoses:   Epigastric pain            Jass Bhatt,   04/07/20 0533

## 2020-04-07 NOTE — ED NOTES
Pt states he is having another diverticulitis flair up. Pt states he hasn't been able to eat or drink anything since Saturday am.      Toby Chavez RN  04/07/20 0208

## 2020-04-20 ENCOUNTER — APPOINTMENT (OUTPATIENT)
Dept: ONCOLOGY | Facility: CLINIC | Age: 45
End: 2020-04-20

## 2020-04-20 ENCOUNTER — APPOINTMENT (OUTPATIENT)
Dept: ONCOLOGY | Facility: HOSPITAL | Age: 45
End: 2020-04-20

## 2020-05-13 ENCOUNTER — TELEPHONE (OUTPATIENT)
Dept: ONCOLOGY | Facility: CLINIC | Age: 45
End: 2020-05-13

## 2020-05-13 NOTE — TELEPHONE ENCOUNTER
Brea Kerns APRN    Pt has a lab appt on this coming Friday  He wants to speak to a nurse about having some extra lab work done on that day as well.    Please call pt asap     Thanks

## 2020-05-13 NOTE — TELEPHONE ENCOUNTER
Pt called back regarding labs.  He wants to know if Brea will add hormone and heavy metal lab work to his orders for Friday. Informed pt that a message will be sent to Brea. He states understanding.

## 2020-05-14 NOTE — TELEPHONE ENCOUNTER
"Spoke with pt. He states he was wanting Testorone, Estrogen, and growth hormone panel. He states that he doesn't know of anything specific as far as tests for heavy metals. He just states something to see if \"he has a build up\" in his body. Something generalized.   "

## 2020-05-15 ENCOUNTER — LAB (OUTPATIENT)
Dept: ONCOLOGY | Facility: HOSPITAL | Age: 45
End: 2020-05-15

## 2020-05-15 DIAGNOSIS — D75.1 POLYCYTHEMIA: Primary | ICD-10-CM

## 2020-05-15 DIAGNOSIS — D69.6 THROMBOCYTOPENIA (HCC): ICD-10-CM

## 2020-05-15 LAB
ALBUMIN SERPL-MCNC: 3.9 G/DL (ref 3.5–5.2)
ALBUMIN/GLOB SERPL: 1.6 G/DL
ALP SERPL-CCNC: 52 U/L (ref 39–117)
ALT SERPL W P-5'-P-CCNC: 36 U/L (ref 1–41)
ANION GAP SERPL CALCULATED.3IONS-SCNC: 13 MMOL/L (ref 5–15)
AST SERPL-CCNC: 36 U/L (ref 1–40)
BASOPHILS # BLD AUTO: 0.04 10*3/MM3 (ref 0–0.2)
BASOPHILS NFR BLD AUTO: 0.4 % (ref 0–1.5)
BILIRUB SERPL-MCNC: 0.4 MG/DL (ref 0.2–1.2)
BUN BLD-MCNC: 11 MG/DL (ref 6–20)
BUN/CREAT SERPL: 12 (ref 7–25)
CALCIUM SPEC-SCNC: 9.2 MG/DL (ref 8.6–10.5)
CHLORIDE SERPL-SCNC: 103 MMOL/L (ref 98–107)
CO2 SERPL-SCNC: 24 MMOL/L (ref 22–29)
CREAT BLD-MCNC: 0.92 MG/DL (ref 0.76–1.27)
DEPRECATED RDW RBC AUTO: 49.4 FL (ref 37–54)
EOSINOPHIL # BLD AUTO: 0.29 10*3/MM3 (ref 0–0.4)
EOSINOPHIL NFR BLD AUTO: 2.7 % (ref 0.3–6.2)
ERYTHROCYTE [DISTWIDTH] IN BLOOD BY AUTOMATED COUNT: 17 % (ref 12.3–15.4)
GFR SERPL CREATININE-BSD FRML MDRD: 90 ML/MIN/1.73
GLOBULIN UR ELPH-MCNC: 2.4 GM/DL
GLUCOSE BLD-MCNC: 101 MG/DL (ref 65–99)
HCT VFR BLD AUTO: 49.4 % (ref 37.5–51)
HGB BLD-MCNC: 16.1 G/DL (ref 13–17.7)
IMM GRANULOCYTES # BLD AUTO: 0.04 10*3/MM3 (ref 0–0.05)
IMM GRANULOCYTES NFR BLD AUTO: 0.4 % (ref 0–0.5)
LYMPHOCYTES # BLD AUTO: 1.59 10*3/MM3 (ref 0.7–3.1)
LYMPHOCYTES NFR BLD AUTO: 14.7 % (ref 19.6–45.3)
MCH RBC QN AUTO: 27.2 PG (ref 26.6–33)
MCHC RBC AUTO-ENTMCNC: 32.6 G/DL (ref 31.5–35.7)
MCV RBC AUTO: 83.3 FL (ref 79–97)
MONOCYTES # BLD AUTO: 0.71 10*3/MM3 (ref 0.1–0.9)
MONOCYTES NFR BLD AUTO: 6.6 % (ref 5–12)
NEUTROPHILS # BLD AUTO: 8.15 10*3/MM3 (ref 1.7–7)
NEUTROPHILS NFR BLD AUTO: 75.2 % (ref 42.7–76)
NRBC BLD AUTO-RTO: 0 /100 WBC (ref 0–0.2)
PLATELET # BLD AUTO: 219 10*3/MM3 (ref 140–450)
PMV BLD AUTO: 9.9 FL (ref 6–12)
POTASSIUM BLD-SCNC: 4.1 MMOL/L (ref 3.5–5.2)
PROT SERPL-MCNC: 6.3 G/DL (ref 6–8.5)
RBC # BLD AUTO: 5.93 10*6/MM3 (ref 4.14–5.8)
SODIUM BLD-SCNC: 140 MMOL/L (ref 136–145)
TESTOST SERPL-MCNC: >1500 NG/DL (ref 249–836)
WBC NRBC COR # BLD: 10.82 10*3/MM3 (ref 3.4–10.8)

## 2020-05-15 PROCEDURE — 36415 COLL VENOUS BLD VENIPUNCTURE: CPT | Performed by: NURSE PRACTITIONER

## 2020-05-15 PROCEDURE — 84403 ASSAY OF TOTAL TESTOSTERONE: CPT | Performed by: NURSE PRACTITIONER

## 2020-05-15 PROCEDURE — 83003 ASSAY GROWTH HORMONE (HGH): CPT | Performed by: NURSE PRACTITIONER

## 2020-05-15 PROCEDURE — 80053 COMPREHEN METABOLIC PANEL: CPT | Performed by: NURSE PRACTITIONER

## 2020-05-15 PROCEDURE — 85025 COMPLETE CBC W/AUTO DIFF WBC: CPT | Performed by: NURSE PRACTITIONER

## 2020-05-15 PROCEDURE — 82672 ASSAY OF ESTROGEN: CPT | Performed by: NURSE PRACTITIONER

## 2020-05-16 LAB — GH SERPL-MCNC: 0.2 NG/ML (ref 0–10)

## 2020-05-18 ENCOUNTER — TELEMEDICINE (OUTPATIENT)
Dept: ONCOLOGY | Facility: CLINIC | Age: 45
End: 2020-05-18

## 2020-05-18 ENCOUNTER — APPOINTMENT (OUTPATIENT)
Dept: ONCOLOGY | Facility: HOSPITAL | Age: 45
End: 2020-05-18

## 2020-05-18 DIAGNOSIS — D75.1 SECONDARY POLYCYTHEMIA: Primary | ICD-10-CM

## 2020-05-18 PROCEDURE — 99213 OFFICE O/P EST LOW 20 MIN: CPT | Performed by: NURSE PRACTITIONER

## 2020-05-18 NOTE — PROGRESS NOTES
This visit has been rescheduled as a video visit to comply with patient safety concerns in accordance with CDC recommendations. Total time of discussion was 11 minutes.    You have chosen to receive care through a video visit. Do you consent to use a telephone visit for your medical care today? Yes    REASON FOR VISIT:  Follow-up for secondary polycythemia     HISTORY OF PRESENT ILLNESS:  44 yr old male with history of sleep apnea diagnosed 3 yrs ago; recently has been more compliant with use of CPAP;  He has a past history of testosterone use and a history of atrial flutter that has required ablation. He was initially seen by Dr. Monet in October for elevated H&H. He had an extensive work up that included a negative BAKARI 2 mutation, Normal EPO level and Ultrasound of abdomen that was unremarkable. He has had couple therapeutic phlebotomy. Most recent was in September 2018 when he was symptomatic. He is taking daily baby ASA and does admit to still taking testosterone supplement. Tele health visit today to discuss recent labs.  He denies any erythemalgia symptoms. He is taking baby ASA daily. States he will be having back surgery in near future.    Review of Systems   Constitutional: Negative.    HENT: Negative.    Eyes: Negative.    Respiratory: Negative.    Cardiovascular: Negative.    Gastrointestinal: Negative.    Endocrine: Negative.    Genitourinary: Negative.    Musculoskeletal: Positive for back pain.   Skin: Negative.    Allergic/Immunologic: Negative.    Neurological: Negative.    Hematological: Negative.    Psychiatric/Behavioral: Negative.        Physical Exam   Constitutional: He appears well-developed and well-nourished.   HENT:   Head: Normocephalic and atraumatic.   Eyes: Pupils are equal, round, and reactive to light. Conjunctivae are normal.   Neck: Neck normal appearance.  Pulmonary/Chest: Effort normal.   Abdominal: Abdomen appears normal. Soft.   Neurological: He is alert.   Skin: Skin is warm  and dry.   Psychiatric: He has a normal mood and affect.     Component      Latest Ref Rng & Units 5/15/2020             WBC      3.40 - 10.80 10*3/mm3 10.82 (H)   RBC      4.14 - 5.80 10*6/mm3 5.93 (H)   Hemoglobin      13.0 - 17.7 g/dL 16.1   Hematocrit      37.5 - 51.0 % 49.4   MCV      79.0 - 97.0 fL 83.3   MCH      26.6 - 33.0 pg 27.2   MCHC      31.5 - 35.7 g/dL 32.6   RDW      12.3 - 15.4 % 17.0 (H)   RDW-SD      37.0 - 54.0 fl 49.4   MPV      6.0 - 12.0 fL 9.9   Platelets      140 - 450 10*3/mm3 219   Neutrophil Rel %      42.7 - 76.0 % 75.2   Lymphocyte Rel %      19.6 - 45.3 % 14.7 (L)   Monocyte Rel %      5.0 - 12.0 % 6.6   Eosinophil Rel %      0.3 - 6.2 % 2.7   Basophil Rel %      0.0 - 1.5 % 0.4   Immature Granulocyte Rel %      0.0 - 0.5 % 0.4   Neutrophils Absolute      1.70 - 7.00 10*3/mm3 8.15 (H)   Lymphocytes Absolute      0.70 - 3.10 10*3/mm3 1.59   Monocytes Absolute      0.10 - 0.90 10*3/mm3 0.71   Eosinophils Absolute      0.00 - 0.40 10*3/mm3 0.29   Basophils Absolute      0.00 - 0.20 10*3/mm3 0.04   Immature Grans, Absolute      0.00 - 0.05 10*3/mm3 0.04   nRBC      0.0 - 0.2 /100 WBC 0.0     ASSESSMENT AND PLAN:    1. 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 He denies any erythromyalgia symptoms; no heme intervention needed at this time. Advised him to continue to stay well hydrated and to continue with daily ASA. Recheck again in 4 mos.     2. Atrial fibrillation, pt was  taken off the Eliquis by his cardiologist and is currently taking baby ASA daily.      3. Health maintenance, he does not smoke; he has had a EGD and colonoscopy

## 2020-05-20 LAB — ESTROGEN SERPL-MCNC: 390 PG/ML (ref 40–115)

## 2020-05-21 ENCOUNTER — HOSPITAL ENCOUNTER (OUTPATIENT)
Dept: MRI IMAGING | Facility: HOSPITAL | Age: 45
Discharge: HOME | End: 2020-05-21

## 2020-05-21 ENCOUNTER — HOSPITAL ENCOUNTER (OUTPATIENT)
Dept: MRI IMAGING | Facility: HOSPITAL | Age: 45
Discharge: HOME | End: 2020-05-21
Admitting: NEUROLOGICAL SURGERY

## 2020-05-21 ENCOUNTER — HOSPITAL ENCOUNTER (OUTPATIENT)
Dept: CT IMAGING | Facility: HOSPITAL | Age: 45
Discharge: HOME | End: 2020-05-21

## 2020-05-21 VITALS
DIASTOLIC BLOOD PRESSURE: 99 MMHG | RESPIRATION RATE: 18 BRPM | OXYGEN SATURATION: 98 % | SYSTOLIC BLOOD PRESSURE: 146 MMHG | HEART RATE: 74 BPM

## 2020-05-21 DIAGNOSIS — M41.9 SCOLIOSIS, UNSPECIFIED SCOLIOSIS TYPE, UNSPECIFIED SPINAL REGION: ICD-10-CM

## 2020-05-21 DIAGNOSIS — M54.50 LOW BACK PAIN, UNSPECIFIED BACK PAIN LATERALITY, UNSPECIFIED CHRONICITY, UNSPECIFIED WHETHER SCIATICA PRESENT: ICD-10-CM

## 2020-05-21 DIAGNOSIS — R20.0 NUMBNESS: ICD-10-CM

## 2020-05-21 DIAGNOSIS — R29.898 WEAKNESS OF LOWER EXTREMITY, UNSPECIFIED LATERALITY: ICD-10-CM

## 2020-05-21 DIAGNOSIS — M47.14 THORACIC MYELOPATHY: ICD-10-CM

## 2020-05-21 PROCEDURE — 72141 MRI NECK SPINE W/O DYE: CPT

## 2020-05-21 PROCEDURE — 72128 CT CHEST SPINE W/O DYE: CPT

## 2020-05-21 PROCEDURE — 72146 MRI CHEST SPINE W/O DYE: CPT

## 2020-05-21 PROCEDURE — 72148 MRI LUMBAR SPINE W/O DYE: CPT

## 2020-05-21 RX ORDER — DIAZEPAM 5 MG/1
TABLET ORAL
Status: DISPENSED
Start: 2020-05-21 | End: 2020-05-21

## 2020-05-21 RX ORDER — DIAZEPAM 5 MG/1
10 TABLET ORAL ONCE
Status: COMPLETED | OUTPATIENT
Start: 2020-05-21 | End: 2020-05-21

## 2020-05-21 RX ADMIN — DIAZEPAM 10 MG: 5 TABLET ORAL at 11:36

## 2020-07-31 RX ORDER — PANTOPRAZOLE SODIUM 40 MG/1
40 TABLET, DELAYED RELEASE ORAL DAILY
Qty: 30 TABLET | Refills: 5 | Status: SHIPPED | OUTPATIENT
Start: 2020-07-31 | End: 2020-10-27

## 2020-09-21 ENCOUNTER — TELEPHONE (OUTPATIENT)
Dept: ONCOLOGY | Facility: HOSPITAL | Age: 45
End: 2020-09-21

## 2020-09-21 ENCOUNTER — OFFICE VISIT (OUTPATIENT)
Dept: ONCOLOGY | Facility: CLINIC | Age: 45
End: 2020-09-21

## 2020-09-21 ENCOUNTER — LAB (OUTPATIENT)
Dept: ONCOLOGY | Facility: HOSPITAL | Age: 45
End: 2020-09-21

## 2020-09-21 VITALS
DIASTOLIC BLOOD PRESSURE: 105 MMHG | RESPIRATION RATE: 18 BRPM | SYSTOLIC BLOOD PRESSURE: 145 MMHG | WEIGHT: 230.4 LBS | HEART RATE: 81 BPM | TEMPERATURE: 98.7 F | BODY MASS INDEX: 31.21 KG/M2 | HEIGHT: 72 IN

## 2020-09-21 DIAGNOSIS — D75.1 SECONDARY POLYCYTHEMIA: ICD-10-CM

## 2020-09-21 DIAGNOSIS — D50.8 OTHER IRON DEFICIENCY ANEMIA: Primary | ICD-10-CM

## 2020-09-21 LAB
ALBUMIN SERPL-MCNC: 4.4 G/DL (ref 3.5–5.2)
ALBUMIN/GLOB SERPL: 2.1 G/DL
ALP SERPL-CCNC: 52 U/L (ref 39–117)
ALT SERPL W P-5'-P-CCNC: 18 U/L (ref 1–41)
ANION GAP SERPL CALCULATED.3IONS-SCNC: 10 MMOL/L (ref 5–15)
AST SERPL-CCNC: 27 U/L (ref 1–40)
BASOPHILS # BLD AUTO: 0.05 10*3/MM3 (ref 0–0.2)
BASOPHILS NFR BLD AUTO: 0.5 % (ref 0–1.5)
BILIRUB SERPL-MCNC: 0.3 MG/DL (ref 0–1.2)
BUN SERPL-MCNC: 14 MG/DL (ref 6–20)
BUN/CREAT SERPL: 12.7 (ref 7–25)
CALCIUM SPEC-SCNC: 8.9 MG/DL (ref 8.6–10.5)
CHLORIDE SERPL-SCNC: 102 MMOL/L (ref 98–107)
CO2 SERPL-SCNC: 26 MMOL/L (ref 22–29)
CREAT SERPL-MCNC: 1.1 MG/DL (ref 0.76–1.27)
DEPRECATED RDW RBC AUTO: 47.2 FL (ref 37–54)
EOSINOPHIL # BLD AUTO: 0.14 10*3/MM3 (ref 0–0.4)
EOSINOPHIL NFR BLD AUTO: 1.5 % (ref 0.3–6.2)
ERYTHROCYTE [DISTWIDTH] IN BLOOD BY AUTOMATED COUNT: 19.9 % (ref 12.3–15.4)
FERRITIN SERPL-MCNC: 11.93 NG/ML (ref 30–400)
FOLATE SERPL-MCNC: 17.6 NG/ML (ref 4.78–24.2)
GFR SERPL CREATININE-BSD FRML MDRD: 73 ML/MIN/1.73
GLOBULIN UR ELPH-MCNC: 2.1 GM/DL
GLUCOSE SERPL-MCNC: 92 MG/DL (ref 65–99)
HCT VFR BLD AUTO: 41 % (ref 37.5–51)
HGB BLD-MCNC: 12.9 G/DL (ref 13–17.7)
IMM GRANULOCYTES # BLD AUTO: 0.03 10*3/MM3 (ref 0–0.05)
IMM GRANULOCYTES NFR BLD AUTO: 0.3 % (ref 0–0.5)
IRON 24H UR-MRATE: 23 MCG/DL (ref 59–158)
IRON SATN MFR SERPL: 5 % (ref 20–50)
LYMPHOCYTES # BLD AUTO: 1.12 10*3/MM3 (ref 0.7–3.1)
LYMPHOCYTES NFR BLD AUTO: 12.2 % (ref 19.6–45.3)
MCH RBC QN AUTO: 22.3 PG (ref 26.6–33)
MCHC RBC AUTO-ENTMCNC: 31.5 G/DL (ref 31.5–35.7)
MCV RBC AUTO: 70.9 FL (ref 79–97)
MONOCYTES # BLD AUTO: 0.55 10*3/MM3 (ref 0.1–0.9)
MONOCYTES NFR BLD AUTO: 6 % (ref 5–12)
NEUTROPHILS NFR BLD AUTO: 7.31 10*3/MM3 (ref 1.7–7)
NEUTROPHILS NFR BLD AUTO: 79.5 % (ref 42.7–76)
NRBC BLD AUTO-RTO: 0 /100 WBC (ref 0–0.2)
PLATELET # BLD AUTO: 376 10*3/MM3 (ref 140–450)
PMV BLD AUTO: 9.5 FL (ref 6–12)
POTASSIUM SERPL-SCNC: 4 MMOL/L (ref 3.5–5.2)
PROT SERPL-MCNC: 6.5 G/DL (ref 6–8.5)
RBC # BLD AUTO: 5.78 10*6/MM3 (ref 4.14–5.8)
SODIUM SERPL-SCNC: 138 MMOL/L (ref 136–145)
TIBC SERPL-MCNC: 507 MCG/DL (ref 298–536)
TRANSFERRIN SERPL-MCNC: 340 MG/DL (ref 200–360)
VIT B12 BLD-MCNC: 670 PG/ML (ref 211–946)
WBC # BLD AUTO: 9.2 10*3/MM3 (ref 3.4–10.8)

## 2020-09-21 PROCEDURE — 83540 ASSAY OF IRON: CPT | Performed by: NURSE PRACTITIONER

## 2020-09-21 PROCEDURE — 82728 ASSAY OF FERRITIN: CPT | Performed by: NURSE PRACTITIONER

## 2020-09-21 PROCEDURE — 82607 VITAMIN B-12: CPT | Performed by: NURSE PRACTITIONER

## 2020-09-21 PROCEDURE — 84466 ASSAY OF TRANSFERRIN: CPT | Performed by: NURSE PRACTITIONER

## 2020-09-21 PROCEDURE — 85025 COMPLETE CBC W/AUTO DIFF WBC: CPT | Performed by: NURSE PRACTITIONER

## 2020-09-21 PROCEDURE — G0463 HOSPITAL OUTPT CLINIC VISIT: HCPCS | Performed by: NURSE PRACTITIONER

## 2020-09-21 PROCEDURE — 99213 OFFICE O/P EST LOW 20 MIN: CPT | Performed by: NURSE PRACTITIONER

## 2020-09-21 PROCEDURE — 80053 COMPREHEN METABOLIC PANEL: CPT | Performed by: NURSE PRACTITIONER

## 2020-09-21 PROCEDURE — 82746 ASSAY OF FOLIC ACID SERUM: CPT | Performed by: NURSE PRACTITIONER

## 2020-09-21 RX ORDER — FERROUS SULFATE 325(65) MG
325 TABLET ORAL 2 TIMES DAILY WITH MEALS
Qty: 60 TABLET | Refills: 3 | Status: SHIPPED | OUTPATIENT
Start: 2020-09-21 | End: 2020-11-23

## 2020-09-21 NOTE — TELEPHONE ENCOUNTER
----- Message from VIKASH Clinton sent at 9/21/2020  2:28 PM CDT -----  Let him know that I have sent prescription for oral iron to pharmacy

## 2020-09-21 NOTE — PROGRESS NOTES
"DATE OF VISIT: 9/21/2020    REASON FOR VISIT:   Follow-up for secondary polycythemia     HISTORY OF PRESENT ILLNESS:  45 yr old male with history of sleep apnea diagnosed 3 yrs ago; recently has been more compliant with use of CPAP;  He has a past history of testosterone use and a history of atrial flutter that has required ablation. He was initially seen by Dr. Monet in October for elevated H&H. He had an extensive work up that included a negative BAKARI 2 mutation, Normal EPO level and Ultrasound of abdomen that was unremarkable. He has had couple therapeutic phlebotomy. Most recent was in September 2018 when he was symptomatic. He is taking daily baby ASA and does admit to still taking testosterone supplement.   He denies any erythemalgia symptoms. He is taking baby ASA daily.  Since he was here last he had spinal fusion surgery at [facility]; states he had some bleeding post op night #1 but did not require blood transfusion.   Denies any bleeding; denies any hematochezia.        PAST MEDICAL HISTORY:    Past Medical History:   Diagnosis Date   • Alcoholism /alcohol abuse (CMS/HCC)    • Anxiety    • Arthritis     shoulders and knees   • Atrial fibrillation (CMS/HCC)    • Atrial flutter (CMS/HCC)    • Depression    • Diverticulitis    • Esophagitis    • Gastritis    • GERD (gastroesophageal reflux disease)    • Hypertension    • Kidney stone    • Pancreatitis     patient denies   • Sleep apnea     has c-pap       SOCIAL HISTORY:    Social History     Tobacco Use   • Smoking status: Never Smoker   • Smokeless tobacco: Never Used   Substance Use Topics   • Alcohol use: No     Comment: quit 3/17/18   • Drug use: Yes     Frequency: 7.0 times per week     Types: Marijuana     Comment: states he uses \"a little\" every day       Surgical History :  Past Surgical History:   Procedure Laterality Date   • CARDIAC ELECTROPHYSIOLOGY PROCEDURE N/A 5/2/2017    Procedure: Ablation atrial flutter;  Surgeon: Ramirez WEBB" MD Juaquin;  Location:  [facility];  Service:    • CARDIAC ELECTROPHYSIOLOGY PROCEDURE N/A 9/5/2017    Procedure: Ablation atrial fibrillation PVA ;  Surgeon: Ramirez Reza MD;  Location:  [facility];  Service:    • COLON RESECTION Left 12/20/2018    Procedure: LAPAROSCOPIC SIGMOID  RESECTION, TAKE DOWN OF SPLENIC FLEXURE;  Surgeon: Michel Lala MD;  Location: [facility] OR;  Service: General   • COLONOSCOPY     • COLONOSCOPY N/A 5/30/2018    Procedure: COLONOSCOPY;  Surgeon: Edwin Medina MD;  Location: [facility] ENDOSCOPY;  Service: Gastroenterology   • ENDOSCOPY N/A 3/30/2018    Procedure: ESOPHAGOGASTRODUODENOSCOPY;  Surgeon: Edwin Medina MD;  Location: [facility] ENDOSCOPY;  Service: Gastroenterology   • ENDOSCOPY N/A 5/30/2018    Procedure: ESOPHAGOGASTRODUODENOSCOPY;  Surgeon: Edwin Medina MD;  Location: [facility] ENDOSCOPY;  Service: Gastroenterology   • ENDOSCOPY N/A 12/3/2018    Procedure: ESOPHAGOGASTRODUODENOSCOPY;  Surgeon: Edwin Medina MD;  Location: [facility] ENDOSCOPY;  Service: Gastroenterology   • ENDOSCOPY N/A 10/22/2019    Procedure: ESOPHAGOGASTRODUODENOSCOPY;  Surgeon: Edwin Medina MD;  Location: [facility] ENDOSCOPY;  Service: Gastroenterology   • SHOULDER SURGERY Right 2014   • TONSILLECTOMY  12/2015   • UPPER GASTROINTESTINAL ENDOSCOPY  03/30/2018   • UPPER GASTROINTESTINAL ENDOSCOPY     • UPPER GASTROINTESTINAL ENDOSCOPY  05/30/2018   • UPPER GASTROINTESTINAL ENDOSCOPY  10/22/2019       ALLERGIES:    Allergies   Allergen Reactions   • Contrast Dye Anaphylaxis     ANAPHYLAXIS   • Iodinated Diagnostic Agents Anaphylaxis   • Morphine Nausea And Vomiting   • Zofran [Ondansetron Hcl] Other (See Comments)     IV gives hiccups       REVIEW OF SYSTEMS:      CONSTITUTIONAL:  No fever, chills, or night sweats.     HEENT:  No epistaxis, mouth sores, or difficulty swallowing.    RESPIRATORY:  No new shortness of breath or cough at present.    CARDIOVASCULAR:  No chest  "pain or palpitations.    GASTROINTESTINAL:  No abdominal pain, nausea, vomiting, or blood in the stool.    GENITOURINARY:  No dysuria or hematuria.    MUSCULOSKELETAL:  No any new back pain or arthralgias.     NEUROLOGICAL:  No tingling or numbness. No new headache or dizziness.     LYMPHATICS:  Denies any abnormal swollen and anywhere in the body.    SKIN:  Denies any new skin rash.    PHYSICAL EXAMINATION:      VITAL SIGNS:  BP (!) 145/105 Comment: left arm  Pulse 81   Temp 98.7 °F (37.1 °C) (Temporal)   Resp 18   Ht 182.9 cm (72.01\")   Wt 105 kg (230 lb 6.4 oz)   BMI 31.24 kg/m²     GENERAL:  Not in any distress.    HEENT:  Normocephalic, Atraumatic.Mild Conjunctival pallor. No icterus. No Facial Asymmetry noted.    NECK:  No adenopathy. No JVD.    RESPIRATORY:  Fair air entry bilateral. No rhonchi or wheezing.    CARDIOVASCULAR:  S1, S2. Regular rate and rhythm. No murmur or gallop appreciated.    ABDOMEN:  Soft, obese, nontender. Bowel sounds present in all four quadrants.  No organomegaly appreciated.    EXTREMITIES:  No edema.No Calf Tenderness.    NEUROLOGIC:  Alert, awake and oriented ×3.      SKIN : No new skin lesion identified  DIAGNOSTIC DATA:    Glucose   Date Value Ref Range Status   09/21/2020 92 65 - 99 mg/dL Final     Sodium   Date Value Ref Range Status   09/21/2020 138 136 - 145 mmol/L Final   03/22/2018 139 136 - 145 MMOL/L Final     Potassium   Date Value Ref Range Status   09/21/2020 4.0 3.5 - 5.2 mmol/L Final   03/22/2018 4.2 3.5 - 5.4 MMOL/L Final     CO2   Date Value Ref Range Status   09/21/2020 26.0 22.0 - 29.0 mmol/L Final   03/22/2018 25 20 - 33 MMOL/L Final     Chloride   Date Value Ref Range Status   09/21/2020 102 98 - 107 mmol/L Final   03/22/2018 101 98 - 107 MMOL/L Final     Anion Gap   Date Value Ref Range Status   09/21/2020 10.0 5.0 - 15.0 mmol/L Final   03/22/2018 17 (H) 7 - 14 MMOL/L Final     Creatinine   Date Value Ref Range Status   09/21/2020 1.10 0.76 - 1.27 mg/dL " Final   03/22/2018 1.0 0.6 - 1.2 MG/DL Final     BUN   Date Value Ref Range Status   09/21/2020 14 6 - 20 mg/dL Final   03/22/2018 10 8 - 23 MG/DL Final     BUN/Creatinine Ratio   Date Value Ref Range Status   09/21/2020 12.7 7.0 - 25.0 Final     Calcium   Date Value Ref Range Status   09/21/2020 8.9 8.6 - 10.5 mg/dL Final   03/22/2018 8.1 (L) 8.7 - 10.4 MG/DL Final     eGFR Non  Amer   Date Value Ref Range Status   09/21/2020 73 >60 mL/min/1.73 Final     Alkaline Phosphatase   Date Value Ref Range Status   09/21/2020 52 39 - 117 U/L Final   03/21/2018 36 25 - 100 U/L Final     Total Protein   Date Value Ref Range Status   09/21/2020 6.5 6.0 - 8.5 g/dL Final   03/21/2018 7.2 6.0 - 8.2 G/DL Final     ALT (SGPT)   Date Value Ref Range Status   09/21/2020 18 1 - 41 U/L Final   03/21/2018 37 10 - 40 U/L Final     AST (SGOT)   Date Value Ref Range Status   09/21/2020 27 1 - 40 U/L Final   03/21/2018 44 (H) 0 - 39 U/L Final     Total Bilirubin   Date Value Ref Range Status   09/21/2020 0.3 0.0 - 1.2 mg/dL Final   03/21/2018 1.1 0.3 - 1.2 MG/DL Final     Albumin   Date Value Ref Range Status   09/21/2020 4.40 3.50 - 5.20 g/dL Final   03/21/2018 4.4 3.4 - 4.8 G/DL Final     Globulin   Date Value Ref Range Status   09/21/2020 2.1 gm/dL Final     A/G Ratio   Date Value Ref Range Status   03/21/2018 1.6  Final     Lab Results   Component Value Date    WBC 9.20 09/21/2020    HGB 12.9 (L) 09/21/2020    HCT 41.0 09/21/2020    MCV 70.9 (L) 09/21/2020     09/21/2020     Lab Results   Component Value Date    NEUTROABS 7.31 (H) 09/21/2020    IRON 23 (L) 09/21/2020    TIBC 507 09/21/2020    LABIRON 5 (L) 09/21/2020    FERRITIN 11.93 (L) 09/21/2020     Lab Results   Component Value Date    AFPTM 2.2 04/10/2018    AFPTM 182 04/10/2018   ]        ASSESSMENT AND PLAN:       1. 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     2. Atrial fibrillation, pt was  taken off the Eliquis by his cardiologist and is currently taking baby ASA daily.      3. Health maintenance, he does not smoke; he has had a EGD and colonoscopy       This document has been signed by VIKASH Clinton on September 21, 2020 13:23 CDT

## 2020-10-12 ENCOUNTER — TELEPHONE (OUTPATIENT)
Dept: ONCOLOGY | Facility: HOSPITAL | Age: 45
End: 2020-10-12

## 2020-10-12 DIAGNOSIS — R19.5 HEME POSITIVE STOOL: Primary | ICD-10-CM

## 2020-10-12 LAB — HEMOCCULT STL QL: POSITIVE

## 2020-10-12 PROCEDURE — 82270 OCCULT BLOOD FECES: CPT | Performed by: NURSE PRACTITIONER

## 2020-10-12 NOTE — TELEPHONE ENCOUNTER
----- Message from VIKASH Clinton sent at 10/12/2020 10:26 AM CDT -----  He needs to be seen by GI for heme positive stool    Thanks  Brea

## 2020-10-13 ENCOUNTER — OFFICE VISIT (OUTPATIENT)
Dept: CARDIOLOGY | Facility: CLINIC | Age: 45
End: 2020-10-13

## 2020-10-13 VITALS
BODY MASS INDEX: 31.15 KG/M2 | OXYGEN SATURATION: 98 % | HEIGHT: 72 IN | WEIGHT: 230 LBS | SYSTOLIC BLOOD PRESSURE: 160 MMHG | DIASTOLIC BLOOD PRESSURE: 100 MMHG | HEART RATE: 71 BPM

## 2020-10-13 DIAGNOSIS — I10 ESSENTIAL HYPERTENSION: Chronic | ICD-10-CM

## 2020-10-13 DIAGNOSIS — I48.19 PERSISTENT ATRIAL FIBRILLATION (HCC): Primary | ICD-10-CM

## 2020-10-13 DIAGNOSIS — I48.92 ATRIAL FLUTTER WITH RAPID VENTRICULAR RESPONSE (HCC): ICD-10-CM

## 2020-10-13 DIAGNOSIS — R55 VASOVAGAL SYNCOPE: ICD-10-CM

## 2020-10-13 PROCEDURE — 99213 OFFICE O/P EST LOW 20 MIN: CPT | Performed by: INTERNAL MEDICINE

## 2020-10-13 PROCEDURE — 93000 ELECTROCARDIOGRAM COMPLETE: CPT | Performed by: INTERNAL MEDICINE

## 2020-10-13 RX ORDER — CARISOPRODOL 350 MG/1
350 TABLET ORAL 3 TIMES DAILY
COMMUNITY
Start: 2020-09-08 | End: 2022-05-24

## 2020-10-13 NOTE — PROGRESS NOTES
Stevenson Huynh Ranjana  1975  [phone]    10/13/2020    [facility] CARDIOLOGY     [place], Jacqueline Conte, APRN  [address] [place] [address]    Chief Complaint   Patient presents with   • Persistent atrial fibrillation (CMS/HCC)       Problem List:   1. Typical atrial flutter  a. Hospitilization 7/2016 for Aflutter in setting of phentermine - cardioverted with Corvert discharged on betablocker  b. Cardiolite stress test 8/2016 Negative for ischemia  c. Echo 7/2016: mild LVH, biatrial enlargement, EF 55%-60%  d. EPS with RFA atrial flutter 11/21/16 with Dr. Alaniz- [facility], [place], [place], recurrence with ECV and initiation of Amiodarone  e. Recurrence with repeat EPS 12/16/17: with bidirectional block documented, Dr. Alaniz  f. RFA of RA flutter 5/2/2017     2. AF   A.  CHADS-Vasc 0 on Eliquis.  B.  Cryoablation of pulmonary veins, 8/3/2017,  Severe LV dysfunction with LV EF of < 25% by ICE under general anesthesia noted. Transient periods of hypotension associated with drop in O2 sat from 100-92% requiring IV Mundo-Synephrine as well as dopamine with resolution following discontinuation of general anesthesia  C. Echo 9/6/2017: EF 50-55%. Trace MR and TR.  3. CKD  g. BL Renal US 10/11/2017, Increased echogenicity of kidneys, compatible with medical renal disease. No hydronephrosis.  h. Negative abdominal US, 10/20/2017  4    HTN  5.   Anxiety/Depression  6    UC - diagnosed at age 23, but not being treated currently  7    Obesity              8    ETOH abuse  9    DONNIE- non compliant  10    Polycytemia: plhebotomy as needed  11  Surgical Hx:  i. Right shoulder  j. Tonsillectomy  k. Colon resection    Allergies  Allergies   Allergen Reactions   • Contrast Dye Anaphylaxis     ANAPHYLAXIS   • Iodinated Diagnostic Agents Anaphylaxis   • Morphine Nausea And Vomiting   • Zofran [Ondansetron Hcl] Other (See Comments)     IV gives hiccups       Current  Medications    Current Outpatient Medications:   •  amLODIPine (NORVASC) 5 MG tablet, Take 1 tablet by mouth Daily., Disp: 15 tablet, Rfl: 0  •  aspirin 81 MG EC tablet, Take 81 mg by mouth Daily., Disp: , Rfl:   •  B Complex-C (SUPER B COMPLEX PO), Take 1 tablet by mouth Daily., Disp: , Rfl:   •  carisoprodol (SOMA) 350 MG tablet, Take 350 mg by mouth 3 (Three) Times a Day., Disp: , Rfl:   •  carvedilol (COREG) 6.25 MG tablet, Take 1 tablet by mouth Daily., Disp: 30 tablet, Rfl: 6  •  clonazePAM (KlonoPIN) 1 MG tablet, Take 1 mg by mouth 2 (Two) Times a Day As Needed for Anxiety., Disp: , Rfl:   •  DULoxetine (CYMBALTA) 60 MG capsule, Take 60 mg by mouth Every Night., Disp: , Rfl: 2  •  ferrous sulfate 325 (65 FE) MG tablet, Take 1 tablet by mouth 2 (Two) Times a Day With Meals., Disp: 60 tablet, Rfl: 3  •  magnesium oxide (MAGOX) 400 (241.3 MG) MG tablet tablet, Take 400 mg by mouth 2 (Two) Times a Day., Disp: , Rfl:   •  mirtazapine (REMERON) 15 MG tablet, Take 15 mg by mouth Every Night., Disp: , Rfl:   •  pantoprazole (PROTONIX) 40 MG EC tablet, Take 1 tablet by mouth Daily. (Patient taking differently: Take 40 mg by mouth 2 (two) times a day.), Disp: 30 tablet, Rfl: 5  •  tiZANidine (ZANAFLEX) 4 MG tablet, , Disp: , Rfl:     History of Present Illness     Pt presents for follow up of AF/AFL/HTN. Since we last saw the pt, pt denies any AF episodes, SOB, CP, LH, and dizziness. Denies any  ER visits, or TIA/CVA symptoms.  Blood pressure at home have been well controlled running approximately 130/80 per the patient.  He has had no further vasovagal or syncopal episodes.  He did have neck surgery by neurosurgery in June of this year at [facility] due to chronic pain and numbness which has significantly improved.  Has recently noticed some blood in his stools which is being evaluated by his GI doctor and history of ulcerative colitis.    ROS:  General:  Denies fatigue, weight gain or  "loss  Cardiovascular:  Denies CP, PND, syncope, near syncope, edema or palpitations.  Pulmonary:  Denies HOLLINS, cough, or wheezing      Vitals:    10/13/20 1138   BP: 160/100   BP Location: Right arm   Patient Position: Sitting   Pulse: 71   SpO2: 98%   Weight: 104 kg (230 lb)   Height: 182.9 cm (72\")     Body mass index is 31.19 kg/m².  PE:  General: NAD  Neck: no JVD, no carotid bruits, no TM  Heart RRR, NL S1, S2, S4 present, no rubs, murmurs  Lungs: CTA, no wheezes, rhonchi, or rales  Abd: soft, non-tender, NL BS  Ext: No musculoskeletal deformities, no edema, cyanosis, or clubbing  Psych: normal mood and affect    Diagnostic Data:        ECG 12 Lead    Date/Time: 10/13/2020 12:09 PM  Performed by: Ramirez Reza MD  Authorized by: Ramirez Reza MD   Comparison: compared with previous ECG from 1/8/2020  Similar to previous ECG  Rhythm: sinus rhythm  BPM: 70             1. Persistent atrial fibrillation (CMS/HCC)    2. Essential hypertension    3. Vasovagal syncope                  Plan:  1) Persistent atrial fibrillation:  - S/p PVA in 2017.    No recurrent symptoms maintaining sinus rhythm off antiarrhythmic medications.  2) Anticoagulation:  - Continue ASA  3) HTN: Monitor at home.  - Wt loss, exercise, salt reduction  4) Vasovagal syncope: resolved    F/up in 12 months      "

## 2020-10-15 ENCOUNTER — OFFICE VISIT (OUTPATIENT)
Dept: GASTROENTEROLOGY | Facility: CLINIC | Age: 45
End: 2020-10-15

## 2020-10-15 VITALS
HEIGHT: 72 IN | WEIGHT: 229.4 LBS | SYSTOLIC BLOOD PRESSURE: 140 MMHG | BODY MASS INDEX: 31.07 KG/M2 | HEART RATE: 84 BPM | DIASTOLIC BLOOD PRESSURE: 90 MMHG

## 2020-10-15 DIAGNOSIS — D50.8 OTHER IRON DEFICIENCY ANEMIA: ICD-10-CM

## 2020-10-15 DIAGNOSIS — K92.1 BLOOD IN STOOL: Primary | ICD-10-CM

## 2020-10-15 PROBLEM — D64.9 ABSOLUTE ANEMIA: Status: ACTIVE | Noted: 2020-10-15

## 2020-10-15 PROCEDURE — 99214 OFFICE O/P EST MOD 30 MIN: CPT | Performed by: INTERNAL MEDICINE

## 2020-10-15 RX ORDER — DEXTROSE AND SODIUM CHLORIDE 5; .45 G/100ML; G/100ML
30 INJECTION, SOLUTION INTRAVENOUS CONTINUOUS PRN
Status: CANCELLED | OUTPATIENT
Start: 2020-10-28

## 2020-10-15 NOTE — PROGRESS NOTES
[facility]      Chief Complaint:   Chief Complaint   Patient presents with   • Positive stool       Subjective     HPI:   Patient with diverticular disease in the past and ulcer in the distal esophagus.  Patient has been doing well until recently he developed an iron deficiency anemia with heme positive stools.  Patient denies any abdominal pain nausea vomiting.  Patient is continuing taking a proton pump inhibitor.    Plan; schedule patient for EGD and colonoscopy to evaluate for the source of the bleeding.  Discussed with patient taking tablespoon of Benefiber daily.  We will do biopsies of the colon to evaluate for possible ulcerative colitis.  Patient to supplement the iron.    Past Medical History:   Past Medical History:   Diagnosis Date   • Alcoholism /alcohol abuse (CMS/HCC)    • Anxiety    • Arthritis     shoulders and knees   • Atrial fibrillation (CMS/HCC)    • Atrial flutter (CMS/HCC)    • Depression    • Diverticulitis    • Esophagitis    • Gastritis    • GERD (gastroesophageal reflux disease)    • Hypertension    • Kidney stone    • Pancreatitis     patient denies   • Sleep apnea     has c-pap       Past Surgical History:  Past Surgical History:   Procedure Laterality Date   • BACK SURGERY     • CARDIAC ELECTROPHYSIOLOGY PROCEDURE N/A 5/2/2017    Procedure: Ablation atrial flutter;  Surgeon: Ramirez Reza MD;  Location: [facility] INVASIVE LOCATION;  Service:    • CARDIAC ELECTROPHYSIOLOGY PROCEDURE N/A 9/5/2017    Procedure: Ablation atrial fibrillation PVA ;  Surgeon: Ramirez Reza MD;  Location: [facility] EP INVASIVE LOCATION;  Service:    • COLON RESECTION Left 12/20/2018    Procedure: LAPAROSCOPIC SIGMOID  RESECTION, TAKE DOWN OF SPLENIC FLEXURE;  Surgeon: Michel Lala MD;  Location: [facility] OR;  Service: General   • COLONOSCOPY     • COLONOSCOPY N/A 5/30/2018    Procedure: COLONOSCOPY;  Surgeon: Edwin Medina MD;  Location: [facility] ENDOSCOPY;  Service:  Gastroenterology   • ENDOSCOPY N/A 3/30/2018    Procedure: ESOPHAGOGASTRODUODENOSCOPY;  Surgeon: Edwin Medina MD;  Location: [facility] ENDOSCOPY;  Service: Gastroenterology   • ENDOSCOPY N/A 5/30/2018    Procedure: ESOPHAGOGASTRODUODENOSCOPY;  Surgeon: Edwin Medina MD;  Location: [facility] ENDOSCOPY;  Service: Gastroenterology   • ENDOSCOPY N/A 12/3/2018    Procedure: ESOPHAGOGASTRODUODENOSCOPY;  Surgeon: Edwin Medina MD;  Location: [facility] ENDOSCOPY;  Service: Gastroenterology   • ENDOSCOPY N/A 10/22/2019    Procedure: ESOPHAGOGASTRODUODENOSCOPY;  Surgeon: Edwin Medina MD;  Location: [facility] ENDOSCOPY;  Service: Gastroenterology   • SHOULDER SURGERY Right 2014   • TONSILLECTOMY  12/2015   • UPPER GASTROINTESTINAL ENDOSCOPY  03/30/2018   • UPPER GASTROINTESTINAL ENDOSCOPY     • UPPER GASTROINTESTINAL ENDOSCOPY  05/30/2018   • UPPER GASTROINTESTINAL ENDOSCOPY  10/22/2019       Family History:  Family History   Problem Relation Age of Onset   • Heart disease Father    • Depression Father    • Cancer Maternal Grandmother         lung   • Cancer Paternal Grandmother         pancreatic   • Heart disease Paternal Grandfather        Social History:   reports that he has never smoked. He has never used smokeless tobacco. He reports current drug use. Frequency: 7.00 times per week. Drug: Marijuana. He reports that he does not drink alcohol.    Medications:   Prior to Admission medications    Medication Sig Start Date End Date Taking? Authorizing Provider   amLODIPine (NORVASC) 5 MG tablet Take 1 tablet by mouth Daily. 11/5/19  Yes Jeffrey Bernabe MD   aspirin 81 MG EC tablet Take 81 mg by mouth Daily.   Yes ProviderIdalmis MD   B Complex-C (SUPER B COMPLEX PO) Take 1 tablet by mouth Daily.   Yes ProviderIdalmis MD   carisoprodol (SOMA) 350 MG tablet Take 350 mg by mouth 3 (Three) Times a Day. 9/8/20  Yes Idalmis Medina MD   carvedilol (COREG) 6.25 MG tablet Take 1 tablet by mouth Daily. 3/9/20  Yes  Ramirez Reza MD   clonazePAM (KlonoPIN) 1 MG tablet Take 1 mg by mouth 2 (Two) Times a Day As Needed for Anxiety.   Yes Idalmis Medina MD   DULoxetine (CYMBALTA) 60 MG capsule Take 60 mg by mouth Every Night. 8/21/18  Yes Idalmis Medina MD   ferrous sulfate 325 (65 FE) MG tablet Take 1 tablet by mouth 2 (Two) Times a Day With Meals. 9/21/20  Yes Brea Kerns APRN   magnesium oxide (MAGOX) 400 (241.3 MG) MG tablet tablet Take 400 mg by mouth 2 (Two) Times a Day.   Yes Idalmis Medina MD   mirtazapine (REMERON) 15 MG tablet Take 15 mg by mouth Every Night.   Yes Idalmis Medina MD   pantoprazole (PROTONIX) 40 MG EC tablet Take 1 tablet by mouth Daily.  Patient taking differently: Take 40 mg by mouth 2 (two) times a day. 7/31/20  Yes Edwin Medina MD   tiZANidine (ZANAFLEX) 4 MG tablet  12/11/19  Yes Idalmis Medina MD   polyethylene glycol (GoLYTELY) 236 g solution Starting at noon on day prior to procedure, drink 8 ounces every 30 minutes until all gone or stools are clear. May add flavor packet. 10/15/20   Edwin Medina MD       Allergies:  Contrast dye, Iodinated diagnostic agents, Morphine, and Zofran [ondansetron hcl]    ROS:    Review of Systems   Constitutional: Negative for activity change, appetite change and unexpected weight change.   HENT: Negative for congestion, sore throat and trouble swallowing.    Respiratory: Negative for cough, choking and shortness of breath.    Cardiovascular: Negative for chest pain.   Gastrointestinal: Negative for abdominal distention, abdominal pain, anal bleeding, blood in stool, constipation, diarrhea, nausea, rectal pain and vomiting.   Endocrine: Negative for heat intolerance, polydipsia and polyphagia.   Genitourinary: Negative for difficulty urinating.   Musculoskeletal: Negative for arthralgias.   Skin: Negative for color change, pallor, rash and wound.   Allergic/Immunologic: Negative for food allergies.   Neurological:  "Negative for dizziness, syncope, weakness and headaches.   Psychiatric/Behavioral: Negative for agitation, behavioral problems, confusion and decreased concentration.     Objective     Blood pressure 140/90, pulse 84, height 182.9 cm (72.01\"), weight 104 kg (229 lb 6.4 oz).    Physical Exam  Constitutional:       General: He is not in acute distress.     Appearance: He is well-developed. He is not diaphoretic.   HENT:      Head: Normocephalic and atraumatic.   Cardiovascular:      Rate and Rhythm: Normal rate and regular rhythm.      Heart sounds: Normal heart sounds. No murmur. No friction rub. No gallop.    Pulmonary:      Effort: No respiratory distress.      Breath sounds: Normal breath sounds. No wheezing or rales.   Chest:      Chest wall: No tenderness.   Abdominal:      General: Bowel sounds are normal. There is no distension.      Palpations: Abdomen is soft. There is no mass.      Tenderness: There is no abdominal tenderness. There is no guarding or rebound.      Hernia: No hernia is present.   Musculoskeletal: Normal range of motion.   Skin:     General: Skin is warm and dry.      Coloration: Skin is not pale.      Findings: No erythema or rash.   Neurological:      Mental Status: He is alert and oriented to person, place, and time.   Psychiatric:         Behavior: Behavior normal.         Thought Content: Thought content normal.         Judgment: Judgment normal.          Assessment/Plan   Diagnoses and all orders for this visit:    1. Blood in stool (Primary)  -     Case Request; Standing  -     dextrose 5 % and sodium chloride 0.45 % infusion  -     Case Request    2. Other iron deficiency anemia  -     Case Request; Standing  -     dextrose 5 % and sodium chloride 0.45 % infusion  -     Case Request    Other orders  -     Follow Anesthesia Guidelines / Standing Orders; Future  -     Obtain Informed Consent; Future  -     Implement Anesthesia Orders Day of Procedure; Standing  -     Obtain Informed " Consent; Standing  -     POC Glucose Once; Standing  -     Insert Peripheral IV; Standing  -     polyethylene glycol (GoLYTELY) 236 g solution; Starting at noon on day prior to procedure, drink 8 ounces every 30 minutes until all gone or stools are clear. May add flavor packet.  Dispense: 4000 mL; Refill: 0        ESOPHAGOGASTRODUODENOSCOPY (N/A), COLONOSCOPY (N/A)     Diagnosis Plan   1. Blood in stool  Case Request    dextrose 5 % and sodium chloride 0.45 % infusion    Case Request   2. Other iron deficiency anemia  Case Request    dextrose 5 % and sodium chloride 0.45 % infusion    Case Request       Anticipated Surgical Procedure:  Orders Placed This Encounter   Procedures   • Follow Anesthesia Guidelines / Standing Orders     Standing Status:   Future   • Obtain Informed Consent     Standing Status:   Future     Order Specific Question:   Informed Consent Given For     Answer:   egd and colonoscopy       The risks, benefits, and alternatives of this procedure have been discussed with the patient or the responsible party- the patient understands and agrees to proceed.

## 2020-10-15 NOTE — PATIENT INSTRUCTIONS
"BMI for Adults  What is BMI?  Body mass index (BMI) is a number that is calculated from a person's weight and height. BMI can help estimate how much of a person's weight is composed of fat. BMI does not measure body fat directly. Rather, it is an alternative to procedures that directly measure body fat, which can be difficult and expensive.  BMI can help identify people who may be at higher risk for certain medical problems.  What are BMI measurements used for?  BMI is used as a screening tool to identify possible weight problems. It helps determine whether a person is obese, overweight, a healthy weight, or underweight.  BMI is useful for:  · Identifying a weight problem that may be related to a medical condition or may increase the risk for medical problems.  · Promoting changes, such as changes in diet and exercise, to help reach a healthy weight. BMI screening can be repeated to see if these changes are working.  How is BMI calculated?  BMI involves measuring your weight in relation to your height. Both height and weight are measured, and the BMI is calculated from those numbers. This can be done either in English ([place] or metric measurements. Note that charts and online BMI calculators are available to help you find your BMI quickly and easily without having to do these calculations yourself.  To calculate your BMI in English ([place] measurements:    1. Measure your weight in pounds (lb).  2. Multiply the number of pounds by 703.  ? For example, for a person who weighs 180 lb, multiply that number by 703, which equals 126,540.  3. Measure your height in inches. Then multiply that number by itself to get a measurement called \"inches squared.\"  ? For example, for a person who is 70 inches tall, the \"inches squared\" measurement is 70 inches x 70 inches, which equals 4,900 inches squared.  4. Divide the total from step 2 (number of lb x 703) by the total from step 3 (inches squared): 126,540 ÷ 4,900 = 25.8. This is " "your BMI.  To calculate your BMI in metric measurements:  1. Measure your weight in kilograms (kg).  2. Measure your height in meters (m). Then multiply that number by itself to get a measurement called \"meters squared.\"  ? For example, for a person who is 1.75 m tall, the \"meters squared\" measurement is 1.75 m x 1.75 m, which is equal to 3.1 meters squared.  3. Divide the number of kilograms (your weight) by the meters squared number. In this example: 70 ÷ 3.1 = 22.6. This is your BMI.  What do the results mean?  BMI charts are used to identify whether you are underweight, normal weight, overweight, or obese. The following guidelines will be used:  · Underweight: BMI less than 18.5.  · Normal weight: BMI between 18.5 and 24.9.  · Overweight: BMI between 25 and 29.9.  · Obese: BMI of 30 or above.  Keep these notes in mind:  · Weight includes both fat and muscle, so someone with a muscular build, such as an athlete, may have a BMI that is higher than 24.9. In cases like these, BMI is not an accurate measure of body fat.  · To determine if excess body fat is the cause of a BMI of 25 or higher, further assessments may need to be done by a health care provider.  · BMI is usually interpreted in the same way for men and women.  Where to find more information  For more information about BMI, including tools to quickly calculate your BMI, go to these websites:  · Centers for Disease Control and Prevention: www.cdc.gov  · American Heart Association: www.heart.org  · National Heart, Lung, and Blood [place]: www.nhlbi.nih.gov  Summary  · Body mass index (BMI) is a number that is calculated from a person's weight and height.  · BMI may help estimate how much of a person's weight is composed of fat. BMI can help identify those who may be at higher risk for certain medical problems.  · BMI can be measured using English measurements or metric measurements.  · BMI charts are used to identify whether you are underweight, normal " weight, overweight, or obese.  This information is not intended to replace advice given to you by your health care provider. Make sure you discuss any questions you have with your health care provider.  Document Released: 08/29/2005 Document Revised: 09/09/2020 Document Reviewed: 07/17/2020  Elsevier Patient Education © 2020 [employer]

## 2020-10-25 ENCOUNTER — LAB (OUTPATIENT)
Dept: LAB | Facility: HOSPITAL | Age: 45
End: 2020-10-25

## 2020-10-25 DIAGNOSIS — Z01.818 PREOP TESTING: Primary | ICD-10-CM

## 2020-10-25 LAB — SARS-COV-2 N GENE RESP QL NAA+PROBE: NOT DETECTED

## 2020-10-25 PROCEDURE — C9803 HOPD COVID-19 SPEC COLLECT: HCPCS

## 2020-10-25 PROCEDURE — 87635 SARS-COV-2 COVID-19 AMP PRB: CPT

## 2020-10-27 RX ORDER — TRAZODONE HYDROCHLORIDE 50 MG/1
50 TABLET ORAL AS NEEDED
COMMUNITY

## 2020-10-27 RX ORDER — PANTOPRAZOLE SODIUM 40 MG/1
40 TABLET, DELAYED RELEASE ORAL 2 TIMES DAILY
COMMUNITY
End: 2020-11-06 | Stop reason: SDUPTHER

## 2020-10-28 ENCOUNTER — ANESTHESIA EVENT (OUTPATIENT)
Dept: GASTROENTEROLOGY | Facility: HOSPITAL | Age: 45
End: 2020-10-28

## 2020-10-28 ENCOUNTER — ANESTHESIA (OUTPATIENT)
Dept: GASTROENTEROLOGY | Facility: HOSPITAL | Age: 45
End: 2020-10-28

## 2020-10-28 ENCOUNTER — HOSPITAL ENCOUNTER (OUTPATIENT)
Facility: HOSPITAL | Age: 45
Setting detail: HOSPITAL OUTPATIENT SURGERY
Discharge: HOME | End: 2020-10-28
Attending: INTERNAL MEDICINE | Admitting: INTERNAL MEDICINE

## 2020-10-28 VITALS
SYSTOLIC BLOOD PRESSURE: 115 MMHG | RESPIRATION RATE: 20 BRPM | TEMPERATURE: 97.5 F | OXYGEN SATURATION: 95 % | HEIGHT: 72 IN | BODY MASS INDEX: 30.82 KG/M2 | DIASTOLIC BLOOD PRESSURE: 56 MMHG | HEART RATE: 84 BPM | WEIGHT: 227.51 LBS

## 2020-10-28 DIAGNOSIS — D50.8 OTHER IRON DEFICIENCY ANEMIA: ICD-10-CM

## 2020-10-28 DIAGNOSIS — K92.1 BLOOD IN STOOL: ICD-10-CM

## 2020-10-28 PROCEDURE — 45380 COLONOSCOPY AND BIOPSY: CPT | Performed by: INTERNAL MEDICINE

## 2020-10-28 PROCEDURE — 43239 EGD BIOPSY SINGLE/MULTIPLE: CPT | Performed by: INTERNAL MEDICINE

## 2020-10-28 PROCEDURE — 25010000002 PROPOFOL 10 MG/ML EMULSION: Performed by: NURSE ANESTHETIST, CERTIFIED REGISTERED

## 2020-10-28 RX ORDER — LIDOCAINE HYDROCHLORIDE 20 MG/ML
INJECTION, SOLUTION EPIDURAL; INFILTRATION; INTRACAUDAL; PERINEURAL AS NEEDED
Status: DISCONTINUED | OUTPATIENT
Start: 2020-10-28 | End: 2020-10-28 | Stop reason: SURG

## 2020-10-28 RX ORDER — PROPOFOL 10 MG/ML
VIAL (ML) INTRAVENOUS AS NEEDED
Status: DISCONTINUED | OUTPATIENT
Start: 2020-10-28 | End: 2020-10-28 | Stop reason: SURG

## 2020-10-28 RX ORDER — DEXTROSE AND SODIUM CHLORIDE 5; .45 G/100ML; G/100ML
30 INJECTION, SOLUTION INTRAVENOUS CONTINUOUS PRN
Status: DISCONTINUED | OUTPATIENT
Start: 2020-10-28 | End: 2020-10-28 | Stop reason: HOSPADM

## 2020-10-28 RX ADMIN — PROPOFOL 40 MG: 10 INJECTION, EMULSION INTRAVENOUS at 11:13

## 2020-10-28 RX ADMIN — PROPOFOL 40 MG: 10 INJECTION, EMULSION INTRAVENOUS at 11:15

## 2020-10-28 RX ADMIN — PROPOFOL 30 MG: 10 INJECTION, EMULSION INTRAVENOUS at 11:10

## 2020-10-28 RX ADMIN — DEXTROSE AND SODIUM CHLORIDE 30 ML/HR: 5; 450 INJECTION, SOLUTION INTRAVENOUS at 09:56

## 2020-10-28 RX ADMIN — PROPOFOL 120 MG: 10 INJECTION, EMULSION INTRAVENOUS at 11:07

## 2020-10-28 RX ADMIN — PROPOFOL 20 MG: 10 INJECTION, EMULSION INTRAVENOUS at 11:09

## 2020-10-28 RX ADMIN — PROPOFOL 50 MG: 10 INJECTION, EMULSION INTRAVENOUS at 11:18

## 2020-10-28 RX ADMIN — LIDOCAINE HYDROCHLORIDE 60 MG: 20 INJECTION, SOLUTION EPIDURAL; INFILTRATION; INTRACAUDAL; PERINEURAL at 11:07

## 2020-10-28 NOTE — ANESTHESIA POSTPROCEDURE EVALUATION
Patient: Stevenson Chilel    Procedure Summary     Date: 10/28/20 Room / Location: [facility] ENDOSCOPY 1 / [facility] ENDOSCOPY    Anesthesia Start: 1106 Anesthesia Stop: 1119    Procedures:       ESOPHAGOGASTRODUODENOSCOPY (N/A )      COLONOSCOPY (N/A ) Diagnosis:       Blood in stool      Other iron deficiency anemia      (Blood in stool [K92.1])      (Other iron deficiency anemia [D50.8])    Surgeon: Edwin Medina MD Provider: Darline Hill CRNA    Anesthesia Type: MAC ASA Status: 3          Anesthesia Type: MAC    Vitals  No vitals data found for the desired time range.          Post Anesthesia Care and Evaluation    Patient location during evaluation: bedside  Patient participation: complete - patient participated  Level of consciousness: awake and awake and alert  Pain score: 0  Pain management: satisfactory to patient  Airway patency: patent  Anesthetic complications: No anesthetic complications  PONV Status: none  Cardiovascular status: acceptable and stable  Respiratory status: acceptable, room air and spontaneous ventilation  Hydration status: acceptable

## 2020-10-28 NOTE — ANESTHESIA PREPROCEDURE EVALUATION
Anesthesia Evaluation     Patient summary reviewed and Nursing notes reviewed   NPO Solid Status: > 8 hours  NPO Liquid Status: > 2 hours           Airway   Mallampati: II  TM distance: >3 FB  Neck ROM: full  Possible difficult intubation and Large neck circumference  Dental - normal exam     Pulmonary - normal exam   (+) a smoker Former, COPD mild, sleep apnea,   Cardiovascular - normal exam  Exercise tolerance: good (4-7 METS)    (+) hypertension well controlled less than 2 medications, dysrhythmias Atrial Fib, Atrial Flutter,     ROS comment: · Left atrial cavity size is mildly dilated.  · Left ventricular systolic function is low normal.  · Estimated ejection fraction 50-55%.  · Trace mitral regurgitation, trace tricuspid regurgitant    Neuro/Psych  (+) syncope, psychiatric history Depression and Anxiety,     GI/Hepatic/Renal/Endo    (+) obesity,  hiatal hernia, GERD poorly controlled, GI bleeding , liver disease history of elevated LFT, renal disease ARF and stones,     Musculoskeletal     Abdominal   (+) obese,    Substance History   (+) alcohol use (Alcoholism /alcohol abuse (CMS/HCC), drug use (marijuana)      Comment: MJ QD   OB/GYN          Other   arthritis,                        Anesthesia Plan    ASA 3     MAC     intravenous induction     Anesthetic plan, all risks, benefits, and alternatives have been provided, discussed and informed consent has been obtained with: patient.

## 2020-10-29 LAB
LAB AP CASE REPORT: NORMAL
PATH REPORT.FINAL DX SPEC: NORMAL

## 2020-11-06 ENCOUNTER — OFFICE VISIT (OUTPATIENT)
Dept: GASTROENTEROLOGY | Facility: CLINIC | Age: 45
End: 2020-11-06

## 2020-11-06 VITALS
HEART RATE: 87 BPM | SYSTOLIC BLOOD PRESSURE: 145 MMHG | BODY MASS INDEX: 30.75 KG/M2 | DIASTOLIC BLOOD PRESSURE: 81 MMHG | WEIGHT: 227 LBS | HEIGHT: 72 IN

## 2020-11-06 DIAGNOSIS — K29.71 GASTRITIS WITH HEMORRHAGE, UNSPECIFIED CHRONICITY, UNSPECIFIED GASTRITIS TYPE: Primary | ICD-10-CM

## 2020-11-06 PROCEDURE — 99214 OFFICE O/P EST MOD 30 MIN: CPT | Performed by: INTERNAL MEDICINE

## 2020-11-06 RX ORDER — PANTOPRAZOLE SODIUM 40 MG/1
40 TABLET, DELAYED RELEASE ORAL 2 TIMES DAILY
Qty: 60 TABLET | Refills: 5 | Status: SHIPPED | OUTPATIENT
Start: 2020-11-06 | End: 2022-05-24

## 2020-11-06 NOTE — PROGRESS NOTES
[facility]      Chief Complaint:   Chief Complaint   Patient presents with   • Follow-up     after endo       Subjective     HPI:   Patient with history of blood in the stool.  Patient denies any nausea vomiting or abdominal pain at this time or blood in the stool.  Patient recently underwent EGD and colonoscopy which showed  internal and external hemorrhoids otherwise normal EGD showed mild gastritis biopsies were normal.  Patient shows no source of GI bleeding.    Plan; patient to continue on Protonix twice a day as this is what relieves his abdominal pain.  Patient to follow-up in 6 months          Past Medical History:   Past Medical History:   Diagnosis Date   • Alcoholism /alcohol abuse (CMS/HCC)    • Anemia    • Anxiety    • Arthritis     shoulders and knees   • Atrial fibrillation (CMS/HCC)    • Atrial flutter (CMS/HCC)    • Depression    • Diverticulitis    • Esophagitis    • Gastritis    • GERD (gastroesophageal reflux disease)    • Hypertension    • Kidney stone    • Pancreatitis     patient denies   • Sleep apnea     has c-pap       Past Surgical History:    Past Surgical History:   Procedure Laterality Date   • BACK SURGERY     • CARDIAC ELECTROPHYSIOLOGY PROCEDURE N/A 5/2/2017    Procedure: Ablation atrial flutter;  Surgeon: Ramirez Reza MD;  Location: [facility] EP INVASIVE LOCATION;  Service:    • CARDIAC ELECTROPHYSIOLOGY PROCEDURE N/A 9/5/2017    Procedure: Ablation atrial fibrillation PVA ;  Surgeon: Ramirez Reza MD;  Location: [facility] EP INVASIVE LOCATION;  Service:    • COLON RESECTION Left 12/20/2018    Procedure: LAPAROSCOPIC SIGMOID  RESECTION, TAKE DOWN OF SPLENIC FLEXURE;  Surgeon: Michel Lala MD;  Location: [facility] OR;  Service: General   • COLONOSCOPY     • COLONOSCOPY N/A 5/30/2018    Procedure: COLONOSCOPY;  Surgeon: Edwin Medina MD;  Location: [facility] ENDOSCOPY;  Service: Gastroenterology   • COLONOSCOPY N/A 10/28/2020    Procedure: COLONOSCOPY;   Surgeon: Edwin Medina MD;  Location: [facility] ENDOSCOPY;  Service: Gastroenterology;  Laterality: N/A;   • ENDOSCOPY N/A 3/30/2018    Procedure: ESOPHAGOGASTRODUODENOSCOPY;  Surgeon: Edwin Medina MD;  Location: [facility] ENDOSCOPY;  Service: Gastroenterology   • ENDOSCOPY N/A 5/30/2018    Procedure: ESOPHAGOGASTRODUODENOSCOPY;  Surgeon: Edwin Medina MD;  Location: [facility] ENDOSCOPY;  Service: Gastroenterology   • ENDOSCOPY N/A 12/3/2018    Procedure: ESOPHAGOGASTRODUODENOSCOPY;  Surgeon: Edwin Medina MD;  Location: [facility] ENDOSCOPY;  Service: Gastroenterology   • ENDOSCOPY N/A 10/22/2019    Procedure: ESOPHAGOGASTRODUODENOSCOPY;  Surgeon: Edwin Medina MD;  Location: [facility] ENDOSCOPY;  Service: Gastroenterology   • ENDOSCOPY N/A 10/28/2020    Procedure: ESOPHAGOGASTRODUODENOSCOPY;  Surgeon: Edwin Medina MD;  Location: [facility] ENDOSCOPY;  Service: Gastroenterology;  Laterality: N/A;   • SHOULDER SURGERY Right 2014   • TONSILLECTOMY  12/2015   • UPPER GASTROINTESTINAL ENDOSCOPY  03/30/2018   • UPPER GASTROINTESTINAL ENDOSCOPY     • UPPER GASTROINTESTINAL ENDOSCOPY  05/30/2018   • UPPER GASTROINTESTINAL ENDOSCOPY  10/22/2019   • WISDOM TOOTH EXTRACTION         Family History:  Family History   Problem Relation Age of Onset   • Heart disease Father    • Depression Father    • Cancer Maternal Grandmother         lung   • Cancer Paternal Grandmother         pancreatic   • Heart disease Paternal Grandfather        Social History:   reports that he has never smoked. He has never used smokeless tobacco. He reports previous alcohol use. He reports current drug use. Frequency: 7.00 times per week. Drug: Marijuana.    Medications:   Prior to Admission medications    Medication Sig Start Date End Date Taking? Authorizing Provider   aspirin 81 MG EC tablet Take 81 mg by mouth Daily.   Yes ProviderIdalmis MD   B Complex-C (SUPER B COMPLEX PO) Take 1 tablet by mouth Daily.   Yes ProviderIdalmis MD    carisoprodol (SOMA) 350 MG tablet Take 350 mg by mouth 3 (Three) Times a Day. 9/8/20  Yes Idalmis Medina MD   carvedilol (COREG) 6.25 MG tablet Take 1 tablet by mouth Daily. 3/9/20  Yes Raimrez Reza MD   DULoxetine (CYMBALTA) 60 MG capsule Take 60 mg by mouth Every Night. 8/21/18  Yes Idalmis Medina MD   ferrous sulfate 325 (65 FE) MG tablet Take 1 tablet by mouth 2 (Two) Times a Day With Meals. 9/21/20  Yes Brea Kerns APRN   magnesium oxide (MAGOX) 400 (241.3 MG) MG tablet tablet Take 400 mg by mouth 2 (Two) Times a Day.   Yes Idalmis Medina MD   mirtazapine (REMERON) 15 MG tablet Take 15 mg by mouth Every Night.   Yes Idalmis Medina MD   pantoprazole (PROTONIX) 40 MG EC tablet Take 1 tablet by mouth 2 (Two) Times a Day. 11/6/20  Yes Edwin Medina MD   traZODone (DESYREL) 50 MG tablet Take 50 mg by mouth Every Night.   Yes Idalmis Medina MD   pantoprazole (PROTONIX) 40 MG EC tablet Take 40 mg by mouth 2 (Two) Times a Day.  11/6/20 Yes Idalmis Medina MD       Allergies:  Contrast dye, Iodinated diagnostic agents, Morphine, and Zofran [ondansetron hcl]    ROS:    Review of Systems   Constitutional: Negative for activity change, appetite change and unexpected weight change.   HENT: Negative for congestion, sore throat and trouble swallowing.    Respiratory: Negative for cough, choking and shortness of breath.    Cardiovascular: Negative for chest pain.   Gastrointestinal: Negative for abdominal distention, abdominal pain, anal bleeding, blood in stool, constipation, diarrhea, nausea, rectal pain and vomiting.   Endocrine: Negative for heat intolerance, polydipsia and polyphagia.   Genitourinary: Negative for difficulty urinating.   Musculoskeletal: Negative for arthralgias.   Skin: Negative for color change, pallor, rash and wound.   Allergic/Immunologic: Negative for food allergies.   Neurological: Negative for dizziness, syncope, weakness and headaches.  "  Psychiatric/Behavioral: Negative for agitation, behavioral problems, confusion and decreased concentration.     Objective     Blood pressure 145/81, pulse 87, height 182.9 cm (72.01\"), weight 103 kg (227 lb).    Physical Exam  Constitutional:       General: He is not in acute distress.     Appearance: He is well-developed. He is not diaphoretic.   HENT:      Head: Normocephalic and atraumatic.   Cardiovascular:      Rate and Rhythm: Normal rate and regular rhythm.      Heart sounds: Normal heart sounds. No murmur. No friction rub. No gallop.    Pulmonary:      Effort: No respiratory distress.      Breath sounds: Normal breath sounds. No wheezing or rales.   Chest:      Chest wall: No tenderness.   Abdominal:      General: Bowel sounds are normal. There is no distension.      Palpations: Abdomen is soft. There is no mass.      Tenderness: There is no abdominal tenderness. There is no guarding or rebound.      Hernia: No hernia is present.   Musculoskeletal: Normal range of motion.   Skin:     General: Skin is warm and dry.      Coloration: Skin is not pale.      Findings: No erythema or rash.   Neurological:      Mental Status: He is alert and oriented to person, place, and time.   Psychiatric:         Behavior: Behavior normal.         Thought Content: Thought content normal.         Judgment: Judgment normal.          Assessment/Plan   Diagnoses and all orders for this visit:    1. Gastritis with hemorrhage, unspecified chronicity, unspecified gastritis type (Primary)    Other orders  -     pantoprazole (PROTONIX) 40 MG EC tablet; Take 1 tablet by mouth 2 (Two) Times a Day.  Dispense: 60 tablet; Refill: 5        * Surgery not found *     Diagnosis Plan   1. Gastritis with hemorrhage, unspecified chronicity, unspecified gastritis type         Anticipated Surgical Procedure:  No orders of the defined types were placed in this encounter.      The risks, benefits, and alternatives of this procedure have been " discussed with the patient or the responsible party- the patient understands and agrees to proceed.

## 2020-11-06 NOTE — PATIENT INSTRUCTIONS
"BMI for Adults  What is BMI?  Body mass index (BMI) is a number that is calculated from a person's weight and height. BMI can help estimate how much of a person's weight is composed of fat. BMI does not measure body fat directly. Rather, it is an alternative to procedures that directly measure body fat, which can be difficult and expensive.  BMI can help identify people who may be at higher risk for certain medical problems.  What are BMI measurements used for?  BMI is used as a screening tool to identify possible weight problems. It helps determine whether a person is obese, overweight, a healthy weight, or underweight.  BMI is useful for:  · Identifying a weight problem that may be related to a medical condition or may increase the risk for medical problems.  · Promoting changes, such as changes in diet and exercise, to help reach a healthy weight. BMI screening can be repeated to see if these changes are working.  How is BMI calculated?  BMI involves measuring your weight in relation to your height. Both height and weight are measured, and the BMI is calculated from those numbers. This can be done either in English (U[place] or metric measurements. Note that charts and online BMI calculators are available to help you find your BMI quickly and easily without having to do these calculations yourself.  To calculate your BMI in English (U.S[place] measurements:    1. Measure your weight in pounds (lb).  2. Multiply the number of pounds by 703.  ? For example, for a person who weighs 180 lb, multiply that number by 703, which equals 126,540.  3. Measure your height in inches. Then multiply that number by itself to get a measurement called \"inches squared.\"  ? For example, for a person who is 70 inches tall, the \"inches squared\" measurement is 70 inches x 70 inches, which equals 4,900 inches squared.  4. Divide the total from step 2 (number of lb x 703) by the total from step 3 (inches squared): 126,540 ÷ 4,900 = 25.8. This is " "your BMI.  To calculate your BMI in metric measurements:  1. Measure your weight in kilograms (kg).  2. Measure your height in meters (m). Then multiply that number by itself to get a measurement called \"meters squared.\"  ? For example, for a person who is 1.75 m tall, the \"meters squared\" measurement is 1.75 m x 1.75 m, which is equal to 3.1 meters squared.  3. Divide the number of kilograms (your weight) by the meters squared number. In this example: 70 ÷ 3.1 = 22.6. This is your BMI.  What do the results mean?  BMI charts are used to identify whether you are underweight, normal weight, overweight, or obese. The following guidelines will be used:  · Underweight: BMI less than 18.5.  · Normal weight: BMI between 18.5 and 24.9.  · Overweight: BMI between 25 and 29.9.  · Obese: BMI of 30 or above.  Keep these notes in mind:  · Weight includes both fat and muscle, so someone with a muscular build, such as an athlete, may have a BMI that is higher than 24.9. In cases like these, BMI is not an accurate measure of body fat.  · To determine if excess body fat is the cause of a BMI of 25 or higher, further assessments may need to be done by a health care provider.  · BMI is usually interpreted in the same way for men and women.  Where to find more information  For more information about BMI, including tools to quickly calculate your BMI, go to these websites:  · Centers for Disease Control and Prevention: www.cdc.gov  · American Heart Association: www.heart.org  · National Heart, Lung, and Blood [place]: www.nhlbi.nih.gov  Summary  · Body mass index (BMI) is a number that is calculated from a person's weight and height.  · BMI may help estimate how much of a person's weight is composed of fat. BMI can help identify those who may be at higher risk for certain medical problems.  · BMI can be measured using English measurements or metric measurements.  · BMI charts are used to identify whether you are underweight, normal " weight, overweight, or obese.  This information is not intended to replace advice given to you by your health care provider. Make sure you discuss any questions you have with your health care provider.  Document Released: 08/29/2005 Document Revised: 09/09/2020 Document Reviewed: 07/17/2020  Elsevier Patient Education © 2020 [employer]

## 2020-11-23 ENCOUNTER — OFFICE VISIT (OUTPATIENT)
Dept: ONCOLOGY | Facility: CLINIC | Age: 45
End: 2020-11-23

## 2020-11-23 ENCOUNTER — LAB (OUTPATIENT)
Dept: ONCOLOGY | Facility: HOSPITAL | Age: 45
End: 2020-11-23

## 2020-11-23 VITALS
BODY MASS INDEX: 30.99 KG/M2 | SYSTOLIC BLOOD PRESSURE: 144 MMHG | HEIGHT: 72 IN | TEMPERATURE: 98.9 F | RESPIRATION RATE: 18 BRPM | DIASTOLIC BLOOD PRESSURE: 84 MMHG | HEART RATE: 77 BPM | WEIGHT: 228.8 LBS

## 2020-11-23 DIAGNOSIS — D50.8 OTHER IRON DEFICIENCY ANEMIA: ICD-10-CM

## 2020-11-23 DIAGNOSIS — D75.1 SECONDARY POLYCYTHEMIA: Primary | ICD-10-CM

## 2020-11-23 LAB
BASOPHILS # BLD AUTO: 0.06 10*3/MM3 (ref 0–0.2)
BASOPHILS NFR BLD AUTO: 0.7 % (ref 0–1.5)
DEPRECATED RDW RBC AUTO: 73.9 FL (ref 37–54)
EOSINOPHIL # BLD AUTO: 0.2 10*3/MM3 (ref 0–0.4)
EOSINOPHIL NFR BLD AUTO: 2.2 % (ref 0.3–6.2)
ERYTHROCYTE [DISTWIDTH] IN BLOOD BY AUTOMATED COUNT: 26.3 % (ref 12.3–15.4)
FERRITIN SERPL-MCNC: 36.31 NG/ML (ref 30–400)
HCT VFR BLD AUTO: 49.5 % (ref 37.5–51)
HGB BLD-MCNC: 15.9 G/DL (ref 13–17.7)
IMM GRANULOCYTES # BLD AUTO: 0.03 10*3/MM3 (ref 0–0.05)
IMM GRANULOCYTES NFR BLD AUTO: 0.3 % (ref 0–0.5)
IRON 24H UR-MRATE: 95 MCG/DL (ref 59–158)
IRON SATN MFR SERPL: 22 % (ref 20–50)
LYMPHOCYTES # BLD AUTO: 1.66 10*3/MM3 (ref 0.7–3.1)
LYMPHOCYTES NFR BLD AUTO: 18.4 % (ref 19.6–45.3)
MCH RBC QN AUTO: 26.6 PG (ref 26.6–33)
MCHC RBC AUTO-ENTMCNC: 32.1 G/DL (ref 31.5–35.7)
MCV RBC AUTO: 82.9 FL (ref 79–97)
MONOCYTES # BLD AUTO: 0.63 10*3/MM3 (ref 0.1–0.9)
MONOCYTES NFR BLD AUTO: 7 % (ref 5–12)
NEUTROPHILS NFR BLD AUTO: 6.46 10*3/MM3 (ref 1.7–7)
NEUTROPHILS NFR BLD AUTO: 71.4 % (ref 42.7–76)
NRBC BLD AUTO-RTO: 0 /100 WBC (ref 0–0.2)
PLATELET # BLD AUTO: 234 10*3/MM3 (ref 140–450)
PMV BLD AUTO: 9.6 FL (ref 6–12)
RBC # BLD AUTO: 5.97 10*6/MM3 (ref 4.14–5.8)
TIBC SERPL-MCNC: 435 MCG/DL (ref 298–536)
TRANSFERRIN SERPL-MCNC: 292 MG/DL (ref 200–360)
WBC # BLD AUTO: 9.04 10*3/MM3 (ref 3.4–10.8)

## 2020-11-23 PROCEDURE — G0463 HOSPITAL OUTPT CLINIC VISIT: HCPCS | Performed by: NURSE PRACTITIONER

## 2020-11-23 PROCEDURE — 36415 COLL VENOUS BLD VENIPUNCTURE: CPT | Performed by: NURSE PRACTITIONER

## 2020-11-23 PROCEDURE — 82728 ASSAY OF FERRITIN: CPT | Performed by: NURSE PRACTITIONER

## 2020-11-23 PROCEDURE — 83540 ASSAY OF IRON: CPT | Performed by: NURSE PRACTITIONER

## 2020-11-23 PROCEDURE — 99213 OFFICE O/P EST LOW 20 MIN: CPT | Performed by: NURSE PRACTITIONER

## 2020-11-23 PROCEDURE — 85025 COMPLETE CBC W/AUTO DIFF WBC: CPT

## 2020-11-23 PROCEDURE — 84466 ASSAY OF TRANSFERRIN: CPT | Performed by: NURSE PRACTITIONER

## 2020-11-23 NOTE — PROGRESS NOTES
DATE OF VISIT: 11/23/2020    REASON FOR VISIT:  Follow-up for secondary polycythemia    HISTORY OF PRESENT ILLNESS:  45 yr old male with history of sleep apnea diagnosed 3 yrs ago; recently has been more compliant with use of CPAP;  He has a past history of testosterone use and a history of atrial flutter that has required ablation. He was initially seen by Dr. Monet in October for elevated H&H. He had an extensive work up that included a negative BAKARI 2 mutation, Normal EPO level and Ultrasound of abdomen that was unremarkable. He has had couple therapeutic phlebotomy. Most recent was in September 2018 when he was symptomatic. He is taking daily baby ASA and does admit to still taking testosterone supplement.   He denies any erythemalgia symptoms. He is taking baby ASA daily.  He had a  spinal fusion surgery at [facility] and had some post op bleeding; on his last visit here it was noted that his iron saturation had dropped to 5% and he had heme positive stool; was started on oral iron replacement and referred back to GI clinic; upper and lower endoscopy did not show any source of bleeding. Denies any bleeding; denies any hematochezia.    PAST MEDICAL HISTORY:    Past Medical History:   Diagnosis Date   • Alcoholism /alcohol abuse (CMS/HCC)    • Anemia    • Anxiety    • Arthritis     shoulders and knees   • Atrial fibrillation (CMS/HCC)    • Atrial flutter (CMS/HCC)    • Depression    • Diverticulitis    • Esophagitis    • Gastritis    • GERD (gastroesophageal reflux disease)    • Hypertension    • Kidney stone    • Pancreatitis     patient denies   • Sleep apnea     has c-pap       SOCIAL HISTORY:    Social History     Tobacco Use   • Smoking status: Never Smoker   • Smokeless tobacco: Never Used   Substance Use Topics   • Alcohol use: Not Currently     Comment: quit 3/17/18   • Drug use: Yes     Frequency: 7.0 times per week     Types: Marijuana       Surgical History :  Past Surgical History:    Procedure Laterality Date   • BACK SURGERY     • CARDIAC ELECTROPHYSIOLOGY PROCEDURE N/A 5/2/2017    Procedure: Ablation atrial flutter;  Surgeon: Ramirez Reza MD;  Location:  [facility] EP INVASIVE LOCATION;  Service:    • CARDIAC ELECTROPHYSIOLOGY PROCEDURE N/A 9/5/2017    Procedure: Ablation atrial fibrillation PVA ;  Surgeon: Ramirez Reza MD;  Location:  [facility] EP INVASIVE LOCATION;  Service:    • COLON RESECTION Left 12/20/2018    Procedure: LAPAROSCOPIC SIGMOID  RESECTION, TAKE DOWN OF SPLENIC FLEXURE;  Surgeon: Michel Lala MD;  Location: [facility] OR;  Service: General   • COLONOSCOPY     • COLONOSCOPY N/A 5/30/2018    Procedure: COLONOSCOPY;  Surgeon: Edwin Medina MD;  Location: [facility] ENDOSCOPY;  Service: Gastroenterology   • COLONOSCOPY N/A 10/28/2020    Procedure: COLONOSCOPY;  Surgeon: Edwin Medina MD;  Location: [facility] ENDOSCOPY;  Service: Gastroenterology;  Laterality: N/A;   • ENDOSCOPY N/A 3/30/2018    Procedure: ESOPHAGOGASTRODUODENOSCOPY;  Surgeon: Edwin Medina MD;  Location: [facility] ENDOSCOPY;  Service: Gastroenterology   • ENDOSCOPY N/A 5/30/2018    Procedure: ESOPHAGOGASTRODUODENOSCOPY;  Surgeon: Edwin Medina MD;  Location: [facility] ENDOSCOPY;  Service: Gastroenterology   • ENDOSCOPY N/A 12/3/2018    Procedure: ESOPHAGOGASTRODUODENOSCOPY;  Surgeon: Edwin Medina MD;  Location: [facility] ENDOSCOPY;  Service: Gastroenterology   • ENDOSCOPY N/A 10/22/2019    Procedure: ESOPHAGOGASTRODUODENOSCOPY;  Surgeon: Edwin Medina MD;  Location: [facility] ENDOSCOPY;  Service: Gastroenterology   • ENDOSCOPY N/A 10/28/2020    Procedure: ESOPHAGOGASTRODUODENOSCOPY;  Surgeon: Edwin Medina MD;  Location: [facility] ENDOSCOPY;  Service: Gastroenterology;  Laterality: N/A;   • SHOULDER SURGERY Right 2014   • TONSILLECTOMY  12/2015   • UPPER GASTROINTESTINAL ENDOSCOPY  03/30/2018   • UPPER GASTROINTESTINAL ENDOSCOPY     • UPPER GASTROINTESTINAL ENDOSCOPY  05/30/2018   • UPPER GASTROINTESTINAL ENDOSCOPY  " 10/22/2019   • WISDOM TOOTH EXTRACTION         ALLERGIES:    Allergies   Allergen Reactions   • Contrast Dye Anaphylaxis   • Iodinated Diagnostic Agents Anaphylaxis   • Morphine Other (See Comments)     hiccups   • Zofran [Ondansetron Hcl] Other (See Comments)      hiccups       REVIEW OF SYSTEMS:      CONSTITUTIONAL:  No fever, chills, or night sweats.     HEENT:  No epistaxis, mouth sores, or difficulty swallowing.    RESPIRATORY:  No new shortness of breath or cough at present.    CARDIOVASCULAR:  No chest pain or palpitations.    GASTROINTESTINAL:  No abdominal pain, nausea, vomiting, or blood in the stool.    GENITOURINARY:  No dysuria or hematuria.    MUSCULOSKELETAL:  No any new back pain or arthralgias.     NEUROLOGICAL:  No tingling or numbness. No new headache or dizziness.     LYMPHATICS:  Denies any abnormal swollen and anywhere in the body.    SKIN:  Denies any new skin rash.    PHYSICAL EXAMINATION:      VITAL SIGNS:  /84   Pulse 77   Temp 98.9 °F (37.2 °C) (Temporal)   Resp 18   Ht 182.9 cm (72.01\")   Wt 104 kg (228 lb 12.8 oz)   BMI 31.02 kg/m²     GENERAL:  Not in any distress.    HEENT:  Normocephalic, Atraumatic.Mild Conjunctival pallor. No icterus. Extraocular Movements Intact. No Facial Asymmetry noted.    NECK:  No adenopathy. No JVD.    RESPIRATORY:  Fair air entry bilateral. No rhonchi or wheezing.    CARDIOVASCULAR:  S1, S2. Regular rate and rhythm. No murmur or gallop appreciated.    ABDOMEN:  Soft, obese, nontender. Bowel sounds present in all four quadrants.  No organomegaly appreciated.    EXTREMITIES:  No edema.No Calf Tenderness.    NEUROLOGIC:  Alert, awake and oriented ×3.  No  Motor or sensory deficit appreciated. Cranial Nerves 2-12 grossly intact.    SKIN : No new skin lesion identified  DIAGNOSTIC DATA:    Glucose   Date Value Ref Range Status   09/21/2020 92 65 - 99 mg/dL Final     Sodium   Date Value Ref Range Status   09/21/2020 138 136 - 145 mmol/L Final "   03/22/2018 139 136 - 145 MMOL/L Final     Potassium   Date Value Ref Range Status   09/21/2020 4.0 3.5 - 5.2 mmol/L Final   03/22/2018 4.2 3.5 - 5.4 MMOL/L Final     CO2   Date Value Ref Range Status   09/21/2020 26.0 22.0 - 29.0 mmol/L Final   03/22/2018 25 20 - 33 MMOL/L Final     Chloride   Date Value Ref Range Status   09/21/2020 102 98 - 107 mmol/L Final   03/22/2018 101 98 - 107 MMOL/L Final     Anion Gap   Date Value Ref Range Status   09/21/2020 10.0 5.0 - 15.0 mmol/L Final   03/22/2018 17 (H) 7 - 14 MMOL/L Final     Creatinine   Date Value Ref Range Status   09/21/2020 1.10 0.76 - 1.27 mg/dL Final   03/22/2018 1.0 0.6 - 1.2 MG/DL Final     BUN   Date Value Ref Range Status   09/21/2020 14 6 - 20 mg/dL Final   03/22/2018 10 8 - 23 MG/DL Final     BUN/Creatinine Ratio   Date Value Ref Range Status   09/21/2020 12.7 7.0 - 25.0 Final     Calcium   Date Value Ref Range Status   09/21/2020 8.9 8.6 - 10.5 mg/dL Final   03/22/2018 8.1 (L) 8.7 - 10.4 MG/DL Final     eGFR Non  Amer   Date Value Ref Range Status   09/21/2020 73 >60 mL/min/1.73 Final     Alkaline Phosphatase   Date Value Ref Range Status   09/21/2020 52 39 - 117 U/L Final   03/21/2018 36 25 - 100 U/L Final     Total Protein   Date Value Ref Range Status   09/21/2020 6.5 6.0 - 8.5 g/dL Final   03/21/2018 7.2 6.0 - 8.2 G/DL Final     ALT (SGPT)   Date Value Ref Range Status   09/21/2020 18 1 - 41 U/L Final   03/21/2018 37 10 - 40 U/L Final     AST (SGOT)   Date Value Ref Range Status   09/21/2020 27 1 - 40 U/L Final   03/21/2018 44 (H) 0 - 39 U/L Final     Total Bilirubin   Date Value Ref Range Status   09/21/2020 0.3 0.0 - 1.2 mg/dL Final   03/21/2018 1.1 0.3 - 1.2 MG/DL Final     Albumin   Date Value Ref Range Status   09/21/2020 4.40 3.50 - 5.20 g/dL Final   03/21/2018 4.4 3.4 - 4.8 G/DL Final     Globulin   Date Value Ref Range Status   09/21/2020 2.1 gm/dL Final     A/G Ratio   Date Value Ref Range Status   03/21/2018 1.6  Final     Lab  Results   Component Value Date    WBC 9.04 11/23/2020    HGB 15.9 11/23/2020    HCT 49.5 11/23/2020    MCV 82.9 11/23/2020     11/23/2020     Lab Results   Component Value Date    NEUTROABS 6.46 11/23/2020    IRON 95 11/23/2020    TIBC 435 11/23/2020    LABIRON 22 11/23/2020    FERRITIN 36.31 11/23/2020    WQMXHMVL04 670 09/21/2020    FOLATE 17.60 09/21/2020     Lab Results   Component Value Date    AFPTM 2.2 04/10/2018    AFPTM 182 04/10/2018   ]      RADIOLOGY DATA :  No radiology results for the last 7 days      ASSESSMENT AND PLAN:    1. Erythrocytosis,secondary to testosterone use and an element of DONNIE;  He has had periodic phlebotomy last one was in 2018.  -remains on observation; will continue to monitor his CBC.    2. Iron deficiency anemia; Iron saturation and ferritin were low on last visit; started on oral iron and found to have heme positive stool; sent back to GI clinic and upper and lower endoscopy did not show any source of bleeding. Patient labs have improved; continue with oral iron and will recheck again in 4 mos.     2. Atrial fibrillation, pt was  taken off the Eliquis by his cardiologist and is currently taking baby ASA daily.      3. Health maintenance, he does not smoke; he has had a EGD and colonoscopy recently          This document has been signed by VIKASH Clinton on November 23, 2020 10:37 CST

## 2020-12-02 ENCOUNTER — OFFICE VISIT (OUTPATIENT)
Dept: SLEEP MEDICINE | Facility: HOSPITAL | Age: 45
End: 2020-12-02

## 2020-12-02 VITALS
DIASTOLIC BLOOD PRESSURE: 93 MMHG | HEART RATE: 95 BPM | OXYGEN SATURATION: 96 % | SYSTOLIC BLOOD PRESSURE: 151 MMHG | BODY MASS INDEX: 31.15 KG/M2 | HEIGHT: 72 IN | WEIGHT: 230 LBS

## 2020-12-02 DIAGNOSIS — G25.81 RESTLESS LEGS SYNDROME (RLS): ICD-10-CM

## 2020-12-02 DIAGNOSIS — G47.33 OBSTRUCTIVE SLEEP APNEA, ADULT: Primary | ICD-10-CM

## 2020-12-02 PROCEDURE — 99213 OFFICE O/P EST LOW 20 MIN: CPT | Performed by: NURSE PRACTITIONER

## 2020-12-02 RX ORDER — HYDROXYZINE PAMOATE 25 MG/1
CAPSULE ORAL
COMMUNITY
Start: 2020-11-16 | End: 2022-05-24

## 2020-12-02 RX ORDER — NABUMETONE 500 MG/1
500 TABLET, FILM COATED ORAL AS NEEDED
COMMUNITY
Start: 2020-11-20 | End: 2022-05-24

## 2020-12-02 RX ORDER — PRAMIPEXOLE DIHYDROCHLORIDE 0.5 MG/1
TABLET ORAL
COMMUNITY
Start: 2020-10-12 | End: 2022-05-24

## 2020-12-02 RX ORDER — DULOXETIN HYDROCHLORIDE 30 MG/1
CAPSULE, DELAYED RELEASE ORAL
COMMUNITY
Start: 2020-11-17

## 2020-12-02 NOTE — PROGRESS NOTES
"Sleep Clinic Follow Up    Date: 2020  Primary Care Physician: Jacqueline Montalvo APRN    Last office visit: 2019 (I reviewed this note)    CC: Follow up: History of DONNIE      Interim History:  Since the last visit:    1) History of moderate DONNIE -  Stevenson Chilel has not been on CPAP for about 2 years.    Sleep Testin. PSG on 2013, AHI of 15   2. Currently on no therapy      Bed time: 2230  Sleep latency: <5 minutes  Number of times awakens during the night: 2  Wake time: varies based on schedule  Estimated total sleep time at night: 8 hours  Caffeine intake: 1 cups of coffee, 1 cups of \"calming\" tea, and 0 sodas per day  Alcohol intake: rarely has drinks  Marijuana use  Nap time: rare   Sleepiness with Driving: none     Cubero - 12    2) Patient denies current RLS symptoms. Currently reports taking trazodone 50 mg QHS for this.    PMHx, FH, SH reviewed and pertinent changes are: Had back surgery 6 months ago.      REVIEW OF SYSTEMS:   Negative for chest pain, SOA, fever, chills, cough, N/V/D, abdominal pain.    Smoking:none      Exam:  Vitals:    20 1342   BP: 151/93   Pulse: 95   SpO2: 96%           20  1342   Weight: 104 kg (230 lb)     Body mass index is 31.19 kg/m². Patient's Body mass index is 31.19 kg/m². BMI is above normal parameters. Recommendations include: referral to primary care.      Gen:                No distress, conversant, pleasant, appears stated age, alert, oriented  Eyes:               Anicteric sclera, moist conjunctiva, no lid lag                           PERRL, EOMI   Heent:             NC/AT                          Oropharynx clear                          Normal hearing  Lungs:             Normal effort, non-labored breathing                          Clear to auscultation bilaterally          CV:                  Normal S1/S2, no murmur                          No lower extremity edema  ABD:               Soft, rounded, non-distended         "                  Normal bowel sounds                    Psych:             Appropriate affect  Neuro:             CN 2-12 appear intact    Past Medical History:   Diagnosis Date   • Alcoholism /alcohol abuse (CMS/HCC)    • Anemia    • Anxiety    • Arthritis     shoulders and knees   • Atrial fibrillation (CMS/HCC)    • Atrial flutter (CMS/HCC)    • Depression    • Diverticulitis    • Esophagitis    • Gastritis    • GERD (gastroesophageal reflux disease)    • Hypertension    • Kidney stone    • Pancreatitis     patient denies   • Sleep apnea     has c-pap       Current Outpatient Medications:   •  aspirin 81 MG EC tablet, Take 81 mg by mouth Daily., Disp: , Rfl:   •  B Complex-C (SUPER B COMPLEX PO), Take 1 tablet by mouth Daily., Disp: , Rfl:   •  carisoprodol (SOMA) 350 MG tablet, Take 350 mg by mouth 3 (Three) Times a Day., Disp: , Rfl:   •  carvedilol (COREG) 6.25 MG tablet, Take 1 tablet by mouth Daily., Disp: 30 tablet, Rfl: 6  •  DULoxetine (CYMBALTA) 30 MG capsule, , Disp: , Rfl:   •  DULoxetine (CYMBALTA) 60 MG capsule, Take 90 mg by mouth Every Night., Disp: , Rfl: 2  •  hydrOXYzine pamoate (VISTARIL) 25 MG capsule, , Disp: , Rfl:   •  magnesium oxide (MAGOX) 400 (241.3 MG) MG tablet tablet, Take 400 mg by mouth 2 (Two) Times a Day., Disp: , Rfl:   •  mirtazapine (REMERON) 15 MG tablet, Take 15 mg by mouth Every Night., Disp: , Rfl:   •  nabumetone (RELAFEN) 500 MG tablet, , Disp: , Rfl:   •  pantoprazole (PROTONIX) 40 MG EC tablet, Take 1 tablet by mouth 2 (Two) Times a Day., Disp: 60 tablet, Rfl: 5  •  pramipexole (MIRAPEX) 0.5 MG tablet, , Disp: , Rfl:   •  traZODone (DESYREL) 50 MG tablet, Take 50 mg by mouth Every Night., Disp: , Rfl:       Assessment and Plan:    1. Obstructive sleep apnea Established, stable (1)  1. Has not been using CPAP for about 2 years with no recurrence of symptoms  2. Will continue to follow up yearly or as needed  2. Restless leg syndrome/ Delatorre-Ekbom disease (RLS/WED)  Established, stable (1)        8 of 15 minutes spent face-to-face counseling patient extensively regarding:   PAP therapy and Medication changes      RTC in 12 months. Patient agrees to return sooner if changes in symptoms.        This document has been electronically signed by VIKASH Folrence on December 2, 2020 14:06 CST          CC: Jacqueline Montalvo APRN          No ref. provider found

## 2021-03-30 ENCOUNTER — OFFICE VISIT (OUTPATIENT)
Dept: ONCOLOGY | Facility: CLINIC | Age: 46
End: 2021-03-30

## 2021-03-30 ENCOUNTER — LAB (OUTPATIENT)
Dept: LAB | Facility: HOSPITAL | Age: 46
End: 2021-03-30

## 2021-03-30 ENCOUNTER — LAB (OUTPATIENT)
Dept: ONCOLOGY | Facility: HOSPITAL | Age: 46
End: 2021-03-30

## 2021-03-30 VITALS
TEMPERATURE: 97.9 F | WEIGHT: 225.9 LBS | HEART RATE: 87 BPM | BODY MASS INDEX: 30.6 KG/M2 | RESPIRATION RATE: 18 BRPM | HEIGHT: 72 IN | SYSTOLIC BLOOD PRESSURE: 136 MMHG | DIASTOLIC BLOOD PRESSURE: 89 MMHG

## 2021-03-30 DIAGNOSIS — D75.1 SECONDARY POLYCYTHEMIA: ICD-10-CM

## 2021-03-30 DIAGNOSIS — D69.6 THROMBOCYTOPENIA (HCC): ICD-10-CM

## 2021-03-30 LAB
ALBUMIN SERPL-MCNC: 3.9 G/DL (ref 3.5–5.2)
ALBUMIN/GLOB SERPL: 1.7 G/DL
ALP SERPL-CCNC: 65 U/L (ref 39–117)
ALT SERPL W P-5'-P-CCNC: 20 U/L (ref 1–41)
ANION GAP SERPL CALCULATED.3IONS-SCNC: 8 MMOL/L (ref 5–15)
AST SERPL-CCNC: 23 U/L (ref 1–40)
BASOPHILS # BLD AUTO: 0.04 10*3/MM3 (ref 0–0.2)
BASOPHILS NFR BLD AUTO: 0.6 % (ref 0–1.5)
BILIRUB SERPL-MCNC: 0.6 MG/DL (ref 0–1.2)
BUN SERPL-MCNC: 13 MG/DL (ref 6–20)
BUN/CREAT SERPL: 11.4 (ref 7–25)
CALCIUM SPEC-SCNC: 8.8 MG/DL (ref 8.6–10.5)
CHLORIDE SERPL-SCNC: 105 MMOL/L (ref 98–107)
CO2 SERPL-SCNC: 30 MMOL/L (ref 22–29)
CREAT SERPL-MCNC: 1.14 MG/DL (ref 0.76–1.27)
DEPRECATED RDW RBC AUTO: 51 FL (ref 37–54)
EOSINOPHIL # BLD AUTO: 0.13 10*3/MM3 (ref 0–0.4)
EOSINOPHIL NFR BLD AUTO: 1.8 % (ref 0.3–6.2)
ERYTHROCYTE [DISTWIDTH] IN BLOOD BY AUTOMATED COUNT: 17.3 % (ref 12.3–15.4)
FERRITIN SERPL-MCNC: 35.89 NG/ML (ref 30–400)
FOLATE SERPL-MCNC: 13.5 NG/ML (ref 4.78–24.2)
GFR SERPL CREATININE-BSD FRML MDRD: 70 ML/MIN/1.73
GLOBULIN UR ELPH-MCNC: 2.3 GM/DL
GLUCOSE SERPL-MCNC: 95 MG/DL (ref 65–99)
HCT VFR BLD AUTO: 52.9 % (ref 37.5–51)
HGB BLD-MCNC: 16.8 G/DL (ref 13–17.7)
IMM GRANULOCYTES # BLD AUTO: 0.02 10*3/MM3 (ref 0–0.05)
IMM GRANULOCYTES NFR BLD AUTO: 0.3 % (ref 0–0.5)
IRON 24H UR-MRATE: 85 MCG/DL (ref 59–158)
IRON SATN MFR SERPL: 19 % (ref 20–50)
LYMPHOCYTES # BLD AUTO: 1.43 10*3/MM3 (ref 0.7–3.1)
LYMPHOCYTES NFR BLD AUTO: 19.9 % (ref 19.6–45.3)
Lab: NORMAL
MCH RBC QN AUTO: 27.8 PG (ref 26.6–33)
MCHC RBC AUTO-ENTMCNC: 31.8 G/DL (ref 31.5–35.7)
MCV RBC AUTO: 87.4 FL (ref 79–97)
MONOCYTES # BLD AUTO: 0.56 10*3/MM3 (ref 0.1–0.9)
MONOCYTES NFR BLD AUTO: 7.8 % (ref 5–12)
NEUTROPHILS NFR BLD AUTO: 5.01 10*3/MM3 (ref 1.7–7)
NEUTROPHILS NFR BLD AUTO: 69.6 % (ref 42.7–76)
NRBC BLD AUTO-RTO: 0 /100 WBC (ref 0–0.2)
PLATELET # BLD AUTO: 255 10*3/MM3 (ref 140–450)
PMV BLD AUTO: 9.9 FL (ref 6–12)
POTASSIUM SERPL-SCNC: 4.2 MMOL/L (ref 3.5–5.2)
PRE-BLOOD PRESSURE: NORMAL
PRE-HCT: 52.9
PRE-HGB: 16.8
PROT SERPL-MCNC: 6.2 G/DL (ref 6–8.5)
PULSE: 64
RBC # BLD AUTO: 6.05 10*6/MM3 (ref 4.14–5.8)
SODIUM SERPL-SCNC: 143 MMOL/L (ref 136–145)
TESTOST SERPL-MCNC: 1075 NG/DL (ref 249–836)
TIBC SERPL-MCNC: 437 MCG/DL (ref 298–536)
TRANSFERRIN SERPL-MCNC: 293 MG/DL (ref 200–360)
VIT B12 BLD-MCNC: 513 PG/ML (ref 211–946)
VOLUME COLLECTED: 500
WBC # BLD AUTO: 7.19 10*3/MM3 (ref 3.4–10.8)

## 2021-03-30 PROCEDURE — G0463 HOSPITAL OUTPT CLINIC VISIT: HCPCS | Performed by: NURSE PRACTITIONER

## 2021-03-30 PROCEDURE — 99212 OFFICE O/P EST SF 10 MIN: CPT | Performed by: NURSE PRACTITIONER

## 2021-03-30 PROCEDURE — 84466 ASSAY OF TRANSFERRIN: CPT

## 2021-03-30 PROCEDURE — 80053 COMPREHEN METABOLIC PANEL: CPT

## 2021-03-30 PROCEDURE — 83003 ASSAY GROWTH HORMONE (HGH): CPT

## 2021-03-30 PROCEDURE — 82672 ASSAY OF ESTROGEN: CPT

## 2021-03-30 PROCEDURE — 36415 COLL VENOUS BLD VENIPUNCTURE: CPT

## 2021-03-30 PROCEDURE — 82746 ASSAY OF FOLIC ACID SERUM: CPT

## 2021-03-30 PROCEDURE — 83540 ASSAY OF IRON: CPT

## 2021-03-30 PROCEDURE — 99195 PHLEBOTOMY: CPT

## 2021-03-30 PROCEDURE — 84403 ASSAY OF TOTAL TESTOSTERONE: CPT

## 2021-03-30 PROCEDURE — 85025 COMPLETE CBC W/AUTO DIFF WBC: CPT

## 2021-03-30 PROCEDURE — 82728 ASSAY OF FERRITIN: CPT

## 2021-03-30 PROCEDURE — 82607 VITAMIN B-12: CPT

## 2021-04-01 LAB — GH SERPL-MCNC: 0.9 NG/ML (ref 0–10)

## 2021-04-01 NOTE — PROGRESS NOTES
DATE OF VISIT: 3/30/2021      REASON FOR VISIT:  Follow-up for secondary polycythemia     HISTORY OF PRESENT ILLNESS:  45 yr old male with history of sleep apnea diagnosed 3 yrs ago; recently has been more compliant with use of CPAP;  He has a past history of testosterone use and a history of atrial flutter that has required ablation. He was initially seen by Dr. Monet in October for elevated H&H. He had an extensive work up that included a negative BAKARI 2 mutation, Normal EPO level and Ultrasound of abdomen that was unremarkable. He has had couple therapeutic phlebotomy. Most recent was in September 2018 when he was symptomatic. He is taking daily baby ASA and does admit to still taking testosterone supplement.   He denies any erythemalgia symptoms. He is taking baby ASA daily.  He had a  spinal fusion surgery at [facility] and had some post op bleeding; on his last visit here it was noted that his iron saturation had dropped to 5% and he had heme positive stool; was started on oral iron replacement and referred back to GI clinic; upper and lower endoscopy did not show any source of bleeding. Denies any bleeding; denies any hematochezia. Iron has been d/c at last visit.              Past Medical History, Past Surgical History, Social History, Family History have been reviewed and are without significant changes except as mentioned.    Review of Systems   A comprehensive 14 point review of systems was performed and was negative except as mentioned.    Medications:  The current medication list was reviewed in the EMR    ALLERGIES:    Allergies   Allergen Reactions   • Contrast Dye Anaphylaxis   • Iodinated Diagnostic Agents Anaphylaxis   • Morphine Other (See Comments)     hiccups   • Zofran [Ondansetron Hcl] Other (See Comments)      hiccups       Objective      Vitals:    03/30/21 0843   BP: 136/89   Pulse: 87   Resp: 18   Temp: 97.9 °F (36.6 °C)   TempSrc: Temporal   Weight: 102 kg (225 lb 14.4 oz)  "  Height: 182.9 cm (72.01\")   PainSc: 0-No pain     Current Status 3/30/2021   ECOG score 0       Physical Exam    GENERAL:  Not in any distress.     HEENT:  Normocephalic, Atraumatic.Mild Conjunctival pallor. No icterus.    NECK:  No adenopathy. No JVD.     RESPIRATORY:  Fair air entry bilateral. No rhonchi or wheezing.     CARDIOVASCULAR:  S1, S2. Regular rate and rhythm. No murmur or gallop appreciated.     ABDOMEN:  Soft, obese, nontender. Bowel sounds present in all four quadrants.  No organomegaly appreciated.     EXTREMITIES:  No edema.No Calf Tenderness.     NEUROLOGIC:  Alert, awake and oriented ×3.    SKIN : No new skin lesion identified  RECENT LABS:  Glucose   Date Value Ref Range Status   03/30/2021 95 65 - 99 mg/dL Final     Sodium   Date Value Ref Range Status   03/30/2021 143 136 - 145 mmol/L Final   03/22/2018 139 136 - 145 MMOL/L Final     Potassium   Date Value Ref Range Status   03/30/2021 4.2 3.5 - 5.2 mmol/L Final   03/22/2018 4.2 3.5 - 5.4 MMOL/L Final     CO2   Date Value Ref Range Status   03/30/2021 30.0 (H) 22.0 - 29.0 mmol/L Final   03/22/2018 25 20 - 33 MMOL/L Final     Chloride   Date Value Ref Range Status   03/30/2021 105 98 - 107 mmol/L Final   03/22/2018 101 98 - 107 MMOL/L Final     Anion Gap   Date Value Ref Range Status   03/30/2021 8.0 5.0 - 15.0 mmol/L Final   03/22/2018 17 (H) 7 - 14 MMOL/L Final     Creatinine   Date Value Ref Range Status   03/30/2021 1.14 0.76 - 1.27 mg/dL Final   03/22/2018 1.0 0.6 - 1.2 MG/DL Final     BUN   Date Value Ref Range Status   03/30/2021 13 6 - 20 mg/dL Final   03/22/2018 10 8 - 23 MG/DL Final     BUN/Creatinine Ratio   Date Value Ref Range Status   03/30/2021 11.4 7.0 - 25.0 Final     Calcium   Date Value Ref Range Status   03/30/2021 8.8 8.6 - 10.5 mg/dL Final   03/22/2018 8.1 (L) 8.7 - 10.4 MG/DL Final     eGFR Non  Amer   Date Value Ref Range Status   03/30/2021 70 >60 mL/min/1.73 Final     Alkaline Phosphatase   Date Value Ref Range " Status   03/30/2021 65 39 - 117 U/L Final   03/21/2018 36 25 - 100 U/L Final     Total Protein   Date Value Ref Range Status   03/30/2021 6.2 6.0 - 8.5 g/dL Final   03/21/2018 7.2 6.0 - 8.2 G/DL Final     ALT (SGPT)   Date Value Ref Range Status   03/30/2021 20 1 - 41 U/L Final   03/21/2018 37 10 - 40 U/L Final     AST (SGOT)   Date Value Ref Range Status   03/30/2021 23 1 - 40 U/L Final   03/21/2018 44 (H) 0 - 39 U/L Final     Total Bilirubin   Date Value Ref Range Status   03/30/2021 0.6 0.0 - 1.2 mg/dL Final   03/21/2018 1.1 0.3 - 1.2 MG/DL Final     Albumin   Date Value Ref Range Status   03/30/2021 3.90 3.50 - 5.20 g/dL Final   03/21/2018 4.4 3.4 - 4.8 G/DL Final     Globulin   Date Value Ref Range Status   03/30/2021 2.3 gm/dL Final     A/G Ratio   Date Value Ref Range Status   03/21/2018 1.6  Final     Lab Results   Component Value Date    WBC 7.19 03/30/2021    HGB 16.8 03/30/2021    HCT 52.9 (H) 03/30/2021    MCV 87.4 03/30/2021     03/30/2021     Lab Results   Component Value Date    NEUTROABS 5.01 03/30/2021    IRON 85 03/30/2021    IRON 95 11/23/2020    IRON 23 (L) 09/21/2020    TIBC 437 03/30/2021    TIBC 435 11/23/2020    TIBC 507 09/21/2020    LABIRON 19 (L) 03/30/2021    LABIRON 22 11/23/2020    LABIRON 5 (L) 09/21/2020    FERRITIN 35.89 03/30/2021    FERRITIN 36.31 11/23/2020    FERRITIN 11.93 (L) 09/21/2020    [id number] 513 03/30/2021    [id number] 670 09/21/2020    FOLATE 13.50 03/30/2021    FOLATE 17.60 09/21/2020     Lab Results   Component Value Date    AFPTM 2.2 04/10/2018    AFPTM 182 04/10/2018               RADIOLOGY DATA :  No radiology results for the last 7 days        Assessment/Plan   Erythrocytosis,secondary to testosterone use and an element of DONNIE; has required intermittent therapeutic phlebotomy as needed. Last phlebotomy was in 2018. He states he is going to be moving to [place] in next few weeks.  Labs reviewed today Hgb is 16.8 and Hct is 52.9%; will send for therapeutic  phlebotomy today; instructed him to continue with his narayan ASA and to continue with hydration. He will establish care in [place].            PHQ-9 Total Score: 0     Stevenson Chilel reports a pain score of 0.  Given his pain assessment as noted, treatment options were discussed and the following options were decided upon as a follow-up plan to address the patient's pain: no pain reported today.         Brea Kerns, APRN  4/1/2021  12:44 CDT        Part of this note may be an electronic transcription/translation of spoken language to printed text using the Dragon Dictation System.          CC:

## 2021-04-05 LAB — ESTROGEN SERPL-MCNC: 4657 PG/ML (ref 40–115)

## 2022-04-18 DIAGNOSIS — D69.6 THROMBOCYTOPENIA: Primary | ICD-10-CM

## 2022-04-19 ENCOUNTER — OFFICE VISIT (OUTPATIENT)
Dept: ONCOLOGY | Facility: CLINIC | Age: 47
End: 2022-04-19

## 2022-04-19 ENCOUNTER — LAB (OUTPATIENT)
Dept: ONCOLOGY | Facility: HOSPITAL | Age: 47
End: 2022-04-19

## 2022-04-19 VITALS
DIASTOLIC BLOOD PRESSURE: 83 MMHG | SYSTOLIC BLOOD PRESSURE: 138 MMHG | HEART RATE: 76 BPM | WEIGHT: 232.9 LBS | OXYGEN SATURATION: 93 % | BODY MASS INDEX: 31.58 KG/M2 | TEMPERATURE: 98 F

## 2022-04-19 DIAGNOSIS — D69.6 THROMBOCYTOPENIA: ICD-10-CM

## 2022-04-19 DIAGNOSIS — D69.6 THROMBOCYTOPENIA: Chronic | ICD-10-CM

## 2022-04-19 DIAGNOSIS — D75.1 SECONDARY POLYCYTHEMIA: Primary | Chronic | ICD-10-CM

## 2022-04-19 DIAGNOSIS — N17.9 AKI (ACUTE KIDNEY INJURY): ICD-10-CM

## 2022-04-19 LAB
ALBUMIN SERPL-MCNC: 4.7 G/DL (ref 3.5–5.2)
ALBUMIN/GLOB SERPL: 1.8 G/DL
ALP SERPL-CCNC: 90 U/L (ref 39–117)
ALT SERPL W P-5'-P-CCNC: 43 U/L (ref 1–41)
ANION GAP SERPL CALCULATED.3IONS-SCNC: 13 MMOL/L (ref 5–15)
AST SERPL-CCNC: 36 U/L (ref 1–40)
BASOPHILS # BLD AUTO: 0.05 10*3/MM3 (ref 0–0.2)
BASOPHILS NFR BLD AUTO: 0.7 % (ref 0–1.5)
BILIRUB SERPL-MCNC: 0.6 MG/DL (ref 0–1.2)
BUN SERPL-MCNC: 19 MG/DL (ref 6–20)
BUN/CREAT SERPL: 14.6 (ref 7–25)
CALCIUM SPEC-SCNC: 9.4 MG/DL (ref 8.6–10.5)
CHLORIDE SERPL-SCNC: 101 MMOL/L (ref 98–107)
CO2 SERPL-SCNC: 24 MMOL/L (ref 22–29)
CREAT SERPL-MCNC: 1.3 MG/DL (ref 0.76–1.27)
DEPRECATED RDW RBC AUTO: 46.5 FL (ref 37–54)
EGFRCR SERPLBLD CKD-EPI 2021: 69.5 ML/MIN/1.73
EOSINOPHIL # BLD AUTO: 0.13 10*3/MM3 (ref 0–0.4)
EOSINOPHIL NFR BLD AUTO: 1.7 % (ref 0.3–6.2)
ERYTHROCYTE [DISTWIDTH] IN BLOOD BY AUTOMATED COUNT: 15.2 % (ref 12.3–15.4)
GLOBULIN UR ELPH-MCNC: 2.6 GM/DL
GLUCOSE SERPL-MCNC: 100 MG/DL (ref 65–99)
HCT VFR BLD AUTO: 52.6 % (ref 37.5–51)
HGB BLD-MCNC: 17.8 G/DL (ref 13–17.7)
HOLD SPECIMEN: NORMAL
IMM GRANULOCYTES # BLD AUTO: 0.03 10*3/MM3 (ref 0–0.05)
IMM GRANULOCYTES NFR BLD AUTO: 0.4 % (ref 0–0.5)
LYMPHOCYTES # BLD AUTO: 1.59 10*3/MM3 (ref 0.7–3.1)
LYMPHOCYTES NFR BLD AUTO: 20.8 % (ref 19.6–45.3)
MCH RBC QN AUTO: 29.3 PG (ref 26.6–33)
MCHC RBC AUTO-ENTMCNC: 33.8 G/DL (ref 31.5–35.7)
MCV RBC AUTO: 86.5 FL (ref 79–97)
MONOCYTES # BLD AUTO: 0.54 10*3/MM3 (ref 0.1–0.9)
MONOCYTES NFR BLD AUTO: 7 % (ref 5–12)
NEUTROPHILS NFR BLD AUTO: 5.32 10*3/MM3 (ref 1.7–7)
NEUTROPHILS NFR BLD AUTO: 69.4 % (ref 42.7–76)
NRBC BLD AUTO-RTO: 0 /100 WBC (ref 0–0.2)
PLATELET # BLD AUTO: 190 10*3/MM3 (ref 140–450)
PMV BLD AUTO: 10.2 FL (ref 6–12)
POTASSIUM SERPL-SCNC: 4.3 MMOL/L (ref 3.5–5.2)
PROT SERPL-MCNC: 7.3 G/DL (ref 6–8.5)
RBC # BLD AUTO: 6.08 10*6/MM3 (ref 4.14–5.8)
SODIUM SERPL-SCNC: 138 MMOL/L (ref 136–145)
WBC NRBC COR # BLD: 7.66 10*3/MM3 (ref 3.4–10.8)

## 2022-04-19 PROCEDURE — 36415 COLL VENOUS BLD VENIPUNCTURE: CPT

## 2022-04-19 PROCEDURE — G0463 HOSPITAL OUTPT CLINIC VISIT: HCPCS | Performed by: INTERNAL MEDICINE

## 2022-04-19 PROCEDURE — 80053 COMPREHEN METABOLIC PANEL: CPT

## 2022-04-19 PROCEDURE — 1126F AMNT PAIN NOTED NONE PRSNT: CPT | Performed by: INTERNAL MEDICINE

## 2022-04-19 PROCEDURE — 85025 COMPLETE CBC W/AUTO DIFF WBC: CPT

## 2022-04-19 PROCEDURE — 99214 OFFICE O/P EST MOD 30 MIN: CPT | Performed by: INTERNAL MEDICINE

## 2022-04-19 NOTE — PROGRESS NOTES
DATE OF VISIT: 4/19/2022      REASON FOR VISIT: Secondary polycythemia, thrombocytopenia, acute kidney injury      HISTORY OF PRESENT ILLNESS:   46-year-old male with a history of sleep apnea for which he is not using CPAP machine, chronic testosterone supplement currently getting weekly testosterone injection, atrial fibrillation, secondary polycythemia, thrombocytopenia is here for follow-up appointment today.  Patient was last seen here at [facility] in November 2020 after that he moved to [place] and now is moved back to [place].  He wants to reestablish care here at [facility].  States he did not see any hematologist in [place] while he was there.  Had episode of vasovagal last week.  Denies any persistent headache or any new tingling or numbness.  Denies any symptoms suggestive of erythromelalgia.  Denies any abnormal bleeding.          Past Medical History, Past Surgical History, Social History, Family History have been reviewed and are without significant changes except as mentioned.    Review of Systems   A comprehensive 14 point review of systems was performed and was negative except as mentioned in HPI.    Medications:  The current medication list was reviewed in the EMR    ALLERGIES:    Allergies   Allergen Reactions   • Contrast Dye Anaphylaxis   • Iodinated Diagnostic Agents Anaphylaxis   • Morphine Other (See Comments)     hiccups   • Zofran [Ondansetron Hcl] Other (See Comments)      hiccups       Objective      Vitals:    04/19/22 1057   BP: 138/83   Pulse: 76   Temp: 98 °F (36.7 °C)   TempSrc: Temporal   SpO2: 93%   Weight: 106 kg (232 lb 14.4 oz)   PainSc: 0-No pain     Current Status 3/30/2021   ECOG score 0       Physical Exam  Pulmonary:      Breath sounds: Normal breath sounds.   Neurological:      Mental Status: He is alert and oriented to person, place, and time.           RECENT LABS:  Glucose   Date Value Ref Range Status   03/30/2021 95 65 - 99 mg/dL Final     Sodium    Date Value Ref Range Status   03/30/2021 143 136 - 145 mmol/L Final   03/22/2018 139 136 - 145 MMOL/L Final     Potassium   Date Value Ref Range Status   03/30/2021 4.2 3.5 - 5.2 mmol/L Final   03/22/2018 4.2 3.5 - 5.4 MMOL/L Final     CO2   Date Value Ref Range Status   03/30/2021 30.0 (H) 22.0 - 29.0 mmol/L Final   03/22/2018 25 20 - 33 MMOL/L Final     Chloride   Date Value Ref Range Status   03/30/2021 105 98 - 107 mmol/L Final   03/22/2018 101 98 - 107 MMOL/L Final     Anion Gap   Date Value Ref Range Status   03/30/2021 8.0 5.0 - 15.0 mmol/L Final   03/22/2018 17 (H) 7 - 14 MMOL/L Final     Creatinine   Date Value Ref Range Status   03/30/2021 1.14 0.76 - 1.27 mg/dL Final   03/22/2018 1.0 0.6 - 1.2 MG/DL Final     BUN   Date Value Ref Range Status   03/30/2021 13 6 - 20 mg/dL Final   03/22/2018 10 8 - 23 MG/DL Final     BUN/Creatinine Ratio   Date Value Ref Range Status   03/30/2021 11.4 7.0 - 25.0 Final     Calcium   Date Value Ref Range Status   03/30/2021 8.8 8.6 - 10.5 mg/dL Final   03/22/2018 8.1 (L) 8.7 - 10.4 MG/DL Final     eGFR Non  Amer   Date Value Ref Range Status   03/30/2021 70 >60 mL/min/1.73 Final     Alkaline Phosphatase   Date Value Ref Range Status   03/30/2021 65 39 - 117 U/L Final   03/21/2018 36 25 - 100 U/L Final     Total Protein   Date Value Ref Range Status   03/30/2021 6.2 6.0 - 8.5 g/dL Final   03/21/2018 7.2 6.0 - 8.2 G/DL Final     ALT (SGPT)   Date Value Ref Range Status   03/30/2021 20 1 - 41 U/L Final   03/21/2018 37 10 - 40 U/L Final     AST (SGOT)   Date Value Ref Range Status   03/30/2021 23 1 - 40 U/L Final   03/21/2018 44 (H) 0 - 39 U/L Final     Total Bilirubin   Date Value Ref Range Status   03/30/2021 0.6 0.0 - 1.2 mg/dL Final   03/21/2018 1.1 0.3 - 1.2 MG/DL Final     Albumin   Date Value Ref Range Status   03/30/2021 3.90 3.50 - 5.20 g/dL Final   03/21/2018 4.4 3.4 - 4.8 G/DL Final     Globulin   Date Value Ref Range Status   03/30/2021 2.3 gm/dL Final      A/G Ratio   Date Value Ref Range Status   03/21/2018 1.6  Final     Lab Results   Component Value Date    WBC 7.66 04/19/2022    HGB 17.8 (H) 04/19/2022    HCT 52.6 (H) 04/19/2022    MCV 86.5 04/19/2022     04/19/2022     Lab Results   Component Value Date    NEUTROABS 5.32 04/19/2022    IRON 85 03/30/2021    IRON 95 11/23/2020    IRON 23 (L) 09/21/2020    TIBC 437 03/30/2021    TIBC 435 11/23/2020    TIBC 507 09/21/2020    LABIRON 19 (L) 03/30/2021    LABIRON 22 11/23/2020    LABIRON 5 (L) 09/21/2020    FERRITIN 35.89 03/30/2021    FERRITIN 36.31 11/23/2020    FERRITIN 11.93 (L) 09/21/2020    [id number] 513 03/30/2021    [id number] 670 09/21/2020    FOLATE 13.50 03/30/2021    FOLATE 17.60 09/21/2020     Lab Results   Component Value Date    AFPTM 2.2 04/10/2018    AFPTM 182 04/10/2018       JAK2 mutation done on peripheral blood on October 12, 2017 showed:  JAK2 V617F Mutation Comment   Comments: Result: NEGATIVE for the JAK2 V617F mutation.          Erythropoietin level done on peripheral blood on October 12, 2017 showed:  Erythropoietin 2.6 - 18.5 mIU/mL 6.9   Comments: Siemens Immulite 2000 Immunochemiluminometric assay (ICMA)                 PATHOLOGY:  * Cannot find OR log *         RADIOLOGY DATA :  No radiology results for the last 7 days        Assessment/Plan     1.  Erythrocytosis:  - 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  - Work-up in the past consisting of JAK2 mutation was negative in October 2017.  Erythropoietin level was normal at 6.9 in October 2017 consistent with secondary cause.  - CBC done earlier today shows hemoglobin is 17.8 with hematocrit of 52.6.  - Recommend therapeutic phlebotomy with 500 mL of blood to be removed today.  Recommending hydration after phlebotomy to prevent any excessive dizziness.  - Recommend continue with baby aspirin  - Patient will  return to clinic in 3 months with repeat CBC and BMP on that day.    2.  Thrombocytopenia:  - Platelet count today is 190,000.  Will monitor with CBC    3.  History of obstructive sleep apnea:  - Patient is not using CPAP machine.  Patient has been counseled about importance of using CPAP machine in the past.    4.  History of low testosterone:  - Currently on weekly testosterone injection.    5.  Health maintenance: Patient does not smoke    6.  Acute kidney injury: Problem is new to me  - Creatinine is elevated at 1.30.  Patient will be notified about elevated creatinine and need to increase hydration.                   PHQ-9 Total Score: 2   -Patient is not homicidal or suicidal.  No acute intervention required.    Stevenson Chilel reports a pain score of 0.  Given his pain assessment as noted, treatment options were discussed and the following options were decided upon as a follow-up plan to address the patient's pain: continuation of current treatment plan for pain.         Jose Banks MD  4/19/2022  11:20 CDT        Part of this note may be an electronic transcription/translation of spoken language to printed text using the Dragon Dictation System.          CC:

## 2022-04-20 ENCOUNTER — TELEPHONE (OUTPATIENT)
Dept: ONCOLOGY | Facility: HOSPITAL | Age: 47
End: 2022-04-20

## 2022-04-20 ENCOUNTER — LAB (OUTPATIENT)
Dept: LAB | Facility: HOSPITAL | Age: 47
End: 2022-04-20

## 2022-04-20 DIAGNOSIS — D75.1 SECONDARY POLYCYTHEMIA: Chronic | ICD-10-CM

## 2022-04-20 DIAGNOSIS — D69.6 THROMBOCYTOPENIA: Chronic | ICD-10-CM

## 2022-04-20 LAB
ANION GAP SERPL CALCULATED.3IONS-SCNC: 8 MMOL/L (ref 5–15)
BUN SERPL-MCNC: 24 MG/DL (ref 6–20)
BUN/CREAT SERPL: 19.2 (ref 7–25)
CALCIUM SPEC-SCNC: 9.3 MG/DL (ref 8.6–10.5)
CHLORIDE SERPL-SCNC: 103 MMOL/L (ref 98–107)
CO2 SERPL-SCNC: 27 MMOL/L (ref 22–29)
CREAT SERPL-MCNC: 1.25 MG/DL (ref 0.76–1.27)
EGFRCR SERPLBLD CKD-EPI 2021: 72.8 ML/MIN/1.73
GLUCOSE SERPL-MCNC: 78 MG/DL (ref 65–99)
Lab: NORMAL
POTASSIUM SERPL-SCNC: 4.8 MMOL/L (ref 3.5–5.2)
PRE-BLOOD PRESSURE: NORMAL MMHG
PRE-HGB: 17.8 G/DL
PULSE: 68 BPM
SODIUM SERPL-SCNC: 138 MMOL/L (ref 136–145)
VOLUME COLLECTED: 500 ML

## 2022-04-20 PROCEDURE — 80048 BASIC METABOLIC PNL TOTAL CA: CPT

## 2022-04-20 NOTE — TELEPHONE ENCOUNTER
----- Message from Jose Banks MD sent at 4/19/2022 12:08 PM CDT -----  Regarding: labs  Please let patient know, his creatinine was higher than normal at 1.30.  He needs to increase hydration.  Thank you

## 2022-04-28 ENCOUNTER — TELEPHONE (OUTPATIENT)
Dept: CARDIOLOGY | Facility: CLINIC | Age: 47
End: 2022-04-28

## 2022-04-28 NOTE — TELEPHONE ENCOUNTER
Patient fell at home on 1-10-22. He states that he went to the bathroom to urinate and when he stood up he passed out. He hit his face on a wooden box and knocked his two front teeth out. He did not go to the ER, he went to his chiropractor instead because he has back issues. He states that he feels fine now and is agreeable to see Dr. Sy on May 24.

## 2022-05-24 ENCOUNTER — OFFICE VISIT (OUTPATIENT)
Dept: CARDIOLOGY | Facility: CLINIC | Age: 47
End: 2022-05-24

## 2022-05-24 VITALS
HEIGHT: 72 IN | OXYGEN SATURATION: 96 % | HEART RATE: 83 BPM | WEIGHT: 225 LBS | BODY MASS INDEX: 30.48 KG/M2 | SYSTOLIC BLOOD PRESSURE: 136 MMHG | DIASTOLIC BLOOD PRESSURE: 98 MMHG

## 2022-05-24 DIAGNOSIS — I48.3 TYPICAL ATRIAL FLUTTER: ICD-10-CM

## 2022-05-24 DIAGNOSIS — R55 VASOVAGAL SYNCOPE: ICD-10-CM

## 2022-05-24 DIAGNOSIS — I48.19 PERSISTENT ATRIAL FIBRILLATION: Primary | ICD-10-CM

## 2022-05-24 PROBLEM — I48.92 ATRIAL FLUTTER WITH RAPID VENTRICULAR RESPONSE (HCC): Status: RESOLVED | Noted: 2017-03-27 | Resolved: 2022-05-24

## 2022-05-24 PROCEDURE — 99214 OFFICE O/P EST MOD 30 MIN: CPT | Performed by: INTERNAL MEDICINE

## 2022-05-24 NOTE — PROGRESS NOTES
Cardiac Electrophysiology Outpatient Follow Up Note            [facility]    Follow Up Office Visit      Stevenson Chilel  [id number]  05/24/2022  [unfilled]  [unfilled]    Primary Care Physician: Jacqueline Montalvo APRN    Referred By: No ref. provider found    Subjective     Chief Complaint:   Diagnoses and all orders for this visit:    1. Persistent atrial fibrillation (HCC) (Primary)    2. Typical atrial flutter (HCC)    3. Vasovagal syncope      Chief Complaint   Patient presents with   • Persistent atrial fibrillation (CMS/HCC)       History of Present Illness:   Stevenson Chilel is a 46 y.o. male who presents to my electrophysiology clinic for follow up of above complaints.  Mr. Chilel tell me that he presented as of January where he passed out.  He was seated on the toilet.  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  On way down he hit a wooden countertop and knocked out to his upper front teeth.  This required stents of dental reconstructive surgery.    He continues to have episodes occasionally when moving from a sitting to standing position that he feels like he would pass out and occasionally will pass out.  All of his episodes of syncope presyncope recurrence contact only.  #1 sitting down.  #1 standing for a while.    Address today very well-hydrated.    He does not take any medications and really not interested in taking additional medications.    Been severe since he had an echo or stress test he states.    No chest pain.  With significant exertion occasionally some shortness of breath.  But this is really only with significant exertion.    Review of Systems:   Constitutional: No fevers or chills, no recent weight gain or weight loss or fatigue  Eyes: No visual loss, blurred vision, double vision, yellow sclerae.  ENT: No headaches, hearing loss, vertigo, congestion or sore  throat.   Cardiovascular: Per HPI  Respiratory: No cough or wheezing, no sputum production, no hematemesis   Gastrointestinal: No abdominal pain, no nausea, vomiting, constipation, diarrhea, melena.   Genitourinary: No dysuria, hematuria or increased frequency.  Musculoskeletal: Stiffness in his neck and upper back and spinal fusion surgery some years ago.      Past Medical History:   Past Medical History:   Diagnosis Date   • Alcoholism /alcohol abuse    • Anemia    • Anxiety    • Arthritis     shoulders and knees   • Atrial fibrillation (HCC)    • Atrial flutter (HCC)    • Depression    • Diverticulitis    • Esophagitis    • Gastritis    • GERD (gastroesophageal reflux disease)    • Hypertension    • Kidney stone    • Pancreatitis     patient denies   • Sleep apnea     has c-pap       Past Surgical History:   Past Surgical History:   Procedure Laterality Date   • BACK SURGERY     • CARDIAC ELECTROPHYSIOLOGY PROCEDURE N/A 5/2/2017    Procedure: Ablation atrial flutter;  Surgeon: Ramirez Reza MD;  Location: [facility] EP INVASIVE LOCATION;  Service:    • CARDIAC ELECTROPHYSIOLOGY PROCEDURE N/A 9/5/2017    Procedure: Ablation atrial fibrillation PVA ;  Surgeon: Ramirez Reza MD;  Location: [facility] EP INVASIVE LOCATION;  Service:    • COLON RESECTION Left 12/20/2018    Procedure: LAPAROSCOPIC SIGMOID  RESECTION, TAKE DOWN OF SPLENIC FLEXURE;  Surgeon: Michel Lala MD;  Location: [facility] OR;  Service: General   • COLONOSCOPY     • COLONOSCOPY N/A 5/30/2018    Procedure: COLONOSCOPY;  Surgeon: Edwin Medina MD;  Location: [facility] ENDOSCOPY;  Service: Gastroenterology   • COLONOSCOPY N/A 10/28/2020    Procedure: COLONOSCOPY;  Surgeon: Edwin Medina MD;  Location: [facility] ENDOSCOPY;  Service: Gastroenterology;  Laterality: N/A;   • ENDOSCOPY N/A 3/30/2018    Procedure: ESOPHAGOGASTRODUODENOSCOPY;  Surgeon: Edwin Medina MD;  Location: [facility] ENDOSCOPY;  Service: Gastroenterology   • ENDOSCOPY N/A 5/30/2018     Procedure: ESOPHAGOGASTRODUODENOSCOPY;  Surgeon: Edwin Medina MD;  Location: [facility] ENDOSCOPY;  Service: Gastroenterology   • ENDOSCOPY N/A 12/3/2018    Procedure: ESOPHAGOGASTRODUODENOSCOPY;  Surgeon: Edwin Medina MD;  Location: [facility] ENDOSCOPY;  Service: Gastroenterology   • ENDOSCOPY N/A 10/22/2019    Procedure: ESOPHAGOGASTRODUODENOSCOPY;  Surgeon: Edwin Medina MD;  Location: [facility] ENDOSCOPY;  Service: Gastroenterology   • ENDOSCOPY N/A 10/28/2020    Procedure: ESOPHAGOGASTRODUODENOSCOPY;  Surgeon: Edwin Medina MD;  Location: [facility] ENDOSCOPY;  Service: Gastroenterology;  Laterality: N/A;   • SHOULDER SURGERY Right 2014   • TONSILLECTOMY  12/2015   • UPPER GASTROINTESTINAL ENDOSCOPY  03/30/2018   • UPPER GASTROINTESTINAL ENDOSCOPY     • UPPER GASTROINTESTINAL ENDOSCOPY  05/30/2018   • UPPER GASTROINTESTINAL ENDOSCOPY  10/22/2019   • WISDOM TOOTH EXTRACTION         Family History:   Family History   Problem Relation Age of Onset   • Heart disease Father    • Depression Father    • Cancer Maternal Grandmother         lung   • Cancer Paternal Grandmother         pancreatic   • Heart disease Paternal Grandfather        Social History:   Social History     Socioeconomic History   • Marital status:    Tobacco Use   • Smoking status: Never Smoker   • Smokeless tobacco: Never Used   Substance and Sexual Activity   • Alcohol use: Not Currently     Comment: quit 3/17/18   • Drug use: Yes     Frequency: 7.0 times per week     Types: Marijuana   • Sexual activity: Defer       Medications:     Current Outpatient Medications:   •  aspirin 81 MG EC tablet, Take 81 mg by mouth Daily., Disp: , Rfl:   •  B Complex-C (SUPER B COMPLEX PO), Take 1 tablet by mouth Daily., Disp: , Rfl:   •  DULoxetine (CYMBALTA) 30 MG capsule, , Disp: , Rfl:   •  DULoxetine (CYMBALTA) 60 MG capsule, Take 90 mg by mouth Every Night., Disp: , Rfl: 2  •  magnesium oxide (MAGOX) 400 (241.3 MG) MG tablet tablet, Take 400 mg by mouth  "2 (Two) Times a Day., Disp: , Rfl:   •  mirtazapine (REMERON) 15 MG tablet, Take 15 mg by mouth Every Night., Disp: , Rfl:   •  traZODone (DESYREL) 50 MG tablet, Take 50 mg by mouth As Needed., Disp: , Rfl:     Allergies:   Allergies   Allergen Reactions   • Contrast Dye Anaphylaxis   • Iodinated Diagnostic Agents Anaphylaxis   • Morphine Other (See Comments)     hiccups   • Zofran [Ondansetron Hcl] Other (See Comments)      hiccups       Objective   Vital Signs:   Vitals:    05/24/22 1023   BP: 136/98   BP Location: Left arm   Patient Position: Sitting   Pulse: 83   SpO2: 96%   Weight: 102 kg (225 lb)   Height: 182.9 cm (72\")       PHYSICAL EXAM  General appearance: Awake, alert, cooperative  Head: Normocephalic, without obvious abnormality, atraumatic  Eyes: Conjunctivae/corneas clear, EOMs intact  Neck: no adenopathy, no carotid bruit, no JVD and thyroid: not enlarged  Lungs: clear to auscultation bilaterally and no rhonchi or crackles\", ' symmetric  Heart: regular rate and rhythm, S1, S2 normal, no murmur, click, rub or gallop  Abdomen: Soft, non-tender, bowel sounds normal,  no organomegaly  Extremities: extremities normal, atraumatic, no cyanosis or edema  Skin: Skin color, turgor normal, no rashes or lesions  Neurologic: Grossly normal     Lab Results   Component Value Date    GLUCOSE 78 04/20/2022    CALCIUM 9.3 04/20/2022     04/20/2022    K 4.8 04/20/2022    CO2 27.0 04/20/2022     04/20/2022    BUN 24 (H) 04/20/2022    CREATININE 1.25 04/20/2022    EGFRIFNONA 70 03/30/2021    BCR 19.2 04/20/2022    ANIONGAP 8.0 04/20/2022     Lab Results   Component Value Date    WBC 7.66 04/19/2022    HGB 17.8 (H) 04/19/2022    HCT 52.6 (H) 04/19/2022    MCV 86.5 04/19/2022     04/19/2022     Lab Results   Component Value Date    INR 1.00 01/08/2020    INR 0.99 09/05/2019    INR 1.10 04/10/2018    PROTIME 13.0 01/08/2020    PROTIME 12.9 09/05/2019    PROTIME 14.0 04/10/2018     Lab Results   Component " Value Date    TSH 1.370 08/31/2017       Cardiac Testing:     I personally viewed and interpreted the patient's EKG/Telemetry/lab data    Procedures    Tobacco Cessation: N/A  Obstructive Sleep Apnea Screening: Completed    Assessment & Plan    Diagnoses and all orders for this visit:    1. Persistent atrial fibrillation (HCC) (Primary)    2. Typical atrial flutter (HCC)    3. Vasovagal syncope         Diagnosis Plan   1. Persistent atrial fibrillation (HCC)   rhythm control strategy.  Limited with cath ablation.  No recurrence.    [id number] equals 0    Rhythm control has very clearly also been helped by sobriety.  He is clear his mind that he is an alcoholic and drank excessively even the night before his most recent ablation procedure.    No evidence of any recurrence of arrhythmia.    No need for anticoagulation.  No indication for antiarrhythmic drug therapy.   2. Typical atrial flutter (HCC)   ablated as well.  No recurrence.   3. Vasovagal syncope   most recent episode of syncope clearly has due to autonomic dysfunction.  He has orthostatic intolerance as a numerous episodes of syncope presyncope moving from seated to standing position.    1 conversation with him that a stress test is helpful.  We will arrange for an exercise stress echo at his cardiologist office.    If indeed this is normal then although certainly this is syncope because of autonomic dysfunction.  I recommend that he encourage salt and fluid intake.  Is not really on any medications or can exacerbate this.    Lengthy conversation with him that he will need to be aware that this is something is prone to and that moving from sitting to standing he needs to take time and be aware that indeed this can happen again.    Back and see me in an as needed fashion.    Apart from a stress test which we will arrange at his cardiologist office no further testing or work-up is required.     Body mass index is 30.52 kg/m².    I spent 38 minutes in  consultation with this patient which included more than 65% of this time in direct face-to-face counseling, physical examination and discussion of my assessment and findings and shared decision making with the patient.  The remainder of the time not spent face to face was performing one, some or all of the following actions:  preparing to see this patient ( eg. Review of tests),  ordering medications, tests or procedures ), care coordination, discussion of the plan with other healthcare providers, documenting clinical information in Epic well as independently interpreting results and communicating results to patient, family and or caregiver.  All time noted occurred on the date of service.    Follow Up:       Thank you for allowing me to participate in the care of your patient. Please to not hesitate to contact me with additional questions or concerns.      Augie Sy DO, FACC, RS  Cardiac Electrophysiologist  [facility] / [facility]

## 2022-05-25 DIAGNOSIS — R55 VASOVAGAL SYNCOPE: Primary | ICD-10-CM

## 2022-05-25 DIAGNOSIS — I48.19 PERSISTENT ATRIAL FIBRILLATION: ICD-10-CM

## 2022-05-25 DIAGNOSIS — I48.3 TYPICAL ATRIAL FLUTTER: ICD-10-CM

## 2022-06-01 ENCOUNTER — APPOINTMENT (OUTPATIENT)
Dept: CARDIOLOGY | Facility: HOSPITAL | Age: 47
End: 2022-06-01

## 2022-07-19 ENCOUNTER — APPOINTMENT (OUTPATIENT)
Dept: LAB | Facility: HOSPITAL | Age: 47
End: 2022-07-19

## 2022-07-19 ENCOUNTER — APPOINTMENT (OUTPATIENT)
Dept: ONCOLOGY | Facility: CLINIC | Age: 47
End: 2022-07-19

## 2022-07-19 ENCOUNTER — APPOINTMENT (OUTPATIENT)
Dept: ONCOLOGY | Facility: HOSPITAL | Age: 47
End: 2022-07-19

## 2022-07-19 ENCOUNTER — OFFICE VISIT (OUTPATIENT)
Dept: ONCOLOGY | Facility: CLINIC | Age: 47
End: 2022-07-19

## 2022-07-19 ENCOUNTER — TELEPHONE (OUTPATIENT)
Dept: ONCOLOGY | Facility: HOSPITAL | Age: 47
End: 2022-07-19

## 2022-07-19 ENCOUNTER — LAB (OUTPATIENT)
Dept: ONCOLOGY | Facility: HOSPITAL | Age: 47
End: 2022-07-19

## 2022-07-19 VITALS
RESPIRATION RATE: 18 BRPM | DIASTOLIC BLOOD PRESSURE: 91 MMHG | SYSTOLIC BLOOD PRESSURE: 146 MMHG | TEMPERATURE: 97.2 F | BODY MASS INDEX: 30.52 KG/M2 | HEART RATE: 62 BPM | OXYGEN SATURATION: 96 % | WEIGHT: 225 LBS

## 2022-07-19 DIAGNOSIS — D75.1 SECONDARY POLYCYTHEMIA: Primary | ICD-10-CM

## 2022-07-19 DIAGNOSIS — D75.1 SECONDARY POLYCYTHEMIA: ICD-10-CM

## 2022-07-19 DIAGNOSIS — D69.6 THROMBOCYTOPENIA: ICD-10-CM

## 2022-07-19 LAB
ANION GAP SERPL CALCULATED.3IONS-SCNC: 5 MMOL/L (ref 5–15)
BASOPHILS # BLD AUTO: 0.05 10*3/MM3 (ref 0–0.2)
BASOPHILS NFR BLD AUTO: 0.6 % (ref 0–1.5)
BUN SERPL-MCNC: 20 MG/DL (ref 6–20)
BUN/CREAT SERPL: 18.3 (ref 7–25)
CALCIUM SPEC-SCNC: 8.8 MG/DL (ref 8.6–10.5)
CHLORIDE SERPL-SCNC: 107 MMOL/L (ref 98–107)
CO2 SERPL-SCNC: 30 MMOL/L (ref 22–29)
CREAT SERPL-MCNC: 1.09 MG/DL (ref 0.76–1.27)
DEPRECATED RDW RBC AUTO: 47.4 FL (ref 37–54)
EGFRCR SERPLBLD CKD-EPI 2021: 85.3 ML/MIN/1.73
EOSINOPHIL # BLD AUTO: 0.18 10*3/MM3 (ref 0–0.4)
EOSINOPHIL NFR BLD AUTO: 2 % (ref 0.3–6.2)
ERYTHROCYTE [DISTWIDTH] IN BLOOD BY AUTOMATED COUNT: 15.1 % (ref 12.3–15.4)
GLUCOSE SERPL-MCNC: 95 MG/DL (ref 65–99)
HCT VFR BLD AUTO: 49.8 % (ref 37.5–51)
HGB BLD-MCNC: 17 G/DL (ref 13–17.7)
HOLD SPECIMEN: NORMAL
IMM GRANULOCYTES # BLD AUTO: 0.03 10*3/MM3 (ref 0–0.05)
IMM GRANULOCYTES NFR BLD AUTO: 0.3 % (ref 0–0.5)
LYMPHOCYTES # BLD AUTO: 1.87 10*3/MM3 (ref 0.7–3.1)
LYMPHOCYTES NFR BLD AUTO: 21.1 % (ref 19.6–45.3)
MCH RBC QN AUTO: 29.7 PG (ref 26.6–33)
MCHC RBC AUTO-ENTMCNC: 34.1 G/DL (ref 31.5–35.7)
MCV RBC AUTO: 86.9 FL (ref 79–97)
MONOCYTES # BLD AUTO: 0.65 10*3/MM3 (ref 0.1–0.9)
MONOCYTES NFR BLD AUTO: 7.3 % (ref 5–12)
NEUTROPHILS NFR BLD AUTO: 6.07 10*3/MM3 (ref 1.7–7)
NEUTROPHILS NFR BLD AUTO: 68.7 % (ref 42.7–76)
NRBC BLD AUTO-RTO: 0 /100 WBC (ref 0–0.2)
PLATELET # BLD AUTO: 223 10*3/MM3 (ref 140–450)
PMV BLD AUTO: 10.1 FL (ref 6–12)
POTASSIUM SERPL-SCNC: 4.8 MMOL/L (ref 3.5–5.2)
RBC # BLD AUTO: 5.73 10*6/MM3 (ref 4.14–5.8)
SODIUM SERPL-SCNC: 142 MMOL/L (ref 136–145)
WBC NRBC COR # BLD: 8.85 10*3/MM3 (ref 3.4–10.8)

## 2022-07-19 PROCEDURE — 80048 BASIC METABOLIC PNL TOTAL CA: CPT

## 2022-07-19 PROCEDURE — 85025 COMPLETE CBC W/AUTO DIFF WBC: CPT

## 2022-07-19 PROCEDURE — 99214 OFFICE O/P EST MOD 30 MIN: CPT | Performed by: NURSE PRACTITIONER

## 2022-07-19 PROCEDURE — G0463 HOSPITAL OUTPT CLINIC VISIT: HCPCS | Performed by: NURSE PRACTITIONER

## 2022-07-19 NOTE — TELEPHONE ENCOUNTER
----- Message from VIKASH Clinton sent at 7/19/2022  9:30 AM CDT -----  Let him know his kidney function is good

## 2022-07-20 NOTE — PROGRESS NOTES
DATE OF VISIT: 7/19/2022      REASON FOR VISIT:  Secondary polycythemia, thrombocytopenia, acute kidney injury        HISTORY OF PRESENT ILLNESS:   47-year-old male with a history of sleep apnea for which he is not using CPAP machine, chronic testosterone supplement currently getting weekly testosterone injection, atrial fibrillation, secondary polycythemia, thrombocytopenia is here for follow-up appointment today.   Patient is here today for 3 month follow-up; he was seen back in April and required therapeutic phlebotomy.  He has been seen by Dr. Sy in [place] for his recent vasovagal syncopal episode. His recommendations were to do exercise stress ECHO.  Patient is seeing Dr. Lake as well.  He is doing well today. Has not had any additional syncopal episodes.      Past Medical History, Past Surgical History, Social History, Family History have been reviewed and are without significant changes except as mentioned.    Review of Systems   A comprehensive 14 point review of systems was performed and was negative except as mentioned.    Medications:  The current medication list was reviewed in the EMR    ALLERGIES:    Allergies   Allergen Reactions   • Contrast Dye Anaphylaxis   • Iodinated Diagnostic Agents Anaphylaxis   • Morphine Other (See Comments)     hiccups   • Zofran [Ondansetron Hcl] Other (See Comments)      hiccups       Objective      Vitals:    07/19/22 0817   BP: 146/91   Pulse: 62   Resp: 18   Temp: 97.2 °F (36.2 °C)   SpO2: 96%   Weight: 102 kg (225 lb)   PainSc: 0-No pain     Current Status 3/30/2021   ECOG score 0       Physical Exam  General: alert and oriented no acute distress  Lungs; CTAB  Card: RRR  Ext: no edema    RECENT LABS:  Glucose   Date Value Ref Range Status   07/19/2022 95 65 - 99 mg/dL Final     Sodium   Date Value Ref Range Status   07/19/2022 142 136 - 145 mmol/L Final   03/22/2018 139 136 - 145 MMOL/L Final     Potassium   Date Value Ref Range Status   07/19/2022 4.8 3.5 -  5.2 mmol/L Final   03/22/2018 4.2 3.5 - 5.4 MMOL/L Final     CO2   Date Value Ref Range Status   07/19/2022 30.0 (H) 22.0 - 29.0 mmol/L Final   03/22/2018 25 20 - 33 MMOL/L Final     Chloride   Date Value Ref Range Status   07/19/2022 107 98 - 107 mmol/L Final   03/22/2018 101 98 - 107 MMOL/L Final     Anion Gap   Date Value Ref Range Status   07/19/2022 5.0 5.0 - 15.0 mmol/L Final   03/22/2018 17 (H) 7 - 14 MMOL/L Final     Creatinine   Date Value Ref Range Status   07/19/2022 1.09 0.76 - 1.27 mg/dL Final   03/22/2018 1.0 0.6 - 1.2 MG/DL Final     BUN   Date Value Ref Range Status   07/19/2022 20 6 - 20 mg/dL Final   03/22/2018 10 8 - 23 MG/DL Final     BUN/Creatinine Ratio   Date Value Ref Range Status   07/19/2022 18.3 7.0 - 25.0 Final     Calcium   Date Value Ref Range Status   07/19/2022 8.8 8.6 - 10.5 mg/dL Final   03/22/2018 8.1 (L) 8.7 - 10.4 MG/DL Final     eGFR Non  Amer   Date Value Ref Range Status   03/30/2021 70 >60 mL/min/1.73 Final     Alkaline Phosphatase   Date Value Ref Range Status   04/19/2022 90 39 - 117 U/L Final   03/21/2018 36 25 - 100 U/L Final     Total Protein   Date Value Ref Range Status   04/19/2022 7.3 6.0 - 8.5 g/dL Final   03/21/2018 7.2 6.0 - 8.2 G/DL Final     ALT (SGPT)   Date Value Ref Range Status   04/19/2022 43 (H) 1 - 41 U/L Final   03/21/2018 37 10 - 40 U/L Final     AST (SGOT)   Date Value Ref Range Status   04/19/2022 36 1 - 40 U/L Final   03/21/2018 44 (H) 0 - 39 U/L Final     Total Bilirubin   Date Value Ref Range Status   04/19/2022 0.6 0.0 - 1.2 mg/dL Final   03/21/2018 1.1 0.3 - 1.2 MG/DL Final     Albumin   Date Value Ref Range Status   04/19/2022 4.70 3.50 - 5.20 g/dL Final   03/21/2018 4.4 3.4 - 4.8 G/DL Final     Globulin   Date Value Ref Range Status   04/19/2022 2.6 gm/dL Final     A/G Ratio   Date Value Ref Range Status   03/21/2018 1.6  Final     Lab Results   Component Value Date    WBC 8.85 07/19/2022    HGB 17.0 07/19/2022    HCT 49.8 07/19/2022     MCV 86.9 07/19/2022     07/19/2022     Lab Results   Component Value Date    NEUTROABS 6.07 07/19/2022    IRON 85 03/30/2021    IRON 95 11/23/2020    IRON 23 (L) 09/21/2020    TIBC 437 03/30/2021    TIBC 435 11/23/2020    TIBC 507 09/21/2020    LABIRON 19 (L) 03/30/2021    LABIRON 22 11/23/2020    LABIRON 5 (L) 09/21/2020    FERRITIN 35.89 03/30/2021    FERRITIN 36.31 11/23/2020    FERRITIN 11.93 (L) 09/21/2020    [id number] 513 03/30/2021    [id number] 670 09/21/2020    FOLATE 13.50 03/30/2021    FOLATE 17.60 09/21/2020     Lab Results   Component Value Date    AFPTM 2.2 04/10/2018    AFPTM 182 04/10/2018         PATHOLOGY:  * Cannot find OR log *         RADIOLOGY DATA :  No radiology results for the last 7 days        Assessment & Plan       1 .  Erythrocytosis:  - 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  - Work-up in the past consisting of JAK2 mutation was negative in October 2017.  Erythropoietin level was normal at 6.9 in October 2017 consistent with secondary cause.  - CBC done earlier today shows hemoglobin is 17.0 with hematocrit of 49.8; no need for any intervention today.  - Recommend continue with baby aspirin  - Patient will return to clinic in 3 months with repeat CBC and BMP on that day.     2.  Thrombocytopenia:  - Platelet count today is 223,000.  Will monitor with CBC     3.  History of obstructive sleep apnea:  - Patient is not using CPAP machine.  Patient has been counseled about importance of using CPAP machine in the past.     4.  History of low testosterone:  - Currently on weekly testosterone injection.     5.  Health maintenance: Patient does not smoke     6.  Acute kidney injury: Problem is new to me  - Creatinine is improved today to 1.09             PHQ-9 Total Score:       Stevenson Chilel reports a pain score of 0.  Given his pain assessment as noted,  treatment options were discussed and the following options were decided upon as a follow-up plan to address the patient's pain: no pain today.         Brea Kerns, APRN  7/20/2022  09:29 CDT        Part of this note may be an electronic transcription/translation of spoken language to printed text using the Dragon Dictation System.          CC:

## 2022-10-20 ENCOUNTER — LAB (OUTPATIENT)
Dept: ONCOLOGY | Facility: HOSPITAL | Age: 47
End: 2022-10-20

## 2022-10-20 ENCOUNTER — OFFICE VISIT (OUTPATIENT)
Dept: ONCOLOGY | Facility: CLINIC | Age: 47
End: 2022-10-20

## 2022-10-20 VITALS
WEIGHT: 232 LBS | HEART RATE: 103 BPM | DIASTOLIC BLOOD PRESSURE: 87 MMHG | SYSTOLIC BLOOD PRESSURE: 141 MMHG | BODY MASS INDEX: 31.46 KG/M2 | RESPIRATION RATE: 18 BRPM | TEMPERATURE: 98.8 F | OXYGEN SATURATION: 91 %

## 2022-10-20 DIAGNOSIS — D75.1 SECONDARY POLYCYTHEMIA: ICD-10-CM

## 2022-10-20 DIAGNOSIS — D75.1 SECONDARY POLYCYTHEMIA: Primary | Chronic | ICD-10-CM

## 2022-10-20 DIAGNOSIS — D69.6 THROMBOCYTOPENIA: Chronic | ICD-10-CM

## 2022-10-20 LAB
ALBUMIN SERPL-MCNC: 4.7 G/DL (ref 3.5–5.2)
ALBUMIN/GLOB SERPL: 2.1 G/DL
ALP SERPL-CCNC: 82 U/L (ref 39–117)
ALT SERPL W P-5'-P-CCNC: 46 U/L (ref 1–41)
ANION GAP SERPL CALCULATED.3IONS-SCNC: 10 MMOL/L (ref 5–15)
AST SERPL-CCNC: 37 U/L (ref 1–40)
BASOPHILS # BLD AUTO: 0.05 10*3/MM3 (ref 0–0.2)
BASOPHILS NFR BLD AUTO: 0.5 % (ref 0–1.5)
BILIRUB SERPL-MCNC: 0.9 MG/DL (ref 0–1.2)
BUN SERPL-MCNC: 17 MG/DL (ref 6–20)
BUN/CREAT SERPL: 13 (ref 7–25)
CALCIUM SPEC-SCNC: 9.4 MG/DL (ref 8.6–10.5)
CHLORIDE SERPL-SCNC: 101 MMOL/L (ref 98–107)
CO2 SERPL-SCNC: 27 MMOL/L (ref 22–29)
CREAT SERPL-MCNC: 1.31 MG/DL (ref 0.76–1.27)
DEPRECATED RDW RBC AUTO: 52.5 FL (ref 37–54)
EGFRCR SERPLBLD CKD-EPI 2021: 68.4 ML/MIN/1.73
EOSINOPHIL # BLD AUTO: 0.09 10*3/MM3 (ref 0–0.4)
EOSINOPHIL NFR BLD AUTO: 0.9 % (ref 0.3–6.2)
ERYTHROCYTE [DISTWIDTH] IN BLOOD BY AUTOMATED COUNT: 16 % (ref 12.3–15.4)
GLOBULIN UR ELPH-MCNC: 2.2 GM/DL
GLUCOSE SERPL-MCNC: 67 MG/DL (ref 65–99)
HCT VFR BLD AUTO: 51.5 % (ref 37.5–51)
HGB BLD-MCNC: 16.8 G/DL (ref 13–17.7)
HOLD SPECIMEN: NORMAL
IMM GRANULOCYTES # BLD AUTO: 0.04 10*3/MM3 (ref 0–0.05)
IMM GRANULOCYTES NFR BLD AUTO: 0.4 % (ref 0–0.5)
LYMPHOCYTES # BLD AUTO: 1.87 10*3/MM3 (ref 0.7–3.1)
LYMPHOCYTES NFR BLD AUTO: 19.4 % (ref 19.6–45.3)
MCH RBC QN AUTO: 29.3 PG (ref 26.6–33)
MCHC RBC AUTO-ENTMCNC: 32.6 G/DL (ref 31.5–35.7)
MCV RBC AUTO: 89.9 FL (ref 79–97)
MONOCYTES # BLD AUTO: 0.7 10*3/MM3 (ref 0.1–0.9)
MONOCYTES NFR BLD AUTO: 7.3 % (ref 5–12)
NEUTROPHILS NFR BLD AUTO: 6.9 10*3/MM3 (ref 1.7–7)
NEUTROPHILS NFR BLD AUTO: 71.5 % (ref 42.7–76)
NRBC BLD AUTO-RTO: 0 /100 WBC (ref 0–0.2)
PLATELET # BLD AUTO: 199 10*3/MM3 (ref 140–450)
PMV BLD AUTO: 10 FL (ref 6–12)
POTASSIUM SERPL-SCNC: 4.4 MMOL/L (ref 3.5–5.2)
PROT SERPL-MCNC: 6.9 G/DL (ref 6–8.5)
RBC # BLD AUTO: 5.73 10*6/MM3 (ref 4.14–5.8)
SODIUM SERPL-SCNC: 138 MMOL/L (ref 136–145)
WBC NRBC COR # BLD: 9.65 10*3/MM3 (ref 3.4–10.8)

## 2022-10-20 PROCEDURE — 85025 COMPLETE CBC W/AUTO DIFF WBC: CPT

## 2022-10-20 PROCEDURE — 80053 COMPREHEN METABOLIC PANEL: CPT

## 2022-10-20 PROCEDURE — G0463 HOSPITAL OUTPT CLINIC VISIT: HCPCS | Performed by: NURSE PRACTITIONER

## 2022-10-20 PROCEDURE — 99212 OFFICE O/P EST SF 10 MIN: CPT | Performed by: NURSE PRACTITIONER

## 2022-10-21 NOTE — PROGRESS NOTES
DATE OF VISIT: 10/20/2022      REASON FOR VISIT:  Secondary polycythemia, thrombocytopenia, acute kidney injury        HISTORY OF PRESENT ILLNESS:   47-year-old male with a history of sleep apnea for which he is not using CPAP machine, chronic testosterone supplement currently getting weekly testosterone injection, atrial fibrillation, secondary polycythemia, thrombocytopenia is here for follow-up appointment today.   Patient is here today for 3 month follow-up; he was seen back in April and required therapeutic phlebotomy.  He has been seen by Dr. Sy in [place] for his recent vasovagal syncopal episode. His recommendations were to do exercise stress ECHO.  Patient states this is being done tomorrow in [facility]. Follows with Dr. Lake locally.             Past Medical History, Past Surgical History, Social History, Family History have been reviewed and are without significant changes except as mentioned.    Review of Systems   A comprehensive 14 point review of systems was performed and was negative except as mentioned.    Medications:  The current medication list was reviewed in the EMR    ALLERGIES:    Allergies   Allergen Reactions   • Contrast Dye Anaphylaxis   • Iodinated Diagnostic Agents Anaphylaxis   • Morphine Other (See Comments)     hiccups   • Zofran [Ondansetron Hcl] Other (See Comments)      hiccups       Objective      Vitals:    10/20/22 1451   BP: 141/87   Pulse: 103   Resp: 18   Temp: 98.8 °F (37.1 °C)   SpO2: 91%   Weight: 105 kg (232 lb)   PainSc: 0-No pain     Current Status 3/30/2021   ECOG score 0       Physical Exam  General;alert and oriented no acute distress  Ext; no edema    RECENT LABS:  Glucose   Date Value Ref Range Status   10/20/2022 67 65 - 99 mg/dL Final     Sodium   Date Value Ref Range Status   10/20/2022 138 136 - 145 mmol/L Final   03/22/2018 139 136 - 145 MMOL/L Final     Potassium   Date Value Ref Range Status   10/20/2022 4.4 3.5 - 5.2 mmol/L Final   03/22/2018  4.2 3.5 - 5.4 MMOL/L Final     CO2   Date Value Ref Range Status   10/20/2022 27.0 22.0 - 29.0 mmol/L Final   03/22/2018 25 20 - 33 MMOL/L Final     Chloride   Date Value Ref Range Status   10/20/2022 101 98 - 107 mmol/L Final   03/22/2018 101 98 - 107 MMOL/L Final     Anion Gap   Date Value Ref Range Status   10/20/2022 10.0 5.0 - 15.0 mmol/L Final   03/22/2018 17 (H) 7 - 14 MMOL/L Final     Creatinine   Date Value Ref Range Status   10/20/2022 1.31 (H) 0.76 - 1.27 mg/dL Final   03/22/2018 1.0 0.6 - 1.2 MG/DL Final     BUN   Date Value Ref Range Status   10/20/2022 17 6 - 20 mg/dL Final   03/22/2018 10 8 - 23 MG/DL Final     BUN/Creatinine Ratio   Date Value Ref Range Status   10/20/2022 13.0 7.0 - 25.0 Final     Calcium   Date Value Ref Range Status   10/20/2022 9.4 8.6 - 10.5 mg/dL Final   03/22/2018 8.1 (L) 8.7 - 10.4 MG/DL Final     eGFR Non  Amer   Date Value Ref Range Status   03/30/2021 70 >60 mL/min/1.73 Final     Alkaline Phosphatase   Date Value Ref Range Status   10/20/2022 82 39 - 117 U/L Final   03/21/2018 36 25 - 100 U/L Final     Total Protein   Date Value Ref Range Status   10/20/2022 6.9 6.0 - 8.5 g/dL Final   03/21/2018 7.2 6.0 - 8.2 G/DL Final     ALT (SGPT)   Date Value Ref Range Status   10/20/2022 46 (H) 1 - 41 U/L Final   03/21/2018 37 10 - 40 U/L Final     AST (SGOT)   Date Value Ref Range Status   10/20/2022 37 1 - 40 U/L Final   03/21/2018 44 (H) 0 - 39 U/L Final     Total Bilirubin   Date Value Ref Range Status   10/20/2022 0.9 0.0 - 1.2 mg/dL Final   03/21/2018 1.1 0.3 - 1.2 MG/DL Final     Albumin   Date Value Ref Range Status   10/20/2022 4.70 3.50 - 5.20 g/dL Final   03/21/2018 4.4 3.4 - 4.8 G/DL Final     Globulin   Date Value Ref Range Status   10/20/2022 2.2 gm/dL Final     A/G Ratio   Date Value Ref Range Status   03/21/2018 1.6  Final     Lab Results   Component Value Date    WBC 9.65 10/20/2022    HGB 16.8 10/20/2022    HCT 51.5 (H) 10/20/2022    MCV 89.9 10/20/2022      10/20/2022     Lab Results   Component Value Date    NEUTROABS 6.90 10/20/2022    IRON 85 03/30/2021    IRON 95 11/23/2020    IRON 23 (L) 09/21/2020    TIBC 437 03/30/2021    TIBC 435 11/23/2020    TIBC 507 09/21/2020    LABIRON 19 (L) 03/30/2021    LABIRON 22 11/23/2020    LABIRON 5 (L) 09/21/2020    FERRITIN 35.89 03/30/2021    FERRITIN 36.31 11/23/2020    FERRITIN 11.93 (L) 09/21/2020    [id number] 513 03/30/2021    [id number] 670 09/21/2020    FOLATE 13.50 03/30/2021    FOLATE 17.60 09/21/2020     Lab Results   Component Value Date    AFPTM 2.2 04/10/2018    AFPTM 182 04/10/2018               RADIOLOGY DATA :  No radiology results for the last 7 days        Assessment & Plan   1 .  Erythrocytosis:  - 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  - Work-up in the past consisting of JAK2 mutation was negative in October 2017.  Erythropoietin level was normal at 6.9 in October 2017 consistent with secondary cause.  - CBC done today shows Hgb is 16.8 and HCt 51.5%; will place order for phlebotomy.  - Recommend continue with baby aspirin  - Patient will return to clinic in 3 months with repeat CBC and BMP on that day.     2.  Thrombocytopenia:  - Platelet count today is 199,000.  Will monitor with CBC     3.  History of obstructive sleep apnea:  - Patient is not using CPAP machine.  Patient has been counseled about importance of using CPAP machine in the past.     4.  History of low testosterone:  - Currently on weekly testosterone injection.     5.  Health maintenance: Patient does not smoke                  PHQ-9 Total Score: 0     Stevenson Lino Chilel reports a pain score of 0.  Given his pain assessment as noted, treatment options were discussed and the following options were decided upon as a follow-up plan to address the patient's pain: no pain todya.         Brea Kerns,  APRN  10/21/2022  08:20 CDT        Part of this note may be an electronic transcription/translation of spoken language to printed text using the Dragon Dictation System.          CC:

## 2022-10-28 ENCOUNTER — LAB (OUTPATIENT)
Dept: LAB | Facility: HOSPITAL | Age: 47
End: 2022-10-28

## 2022-10-28 DIAGNOSIS — D75.1 SECONDARY POLYCYTHEMIA: Chronic | ICD-10-CM

## 2022-10-28 LAB
Lab: NORMAL
PRE-BLOOD PRESSURE: NORMAL MMHG
PRE-HGB: 18.3 G/DL
PULSE: 68 BPM
VOLUME COLLECTED: 300 ML

## 2022-10-28 PROCEDURE — 36415 COLL VENOUS BLD VENIPUNCTURE: CPT

## 2022-10-28 PROCEDURE — 99195 PHLEBOTOMY: CPT

## 2022-11-24 NOTE — ADDENDUM NOTE
Addended by: RAQUEL PORRAS on: 10/20/2022 02:47 PM     Modules accepted: Orders    
left hip pain, tonsillar abscess

## 2023-01-20 ENCOUNTER — OFFICE VISIT (OUTPATIENT)
Dept: ONCOLOGY | Facility: CLINIC | Age: 48
End: 2023-01-20
Payer: COMMERCIAL

## 2023-01-20 ENCOUNTER — LAB (OUTPATIENT)
Dept: ONCOLOGY | Facility: HOSPITAL | Age: 48
End: 2023-01-20
Payer: COMMERCIAL

## 2023-01-20 VITALS
SYSTOLIC BLOOD PRESSURE: 137 MMHG | DIASTOLIC BLOOD PRESSURE: 83 MMHG | OXYGEN SATURATION: 96 % | WEIGHT: 222 LBS | BODY MASS INDEX: 30.11 KG/M2 | RESPIRATION RATE: 18 BRPM | HEART RATE: 72 BPM

## 2023-01-20 DIAGNOSIS — D69.6 THROMBOCYTOPENIA: Chronic | ICD-10-CM

## 2023-01-20 DIAGNOSIS — D75.1 SECONDARY POLYCYTHEMIA: Primary | Chronic | ICD-10-CM

## 2023-01-20 DIAGNOSIS — D75.1 SECONDARY POLYCYTHEMIA: ICD-10-CM

## 2023-01-20 LAB
ALBUMIN SERPL-MCNC: 4 G/DL (ref 3.5–5.2)
ALBUMIN/GLOB SERPL: 1.7 G/DL
ALP SERPL-CCNC: 66 U/L (ref 39–117)
ALT SERPL W P-5'-P-CCNC: 23 U/L (ref 1–41)
ANION GAP SERPL CALCULATED.3IONS-SCNC: 7 MMOL/L (ref 5–15)
AST SERPL-CCNC: 28 U/L (ref 1–40)
BASOPHILS # BLD AUTO: 0.03 10*3/MM3 (ref 0–0.2)
BASOPHILS NFR BLD AUTO: 0.4 % (ref 0–1.5)
BILIRUB SERPL-MCNC: 0.5 MG/DL (ref 0–1.2)
BUN SERPL-MCNC: 15 MG/DL (ref 6–20)
BUN/CREAT SERPL: 17 (ref 7–25)
CALCIUM SPEC-SCNC: 8.6 MG/DL (ref 8.6–10.5)
CHLORIDE SERPL-SCNC: 104 MMOL/L (ref 98–107)
CO2 SERPL-SCNC: 27 MMOL/L (ref 22–29)
CREAT SERPL-MCNC: 0.88 MG/DL (ref 0.76–1.27)
DEPRECATED RDW RBC AUTO: 49.9 FL (ref 37–54)
EGFRCR SERPLBLD CKD-EPI 2021: 108.1 ML/MIN/1.73
EOSINOPHIL # BLD AUTO: 0.05 10*3/MM3 (ref 0–0.4)
EOSINOPHIL NFR BLD AUTO: 0.6 % (ref 0.3–6.2)
ERYTHROCYTE [DISTWIDTH] IN BLOOD BY AUTOMATED COUNT: 14.6 % (ref 12.3–15.4)
GLOBULIN UR ELPH-MCNC: 2.3 GM/DL
GLUCOSE SERPL-MCNC: 104 MG/DL (ref 65–99)
HCT VFR BLD AUTO: 45.5 % (ref 37.5–51)
HGB BLD-MCNC: 14.7 G/DL (ref 13–17.7)
HOLD SPECIMEN: NORMAL
IMM GRANULOCYTES # BLD AUTO: 0.02 10*3/MM3 (ref 0–0.05)
IMM GRANULOCYTES NFR BLD AUTO: 0.2 % (ref 0–0.5)
LYMPHOCYTES # BLD AUTO: 1.58 10*3/MM3 (ref 0.7–3.1)
LYMPHOCYTES NFR BLD AUTO: 19.3 % (ref 19.6–45.3)
MCH RBC QN AUTO: 29.9 PG (ref 26.6–33)
MCHC RBC AUTO-ENTMCNC: 32.3 G/DL (ref 31.5–35.7)
MCV RBC AUTO: 92.5 FL (ref 79–97)
MONOCYTES # BLD AUTO: 0.66 10*3/MM3 (ref 0.1–0.9)
MONOCYTES NFR BLD AUTO: 8.1 % (ref 5–12)
NEUTROPHILS NFR BLD AUTO: 5.84 10*3/MM3 (ref 1.7–7)
NEUTROPHILS NFR BLD AUTO: 71.4 % (ref 42.7–76)
NRBC BLD AUTO-RTO: 0 /100 WBC (ref 0–0.2)
PLATELET # BLD AUTO: 246 10*3/MM3 (ref 140–450)
PMV BLD AUTO: 9.6 FL (ref 6–12)
POTASSIUM SERPL-SCNC: 4.3 MMOL/L (ref 3.5–5.2)
PROT SERPL-MCNC: 6.3 G/DL (ref 6–8.5)
RBC # BLD AUTO: 4.92 10*6/MM3 (ref 4.14–5.8)
SODIUM SERPL-SCNC: 138 MMOL/L (ref 136–145)
WBC NRBC COR # BLD: 8.18 10*3/MM3 (ref 3.4–10.8)

## 2023-01-20 PROCEDURE — 99213 OFFICE O/P EST LOW 20 MIN: CPT | Performed by: NURSE PRACTITIONER

## 2023-01-20 PROCEDURE — 80053 COMPREHEN METABOLIC PANEL: CPT

## 2023-01-20 PROCEDURE — G0463 HOSPITAL OUTPT CLINIC VISIT: HCPCS | Performed by: NURSE PRACTITIONER

## 2023-01-20 PROCEDURE — 85025 COMPLETE CBC W/AUTO DIFF WBC: CPT

## 2023-01-23 NOTE — PROGRESS NOTES
DATE OF VISIT: 1/20/2023      REASON FOR VISIT:    Secondary polycythemia      HISTORY OF PRESENT ILLNESS:   47-year-old male with a history of sleep apnea for which he is not using CPAP machine, chronic testosterone supplement currently getting weekly testosterone injection, atrial fibrillation, secondary polycythemia, thrombocytopenia is here for follow-up appointment today.   Patient is here today for 3 month follow-up; His last phlebotomy was in October 2022      Past Medical History, Past Surgical History, Social History, Family History have been reviewed and are without significant changes except as mentioned.    Review of Systems   A comprehensive 14 point review of systems was performed and was negative except as mentioned.    Medications:  The current medication list was reviewed in the EMR    ALLERGIES:    Allergies   Allergen Reactions   • Contrast Dye (Echo Or Unknown Ct/Mr) Anaphylaxis   • Iodinated Contrast Media Anaphylaxis   • Morphine Other (See Comments)     hiccups   • Zofran [Ondansetron Hcl] Other (See Comments)      hiccups       Objective      Vitals:    01/20/23 0925   BP: 137/83   Pulse: 72   Resp: 18   SpO2: 96%   Weight: 101 kg (222 lb)   PainSc: 0-No pain     Current Status 3/30/2021   ECOG score 0       Physical Exam  General:alert and oriented no acute distress  Lungs; normal breath sounds  Card: RRR  Ext; no edema    RECENT LABS:  Glucose   Date Value Ref Range Status   01/20/2023 104 (H) 65 - 99 mg/dL Final     Sodium   Date Value Ref Range Status   01/20/2023 138 136 - 145 mmol/L Final   03/22/2018 139 136 - 145 MMOL/L Final     Potassium   Date Value Ref Range Status   01/20/2023 4.3 3.5 - 5.2 mmol/L Final   03/22/2018 4.2 3.5 - 5.4 MMOL/L Final     CO2   Date Value Ref Range Status   01/20/2023 27.0 22.0 - 29.0 mmol/L Final   03/22/2018 25 20 - 33 MMOL/L Final     Chloride   Date Value Ref Range Status   01/20/2023 104 98 - 107 mmol/L Final   03/22/2018 101 98 - 107 MMOL/L Final      Anion Gap   Date Value Ref Range Status   01/20/2023 7.0 5.0 - 15.0 mmol/L Final   03/22/2018 17 (H) 7 - 14 MMOL/L Final     Creatinine   Date Value Ref Range Status   01/20/2023 0.88 0.76 - 1.27 mg/dL Final   03/22/2018 1.0 0.6 - 1.2 MG/DL Final     BUN   Date Value Ref Range Status   01/20/2023 15 6 - 20 mg/dL Final   03/22/2018 10 8 - 23 MG/DL Final     BUN/Creatinine Ratio   Date Value Ref Range Status   01/20/2023 17.0 7.0 - 25.0 Final     Calcium   Date Value Ref Range Status   01/20/2023 8.6 8.6 - 10.5 mg/dL Final   03/22/2018 8.1 (L) 8.7 - 10.4 MG/DL Final     eGFR Non  Amer   Date Value Ref Range Status   03/30/2021 70 >60 mL/min/1.73 Final     Alkaline Phosphatase   Date Value Ref Range Status   01/20/2023 66 39 - 117 U/L Final   03/21/2018 36 25 - 100 U/L Final     Total Protein   Date Value Ref Range Status   01/20/2023 6.3 6.0 - 8.5 g/dL Final   03/21/2018 7.2 6.0 - 8.2 G/DL Final     ALT (SGPT)   Date Value Ref Range Status   01/20/2023 23 1 - 41 U/L Final   03/21/2018 37 10 - 40 U/L Final     AST (SGOT)   Date Value Ref Range Status   01/20/2023 28 1 - 40 U/L Final   03/21/2018 44 (H) 0 - 39 U/L Final     Total Bilirubin   Date Value Ref Range Status   01/20/2023 0.5 0.0 - 1.2 mg/dL Final   03/21/2018 1.1 0.3 - 1.2 MG/DL Final     Albumin   Date Value Ref Range Status   01/20/2023 4.0 3.5 - 5.2 g/dL Final   03/21/2018 4.4 3.4 - 4.8 G/DL Final     Globulin   Date Value Ref Range Status   01/20/2023 2.3 gm/dL Final     A/G Ratio   Date Value Ref Range Status   03/21/2018 1.6  Final     Lab Results   Component Value Date    WBC 8.18 01/20/2023    HGB 14.7 01/20/2023    HCT 45.5 01/20/2023    MCV 92.5 01/20/2023     01/20/2023     Lab Results   Component Value Date    NEUTROABS 5.84 01/20/2023    IRON 85 03/30/2021    IRON 95 11/23/2020    IRON 23 (L) 09/21/2020    TIBC 437 03/30/2021    TIBC 435 11/23/2020    TIBC 507 09/21/2020    LABIRON 19 (L) 03/30/2021    LABIRON 22 11/23/2020     LABIRON 5 (L) 09/21/2020    FERRITIN 35.89 03/30/2021    FERRITIN 36.31 11/23/2020    FERRITIN 11.93 (L) 09/21/2020    SKZBFDOC02 513 03/30/2021    BEBGWYPQ10 670 09/21/2020    FOLATE 13.50 03/30/2021    FOLATE 17.60 09/21/2020     Lab Results   Component Value Date    AFPTM 2.2 04/10/2018    AFPTM 182 04/10/2018         PATHOLOGY:  * Cannot find OR log *         RADIOLOGY DATA :  No radiology results for the last 7 days        Assessment & Plan    Erythrocytosis:  - 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  - Work-up in the past consisting of JAK2 mutation was negative in October 2017.  Erythropoietin level was normal at 6.9 in October 2017 consistent with secondary cause.  - Labs today show Hgb is 14.7 with HCt 45.5%; no intervention needed today.  -RTC in 4 mos                PHQ-9 Total Score: 0     Stevenson Huynh Ranjana reports a pain score of 0.  Given his pain assessment as noted, treatment options were discussed and the following options were decided upon as a follow-up plan to address the patient's pain: no pain today.         Brea Kerns, APRN  1/23/2023  10:16 CST        Part of this note may be an electronic transcription/translation of spoken language to printed text using the Dragon Dictation System.          CC:

## 2023-06-02 ENCOUNTER — LAB (OUTPATIENT)
Dept: ONCOLOGY | Facility: HOSPITAL | Age: 48
End: 2023-06-02

## 2023-06-02 DIAGNOSIS — D75.1 SECONDARY POLYCYTHEMIA: ICD-10-CM

## 2023-06-02 LAB
ALBUMIN SERPL-MCNC: 4.1 G/DL (ref 3.5–5.2)
ALBUMIN/GLOB SERPL: 1.8 G/DL
ALP SERPL-CCNC: 64 U/L (ref 39–117)
ALT SERPL W P-5'-P-CCNC: 20 U/L (ref 1–41)
ANION GAP SERPL CALCULATED.3IONS-SCNC: 10 MMOL/L (ref 5–15)
AST SERPL-CCNC: 24 U/L (ref 1–40)
BASOPHILS # BLD AUTO: 0.05 10*3/MM3 (ref 0–0.2)
BASOPHILS NFR BLD AUTO: 0.6 % (ref 0–1.5)
BILIRUB SERPL-MCNC: 0.5 MG/DL (ref 0–1.2)
BUN SERPL-MCNC: 15 MG/DL (ref 6–20)
BUN/CREAT SERPL: 13.9 (ref 7–25)
CALCIUM SPEC-SCNC: 9.1 MG/DL (ref 8.6–10.5)
CHLORIDE SERPL-SCNC: 105 MMOL/L (ref 98–107)
CO2 SERPL-SCNC: 24 MMOL/L (ref 22–29)
CREAT SERPL-MCNC: 1.08 MG/DL (ref 0.76–1.27)
DEPRECATED RDW RBC AUTO: 50.9 FL (ref 37–54)
EGFRCR SERPLBLD CKD-EPI 2021: 86.2 ML/MIN/1.73
EOSINOPHIL # BLD AUTO: 0.14 10*3/MM3 (ref 0–0.4)
EOSINOPHIL NFR BLD AUTO: 1.7 % (ref 0.3–6.2)
ERYTHROCYTE [DISTWIDTH] IN BLOOD BY AUTOMATED COUNT: 15.6 % (ref 12.3–15.4)
GLOBULIN UR ELPH-MCNC: 2.3 GM/DL
GLUCOSE SERPL-MCNC: 85 MG/DL (ref 65–99)
HCT VFR BLD AUTO: 49.7 % (ref 37.5–51)
HGB BLD-MCNC: 16.6 G/DL (ref 13–17.7)
IMM GRANULOCYTES # BLD AUTO: 0.03 10*3/MM3 (ref 0–0.05)
IMM GRANULOCYTES NFR BLD AUTO: 0.4 % (ref 0–0.5)
LYMPHOCYTES # BLD AUTO: 2.09 10*3/MM3 (ref 0.7–3.1)
LYMPHOCYTES NFR BLD AUTO: 24.7 % (ref 19.6–45.3)
MCH RBC QN AUTO: 29.7 PG (ref 26.6–33)
MCHC RBC AUTO-ENTMCNC: 33.4 G/DL (ref 31.5–35.7)
MCV RBC AUTO: 89.1 FL (ref 79–97)
MONOCYTES # BLD AUTO: 0.6 10*3/MM3 (ref 0.1–0.9)
MONOCYTES NFR BLD AUTO: 7.1 % (ref 5–12)
NEUTROPHILS NFR BLD AUTO: 5.55 10*3/MM3 (ref 1.7–7)
NEUTROPHILS NFR BLD AUTO: 65.5 % (ref 42.7–76)
NRBC BLD AUTO-RTO: 0 /100 WBC (ref 0–0.2)
PLATELET # BLD AUTO: 203 10*3/MM3 (ref 140–450)
PMV BLD AUTO: 9.8 FL (ref 6–12)
POTASSIUM SERPL-SCNC: 4.2 MMOL/L (ref 3.5–5.2)
PROT SERPL-MCNC: 6.4 G/DL (ref 6–8.5)
RBC # BLD AUTO: 5.58 10*6/MM3 (ref 4.14–5.8)
SODIUM SERPL-SCNC: 139 MMOL/L (ref 136–145)
WBC NRBC COR # BLD: 8.46 10*3/MM3 (ref 3.4–10.8)

## 2023-11-09 NOTE — PLAN OF CARE
Problem: Patient Care Overview  Goal: Plan of Care Review  Outcome: Ongoing (interventions implemented as appropriate)   03/24/19 4642   Coping/Psychosocial   Plan of Care Reviewed With patient   Plan of Care Review   Progress improving   OTHER   Outcome Summary resting tonight, pain improved, no complaints of nausea. should d/c today.          The patient is a 85y Male complaining of altered mental status.

## 2024-02-24 NOTE — H&P
HISTORY AND PHYSICAL  NAME: Stevenson Chilel  : 1975  MRN: [id number]    DATE OF ADMISSION: 19    DATE & TIME SEEN: 19 9:42 PM    PCP: Irma Schuster APRN    CODE STATUS:   Code Status and Medical Interventions:   Ordered at: 19 3535     Level Of Support Discussed With:    Patient     Code Status:    CPR     Medical Interventions (Level of Support Prior to Arrest):    Full       CHIEF COMPLAINT Nausea/vomiting    HPI:  Stevenson Chilel is a 43 y.o. male with medical history significant for anxiety, GERD, bowel resection presents with nausea and vomiting.     Patient states he was his normal self until the last 24 hours for which she became nauseated, developed chills, and was otherwise generally unwell.  Patient also reports some diarrhea over the last couple of days, but this has slowed as his p.o. intake is also slowed.  Patient does not really endorse any abdominal pain although there is some epigastric dullness on questioning.  Patient denies any fevers.    In the emergency department patient's amylase and lipase were both normal.  CBC and CMP are unremarkable.  Minimally elevated creatinine kinase.  Repeat creatinine kinase improving.  Urinalysis essentially unremarkable as well.  Troponin was negative.  CT scan shows no acute process.    CONCURRENT MEDICAL HISTORY:  Past Medical History:   Diagnosis Date   • Alcoholism /alcohol abuse (CMS/HCC)    • Anxiety    • Arthritis     shoulders and knees   • Atrial flutter (CMS/HCC)    • Depression    • Diverticulitis    • Esophagitis    • Gastritis    • GERD (gastroesophageal reflux disease)    • Hypertension    • Kidney stone    • Pancreatitis     patient denies   • Sleep apnea     has c-pap       PAST SURGICAL HISTORY:  Past Surgical History:   Procedure Laterality Date   • CARDIAC ELECTROPHYSIOLOGY PROCEDURE N/A 2017    Procedure: Ablation atrial flutter;  Surgeon: Ramirez Reza MD;  Location: [facility]  LOCATION;  Service:    • CARDIAC ELECTROPHYSIOLOGY PROCEDURE N/A 9/5/2017    Procedure: Ablation atrial fibrillation PVA ;  Surgeon: Ramirez Reza MD;  Location: [facility] EP INVASIVE LOCATION;  Service:    • COLON RESECTION Left 12/20/2018    Procedure: LAPAROSCOPIC SIGMOID  RESECTION, TAKE DOWN OF SPLENIC FLEXURE;  Surgeon: Michel Lala MD;  Location: [facility] OR;  Service: General   • COLONOSCOPY     • COLONOSCOPY N/A 5/30/2018    Procedure: COLONOSCOPY;  Surgeon: Edwin Medina MD;  Location: [facility] ENDOSCOPY;  Service: Gastroenterology   • ENDOSCOPY N/A 3/30/2018    Procedure: ESOPHAGOGASTRODUODENOSCOPY;  Surgeon: Edwin Medina MD;  Location: [facility] ENDOSCOPY;  Service: Gastroenterology   • ENDOSCOPY N/A 5/30/2018    Procedure: ESOPHAGOGASTRODUODENOSCOPY;  Surgeon: Edwin Medina MD;  Location: [facility] ENDOSCOPY;  Service: Gastroenterology   • ENDOSCOPY N/A 12/3/2018    Procedure: ESOPHAGOGASTRODUODENOSCOPY;  Surgeon: Edwin Medina MD;  Location: [facility] ENDOSCOPY;  Service: Gastroenterology   • SHOULDER SURGERY Right 2014   • TONSILLECTOMY  12/2015   • UPPER GASTROINTESTINAL ENDOSCOPY  03/30/2018   • UPPER GASTROINTESTINAL ENDOSCOPY     • UPPER GASTROINTESTINAL ENDOSCOPY  05/30/2018       FAMILY HISTORY:  Family History   Problem Relation Age of Onset   • Heart disease Father    • Depression Father    • Cancer Maternal Grandmother         lung   • Cancer Paternal Grandmother         pancreatic   • Heart disease Paternal Grandfather         SOCIAL HISTORY:  Social History     Socioeconomic History   • Marital status:      Spouse name: Not on file   • Number of children: Not on file   • Years of education: Not on file   • Highest education level: Not on file   Tobacco Use   • Smoking status: Never Smoker   • Smokeless tobacco: Never Used   Substance and Sexual Activity   • Alcohol use: No     Comment: quit 3/17/18   • Drug use: Yes     Frequency: 7.0 times per week     Types: Marijuana     Comment:  "states he uses \"a little\" every day   • Sexual activity: Defer   Social History Narrative    Pt states has not had drink of any alcohol in 3 weeks as of 4/5/2018       HOME MEDICATIONS:  Prior to Admission medications    Medication Sig Start Date End Date Taking? Authorizing Provider   aspirin 81 MG EC tablet Take 81 mg by mouth Daily.   Yes Idalmis Medina MD   B Complex-C (SUPER B COMPLEX PO) Take 1 tablet by mouth Daily.   Yes Idalmis Medina MD   carvedilol (COREG) 6.25 MG tablet Take 6.25 mg by mouth Daily.   Yes Idalmis Medina MD   clonazePAM (KlonoPIN) 1 MG tablet Take 1 mg by mouth 2 (Two) Times a Day As Needed for Anxiety.   Yes Idalmis Medina MD   DULoxetine (CYMBALTA) 60 MG capsule Take 60 mg by mouth Every Night. 8/21/18  Yes Idalmis Medina MD   magnesium oxide (MAGOX) 400 (241.3 MG) MG tablet tablet Take 400 mg by mouth 2 (Two) Times a Day.   Yes Idalmis Medina MD   mesalamine (APRISO) 0.375 g 24 hr capsule Take 4 capsules by mouth Daily. 8/20/18  Yes Rene Saenz PA-C   mirtazapine (REMERON) 15 MG tablet Take 15 mg by mouth Every Night.   Yes Idalmis Medina MD   Multiple Minerals-Vitamins (CALCIUM-MAGNESIUM-ZINC-D3 PO) Take 1 tablet by mouth Daily.   Yes Idalmis Medina MD   ondansetron (ZOFRAN) 4 MG tablet Take 1 tablet by mouth Every 6 (Six) Hours As Needed for Nausea or Vomiting. 12/3/18  Yes Alexandre Borja MD   pantoprazole (PROTONIX) 40 MG EC tablet Take 40 mg by mouth Daily.   Yes Idalmis Medina MD   Probiotic Product (PROBIOTIC-10 PO) Take 1 tablet by mouth Daily.   Yes Idalmis Medina MD   promethazine (PHENERGAN) 25 MG tablet Take 25 mg by mouth Every 6 (Six) Hours As Needed for Nausea or Vomiting.   Yes Idalmis Medina MD   HYDROcodone-acetaminophen (NORCO) 7.5-325 MG per tablet Take 1 tablet by mouth Every 6 (Six) Hours As Needed for Moderate Pain . 3/22/19   Rios Mart MD   nitrofurantoin, " macrocrystal-monohydrate, (MACROBID) 100 MG capsule Take 1 capsule by mouth 2 (Two) Times a Day for 7 days. 3/22/19 3/29/19  Rios Mart MD   promethazine (PHENERGAN) 25 MG suppository Insert 1 suppository into the rectum Every 6 (Six) Hours As Needed for Nausea or Vomiting. 3/22/19   Rios Mart MD   tamsulosin (FLOMAX) 0.4 MG capsule 24 hr capsule Take 1 capsule by mouth Every Night.    Provider, MD Idalmis   tiZANidine (ZANAFLEX) 4 MG tablet Take 4 mg by mouth Every 8 (Eight) Hours As Needed for Muscle Spasms.    Provider, MD Idalmis   carvedilol (COREG) 6.25 MG tablet TAKE ONE TABLET BY MOUTH EVERY 12 HOURS 2/22/19 3/22/19  Ramirez Reza MD   HYDROcodone-acetaminophen (NORCO) 7.5-325 MG per tablet Take 1 tablet by mouth Every 6 (Six) Hours As Needed for Moderate or Severe Pain. 12/24/18 3/22/19  Michel Lala MD       ALLERGIES:  Contrast dye; Iodinated diagnostic agents; Morphine; and Zofran [ondansetron hcl]    REVIEW OF SYSTEMS  Review of Systems   Constitutional: Positive for activity change, appetite change, chills and fatigue. Negative for fever.   Respiratory: Negative for cough and shortness of breath.    Cardiovascular: Negative for chest pain and palpitations.   Gastrointestinal: Positive for diarrhea, nausea and vomiting. Negative for abdominal pain.   Genitourinary: Negative for difficulty urinating and dysuria.   Musculoskeletal: Negative for arthralgias and gait problem.   Skin: Negative for color change and rash.   Neurological: Negative for dizziness, weakness and headaches.   Psychiatric/Behavioral: Negative for agitation, confusion and sleep disturbance.       PHYSICAL EXAM:  Temp:  [97.8 °F (36.6 °C)-98.8 °F (37.1 °C)] 98.8 °F (37.1 °C)  Heart Rate:  [59-95] 95  Resp:  [18] 18  BP: (163-203)/() 184/98  Body mass index is 29.96 kg/m².     Physical Exam   Constitutional: He is oriented to person, place, and time. He appears well-developed and  well-nourished. No distress.   HENT:   Head: Normocephalic and atraumatic.   Right Ear: External ear normal.   Left Ear: External ear normal.   Nose: Nose normal.   Eyes: Conjunctivae and EOM are normal. Pupils are equal, round, and reactive to light.   Neck: Neck supple. No thyromegaly present.   Cardiovascular: Normal rate, regular rhythm and normal heart sounds.   Pulmonary/Chest: Effort normal and breath sounds normal. No respiratory distress. He has no wheezes. He has no rales. He exhibits no tenderness.   Abdominal: Soft. Bowel sounds are normal. He exhibits no distension and no mass. There is no tenderness. There is no rebound and no guarding.   Musculoskeletal: Normal range of motion. He exhibits no edema.   Neurological: He is alert and oriented to person, place, and time.   Skin: Skin is warm and dry. No rash noted. He is not diaphoretic. No erythema. No pallor.   Psychiatric: He has a normal mood and affect. His behavior is normal.   Nursing note and vitals reviewed.      DIAGNOSTIC DATA:   Lab Results (last 24 hours)     Procedure Component Value Units Date/Time    CK-MB [[id number]]  (Normal) Collected:  03/22/19 2034    Specimen:  Blood from Arm, Right Updated:  03/22/19 2112     CKMB 3.38 ng/mL     Troponin [[id number]]  (Normal) Collected:  03/22/19 2034    Specimen:  Blood from Arm, Right Updated:  03/22/19 2112     Troponin I <0.012 ng/mL     CK [[id number]]  (Abnormal) Collected:  03/22/19 2034    Specimen:  Blood from Arm, Right Updated:  03/22/19 2054     Creatine Kinase 397 U/L     Urinalysis With Culture If Indicated - Urine, Clean Catch [[id number]]  (Abnormal) Collected:  03/22/19 1602    Specimen:  Urine, Clean Catch Updated:  03/22/19 1624     Color, UA Dark Yellow     Appearance, UA Clear     pH, UA 7.5     Specific Gravity, UA 1.029     Comment: Result obtained by Refractometer        Glucose, UA Negative     Ketones, UA 15 mg/dL (1+)     Bilirubin, UA Negative     Blood, UA Negative      Protein,  mg/dL (2+)     Leuk Esterase, UA Trace     Nitrite, UA Negative     Urobilinogen, UA 1.0 E.U./dL    Urinalysis, Microscopic Only - Urine, Clean Catch [[id number]]  (Abnormal) Collected:  03/22/19 1602    Specimen:  Urine, Clean Catch Updated:  03/22/19 1624     RBC, UA 3-5 /HPF      WBC, UA 0-2 /HPF      Bacteria, UA None Seen /HPF      Squamous Epithelial Cells, UA None Seen /HPF      Hyaline Casts, UA 0-2 /LPF      Methodology Automated Microscopy    Amylase [[id number]]  (Normal) Collected:  03/22/19 1414    Specimen:  Blood Updated:  03/22/19 1522     Amylase 81 U/L     CK [[id number]]  (Abnormal) Collected:  03/22/19 1414    Specimen:  Blood Updated:  03/22/19 1522     Creatine Kinase 440 U/L     Magnesium [[id number]]  (Normal) Collected:  03/22/19 1414    Specimen:  Blood Updated:  03/22/19 1522     Magnesium 2.2 mg/dL     Extra Tubes [[id number]] Collected:  03/22/19 1414    Specimen:  Blood, Venous Line Updated:  03/22/19 1516    Narrative:       The following orders were created for panel order Extra Tubes.  Procedure                               Abnormality         Status                     ---------                               -----------         ------                     Light Blue Top[[id number]]                                   Final result               Gold Top - SST[[id number]]                                   Final result                 Please view results for these tests on the individual orders.    Light Blue Top [[id number]] Collected:  03/22/19 1414    Specimen:  Blood Updated:  03/22/19 1516     Extra Tube hold for add-on     Comment: Auto resulted       Gold Top - SST [[id number]] Collected:  03/22/19 1414    Specimen:  Blood Updated:  03/22/19 1516     Extra Tube Hold for add-ons.     Comment: Auto resulted.       Lipase [[id number]]  (Normal) Collected:  03/22/19 1407    Specimen:  Blood Updated:  03/22/19 1449     Lipase 78 U/L     Comprehensive Metabolic Panel [[id number]]   (Normal) Collected:  03/22/19 1407    Specimen:  Blood Updated:  03/22/19 1449     Glucose 96 mg/dL      BUN 14 mg/dL      Creatinine 1.11 mg/dL      Sodium 138 mmol/L      Potassium 4.2 mmol/L      Chloride 102 mmol/L      CO2 26.0 mmol/L      Calcium 8.9 mg/dL      Total Protein 7.0 g/dL      Albumin 4.20 g/dL      ALT (SGPT) 44 U/L      AST (SGOT) 45 U/L      Alkaline Phosphatase 61 U/L      Total Bilirubin 1.0 mg/dL      eGFR Non African Amer 73 mL/min/1.73      Globulin 2.8 gm/dL      A/G Ratio 1.5 g/dL      BUN/Creatinine Ratio 12.6     Anion Gap 10.0 mmol/L     CBC & Differential [[id number]] Collected:  03/22/19 1407    Specimen:  Blood Updated:  03/22/19 1417    Narrative:       The following orders were created for panel order CBC & Differential.  Procedure                               Abnormality         Status                     ---------                               -----------         ------                     CBC Auto Differential[[id number]]        Abnormal            Final result                 Please view results for these tests on the individual orders.    CBC Auto Differential [[id number]]  (Abnormal) Collected:  03/22/19 1407    Specimen:  Blood Updated:  03/22/19 1417     WBC 8.55 10*3/mm3      RBC 5.58 10*6/mm3      Hemoglobin 16.0 g/dL      Hematocrit 49.2 %      MCV 88.2 fL      MCH 28.7 pg      MCHC 32.5 g/dL      RDW 15.1 %      RDW-SD 48.3 fl      MPV 9.9 fL      Platelets 305 10*3/mm3      Neutrophil % 77.8 %      Lymphocyte % 15.7 %      Monocyte % 4.7 %      Eosinophil % 0.7 %      Basophil % 0.7 %      Immature Grans % 0.4 %      Neutrophils, Absolute 6.66 10*3/mm3      Lymphocytes, Absolute 1.34 10*3/mm3      Monocytes, Absolute 0.40 10*3/mm3      Eosinophils, Absolute 0.06 10*3/mm3      Basophils, Absolute 0.06 10*3/mm3      Immature Grans, Absolute 0.03 10*3/mm3      nRBC 0.0 /100 WBC         Estimated Creatinine Clearance: 107.3 mL/min (by C-G formula based on SCr of 1.11  mg/dL).     Imaging Results (last 24 hours)     Procedure Component Value Units Date/Time    CT Abdomen Pelvis Without Contrast [[id number]] Collected:  03/22/19 1628     Updated:  03/22/19 1703    Narrative:         CT Abdomen and Pelvis Without Contrast    History: Abdominal pain.    Axials spiral scans of the abdomen and pelvis were obtained  without contrast.  Coronal and sagital reconstructions were  preformed.      This exam was performed according to our departmental  dose-optimization program, which includes automated exposure  control, adjustment of the mA and/or kV according to patient size  and/or use of iterative reconstruction technique.    DLP: 392.10    Comparison: November 29, 2018    Scans of the lung bases demonstrate minimal dependent atelectasis  are unremarkable.    No acute osseous abnormality.  Degenerative disc disease L5-S1.    The liver is unremarkable.  The gallbladder is unremarkable.  The spleen is unremarkable.  The pancreas is unremarkable.  The adrenal glands are unremarkable.    No renal or ureteral calculi.  No renal mass.  No hydronephrosis.    Unremarkable bladder.  No pelvic mass.    No abdominal aortic aneurysm.  No adenopathy.    Diverticulosis of the colon.  Anastomotic sutures sigmoid colon.  No bowel obstruction.  Normal appendix.  No free air.  No free fluid.    No hernia.      Impression:       Conclusion:  No acute process identified in the abdomen or pelvis.  Diverticulosis of the colon.  Anastomotic sutures sigmoid colon.  Degenerative disc disease L5-S1.    [id number]    Electronically signed by:  Pedrito Lyons MD  3/22/2019 5:02 PM CDT  Workstation: [id number]          I reviewed the patient's new clinical results.    ASSESSMENT AND PLAN: This is a 43 y.o. male with:    Active Hospital Problems    Diagnosis Date Noted   • **Intractable nausea and vomiting [R11.2] 03/22/2019   • Hypertension [I10] 03/28/2018   • Anxiety [F41.9] 03/28/2018       #.  Intractable nausea vomiting.   Antiemetics.  IV fluids.  Influenza ordered.  Clear liquid diet.  Close monitoring.  #.  Anxiety.  Home Klonopin given. eKASPER reviewed.  #. HTN.  Coreg restarted.  PRN labetalol given.      Will monitor patient's hospital course and adjust treatment as hospital course dictates.    DVT prophylaxis: N/A  Code status is   Code Status and Medical Interventions:   Ordered at: 03/22/19 2012     Level Of Support Discussed With:    Patient     Code Status:    CPR     Medical Interventions (Level of Support Prior to Arrest):    Full      KELECHI # [id number], reviewed and consistent with patient reported medications.      I discussed the patients findings and my recommendations with patient and nursing staff.           This document has been electronically signed by Cristian Daigle MD on March 22, 2019 9:42 PM    165.1 No

## (undated) DEVICE — SUT VICRYL 3-0 SH-1 PO 18IN J772D

## (undated) DEVICE — SUT VICRYL 2-0 REEL 54IN J206G

## (undated) DEVICE — SHEET,DRAPE,53X77,STERILE: Brand: MEDLINE

## (undated) DEVICE — ADULT, W/LG. BACK PAD, RADIOTRANSPARENT ELEMENT AND LEAD WIRE: Brand: DEFIBRILLATION ELECTRODES

## (undated) DEVICE — DECANT BG O JET

## (undated) DEVICE — 3M™ STERI-STRIP™ REINFORCED ADHESIVE SKIN CLOSURES, R1547, 1/2 IN X 4 IN (12 MM X 100 MM), 6 STRIPS/ENVELOPE: Brand: 3M™ STERI-STRIP™

## (undated) DEVICE — Device: Brand: ESOPHASTAR

## (undated) DEVICE — ADHS LIQ MASTISOL 2/3ML

## (undated) DEVICE — SOL IRR NACL 0.9PCT BT 1000ML

## (undated) DEVICE — Device: Brand: WEBSTER

## (undated) DEVICE — INTRO SHEATH FASTCATH SAFL .038IN 8.5F 60CM

## (undated) DEVICE — ENDOPATH XCEL BLADELESS TROCARS WITH STABILITY SLEEVES: Brand: ENDOPATH XCEL

## (undated) DEVICE — LEX ELECTRO PHYSIOLOGY: Brand: MEDLINE INDUSTRIES, INC.

## (undated) DEVICE — Device: Brand: REFERENCE PATCH CARTO 3

## (undated) DEVICE — INTRO SHEATH FAST/CATH LG/LUM 11F .038IN 12CM

## (undated) DEVICE — Device: Brand: SOUNDSTAR

## (undated) DEVICE — BITEBLOCK ENDO W/STRAP 60F A/ LF DISP

## (undated) DEVICE — HARMONIC ACE +7 LAPAROSCOPIC SHEARS ADVANCED HEMOSTASIS 5MM DIAMETER 36CM SHAFT LENGTH  FOR USE WITH GRAY HAND PIECE ONLY: Brand: HARMONIC ACE

## (undated) DEVICE — SPNG LAP 18X18IN LF STRL PK/5

## (undated) DEVICE — INTRO SHEATH ENGAGE W/50 GW .038 7F12

## (undated) DEVICE — PK LAP CHOLE LF 60

## (undated) DEVICE — TBG ABL COOL PT 2.6M 100042049

## (undated) DEVICE — COUNT NDL FOAM STRIP W/MAG 60CT

## (undated) DEVICE — CATH QUAD CRD 6F5MM

## (undated) DEVICE — CATH ABL ACHIEVE MP 3.3F20MM 165CM

## (undated) DEVICE — ANTIBACTERIAL UNDYED BRAIDED (POLYGLACTIN 910), SYNTHETIC ABSORBABLE SUTURE: Brand: COATED VICRYL

## (undated) DEVICE — LIGHT HANDLE: Brand: DEVON

## (undated) DEVICE — DRSNG SURESITE WNDW 2.38X2.75

## (undated) DEVICE — COVER,MAYO STAND,STERILE: Brand: MEDLINE

## (undated) DEVICE — TOTAL TRAY, 16FR 10ML SIL FOLEY, URN: Brand: MEDLINE

## (undated) DEVICE — MONOPOLAR METZENBAUM SCISSOR TIP, DISPOSABLE: Brand: MONOPOLAR METZENBAUM SCISSOR TIP, DISPOSABLE

## (undated) DEVICE — CANN SMPL SOFTECH BIFLO ETCO2 A/M 7FT

## (undated) DEVICE — SHEATH FLXCATH STEER 12FR

## (undated) DEVICE — SET PRIMARY GRVTY 10DP MALE LL 104IN

## (undated) DEVICE — SUT VIC 2/0 SH 27IN

## (undated) DEVICE — VISUALIZATION SYSTEM: Brand: CLEARIFY

## (undated) DEVICE — CATH ABL ARCTIC FRNT ADV 10.5F3.5X28MM

## (undated) DEVICE — GOWN,NON-REINFORCED,4XL: Brand: MEDLINE

## (undated) DEVICE — SUT PDS CLOSURE CT1 1/0 27IN Z341H

## (undated) DEVICE — UNDYED BRAIDED (POLYGLACTIN 910), SYNTHETIC ABSORBABLE SUTURE: Brand: COATED VICRYL

## (undated) DEVICE — SINGLE-USE BIOPSY FORCEPS: Brand: RADIAL JAW 4

## (undated) DEVICE — ST INF PRI SMRTSTE 20DRP 2VLV 24ML 117

## (undated) DEVICE — ST EXT IV LG BORE NDLESS FLTR LL 17IN

## (undated) DEVICE — Device: Brand: PENTARAY NAV

## (undated) DEVICE — CABL CONN CATH EP COAXL UMB 72IN

## (undated) DEVICE — Device: Brand: LASSO

## (undated) DEVICE — GOWN,AURORA,NOREINF,RAGLAN,XL,STERILE: Brand: MEDLINE

## (undated) DEVICE — CONTRL CONTRST CHMBRD W/TBG72IN REUS

## (undated) DEVICE — 3M™ IOBAN™ 2 ANTIMICROBIAL INCISE DRAPE 6651EZ: Brand: IOBAN™ 2

## (undated) DEVICE — INTRO SHEATH 8F60CM

## (undated) DEVICE — STERILE POLYISOPRENE POWDER-FREE SURGICAL GLOVES WITH EMOLLIENT COATING: Brand: PROTEXIS

## (undated) DEVICE — ST EXT IV SMARTSITE 2VLV SP M LL 5ML IV1

## (undated) DEVICE — ELECTRD BLD STD SS 3/32X6.5IN

## (undated) DEVICE — INTRO SHEATH ART/FEM ENGAGE .035 5F12CM

## (undated) DEVICE — GLV SURG TRIUMPH LT PF LTX 7.5 STRL

## (undated) DEVICE — DECANTER: Brand: UNBRANDED

## (undated) DEVICE — CATH ULTRASND ECHOCARDIOGRAPHY ACUNAV

## (undated) DEVICE — SYS TRNSEP ACC BRK ACROSS A/ 71CM

## (undated) DEVICE — GLV SURG TRIUMPH PF LTX 6.5 STRL

## (undated) DEVICE — Device

## (undated) DEVICE — TP SXN YANKR BLB TIP W/TBG 10F LF STRL

## (undated) DEVICE — TOWEL,OR,DSP,ST,BLUE,DLX,8/PK,10PK/CS: Brand: MEDLINE

## (undated) DEVICE — CABL CONN CATH EP UMB 48IN

## (undated) DEVICE — TRY IRR

## (undated) DEVICE — Device: Brand: SMARTABLATE

## (undated) DEVICE — Device: Brand: THERMOCOOL SMARTTOUCH SF

## (undated) DEVICE — PENCL E/S HNDSWCH ROCKR CB

## (undated) DEVICE — LIMB HOLDERS: Brand: DEROYAL

## (undated) DEVICE — DRSNG SURESITE123 4X4.8IN

## (undated) DEVICE — GLV SURG SENSICARE PI LF PF 8 GRN STRL

## (undated) DEVICE — KT VLV HEMO MAP ACC PLS LG/BORE MTL/INTRO W/TORQ/DEV

## (undated) DEVICE — STERILE POLYISOPRENE POWDER-FREE SURGICAL GLOVES: Brand: PROTEXIS

## (undated) DEVICE — AIRWY SZ11

## (undated) DEVICE — GLV SURG SENSICARE PI PF LF 7 GRN STRL

## (undated) DEVICE — SOL NACL 0.9PCT 1000ML

## (undated) DEVICE — GRSPR BABCOCK 10MM 31CM 1P/U

## (undated) DEVICE — ACCESS PLATFORM FOR MINIMALLY INVASIVE SURGERY.: Brand: GELPORT® LAPAROSCOPIC  SYSTEM

## (undated) DEVICE — CANN NASL CO2 DIVIDED A/

## (undated) DEVICE — Device: Brand: MEDEX